# Patient Record
Sex: MALE | Race: WHITE | NOT HISPANIC OR LATINO | Employment: OTHER | URBAN - METROPOLITAN AREA
[De-identification: names, ages, dates, MRNs, and addresses within clinical notes are randomized per-mention and may not be internally consistent; named-entity substitution may affect disease eponyms.]

---

## 2017-01-01 ENCOUNTER — ANESTHESIA (OUTPATIENT)
Dept: SURGERY | Facility: HOSPITAL | Age: 72
DRG: 571 | End: 2017-01-01
Payer: MEDICARE

## 2017-01-01 ENCOUNTER — SURGERY (OUTPATIENT)
Age: 72
End: 2017-01-01

## 2017-01-01 PROCEDURE — 63600175 PHARM REV CODE 636 W HCPCS: Performed by: NURSE ANESTHETIST, CERTIFIED REGISTERED

## 2017-01-01 PROCEDURE — 25000003 PHARM REV CODE 250: Performed by: NURSE ANESTHETIST, CERTIFIED REGISTERED

## 2017-01-01 RX ORDER — SODIUM CHLORIDE, SODIUM LACTATE, POTASSIUM CHLORIDE, CALCIUM CHLORIDE 600; 310; 30; 20 MG/100ML; MG/100ML; MG/100ML; MG/100ML
INJECTION, SOLUTION INTRAVENOUS CONTINUOUS PRN
Status: DISCONTINUED | OUTPATIENT
Start: 2017-01-01 | End: 2017-01-01

## 2017-01-01 RX ORDER — FENTANYL CITRATE 50 UG/ML
INJECTION, SOLUTION INTRAMUSCULAR; INTRAVENOUS
Status: DISCONTINUED | OUTPATIENT
Start: 2017-01-01 | End: 2017-01-01

## 2017-01-01 RX ORDER — LIDOCAINE HCL/PF 100 MG/5ML
SYRINGE (ML) INTRAVENOUS
Status: DISCONTINUED | OUTPATIENT
Start: 2017-01-01 | End: 2017-01-01

## 2017-01-01 RX ORDER — PROPOFOL 10 MG/ML
VIAL (ML) INTRAVENOUS
Status: DISCONTINUED | OUTPATIENT
Start: 2017-01-01 | End: 2017-01-01

## 2017-01-01 RX ORDER — ONDANSETRON 2 MG/ML
INJECTION INTRAMUSCULAR; INTRAVENOUS
Status: DISCONTINUED | OUTPATIENT
Start: 2017-01-01 | End: 2017-01-01

## 2017-01-01 RX ADMIN — ONDANSETRON 4 MG: 2 INJECTION, SOLUTION INTRAMUSCULAR; INTRAVENOUS at 10:01

## 2017-01-01 RX ADMIN — FENTANYL CITRATE 100 MCG: 50 INJECTION, SOLUTION INTRAMUSCULAR; INTRAVENOUS at 09:01

## 2017-01-01 RX ADMIN — SODIUM CHLORIDE, SODIUM LACTATE, POTASSIUM CHLORIDE, AND CALCIUM CHLORIDE: 600; 310; 30; 20 INJECTION, SOLUTION INTRAVENOUS at 09:01

## 2017-01-01 RX ADMIN — LIDOCAINE HYDROCHLORIDE 80 MG: 20 INJECTION, SOLUTION INTRAVENOUS at 09:01

## 2017-01-01 RX ADMIN — PROPOFOL 120 MG: 10 INJECTION, EMULSION INTRAVENOUS at 09:01

## 2017-01-01 RX ADMIN — EPHEDRINE SULFATE 25 MG: 50 INJECTION, SOLUTION INTRAMUSCULAR; INTRAVENOUS; SUBCUTANEOUS at 09:01

## 2017-01-05 ENCOUNTER — TELEPHONE (OUTPATIENT)
Dept: CASE MANAGEMENT | Facility: HOSPITAL | Age: 72
End: 2017-01-05

## 2017-01-05 NOTE — TELEPHONE ENCOUNTER
Received call from Doris Lemos [facility] in regards to PICC line.  Doris states that the PICC line was not discontinued prior to discharge.  Doris Lemos stated that they are not certified to discontinue PICC lines.  Information regarding PICC line and patient information given to Mikki Reyez RN (CC) on med surg to determine course for getting PICC line discontinued.

## 2017-01-20 ENCOUNTER — TELEPHONE (OUTPATIENT)
Dept: INTERNAL MEDICINE | Facility: CLINIC | Age: 72
End: 2017-01-20

## 2017-01-20 NOTE — TELEPHONE ENCOUNTER
Called Gene Rothman and left a voicemail on 01/20/2017 at 2:20 PM    Patient needs to schedule an appointment to discuss paperwork with Dr. Hills.

## 2017-01-27 ENCOUNTER — TELEPHONE (OUTPATIENT)
Dept: INTERNAL MEDICINE | Facility: CLINIC | Age: 72
End: 2017-01-27

## 2017-01-27 NOTE — TELEPHONE ENCOUNTER
Patient's appointment has been scheduled. Gene Rothman has been notified 01/27/2017 at 2:22 PM & verbalized understanding.       Home health notified.

## 2017-01-27 NOTE — TELEPHONE ENCOUNTER
----- Message from Alondra Kelly sent at 1/27/2017 12:54 PM CST -----  Please call hannah hein at [facility] back at [phone] in regards to faxing a order for home care aid for adl. Please fax at [phone].

## 2017-01-27 NOTE — TELEPHONE ENCOUNTER
That should not be a problem, however he has not been seen for hospital TCC follow up and signing off on home health orders will be a problem as I have not seen him since September. He needs to schedule a visit. Thank you.

## 2017-02-06 ENCOUNTER — OFFICE VISIT (OUTPATIENT)
Dept: INTERNAL MEDICINE | Facility: CLINIC | Age: 72
End: 2017-02-06
Payer: MEDICARE

## 2017-02-06 ENCOUNTER — LAB VISIT (OUTPATIENT)
Dept: LAB | Facility: HOSPITAL | Age: 72
End: 2017-02-06
Attending: INTERNAL MEDICINE
Payer: MEDICARE

## 2017-02-06 VITALS
WEIGHT: 315 LBS | HEIGHT: 71 IN | BODY MASS INDEX: 44.1 KG/M2 | TEMPERATURE: 99 F | RESPIRATION RATE: 17 BRPM | DIASTOLIC BLOOD PRESSURE: 90 MMHG | HEART RATE: 100 BPM | OXYGEN SATURATION: 94 % | SYSTOLIC BLOOD PRESSURE: 160 MMHG

## 2017-02-06 DIAGNOSIS — S81.809D MULTIPLE OPEN WOUNDS OF LOWER LEG, UNSPECIFIED LATERALITY, SUBSEQUENT ENCOUNTER: Primary | ICD-10-CM

## 2017-02-06 DIAGNOSIS — E66.01 MORBID OBESITY WITH BMI OF 60.0-69.9, ADULT: ICD-10-CM

## 2017-02-06 DIAGNOSIS — E03.9 HYPOTHYROIDISM (ACQUIRED): ICD-10-CM

## 2017-02-06 DIAGNOSIS — I89.0 LYMPHEDEMA OF BOTH LOWER EXTREMITIES: ICD-10-CM

## 2017-02-06 DIAGNOSIS — I10 ESSENTIAL HYPERTENSION: ICD-10-CM

## 2017-02-06 PROCEDURE — 99999 PR PBB SHADOW E&M-EST. PATIENT-LVL III: CPT | Mod: PBBFAC,,, | Performed by: INTERNAL MEDICINE

## 2017-02-06 PROCEDURE — 99499 UNLISTED E&M SERVICE: CPT | Mod: S$GLB,,, | Performed by: INTERNAL MEDICINE

## 2017-02-06 PROCEDURE — 84439 ASSAY OF FREE THYROXINE: CPT

## 2017-02-06 PROCEDURE — 36415 COLL VENOUS BLD VENIPUNCTURE: CPT

## 2017-02-06 PROCEDURE — 84443 ASSAY THYROID STIM HORMONE: CPT

## 2017-02-06 PROCEDURE — 99213 OFFICE O/P EST LOW 20 MIN: CPT | Mod: S$GLB,,, | Performed by: INTERNAL MEDICINE

## 2017-02-06 RX ORDER — CIPROFLOXACIN 750 MG/1
TABLET, FILM COATED ORAL
COMMUNITY
Start: 2017-02-01 | End: 2017-02-20

## 2017-02-06 RX ORDER — HYDROCODONE BITARTRATE AND ACETAMINOPHEN 5; 325 MG/1; MG/1
TABLET ORAL
Status: ON HOLD | COMMUNITY
Start: 2017-02-01 | End: 2021-12-14 | Stop reason: SDUPTHER

## 2017-02-06 NOTE — MR AVS SNAPSHOT
[facility]  [address] [place] [address]  Phone: [phone]  Fax: [phone]                  Gene Rothman   2017 2:00 PM   Office Visit    Description:  Male : 1945   Provider:  Ami Zarate DO   Department:  [facility]           Reason for Visit     Transitional Care           Diagnoses this Visit        Comments    Hypothyroidism (acquired)    -  Primary     Multiple open wounds of lower leg, unspecified laterality, subsequent encounter         Venous insufficiency of both lower extremities                To Do List           Goals (5 Years of Data)     None      [facility]     [facility] On [facility] Care Line -  Assistance  Registered nurses in the [facility] On Call Center provide clinical advisement, health education, appointment booking, and other advisory services.  Call for this free service at [phone].             Medications           Message regarding Medications     Verify the changes and/or additions to your medication regime listed below are the same as discussed with your clinician today.  If any of these changes or additions are incorrect, please notify your healthcare provider.             Verify that the below list of medications is an accurate representation of the medications you are currently taking.  If none reported, the list may be blank. If incorrect, please contact your healthcare provider. Carry this list with you in case of emergency.           Current Medications     ciprofloxacin HCl (CIPRO) 750 MG tablet     hydrocodone-acetaminophen 5-325mg (NORCO) 5-325 mg per tablet     levothyroxine (SYNTHROID) 25 MCG tablet Take 1 tablet (25 mcg total) by mouth once daily.    metronidazole 1% (METROGEL) 1 % Gel Apply topically once daily.    miconazole (MICOTIN) 2 % cream Apply topically 2 (two) times daily.    polyethylene glycol (GLYCOLAX) 17 gram PwPk Take 17 g by mouth every evening.    furosemide  "(LASIX) 20 MG tablet Take 1 tablet (20 mg total) by mouth once daily.    naproxen (NAPROSYN) 250 MG tablet Take 500 mg by mouth once daily.    oxycodone-acetaminophen (PERCOCET)  mg per tablet Take 1 tablet by mouth every 4 (four) hours as needed for Pain.    sodium hypochlorite 0.5 % external solution Apply topically daily as needed (dressing changes).           Clinical Reference Information           Your Vitals Were     BP Pulse Temp Resp Height Weight    160/90 100 98.7 °F (37.1 °C) (Temporal) 17 5' 11" (1.803 m) 207.2 kg (456 lb 12.7 oz)    SpO2 BMI             94% 63.71 kg/m2         Blood Pressure          Most Recent Value    BP  (!)  160/90      Allergies as of 2/6/2017     Bactrim [Sulfamethoxazole-trimethoprim]      Immunizations Administered on Date of Encounter - 2/6/2017     None      Orders Placed During Today's Visit     Recurring Lab Work Interval Expires    TSH  Every 12 Months until 4/7/2018 4/7/2018      [facility] Sign-Up     Activating your [facility] account is as easy as 1-2-3!     1) Visit [email or web address], select Sign Up Now, enter this activation code and your date of birth, then select Next.  [email or web address]  Expires: 2/13/2017  2:14 PM      2) Create a username and password to use when you visit [facility] in the future and select a security question in case you lose your password and select Next.    3) Enter your e-mail address and click Sign Up!    Additional Information  If you have questions, please e-mail [email or web address] or call [phone] to talk to our [facility] staff. Remember, [facility] is NOT to be used for urgent needs. For medical emergencies, dial 911.         Language Assistance Services     ATTENTION: Language assistance services are available, free of charge. Please call [phone].      ATENCIÓN: Si habla español, tiene a christensen disposición servicios gratuitos de asistencia lingüística. Llame al [phone].     CHÚ Ý: N?u b?n nói Ti?ng Vi?t, có các d?ch " v? h? tr? carmen ng? mi?n phí alexanderh cho b?n. G?i s? [phone].         [facility] complies with applicable Federal civil rights laws and does not discriminate on the basis of race, color, national origin, age, disability, or sex.

## 2017-02-07 LAB
T4 FREE SERPL-MCNC: 1.03 NG/DL
TSH SERPL DL<=0.005 MIU/L-ACNC: 4.22 UIU/ML

## 2017-02-08 NOTE — PROGRESS NOTES
Transitional Care Note  Subjective:       Patient ID: Gene Rothman is a 71 y.o. male.  Chief Complaint: Transitional Care    Family and/or Caretaker present at visit?  Yes.  Diagnostic tests reviewed/disposition: I have reviewed all completed as well as pending diagnostic tests at the time of discharge.  Disease/illness education: done  Home health/community services discussion/referrals: Patient has home health established at [facility].   Establishment or re-establishment of referral orders for community resources: No other necessary community resources.   Discussion with other health care providers: No discussion with other health care providers necessary.   HPI Comments: HPI: Hospitalized from 12/30/16 to 1/4/17 due to bilateral lower extremity cellulitis and wounds which required surgical debridement and IV antibiotics. He is currently receiving wound care through home health. He and his wife endorse a significant improvement. He has chronic lymphedema.  He would like to establish care today.   Hypothyroidism--compliant with levothyroxine.   His b/p is elevated and has been elevated on prior visits with other providers. He has been prescribed furosemide but does not take it. He is not on any other b/p medication.     Past Medical History:    Arthritis                                                     Depression                                                    Hypertension                                                  Hypothyroidism                                                Lymphedema of lower extremity                                 Nephrolithiasis                                                 Comment:passes one q year, one surgically removed    Obesity                                                       Peripheral vascular disease                                   Sleep apnea                                                     Comment:can't stand the CPAP , sleeps elevated in                 hospital bed or chair    Tobacco dependence                                              Comment:resolved    Past Surgical History:    BACK SURGERY                                     ;  19*      Comment:x2 for disc; scar tissue removed    ADENOIDECTOMY                                                  INNER EAR SURGERY                               Bilateral             Comment:separate - pinnaplasty , new eardrms                constructed    TONSILLECTOMY                                              KIDNEY STONE SURGERY                            Left  appr*      Comment:open    Review of patient's family history indicates:    Asthma                         Daughter                    Comment:  26 w/ asthma    Cancer                         Brother                     Comment: skin-part of ext ear removed    Cancer                         Mother                      Comment: ? abd also    Diabetes                       Mother                    Hypertension                   Mother                    Cancer                         Father                      Comment: ? abdonimal origin stomach/liver or                pancreas    Heart disease                  Father                      Comment: pacer    Heart disease                  Brother                     Comment: CABG3    Alcohol abuse                  Maternal Grandfather      Stroke                         Neg Hx                    COPD                           Neg Hx                    Mental illness                 Neg Hx                    Mental retardation             Neg Hx                    Kidney disease                 Neg Hx                      Current Outpatient Prescriptions on File Prior to Visit:  levothyroxine (SYNTHROID) 25 MCG tablet, Take 1 tablet (25 mcg total) by mouth once daily., Disp: 90 tablet, Rfl: 3  metronidazole 1% (METROGEL) 1 % Gel, Apply topically once daily., Disp: 60 g, Rfl: 0  miconazole  (MICOTIN) 2 % cream, Apply topically 2 (two) times daily., Disp: 14 g, Rfl: 0  polyethylene glycol (GLYCOLAX) 17 gram PwPk, Take 17 g by mouth every evening., Disp: 30 each, Rfl: 0  furosemide (LASIX) 20 MG tablet, Take 1 tablet (20 mg total) by mouth once daily., Disp: 30 tablet, Rfl: 0  naproxen (NAPROSYN) 250 MG tablet, Take 500 mg by mouth once daily., Disp: , Rfl:   oxycodone-acetaminophen (PERCOCET)  mg per tablet, Take 1 tablet by mouth every 4 (four) hours as needed for Pain., Disp: 30 tablet, Rfl: 0  sodium hypochlorite 0.5 % external solution, Apply topically daily as needed (dressing changes)., Disp: , Rfl: 0    Allergies:   Review of patient's allergies indicates:   -- Bactrim (sulfamethoxazole-trimethoprim) -- Itching            Review of Systems   Constitutional: Negative for fever and unexpected weight change.   Respiratory: Negative for shortness of breath.    Cardiovascular: Positive for leg swelling. Negative for chest pain.   Gastrointestinal: Negative for abdominal pain, constipation and diarrhea.   Genitourinary: Negative for dysuria.   Musculoskeletal: Positive for gait problem.   Skin: Positive for wound.   Neurological: Negative for dizziness and headaches.       Objective:      Physical Exam   Constitutional: He is oriented to person, place, and time. He appears well-developed and well-nourished. No distress.   Eyes: No scleral icterus.   Cardiovascular: Normal rate, regular rhythm and normal heart sounds.    Pulmonary/Chest: Effort normal and breath sounds normal. No respiratory distress.   Abdominal: Soft. Bowel sounds are normal.   Obese    Musculoskeletal: He exhibits edema.   Neurological: He is alert and oriented to person, place, and time.   Skin: Skin is warm and dry.   Multiple wounds of bilateral lower extremities. Bloody drainage from wounds on right leg. No purulent drainage noted.    Psychiatric: He has a normal mood and affect.   Vitals reviewed.      Assessment:       1.  Multiple open wounds of lower leg, unspecified laterality, subsequent encounter    2. Lymphedema of both lower extremities    3. Hypothyroidism (acquired)    4. Essential hypertension    5. Morbid obesity with BMI of 60.0-69.9, adult        Plan:       Gene was seen today for transitional care.    Diagnoses and all orders for this visit:    Multiple open wounds of lower leg, unspecified laterality, subsequent encounter  -     Continue wound care    Lymphedema of both lower extremities    Hypothyroidism (acquired)  -     TSH; Standing    Essential hypertension  -     Monitor b/p at home through home health    Morbid obesity with BMI of 60.0-69.9, adult  -     Lifestyle modifications    Schedule labs.

## 2017-02-09 ENCOUNTER — TELEPHONE (OUTPATIENT)
Dept: INTERNAL MEDICINE | Facility: CLINIC | Age: 72
End: 2017-02-09

## 2017-02-09 DIAGNOSIS — E03.9 HYPOTHYROIDISM (ACQUIRED): Primary | ICD-10-CM

## 2017-02-13 ENCOUNTER — TELEPHONE (OUTPATIENT)
Dept: INTERNAL MEDICINE | Facility: CLINIC | Age: 72
End: 2017-02-13

## 2017-02-13 NOTE — TELEPHONE ENCOUNTER
----- Message from Ami Zarate DO sent at 2/9/2017  9:41 PM CST -----  Notify patient thyroid test is slightly abnormal. Recommend continuing current dose of levothyroxine and repeat TSH in 3 months.

## 2017-02-13 NOTE — TELEPHONE ENCOUNTER
Pt was informed lab results and Dr. Zarate recommended continuing current dose of levothyroxine and repeat TSH in 3 months pt verbalized understanding.

## 2017-02-20 ENCOUNTER — OFFICE VISIT (OUTPATIENT)
Dept: INTERNAL MEDICINE | Facility: CLINIC | Age: 72
End: 2017-02-20
Payer: MEDICARE

## 2017-02-20 VITALS
HEART RATE: 79 BPM | OXYGEN SATURATION: 94 % | TEMPERATURE: 99 F | WEIGHT: 315 LBS | DIASTOLIC BLOOD PRESSURE: 70 MMHG | HEIGHT: 71 IN | SYSTOLIC BLOOD PRESSURE: 128 MMHG | BODY MASS INDEX: 44.1 KG/M2

## 2017-02-20 DIAGNOSIS — Z76.89 RETURN TO WORK EVALUATION: Primary | ICD-10-CM

## 2017-02-20 PROCEDURE — 1157F ADVNC CARE PLAN IN RCRD: CPT | Mod: S$GLB,,, | Performed by: INTERNAL MEDICINE

## 2017-02-20 PROCEDURE — 1126F AMNT PAIN NOTED NONE PRSNT: CPT | Mod: S$GLB,,, | Performed by: INTERNAL MEDICINE

## 2017-02-20 PROCEDURE — 3078F DIAST BP <80 MM HG: CPT | Mod: S$GLB,,, | Performed by: INTERNAL MEDICINE

## 2017-02-20 PROCEDURE — 1159F MED LIST DOCD IN RCRD: CPT | Mod: S$GLB,,, | Performed by: INTERNAL MEDICINE

## 2017-02-20 PROCEDURE — 99999 PR PBB SHADOW E&M-EST. PATIENT-LVL III: CPT | Mod: PBBFAC,,, | Performed by: INTERNAL MEDICINE

## 2017-02-20 PROCEDURE — 3074F SYST BP LT 130 MM HG: CPT | Mod: S$GLB,,, | Performed by: INTERNAL MEDICINE

## 2017-02-20 PROCEDURE — 99212 OFFICE O/P EST SF 10 MIN: CPT | Mod: S$GLB,,, | Performed by: INTERNAL MEDICINE

## 2017-02-20 NOTE — MR AVS SNAPSHOT
[facility]  [address] [address]  Phone: [phone]  Fax: [phone]                  Gene Rothman   2017 1:20 PM   Office Visit    Description:  Male : 1945   Provider:  Ami Zarate DO   Department:  [facility]           Reason for Visit     Follow-up                To Do List           Future Appointments        Provider Department Dept Phone    2017 9:30 AM LABORATORY, [address] [facility] [phone]      Goals (5 Years of Data)     None      [facility] On Call     [facility] On Call Nurse Care Line -  Assistance  Registered nurses in the [facility] On Call Center provide clinical advisement, health education, appointment booking, and other advisory services.  Call for this free service at [phone].             Medications           Message regarding Medications     Verify the changes and/or additions to your medication regime listed below are the same as discussed with your clinician today.  If any of these changes or additions are incorrect, please notify your healthcare provider.        STOP taking these medications     ciprofloxacin HCl (CIPRO) 750 MG tablet     furosemide (LASIX) 20 MG tablet Take 1 tablet (20 mg total) by mouth once daily.    metronidazole 1% (METROGEL) 1 % Gel Apply topically once daily.    miconazole (MICOTIN) 2 % cream Apply topically 2 (two) times daily.    oxycodone-acetaminophen (PERCOCET)  mg per tablet Take 1 tablet by mouth every 4 (four) hours as needed for Pain.    polyethylene glycol (GLYCOLAX) 17 gram PwPk Take 17 g by mouth every evening.           Verify that the below list of medications is an accurate representation of the medications you are currently taking.  If none reported, the list may be blank. If incorrect, please contact your healthcare provider. Carry this list with you in case of emergency.           Current Medications      "hydrocodone-acetaminophen 5-325mg (NORCO) 5-325 mg per tablet     levothyroxine (SYNTHROID) 25 MCG tablet Take 1 tablet (25 mcg total) by mouth once daily.    naproxen (NAPROSYN) 250 MG tablet Take 500 mg by mouth once daily.    sodium hypochlorite 0.5 % external solution Apply topically daily as needed (dressing changes).           Clinical Reference Information           Your Vitals Were     BP Pulse Temp Height Weight SpO2    128/70 (BP Location: Right arm, Patient Position: Sitting) 79 98.7 °F (37.1 °C) (Tympanic) 5' 11" (1.803 m) 207 kg (456 lb 5.6 oz) 94%    BMI                63.65 kg/m2          Blood Pressure          Most Recent Value    BP  128/70      Allergies as of 2/20/2017     Bactrim [Sulfamethoxazole-trimethoprim]      Immunizations Administered on Date of Encounter - 2/20/2017     None      [facility] Sign-Up     Activating your [facility] account is as easy as 1-2-3!     1) Visit [email or web address], select Sign Up Now, enter this activation code and your date of birth, then select Next.  [email or web address]  Expires: 4/6/2017  2:09 PM      2) Create a username and password to use when you visit [facility] in the future and select a security question in case you lose your password and select Next.    3) Enter your e-mail address and click Sign Up!    Additional Information  If you have questions, please e-mail [email or web address] or call [phone] to talk to our [facility] staff. Remember, [facility] is NOT to be used for urgent needs. For medical emergencies, dial 911.         Language Assistance Services     ATTENTION: Language assistance services are available, free of charge. Please call [phone].      ATENCIÓN: Si stefaniala hardikwolf, tiene a christensen disposición servicios gratuitos de asistencia lingüística. Llame al [phone].     CHÚ Ý: N?u b?n nói Ti?ng Vi?t, có các d?ch v? h? tr? ngôn ng? mi?n phí dành cho b?n. G?i s? [phone].         [facility] complies with applicable " Federal civil rights laws and does not discriminate on the basis of race, color, national origin, age, disability, or sex.

## 2017-02-20 NOTE — LETTER
February 20, 2017               [facility]  Internal Medicine  [address] [place] [address]  Phone: [phone]  Fax: [phone]   February 20, 2017     Patient: Gene Rothman   YOB: 1945   Date of Visit: 2/20/2017       To Whom it May Concern:    Gene Rothman was seen in my clinic on 2/20/2017. He may return to work on 2/27/2017.    If you have any questions or concerns, please don't hesitate to call.    Sincerely,         Ami Zarate, DO

## 2017-03-23 NOTE — TELEPHONE ENCOUNTER
----- Message from Hansa Tovar sent at 3/23/2017 10:32 AM CDT -----  Pt needs to speak to the nurse regarding possibly getting a refill on a medication (silvasorb gel)) that was given to him in the hospital/ please call [phone]/ma

## 2017-03-28 NOTE — TELEPHONE ENCOUNTER
----- Message from Eneida Erazo sent at 3/28/2017 11:37 AM CDT -----  Contact: Patient   Refill request for Rx Silvasorb gel, Please call him at [phone] anytime after 4pm and anytime before 4pm to call [phone].      [phone] - [place] [address] - [address]  [address] [address]  Phone: [phone] Fax: [phone]    Thanks  td

## 2017-05-18 RX ORDER — LEVOTHYROXINE SODIUM 25 UG/1
25 TABLET ORAL DAILY
Qty: 90 TABLET | Refills: 3 | Status: SHIPPED | OUTPATIENT
Start: 2017-05-18 | End: 2017-05-22 | Stop reason: SDUPTHER

## 2017-05-22 RX ORDER — LEVOTHYROXINE SODIUM 25 UG/1
25 TABLET ORAL DAILY
Qty: 90 TABLET | Refills: 3 | Status: SHIPPED | OUTPATIENT
Start: 2017-05-22 | End: 2017-06-05 | Stop reason: SDUPTHER

## 2017-06-02 ENCOUNTER — LAB VISIT (OUTPATIENT)
Dept: LAB | Facility: HOSPITAL | Age: 72
End: 2017-06-02
Attending: INTERNAL MEDICINE
Payer: MEDICARE

## 2017-06-02 DIAGNOSIS — E03.9 HYPOTHYROIDISM (ACQUIRED): ICD-10-CM

## 2017-06-02 LAB
T4 FREE SERPL-MCNC: 0.94 NG/DL
TSH SERPL DL<=0.005 MIU/L-ACNC: 7.99 UIU/ML

## 2017-06-02 PROCEDURE — 84439 ASSAY OF FREE THYROXINE: CPT

## 2017-06-02 PROCEDURE — 84443 ASSAY THYROID STIM HORMONE: CPT

## 2017-06-02 PROCEDURE — 36415 COLL VENOUS BLD VENIPUNCTURE: CPT

## 2017-06-05 ENCOUNTER — TELEPHONE (OUTPATIENT)
Dept: INTERNAL MEDICINE | Facility: CLINIC | Age: 72
End: 2017-06-05

## 2017-06-05 DIAGNOSIS — E03.9 HYPOTHYROIDISM (ACQUIRED): Primary | ICD-10-CM

## 2017-06-05 RX ORDER — LEVOTHYROXINE SODIUM 25 UG/1
37.5 TABLET ORAL DAILY
Qty: 45 TABLET | Refills: 1 | Status: SHIPPED | OUTPATIENT
Start: 2017-06-05 | End: 2017-06-28 | Stop reason: SDUPTHER

## 2017-06-08 DIAGNOSIS — E03.9 HYPOTHYROIDISM (ACQUIRED): ICD-10-CM

## 2017-06-10 RX ORDER — LEVOTHYROXINE SODIUM 25 UG/1
37.5 TABLET ORAL DAILY
Qty: 135 TABLET | Refills: 1 | OUTPATIENT
Start: 2017-06-10

## 2017-06-26 ENCOUNTER — TELEPHONE (OUTPATIENT)
Dept: INTERNAL MEDICINE | Facility: CLINIC | Age: 72
End: 2017-06-26

## 2017-06-26 DIAGNOSIS — E03.9 HYPOTHYROIDISM (ACQUIRED): ICD-10-CM

## 2017-06-28 RX ORDER — LEVOTHYROXINE SODIUM 25 UG/1
37.5 TABLET ORAL DAILY
Qty: 135 TABLET | Refills: 0 | Status: SHIPPED | OUTPATIENT
Start: 2017-06-28 | End: 2017-09-19 | Stop reason: SDUPTHER

## 2017-09-12 ENCOUNTER — TELEPHONE (OUTPATIENT)
Dept: INTERNAL MEDICINE | Facility: CLINIC | Age: 72
End: 2017-09-12

## 2017-09-12 NOTE — TELEPHONE ENCOUNTER
Pt applying for assistance from [facility] to help restore his home. States he has arthritis to bilat knees and moblizes via walker and/or wheelchair. Letter needs to state that he is disabled.

## 2017-09-12 NOTE — TELEPHONE ENCOUNTER
----- Message from Tayo Lopez sent at 9/12/2017 11:07 AM CDT -----  Contact: pt  He's calling in regards to a written letter of his diagnosis for disability, please fax this to: [phone], please advise, [phone] (work)

## 2017-09-19 ENCOUNTER — TELEPHONE (OUTPATIENT)
Dept: INTERNAL MEDICINE | Facility: CLINIC | Age: 72
End: 2017-09-19

## 2017-09-19 DIAGNOSIS — E03.9 HYPOTHYROIDISM (ACQUIRED): ICD-10-CM

## 2017-09-19 RX ORDER — LEVOTHYROXINE SODIUM 25 UG/1
TABLET ORAL
Qty: 45 TABLET | Refills: 0 | Status: SHIPPED | OUTPATIENT
Start: 2017-09-19 | End: 2017-10-09 | Stop reason: SDUPTHER

## 2017-09-19 NOTE — TELEPHONE ENCOUNTER
----- Message from Patrice Rothman sent at 9/19/2017  2:06 PM CDT -----  Contact: [phone]  Would like to consult with nurse about pt medication Levothyroxine 25mg. Pt need authorization for script. .. Please fax back at [phone]. Thx. lj

## 2017-09-19 NOTE — TELEPHONE ENCOUNTER
Dr. Zarate had wanted him back for follow up to recheck TSH.  He did not complete this.  I only gave one month of refill  Please schedule him for lab and follow up with Dr. Zarate or Tej.    Dr. Erazo

## 2017-09-20 ENCOUNTER — TELEPHONE (OUTPATIENT)
Dept: INTERNAL MEDICINE | Facility: CLINIC | Age: 72
End: 2017-09-20

## 2017-09-20 NOTE — TELEPHONE ENCOUNTER
Pt has an appt on 09/29/17 for HRA and repeat TSH labs. Do you want to order anything else on him?

## 2017-09-20 NOTE — TELEPHONE ENCOUNTER
Notified pts wife of refill for 30 days. Scheduled lab appt for 09/29/17, pt already has an appt that day and will be best. Then scheduled a f/u appt with Dr Zarate on 10/13/17. Mailed appt slips to pt.

## 2017-09-29 ENCOUNTER — LAB VISIT (OUTPATIENT)
Dept: LAB | Facility: HOSPITAL | Age: 72
End: 2017-09-29
Attending: INTERNAL MEDICINE
Payer: MEDICARE

## 2017-09-29 ENCOUNTER — OFFICE VISIT (OUTPATIENT)
Dept: INTERNAL MEDICINE | Facility: CLINIC | Age: 72
End: 2017-09-29
Payer: MEDICARE

## 2017-09-29 VITALS
SYSTOLIC BLOOD PRESSURE: 118 MMHG | WEIGHT: 315 LBS | HEART RATE: 69 BPM | DIASTOLIC BLOOD PRESSURE: 64 MMHG | HEIGHT: 71 IN | OXYGEN SATURATION: 93 % | BODY MASS INDEX: 44.1 KG/M2 | TEMPERATURE: 98 F

## 2017-09-29 DIAGNOSIS — N20.0 NEPHROLITHIASIS: Chronic | ICD-10-CM

## 2017-09-29 DIAGNOSIS — Z13.31 POSITIVE DEPRESSION SCREENING: ICD-10-CM

## 2017-09-29 DIAGNOSIS — E03.9 HYPOTHYROIDISM (ACQUIRED): ICD-10-CM

## 2017-09-29 DIAGNOSIS — I89.0 CHRONIC ACQUIRED LYMPHEDEMA: Chronic | ICD-10-CM

## 2017-09-29 DIAGNOSIS — I83.029 VENOUS STASIS ULCERS OF BOTH LOWER EXTREMITIES: ICD-10-CM

## 2017-09-29 DIAGNOSIS — G47.33 OBSTRUCTIVE SLEEP APNEA SYNDROME: Chronic | ICD-10-CM

## 2017-09-29 DIAGNOSIS — G47.34 NOCTURNAL HYPOXIA: ICD-10-CM

## 2017-09-29 DIAGNOSIS — Z00.00 ENCOUNTER FOR PREVENTIVE HEALTH EXAMINATION: Primary | ICD-10-CM

## 2017-09-29 DIAGNOSIS — E03.8 OTHER SPECIFIED ACQUIRED HYPOTHYROIDISM: Chronic | ICD-10-CM

## 2017-09-29 DIAGNOSIS — L97.919 VENOUS STASIS ULCERS OF BOTH LOWER EXTREMITIES: ICD-10-CM

## 2017-09-29 DIAGNOSIS — L97.929 VENOUS STASIS ULCERS OF BOTH LOWER EXTREMITIES: ICD-10-CM

## 2017-09-29 DIAGNOSIS — H91.93 BILATERAL HEARING LOSS, UNSPECIFIED HEARING LOSS TYPE: Chronic | ICD-10-CM

## 2017-09-29 DIAGNOSIS — I83.019 VENOUS STASIS ULCERS OF BOTH LOWER EXTREMITIES: ICD-10-CM

## 2017-09-29 DIAGNOSIS — E66.01 MORBID OBESITY WITH BMI OF 60.0-69.9, ADULT: Chronic | ICD-10-CM

## 2017-09-29 DIAGNOSIS — K43.9 VENTRAL HERNIA WITHOUT OBSTRUCTION OR GANGRENE: Chronic | ICD-10-CM

## 2017-09-29 PROBLEM — Z99.3 WHEELCHAIR BOUND: Status: ACTIVE | Noted: 2017-09-29

## 2017-09-29 LAB — TSH SERPL DL<=0.005 MIU/L-ACNC: 3.87 UIU/ML

## 2017-09-29 PROCEDURE — 84443 ASSAY THYROID STIM HORMONE: CPT

## 2017-09-29 PROCEDURE — 36415 COLL VENOUS BLD VENIPUNCTURE: CPT

## 2017-09-29 PROCEDURE — 99999 PR PBB SHADOW E&M-EST. PATIENT-LVL IV: CPT | Mod: PBBFAC,,, | Performed by: INTERNAL MEDICINE

## 2017-09-29 PROCEDURE — 99499 UNLISTED E&M SERVICE: CPT | Mod: S$GLB,,, | Performed by: INTERNAL MEDICINE

## 2017-09-29 PROCEDURE — G0439 PPPS, SUBSEQ VISIT: HCPCS | Mod: S$GLB,,, | Performed by: INTERNAL MEDICINE

## 2017-09-29 NOTE — PROGRESS NOTES
"Gene Rothman presented for a  Medicare AWV and comprehensive Health Risk Assessment today. The following components were reviewed and updated:    · Medical history  · Family History  · Social history  · Allergies and Current Medications  · Health Risk Assessment  · Health Maintenance  · Care Team     ** See Completed Assessments for Annual Wellness Visit within the encounter summary.**       The following assessments were completed:  · Living Situation  · CAGE  · Depression Screening  · Timed Get Up and Go  · Whisper Test  · Cognitive Function Screening  · Nutrition Screening  · ADL Screening  · PAQ Screening    Physical Exam    PE:   /64 (BP Location: Left arm, Patient Position: Sitting, BP Method: Large (Manual))   Pulse 69   Temp 98 °F (36.7 °C) (Tympanic)   Ht 5' 11" (1.803 m)   Wt (!) 202.7 kg (446 lb 14 oz)   SpO2 (!) 93%   BMI 62.33 kg/m²     APPEARANCE: Patient is a 71 y.o.male /White . Awake, alert, in no distress, good historian.   HEENT: PERRLA, EOMI. No facial asymmetry.Tongue midline. Edentulate above. A few remaining teeth lower incisors and a molar on each side.   NECK: Supple. Carotids: 2+, no bruits. No thyromegaly. No cervical adenopathy.   HEART: Regular rate and rhythm with  No murmur , rubs or gallops.   CHEST: Lungs clear to auscultation.   BACK:  No spinous tenderness. No flank tenderness.   ABDOMEN: Morbidly obese.  Bowel sounds normal. Prominent rolls of fat lower abdomen, no specific hernia palpated but difficult exam due to girth. Soft. No rebound.   EXTREMITIES: No clubbing or cyanosis. Marked Bilateral lymphedema. With stasis changes and ulcerations. Wrapped foot to knee B. Skin as listed below.  Peripheral pulses intact.Moderate varicosities  NEURO:  Hearing Aid on right. Decreased bilaterally. Motor: Symmetric  grasp, flexion and extension of feet. Toes downgoing. Sensory intact to monofilament testing, symmetric. Finger to nose is intact with no tremor. No " "spasticity or muscle fasciculations. Gait:Wheelchair.   SKIN: All toenails with fungal involvement. Red stasis changes both lower legs with weeping ulcerations Left > Right lower legs.   Pictures of left lower leg below. Unable to remove all of dressing due to bleeding. 4 ulcerated lesions largest 9cm x 3 then 6 cm x 2 ( best seen in picture 1of 2 . 2 smaller ulcers seen on 2/2  about 2 cm in size.          Patient requested that wrappings - aces and gauze not be removed from right leg since only 1 "quarter sized almost healedulcer on posterior calf on right. He has the right wrapped for comfort.       Diagnoses and health risks identified today and associated recommendations/orders:    1. Encounter for preventive health examination  Completed.     2. Other specified acquired hypothyroidism  Unknown status. On levothyroxine with recheck drawn today and pending. PCP, Dr Zarate following this.   Component      9/29/17 6/2/17 2/6/17 2/5/16 4/6/15   TSH      0.400 - 4.000 uIU/mL pending 7.987 (H) 4.217 (H) 3.687 3.584     3. Morbid obesity with BMI of 60.0-69.9, adult  This problem is currently not controlled. Discussed food choices, portion size and regular exercise. Offered Dietian/nutrition consult- which he declined.  Please follow up with your PCP as planned to discuss adjustments to your treatment plan.     4. Venous stasis ulcers of both lower extremities  This problem is currently not controlled.  He used to go to wound care clinic but states" not worth it.  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 and his legs can't be elevated here. His son changes pads and dressings nightly for him and wraps his lower extremities every nght. Please follow up with your PCP as planned to discuss adjustments to your treatment plan.    - silver (SILVASORB) GlSR; Apply topically once daily.  Dispense: 480 mL; Refill: 2    5. " Chronic acquired lymphedema  Ongoing problem. He has his son ace wrap his lower legs daily. He has ulcerations mainly left leg. Follow up with PCP.    6. Obstructive sleep apnea syndrome  Stable and controlled per patient by sleeping upright in hospital bed since he does not tolerate CPAP mask. . Continue current treatment plan as previously prescribed with your physicians.     7. Nephrolithiasis  This problem is currently not controlled. States he thinks he passed a stone 5 days before this visit. Last imaging=7/22/11: CT 1.8 cm Nonobstructing Rt kidney stone. Surg before 2007 also. Please follow up with your PCP or urology.    8. Bilateral hearing loss, unspecified hearing loss type  Stable and controlled.Right ear hearing aid in and left is not a hearing aid candidate. Continue current treatment plan as previously prescribed with your PCP.     9. Nocturnal hypoxia  Unknown status. He no longer uses the oxygen, he does not have a pulse oximeter to be able to check. He has not followed up with Pulmonary.    10. Ventral hernia without obstruction or gangrene  Stable and controlled. Continue current treatment plan as previously prescribed with your PCP.     11. Positive depression screening  This is a new problem that has been identified during today's visit. He scored 3/6 on PHQ2 and 5 on the PHQ9 He feels he has enough stressors to be down or aggravated and not interested in pharmacologic therapy.  Please follow up with your PCP to discuss.      Provided Gene with a 5-10 year written screening schedule and personal prevention plan. Recommendations were developed using the USPSTF age appropriate recommendations. Education, counseling, and referrals were provided as needed. He declined Prevnar 13, Flu chot with pharmacist here or tetanus shot at this time. Also declined everardo of scheduling an Optometry appointment.  After Visit Summary printed and given to patient which includes a list of additional screenings\tests  needed.    Return in about 1 year (around 9/29/2018) for annual HRA.    Britany Castro MD

## 2017-09-29 NOTE — PATIENT INSTRUCTIONS
Counseling and Referral of Other Preventative  (Italic type indicates deductible and co-insurance are waived)    Patient Name: Gene Rothman  Today's Date: 9/29/2017  Recommended Optometry appointment    SERVICE LIMITATIONS RECOMMENDATION    Vaccines    · Pneumococcal (once after 65)    · Influenza (annually)    · Hepatitis B (if medium/high risk)    · Prevnar 13      Hepatitis B medium/high risk factors:       - End-stage renal disease       - Hemophiliacs who received Factor VII or         IX concentrates       - Clients of institutions for the mentally             retarded       - Persons who live in the same house as          a HepB carrier       - Homosexual men       - Illicit injectable drug abusers     Pneumococcal: Done, no repeat necessary     Influenza: Scheduled - see appointments     Hepatitis B: N/A     Prevnar 13: Recommended to patient, declined at this time    Prostate cancer screening (annually to age 75)     Prostate specific antigen (PSA) Shared decision making with Provider. Sometimes a co-pay may be required if the patient decides to have this test. The USPSTF no longer recommends prostate cancer screening routinely in medicine: not to follow    Colorectal cancer screening (to age 75)    · Fecal occult blood test (annual)  · Flexible sigmoidoscopy (5y)  · Screening colonoscopy (10y)  · Barium enema   Recommended to patient, declined    Diabetes self-management training (no USPSTF recommendations)  Requires referral by treating physician for patient with diabetes or renal disease. 10 hours of initial DSMT sessions of no less than 30 minutes each in a continuous 12-month period. 2 hours of follow-up DSMT in subsequent years.  N/A    Glaucoma screening (no USPSTF recommendation)  Diabetes mellitus, family history   , age 50 or over    American, age 65 or over  Last done 6-7 years ago , recommend to repeat every 1  Years, declines having one scheduled in spite of  recommendation.    Medical nutrition therapy for diabetes or renal disease (no recommended schedule)  Requires referral by treating physician for patient with diabetes or renal disease or kidney transplant within the past 3 years.  Can be provided in same year as diabetes self-management training (DSMT), and CMS recommends medical nutrition therapy take place after DSMT. Up to 3 hours for initial year and 2 hours in subsequent years.  N/A    Cardiovascular screening blood tests (every 5 years)  · Fasting lipid panel  Order as a panel if possible  Last done 4/6/15, recommend to repeat every 5  years    Diabetes screening tests (at least every 3 years, Medicare covers annually or at 6-month intervals for prediabetic patients)  · Fasting blood sugar (FBS) or glucose tolerance test (GTT)  Patient must be diagnosed with one of the following:       - Hypertension       - Dyslipidemia       - Obesity (BMI 30kg/m2)       - Previous elevated impaired FBS or GTT       ... or any two of the following:       - Overweight (BMI 25 but <30)       - Family history of diabetes       - Age 65 or older       - History of gestational diabetes or birth of baby weighing more than 9 pounds  Done this year, repeat every year    Abdominal aortic aneurysm screening (once)  · Sonogram   Limited to patients who meet one of the following criteria:       - Men who are 65-75 years old and have smoked more than 100 cigarette in their lifetime       - Anyone with a family history of abdominal aortic aneurysm       - Anyone recommended for screening by the USPSTF  Done, no repeat necessary    HIV screening (annually for increased risk patients)  · HIV-1 and HIV-2 by EIA, or EVIE, rapid antibody test or oral mucosa transudate  Patients must be at increased risk for HIV infection per USPSTF guidelines or pregnant. Tests covered annually for patient at increased risk or as requested by the patient. Pregnant patients may receive up to 3 tests during  pregnancy.  Risks discussed, screening is not recommended    Smoking cessation counseling (up to 8 sessions per year)  Patients must be asymptomatic of tobacco-related conditions to receive as a preventative service.  Non-smoker    Subsequent annual wellness visit  At least 12 months since last AWV  Return in one year     The following information is provided to all patients.  This information is to help you find resources for any of the problems found today that may be affecting your health:                Living healthy guide: www.[facility].[place].[facility]      Understanding Diabetes: www.diabetes.org      Eating healthy: www.cdc.gov/healthyweight      CDC home safety checklist: www.cdc.gov/steadi/patient.html      Agency on Aging: www.goea.[place].[facility]      Alcoholics anonymous (AA): www.aa.org      Physical Activity: www.fernanda.nih.gov/jx7zkca      Tobacco use: www.quitwithusla.org

## 2017-10-04 ENCOUNTER — TELEPHONE (OUTPATIENT)
Dept: INTERNAL MEDICINE | Facility: CLINIC | Age: 72
End: 2017-10-04

## 2017-10-04 NOTE — TELEPHONE ENCOUNTER
[phone] not at work they state he is in the hospital.  [phone] no answer or voicemail.  [phone] Solo left msg for call back.

## 2017-10-04 NOTE — TELEPHONE ENCOUNTER
Patient wife notified thyroid test is normal stay on current dose of Levothyroxine with verbalized understanding.

## 2017-10-04 NOTE — TELEPHONE ENCOUNTER
----- Message from Ami Zarate DO sent at 10/3/2017  7:37 PM CDT -----  Notify patient thyroid test is normal. Continue current dose of levothyroxine.

## 2017-10-09 DIAGNOSIS — E03.9 HYPOTHYROIDISM (ACQUIRED): ICD-10-CM

## 2017-10-10 RX ORDER — LEVOTHYROXINE SODIUM 25 UG/1
TABLET ORAL
Qty: 135 TABLET | Refills: 1 | Status: SHIPPED | OUTPATIENT
Start: 2017-10-10 | End: 2018-03-30 | Stop reason: SDUPTHER

## 2017-10-13 ENCOUNTER — OFFICE VISIT (OUTPATIENT)
Dept: INTERNAL MEDICINE | Facility: CLINIC | Age: 72
End: 2017-10-13
Payer: MEDICARE

## 2017-10-13 VITALS
TEMPERATURE: 98 F | HEART RATE: 72 BPM | BODY MASS INDEX: 44.1 KG/M2 | SYSTOLIC BLOOD PRESSURE: 118 MMHG | DIASTOLIC BLOOD PRESSURE: 60 MMHG | WEIGHT: 315 LBS | HEIGHT: 71 IN | OXYGEN SATURATION: 95 %

## 2017-10-13 DIAGNOSIS — E66.01 MORBID OBESITY WITH BMI OF 60.0-69.9, ADULT: ICD-10-CM

## 2017-10-13 DIAGNOSIS — M17.10 ARTHRITIS OF KNEE: ICD-10-CM

## 2017-10-13 DIAGNOSIS — N20.0 NEPHROLITHIASIS: Primary | ICD-10-CM

## 2017-10-13 DIAGNOSIS — Z74.09 IMPAIRED MOBILITY: ICD-10-CM

## 2017-10-13 DIAGNOSIS — N18.30 CHRONIC KIDNEY DISEASE (CKD), STAGE III (MODERATE): ICD-10-CM

## 2017-10-13 DIAGNOSIS — Z23 IMMUNIZATION DUE: ICD-10-CM

## 2017-10-13 PROCEDURE — 90670 PCV13 VACCINE IM: CPT | Mod: S$GLB,,, | Performed by: INTERNAL MEDICINE

## 2017-10-13 PROCEDURE — 99999 PR PBB SHADOW E&M-EST. PATIENT-LVL III: CPT | Mod: PBBFAC,,, | Performed by: INTERNAL MEDICINE

## 2017-10-13 PROCEDURE — 99499 UNLISTED E&M SERVICE: CPT | Mod: S$GLB,,, | Performed by: INTERNAL MEDICINE

## 2017-10-13 PROCEDURE — 99214 OFFICE O/P EST MOD 30 MIN: CPT | Mod: S$GLB,,, | Performed by: INTERNAL MEDICINE

## 2017-10-13 PROCEDURE — G0009 ADMIN PNEUMOCOCCAL VACCINE: HCPCS | Mod: S$GLB,,, | Performed by: INTERNAL MEDICINE

## 2017-10-13 NOTE — LETTER
October 13, 2017    Gene Rothman  [address] [address]             [facility]  [address] [place] [address]  [place] [address]  Phone: [phone]  Fax: [phone] To whom it may concern:    Gene Rothman is currently a patient under my care. He has been my patient since February 2017. He has arthritis in both knees and is unable to ambulate without using assistive devices such as a cane and wheelchair.     If you have any other questions or concerns, do not hesitate to contact my office.    Sincerely,        Ami Zarate D.O.  Internal Medicine Physician

## 2017-10-13 NOTE — PROGRESS NOTES
Subjective:       Patient ID: Gene Rothman is a 71 y.o. male.    Chief Complaint: Hospital Follow Up    HPI  Review of Systems    Objective:      Physical Exam    Assessment:       1. Nephrolithiasis    2. Chronic kidney disease (CKD), stage III (moderate)    3. Arthritis of knee    4. Impaired mobility        Plan:       ***
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
## 2018-02-27 ENCOUNTER — TELEPHONE (OUTPATIENT)
Dept: INTERNAL MEDICINE | Facility: CLINIC | Age: 73
End: 2018-02-27

## 2018-02-27 NOTE — TELEPHONE ENCOUNTER
----- Message from Lori Gray LPN sent at 2/26/2018 10:58 AM CST -----  Contact: Lynda/[employer]  Mr. Rothman has changed pcp to Dr. RAZIA Zarate.   ----- Message -----  From: Eneida Erazo  Sent: 2/26/2018  10:52 AM  To: Reinier HEBERT Staff    Lynda is calling for orders of repair for his hospital bed, Please call her at [phone] and or fax to [phone].    Thanks  td

## 2018-02-27 NOTE — TELEPHONE ENCOUNTER
Tried returning dawood call   states that she is with a customer  Left message asking for a call back

## 2018-03-15 ENCOUNTER — TELEPHONE (OUTPATIENT)
Dept: INTERNAL MEDICINE | Facility: CLINIC | Age: 73
End: 2018-03-15

## 2018-03-15 NOTE — TELEPHONE ENCOUNTER
----- Message from Micaela Barclay sent at 3/15/2018  9:18 AM CDT -----  Contact: carmen[employer]  Would like to consult with nurse regarding rx needed stating it is a medical necessity for  hospital bed. Request was made on 2/16/18 and left several messages within feb and march without a response.Please fax information to [phone]. Please call back at [phone].       Thanks,  Micaela Barclay

## 2018-03-30 DIAGNOSIS — E03.9 HYPOTHYROIDISM (ACQUIRED): ICD-10-CM

## 2018-04-02 RX ORDER — LEVOTHYROXINE SODIUM 25 UG/1
TABLET ORAL
Qty: 135 TABLET | Refills: 1 | Status: SHIPPED | OUTPATIENT
Start: 2018-04-02 | End: 2018-09-22 | Stop reason: SDUPTHER

## 2018-04-10 ENCOUNTER — TELEPHONE (OUTPATIENT)
Dept: INTERNAL MEDICINE | Facility: CLINIC | Age: 73
End: 2018-04-10

## 2018-04-10 NOTE — TELEPHONE ENCOUNTER
Spoke with Lynda. Pt needs order for repairs on hospital bed. Battery needs repairing. Lynda will fax paperwork for MD to review.

## 2018-04-10 NOTE — TELEPHONE ENCOUNTER
----- Message from Ángela Ramirez sent at 4/9/2018  9:28 AM CDT -----  Contact: Lynda Zavala  Ms Howell states patient needs repair on hospital bed and needs a script for hit, please fax to [phone] or call back at [phone]. Thank you

## 2018-04-27 DIAGNOSIS — M17.10 ARTHRITIS OF KNEE: ICD-10-CM

## 2018-04-27 DIAGNOSIS — Z74.09 IMPAIRED MOBILITY AND ACTIVITIES OF DAILY LIVING: Primary | ICD-10-CM

## 2018-04-27 DIAGNOSIS — Z78.9 IMPAIRED MOBILITY AND ACTIVITIES OF DAILY LIVING: Primary | ICD-10-CM

## 2018-04-27 DIAGNOSIS — E66.01 MORBID OBESITY WITH BMI OF 60.0-69.9, ADULT: ICD-10-CM

## 2018-05-09 ENCOUNTER — TELEPHONE (OUTPATIENT)
Dept: INTERNAL MEDICINE | Facility: CLINIC | Age: 73
End: 2018-05-09

## 2018-05-09 NOTE — TELEPHONE ENCOUNTER
I spoke to Lynda from [employer], she stated she need the [facility] fax number to fax some paperwork for patient(Gene Rothman) over, I gave it to her, she stated she is faxing it right away

## 2018-05-09 NOTE — TELEPHONE ENCOUNTER
----- Message from Misti Erazo MD sent at 5/8/2018  3:27 PM CDT -----  Contact: [employer] //// [employer]  Call them and see what they are requesting Dr. Zaraet to do? If they need order they can fax it to the office for repair?  ----- Message -----  From: Livia Calderon MA  Sent: 5/7/2018   3:19 PM  To: Ami Zarate DO        ----- Message -----  From: Donna Sheikh  Sent: 5/7/2018  10:19 AM  To: Elliot Mueller Staff    Caller is requesting a call back from in regards to pt hospital bed repair.            [phone]

## 2018-05-15 ENCOUNTER — PATIENT OUTREACH (OUTPATIENT)
Dept: ADMINISTRATIVE | Facility: HOSPITAL | Age: 73
End: 2018-05-15

## 2018-05-28 ENCOUNTER — PES CALL (OUTPATIENT)
Dept: ADMINISTRATIVE | Facility: CLINIC | Age: 73
End: 2018-05-28

## 2018-09-22 DIAGNOSIS — E03.9 HYPOTHYROIDISM (ACQUIRED): ICD-10-CM

## 2018-09-26 RX ORDER — LEVOTHYROXINE SODIUM 25 UG/1
TABLET ORAL
Qty: 135 TABLET | Refills: 1 | Status: SHIPPED | OUTPATIENT
Start: 2018-09-26 | End: 2019-03-28 | Stop reason: SDUPTHER

## 2018-12-28 ENCOUNTER — PATIENT OUTREACH (OUTPATIENT)
Dept: ADMINISTRATIVE | Facility: HOSPITAL | Age: 73
End: 2018-12-28

## 2019-01-30 ENCOUNTER — TELEPHONE (OUTPATIENT)
Dept: INTERNAL MEDICINE | Facility: CLINIC | Age: 74
End: 2019-01-30

## 2019-01-30 NOTE — TELEPHONE ENCOUNTER
----- Message from Naomi Calhoun sent at 1/30/2019  3:20 PM CST -----  Contact: self  Requesting call back regarding cancelling appt. Please call back at [phone].    Thanks,  Naomi Calhoun

## 2019-01-30 NOTE — TELEPHONE ENCOUNTER
Spoke with pt, was calling to rescheduled enhance wellness visit. Sent message to Rona Christina to get it rescheduled.

## 2019-03-16 DIAGNOSIS — E03.9 HYPOTHYROIDISM (ACQUIRED): ICD-10-CM

## 2019-03-18 RX ORDER — LEVOTHYROXINE SODIUM 25 UG/1
TABLET ORAL
Qty: 135 TABLET | Refills: 1 | OUTPATIENT
Start: 2019-03-18

## 2019-03-28 ENCOUNTER — TELEPHONE (OUTPATIENT)
Dept: INTERNAL MEDICINE | Facility: CLINIC | Age: 74
End: 2019-03-28

## 2019-03-28 DIAGNOSIS — E03.9 HYPOTHYROIDISM (ACQUIRED): Primary | ICD-10-CM

## 2019-03-28 RX ORDER — LEVOTHYROXINE SODIUM 25 UG/1
TABLET ORAL
Qty: 45 TABLET | Refills: 0 | Status: SHIPPED | OUTPATIENT
Start: 2019-03-28 | End: 2019-04-05 | Stop reason: SDUPTHER

## 2019-03-28 NOTE — TELEPHONE ENCOUNTER
----- Message from Chica Jean sent at 3/28/2019  9:56 AM CDT -----  Contact: pt  Type:  Sooner Apoointment Request    Caller is requesting a sooner appointment.  Caller declined first available appointment listed below.  Caller will not accept being placed on the waitlist and is requesting a message be sent to doctor.  Name of Caller:the pt  When is the first available appointment? 04/01/2019  Symptoms: med refill  Would the patient rather a call back or a response via [facility]? Call back  Best Call Back Number: [phone]  Additional Information: the pt wants to be worked in tomorrow

## 2019-03-28 NOTE — TELEPHONE ENCOUNTER
Spoke with pt, he only wants to see Dr. Zarate and on a Friday and he will be out of his levothyroxine by Monday. I offered him to see other providers, he declined

## 2019-03-28 NOTE — TELEPHONE ENCOUNTER
Patient needs to have his TSH checked ASAP.   Then he can be scheduled to see me on my next available Friday.   Refill approved x 1.

## 2019-03-29 ENCOUNTER — LAB VISIT (OUTPATIENT)
Dept: LAB | Facility: HOSPITAL | Age: 74
End: 2019-03-29
Attending: INTERNAL MEDICINE
Payer: MEDICARE

## 2019-03-29 DIAGNOSIS — E03.9 HYPOTHYROIDISM (ACQUIRED): ICD-10-CM

## 2019-03-29 LAB — TSH SERPL DL<=0.005 MIU/L-ACNC: 2.84 UIU/ML (ref 0.4–4)

## 2019-03-29 PROCEDURE — 84443 ASSAY THYROID STIM HORMONE: CPT | Mod: HCNC

## 2019-03-29 PROCEDURE — 36415 COLL VENOUS BLD VENIPUNCTURE: CPT | Mod: HCNC

## 2019-04-02 ENCOUNTER — TELEPHONE (OUTPATIENT)
Dept: INTERNAL MEDICINE | Facility: CLINIC | Age: 74
End: 2019-04-02

## 2019-04-02 NOTE — TELEPHONE ENCOUNTER
----- Message from Ami Zarate DO sent at 4/1/2019  2:24 PM CDT -----  Notify patient thyroid test is within normal range. Continue current dose of levothyroxine.

## 2019-04-05 ENCOUNTER — OFFICE VISIT (OUTPATIENT)
Dept: INTERNAL MEDICINE | Facility: CLINIC | Age: 74
End: 2019-04-05
Payer: MEDICARE

## 2019-04-05 ENCOUNTER — LAB VISIT (OUTPATIENT)
Dept: LAB | Facility: HOSPITAL | Age: 74
End: 2019-04-05
Payer: MEDICARE

## 2019-04-05 VITALS
HEIGHT: 71 IN | SYSTOLIC BLOOD PRESSURE: 134 MMHG | TEMPERATURE: 98 F | HEART RATE: 86 BPM | BODY MASS INDEX: 61.13 KG/M2 | OXYGEN SATURATION: 95 % | DIASTOLIC BLOOD PRESSURE: 70 MMHG

## 2019-04-05 VITALS
HEART RATE: 86 BPM | BODY MASS INDEX: 61.13 KG/M2 | DIASTOLIC BLOOD PRESSURE: 70 MMHG | TEMPERATURE: 98 F | HEIGHT: 71 IN | SYSTOLIC BLOOD PRESSURE: 134 MMHG | OXYGEN SATURATION: 95 %

## 2019-04-05 DIAGNOSIS — Z00.00 ENCOUNTER FOR PREVENTIVE HEALTH EXAMINATION: Primary | ICD-10-CM

## 2019-04-05 DIAGNOSIS — H91.93 BILATERAL HEARING LOSS, UNSPECIFIED HEARING LOSS TYPE: Chronic | ICD-10-CM

## 2019-04-05 DIAGNOSIS — E66.01 MORBID OBESITY WITH BMI OF 60.0-69.9, ADULT: Chronic | ICD-10-CM

## 2019-04-05 DIAGNOSIS — N18.30 CKD (CHRONIC KIDNEY DISEASE) STAGE 3, GFR 30-59 ML/MIN: ICD-10-CM

## 2019-04-05 DIAGNOSIS — I87.2 VENOUS STASIS DERMATITIS OF BOTH LOWER EXTREMITIES: ICD-10-CM

## 2019-04-05 DIAGNOSIS — G47.33 OBSTRUCTIVE SLEEP APNEA SYNDROME: Chronic | ICD-10-CM

## 2019-04-05 DIAGNOSIS — M19.90 OTHER TYPE OF OSTEOARTHRITIS, UNSPECIFIED SITE: Chronic | ICD-10-CM

## 2019-04-05 DIAGNOSIS — I89.0 CHRONIC ACQUIRED LYMPHEDEMA: Chronic | ICD-10-CM

## 2019-04-05 DIAGNOSIS — K43.9 VENTRAL HERNIA WITHOUT OBSTRUCTION OR GANGRENE: Chronic | ICD-10-CM

## 2019-04-05 DIAGNOSIS — E03.9 ACQUIRED HYPOTHYROIDISM: Chronic | ICD-10-CM

## 2019-04-05 DIAGNOSIS — N18.30 CKD (CHRONIC KIDNEY DISEASE) STAGE 3, GFR 30-59 ML/MIN: Chronic | ICD-10-CM

## 2019-04-05 DIAGNOSIS — I87.2 VENOUS STASIS DERMATITIS OF BOTH LOWER EXTREMITIES: Chronic | ICD-10-CM

## 2019-04-05 DIAGNOSIS — D64.9 NORMOCYTIC ANEMIA: ICD-10-CM

## 2019-04-05 DIAGNOSIS — E03.9 HYPOTHYROIDISM (ACQUIRED): Primary | ICD-10-CM

## 2019-04-05 DIAGNOSIS — E66.01 MORBID OBESITY WITH BMI OF 60.0-69.9, ADULT: ICD-10-CM

## 2019-04-05 LAB
ALBUMIN SERPL BCP-MCNC: 3.2 G/DL (ref 3.5–5.2)
ALP SERPL-CCNC: 64 U/L (ref 55–135)
ALT SERPL W/O P-5'-P-CCNC: 16 U/L (ref 10–44)
ANION GAP SERPL CALC-SCNC: 6 MMOL/L (ref 8–16)
AST SERPL-CCNC: 16 U/L (ref 10–40)
BASOPHILS # BLD AUTO: 0.04 K/UL (ref 0–0.2)
BASOPHILS NFR BLD: 0.7 % (ref 0–1.9)
BILIRUB SERPL-MCNC: 0.5 MG/DL (ref 0.1–1)
BUN SERPL-MCNC: 25 MG/DL (ref 8–23)
CALCIUM SERPL-MCNC: 8.6 MG/DL (ref 8.7–10.5)
CHLORIDE SERPL-SCNC: 105 MMOL/L (ref 95–110)
CO2 SERPL-SCNC: 29 MMOL/L (ref 23–29)
CREAT SERPL-MCNC: 1.8 MG/DL (ref 0.5–1.4)
DIFFERENTIAL METHOD: ABNORMAL
EOSINOPHIL # BLD AUTO: 0.3 K/UL (ref 0–0.5)
EOSINOPHIL NFR BLD: 4.3 % (ref 0–8)
ERYTHROCYTE [DISTWIDTH] IN BLOOD BY AUTOMATED COUNT: 14.3 % (ref 11.5–14.5)
EST. GFR  (AFRICAN AMERICAN): 41.6 ML/MIN/1.73 M^2
EST. GFR  (NON AFRICAN AMERICAN): 36 ML/MIN/1.73 M^2
GLUCOSE SERPL-MCNC: 101 MG/DL (ref 70–110)
HCT VFR BLD AUTO: 43.4 % (ref 40–54)
HGB BLD-MCNC: 13.4 G/DL (ref 14–18)
IMM GRANULOCYTES # BLD AUTO: 0.03 K/UL (ref 0–0.04)
IMM GRANULOCYTES NFR BLD AUTO: 0.5 % (ref 0–0.5)
LYMPHOCYTES # BLD AUTO: 1.4 K/UL (ref 1–4.8)
LYMPHOCYTES NFR BLD: 23.9 % (ref 18–48)
MCH RBC QN AUTO: 32 PG (ref 27–31)
MCHC RBC AUTO-ENTMCNC: 30.9 G/DL (ref 32–36)
MCV RBC AUTO: 104 FL (ref 82–98)
MONOCYTES # BLD AUTO: 0.7 K/UL (ref 0.3–1)
MONOCYTES NFR BLD: 11.4 % (ref 4–15)
NEUTROPHILS # BLD AUTO: 3.5 K/UL (ref 1.8–7.7)
NEUTROPHILS NFR BLD: 59.2 % (ref 38–73)
NRBC BLD-RTO: 0 /100 WBC
PLATELET # BLD AUTO: 193 K/UL (ref 150–350)
PMV BLD AUTO: 11.1 FL (ref 9.2–12.9)
POTASSIUM SERPL-SCNC: 4.7 MMOL/L (ref 3.5–5.1)
PROT SERPL-MCNC: 7.6 G/DL (ref 6–8.4)
RBC # BLD AUTO: 4.19 M/UL (ref 4.6–6.2)
SODIUM SERPL-SCNC: 140 MMOL/L (ref 136–145)
WBC # BLD AUTO: 5.87 K/UL (ref 3.9–12.7)

## 2019-04-05 PROCEDURE — 3075F PR MOST RECENT SYSTOLIC BLOOD PRESS GE 130-139MM HG: ICD-10-PCS | Mod: HCNC,CPTII,S$GLB, | Performed by: NURSE PRACTITIONER

## 2019-04-05 PROCEDURE — 3075F PR MOST RECENT SYSTOLIC BLOOD PRESS GE 130-139MM HG: ICD-10-PCS | Mod: HCNC,CPTII,S$GLB, | Performed by: INTERNAL MEDICINE

## 2019-04-05 PROCEDURE — 99214 PR OFFICE/OUTPT VISIT, EST, LEVL IV, 30-39 MIN: ICD-10-PCS | Mod: HCNC,S$GLB,, | Performed by: INTERNAL MEDICINE

## 2019-04-05 PROCEDURE — 99999 PR PBB SHADOW E&M-EST. PATIENT-LVL III: ICD-10-PCS | Mod: PBBFAC,HCNC,, | Performed by: INTERNAL MEDICINE

## 2019-04-05 PROCEDURE — 1101F PR PT FALLS ASSESS DOC 0-1 FALLS W/OUT INJ PAST YR: ICD-10-PCS | Mod: HCNC,CPTII,S$GLB, | Performed by: INTERNAL MEDICINE

## 2019-04-05 PROCEDURE — 3075F SYST BP GE 130 - 139MM HG: CPT | Mod: HCNC,CPTII,S$GLB, | Performed by: NURSE PRACTITIONER

## 2019-04-05 PROCEDURE — 3078F DIAST BP <80 MM HG: CPT | Mod: HCNC,CPTII,S$GLB, | Performed by: INTERNAL MEDICINE

## 2019-04-05 PROCEDURE — 3078F DIAST BP <80 MM HG: CPT | Mod: HCNC,CPTII,S$GLB, | Performed by: NURSE PRACTITIONER

## 2019-04-05 PROCEDURE — G0439 PPPS, SUBSEQ VISIT: HCPCS | Mod: HCNC,S$GLB,, | Performed by: NURSE PRACTITIONER

## 2019-04-05 PROCEDURE — 99999 PR PBB SHADOW E&M-EST. PATIENT-LVL III: CPT | Mod: PBBFAC,HCNC,, | Performed by: INTERNAL MEDICINE

## 2019-04-05 PROCEDURE — 80053 COMPREHEN METABOLIC PANEL: CPT | Mod: HCNC

## 2019-04-05 PROCEDURE — 99499 RISK ADDL DX/OHS AUDIT: ICD-10-PCS | Mod: S$GLB,,, | Performed by: NURSE PRACTITIONER

## 2019-04-05 PROCEDURE — 3078F PR MOST RECENT DIASTOLIC BLOOD PRESSURE < 80 MM HG: ICD-10-PCS | Mod: HCNC,CPTII,S$GLB, | Performed by: NURSE PRACTITIONER

## 2019-04-05 PROCEDURE — 3078F PR MOST RECENT DIASTOLIC BLOOD PRESSURE < 80 MM HG: ICD-10-PCS | Mod: HCNC,CPTII,S$GLB, | Performed by: INTERNAL MEDICINE

## 2019-04-05 PROCEDURE — 1101F PT FALLS ASSESS-DOCD LE1/YR: CPT | Mod: HCNC,CPTII,S$GLB, | Performed by: INTERNAL MEDICINE

## 2019-04-05 PROCEDURE — 99999 PR PBB SHADOW E&M-EST. PATIENT-LVL III: CPT | Mod: PBBFAC,HCNC,, | Performed by: NURSE PRACTITIONER

## 2019-04-05 PROCEDURE — 36415 COLL VENOUS BLD VENIPUNCTURE: CPT | Mod: HCNC

## 2019-04-05 PROCEDURE — 3075F SYST BP GE 130 - 139MM HG: CPT | Mod: HCNC,CPTII,S$GLB, | Performed by: INTERNAL MEDICINE

## 2019-04-05 PROCEDURE — 99214 OFFICE O/P EST MOD 30 MIN: CPT | Mod: HCNC,S$GLB,, | Performed by: INTERNAL MEDICINE

## 2019-04-05 PROCEDURE — 99499 UNLISTED E&M SERVICE: CPT | Mod: S$GLB,,, | Performed by: NURSE PRACTITIONER

## 2019-04-05 PROCEDURE — G0439 PR MEDICARE ANNUAL WELLNESS SUBSEQUENT VISIT: ICD-10-PCS | Mod: HCNC,S$GLB,, | Performed by: NURSE PRACTITIONER

## 2019-04-05 PROCEDURE — 99999 PR PBB SHADOW E&M-EST. PATIENT-LVL III: ICD-10-PCS | Mod: PBBFAC,HCNC,, | Performed by: NURSE PRACTITIONER

## 2019-04-05 PROCEDURE — 85025 COMPLETE CBC W/AUTO DIFF WBC: CPT | Mod: HCNC

## 2019-04-05 RX ORDER — LEVOTHYROXINE SODIUM 25 UG/1
TABLET ORAL
Qty: 135 TABLET | Refills: 3 | Status: SHIPPED | OUTPATIENT
Start: 2019-04-05 | End: 2020-03-31

## 2019-04-05 NOTE — ASSESSMENT & PLAN NOTE
11/29/11: Audiogram  Wears hearing aids but still has hard time hearing. Hearing aid from [employer]. Continue current treatment plan as previously prescribed with your PCP.

## 2019-04-05 NOTE — Clinical Note
Your patient was seen today for a HRA visit.  I have included a copy of my visit note, please review the note and feel free to contact me with any questions. Thank you for allowing me to participate in the care of your patients. BENNY Love

## 2019-04-05 NOTE — ASSESSMENT & PLAN NOTE
"Reports present for 25 years. Waxes and wanes. Reports he has seen a specialist in the past. No current infections. Reports son washes his legs and "treats them". Continue current treatment plan as previously prescribed with your PCP.    "

## 2019-04-05 NOTE — ASSESSMENT & PLAN NOTE
Status unknown. Last BMP in 2017. CMP drawn today. Discussed to avoid NSAIDs. Continue current treatment plan as previously prescribed with your PCP.

## 2019-04-05 NOTE — ASSESSMENT & PLAN NOTE
Chronic pain in bilateral knees. Takes tylenol and norco but states he is out of norco. Continue current treatment plan as previously prescribed with your PCP.

## 2019-04-05 NOTE — PROGRESS NOTES
Subjective:       Patient ID: Gene Rothman is a 73 y.o. male.    Chief Complaint: Follow-up    Gene Rothman  73 y.o. White male    Patient presents with:  Follow-up    HPI: Here today to follow up on chronic conditions.  Hypothyroidism--stable on levothyroxine. He denies symptoms.                      TSH                      2.842               03/29/2019            CKD III--asymptomatic. He denies taking NSAID's. He is not on an ACE inhibitor or an ARB.   Anemia--normocytic. He is asymptomatic. He denies blood loss.             HGB                      10.3 (L)            01/04/2017                 HCT                      32.3 (L)            01/04/2017                 MCV                      99 (H)              01/04/2017            Venous stasis--he denies open wounds. He denies pain.     Past Medical History:  Arthritis  Chronic kidney disease  Depression  Hypertension  Hypothyroidism  Lymphedema of lower extremity  Nephrolithiasis  Obesity  Peripheral vascular disease  Recurrent cellulitis of lower leg  Sleep apnea      Comment:  can't stand the CPAP , sleeps elevated in hospital bed                or chair  Tobacco dependence      Comment:  resolved    Current Outpatient Medications on File Prior to Visit:  acetaminophen (TYLENOL) 100 mg/mL suspension, Take by mouth every 4 (four) hours as needed for Temperature greater than., Disp: , Rfl:   levothyroxine (SYNTHROID) 25 MCG tablet, TAKE 1 AND 1/2 TABLETS ONE TIME DAILY, Disp: 45 tablet, Rfl: 0  hydrocodone-acetaminophen 5-325mg (NORCO) 5-325 mg per tablet, , Disp: , Rfl:   silver (SILVASORB) GlSR, Apply topically once daily., Disp: 480 mL, Rfl: 2  sodium hypochlorite 0.5 % external solution, Apply topically daily as needed (dressing changes)., Disp: , Rfl: 0    Allergies:  Review of patient's allergies indicates:   -- Bactrim (sulfamethoxazole-trimethoprim) -- Itching      Review of Systems   Constitutional: Negative for fatigue and unexpected weight  change.   Respiratory: Negative for shortness of breath.    Cardiovascular: Positive for leg swelling. Negative for chest pain.   Gastrointestinal: Negative for abdominal pain, blood in stool and constipation.   Genitourinary: Negative for difficulty urinating and frequency.   Musculoskeletal: Positive for gait problem.   Skin: Positive for rash. Negative for wound.   Neurological: Negative for dizziness and headaches.       Objective:      Physical Exam   Constitutional: He is oriented to person, place, and time. He appears well-developed and well-nourished. No distress.   Eyes: No scleral icterus.   Neck: No tracheal deviation present.   Cardiovascular: Normal rate, regular rhythm and normal heart sounds.   Pulmonary/Chest: Effort normal and breath sounds normal. No respiratory distress.   Abdominal: Soft. Bowel sounds are normal.   Musculoskeletal: He exhibits edema.   Neurological: He is alert and oriented to person, place, and time.   Skin: Skin is warm and dry.   Hyperpigmented skin with cracking and peeling on BLE. No open lesions or drainage.   Seborrheic keratoses over back and neck.    Psychiatric: He has a normal mood and affect.   Vitals reviewed.      Assessment:       1. Hypothyroidism (acquired)    2. CKD (chronic kidney disease) stage 3, GFR 30-59 ml/min    3. Normocytic anemia    4. Venous stasis dermatitis of both lower extremities    5. Morbid obesity with BMI of 60.0-69.9, adult        Plan:       Gene was seen today for follow-up.    Diagnoses and all orders for this visit:    Hypothyroidism (acquired)  -     Continue levothyroxine (SYNTHROID) 25 MCG tablet; TAKE 1 AND 1/2 TABLETS ONE TIME DAILY    CKD (chronic kidney disease) stage 3, GFR 30-59 ml/min  -     Advised to avoid NSAID's  -     Comprehensive metabolic panel; Future    Normocytic anemia  -     CBC auto differential; Future    Venous stasis dermatitis of both lower extremities    Morbid obesity with BMI of 60.0-69.9, adult    Labs  today.     Otherwise RTC annually and as needed.

## 2019-04-05 NOTE — ASSESSMENT & PLAN NOTE
Does not use CPAP machine. Not followed by pulm. Denies snoring and family denies apnea. Continue current treatment plan as previously prescribed with your PCP.

## 2019-04-05 NOTE — ASSESSMENT & PLAN NOTE
Lab Results   Component Value Date    TSH 2.842 03/29/2019     Reports compliance with medications. Stable and controlled. Continue current treatment plan as previously prescribed with your PCP.

## 2019-04-05 NOTE — PROGRESS NOTES
"Gene Rothman presented for a  Medicare AWV and comprehensive Health Risk Assessment today. The following components were reviewed and updated:    · Medical history  · Family History  · Social history  · Allergies and Current Medications  · Health Risk Assessment  · Health Maintenance  · Care Team     ** See Completed Assessments for Annual Wellness Visit within the encounter summary.**       The following assessments were completed:  · Living Situation  · CAGE  · Depression Screening  · Timed Get Up and Go  · Whisper Test  · Cognitive Function Screening  · Nutrition Screening  · ADL Screening  · PAQ Screening    Vitals:    04/05/19 1019   BP: 134/70   BP Location: Right arm   Pulse: 86   Temp: 97.6 °F (36.4 °C)   TempSrc: Tympanic   SpO2: 95%   Height: 5' 11" (1.803 m)     Body mass index is 61.13 kg/m².  Physical Exam   Constitutional: He appears well-developed and well-nourished. No distress.   HENT:   Head: Normocephalic and atraumatic.   Cardiovascular: Normal rate and regular rhythm.   BLE edema with discoloration and ulcer.   Pulmonary/Chest: Effort normal and breath sounds normal.   Abdominal: Soft. Bowel sounds are normal.   Neurological: He is alert.   Skin: He is not diaphoretic.   Psychiatric: He has a normal mood and affect.   Vitals reviewed.        Diagnoses and health risks identified today and associated recommendations/orders:    1. Encounter for preventive health examination  Reviewed HM and up to date.  Patient reports he bathes once a week and his son assists. He does try to keep skin folds clean and wears a tissue or towel in folds to help with moisture.   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  He reports he is "either in the chair or the bed". He can stand and walk 3-4 steps as long as he has "three point assist". His son pushes his wheelchair.    Morbid obesity with BMI of 60.0-69.9, adult  Status unknown. Patient refused to weigh this morning. He states he is " "either in wheelchair or laying in bed. States he "probably weighs 20 pounds more than last time he weighed"    Sleep apnea  Does not use CPAP machine. Not followed by pulm. Denies snoring and family denies apnea. Continue current treatment plan as previously prescribed with your PCP.      Acquired hypothyroidism  Lab Results   Component Value Date    TSH 2.842 03/29/2019     Reports compliance with medications. Stable and controlled. Continue current treatment plan as previously prescribed with your PCP.         Chronic acquired lymphedema  Reports present for 25 years. Waxes and wanes. Reports he has seen a specialist in the past. No current infections. Reports son washes his legs and "treats them". Continue current treatment plan as previously prescribed with your PCP.      CKD (chronic kidney disease) stage 3, GFR 30-59 ml/min  Status unknown. Last BMP in 2017. CMP drawn today. Discussed to avoid NSAIDs. Continue current treatment plan as previously prescribed with your PCP.      Osteoarthritis  Chronic pain in bilateral knees. Takes tylenol and norco but states he is out of norco. Continue current treatment plan as previously prescribed with your PCP.      Ventral hernia without obstruction or gangrene  7/22/11: Ct abd: FAT CONTAINING LEFT VENTRAL HERNIA   Patient reports no concerns. Continue current treatment plan as previously prescribed with your PCP.      Bilateral hearing loss  11/29/11: Audiogram  Wears hearing aids but still has hard time hearing. Hearing aid from [employer]. Continue current treatment plan as previously prescribed with your PCP.      Venous stasis dermatitis of both lower extremities  Chronic and ongoing. Continue current treatment plan as previously prescribed with your PCP.          Provided Gene with a 5-10 year written screening schedule and personal prevention plan. Recommendations were developed using the USPSTF age appropriate recommendations. Education, counseling, and referrals " were provided as needed. After Visit Summary printed and given to patient which includes a list of additional screenings\tests needed.    Follow up in about 1 year (around 4/5/2020) for annual HRA, keep routine follow up.    Cassidy Erazo NP

## 2019-04-05 NOTE — ASSESSMENT & PLAN NOTE
"Status unknown. Patient refused to weigh this morning. He states he is either in wheelchair or laying in bed. States he "probably weighs 20 pounds more than last time he weighed"  "

## 2019-04-05 NOTE — ASSESSMENT & PLAN NOTE
7/22/11: Ct abd: FAT CONTAINING LEFT VENTRAL HERNIA   Patient reports no concerns. Continue current treatment plan as previously prescribed with your PCP.

## 2019-04-05 NOTE — PATIENT INSTRUCTIONS
Counseling and Referral of Other Preventative  (Italic type indicates deductible and co-insurance are waived)    Patient Name: Gene Rothman  Today's Date: 4/5/2019    Health Maintenance       Date Due Completion Date    Lipid Panel 04/06/2020 4/6/2015    Colonoscopy 12/19/2024 12/19/2014 (Declined)    Override on 12/19/2014: Declined    TETANUS VACCINE 10/13/2027 10/13/2017 (ClinicallyNA)    Override on 10/13/2017: Not Clinically Appropriate        No orders of the defined types were placed in this encounter.    The following information is provided to all patients.  This information is to help you find resources for any of the problems found today that may be affecting your health:                Living healthy guide: www.[facility].[place].gov      Understanding Diabetes: www.diabetes.org      Eating healthy: www.cdc.gov/healthyweight      [facility] home safety checklist: www.cdc.gov/steadi/patient.html      Agency on Aging: www.goea.[place].[facility]      Alcoholics anonymous (AA): www.aa.org      Physical Activity: www.fernanda.nih.gov/st1qgnm      Tobacco use: www.quitwithusla.org

## 2019-04-09 ENCOUNTER — TELEPHONE (OUTPATIENT)
Dept: INTERNAL MEDICINE | Facility: CLINIC | Age: 74
End: 2019-04-09

## 2019-04-09 DIAGNOSIS — N28.9 RENAL INSUFFICIENCY: Primary | ICD-10-CM

## 2019-04-10 ENCOUNTER — TELEPHONE (OUTPATIENT)
Dept: INTERNAL MEDICINE | Facility: CLINIC | Age: 74
End: 2019-04-10

## 2019-04-10 NOTE — TELEPHONE ENCOUNTER
----- Message from Ami Zarate DO sent at 4/9/2019  3:04 PM CDT -----  Notify patient kidney function is slightly more reduced than usual. I recommend avoiding NSAID's. Repeat BMP in 4 weeks.   CBC shows anemia. It is mild and improved from the last time.

## 2019-10-17 NOTE — TELEPHONE ENCOUNTER
----- Message from Eneida Erazo sent at 4/9/2018  9:49 AM CDT -----  Contact: Patient   Patient would like a call back at [phone], Regards his hospital bed and a prescription refill.    1. What is the name of the medication you are requesting? Rx Levothyroxine  2. What is the dose? n/a  3. How do you take the medication? Orally, topically, etc? oral  4. How often do you take this medication? Once a day   5. Do you need a 30 day or 90 day supply? 90  6. How many refills are you requesting? 3  7. What is your preferred pharmacy and location of the pharmacy?   [facility] - [place], [place] - [address]  [address] [address]  Phone: [phone] Fax: [phone]  8. Who can we contact with further questions? Patient/[phone]    
Pt informed med was sent on 04/02/18. Voiced understanding.   
done

## 2019-12-27 ENCOUNTER — TELEPHONE (OUTPATIENT)
Dept: INTERNAL MEDICINE | Facility: CLINIC | Age: 74
End: 2019-12-27

## 2019-12-27 ENCOUNTER — OFFICE VISIT (OUTPATIENT)
Dept: INTERNAL MEDICINE | Facility: CLINIC | Age: 74
End: 2019-12-27
Payer: MEDICARE

## 2019-12-27 VITALS
TEMPERATURE: 97 F | SYSTOLIC BLOOD PRESSURE: 158 MMHG | DIASTOLIC BLOOD PRESSURE: 86 MMHG | OXYGEN SATURATION: 96 % | HEART RATE: 81 BPM

## 2019-12-27 DIAGNOSIS — M79.671 PAIN OF RIGHT HEEL: ICD-10-CM

## 2019-12-27 DIAGNOSIS — L97.411: Primary | ICD-10-CM

## 2019-12-27 DIAGNOSIS — I83.014: Primary | ICD-10-CM

## 2019-12-27 PROCEDURE — 1126F PR PAIN SEVERITY QUANTIFIED, NO PAIN PRESENT: ICD-10-PCS | Mod: HCNC,S$GLB,, | Performed by: FAMILY MEDICINE

## 2019-12-27 PROCEDURE — 90662 FLU VACCINE - HIGH DOSE (65+) PRESERVATIVE FREE IM: ICD-10-PCS | Mod: HCNC,S$GLB,, | Performed by: FAMILY MEDICINE

## 2019-12-27 PROCEDURE — 1159F PR MEDICATION LIST DOCUMENTED IN MEDICAL RECORD: ICD-10-PCS | Mod: HCNC,S$GLB,, | Performed by: FAMILY MEDICINE

## 2019-12-27 PROCEDURE — 1101F PR PT FALLS ASSESS DOC 0-1 FALLS W/OUT INJ PAST YR: ICD-10-PCS | Mod: HCNC,CPTII,S$GLB, | Performed by: FAMILY MEDICINE

## 2019-12-27 PROCEDURE — 99999 PR PBB SHADOW E&M-EST. PATIENT-LVL IV: CPT | Mod: PBBFAC,HCNC,, | Performed by: FAMILY MEDICINE

## 2019-12-27 PROCEDURE — 99999 PR PBB SHADOW E&M-EST. PATIENT-LVL IV: ICD-10-PCS | Mod: PBBFAC,HCNC,, | Performed by: FAMILY MEDICINE

## 2019-12-27 PROCEDURE — 3079F DIAST BP 80-89 MM HG: CPT | Mod: HCNC,CPTII,S$GLB, | Performed by: FAMILY MEDICINE

## 2019-12-27 PROCEDURE — 99499 RISK ADDL DX/OHS AUDIT: ICD-10-PCS | Mod: HCNC,S$GLB,, | Performed by: FAMILY MEDICINE

## 2019-12-27 PROCEDURE — 99214 OFFICE O/P EST MOD 30 MIN: CPT | Mod: 25,HCNC,S$GLB, | Performed by: FAMILY MEDICINE

## 2019-12-27 PROCEDURE — G0008 ADMIN INFLUENZA VIRUS VAC: HCPCS | Mod: 59,HCNC,S$GLB, | Performed by: FAMILY MEDICINE

## 2019-12-27 PROCEDURE — 90662 IIV NO PRSV INCREASED AG IM: CPT | Mod: HCNC,S$GLB,, | Performed by: FAMILY MEDICINE

## 2019-12-27 PROCEDURE — 3079F PR MOST RECENT DIASTOLIC BLOOD PRESSURE 80-89 MM HG: ICD-10-PCS | Mod: HCNC,CPTII,S$GLB, | Performed by: FAMILY MEDICINE

## 2019-12-27 PROCEDURE — 3077F SYST BP >= 140 MM HG: CPT | Mod: HCNC,CPTII,S$GLB, | Performed by: FAMILY MEDICINE

## 2019-12-27 PROCEDURE — G0008 FLU VACCINE - HIGH DOSE (65+) PRESERVATIVE FREE IM: ICD-10-PCS | Mod: 59,HCNC,S$GLB, | Performed by: FAMILY MEDICINE

## 2019-12-27 PROCEDURE — 1159F MED LIST DOCD IN RCRD: CPT | Mod: HCNC,S$GLB,, | Performed by: FAMILY MEDICINE

## 2019-12-27 PROCEDURE — 99214 PR OFFICE/OUTPT VISIT, EST, LEVL IV, 30-39 MIN: ICD-10-PCS | Mod: 25,HCNC,S$GLB, | Performed by: FAMILY MEDICINE

## 2019-12-27 PROCEDURE — 99499 UNLISTED E&M SERVICE: CPT | Mod: HCNC,S$GLB,, | Performed by: FAMILY MEDICINE

## 2019-12-27 PROCEDURE — 1101F PT FALLS ASSESS-DOCD LE1/YR: CPT | Mod: HCNC,CPTII,S$GLB, | Performed by: FAMILY MEDICINE

## 2019-12-27 PROCEDURE — 3077F PR MOST RECENT SYSTOLIC BLOOD PRESSURE >= 140 MM HG: ICD-10-PCS | Mod: HCNC,CPTII,S$GLB, | Performed by: FAMILY MEDICINE

## 2019-12-27 PROCEDURE — 1126F AMNT PAIN NOTED NONE PRSNT: CPT | Mod: HCNC,S$GLB,, | Performed by: FAMILY MEDICINE

## 2019-12-27 RX ORDER — SILVER SULFADIAZINE 10 G/1000G
CREAM TOPICAL DAILY
Qty: 50 G | Refills: 0 | Status: SHIPPED | OUTPATIENT
Start: 2019-12-27 | End: 2019-12-27 | Stop reason: SDUPTHER

## 2019-12-27 RX ORDER — OXYCODONE AND ACETAMINOPHEN 10; 325 MG/1; MG/1
1 TABLET ORAL EVERY 4 HOURS PRN
Qty: 21 TABLET | Refills: 0 | Status: SHIPPED | OUTPATIENT
Start: 2019-12-27 | End: 2019-12-27 | Stop reason: SDUPTHER

## 2019-12-27 RX ORDER — OXYCODONE AND ACETAMINOPHEN 10; 325 MG/1; MG/1
1 TABLET ORAL EVERY 8 HOURS PRN
Qty: 21 TABLET | Refills: 0 | Status: SHIPPED | OUTPATIENT
Start: 2019-12-27 | End: 2020-04-24 | Stop reason: SDUPTHER

## 2019-12-27 RX ORDER — SILVER SULFADIAZINE 10 G/1000G
CREAM TOPICAL DAILY
Qty: 85 G | Refills: 0 | Status: SHIPPED | OUTPATIENT
Start: 2019-12-27 | End: 2021-08-10

## 2019-12-27 NOTE — PROGRESS NOTES
Subjective:       Patient ID: Gene Rothman is a 74 y.o. male.    Chief Complaint: Mass (on leg)    HPI Mr. Rothman presents today with mass on leg. He has history of venous stasis ulcers and lymphedema.  Lymphedema ulcer on right ankle     Lymphedema for 20 years current ulcer 2-3 months  Painful at night   Tylenol extra strength is not helping with the pain.     Review of Systems   Eyes: Negative.    Cardiovascular: Negative.    Gastrointestinal: Negative.    Genitourinary: Negative.    Musculoskeletal: Positive for arthralgias, gait problem and myalgias.   Psychiatric/Behavioral: Negative.          Past Medical History:   Diagnosis Date    Arthritis     Chronic kidney disease     Depression     Hypertension     Hypothyroidism     Lymphedema of lower extremity     Nephrolithiasis     Obesity     Peripheral vascular disease     Recurrent cellulitis of lower leg     Sleep apnea     can't stand the CPAP , sleeps elevated in hospital bed or chair    Tobacco dependence     resolved     Past Surgical History:   Procedure Laterality Date    ADENOIDECTOMY      BACK SURGERY  ;  1976    x2 for disc; scar tissue removed    debridement of bilateral leg ulcers Bilateral 2017    Dr Hernandez    INNER EAR SURGERY Bilateral     separate - pinnaplasty , new eardrms constructed    KIDNEY STONE SURGERY Left  approx    open    TONSILLECTOMY       Family History   Problem Relation Age of Onset    Asthma Daughter          26 w/ asthma    Cancer Brother         skin-part of ext ear removed    Cancer Mother         ? abd also    Diabetes Mother     Hypertension Mother     Cancer Father         ? abdonimal origin stomach/liver or pancreas    Heart disease Father         pacer    Heart disease Brother         CABG3    Alcohol abuse Maternal Grandfather     Stroke Neg Hx     COPD Neg Hx     Mental illness Neg Hx     Mental retardation Neg Hx     Kidney disease Neg Hx         Objective:        Physical Exam   Constitutional: He appears well-developed and well-nourished.   Morbidly obesse   Vitals reviewed.            Results for orders placed or performed in visit on 04/05/19   CBC auto differential   Result Value Ref Range    WBC 5.87 3.90 - 12.70 K/uL    RBC 4.19 (L) 4.60 - 6.20 M/uL    Hemoglobin 13.4 (L) 14.0 - 18.0 g/dL    Hematocrit 43.4 40.0 - 54.0 %    Mean Corpuscular Volume 104 (H) 82 - 98 fL    Mean Corpuscular Hemoglobin 32.0 (H) 27.0 - 31.0 pg    Mean Corpuscular Hemoglobin Conc 30.9 (L) 32.0 - 36.0 g/dL    RDW 14.3 11.5 - 14.5 %    Platelets 193 150 - 350 K/uL    MPV 11.1 9.2 - 12.9 fL    Immature Granulocytes 0.5 0.0 - 0.5 %    Gran # (ANC) 3.5 1.8 - 7.7 K/uL    Immature Grans (Abs) 0.03 0.00 - 0.04 K/uL    Lymph # 1.4 1.0 - 4.8 K/uL    Mono # 0.7 0.3 - 1.0 K/uL    Eos # 0.3 0.0 - 0.5 K/uL    Baso # 0.04 0.00 - 0.20 K/uL    nRBC 0 0 /100 WBC    Gran% 59.2 38.0 - 73.0 %    Lymph% 23.9 18.0 - 48.0 %    Mono% 11.4 4.0 - 15.0 %    Eosinophil% 4.3 0.0 - 8.0 %    Basophil% 0.7 0.0 - 1.9 %    Differential Method Automated    Comprehensive metabolic panel   Result Value Ref Range    Sodium 140 136 - 145 mmol/L    Potassium 4.7 3.5 - 5.1 mmol/L    Chloride 105 95 - 110 mmol/L    CO2 29 23 - 29 mmol/L    Glucose 101 70 - 110 mg/dL    BUN, Bld 25 (H) 8 - 23 mg/dL    Creatinine 1.8 (H) 0.5 - 1.4 mg/dL    Calcium 8.6 (L) 8.7 - 10.5 mg/dL    Total Protein 7.6 6.0 - 8.4 g/dL    Albumin 3.2 (L) 3.5 - 5.2 g/dL    Total Bilirubin 0.5 0.1 - 1.0 mg/dL    Alkaline Phosphatase 64 55 - 135 U/L    AST 16 10 - 40 U/L    ALT 16 10 - 44 U/L    Anion Gap 6 (L) 8 - 16 mmol/L    eGFR if African American 41.6 (A) >60 mL/min/1.73 m^2    eGFR if non  36.0 (A) >60 mL/min/1.73 m^2       Assessment/Plan:     Venous stasis ulcer of right heel limited to breakdown of skin, unspecified whether varicose veins present  -     Ambulatory consult to Vascular Medicine-will fax referral to [facility] at  [facility]  -     Discontinue: silver sulfADIAZINE 1% (SILVADENE) 1 % cream; Apply topically once daily.  Dispense: 50 g; Refill: 0 pt reports this is expensive although it worked well for him he has an allergy to sulfa/bactrim   -     silver sulfADIAZINE 1% (SILVADENE) 1 % cream; Apply topically once daily.  Dispense: 85 g; Refill: 0    Pain of right heel  -     oxyCODONE-acetaminophen (PERCOCET)  mg per tablet; Take 1 tablet by mouth every 8 (eight) hours as needed for Pain.  Dispense: 21 tablet; Refill: 0    Other orders  -     Influenza - High Dose (65+) (PF) (IM)        Follow up as needed    Beata Garduno MD  [facility]   Family Medicine

## 2019-12-27 NOTE — TELEPHONE ENCOUNTER
----- Message from Cassidy Piña sent at 12/27/2019 12:55 PM CST -----  Contact: long(pharmacist) at [facility] called in reference to medication that was just prescribed for pt. It exceeds their policy and can be reached at [phone].      Thanks,  Cassidy Piña

## 2019-12-27 NOTE — TELEPHONE ENCOUNTER
Pharmacy states the recommendation for Percocet is every hrs to prevent respiratory depression  Can we change to every 8 hrs?

## 2019-12-30 ENCOUNTER — TELEPHONE (OUTPATIENT)
Dept: INTERNAL MEDICINE | Facility: CLINIC | Age: 74
End: 2019-12-30

## 2019-12-30 NOTE — TELEPHONE ENCOUNTER
----- Message from Micaela Barclay sent at 12/30/2019  8:37 AM CST -----  Contact: [facility]  The referral was received. Pt insurance is not accepted if he has [employer].If needed call back at [phone].      Thanks,  Micaela Barclay

## 2019-12-30 NOTE — TELEPHONE ENCOUNTER
Please inform patient that the people we wanted to send him to does not take his insurance. Will reach out to the other vascular doctor for recommendations.

## 2020-01-08 ENCOUNTER — TELEPHONE (OUTPATIENT)
Dept: INTERNAL MEDICINE | Facility: CLINIC | Age: 75
End: 2020-01-08

## 2020-01-08 NOTE — TELEPHONE ENCOUNTER
Notified pt we were referring him to Dr. Quispe for vasular and he should be receiving a call from them to schedule a apt   Pt verbalized understanding

## 2020-01-08 NOTE — TELEPHONE ENCOUNTER
Can we check with either providers below:  Dr. Xander Bernardo [phone] or  Dr. Peter Sterling [phone]    To see if they are affiliated with [facility].   They are with [facility]  The only way his insurance will cover is if it is with [facility]

## 2020-01-09 ENCOUNTER — PES CALL (OUTPATIENT)
Dept: ADMINISTRATIVE | Facility: CLINIC | Age: 75
End: 2020-01-09

## 2020-03-30 DIAGNOSIS — E03.9 HYPOTHYROIDISM (ACQUIRED): ICD-10-CM

## 2020-03-31 RX ORDER — LEVOTHYROXINE SODIUM 25 UG/1
TABLET ORAL
Qty: 135 TABLET | Refills: 0 | Status: SHIPPED | OUTPATIENT
Start: 2020-03-31 | End: 2020-05-25

## 2020-04-21 ENCOUNTER — TELEPHONE (OUTPATIENT)
Dept: INTERNAL MEDICINE | Facility: CLINIC | Age: 75
End: 2020-04-21

## 2020-04-21 NOTE — TELEPHONE ENCOUNTER
Spoke with pt wife offered an appointment with Dr. Zarate pt refused. Wife said she would try and get him to agree to the virtual visit or audio visit and call us back.

## 2020-04-21 NOTE — TELEPHONE ENCOUNTER
----- Message from Ute Terrell sent at 4/21/2020  9:27 AM CDT -----  Contact: -wife  Would like to consult with nurse regarding pt having chills, and shaking. Please give a call back at [phone].          Thanks,  Ute BENSON

## 2020-04-24 ENCOUNTER — OFFICE VISIT (OUTPATIENT)
Dept: INTERNAL MEDICINE | Facility: CLINIC | Age: 75
End: 2020-04-24
Payer: MEDICARE

## 2020-04-24 DIAGNOSIS — L03.90 CELLULITIS, UNSPECIFIED CELLULITIS SITE: Primary | ICD-10-CM

## 2020-04-24 DIAGNOSIS — M79.671 PAIN OF RIGHT HEEL: ICD-10-CM

## 2020-04-24 PROCEDURE — 99213 OFFICE O/P EST LOW 20 MIN: CPT | Mod: HCNC,95,, | Performed by: INTERNAL MEDICINE

## 2020-04-24 PROCEDURE — 99213 PR OFFICE/OUTPT VISIT, EST, LEVL III, 20-29 MIN: ICD-10-PCS | Mod: HCNC,95,, | Performed by: INTERNAL MEDICINE

## 2020-04-24 PROCEDURE — 1159F PR MEDICATION LIST DOCUMENTED IN MEDICAL RECORD: ICD-10-PCS | Mod: HCNC,95,, | Performed by: INTERNAL MEDICINE

## 2020-04-24 PROCEDURE — 99499 RISK ADDL DX/OHS AUDIT: ICD-10-PCS | Mod: 95,,, | Performed by: INTERNAL MEDICINE

## 2020-04-24 PROCEDURE — 1101F PR PT FALLS ASSESS DOC 0-1 FALLS W/OUT INJ PAST YR: ICD-10-PCS | Mod: HCNC,CPTII,95, | Performed by: INTERNAL MEDICINE

## 2020-04-24 PROCEDURE — 1159F MED LIST DOCD IN RCRD: CPT | Mod: HCNC,95,, | Performed by: INTERNAL MEDICINE

## 2020-04-24 PROCEDURE — 99499 UNLISTED E&M SERVICE: CPT | Mod: 95,,, | Performed by: INTERNAL MEDICINE

## 2020-04-24 PROCEDURE — 1101F PT FALLS ASSESS-DOCD LE1/YR: CPT | Mod: HCNC,CPTII,95, | Performed by: INTERNAL MEDICINE

## 2020-04-24 RX ORDER — OXYCODONE AND ACETAMINOPHEN 10; 325 MG/1; MG/1
1 TABLET ORAL EVERY 8 HOURS PRN
Qty: 20 TABLET | Refills: 0 | Status: ON HOLD | OUTPATIENT
Start: 2020-04-24 | End: 2021-12-14 | Stop reason: HOSPADM

## 2020-04-24 RX ORDER — CEPHALEXIN 500 MG/1
500 CAPSULE ORAL EVERY 12 HOURS
Qty: 20 CAPSULE | Refills: 0 | Status: SHIPPED | OUTPATIENT
Start: 2020-04-24 | End: 2020-05-04

## 2020-04-24 NOTE — PROGRESS NOTES
Subjective:       Patient ID: Gene Rothman is a 74 y.o. male.    Chief Complaint: No chief complaint on file.    The patient location is: home [place] [facility]    The chief complaint leading to consultation is: fevers, left thigh redness  Visit type: audiovisual  Total time spent with patient: 20  Each patient to whom he or she provides medical services by telemedicine is:  (1) informed of the relationship between the physician and patient and the respective role of any other health care provider with respect to management of the patient; and (2) notified that he or she may decline to receive medical services by telemedicine and may withdraw from such care at any time.    Notes: chills, cold feeling, low gr fever. Urine flow stopped for 24hrs 36 hrs ago.  Legs hurt as well and now red and inflamed from knees up, mostly on the L leg.  Urine flow returned 36 hrs ago and now nml. Now 0491-9458 cc per day.  Lips feels ulcerated and myalgias/arthralgias.    He wonders if he has cellulitis currently (has had in past) and current symptoms remind him of previous infection.  Still low grade fevers.    No sick contacts.    2 days ago had mild burning with urination, which has cleared. Wife reports that his urine is foul odored.    Has been self isolating due to covid19 for 6 weeks and no one in his hse has been sick. He denies cough, loss of taste, sinus symptoms or covid19 symptoms other than fevers.    Review of Systems   Constitutional: Positive for activity change and fever.   HENT: Negative for congestion.    Respiratory: Negative for cough and shortness of breath.    Cardiovascular: Positive for leg swelling. Negative for chest pain and palpitations.   Gastrointestinal: Negative for abdominal distention, abdominal pain, nausea and vomiting.   Genitourinary: Negative for decreased urine volume (now normal) and hematuria.   Musculoskeletal: Positive for arthralgias and myalgias.   Skin: Positive for rash and  wound (chronic lateral R ankle, venous stasis).   Neurological: Negative for tremors, syncope and weakness.   Psychiatric/Behavioral: Negative for agitation and confusion.       Objective:      Physical Exam   Constitutional: He is oriented to person, place, and time. He appears well-developed and well-nourished. No distress.   Well appearing, breathing comfortably, speaking full sentences, no coughing during telemed encounter   HENT:   Head: Normocephalic and atraumatic.   Pulmonary/Chest: Effort normal. No respiratory distress.   Abdominal: Soft. Bowel sounds are normal.   No tenderness to deep palpation     Musculoskeletal:   Obvious redness L thigh which is well demarcated on video and tender when patient touches the thigh. R thigh is not similarly red    bilat lower legs wrapped for lymphedema    Chronic R lateral ankle venous stasis ulcer does not show obvious redness around the ulcer   Neurological: He is alert and oriented to person, place, and time.   Skin: Rash noted. He is not diaphoretic.   Psychiatric: He has a normal mood and affect. His behavior is normal. Judgment and thought content normal.       Assessment:       1. Cellulitis, unspecified cellulitis site    2. Pain of right heel        Plan:       Diagnoses and all orders for this visit:    Cellulitis, unspecified cellulitis site  -     cephALEXin (KEFLEX) 500 MG capsule; Take 1 capsule (500 mg total) by mouth every 12 (twelve) hours. for 10 days  Presumed L thigh cellulitis.  Could be UTI as well, doubt covid19  Explained that if not better in 3-4 days, pt should rtc/call PCP        Pain of right heel  -     oxyCODONE-acetaminophen (PERCOCET)  mg per tablet; Take 1 tablet by mouth every 8 (eight) hours as needed for Pain.  Chronic ulcer at least 4 mos, wants med RFed      Health Maintenance       Date Due Completion Date    Shingles Vaccine (1 of 2) 10/22/1993 ---    Lipid Panel 04/06/2020 4/6/2015    TETANUS VACCINE 10/13/2027 10/13/2017  (ClinicallyNA)    Override on 10/13/2017: Not Clinically Appropriate          cc Ami Zarate, DO        No future appointments.

## 2020-05-23 DIAGNOSIS — E03.9 HYPOTHYROIDISM (ACQUIRED): ICD-10-CM

## 2020-05-25 RX ORDER — LEVOTHYROXINE SODIUM 25 UG/1
TABLET ORAL
Qty: 45 TABLET | Refills: 0 | Status: SHIPPED | OUTPATIENT
Start: 2020-05-25 | End: 2020-05-27 | Stop reason: SDUPTHER

## 2020-05-25 NOTE — TELEPHONE ENCOUNTER
Tried to call patient and the mailbox is full and can't receive messages. Tried to inform patient of medication refill being sent to the pharmacy and he needs to schedule an in office visit.

## 2020-05-27 DIAGNOSIS — E03.9 HYPOTHYROIDISM (ACQUIRED): ICD-10-CM

## 2020-05-27 RX ORDER — LEVOTHYROXINE SODIUM 25 UG/1
TABLET ORAL
Qty: 45 TABLET | Refills: 0 | Status: SHIPPED | OUTPATIENT
Start: 2020-05-27 | End: 2020-06-15 | Stop reason: SDUPTHER

## 2020-05-27 NOTE — TELEPHONE ENCOUNTER
I will send in one prescription but patient needs to have TSH and an annual exam prior to additional refills.

## 2020-05-27 NOTE — TELEPHONE ENCOUNTER
DALE Zarate 04/05/2019  DALE Garduno 12/27/2019      Pt medication was refused by Provider Jayla, she stated pt needs an appt and labs. I informed pt and he stated he had it done last year and has a huge wheelchair that won't fit through our doors. Pt said he has been on the medication for decades and would like it called in.       Please advise.

## 2020-06-15 DIAGNOSIS — E03.9 HYPOTHYROIDISM (ACQUIRED): ICD-10-CM

## 2020-06-15 RX ORDER — LEVOTHYROXINE SODIUM 25 UG/1
TABLET ORAL
Qty: 135 TABLET | Refills: 0 | Status: SHIPPED | OUTPATIENT
Start: 2020-06-15 | End: 2020-06-16 | Stop reason: SDUPTHER

## 2020-06-15 NOTE — TELEPHONE ENCOUNTER
----- Message from Hiral Loredo sent at 6/15/2020  2:27 PM CDT -----  Patient stated that there was an error with the refill on his Rx(levothyroxine (SYNTHROID) 25 MCG tablet). He stated that it should have been refilled through [facility] for 90 days. Please call back at [phone]

## 2020-06-16 DIAGNOSIS — E03.9 HYPOTHYROIDISM (ACQUIRED): ICD-10-CM

## 2020-06-16 NOTE — TELEPHONE ENCOUNTER
----- Message from Yahaira Wills sent at 6/16/2020  3:38 PM CDT -----  Regarding: CALLING AGAIN  CALLING REGARDING 90 SUPPLY OF MEDICATION. PLEASE CALL PATIENT ASAP TODAY @ [phone]. URGENT!!!!

## 2020-06-16 NOTE — TELEPHONE ENCOUNTER
Pt calling for a refill to [facility]  States he is not coming into the office anytime soon due to covid.

## 2020-06-17 RX ORDER — LEVOTHYROXINE SODIUM 25 UG/1
TABLET ORAL
Qty: 135 TABLET | Refills: 0 | Status: SHIPPED | OUTPATIENT
Start: 2020-06-17 | End: 2021-02-10

## 2020-10-16 ENCOUNTER — IMMUNIZATION (OUTPATIENT)
Dept: INTERNAL MEDICINE | Facility: CLINIC | Age: 75
End: 2020-10-16
Payer: MEDICARE

## 2020-10-16 PROCEDURE — 99999 PR PBB SHADOW E&M-EST. PATIENT-LVL II: ICD-10-PCS | Mod: PBBFAC,HCNC,,

## 2020-10-16 PROCEDURE — 90694 FLU VACCINE - QUADRIVALENT - ADJUVANTED: ICD-10-PCS | Mod: HCNC,S$GLB,, | Performed by: INTERNAL MEDICINE

## 2020-10-16 PROCEDURE — G0008 ADMIN INFLUENZA VIRUS VAC: HCPCS | Mod: HCNC,S$GLB,, | Performed by: INTERNAL MEDICINE

## 2020-10-16 PROCEDURE — G0008 FLU VACCINE - QUADRIVALENT - ADJUVANTED: ICD-10-PCS | Mod: HCNC,S$GLB,, | Performed by: INTERNAL MEDICINE

## 2020-10-16 PROCEDURE — 99999 PR PBB SHADOW E&M-EST. PATIENT-LVL II: CPT | Mod: PBBFAC,HCNC,,

## 2020-10-16 PROCEDURE — 90694 VACC AIIV4 NO PRSRV 0.5ML IM: CPT | Mod: HCNC,S$GLB,, | Performed by: INTERNAL MEDICINE

## 2020-12-16 ENCOUNTER — PES CALL (OUTPATIENT)
Dept: ADMINISTRATIVE | Facility: CLINIC | Age: 75
End: 2020-12-16

## 2021-02-20 ENCOUNTER — IMMUNIZATION (OUTPATIENT)
Dept: INTERNAL MEDICINE | Facility: CLINIC | Age: 76
End: 2021-02-20
Payer: MEDICARE

## 2021-02-20 DIAGNOSIS — Z23 NEED FOR VACCINATION: Primary | ICD-10-CM

## 2021-02-20 PROCEDURE — 91300 COVID-19, MRNA, LNP-S, PF, 30 MCG/0.3 ML DOSE VACCINE: CPT | Mod: PBBFAC | Performed by: FAMILY MEDICINE

## 2021-03-13 ENCOUNTER — IMMUNIZATION (OUTPATIENT)
Dept: INTERNAL MEDICINE | Facility: CLINIC | Age: 76
End: 2021-03-13
Payer: MEDICARE

## 2021-03-13 DIAGNOSIS — Z23 NEED FOR VACCINATION: Primary | ICD-10-CM

## 2021-03-13 PROCEDURE — 0002A COVID-19, MRNA, LNP-S, PF, 30 MCG/0.3 ML DOSE VACCINE: CPT | Mod: PBBFAC | Performed by: FAMILY MEDICINE

## 2021-03-13 PROCEDURE — 91300 COVID-19, MRNA, LNP-S, PF, 30 MCG/0.3 ML DOSE VACCINE: CPT | Mod: PBBFAC | Performed by: FAMILY MEDICINE

## 2021-04-15 ENCOUNTER — PES CALL (OUTPATIENT)
Dept: ADMINISTRATIVE | Facility: CLINIC | Age: 76
End: 2021-04-15

## 2021-05-03 DIAGNOSIS — E03.9 HYPOTHYROIDISM (ACQUIRED): ICD-10-CM

## 2021-05-03 DIAGNOSIS — E66.01 MORBID OBESITY: Primary | ICD-10-CM

## 2021-05-03 DIAGNOSIS — Z74.09 IMPAIRED MOBILITY: ICD-10-CM

## 2021-05-06 RX ORDER — LEVOTHYROXINE SODIUM 25 UG/1
TABLET ORAL
Qty: 135 TABLET | Refills: 0 | Status: SHIPPED | OUTPATIENT
Start: 2021-05-06 | End: 2021-06-19

## 2021-05-06 RX ORDER — LEVOTHYROXINE SODIUM 25 UG/1
TABLET ORAL
Qty: 135 TABLET | Refills: 0 | OUTPATIENT
Start: 2021-05-06

## 2021-05-09 ENCOUNTER — PES CALL (OUTPATIENT)
Dept: ADMINISTRATIVE | Facility: CLINIC | Age: 76
End: 2021-05-09

## 2021-05-14 ENCOUNTER — CARE AT HOME (OUTPATIENT)
Dept: HOME HEALTH SERVICES | Facility: CLINIC | Age: 76
End: 2021-05-14
Payer: MEDICARE

## 2021-05-14 VITALS
RESPIRATION RATE: 16 BRPM | SYSTOLIC BLOOD PRESSURE: 152 MMHG | TEMPERATURE: 98 F | OXYGEN SATURATION: 98 % | DIASTOLIC BLOOD PRESSURE: 88 MMHG

## 2021-05-14 DIAGNOSIS — Z74.09 IMPAIRED MOBILITY: ICD-10-CM

## 2021-05-14 DIAGNOSIS — I87.2 VENOUS STASIS DERMATITIS OF BOTH LOWER EXTREMITIES: ICD-10-CM

## 2021-05-14 DIAGNOSIS — E03.9 HYPOTHYROIDISM (ACQUIRED): ICD-10-CM

## 2021-05-14 DIAGNOSIS — I89.0 ACQUIRED LYMPHEDEMA OF LEG: ICD-10-CM

## 2021-05-14 DIAGNOSIS — I89.0 CHRONIC ACQUIRED LYMPHEDEMA: Primary | ICD-10-CM

## 2021-05-14 DIAGNOSIS — Z09 FOLLOW UP: ICD-10-CM

## 2021-05-14 DIAGNOSIS — E66.01 MORBID OBESITY: ICD-10-CM

## 2021-05-14 PROCEDURE — 99350 PR HOME VISIT,ESTAB PATIENT,LEVEL IV: ICD-10-PCS | Mod: 25,,, | Performed by: NURSE PRACTITIONER

## 2021-05-14 PROCEDURE — 99497 ADVNCD CARE PLAN 30 MIN: CPT | Mod: S$GLB,,, | Performed by: NURSE PRACTITIONER

## 2021-05-14 PROCEDURE — 99350 HOME/RES VST EST HIGH MDM 60: CPT | Mod: 25,,, | Performed by: NURSE PRACTITIONER

## 2021-05-14 PROCEDURE — 1159F PR MEDICATION LIST DOCUMENTED IN MEDICAL RECORD: ICD-10-PCS | Mod: S$GLB,,, | Performed by: NURSE PRACTITIONER

## 2021-05-14 PROCEDURE — 99497 PR ADVNCD CARE PLAN 30 MIN: ICD-10-PCS | Mod: S$GLB,,, | Performed by: NURSE PRACTITIONER

## 2021-05-14 PROCEDURE — 1159F MED LIST DOCD IN RCRD: CPT | Mod: S$GLB,,, | Performed by: NURSE PRACTITIONER

## 2021-05-14 PROCEDURE — 99499 UNLISTED E&M SERVICE: CPT | Mod: S$GLB,,, | Performed by: NURSE PRACTITIONER

## 2021-05-14 PROCEDURE — 99499 RISK ADDL DX/OHS AUDIT: ICD-10-PCS | Mod: S$GLB,,, | Performed by: NURSE PRACTITIONER

## 2021-05-19 ENCOUNTER — LAB VISIT (OUTPATIENT)
Dept: LAB | Facility: HOSPITAL | Age: 76
End: 2021-05-19
Attending: NURSE PRACTITIONER
Payer: MEDICARE

## 2021-05-19 DIAGNOSIS — Z09: ICD-10-CM

## 2021-05-19 DIAGNOSIS — E03.9 PRIMARY HYPOTHYROIDISM: Primary | ICD-10-CM

## 2021-05-19 LAB
ALBUMIN SERPL BCP-MCNC: 3 G/DL (ref 3.5–5.2)
ALP SERPL-CCNC: 64 U/L (ref 55–135)
ALT SERPL W/O P-5'-P-CCNC: 18 U/L (ref 10–44)
ANION GAP SERPL CALC-SCNC: 8 MMOL/L (ref 8–16)
AST SERPL-CCNC: 20 U/L (ref 10–40)
BASOPHILS # BLD AUTO: 0.06 K/UL (ref 0–0.2)
BASOPHILS NFR BLD: 1 % (ref 0–1.9)
BILIRUB SERPL-MCNC: 0.5 MG/DL (ref 0.1–1)
BUN SERPL-MCNC: 19 MG/DL (ref 8–23)
CALCIUM SERPL-MCNC: 7.8 MG/DL (ref 8.7–10.5)
CHLORIDE SERPL-SCNC: 107 MMOL/L (ref 95–110)
CHOLEST SERPL-MCNC: 151 MG/DL (ref 120–199)
CHOLEST/HDLC SERPL: 4.1 {RATIO} (ref 2–5)
CO2 SERPL-SCNC: 24 MMOL/L (ref 23–29)
COMPLEXED PSA SERPL-MCNC: 0.65 NG/ML (ref 0–4)
CREAT SERPL-MCNC: 1.5 MG/DL (ref 0.5–1.4)
DIFFERENTIAL METHOD: ABNORMAL
EOSINOPHIL # BLD AUTO: 0.3 K/UL (ref 0–0.5)
EOSINOPHIL NFR BLD: 5.1 % (ref 0–8)
ERYTHROCYTE [DISTWIDTH] IN BLOOD BY AUTOMATED COUNT: 15.5 % (ref 11.5–14.5)
EST. GFR  (AFRICAN AMERICAN): 51 ML/MIN/1.73 M^2
EST. GFR  (NON AFRICAN AMERICAN): 44 ML/MIN/1.73 M^2
ESTIMATED AVG GLUCOSE: 111 MG/DL (ref 68–131)
GLUCOSE SERPL-MCNC: 81 MG/DL (ref 70–110)
HBA1C MFR BLD: 5.5 % (ref 4–5.6)
HCT VFR BLD AUTO: 43.5 % (ref 40–54)
HDLC SERPL-MCNC: 37 MG/DL (ref 40–75)
HDLC SERPL: 24.5 % (ref 20–50)
HGB BLD-MCNC: 13.3 G/DL (ref 14–18)
IMM GRANULOCYTES # BLD AUTO: 0.03 K/UL (ref 0–0.04)
IMM GRANULOCYTES NFR BLD AUTO: 0.5 % (ref 0–0.5)
LDLC SERPL CALC-MCNC: 93 MG/DL (ref 63–159)
LYMPHOCYTES # BLD AUTO: 2.1 K/UL (ref 1–4.8)
LYMPHOCYTES NFR BLD: 34 % (ref 18–48)
MAGNESIUM SERPL-MCNC: 2.2 MG/DL (ref 1.6–2.6)
MCH RBC QN AUTO: 32 PG (ref 27–31)
MCHC RBC AUTO-ENTMCNC: 30.6 G/DL (ref 32–36)
MCV RBC AUTO: 105 FL (ref 82–98)
MONOCYTES # BLD AUTO: 0.7 K/UL (ref 0.3–1)
MONOCYTES NFR BLD: 10.3 % (ref 4–15)
NEUTROPHILS # BLD AUTO: 3.1 K/UL (ref 1.8–7.7)
NEUTROPHILS NFR BLD: 49.1 % (ref 38–73)
NONHDLC SERPL-MCNC: 114 MG/DL
NRBC BLD-RTO: 0 /100 WBC
PHOSPHATE SERPL-MCNC: 3.5 MG/DL (ref 2.7–4.5)
PLATELET # BLD AUTO: 225 K/UL (ref 150–450)
PMV BLD AUTO: 10 FL (ref 9.2–12.9)
POTASSIUM SERPL-SCNC: 4.2 MMOL/L (ref 3.5–5.1)
PROT SERPL-MCNC: 7 G/DL (ref 6–8.4)
RBC # BLD AUTO: 4.16 M/UL (ref 4.6–6.2)
SODIUM SERPL-SCNC: 139 MMOL/L (ref 136–145)
T3 SERPL-MCNC: 52 NG/DL (ref 60–180)
T4 FREE SERPL-MCNC: 0.84 NG/DL (ref 0.71–1.51)
T4 SERPL-MCNC: 6.4 UG/DL (ref 4.5–11.5)
TRIGL SERPL-MCNC: 105 MG/DL (ref 30–150)
TSH SERPL DL<=0.005 MIU/L-ACNC: 6.51 UIU/ML (ref 0.4–4)
WBC # BLD AUTO: 6.3 K/UL (ref 3.9–12.7)

## 2021-05-19 PROCEDURE — 80061 LIPID PANEL: CPT | Performed by: NURSE PRACTITIONER

## 2021-05-19 PROCEDURE — 84480 ASSAY TRIIODOTHYRONINE (T3): CPT | Performed by: NURSE PRACTITIONER

## 2021-05-19 PROCEDURE — 84439 ASSAY OF FREE THYROXINE: CPT | Performed by: NURSE PRACTITIONER

## 2021-05-19 PROCEDURE — 83036 HEMOGLOBIN GLYCOSYLATED A1C: CPT | Mod: DBM | Performed by: NURSE PRACTITIONER

## 2021-05-19 PROCEDURE — 36415 COLL VENOUS BLD VENIPUNCTURE: CPT | Performed by: NURSE PRACTITIONER

## 2021-05-19 PROCEDURE — 84153 ASSAY OF PSA TOTAL: CPT | Mod: DBM | Performed by: NURSE PRACTITIONER

## 2021-05-19 PROCEDURE — 84436 ASSAY OF TOTAL THYROXINE: CPT | Performed by: NURSE PRACTITIONER

## 2021-05-19 PROCEDURE — 85025 COMPLETE CBC W/AUTO DIFF WBC: CPT | Performed by: NURSE PRACTITIONER

## 2021-05-19 PROCEDURE — 84100 ASSAY OF PHOSPHORUS: CPT | Performed by: NURSE PRACTITIONER

## 2021-05-19 PROCEDURE — 80053 COMPREHEN METABOLIC PANEL: CPT | Performed by: NURSE PRACTITIONER

## 2021-05-19 PROCEDURE — 84443 ASSAY THYROID STIM HORMONE: CPT | Performed by: NURSE PRACTITIONER

## 2021-05-19 PROCEDURE — 83735 ASSAY OF MAGNESIUM: CPT | Performed by: NURSE PRACTITIONER

## 2021-06-03 ENCOUNTER — EXTERNAL HOME HEALTH (OUTPATIENT)
Dept: HOME HEALTH SERVICES | Facility: HOSPITAL | Age: 76
End: 2021-06-03
Payer: MEDICARE

## 2021-06-16 ENCOUNTER — CARE AT HOME (OUTPATIENT)
Dept: HOME HEALTH SERVICES | Facility: CLINIC | Age: 76
End: 2021-06-16
Payer: MEDICARE

## 2021-06-16 DIAGNOSIS — E03.9 HYPOTHYROIDISM (ACQUIRED): ICD-10-CM

## 2021-06-16 DIAGNOSIS — E03.9 ACQUIRED HYPOTHYROIDISM: ICD-10-CM

## 2021-06-16 DIAGNOSIS — Z09 FOLLOW UP: Primary | ICD-10-CM

## 2021-06-16 DIAGNOSIS — E78.5 HYPERLIPIDEMIA, UNSPECIFIED HYPERLIPIDEMIA TYPE: ICD-10-CM

## 2021-06-16 PROCEDURE — 99350 PR HOME VISIT,ESTAB PATIENT,LEVEL IV: ICD-10-PCS | Mod: ,,, | Performed by: NURSE PRACTITIONER

## 2021-06-16 PROCEDURE — 99350 HOME/RES VST EST HIGH MDM 60: CPT | Mod: ,,, | Performed by: NURSE PRACTITIONER

## 2021-06-16 PROCEDURE — 1159F PR MEDICATION LIST DOCUMENTED IN MEDICAL RECORD: ICD-10-PCS | Mod: ,,, | Performed by: NURSE PRACTITIONER

## 2021-06-16 PROCEDURE — 1159F MED LIST DOCD IN RCRD: CPT | Mod: ,,, | Performed by: NURSE PRACTITIONER

## 2021-06-19 ENCOUNTER — TELEPHONE (OUTPATIENT)
Dept: HOME HEALTH SERVICES | Facility: CLINIC | Age: 76
End: 2021-06-19

## 2021-06-19 VITALS
SYSTOLIC BLOOD PRESSURE: 132 MMHG | RESPIRATION RATE: 18 BRPM | HEART RATE: 78 BPM | TEMPERATURE: 98 F | OXYGEN SATURATION: 98 % | DIASTOLIC BLOOD PRESSURE: 75 MMHG

## 2021-06-19 RX ORDER — LEVOTHYROXINE SODIUM 50 UG/1
50 TABLET ORAL
Qty: 90 TABLET | Refills: 0 | Status: SHIPPED | OUTPATIENT
Start: 2021-06-19 | End: 2021-09-21 | Stop reason: SDUPTHER

## 2021-06-19 RX ORDER — ATORVASTATIN CALCIUM 40 MG/1
40 TABLET, FILM COATED ORAL DAILY
Qty: 90 TABLET | Refills: 3 | Status: SHIPPED | OUTPATIENT
Start: 2021-06-19 | End: 2022-04-28 | Stop reason: SDUPTHER

## 2021-07-01 ENCOUNTER — PATIENT MESSAGE (OUTPATIENT)
Dept: ADMINISTRATIVE | Facility: OTHER | Age: 76
End: 2021-07-01

## 2021-07-09 ENCOUNTER — PES CALL (OUTPATIENT)
Dept: ADMINISTRATIVE | Facility: CLINIC | Age: 76
End: 2021-07-09

## 2021-07-22 ENCOUNTER — CARE AT HOME (OUTPATIENT)
Dept: HOME HEALTH SERVICES | Facility: CLINIC | Age: 76
End: 2021-07-22
Payer: MEDICARE

## 2021-07-22 DIAGNOSIS — Z09 FOLLOW UP: Primary | ICD-10-CM

## 2021-07-22 PROCEDURE — 99213 PR OFFICE/OUTPT VISIT, EST, LEVL III, 20-29 MIN: ICD-10-PCS | Mod: S$GLB,,, | Performed by: NURSE PRACTITIONER

## 2021-07-22 PROCEDURE — 99213 OFFICE O/P EST LOW 20 MIN: CPT | Mod: S$GLB,,, | Performed by: NURSE PRACTITIONER

## 2021-08-10 ENCOUNTER — OFFICE VISIT (OUTPATIENT)
Dept: HOME HEALTH SERVICES | Facility: CLINIC | Age: 76
End: 2021-08-10
Payer: MEDICARE

## 2021-08-10 VITALS
HEIGHT: 71 IN | HEART RATE: 77 BPM | TEMPERATURE: 97 F | BODY MASS INDEX: 44.1 KG/M2 | WEIGHT: 315 LBS | OXYGEN SATURATION: 94 % | DIASTOLIC BLOOD PRESSURE: 77 MMHG | SYSTOLIC BLOOD PRESSURE: 151 MMHG

## 2021-08-10 DIAGNOSIS — H91.93 BILATERAL HEARING LOSS, UNSPECIFIED HEARING LOSS TYPE: Chronic | ICD-10-CM

## 2021-08-10 DIAGNOSIS — G47.33 OBSTRUCTIVE SLEEP APNEA SYNDROME: Chronic | ICD-10-CM

## 2021-08-10 DIAGNOSIS — I73.9 PERIPHERAL VASCULAR DISEASE, UNSPECIFIED: ICD-10-CM

## 2021-08-10 DIAGNOSIS — Z00.00 ENCOUNTER FOR PREVENTIVE HEALTH EXAMINATION: ICD-10-CM

## 2021-08-10 DIAGNOSIS — M19.90 OTHER TYPE OF OSTEOARTHRITIS, UNSPECIFIED SITE: Chronic | ICD-10-CM

## 2021-08-10 DIAGNOSIS — B35.1 ONYCHOMYCOSIS: ICD-10-CM

## 2021-08-10 DIAGNOSIS — E03.9 ACQUIRED HYPOTHYROIDISM: Chronic | ICD-10-CM

## 2021-08-10 DIAGNOSIS — N18.30 STAGE 3 CHRONIC KIDNEY DISEASE, UNSPECIFIED WHETHER STAGE 3A OR 3B CKD: Chronic | ICD-10-CM

## 2021-08-10 DIAGNOSIS — E66.01 MORBID OBESITY WITH BMI OF 60.0-69.9, ADULT: Chronic | ICD-10-CM

## 2021-08-10 DIAGNOSIS — I87.2 VENOUS STASIS DERMATITIS OF BOTH LOWER EXTREMITIES: Chronic | ICD-10-CM

## 2021-08-10 DIAGNOSIS — B35.1 FUNGAL INFECTION OF TOENAIL: ICD-10-CM

## 2021-08-10 DIAGNOSIS — Z99.3 DEPENDENCE ON WHEELCHAIR: ICD-10-CM

## 2021-08-10 DIAGNOSIS — R26.9 ABNORMALITY OF GAIT AND MOBILITY: ICD-10-CM

## 2021-08-10 DIAGNOSIS — Z00.00 ENCOUNTER FOR PREVENTATIVE ADULT HEALTH CARE EXAMINATION: Primary | ICD-10-CM

## 2021-08-10 DIAGNOSIS — I89.0 CHRONIC ACQUIRED LYMPHEDEMA: Chronic | ICD-10-CM

## 2021-08-10 DIAGNOSIS — K43.9 VENTRAL HERNIA WITHOUT OBSTRUCTION OR GANGRENE: Chronic | ICD-10-CM

## 2021-08-10 PROCEDURE — 1125F AMNT PAIN NOTED PAIN PRSNT: CPT | Mod: CPTII,S$GLB,, | Performed by: NURSE PRACTITIONER

## 2021-08-10 PROCEDURE — 1159F MED LIST DOCD IN RCRD: CPT | Mod: CPTII,S$GLB,, | Performed by: NURSE PRACTITIONER

## 2021-08-10 PROCEDURE — 3078F DIAST BP <80 MM HG: CPT | Mod: CPTII,S$GLB,, | Performed by: NURSE PRACTITIONER

## 2021-08-10 PROCEDURE — 1125F PR PAIN SEVERITY QUANTIFIED, PAIN PRESENT: ICD-10-PCS | Mod: CPTII,S$GLB,, | Performed by: NURSE PRACTITIONER

## 2021-08-10 PROCEDURE — 3288F PR FALLS RISK ASSESSMENT DOCUMENTED: ICD-10-PCS | Mod: CPTII,S$GLB,, | Performed by: NURSE PRACTITIONER

## 2021-08-10 PROCEDURE — 3077F PR MOST RECENT SYSTOLIC BLOOD PRESSURE >= 140 MM HG: ICD-10-PCS | Mod: CPTII,S$GLB,, | Performed by: NURSE PRACTITIONER

## 2021-08-10 PROCEDURE — 1101F PT FALLS ASSESS-DOCD LE1/YR: CPT | Mod: CPTII,S$GLB,, | Performed by: NURSE PRACTITIONER

## 2021-08-10 PROCEDURE — 1101F PR PT FALLS ASSESS DOC 0-1 FALLS W/OUT INJ PAST YR: ICD-10-PCS | Mod: CPTII,S$GLB,, | Performed by: NURSE PRACTITIONER

## 2021-08-10 PROCEDURE — 3288F FALL RISK ASSESSMENT DOCD: CPT | Mod: CPTII,S$GLB,, | Performed by: NURSE PRACTITIONER

## 2021-08-10 PROCEDURE — 1160F RVW MEDS BY RX/DR IN RCRD: CPT | Mod: CPTII,S$GLB,, | Performed by: NURSE PRACTITIONER

## 2021-08-10 PROCEDURE — 1160F PR REVIEW ALL MEDS BY PRESCRIBER/CLIN PHARMACIST DOCUMENTED: ICD-10-PCS | Mod: CPTII,S$GLB,, | Performed by: NURSE PRACTITIONER

## 2021-08-10 PROCEDURE — G0439 PR MEDICARE ANNUAL WELLNESS SUBSEQUENT VISIT: ICD-10-PCS | Mod: S$GLB,,, | Performed by: NURSE PRACTITIONER

## 2021-08-10 PROCEDURE — G0439 PPPS, SUBSEQ VISIT: HCPCS | Mod: S$GLB,,, | Performed by: NURSE PRACTITIONER

## 2021-08-10 PROCEDURE — 3078F PR MOST RECENT DIASTOLIC BLOOD PRESSURE < 80 MM HG: ICD-10-PCS | Mod: CPTII,S$GLB,, | Performed by: NURSE PRACTITIONER

## 2021-08-10 PROCEDURE — 3077F SYST BP >= 140 MM HG: CPT | Mod: CPTII,S$GLB,, | Performed by: NURSE PRACTITIONER

## 2021-08-10 PROCEDURE — 1159F PR MEDICATION LIST DOCUMENTED IN MEDICAL RECORD: ICD-10-PCS | Mod: CPTII,S$GLB,, | Performed by: NURSE PRACTITIONER

## 2021-08-10 RX ORDER — ACETAMINOPHEN 500 MG
500 TABLET ORAL EVERY 6 HOURS PRN
Status: ON HOLD | COMMUNITY
End: 2023-01-01

## 2021-08-10 RX ORDER — CICLOPIROX 80 MG/ML
SOLUTION TOPICAL NIGHTLY
Qty: 3 BOTTLE | Refills: 4 | Status: ON HOLD | OUTPATIENT
Start: 2021-08-10 | End: 2021-12-14 | Stop reason: HOSPADM

## 2021-08-13 ENCOUNTER — EXTERNAL HOME HEALTH (OUTPATIENT)
Dept: HOME HEALTH SERVICES | Facility: HOSPITAL | Age: 76
End: 2021-08-13
Payer: MEDICARE

## 2021-08-23 ENCOUNTER — DOCUMENT SCAN (OUTPATIENT)
Dept: HOME HEALTH SERVICES | Facility: HOSPITAL | Age: 76
End: 2021-08-23

## 2021-09-01 RX ORDER — SILVER SULFADIAZINE 10 G/1000G
CREAM TOPICAL
Status: CANCELLED | OUTPATIENT
Start: 2021-09-01 | End: 2021-09-01

## 2021-09-01 RX ORDER — SILVER SULFADIAZINE 10 G/1000G
CREAM TOPICAL DAILY
Qty: 85 G | Refills: 11 | Status: ON HOLD | OUTPATIENT
Start: 2021-09-01 | End: 2021-12-14 | Stop reason: HOSPADM

## 2021-09-02 ENCOUNTER — CARE AT HOME (OUTPATIENT)
Dept: HOME HEALTH SERVICES | Facility: CLINIC | Age: 76
End: 2021-09-02
Payer: MEDICARE

## 2021-09-02 DIAGNOSIS — Z09 FOLLOW UP: Primary | ICD-10-CM

## 2021-09-02 PROCEDURE — 99499 NO LOS: ICD-10-PCS | Mod: S$GLB,,, | Performed by: NURSE PRACTITIONER

## 2021-09-02 PROCEDURE — 99499 UNLISTED E&M SERVICE: CPT | Mod: S$GLB,,, | Performed by: NURSE PRACTITIONER

## 2021-09-14 ENCOUNTER — PATIENT MESSAGE (OUTPATIENT)
Dept: PHARMACY | Facility: CLINIC | Age: 76
End: 2021-09-14

## 2021-09-21 ENCOUNTER — TELEPHONE (OUTPATIENT)
Dept: INTERNAL MEDICINE | Facility: CLINIC | Age: 76
End: 2021-09-21

## 2021-09-25 RX ORDER — LEVOTHYROXINE SODIUM 50 UG/1
50 TABLET ORAL
Qty: 90 TABLET | Refills: 0 | Status: SHIPPED | OUTPATIENT
Start: 2021-09-25 | End: 2021-11-26

## 2021-10-07 ENCOUNTER — CARE AT HOME (OUTPATIENT)
Dept: HOME HEALTH SERVICES | Facility: CLINIC | Age: 76
End: 2021-10-07
Payer: MEDICARE

## 2021-10-07 DIAGNOSIS — I89.0 ACQUIRED LYMPHEDEMA OF LEG: ICD-10-CM

## 2021-10-07 DIAGNOSIS — Z09 FOLLOW UP: Primary | ICD-10-CM

## 2021-10-07 DIAGNOSIS — E03.9 ACQUIRED HYPOTHYROIDISM: ICD-10-CM

## 2021-10-07 PROCEDURE — 99350 PR HOME VISIT,ESTAB PATIENT,LEVEL IV: ICD-10-PCS | Mod: ,,, | Performed by: NURSE PRACTITIONER

## 2021-10-07 PROCEDURE — 99499 RISK ADDL DX/OHS AUDIT: ICD-10-PCS | Mod: S$GLB,,, | Performed by: NURSE PRACTITIONER

## 2021-10-07 PROCEDURE — 99350 HOME/RES VST EST HIGH MDM 60: CPT | Mod: ,,, | Performed by: NURSE PRACTITIONER

## 2021-10-07 PROCEDURE — 99499 UNLISTED E&M SERVICE: CPT | Mod: S$GLB,,, | Performed by: NURSE PRACTITIONER

## 2021-10-08 VITALS
RESPIRATION RATE: 16 BRPM | SYSTOLIC BLOOD PRESSURE: 140 MMHG | TEMPERATURE: 98 F | DIASTOLIC BLOOD PRESSURE: 81 MMHG | HEART RATE: 63 BPM

## 2021-10-08 RX ORDER — SILVER SULFADIAZINE 10 G/1000G
CREAM TOPICAL DAILY
Qty: 85 G | Refills: 3 | Status: ON HOLD | OUTPATIENT
Start: 2021-10-08 | End: 2021-12-14 | Stop reason: HOSPADM

## 2021-11-12 ENCOUNTER — TELEPHONE (OUTPATIENT)
Dept: INTERNAL MEDICINE | Facility: CLINIC | Age: 76
End: 2021-11-12
Payer: MEDICARE

## 2021-11-26 RX ORDER — LEVOTHYROXINE SODIUM 50 UG/1
50 TABLET ORAL
Qty: 90 TABLET | Refills: 0 | Status: SHIPPED | OUTPATIENT
Start: 2021-11-26 | End: 2022-02-10 | Stop reason: SDUPTHER

## 2021-12-01 ENCOUNTER — IMMUNIZATION (OUTPATIENT)
Dept: PRIMARY CARE CLINIC | Facility: CLINIC | Age: 76
End: 2021-12-01
Payer: MEDICARE

## 2021-12-01 DIAGNOSIS — Z23 NEED FOR VACCINATION: Primary | ICD-10-CM

## 2021-12-01 PROCEDURE — 0004A COVID-19, MRNA, LNP-S, PF, 30 MCG/0.3 ML DOSE VACCINE: CPT | Mod: CV19,PBBFAC | Performed by: FAMILY MEDICINE

## 2021-12-03 ENCOUNTER — HOSPITAL ENCOUNTER (INPATIENT)
Facility: HOSPITAL | Age: 76
LOS: 10 days | Discharge: NURSING, REHAB OR LONG-TERM CARE | DRG: 872 | End: 2021-12-14
Attending: EMERGENCY MEDICINE | Admitting: INTERNAL MEDICINE
Payer: MEDICARE

## 2021-12-03 DIAGNOSIS — A41.9 SEVERE SEPSIS: ICD-10-CM

## 2021-12-03 DIAGNOSIS — N17.9 AKI (ACUTE KIDNEY INJURY): ICD-10-CM

## 2021-12-03 DIAGNOSIS — R07.9 CHEST PAIN: ICD-10-CM

## 2021-12-03 DIAGNOSIS — R53.1 WEAKNESS: ICD-10-CM

## 2021-12-03 DIAGNOSIS — E66.01 MORBID OBESITY WITH BMI OF 60.0-69.9, ADULT: Chronic | ICD-10-CM

## 2021-12-03 DIAGNOSIS — R65.20 SEVERE SEPSIS: ICD-10-CM

## 2021-12-03 DIAGNOSIS — A41.9 SEPSIS, DUE TO UNSPECIFIED ORGANISM, UNSPECIFIED WHETHER ACUTE ORGAN DYSFUNCTION PRESENT: ICD-10-CM

## 2021-12-03 DIAGNOSIS — D72.829 LEUKOCYTOSIS, UNSPECIFIED TYPE: ICD-10-CM

## 2021-12-03 DIAGNOSIS — R60.9 EDEMA: ICD-10-CM

## 2021-12-03 DIAGNOSIS — L03.116 CELLULITIS OF LEFT LOWER EXTREMITY: Primary | ICD-10-CM

## 2021-12-03 PROBLEM — L03.90 CELLULITIS: Status: ACTIVE | Noted: 2021-12-03

## 2021-12-03 PROBLEM — N18.9 ACUTE ON CHRONIC RENAL FAILURE: Status: ACTIVE | Noted: 2021-12-03

## 2021-12-03 LAB
ALBUMIN SERPL BCP-MCNC: 2.7 G/DL (ref 3.5–5.2)
ALP SERPL-CCNC: 67 U/L (ref 55–135)
ALT SERPL W/O P-5'-P-CCNC: 39 U/L (ref 10–44)
ANION GAP SERPL CALC-SCNC: 13 MMOL/L (ref 8–16)
AST SERPL-CCNC: 33 U/L (ref 10–40)
BACTERIA #/AREA URNS HPF: ABNORMAL /HPF
BASOPHILS # BLD AUTO: 0.08 K/UL (ref 0–0.2)
BASOPHILS NFR BLD: 0.3 % (ref 0–1.9)
BILIRUB SERPL-MCNC: 2 MG/DL (ref 0.1–1)
BILIRUB UR QL STRIP: NEGATIVE
BNP SERPL-MCNC: 234 PG/ML (ref 0–99)
BUN SERPL-MCNC: 27 MG/DL (ref 8–23)
CALCIUM SERPL-MCNC: 8.1 MG/DL (ref 8.7–10.5)
CHLORIDE SERPL-SCNC: 103 MMOL/L (ref 95–110)
CLARITY UR: CLEAR
CO2 SERPL-SCNC: 20 MMOL/L (ref 23–29)
COLOR UR: YELLOW
CREAT SERPL-MCNC: 3 MG/DL (ref 0.5–1.4)
DIFFERENTIAL METHOD: ABNORMAL
EOSINOPHIL # BLD AUTO: 0 K/UL (ref 0–0.5)
EOSINOPHIL NFR BLD: 0 % (ref 0–8)
ERYTHROCYTE [DISTWIDTH] IN BLOOD BY AUTOMATED COUNT: 14.6 % (ref 11.5–14.5)
EST. GFR  (AFRICAN AMERICAN): 22 ML/MIN/1.73 M^2
EST. GFR  (NON AFRICAN AMERICAN): 19 ML/MIN/1.73 M^2
GLUCOSE SERPL-MCNC: 95 MG/DL (ref 70–110)
GLUCOSE UR QL STRIP: NEGATIVE
HCT VFR BLD AUTO: 39.6 % (ref 40–54)
HGB BLD-MCNC: 12.4 G/DL (ref 14–18)
HGB UR QL STRIP: ABNORMAL
HYALINE CASTS #/AREA URNS LPF: 0 /LPF
IMM GRANULOCYTES # BLD AUTO: 1.09 K/UL (ref 0–0.04)
IMM GRANULOCYTES NFR BLD AUTO: 4.8 % (ref 0–0.5)
KETONES UR QL STRIP: ABNORMAL
LACTATE SERPL-SCNC: 4 MMOL/L (ref 0.5–2.2)
LACTATE SERPL-SCNC: 4.2 MMOL/L (ref 0.5–2.2)
LACTATE SERPL-SCNC: 5.1 MMOL/L (ref 0.5–2.2)
LEUKOCYTE ESTERASE UR QL STRIP: ABNORMAL
LYMPHOCYTES # BLD AUTO: 0.8 K/UL (ref 1–4.8)
LYMPHOCYTES NFR BLD: 3.5 % (ref 18–48)
MCH RBC QN AUTO: 32.2 PG (ref 27–31)
MCHC RBC AUTO-ENTMCNC: 31.3 G/DL (ref 32–36)
MCV RBC AUTO: 103 FL (ref 82–98)
MICROSCOPIC COMMENT: ABNORMAL
MONOCYTES # BLD AUTO: 1.4 K/UL (ref 0.3–1)
MONOCYTES NFR BLD: 6 % (ref 4–15)
NEUTROPHILS # BLD AUTO: 19.6 K/UL (ref 1.8–7.7)
NEUTROPHILS NFR BLD: 85.4 % (ref 38–73)
NITRITE UR QL STRIP: NEGATIVE
NRBC BLD-RTO: 0 /100 WBC
PH UR STRIP: 6 [PH] (ref 5–8)
PLATELET # BLD AUTO: 171 K/UL (ref 150–450)
PMV BLD AUTO: 9.9 FL (ref 9.2–12.9)
POTASSIUM SERPL-SCNC: 4.5 MMOL/L (ref 3.5–5.1)
PROCALCITONIN SERPL IA-MCNC: 52 NG/ML
PROT SERPL-MCNC: 6.8 G/DL (ref 6–8.4)
PROT UR QL STRIP: ABNORMAL
RBC # BLD AUTO: 3.85 M/UL (ref 4.6–6.2)
RBC #/AREA URNS HPF: 0 /HPF (ref 0–4)
SODIUM SERPL-SCNC: 136 MMOL/L (ref 136–145)
SP GR UR STRIP: 1.02 (ref 1–1.03)
URN SPEC COLLECT METH UR: ABNORMAL
UROBILINOGEN UR STRIP-ACNC: NEGATIVE EU/DL
WBC # BLD AUTO: 22.91 K/UL (ref 3.9–12.7)
WBC #/AREA URNS HPF: 50 /HPF (ref 0–5)

## 2021-12-03 PROCEDURE — 63600175 PHARM REV CODE 636 W HCPCS: Performed by: EMERGENCY MEDICINE

## 2021-12-03 PROCEDURE — 99900035 HC TECH TIME PER 15 MIN (STAT): Mod: HCNC

## 2021-12-03 PROCEDURE — 93005 ELECTROCARDIOGRAM TRACING: CPT | Mod: HCNC

## 2021-12-03 PROCEDURE — 93010 EKG 12-LEAD: ICD-10-PCS | Mod: HCNC,,, | Performed by: INTERNAL MEDICINE

## 2021-12-03 PROCEDURE — 81000 URINALYSIS NONAUTO W/SCOPE: CPT | Mod: HCNC | Performed by: EMERGENCY MEDICINE

## 2021-12-03 PROCEDURE — 84145 PROCALCITONIN (PCT): CPT | Mod: HCNC | Performed by: EMERGENCY MEDICINE

## 2021-12-03 PROCEDURE — 87086 URINE CULTURE/COLONY COUNT: CPT | Mod: HCNC | Performed by: EMERGENCY MEDICINE

## 2021-12-03 PROCEDURE — 83605 ASSAY OF LACTIC ACID: CPT | Mod: 91,HCNC | Performed by: NURSE PRACTITIONER

## 2021-12-03 PROCEDURE — 83605 ASSAY OF LACTIC ACID: CPT | Mod: 91,HCNC | Performed by: EMERGENCY MEDICINE

## 2021-12-03 PROCEDURE — 99291 CRITICAL CARE FIRST HOUR: CPT | Mod: 25,HCNC

## 2021-12-03 PROCEDURE — 25000003 PHARM REV CODE 250: Mod: HCNC | Performed by: EMERGENCY MEDICINE

## 2021-12-03 PROCEDURE — 96361 HYDRATE IV INFUSION ADD-ON: CPT | Mod: HCNC

## 2021-12-03 PROCEDURE — G0378 HOSPITAL OBSERVATION PER HR: HCPCS | Mod: HCNC

## 2021-12-03 PROCEDURE — 87040 BLOOD CULTURE FOR BACTERIA: CPT | Mod: HCNC | Performed by: EMERGENCY MEDICINE

## 2021-12-03 PROCEDURE — 85025 COMPLETE CBC W/AUTO DIFF WBC: CPT | Mod: HCNC | Performed by: EMERGENCY MEDICINE

## 2021-12-03 PROCEDURE — 96374 THER/PROPH/DIAG INJ IV PUSH: CPT | Mod: HCNC

## 2021-12-03 PROCEDURE — 80053 COMPREHEN METABOLIC PANEL: CPT | Mod: HCNC | Performed by: EMERGENCY MEDICINE

## 2021-12-03 PROCEDURE — 83880 ASSAY OF NATRIURETIC PEPTIDE: CPT | Mod: HCNC | Performed by: EMERGENCY MEDICINE

## 2021-12-03 PROCEDURE — 36415 COLL VENOUS BLD VENIPUNCTURE: CPT | Mod: HCNC | Performed by: NURSE PRACTITIONER

## 2021-12-03 PROCEDURE — 93010 ELECTROCARDIOGRAM REPORT: CPT | Mod: HCNC,,, | Performed by: INTERNAL MEDICINE

## 2021-12-03 RX ORDER — METRONIDAZOLE 500 MG/1
500 TABLET ORAL EVERY 8 HOURS
Status: DISCONTINUED | OUTPATIENT
Start: 2021-12-03 | End: 2021-12-07

## 2021-12-03 RX ORDER — SODIUM CHLORIDE 9 MG/ML
INJECTION, SOLUTION INTRAVENOUS
Status: DISCONTINUED | OUTPATIENT
Start: 2021-12-03 | End: 2021-12-08

## 2021-12-03 RX ORDER — TRAMADOL HYDROCHLORIDE 50 MG/1
50 TABLET ORAL EVERY 6 HOURS PRN
Status: DISCONTINUED | OUTPATIENT
Start: 2021-12-03 | End: 2021-12-08

## 2021-12-03 RX ORDER — LEVOTHYROXINE SODIUM 50 UG/1
50 TABLET ORAL
Status: DISCONTINUED | OUTPATIENT
Start: 2021-12-04 | End: 2021-12-14 | Stop reason: HOSPADM

## 2021-12-03 RX ORDER — HEPARIN SODIUM 5000 [USP'U]/ML
5000 INJECTION, SOLUTION INTRAVENOUS; SUBCUTANEOUS EVERY 8 HOURS
Status: DISCONTINUED | OUTPATIENT
Start: 2021-12-03 | End: 2021-12-14 | Stop reason: HOSPADM

## 2021-12-03 RX ORDER — MORPHINE SULFATE 4 MG/ML
4 INJECTION, SOLUTION INTRAMUSCULAR; INTRAVENOUS
Status: COMPLETED | OUTPATIENT
Start: 2021-12-03 | End: 2021-12-03

## 2021-12-03 RX ORDER — SODIUM CHLORIDE 9 MG/ML
INJECTION, SOLUTION INTRAVENOUS
Status: DISCONTINUED | OUTPATIENT
Start: 2021-12-03 | End: 2021-12-14 | Stop reason: HOSPADM

## 2021-12-03 RX ORDER — CEFEPIME HYDROCHLORIDE 1 G/50ML
1 INJECTION, SOLUTION INTRAVENOUS
Status: DISCONTINUED | OUTPATIENT
Start: 2021-12-03 | End: 2021-12-06

## 2021-12-03 RX ORDER — FAMOTIDINE 20 MG/1
20 TABLET, FILM COATED ORAL DAILY
Status: DISCONTINUED | OUTPATIENT
Start: 2021-12-04 | End: 2021-12-06

## 2021-12-03 RX ORDER — ATORVASTATIN CALCIUM 40 MG/1
40 TABLET, FILM COATED ORAL NIGHTLY
Status: DISCONTINUED | OUTPATIENT
Start: 2021-12-04 | End: 2021-12-14 | Stop reason: HOSPADM

## 2021-12-03 RX ADMIN — SODIUM CHLORIDE: 0.9 INJECTION, SOLUTION INTRAVENOUS at 01:12

## 2021-12-03 RX ADMIN — SODIUM CHLORIDE, SODIUM LACTATE, POTASSIUM CHLORIDE, AND CALCIUM CHLORIDE 2259 ML: .6; .31; .03; .02 INJECTION, SOLUTION INTRAVENOUS at 12:12

## 2021-12-03 RX ADMIN — VANCOMYCIN HYDROCHLORIDE 2000 MG: 10 INJECTION, POWDER, LYOPHILIZED, FOR SOLUTION INTRAVENOUS at 02:12

## 2021-12-03 RX ADMIN — SODIUM CHLORIDE: 0.9 INJECTION, SOLUTION INTRAVENOUS at 02:12

## 2021-12-03 RX ADMIN — CEFEPIME HYDROCHLORIDE 1 G: 1 INJECTION, SOLUTION INTRAVENOUS at 01:12

## 2021-12-03 RX ADMIN — MORPHINE SULFATE 4 MG: 4 INJECTION INTRAVENOUS at 01:12

## 2021-12-04 PROBLEM — B37.2 CANDIDAL DERMATITIS: Status: ACTIVE | Noted: 2021-12-04

## 2021-12-04 LAB
ALBUMIN SERPL BCP-MCNC: 2.2 G/DL (ref 3.5–5.2)
ALP SERPL-CCNC: 72 U/L (ref 55–135)
ALT SERPL W/O P-5'-P-CCNC: 30 U/L (ref 10–44)
ANION GAP SERPL CALC-SCNC: 10 MMOL/L (ref 8–16)
AST SERPL-CCNC: 28 U/L (ref 10–40)
BASOPHILS NFR BLD: 0 % (ref 0–1.9)
BILIRUB SERPL-MCNC: 1.5 MG/DL (ref 0.1–1)
BUN SERPL-MCNC: 35 MG/DL (ref 8–23)
CALCIUM SERPL-MCNC: 8.1 MG/DL (ref 8.7–10.5)
CHLORIDE SERPL-SCNC: 103 MMOL/L (ref 95–110)
CO2 SERPL-SCNC: 22 MMOL/L (ref 23–29)
CREAT SERPL-MCNC: 2.5 MG/DL (ref 0.5–1.4)
DIFFERENTIAL METHOD: ABNORMAL
EOSINOPHIL NFR BLD: 0 % (ref 0–8)
ERYTHROCYTE [DISTWIDTH] IN BLOOD BY AUTOMATED COUNT: 14.5 % (ref 11.5–14.5)
EST. GFR  (AFRICAN AMERICAN): 27 ML/MIN/1.73 M^2
EST. GFR  (NON AFRICAN AMERICAN): 24 ML/MIN/1.73 M^2
GLUCOSE SERPL-MCNC: 89 MG/DL (ref 70–110)
HCT VFR BLD AUTO: 35.8 % (ref 40–54)
HGB BLD-MCNC: 11.4 G/DL (ref 14–18)
HYPOCHROMIA BLD QL SMEAR: ABNORMAL
IMM GRANULOCYTES # BLD AUTO: ABNORMAL K/UL (ref 0–0.04)
IMM GRANULOCYTES NFR BLD AUTO: ABNORMAL % (ref 0–0.5)
LACTATE SERPL-SCNC: 2.6 MMOL/L (ref 0.5–2.2)
LYMPHOCYTES NFR BLD: 4 % (ref 18–48)
MCH RBC QN AUTO: 31.8 PG (ref 27–31)
MCHC RBC AUTO-ENTMCNC: 31.8 G/DL (ref 32–36)
MCV RBC AUTO: 100 FL (ref 82–98)
METAMYELOCYTES NFR BLD MANUAL: 1 %
MONOCYTES NFR BLD: 7 % (ref 4–15)
MYELOCYTES NFR BLD MANUAL: 2 %
NEUTROPHILS NFR BLD: 86 % (ref 38–73)
NRBC BLD-RTO: 0 /100 WBC
PLATELET # BLD AUTO: 156 K/UL (ref 150–450)
PMV BLD AUTO: 10 FL (ref 9.2–12.9)
POTASSIUM SERPL-SCNC: 4.4 MMOL/L (ref 3.5–5.1)
PROT SERPL-MCNC: 6.3 G/DL (ref 6–8.4)
RBC # BLD AUTO: 3.58 M/UL (ref 4.6–6.2)
SODIUM SERPL-SCNC: 135 MMOL/L (ref 136–145)
TSH SERPL DL<=0.005 MIU/L-ACNC: 3.41 UIU/ML (ref 0.4–4)
VANCOMYCIN SERPL-MCNC: 13.4 UG/ML
WBC # BLD AUTO: 21.7 K/UL (ref 3.9–12.7)

## 2021-12-04 PROCEDURE — 36415 COLL VENOUS BLD VENIPUNCTURE: CPT | Mod: HCNC | Performed by: INTERNAL MEDICINE

## 2021-12-04 PROCEDURE — 85007 BL SMEAR W/DIFF WBC COUNT: CPT | Mod: HCNC | Performed by: NURSE PRACTITIONER

## 2021-12-04 PROCEDURE — 63700000 PHARM REV CODE 250 ALT 637 W/O HCPCS: Mod: HCNC | Performed by: NURSE PRACTITIONER

## 2021-12-04 PROCEDURE — 63600175 PHARM REV CODE 636 W HCPCS: Mod: HCNC | Performed by: INTERNAL MEDICINE

## 2021-12-04 PROCEDURE — 36415 COLL VENOUS BLD VENIPUNCTURE: CPT | Mod: HCNC | Performed by: EMERGENCY MEDICINE

## 2021-12-04 PROCEDURE — 80202 ASSAY OF VANCOMYCIN: CPT | Mod: HCNC | Performed by: EMERGENCY MEDICINE

## 2021-12-04 PROCEDURE — 84443 ASSAY THYROID STIM HORMONE: CPT | Mod: HCNC | Performed by: NURSE PRACTITIONER

## 2021-12-04 PROCEDURE — 83605 ASSAY OF LACTIC ACID: CPT | Mod: HCNC | Performed by: INTERNAL MEDICINE

## 2021-12-04 PROCEDURE — 25000003 PHARM REV CODE 250: Mod: HCNC | Performed by: NURSE PRACTITIONER

## 2021-12-04 PROCEDURE — 63600175 PHARM REV CODE 636 W HCPCS: Mod: HCNC | Performed by: EMERGENCY MEDICINE

## 2021-12-04 PROCEDURE — 25000003 PHARM REV CODE 250: Mod: HCNC | Performed by: INTERNAL MEDICINE

## 2021-12-04 PROCEDURE — 63600175 PHARM REV CODE 636 W HCPCS: Mod: HCNC | Performed by: NURSE PRACTITIONER

## 2021-12-04 PROCEDURE — 99900035 HC TECH TIME PER 15 MIN (STAT): Mod: HCNC

## 2021-12-04 PROCEDURE — 25000003 PHARM REV CODE 250: Mod: HCNC | Performed by: EMERGENCY MEDICINE

## 2021-12-04 PROCEDURE — 11000001 HC ACUTE MED/SURG PRIVATE ROOM: Mod: HCNC

## 2021-12-04 PROCEDURE — 85027 COMPLETE CBC AUTOMATED: CPT | Mod: HCNC | Performed by: NURSE PRACTITIONER

## 2021-12-04 PROCEDURE — 80053 COMPREHEN METABOLIC PANEL: CPT | Mod: HCNC | Performed by: NURSE PRACTITIONER

## 2021-12-04 RX ORDER — SODIUM CHLORIDE 9 MG/ML
INJECTION, SOLUTION INTRAVENOUS CONTINUOUS
Status: DISCONTINUED | OUTPATIENT
Start: 2021-12-04 | End: 2021-12-14 | Stop reason: HOSPADM

## 2021-12-04 RX ORDER — MICONAZOLE NITRATE 2 %
POWDER (GRAM) TOPICAL 2 TIMES DAILY
Status: DISCONTINUED | OUTPATIENT
Start: 2021-12-04 | End: 2021-12-14 | Stop reason: HOSPADM

## 2021-12-04 RX ORDER — FLUCONAZOLE 100 MG/1
100 TABLET ORAL DAILY
Status: DISCONTINUED | OUTPATIENT
Start: 2021-12-04 | End: 2021-12-05

## 2021-12-04 RX ADMIN — FAMOTIDINE 20 MG: 20 TABLET ORAL at 09:12

## 2021-12-04 RX ADMIN — HEPARIN SODIUM 5000 UNITS: 5000 INJECTION INTRAVENOUS; SUBCUTANEOUS at 09:12

## 2021-12-04 RX ADMIN — HEPARIN SODIUM 5000 UNITS: 5000 INJECTION INTRAVENOUS; SUBCUTANEOUS at 01:12

## 2021-12-04 RX ADMIN — FLUCONAZOLE 100 MG: 100 TABLET ORAL at 09:12

## 2021-12-04 RX ADMIN — METRONIDAZOLE 500 MG: 500 TABLET ORAL at 12:12

## 2021-12-04 RX ADMIN — ATORVASTATIN CALCIUM 40 MG: 40 TABLET, FILM COATED ORAL at 09:12

## 2021-12-04 RX ADMIN — CEFEPIME HYDROCHLORIDE 1 G: 1 INJECTION, SOLUTION INTRAVENOUS at 02:12

## 2021-12-04 RX ADMIN — MICONAZOLE NITRATE: 20 POWDER TOPICAL at 09:12

## 2021-12-04 RX ADMIN — METRONIDAZOLE 500 MG: 500 TABLET ORAL at 06:12

## 2021-12-04 RX ADMIN — METRONIDAZOLE 500 MG: 500 TABLET ORAL at 01:12

## 2021-12-04 RX ADMIN — TRAMADOL HYDROCHLORIDE 50 MG: 50 TABLET ORAL at 12:12

## 2021-12-04 RX ADMIN — LEVOTHYROXINE SODIUM 50 MCG: 50 TABLET ORAL at 06:12

## 2021-12-04 RX ADMIN — METRONIDAZOLE 500 MG: 500 TABLET ORAL at 09:12

## 2021-12-04 RX ADMIN — TRAMADOL HYDROCHLORIDE 50 MG: 50 TABLET ORAL at 06:12

## 2021-12-04 RX ADMIN — SODIUM CHLORIDE: 0.9 INJECTION, SOLUTION INTRAVENOUS at 02:12

## 2021-12-04 RX ADMIN — HEPARIN SODIUM 5000 UNITS: 5000 INJECTION INTRAVENOUS; SUBCUTANEOUS at 06:12

## 2021-12-04 RX ADMIN — TRAMADOL HYDROCHLORIDE 50 MG: 50 TABLET ORAL at 03:12

## 2021-12-04 RX ADMIN — HEPARIN SODIUM 5000 UNITS: 5000 INJECTION INTRAVENOUS; SUBCUTANEOUS at 12:12

## 2021-12-04 RX ADMIN — VANCOMYCIN HYDROCHLORIDE 2000 MG: 10 INJECTION, POWDER, LYOPHILIZED, FOR SOLUTION INTRAVENOUS at 12:12

## 2021-12-05 PROBLEM — L97.329 VENOUS STASIS ULCER OF LEFT ANKLE: Status: ACTIVE | Noted: 2021-12-05

## 2021-12-05 PROBLEM — I83.023 VENOUS STASIS ULCER OF LEFT ANKLE: Status: ACTIVE | Noted: 2021-12-05

## 2021-12-05 LAB
ALBUMIN SERPL BCP-MCNC: 2 G/DL (ref 3.5–5.2)
ALP SERPL-CCNC: 72 U/L (ref 55–135)
ALT SERPL W/O P-5'-P-CCNC: 26 U/L (ref 10–44)
ANION GAP SERPL CALC-SCNC: 8 MMOL/L (ref 8–16)
AST SERPL-CCNC: 26 U/L (ref 10–40)
BACTERIA UR CULT: NO GROWTH
BASOPHILS NFR BLD: 1 % (ref 0–1.9)
BILIRUB SERPL-MCNC: 1.1 MG/DL (ref 0.1–1)
BUN SERPL-MCNC: 35 MG/DL (ref 8–23)
CALCIUM SERPL-MCNC: 8.1 MG/DL (ref 8.7–10.5)
CHLORIDE SERPL-SCNC: 106 MMOL/L (ref 95–110)
CO2 SERPL-SCNC: 22 MMOL/L (ref 23–29)
CREAT SERPL-MCNC: 2 MG/DL (ref 0.5–1.4)
DIFFERENTIAL METHOD: ABNORMAL
EOSINOPHIL NFR BLD: 0 % (ref 0–8)
ERYTHROCYTE [DISTWIDTH] IN BLOOD BY AUTOMATED COUNT: 14.5 % (ref 11.5–14.5)
EST. GFR  (AFRICAN AMERICAN): 36 ML/MIN/1.73 M^2
EST. GFR  (NON AFRICAN AMERICAN): 31 ML/MIN/1.73 M^2
GLUCOSE SERPL-MCNC: 88 MG/DL (ref 70–110)
HCT VFR BLD AUTO: 36.3 % (ref 40–54)
HGB BLD-MCNC: 11.5 G/DL (ref 14–18)
IMM GRANULOCYTES # BLD AUTO: ABNORMAL K/UL (ref 0–0.04)
IMM GRANULOCYTES NFR BLD AUTO: ABNORMAL % (ref 0–0.5)
LYMPHOCYTES NFR BLD: 8 % (ref 18–48)
MCH RBC QN AUTO: 32.1 PG (ref 27–31)
MCHC RBC AUTO-ENTMCNC: 31.7 G/DL (ref 32–36)
MCV RBC AUTO: 101 FL (ref 82–98)
MONOCYTES NFR BLD: 4 % (ref 4–15)
NEUTROPHILS NFR BLD: 87 % (ref 38–73)
NRBC BLD-RTO: 0 /100 WBC
PLATELET # BLD AUTO: 158 K/UL (ref 150–450)
PLATELET BLD QL SMEAR: ABNORMAL
PMV BLD AUTO: 10.2 FL (ref 9.2–12.9)
POTASSIUM SERPL-SCNC: 3.9 MMOL/L (ref 3.5–5.1)
PROCALCITONIN SERPL IA-MCNC: 16.23 NG/ML
PROCALCITONIN SERPL IA-MCNC: 16.23 NG/ML
PROT SERPL-MCNC: 6.3 G/DL (ref 6–8.4)
RBC # BLD AUTO: 3.58 M/UL (ref 4.6–6.2)
SODIUM SERPL-SCNC: 136 MMOL/L (ref 136–145)
VANCOMYCIN SERPL-MCNC: 15.7 UG/ML
WBC # BLD AUTO: 18.12 K/UL (ref 3.9–12.7)

## 2021-12-05 PROCEDURE — 25000003 PHARM REV CODE 250: Mod: HCNC | Performed by: NURSE PRACTITIONER

## 2021-12-05 PROCEDURE — 99900035 HC TECH TIME PER 15 MIN (STAT): Mod: HCNC

## 2021-12-05 PROCEDURE — 25000003 PHARM REV CODE 250: Mod: HCNC | Performed by: EMERGENCY MEDICINE

## 2021-12-05 PROCEDURE — 63600175 PHARM REV CODE 636 W HCPCS: Mod: HCNC | Performed by: NURSE PRACTITIONER

## 2021-12-05 PROCEDURE — 85027 COMPLETE CBC AUTOMATED: CPT | Mod: HCNC | Performed by: NURSE PRACTITIONER

## 2021-12-05 PROCEDURE — 97110 THERAPEUTIC EXERCISES: CPT | Mod: HCNC

## 2021-12-05 PROCEDURE — 85007 BL SMEAR W/DIFF WBC COUNT: CPT | Mod: HCNC | Performed by: NURSE PRACTITIONER

## 2021-12-05 PROCEDURE — 11000001 HC ACUTE MED/SURG PRIVATE ROOM: Mod: HCNC

## 2021-12-05 PROCEDURE — 97163 PT EVAL HIGH COMPLEX 45 MIN: CPT | Mod: HCNC

## 2021-12-05 PROCEDURE — 84145 PROCALCITONIN (PCT): CPT | Mod: HCNC | Performed by: NURSE PRACTITIONER

## 2021-12-05 PROCEDURE — 25000003 PHARM REV CODE 250: Mod: HCNC | Performed by: INTERNAL MEDICINE

## 2021-12-05 PROCEDURE — 36415 COLL VENOUS BLD VENIPUNCTURE: CPT | Mod: HCNC | Performed by: INTERNAL MEDICINE

## 2021-12-05 PROCEDURE — 63700000 PHARM REV CODE 250 ALT 637 W/O HCPCS: Mod: HCNC | Performed by: INTERNAL MEDICINE

## 2021-12-05 PROCEDURE — 63600175 PHARM REV CODE 636 W HCPCS: Mod: HCNC | Performed by: EMERGENCY MEDICINE

## 2021-12-05 PROCEDURE — 80202 ASSAY OF VANCOMYCIN: CPT | Mod: HCNC | Performed by: INTERNAL MEDICINE

## 2021-12-05 PROCEDURE — 36415 COLL VENOUS BLD VENIPUNCTURE: CPT | Mod: HCNC | Performed by: NURSE PRACTITIONER

## 2021-12-05 PROCEDURE — 80053 COMPREHEN METABOLIC PANEL: CPT | Mod: HCNC | Performed by: NURSE PRACTITIONER

## 2021-12-05 PROCEDURE — 63600175 PHARM REV CODE 636 W HCPCS: Mod: HCNC | Performed by: INTERNAL MEDICINE

## 2021-12-05 RX ORDER — FLUCONAZOLE 100 MG/1
200 TABLET ORAL DAILY
Status: COMPLETED | OUTPATIENT
Start: 2021-12-05 | End: 2021-12-08

## 2021-12-05 RX ORDER — METRONIDAZOLE 10 MG/G
GEL TOPICAL 2 TIMES DAILY
Status: DISCONTINUED | OUTPATIENT
Start: 2021-12-05 | End: 2021-12-14 | Stop reason: HOSPADM

## 2021-12-05 RX ADMIN — METRONIDAZOLE 500 MG: 500 TABLET ORAL at 05:12

## 2021-12-05 RX ADMIN — FAMOTIDINE 20 MG: 20 TABLET ORAL at 08:12

## 2021-12-05 RX ADMIN — TRAMADOL HYDROCHLORIDE 50 MG: 50 TABLET ORAL at 08:12

## 2021-12-05 RX ADMIN — LEVOTHYROXINE SODIUM 50 MCG: 50 TABLET ORAL at 05:12

## 2021-12-05 RX ADMIN — METRONIDAZOLE 500 MG: 500 TABLET ORAL at 01:12

## 2021-12-05 RX ADMIN — SODIUM CHLORIDE: 0.9 INJECTION, SOLUTION INTRAVENOUS at 01:12

## 2021-12-05 RX ADMIN — MICONAZOLE NITRATE: 20 POWDER TOPICAL at 08:12

## 2021-12-05 RX ADMIN — FLUCONAZOLE 200 MG: 100 TABLET ORAL at 08:12

## 2021-12-05 RX ADMIN — CEFEPIME HYDROCHLORIDE 1 G: 1 INJECTION, SOLUTION INTRAVENOUS at 01:12

## 2021-12-05 RX ADMIN — MICONAZOLE NITRATE: 2 OINTMENT TOPICAL at 09:12

## 2021-12-05 RX ADMIN — HEPARIN SODIUM 5000 UNITS: 5000 INJECTION INTRAVENOUS; SUBCUTANEOUS at 01:12

## 2021-12-05 RX ADMIN — METRONIDAZOLE: 10 GEL TOPICAL at 05:12

## 2021-12-05 RX ADMIN — ATORVASTATIN CALCIUM 40 MG: 40 TABLET, FILM COATED ORAL at 09:12

## 2021-12-05 RX ADMIN — HEPARIN SODIUM 5000 UNITS: 5000 INJECTION INTRAVENOUS; SUBCUTANEOUS at 09:12

## 2021-12-05 RX ADMIN — VANCOMYCIN HYDROCHLORIDE 2000 MG: 10 INJECTION, POWDER, LYOPHILIZED, FOR SOLUTION INTRAVENOUS at 04:12

## 2021-12-05 RX ADMIN — HEPARIN SODIUM 5000 UNITS: 5000 INJECTION INTRAVENOUS; SUBCUTANEOUS at 05:12

## 2021-12-05 RX ADMIN — METRONIDAZOLE 500 MG: 500 TABLET ORAL at 09:12

## 2021-12-05 RX ADMIN — MICONAZOLE NITRATE: 20 POWDER TOPICAL at 09:12

## 2021-12-06 PROBLEM — D72.829 LEUKOCYTOSIS: Status: ACTIVE | Noted: 2021-12-06

## 2021-12-06 LAB
ALBUMIN SERPL BCP-MCNC: 1.9 G/DL (ref 3.5–5.2)
ALP SERPL-CCNC: 97 U/L (ref 55–135)
ALT SERPL W/O P-5'-P-CCNC: 21 U/L (ref 10–44)
ANION GAP SERPL CALC-SCNC: 7 MMOL/L (ref 8–16)
AST SERPL-CCNC: 24 U/L (ref 10–40)
BILIRUB SERPL-MCNC: 1 MG/DL (ref 0.1–1)
BUN SERPL-MCNC: 29 MG/DL (ref 8–23)
CALCIUM SERPL-MCNC: 8 MG/DL (ref 8.7–10.5)
CHLORIDE SERPL-SCNC: 106 MMOL/L (ref 95–110)
CO2 SERPL-SCNC: 23 MMOL/L (ref 23–29)
CREAT SERPL-MCNC: 1.6 MG/DL (ref 0.5–1.4)
EST. GFR  (AFRICAN AMERICAN): 47 ML/MIN/1.73 M^2
EST. GFR  (NON AFRICAN AMERICAN): 41 ML/MIN/1.73 M^2
GLUCOSE SERPL-MCNC: 83 MG/DL (ref 70–110)
POTASSIUM SERPL-SCNC: 3.6 MMOL/L (ref 3.5–5.1)
PROT SERPL-MCNC: 6.3 G/DL (ref 6–8.4)
SODIUM SERPL-SCNC: 136 MMOL/L (ref 136–145)
VANCOMYCIN SERPL-MCNC: 19.3 UG/ML

## 2021-12-06 PROCEDURE — 36415 COLL VENOUS BLD VENIPUNCTURE: CPT | Mod: HCNC | Performed by: NURSE PRACTITIONER

## 2021-12-06 PROCEDURE — 11000001 HC ACUTE MED/SURG PRIVATE ROOM: Mod: HCNC

## 2021-12-06 PROCEDURE — 63700000 PHARM REV CODE 250 ALT 637 W/O HCPCS: Mod: HCNC | Performed by: INTERNAL MEDICINE

## 2021-12-06 PROCEDURE — 63600175 PHARM REV CODE 636 W HCPCS: Mod: HCNC | Performed by: INTERNAL MEDICINE

## 2021-12-06 PROCEDURE — 36415 COLL VENOUS BLD VENIPUNCTURE: CPT | Mod: HCNC | Performed by: INTERNAL MEDICINE

## 2021-12-06 PROCEDURE — 80053 COMPREHEN METABOLIC PANEL: CPT | Mod: HCNC | Performed by: INTERNAL MEDICINE

## 2021-12-06 PROCEDURE — 25000003 PHARM REV CODE 250: Mod: HCNC | Performed by: INTERNAL MEDICINE

## 2021-12-06 PROCEDURE — 63600175 PHARM REV CODE 636 W HCPCS: Mod: HCNC | Performed by: NURSE PRACTITIONER

## 2021-12-06 PROCEDURE — 63600175 PHARM REV CODE 636 W HCPCS: Mod: HCNC | Performed by: EMERGENCY MEDICINE

## 2021-12-06 PROCEDURE — 25000003 PHARM REV CODE 250: Mod: HCNC | Performed by: EMERGENCY MEDICINE

## 2021-12-06 PROCEDURE — 99900035 HC TECH TIME PER 15 MIN (STAT): Mod: HCNC

## 2021-12-06 PROCEDURE — 99900038 HC OT GENERIC THERAPY SCREENING (STAT): Mod: HCNC

## 2021-12-06 PROCEDURE — 25000003 PHARM REV CODE 250: Mod: HCNC | Performed by: NURSE PRACTITIONER

## 2021-12-06 PROCEDURE — 80202 ASSAY OF VANCOMYCIN: CPT | Mod: HCNC | Performed by: INTERNAL MEDICINE

## 2021-12-06 RX ORDER — CEFEPIME HYDROCHLORIDE 1 G/50ML
1 INJECTION, SOLUTION INTRAVENOUS
Status: DISCONTINUED | OUTPATIENT
Start: 2021-12-06 | End: 2021-12-07

## 2021-12-06 RX ORDER — FAMOTIDINE 20 MG/1
20 TABLET, FILM COATED ORAL 2 TIMES DAILY
Status: DISCONTINUED | OUTPATIENT
Start: 2021-12-06 | End: 2021-12-14 | Stop reason: HOSPADM

## 2021-12-06 RX ADMIN — LEVOTHYROXINE SODIUM 50 MCG: 50 TABLET ORAL at 06:12

## 2021-12-06 RX ADMIN — CEFEPIME HYDROCHLORIDE 1 G: 1 INJECTION, SOLUTION INTRAVENOUS at 02:12

## 2021-12-06 RX ADMIN — METRONIDAZOLE: 10 GEL TOPICAL at 09:12

## 2021-12-06 RX ADMIN — FAMOTIDINE 20 MG: 20 TABLET ORAL at 09:12

## 2021-12-06 RX ADMIN — CEFEPIME HYDROCHLORIDE 1 G: 1 INJECTION, SOLUTION INTRAVENOUS at 09:12

## 2021-12-06 RX ADMIN — TRAMADOL HYDROCHLORIDE 50 MG: 50 TABLET ORAL at 03:12

## 2021-12-06 RX ADMIN — MICONAZOLE NITRATE: 20 POWDER TOPICAL at 09:12

## 2021-12-06 RX ADMIN — HEPARIN SODIUM 5000 UNITS: 5000 INJECTION INTRAVENOUS; SUBCUTANEOUS at 06:12

## 2021-12-06 RX ADMIN — CEFEPIME HYDROCHLORIDE 1 G: 1 INJECTION, SOLUTION INTRAVENOUS at 01:12

## 2021-12-06 RX ADMIN — VANCOMYCIN HYDROCHLORIDE 1750 MG: 750 INJECTION, POWDER, LYOPHILIZED, FOR SOLUTION INTRAVENOUS at 09:12

## 2021-12-06 RX ADMIN — METRONIDAZOLE 500 MG: 500 TABLET ORAL at 01:12

## 2021-12-06 RX ADMIN — METRONIDAZOLE 500 MG: 500 TABLET ORAL at 09:12

## 2021-12-06 RX ADMIN — MICONAZOLE NITRATE: 2 OINTMENT TOPICAL at 09:12

## 2021-12-06 RX ADMIN — HEPARIN SODIUM 5000 UNITS: 5000 INJECTION INTRAVENOUS; SUBCUTANEOUS at 09:12

## 2021-12-06 RX ADMIN — HEPARIN SODIUM 5000 UNITS: 5000 INJECTION INTRAVENOUS; SUBCUTANEOUS at 01:12

## 2021-12-06 RX ADMIN — FLUCONAZOLE 200 MG: 100 TABLET ORAL at 09:12

## 2021-12-06 RX ADMIN — MICONAZOLE NITRATE: 2 OINTMENT TOPICAL at 01:12

## 2021-12-06 RX ADMIN — ATORVASTATIN CALCIUM 40 MG: 40 TABLET, FILM COATED ORAL at 09:12

## 2021-12-06 RX ADMIN — METRONIDAZOLE 500 MG: 500 TABLET ORAL at 06:12

## 2021-12-07 PROBLEM — M79.641 HAND PAIN, RIGHT: Status: ACTIVE | Noted: 2021-12-07

## 2021-12-07 LAB
AORTIC ROOT ANNULUS: 3.58 CM
ASCENDING AORTA: 3.63 CM
AV INDEX (PROSTH): 1.01
AV MEAN GRADIENT: 6 MMHG
AV PEAK GRADIENT: 10 MMHG
AV VALVE AREA: 3.21 CM2
AV VELOCITY RATIO: 0.88
BASOPHILS # BLD AUTO: 0.06 K/UL (ref 0–0.2)
BASOPHILS NFR BLD: 0.7 % (ref 0–1.9)
BSA FOR ECHO PROCEDURE: 3.24 M2
CREAT SERPL-MCNC: 1.4 MG/DL (ref 0.5–1.4)
CV ECHO LV RWT: 0.5 CM
DIFFERENTIAL METHOD: ABNORMAL
DOP CALC AO PEAK VEL: 1.56 M/S
DOP CALC AO VTI: 28.3 CM
DOP CALC LVOT AREA: 3.2 CM2
DOP CALC LVOT DIAMETER: 2.01 CM
DOP CALC LVOT PEAK VEL: 1.38 M/S
DOP CALC LVOT STROKE VOLUME: 90.7 CM3
DOP CALC RVOT PEAK VEL: 1.09 M/S
DOP CALC RVOT VTI: 26.5 CM
DOP CALCLVOT PEAK VEL VTI: 28.6 CM
E WAVE DECELERATION TIME: 189.69 MSEC
E/A RATIO: 1.14
E/E' RATIO: 10.33 M/S
ECHO EF ESTIMATED: 65 %
ECHO LV POSTERIOR WALL: 1.3 CM (ref 0.6–1.1)
EJECTION FRACTION: 55 %
EOSINOPHIL # BLD AUTO: 0.2 K/UL (ref 0–0.5)
EOSINOPHIL NFR BLD: 2.1 % (ref 0–8)
ERYTHROCYTE [DISTWIDTH] IN BLOOD BY AUTOMATED COUNT: 14.6 % (ref 11.5–14.5)
EST. GFR  (AFRICAN AMERICAN): 55 ML/MIN/1.73 M^2
EST. GFR  (NON AFRICAN AMERICAN): 48 ML/MIN/1.73 M^2
FRACTIONAL SHORTENING: 36 % (ref 28–44)
HCT VFR BLD AUTO: 38.2 % (ref 40–54)
HGB BLD-MCNC: 11.8 G/DL (ref 14–18)
IMM GRANULOCYTES # BLD AUTO: 0.32 K/UL (ref 0–0.04)
IMM GRANULOCYTES NFR BLD AUTO: 3.9 % (ref 0–0.5)
INTERVENTRICULAR SEPTUM: 1.3 CM (ref 0.6–1.1)
IVC DIAMETER: 1.94 CM
IVRT: 57.09 MSEC
LA MAJOR: 5.15 CM
LA MINOR: 4.07 CM
LEFT ATRIUM SIZE: 3.66 CM
LEFT INTERNAL DIMENSION IN SYSTOLE: 3.32 CM (ref 2.1–4)
LEFT VENTRICLE DIASTOLIC VOLUME INDEX: 42.44 ML/M2
LEFT VENTRICLE DIASTOLIC VOLUME: 127.73 ML
LEFT VENTRICLE MASS INDEX: 92 G/M2
LEFT VENTRICLE SYSTOLIC VOLUME INDEX: 14.8 ML/M2
LEFT VENTRICLE SYSTOLIC VOLUME: 44.65 ML
LEFT VENTRICULAR INTERNAL DIMENSION IN DIASTOLE: 5.17 CM (ref 3.5–6)
LEFT VENTRICULAR MASS: 275.92 G
LV LATERAL E/E' RATIO: 8.86 M/S
LV SEPTAL E/E' RATIO: 12.4 M/S
LVOT MG: 5.31 MMHG
LVOT MV: 1.13 CM/S
LYMPHOCYTES # BLD AUTO: 1.2 K/UL (ref 1–4.8)
LYMPHOCYTES NFR BLD: 15 % (ref 18–48)
MCH RBC QN AUTO: 31.5 PG (ref 27–31)
MCHC RBC AUTO-ENTMCNC: 30.9 G/DL (ref 32–36)
MCV RBC AUTO: 102 FL (ref 82–98)
MONOCYTES # BLD AUTO: 1.6 K/UL (ref 0.3–1)
MONOCYTES NFR BLD: 19.8 % (ref 4–15)
MV PEAK A VEL: 1.09 M/S
MV PEAK E VEL: 1.24 M/S
MV STENOSIS PRESSURE HALF TIME: 55.01 MS
MV VALVE AREA P 1/2 METHOD: 4 CM2
NEUTROPHILS # BLD AUTO: 4.7 K/UL (ref 1.8–7.7)
NEUTROPHILS NFR BLD: 58.5 % (ref 38–73)
NRBC BLD-RTO: 0 /100 WBC
PISA MRMAX VEL: 4.4 M/S
PISA TR MAX VEL: 3.15 M/S
PLATELET # BLD AUTO: 170 K/UL (ref 150–450)
PMV BLD AUTO: 10.1 FL (ref 9.2–12.9)
PROCALCITONIN SERPL IA-MCNC: 5.29 NG/ML
PV MEAN GRADIENT: 3.07 MMHG
RA MAJOR: 4.33 CM
RA PRESSURE: 8 MMHG
RA WIDTH: 3.52 CM
RBC # BLD AUTO: 3.75 M/UL (ref 4.6–6.2)
SINUS: 3.91 CM
STJ: 3.66 CM
TDI LATERAL: 0.14 M/S
TDI SEPTAL: 0.1 M/S
TDI: 0.12 M/S
TR MAX PG: 40 MMHG
TR MEAN GRADIENT: 29 MMHG
TRICUSPID ANNULAR PLANE SYSTOLIC EXCURSION: 1.9 CM
TV REST PULMONARY ARTERY PRESSURE: 48 MMHG
URATE SERPL-MCNC: 4.3 MG/DL (ref 3.4–7)
WBC # BLD AUTO: 8.12 K/UL (ref 3.9–12.7)

## 2021-12-07 PROCEDURE — 99900035 HC TECH TIME PER 15 MIN (STAT): Mod: HCNC

## 2021-12-07 PROCEDURE — 84145 PROCALCITONIN (PCT): CPT | Mod: HCNC | Performed by: NURSE PRACTITIONER

## 2021-12-07 PROCEDURE — 11000001 HC ACUTE MED/SURG PRIVATE ROOM: Mod: HCNC

## 2021-12-07 PROCEDURE — 63600175 PHARM REV CODE 636 W HCPCS: Mod: HCNC | Performed by: INTERNAL MEDICINE

## 2021-12-07 PROCEDURE — 25000003 PHARM REV CODE 250: Mod: HCNC | Performed by: INTERNAL MEDICINE

## 2021-12-07 PROCEDURE — 25000003 PHARM REV CODE 250: Mod: HCNC | Performed by: NURSE PRACTITIONER

## 2021-12-07 PROCEDURE — 36415 COLL VENOUS BLD VENIPUNCTURE: CPT | Mod: HCNC | Performed by: NURSE PRACTITIONER

## 2021-12-07 PROCEDURE — 82565 ASSAY OF CREATININE: CPT | Mod: HCNC | Performed by: INTERNAL MEDICINE

## 2021-12-07 PROCEDURE — 63700000 PHARM REV CODE 250 ALT 637 W/O HCPCS: Mod: HCNC | Performed by: INTERNAL MEDICINE

## 2021-12-07 PROCEDURE — 85025 COMPLETE CBC W/AUTO DIFF WBC: CPT | Mod: HCNC | Performed by: NURSE PRACTITIONER

## 2021-12-07 PROCEDURE — 97166 OT EVAL MOD COMPLEX 45 MIN: CPT | Mod: HCNC | Performed by: PHYSICAL THERAPIST

## 2021-12-07 PROCEDURE — 84550 ASSAY OF BLOOD/URIC ACID: CPT | Mod: HCNC | Performed by: NURSE PRACTITIONER

## 2021-12-07 PROCEDURE — 63600175 PHARM REV CODE 636 W HCPCS: Mod: HCNC | Performed by: NURSE PRACTITIONER

## 2021-12-07 PROCEDURE — 96372 THER/PROPH/DIAG INJ SC/IM: CPT | Mod: HCNC

## 2021-12-07 PROCEDURE — 25000003 PHARM REV CODE 250: Mod: HCNC | Performed by: EMERGENCY MEDICINE

## 2021-12-07 PROCEDURE — 97110 THERAPEUTIC EXERCISES: CPT | Mod: HCNC | Performed by: PHYSICAL THERAPIST

## 2021-12-07 RX ORDER — DOXYCYCLINE HYCLATE 100 MG
100 TABLET ORAL EVERY 12 HOURS
Status: DISCONTINUED | OUTPATIENT
Start: 2021-12-07 | End: 2021-12-14 | Stop reason: HOSPADM

## 2021-12-07 RX ORDER — AMOXICILLIN AND CLAVULANATE POTASSIUM 875; 125 MG/1; MG/1
1 TABLET, FILM COATED ORAL EVERY 12 HOURS
Status: DISCONTINUED | OUTPATIENT
Start: 2021-12-07 | End: 2021-12-14 | Stop reason: HOSPADM

## 2021-12-07 RX ADMIN — CEFEPIME HYDROCHLORIDE 1 G: 1 INJECTION, SOLUTION INTRAVENOUS at 05:12

## 2021-12-07 RX ADMIN — LEVOTHYROXINE SODIUM 50 MCG: 50 TABLET ORAL at 05:12

## 2021-12-07 RX ADMIN — ATORVASTATIN CALCIUM 40 MG: 40 TABLET, FILM COATED ORAL at 09:12

## 2021-12-07 RX ADMIN — MICONAZOLE NITRATE: 20 POWDER TOPICAL at 09:12

## 2021-12-07 RX ADMIN — METRONIDAZOLE: 10 GEL TOPICAL at 10:12

## 2021-12-07 RX ADMIN — TRAMADOL HYDROCHLORIDE 50 MG: 50 TABLET ORAL at 10:12

## 2021-12-07 RX ADMIN — DOXYCYCLINE HYCLATE 100 MG: 100 TABLET, COATED ORAL at 09:12

## 2021-12-07 RX ADMIN — MICONAZOLE NITRATE: 2 OINTMENT TOPICAL at 09:12

## 2021-12-07 RX ADMIN — FLUCONAZOLE 200 MG: 100 TABLET ORAL at 09:12

## 2021-12-07 RX ADMIN — FAMOTIDINE 20 MG: 20 TABLET ORAL at 09:12

## 2021-12-07 RX ADMIN — METRONIDAZOLE 500 MG: 500 TABLET ORAL at 05:12

## 2021-12-07 RX ADMIN — METRONIDAZOLE: 10 GEL TOPICAL at 09:12

## 2021-12-07 RX ADMIN — AMOXICILLIN AND CLAVULANATE POTASSIUM 1 TABLET: 875; 125 TABLET, FILM COATED ORAL at 09:12

## 2021-12-07 RX ADMIN — HEPARIN SODIUM 5000 UNITS: 5000 INJECTION INTRAVENOUS; SUBCUTANEOUS at 09:12

## 2021-12-07 RX ADMIN — MICONAZOLE NITRATE: 20 POWDER TOPICAL at 10:12

## 2021-12-07 RX ADMIN — HEPARIN SODIUM 5000 UNITS: 5000 INJECTION INTRAVENOUS; SUBCUTANEOUS at 05:12

## 2021-12-07 RX ADMIN — HEPARIN SODIUM 5000 UNITS: 5000 INJECTION INTRAVENOUS; SUBCUTANEOUS at 01:12

## 2021-12-07 RX ADMIN — METRONIDAZOLE: 10 GEL TOPICAL at 05:12

## 2021-12-07 RX ADMIN — MICONAZOLE NITRATE: 2 OINTMENT TOPICAL at 01:12

## 2021-12-07 RX ADMIN — TRAMADOL HYDROCHLORIDE 50 MG: 50 TABLET ORAL at 01:12

## 2021-12-08 PROBLEM — A41.9 SEVERE SEPSIS: Status: RESOLVED | Noted: 2021-12-03 | Resolved: 2021-12-08

## 2021-12-08 PROBLEM — R65.20 SEVERE SEPSIS: Status: RESOLVED | Noted: 2021-12-03 | Resolved: 2021-12-08

## 2021-12-08 LAB
ALBUMIN SERPL BCP-MCNC: 1.9 G/DL (ref 3.5–5.2)
ANION GAP SERPL CALC-SCNC: 8 MMOL/L (ref 8–16)
BACTERIA BLD CULT: NORMAL
BACTERIA BLD CULT: NORMAL
BASOPHILS NFR BLD: 0 % (ref 0–1.9)
BUN SERPL-MCNC: 22 MG/DL (ref 8–23)
CALCIUM SERPL-MCNC: 8.5 MG/DL (ref 8.7–10.5)
CHLORIDE SERPL-SCNC: 103 MMOL/L (ref 95–110)
CO2 SERPL-SCNC: 26 MMOL/L (ref 23–29)
CREAT SERPL-MCNC: 1.3 MG/DL (ref 0.5–1.4)
CREAT SERPL-MCNC: 1.3 MG/DL (ref 0.5–1.4)
DIFFERENTIAL METHOD: ABNORMAL
EOSINOPHIL NFR BLD: 2 % (ref 0–8)
ERYTHROCYTE [DISTWIDTH] IN BLOOD BY AUTOMATED COUNT: 14.6 % (ref 11.5–14.5)
EST. GFR  (AFRICAN AMERICAN): >60 ML/MIN/1.73 M^2
EST. GFR  (AFRICAN AMERICAN): >60 ML/MIN/1.73 M^2
EST. GFR  (NON AFRICAN AMERICAN): 52 ML/MIN/1.73 M^2
EST. GFR  (NON AFRICAN AMERICAN): 52 ML/MIN/1.73 M^2
GLUCOSE SERPL-MCNC: 88 MG/DL (ref 70–110)
HCT VFR BLD AUTO: 40.8 % (ref 40–54)
HGB BLD-MCNC: 12.5 G/DL (ref 14–18)
HYPOCHROMIA BLD QL SMEAR: ABNORMAL
IMM GRANULOCYTES # BLD AUTO: ABNORMAL K/UL (ref 0–0.04)
IMM GRANULOCYTES NFR BLD AUTO: ABNORMAL % (ref 0–0.5)
LYMPHOCYTES NFR BLD: 21 % (ref 18–48)
MCH RBC QN AUTO: 31.6 PG (ref 27–31)
MCHC RBC AUTO-ENTMCNC: 30.6 G/DL (ref 32–36)
MCV RBC AUTO: 103 FL (ref 82–98)
METAMYELOCYTES NFR BLD MANUAL: 2 %
MONOCYTES NFR BLD: 4 % (ref 4–15)
MYELOCYTES NFR BLD MANUAL: 4 %
NEUTROPHILS NFR BLD: 48 % (ref 38–73)
NEUTS BAND NFR BLD MANUAL: 19 %
NRBC BLD-RTO: 0 /100 WBC
PHOSPHATE SERPL-MCNC: 2.3 MG/DL (ref 2.7–4.5)
PLATELET # BLD AUTO: 190 K/UL (ref 150–450)
PLATELET BLD QL SMEAR: ABNORMAL
PMV BLD AUTO: 10.5 FL (ref 9.2–12.9)
POTASSIUM SERPL-SCNC: 3.7 MMOL/L (ref 3.5–5.1)
RBC # BLD AUTO: 3.96 M/UL (ref 4.6–6.2)
SODIUM SERPL-SCNC: 137 MMOL/L (ref 136–145)
WBC # BLD AUTO: 7.29 K/UL (ref 3.9–12.7)

## 2021-12-08 PROCEDURE — 63600175 PHARM REV CODE 636 W HCPCS: Mod: HCNC | Performed by: NURSE PRACTITIONER

## 2021-12-08 PROCEDURE — 25000003 PHARM REV CODE 250: Mod: HCNC | Performed by: NURSE PRACTITIONER

## 2021-12-08 PROCEDURE — 80069 RENAL FUNCTION PANEL: CPT | Mod: HCNC | Performed by: NURSE PRACTITIONER

## 2021-12-08 PROCEDURE — 97530 THERAPEUTIC ACTIVITIES: CPT | Mod: HCNC

## 2021-12-08 PROCEDURE — 85007 BL SMEAR W/DIFF WBC COUNT: CPT | Mod: HCNC | Performed by: NURSE PRACTITIONER

## 2021-12-08 PROCEDURE — 63700000 PHARM REV CODE 250 ALT 637 W/O HCPCS: Mod: HCNC | Performed by: INTERNAL MEDICINE

## 2021-12-08 PROCEDURE — 85027 COMPLETE CBC AUTOMATED: CPT | Mod: HCNC | Performed by: NURSE PRACTITIONER

## 2021-12-08 PROCEDURE — 97530 THERAPEUTIC ACTIVITIES: CPT | Mod: HCNC | Performed by: PHYSICAL THERAPIST

## 2021-12-08 PROCEDURE — 25000003 PHARM REV CODE 250: Mod: HCNC | Performed by: INTERNAL MEDICINE

## 2021-12-08 PROCEDURE — 96372 THER/PROPH/DIAG INJ SC/IM: CPT | Mod: HCNC

## 2021-12-08 PROCEDURE — 36415 COLL VENOUS BLD VENIPUNCTURE: CPT | Mod: HCNC | Performed by: NURSE PRACTITIONER

## 2021-12-08 PROCEDURE — 11000001 HC ACUTE MED/SURG PRIVATE ROOM: Mod: HCNC

## 2021-12-08 RX ORDER — SODIUM,POTASSIUM PHOSPHATES 280-250MG
2 POWDER IN PACKET (EA) ORAL
Status: DISCONTINUED | OUTPATIENT
Start: 2021-12-08 | End: 2021-12-14 | Stop reason: HOSPADM

## 2021-12-08 RX ORDER — DOXYCYCLINE HYCLATE 100 MG
100 TABLET ORAL EVERY 12 HOURS
Qty: 12 TABLET | Refills: 0 | OUTPATIENT
Start: 2021-12-08 | End: 2021-12-14

## 2021-12-08 RX ORDER — CYANOCOBALAMIN 1000 UG/ML
1000 INJECTION, SOLUTION INTRAMUSCULAR; SUBCUTANEOUS ONCE
Status: COMPLETED | OUTPATIENT
Start: 2021-12-08 | End: 2021-12-08

## 2021-12-08 RX ORDER — BISACODYL 10 MG
10 SUPPOSITORY, RECTAL RECTAL DAILY PRN
Status: DISCONTINUED | OUTPATIENT
Start: 2021-12-08 | End: 2021-12-14 | Stop reason: HOSPADM

## 2021-12-08 RX ORDER — SODIUM,POTASSIUM PHOSPHATES 280-250MG
1 POWDER IN PACKET (EA) ORAL ONCE
Status: COMPLETED | OUTPATIENT
Start: 2021-12-08 | End: 2021-12-08

## 2021-12-08 RX ORDER — HYDROCODONE BITARTRATE AND ACETAMINOPHEN 10; 325 MG/1; MG/1
1 TABLET ORAL EVERY 6 HOURS PRN
Status: DISCONTINUED | OUTPATIENT
Start: 2021-12-08 | End: 2021-12-14 | Stop reason: HOSPADM

## 2021-12-08 RX ORDER — AMOXICILLIN AND CLAVULANATE POTASSIUM 875; 125 MG/1; MG/1
1 TABLET, FILM COATED ORAL EVERY 12 HOURS
Qty: 12 TABLET | Refills: 0 | OUTPATIENT
Start: 2021-12-08 | End: 2021-12-14

## 2021-12-08 RX ORDER — MICONAZOLE NITRATE 2 %
POWDER (GRAM) TOPICAL 2 TIMES DAILY
Refills: 0
Start: 2021-12-08

## 2021-12-08 RX ORDER — NALOXONE HCL 0.4 MG/ML
0.02 VIAL (ML) INJECTION
Status: DISCONTINUED | OUTPATIENT
Start: 2021-12-08 | End: 2021-12-14 | Stop reason: HOSPADM

## 2021-12-08 RX ORDER — TALC
6 POWDER (GRAM) TOPICAL NIGHTLY PRN
Status: DISCONTINUED | OUTPATIENT
Start: 2021-12-08 | End: 2021-12-14 | Stop reason: HOSPADM

## 2021-12-08 RX ORDER — KETOROLAC TROMETHAMINE 30 MG/ML
15 INJECTION, SOLUTION INTRAMUSCULAR; INTRAVENOUS EVERY 6 HOURS PRN
Status: ACTIVE | OUTPATIENT
Start: 2021-12-08 | End: 2021-12-11

## 2021-12-08 RX ORDER — TRAMADOL HYDROCHLORIDE 50 MG/1
50 TABLET ORAL EVERY 6 HOURS PRN
Qty: 28 TABLET | Refills: 0 | OUTPATIENT
Start: 2021-12-08 | End: 2021-12-15

## 2021-12-08 RX ORDER — MAG HYDROX/ALUMINUM HYD/SIMETH 200-200-20
30 SUSPENSION, ORAL (FINAL DOSE FORM) ORAL 4 TIMES DAILY PRN
Status: DISCONTINUED | OUTPATIENT
Start: 2021-12-08 | End: 2021-12-14 | Stop reason: HOSPADM

## 2021-12-08 RX ORDER — CELECOXIB 100 MG/1
200 CAPSULE ORAL DAILY
Status: DISCONTINUED | OUTPATIENT
Start: 2021-12-08 | End: 2021-12-14 | Stop reason: HOSPADM

## 2021-12-08 RX ORDER — HYDROCODONE BITARTRATE AND ACETAMINOPHEN 10; 325 MG/1; MG/1
1 TABLET ORAL ONCE
Status: COMPLETED | OUTPATIENT
Start: 2021-12-08 | End: 2021-12-08

## 2021-12-08 RX ORDER — LANOLIN ALCOHOL/MO/W.PET/CERES
800 CREAM (GRAM) TOPICAL
Status: DISCONTINUED | OUTPATIENT
Start: 2021-12-08 | End: 2021-12-14 | Stop reason: HOSPADM

## 2021-12-08 RX ORDER — SIMETHICONE 80 MG
1 TABLET,CHEWABLE ORAL 4 TIMES DAILY PRN
Status: DISCONTINUED | OUTPATIENT
Start: 2021-12-08 | End: 2021-12-14 | Stop reason: HOSPADM

## 2021-12-08 RX ORDER — PROMETHAZINE HYDROCHLORIDE 25 MG/1
25 TABLET ORAL EVERY 6 HOURS PRN
Status: DISCONTINUED | OUTPATIENT
Start: 2021-12-08 | End: 2021-12-14 | Stop reason: HOSPADM

## 2021-12-08 RX ORDER — ACETAMINOPHEN 325 MG/1
650 TABLET ORAL EVERY 4 HOURS PRN
Status: DISCONTINUED | OUTPATIENT
Start: 2021-12-08 | End: 2021-12-14 | Stop reason: HOSPADM

## 2021-12-08 RX ORDER — ONDANSETRON 8 MG/1
8 TABLET, ORALLY DISINTEGRATING ORAL EVERY 8 HOURS PRN
Status: DISCONTINUED | OUTPATIENT
Start: 2021-12-08 | End: 2021-12-14 | Stop reason: HOSPADM

## 2021-12-08 RX ORDER — METRONIDAZOLE 10 MG/G
GEL TOPICAL 2 TIMES DAILY
Start: 2021-12-08 | End: 2022-01-01

## 2021-12-08 RX ORDER — OXYCODONE HYDROCHLORIDE 5 MG/1
10 TABLET ORAL EVERY 6 HOURS PRN
Status: DISCONTINUED | OUTPATIENT
Start: 2021-12-08 | End: 2021-12-08

## 2021-12-08 RX ADMIN — MICONAZOLE NITRATE: 20 POWDER TOPICAL at 09:12

## 2021-12-08 RX ADMIN — ATORVASTATIN CALCIUM 40 MG: 40 TABLET, FILM COATED ORAL at 09:12

## 2021-12-08 RX ADMIN — FAMOTIDINE 20 MG: 20 TABLET ORAL at 10:12

## 2021-12-08 RX ADMIN — DOXYCYCLINE HYCLATE 100 MG: 100 TABLET, COATED ORAL at 09:12

## 2021-12-08 RX ADMIN — LEVOTHYROXINE SODIUM 50 MCG: 50 TABLET ORAL at 06:12

## 2021-12-08 RX ADMIN — MICONAZOLE NITRATE: 2 OINTMENT TOPICAL at 01:12

## 2021-12-08 RX ADMIN — CYANOCOBALAMIN 1000 MCG: 1000 INJECTION, SOLUTION INTRAMUSCULAR; SUBCUTANEOUS at 06:12

## 2021-12-08 RX ADMIN — CELECOXIB 200 MG: 100 CAPSULE ORAL at 02:12

## 2021-12-08 RX ADMIN — HEPARIN SODIUM 5000 UNITS: 5000 INJECTION INTRAVENOUS; SUBCUTANEOUS at 06:12

## 2021-12-08 RX ADMIN — METRONIDAZOLE: 10 GEL TOPICAL at 10:12

## 2021-12-08 RX ADMIN — HEPARIN SODIUM 5000 UNITS: 5000 INJECTION INTRAVENOUS; SUBCUTANEOUS at 02:12

## 2021-12-08 RX ADMIN — AMOXICILLIN AND CLAVULANATE POTASSIUM 1 TABLET: 875; 125 TABLET, FILM COATED ORAL at 09:12

## 2021-12-08 RX ADMIN — SODIUM CHLORIDE: 0.9 INJECTION, SOLUTION INTRAVENOUS at 01:12

## 2021-12-08 RX ADMIN — HYDROCODONE BITARTRATE AND ACETAMINOPHEN 1 TABLET: 10; 325 TABLET ORAL at 02:12

## 2021-12-08 RX ADMIN — TRAMADOL HYDROCHLORIDE 50 MG: 50 TABLET ORAL at 06:12

## 2021-12-08 RX ADMIN — HEPARIN SODIUM 5000 UNITS: 5000 INJECTION INTRAVENOUS; SUBCUTANEOUS at 09:12

## 2021-12-08 RX ADMIN — MICONAZOLE NITRATE: 20 POWDER TOPICAL at 10:12

## 2021-12-08 RX ADMIN — MICONAZOLE NITRATE: 2 OINTMENT TOPICAL at 09:12

## 2021-12-08 RX ADMIN — METRONIDAZOLE: 10 GEL TOPICAL at 05:12

## 2021-12-08 RX ADMIN — TRAZODONE HYDROCHLORIDE 25 MG: 50 TABLET ORAL at 09:12

## 2021-12-08 RX ADMIN — FLUCONAZOLE 200 MG: 100 TABLET ORAL at 09:12

## 2021-12-08 RX ADMIN — Medication 1 PACKET: at 06:12

## 2021-12-08 RX ADMIN — FAMOTIDINE 20 MG: 20 TABLET ORAL at 09:12

## 2021-12-09 LAB
ALBUMIN SERPL BCP-MCNC: 2 G/DL (ref 3.5–5.2)
ANION GAP SERPL CALC-SCNC: 11 MMOL/L (ref 8–16)
BASOPHILS NFR BLD: 1 % (ref 0–1.9)
BUN SERPL-MCNC: 24 MG/DL (ref 8–23)
CALCIUM SERPL-MCNC: 8.6 MG/DL (ref 8.7–10.5)
CHLORIDE SERPL-SCNC: 104 MMOL/L (ref 95–110)
CO2 SERPL-SCNC: 23 MMOL/L (ref 23–29)
CREAT SERPL-MCNC: 1.3 MG/DL (ref 0.5–1.4)
CREAT SERPL-MCNC: 1.3 MG/DL (ref 0.5–1.4)
DIFFERENTIAL METHOD: ABNORMAL
EOSINOPHIL NFR BLD: 1 % (ref 0–8)
ERYTHROCYTE [DISTWIDTH] IN BLOOD BY AUTOMATED COUNT: 14.5 % (ref 11.5–14.5)
EST. GFR  (AFRICAN AMERICAN): >60 ML/MIN/1.73 M^2
EST. GFR  (AFRICAN AMERICAN): >60 ML/MIN/1.73 M^2
EST. GFR  (NON AFRICAN AMERICAN): 52 ML/MIN/1.73 M^2
EST. GFR  (NON AFRICAN AMERICAN): 52 ML/MIN/1.73 M^2
GLUCOSE SERPL-MCNC: 89 MG/DL (ref 70–110)
HCT VFR BLD AUTO: 41.9 % (ref 40–54)
HGB BLD-MCNC: 12.8 G/DL (ref 14–18)
IMM GRANULOCYTES # BLD AUTO: ABNORMAL K/UL (ref 0–0.04)
IMM GRANULOCYTES NFR BLD AUTO: ABNORMAL % (ref 0–0.5)
LYMPHOCYTES NFR BLD: 19 % (ref 18–48)
MCH RBC QN AUTO: 31.3 PG (ref 27–31)
MCHC RBC AUTO-ENTMCNC: 30.5 G/DL (ref 32–36)
MCV RBC AUTO: 102 FL (ref 82–98)
METAMYELOCYTES NFR BLD MANUAL: 10 %
MONOCYTES NFR BLD: 5 % (ref 4–15)
MYELOCYTES NFR BLD MANUAL: 2 %
NEUTROPHILS NFR BLD: 45 % (ref 38–73)
NEUTS BAND NFR BLD MANUAL: 17 %
NRBC BLD-RTO: 0 /100 WBC
PHOSPHATE SERPL-MCNC: 2.9 MG/DL (ref 2.7–4.5)
PLATELET # BLD AUTO: 198 K/UL (ref 150–450)
PLATELET BLD QL SMEAR: ABNORMAL
PMV BLD AUTO: 9.7 FL (ref 9.2–12.9)
POTASSIUM SERPL-SCNC: 4 MMOL/L (ref 3.5–5.1)
RBC # BLD AUTO: 4.09 M/UL (ref 4.6–6.2)
SODIUM SERPL-SCNC: 138 MMOL/L (ref 136–145)
WBC # BLD AUTO: 7.85 K/UL (ref 3.9–12.7)

## 2021-12-09 PROCEDURE — 63600175 PHARM REV CODE 636 W HCPCS: Mod: HCNC | Performed by: NURSE PRACTITIONER

## 2021-12-09 PROCEDURE — 94761 N-INVAS EAR/PLS OXIMETRY MLT: CPT | Mod: HCNC

## 2021-12-09 PROCEDURE — 97110 THERAPEUTIC EXERCISES: CPT | Mod: HCNC

## 2021-12-09 PROCEDURE — 25000003 PHARM REV CODE 250: Mod: HCNC | Performed by: NURSE PRACTITIONER

## 2021-12-09 PROCEDURE — 85027 COMPLETE CBC AUTOMATED: CPT | Mod: HCNC | Performed by: NURSE PRACTITIONER

## 2021-12-09 PROCEDURE — 25000003 PHARM REV CODE 250: Mod: HCNC | Performed by: INTERNAL MEDICINE

## 2021-12-09 PROCEDURE — 36415 COLL VENOUS BLD VENIPUNCTURE: CPT | Mod: HCNC | Performed by: NURSE PRACTITIONER

## 2021-12-09 PROCEDURE — 11000001 HC ACUTE MED/SURG PRIVATE ROOM: Mod: HCNC

## 2021-12-09 PROCEDURE — 99900035 HC TECH TIME PER 15 MIN (STAT): Mod: HCNC

## 2021-12-09 PROCEDURE — 85007 BL SMEAR W/DIFF WBC COUNT: CPT | Mod: HCNC | Performed by: NURSE PRACTITIONER

## 2021-12-09 PROCEDURE — 80069 RENAL FUNCTION PANEL: CPT | Mod: HCNC | Performed by: NURSE PRACTITIONER

## 2021-12-09 PROCEDURE — 21400001 HC TELEMETRY ROOM: Mod: HCNC

## 2021-12-09 PROCEDURE — 27000221 HC OXYGEN, UP TO 24 HOURS: Mod: HCNC

## 2021-12-09 RX ADMIN — HEPARIN SODIUM 5000 UNITS: 5000 INJECTION INTRAVENOUS; SUBCUTANEOUS at 02:12

## 2021-12-09 RX ADMIN — DOXYCYCLINE HYCLATE 100 MG: 100 TABLET, COATED ORAL at 09:12

## 2021-12-09 RX ADMIN — MICONAZOLE NITRATE: 20 POWDER TOPICAL at 09:12

## 2021-12-09 RX ADMIN — METRONIDAZOLE: 10 GEL TOPICAL at 08:12

## 2021-12-09 RX ADMIN — FAMOTIDINE 20 MG: 20 TABLET ORAL at 08:12

## 2021-12-09 RX ADMIN — METRONIDAZOLE: 10 GEL TOPICAL at 06:12

## 2021-12-09 RX ADMIN — LEVOTHYROXINE SODIUM 50 MCG: 50 TABLET ORAL at 05:12

## 2021-12-09 RX ADMIN — MICONAZOLE NITRATE: 2 OINTMENT TOPICAL at 09:12

## 2021-12-09 RX ADMIN — DOXYCYCLINE HYCLATE 100 MG: 100 TABLET, COATED ORAL at 08:12

## 2021-12-09 RX ADMIN — CELECOXIB 200 MG: 100 CAPSULE ORAL at 08:12

## 2021-12-09 RX ADMIN — HEPARIN SODIUM 5000 UNITS: 5000 INJECTION INTRAVENOUS; SUBCUTANEOUS at 05:12

## 2021-12-09 RX ADMIN — MICONAZOLE NITRATE: 20 POWDER TOPICAL at 08:12

## 2021-12-09 RX ADMIN — TRAZODONE HYDROCHLORIDE 25 MG: 50 TABLET ORAL at 09:12

## 2021-12-09 RX ADMIN — AMOXICILLIN AND CLAVULANATE POTASSIUM 1 TABLET: 875; 125 TABLET, FILM COATED ORAL at 08:12

## 2021-12-09 RX ADMIN — MICONAZOLE NITRATE: 2 OINTMENT TOPICAL at 02:12

## 2021-12-09 RX ADMIN — FAMOTIDINE 20 MG: 20 TABLET ORAL at 09:12

## 2021-12-09 RX ADMIN — ATORVASTATIN CALCIUM 40 MG: 40 TABLET, FILM COATED ORAL at 09:12

## 2021-12-09 RX ADMIN — AMOXICILLIN AND CLAVULANATE POTASSIUM 1 TABLET: 875; 125 TABLET, FILM COATED ORAL at 09:12

## 2021-12-09 RX ADMIN — HEPARIN SODIUM 5000 UNITS: 5000 INJECTION INTRAVENOUS; SUBCUTANEOUS at 10:12

## 2021-12-10 PROBLEM — D72.829 LEUKOCYTOSIS: Status: RESOLVED | Noted: 2021-12-06 | Resolved: 2021-12-10

## 2021-12-10 LAB
CREAT SERPL-MCNC: 1.2 MG/DL (ref 0.5–1.4)
EST. GFR  (AFRICAN AMERICAN): >60 ML/MIN/1.73 M^2
EST. GFR  (NON AFRICAN AMERICAN): 58 ML/MIN/1.73 M^2

## 2021-12-10 PROCEDURE — 36415 COLL VENOUS BLD VENIPUNCTURE: CPT | Mod: HCNC | Performed by: INTERNAL MEDICINE

## 2021-12-10 PROCEDURE — 25000003 PHARM REV CODE 250: Mod: HCNC | Performed by: NURSE PRACTITIONER

## 2021-12-10 PROCEDURE — 82565 ASSAY OF CREATININE: CPT | Mod: HCNC | Performed by: INTERNAL MEDICINE

## 2021-12-10 PROCEDURE — 94761 N-INVAS EAR/PLS OXIMETRY MLT: CPT | Mod: HCNC

## 2021-12-10 PROCEDURE — 96372 THER/PROPH/DIAG INJ SC/IM: CPT | Mod: HCNC

## 2021-12-10 PROCEDURE — 21400001 HC TELEMETRY ROOM: Mod: HCNC

## 2021-12-10 PROCEDURE — 27000221 HC OXYGEN, UP TO 24 HOURS: Mod: HCNC

## 2021-12-10 PROCEDURE — 63600175 PHARM REV CODE 636 W HCPCS: Mod: HCNC | Performed by: NURSE PRACTITIONER

## 2021-12-10 PROCEDURE — 99900035 HC TECH TIME PER 15 MIN (STAT): Mod: HCNC

## 2021-12-10 PROCEDURE — 11000001 HC ACUTE MED/SURG PRIVATE ROOM: Mod: HCNC

## 2021-12-10 PROCEDURE — 97530 THERAPEUTIC ACTIVITIES: CPT | Mod: HCNC

## 2021-12-10 PROCEDURE — 25000003 PHARM REV CODE 250: Mod: HCNC | Performed by: INTERNAL MEDICINE

## 2021-12-10 RX ORDER — AMOXICILLIN 250 MG
1 CAPSULE ORAL DAILY
Status: DISCONTINUED | OUTPATIENT
Start: 2021-12-10 | End: 2021-12-12

## 2021-12-10 RX ADMIN — AMOXICILLIN AND CLAVULANATE POTASSIUM 1 TABLET: 875; 125 TABLET, FILM COATED ORAL at 09:12

## 2021-12-10 RX ADMIN — METRONIDAZOLE: 10 GEL TOPICAL at 09:12

## 2021-12-10 RX ADMIN — FAMOTIDINE 20 MG: 20 TABLET ORAL at 08:12

## 2021-12-10 RX ADMIN — SODIUM CHLORIDE: 0.9 INJECTION, SOLUTION INTRAVENOUS at 05:12

## 2021-12-10 RX ADMIN — CELECOXIB 200 MG: 100 CAPSULE ORAL at 09:12

## 2021-12-10 RX ADMIN — Medication 6 MG: at 08:12

## 2021-12-10 RX ADMIN — HYDROCODONE BITARTRATE AND ACETAMINOPHEN 1 TABLET: 10; 325 TABLET ORAL at 12:12

## 2021-12-10 RX ADMIN — HEPARIN SODIUM 5000 UNITS: 5000 INJECTION INTRAVENOUS; SUBCUTANEOUS at 02:12

## 2021-12-10 RX ADMIN — METRONIDAZOLE: 10 GEL TOPICAL at 05:12

## 2021-12-10 RX ADMIN — MICONAZOLE NITRATE: 2 OINTMENT TOPICAL at 01:12

## 2021-12-10 RX ADMIN — SENNOSIDES AND DOCUSATE SODIUM 1 TABLET: 50; 8.6 TABLET ORAL at 08:12

## 2021-12-10 RX ADMIN — MICONAZOLE NITRATE: 20 POWDER TOPICAL at 08:12

## 2021-12-10 RX ADMIN — ATORVASTATIN CALCIUM 40 MG: 40 TABLET, FILM COATED ORAL at 08:12

## 2021-12-10 RX ADMIN — HYDROCODONE BITARTRATE AND ACETAMINOPHEN 1 TABLET: 10; 325 TABLET ORAL at 08:12

## 2021-12-10 RX ADMIN — DOXYCYCLINE HYCLATE 100 MG: 100 TABLET, COATED ORAL at 08:12

## 2021-12-10 RX ADMIN — MICONAZOLE NITRATE: 20 POWDER TOPICAL at 09:12

## 2021-12-10 RX ADMIN — HEPARIN SODIUM 5000 UNITS: 5000 INJECTION INTRAVENOUS; SUBCUTANEOUS at 09:12

## 2021-12-10 RX ADMIN — FAMOTIDINE 20 MG: 20 TABLET ORAL at 09:12

## 2021-12-10 RX ADMIN — DOXYCYCLINE HYCLATE 100 MG: 100 TABLET, COATED ORAL at 09:12

## 2021-12-10 RX ADMIN — TRAZODONE HYDROCHLORIDE 25 MG: 50 TABLET ORAL at 08:12

## 2021-12-10 RX ADMIN — HYDROCODONE BITARTRATE AND ACETAMINOPHEN 1 TABLET: 10; 325 TABLET ORAL at 09:12

## 2021-12-10 RX ADMIN — AMOXICILLIN AND CLAVULANATE POTASSIUM 1 TABLET: 875; 125 TABLET, FILM COATED ORAL at 08:12

## 2021-12-10 RX ADMIN — HEPARIN SODIUM 5000 UNITS: 5000 INJECTION INTRAVENOUS; SUBCUTANEOUS at 05:12

## 2021-12-10 RX ADMIN — MICONAZOLE NITRATE: 2 OINTMENT TOPICAL at 08:12

## 2021-12-10 RX ADMIN — LEVOTHYROXINE SODIUM 50 MCG: 50 TABLET ORAL at 05:12

## 2021-12-11 PROBLEM — L03.90 CELLULITIS: Status: RESOLVED | Noted: 2021-12-03 | Resolved: 2021-12-11

## 2021-12-11 PROBLEM — N18.9 ACUTE ON CHRONIC RENAL FAILURE: Status: RESOLVED | Noted: 2021-12-03 | Resolved: 2021-12-11

## 2021-12-11 PROBLEM — N17.9 ACUTE ON CHRONIC RENAL FAILURE: Status: RESOLVED | Noted: 2021-12-03 | Resolved: 2021-12-11

## 2021-12-11 PROCEDURE — 25000003 PHARM REV CODE 250: Mod: HCNC | Performed by: NURSE PRACTITIONER

## 2021-12-11 PROCEDURE — 63600175 PHARM REV CODE 636 W HCPCS: Mod: HCNC | Performed by: NURSE PRACTITIONER

## 2021-12-11 PROCEDURE — 99900035 HC TECH TIME PER 15 MIN (STAT): Mod: HCNC

## 2021-12-11 PROCEDURE — 97110 THERAPEUTIC EXERCISES: CPT | Mod: HCNC,CQ

## 2021-12-11 PROCEDURE — 21400001 HC TELEMETRY ROOM: Mod: HCNC

## 2021-12-11 PROCEDURE — 97530 THERAPEUTIC ACTIVITIES: CPT | Mod: HCNC,CQ

## 2021-12-11 PROCEDURE — 11000001 HC ACUTE MED/SURG PRIVATE ROOM: Mod: HCNC

## 2021-12-11 PROCEDURE — 25000003 PHARM REV CODE 250: Mod: HCNC | Performed by: INTERNAL MEDICINE

## 2021-12-11 RX ADMIN — HEPARIN SODIUM 5000 UNITS: 5000 INJECTION INTRAVENOUS; SUBCUTANEOUS at 03:12

## 2021-12-11 RX ADMIN — MICONAZOLE NITRATE: 20 POWDER TOPICAL at 08:12

## 2021-12-11 RX ADMIN — HYDROCODONE BITARTRATE AND ACETAMINOPHEN 1 TABLET: 10; 325 TABLET ORAL at 10:12

## 2021-12-11 RX ADMIN — DOXYCYCLINE HYCLATE 100 MG: 100 TABLET, COATED ORAL at 08:12

## 2021-12-11 RX ADMIN — AMOXICILLIN AND CLAVULANATE POTASSIUM 1 TABLET: 875; 125 TABLET, FILM COATED ORAL at 09:12

## 2021-12-11 RX ADMIN — METRONIDAZOLE: 10 GEL TOPICAL at 08:12

## 2021-12-11 RX ADMIN — FAMOTIDINE 20 MG: 20 TABLET ORAL at 08:12

## 2021-12-11 RX ADMIN — ATORVASTATIN CALCIUM 40 MG: 40 TABLET, FILM COATED ORAL at 09:12

## 2021-12-11 RX ADMIN — HEPARIN SODIUM 5000 UNITS: 5000 INJECTION INTRAVENOUS; SUBCUTANEOUS at 05:12

## 2021-12-11 RX ADMIN — TRAZODONE HYDROCHLORIDE 25 MG: 50 TABLET ORAL at 09:12

## 2021-12-11 RX ADMIN — ONDANSETRON 8 MG: 8 TABLET, ORALLY DISINTEGRATING ORAL at 09:12

## 2021-12-11 RX ADMIN — MICONAZOLE NITRATE: 2 OINTMENT TOPICAL at 12:12

## 2021-12-11 RX ADMIN — SODIUM CHLORIDE: 0.9 INJECTION, SOLUTION INTRAVENOUS at 02:12

## 2021-12-11 RX ADMIN — SENNOSIDES AND DOCUSATE SODIUM 1 TABLET: 50; 8.6 TABLET ORAL at 08:12

## 2021-12-11 RX ADMIN — Medication 6 MG: at 09:12

## 2021-12-11 RX ADMIN — HYDROCODONE BITARTRATE AND ACETAMINOPHEN 1 TABLET: 10; 325 TABLET ORAL at 09:12

## 2021-12-11 RX ADMIN — SODIUM CHLORIDE: 0.9 INJECTION, SOLUTION INTRAVENOUS at 12:12

## 2021-12-11 RX ADMIN — DOXYCYCLINE HYCLATE 100 MG: 100 TABLET, COATED ORAL at 09:12

## 2021-12-11 RX ADMIN — HEPARIN SODIUM 5000 UNITS: 5000 INJECTION INTRAVENOUS; SUBCUTANEOUS at 09:12

## 2021-12-11 RX ADMIN — LEVOTHYROXINE SODIUM 50 MCG: 50 TABLET ORAL at 05:12

## 2021-12-11 RX ADMIN — CELECOXIB 200 MG: 100 CAPSULE ORAL at 08:12

## 2021-12-11 RX ADMIN — AMOXICILLIN AND CLAVULANATE POTASSIUM 1 TABLET: 875; 125 TABLET, FILM COATED ORAL at 08:12

## 2021-12-11 RX ADMIN — MICONAZOLE NITRATE: 20 POWDER TOPICAL at 09:12

## 2021-12-11 RX ADMIN — MICONAZOLE NITRATE: 2 OINTMENT TOPICAL at 09:12

## 2021-12-11 RX ADMIN — FAMOTIDINE 20 MG: 20 TABLET ORAL at 09:12

## 2021-12-12 PROCEDURE — 21400001 HC TELEMETRY ROOM: Mod: HCNC

## 2021-12-12 PROCEDURE — 99900035 HC TECH TIME PER 15 MIN (STAT): Mod: HCNC

## 2021-12-12 PROCEDURE — 25000003 PHARM REV CODE 250: Mod: HCNC | Performed by: NURSE PRACTITIONER

## 2021-12-12 PROCEDURE — 11000001 HC ACUTE MED/SURG PRIVATE ROOM: Mod: HCNC

## 2021-12-12 PROCEDURE — 25000003 PHARM REV CODE 250: Mod: HCNC | Performed by: INTERNAL MEDICINE

## 2021-12-12 PROCEDURE — 25000003 PHARM REV CODE 250: Mod: HCNC | Performed by: HOSPITALIST

## 2021-12-12 PROCEDURE — 63600175 PHARM REV CODE 636 W HCPCS: Mod: HCNC | Performed by: NURSE PRACTITIONER

## 2021-12-12 RX ORDER — AMOXICILLIN 250 MG
1 CAPSULE ORAL 2 TIMES DAILY
Status: DISCONTINUED | OUTPATIENT
Start: 2021-12-12 | End: 2021-12-14 | Stop reason: HOSPADM

## 2021-12-12 RX ORDER — POLYETHYLENE GLYCOL 3350 17 G/17G
17 POWDER, FOR SOLUTION ORAL 2 TIMES DAILY
Status: DISCONTINUED | OUTPATIENT
Start: 2021-12-12 | End: 2021-12-14 | Stop reason: HOSPADM

## 2021-12-12 RX ORDER — LACTULOSE 10 G/15ML
20 SOLUTION ORAL 3 TIMES DAILY
Status: DISPENSED | OUTPATIENT
Start: 2021-12-12 | End: 2021-12-14

## 2021-12-12 RX ORDER — ADHESIVE BANDAGE
30 BANDAGE TOPICAL DAILY
Status: DISCONTINUED | OUTPATIENT
Start: 2021-12-12 | End: 2021-12-14 | Stop reason: HOSPADM

## 2021-12-12 RX ADMIN — SENNOSIDES AND DOCUSATE SODIUM 1 TABLET: 50; 8.6 TABLET ORAL at 08:12

## 2021-12-12 RX ADMIN — DOXYCYCLINE HYCLATE 100 MG: 100 TABLET, COATED ORAL at 08:12

## 2021-12-12 RX ADMIN — SODIUM CHLORIDE: 0.9 INJECTION, SOLUTION INTRAVENOUS at 08:12

## 2021-12-12 RX ADMIN — ONDANSETRON 8 MG: 8 TABLET, ORALLY DISINTEGRATING ORAL at 08:12

## 2021-12-12 RX ADMIN — ATORVASTATIN CALCIUM 40 MG: 40 TABLET, FILM COATED ORAL at 08:12

## 2021-12-12 RX ADMIN — AMOXICILLIN AND CLAVULANATE POTASSIUM 1 TABLET: 875; 125 TABLET, FILM COATED ORAL at 08:12

## 2021-12-12 RX ADMIN — HEPARIN SODIUM 5000 UNITS: 5000 INJECTION INTRAVENOUS; SUBCUTANEOUS at 02:12

## 2021-12-12 RX ADMIN — HEPARIN SODIUM 5000 UNITS: 5000 INJECTION INTRAVENOUS; SUBCUTANEOUS at 05:12

## 2021-12-12 RX ADMIN — HEPARIN SODIUM 5000 UNITS: 5000 INJECTION INTRAVENOUS; SUBCUTANEOUS at 09:12

## 2021-12-12 RX ADMIN — METRONIDAZOLE: 10 GEL TOPICAL at 08:12

## 2021-12-12 RX ADMIN — LACTULOSE 20 G: 10 SOLUTION ORAL at 08:12

## 2021-12-12 RX ADMIN — MICONAZOLE NITRATE: 20 POWDER TOPICAL at 08:12

## 2021-12-12 RX ADMIN — MAGNESIUM HYDROXIDE 2400 MG: 400 SUSPENSION ORAL at 02:12

## 2021-12-12 RX ADMIN — TRAZODONE HYDROCHLORIDE 25 MG: 50 TABLET ORAL at 08:12

## 2021-12-12 RX ADMIN — LEVOTHYROXINE SODIUM 50 MCG: 50 TABLET ORAL at 05:12

## 2021-12-12 RX ADMIN — Medication 6 MG: at 08:12

## 2021-12-12 RX ADMIN — MICONAZOLE NITRATE: 2 OINTMENT TOPICAL at 01:12

## 2021-12-12 RX ADMIN — HYDROCODONE BITARTRATE AND ACETAMINOPHEN 1 TABLET: 10; 325 TABLET ORAL at 08:12

## 2021-12-12 RX ADMIN — METRONIDAZOLE: 10 GEL TOPICAL at 05:12

## 2021-12-12 RX ADMIN — LACTULOSE 20 G: 10 SOLUTION ORAL at 02:12

## 2021-12-12 RX ADMIN — FAMOTIDINE 20 MG: 20 TABLET ORAL at 08:12

## 2021-12-12 RX ADMIN — POLYETHYLENE GLYCOL 3350 17 G: 17 POWDER, FOR SOLUTION ORAL at 08:12

## 2021-12-12 RX ADMIN — CELECOXIB 200 MG: 100 CAPSULE ORAL at 08:12

## 2021-12-13 PROBLEM — F32.A DEPRESSION: Status: ACTIVE | Noted: 2021-12-13

## 2021-12-13 LAB
ANION GAP SERPL CALC-SCNC: 7 MMOL/L (ref 8–16)
BASOPHILS # BLD AUTO: 0.05 K/UL (ref 0–0.2)
BASOPHILS NFR BLD: 0.7 % (ref 0–1.9)
BUN SERPL-MCNC: 23 MG/DL (ref 8–23)
CALCIUM SERPL-MCNC: 8.1 MG/DL (ref 8.7–10.5)
CHLORIDE SERPL-SCNC: 107 MMOL/L (ref 95–110)
CO2 SERPL-SCNC: 25 MMOL/L (ref 23–29)
CREAT SERPL-MCNC: 1.1 MG/DL (ref 0.5–1.4)
DIFFERENTIAL METHOD: ABNORMAL
EOSINOPHIL # BLD AUTO: 0.3 K/UL (ref 0–0.5)
EOSINOPHIL NFR BLD: 3.5 % (ref 0–8)
ERYTHROCYTE [DISTWIDTH] IN BLOOD BY AUTOMATED COUNT: 14.4 % (ref 11.5–14.5)
EST. GFR  (AFRICAN AMERICAN): >60 ML/MIN/1.73 M^2
EST. GFR  (NON AFRICAN AMERICAN): >60 ML/MIN/1.73 M^2
GLUCOSE SERPL-MCNC: 89 MG/DL (ref 70–110)
HCT VFR BLD AUTO: 39.2 % (ref 40–54)
HGB BLD-MCNC: 11.9 G/DL (ref 14–18)
IMM GRANULOCYTES # BLD AUTO: 0.23 K/UL (ref 0–0.04)
IMM GRANULOCYTES NFR BLD AUTO: 3 % (ref 0–0.5)
LYMPHOCYTES # BLD AUTO: 2.1 K/UL (ref 1–4.8)
LYMPHOCYTES NFR BLD: 27.3 % (ref 18–48)
MAGNESIUM SERPL-MCNC: 1.8 MG/DL (ref 1.6–2.6)
MCH RBC QN AUTO: 31.2 PG (ref 27–31)
MCHC RBC AUTO-ENTMCNC: 30.4 G/DL (ref 32–36)
MCV RBC AUTO: 103 FL (ref 82–98)
MONOCYTES # BLD AUTO: 0.5 K/UL (ref 0.3–1)
MONOCYTES NFR BLD: 6.9 % (ref 4–15)
NEUTROPHILS # BLD AUTO: 4.5 K/UL (ref 1.8–7.7)
NEUTROPHILS NFR BLD: 58.6 % (ref 38–73)
NRBC BLD-RTO: 0 /100 WBC
PHOSPHATE SERPL-MCNC: 2.8 MG/DL (ref 2.7–4.5)
PLATELET # BLD AUTO: 256 K/UL (ref 150–450)
PMV BLD AUTO: 9.3 FL (ref 9.2–12.9)
POCT GLUCOSE: 122 MG/DL (ref 70–110)
POTASSIUM SERPL-SCNC: 4 MMOL/L (ref 3.5–5.1)
RBC # BLD AUTO: 3.82 M/UL (ref 4.6–6.2)
SODIUM SERPL-SCNC: 139 MMOL/L (ref 136–145)
WBC # BLD AUTO: 7.63 K/UL (ref 3.9–12.7)

## 2021-12-13 PROCEDURE — 36415 COLL VENOUS BLD VENIPUNCTURE: CPT | Mod: HCNC | Performed by: HOSPITALIST

## 2021-12-13 PROCEDURE — 83735 ASSAY OF MAGNESIUM: CPT | Mod: HCNC | Performed by: HOSPITALIST

## 2021-12-13 PROCEDURE — 80048 BASIC METABOLIC PNL TOTAL CA: CPT | Mod: HCNC | Performed by: HOSPITALIST

## 2021-12-13 PROCEDURE — 63600175 PHARM REV CODE 636 W HCPCS: Mod: HCNC | Performed by: NURSE PRACTITIONER

## 2021-12-13 PROCEDURE — 25000003 PHARM REV CODE 250: Mod: HCNC | Performed by: HOSPITALIST

## 2021-12-13 PROCEDURE — 96372 THER/PROPH/DIAG INJ SC/IM: CPT | Mod: HCNC

## 2021-12-13 PROCEDURE — 85025 COMPLETE CBC W/AUTO DIFF WBC: CPT | Mod: HCNC | Performed by: HOSPITALIST

## 2021-12-13 PROCEDURE — 21400001 HC TELEMETRY ROOM: Mod: HCNC

## 2021-12-13 PROCEDURE — 25000003 PHARM REV CODE 250: Mod: HCNC | Performed by: INTERNAL MEDICINE

## 2021-12-13 PROCEDURE — 84100 ASSAY OF PHOSPHORUS: CPT | Mod: HCNC | Performed by: HOSPITALIST

## 2021-12-13 PROCEDURE — 25000003 PHARM REV CODE 250: Mod: HCNC | Performed by: NURSE PRACTITIONER

## 2021-12-13 PROCEDURE — 99900035 HC TECH TIME PER 15 MIN (STAT): Mod: HCNC

## 2021-12-13 RX ORDER — PSEUDOEPHEDRINE/ACETAMINOPHEN 30MG-500MG
100 TABLET ORAL
Status: COMPLETED | OUTPATIENT
Start: 2021-12-13 | End: 2021-12-13

## 2021-12-13 RX ORDER — SYRING-NEEDL,DISP,INSUL,0.3 ML 29 G X1/2"
296 SYRINGE, EMPTY DISPOSABLE MISCELLANEOUS ONCE
Status: COMPLETED | OUTPATIENT
Start: 2021-12-13 | End: 2021-12-13

## 2021-12-13 RX ADMIN — LACTULOSE 20 G: 10 SOLUTION ORAL at 09:12

## 2021-12-13 RX ADMIN — HEPARIN SODIUM 5000 UNITS: 5000 INJECTION INTRAVENOUS; SUBCUTANEOUS at 02:12

## 2021-12-13 RX ADMIN — Medication 296 ML: at 02:12

## 2021-12-13 RX ADMIN — MICONAZOLE NITRATE: 2 OINTMENT TOPICAL at 08:12

## 2021-12-13 RX ADMIN — DOXYCYCLINE HYCLATE 100 MG: 100 TABLET, COATED ORAL at 09:12

## 2021-12-13 RX ADMIN — DOXYCYCLINE HYCLATE 100 MG: 100 TABLET, COATED ORAL at 08:12

## 2021-12-13 RX ADMIN — CELECOXIB 200 MG: 100 CAPSULE ORAL at 09:12

## 2021-12-13 RX ADMIN — METRONIDAZOLE: 10 GEL TOPICAL at 10:12

## 2021-12-13 RX ADMIN — HEPARIN SODIUM 5000 UNITS: 5000 INJECTION INTRAVENOUS; SUBCUTANEOUS at 05:12

## 2021-12-13 RX ADMIN — LEVOTHYROXINE SODIUM 50 MCG: 50 TABLET ORAL at 05:12

## 2021-12-13 RX ADMIN — Medication 100 ML: at 02:12

## 2021-12-13 RX ADMIN — TRAZODONE HYDROCHLORIDE 25 MG: 50 TABLET ORAL at 08:12

## 2021-12-13 RX ADMIN — ATORVASTATIN CALCIUM 40 MG: 40 TABLET, FILM COATED ORAL at 08:12

## 2021-12-13 RX ADMIN — MICONAZOLE NITRATE: 20 POWDER TOPICAL at 10:12

## 2021-12-13 RX ADMIN — SODIUM CHLORIDE 500 ML: 0.9 INJECTION, SOLUTION INTRAVENOUS at 02:12

## 2021-12-13 RX ADMIN — FAMOTIDINE 20 MG: 20 TABLET ORAL at 09:12

## 2021-12-13 RX ADMIN — MICONAZOLE NITRATE: 20 POWDER TOPICAL at 08:12

## 2021-12-13 RX ADMIN — POLYETHYLENE GLYCOL 3350 17 G: 17 POWDER, FOR SOLUTION ORAL at 09:12

## 2021-12-13 RX ADMIN — MICONAZOLE NITRATE: 2 OINTMENT TOPICAL at 02:12

## 2021-12-13 RX ADMIN — FAMOTIDINE 20 MG: 20 TABLET ORAL at 08:12

## 2021-12-13 RX ADMIN — METRONIDAZOLE: 10 GEL TOPICAL at 05:12

## 2021-12-13 RX ADMIN — Medication 6 MG: at 08:12

## 2021-12-13 RX ADMIN — SENNOSIDES AND DOCUSATE SODIUM 1 TABLET: 50; 8.6 TABLET ORAL at 08:12

## 2021-12-13 RX ADMIN — HEPARIN SODIUM 5000 UNITS: 5000 INJECTION INTRAVENOUS; SUBCUTANEOUS at 09:12

## 2021-12-13 RX ADMIN — AMOXICILLIN AND CLAVULANATE POTASSIUM 1 TABLET: 875; 125 TABLET, FILM COATED ORAL at 09:12

## 2021-12-13 RX ADMIN — SENNOSIDES AND DOCUSATE SODIUM 1 TABLET: 50; 8.6 TABLET ORAL at 09:12

## 2021-12-13 RX ADMIN — MAGNESIUM HYDROXIDE 2400 MG: 400 SUSPENSION ORAL at 09:12

## 2021-12-13 RX ADMIN — AMOXICILLIN AND CLAVULANATE POTASSIUM 1 TABLET: 875; 125 TABLET, FILM COATED ORAL at 08:12

## 2021-12-13 RX ADMIN — SODIUM CHLORIDE: 0.9 INJECTION, SOLUTION INTRAVENOUS at 02:12

## 2021-12-14 ENCOUNTER — TELEPHONE (OUTPATIENT)
Dept: SPORTS MEDICINE | Facility: CLINIC | Age: 76
End: 2021-12-14
Payer: MEDICARE

## 2021-12-14 VITALS
RESPIRATION RATE: 16 BRPM | WEIGHT: 315 LBS | TEMPERATURE: 98 F | HEIGHT: 71 IN | HEART RATE: 78 BPM | DIASTOLIC BLOOD PRESSURE: 69 MMHG | BODY MASS INDEX: 44.1 KG/M2 | OXYGEN SATURATION: 99 % | SYSTOLIC BLOOD PRESSURE: 142 MMHG

## 2021-12-14 LAB — SARS-COV-2 RDRP RESP QL NAA+PROBE: NEGATIVE

## 2021-12-14 PROCEDURE — 25000003 PHARM REV CODE 250: Mod: HCNC | Performed by: INTERNAL MEDICINE

## 2021-12-14 PROCEDURE — 63600175 PHARM REV CODE 636 W HCPCS: Mod: HCNC | Performed by: NURSE PRACTITIONER

## 2021-12-14 PROCEDURE — 97110 THERAPEUTIC EXERCISES: CPT | Mod: HCNC | Performed by: PHYSICAL THERAPIST

## 2021-12-14 PROCEDURE — 25000003 PHARM REV CODE 250: Mod: HCNC | Performed by: NURSE PRACTITIONER

## 2021-12-14 PROCEDURE — 25000003 PHARM REV CODE 250: Mod: HCNC | Performed by: HOSPITALIST

## 2021-12-14 PROCEDURE — 97530 THERAPEUTIC ACTIVITIES: CPT | Mod: HCNC

## 2021-12-14 PROCEDURE — U0002 COVID-19 LAB TEST NON-CDC: HCPCS | Mod: HCNC | Performed by: HOSPITALIST

## 2021-12-14 RX ORDER — AMOXICILLIN AND CLAVULANATE POTASSIUM 875; 125 MG/1; MG/1
1 TABLET, FILM COATED ORAL EVERY 12 HOURS
Qty: 6 TABLET | Refills: 0 | Status: SHIPPED | OUTPATIENT
Start: 2021-12-14 | End: 2021-12-17

## 2021-12-14 RX ORDER — DOXYCYCLINE HYCLATE 100 MG
100 TABLET ORAL EVERY 12 HOURS
Qty: 6 TABLET | Refills: 0 | Status: SHIPPED | OUTPATIENT
Start: 2021-12-14 | End: 2021-12-17

## 2021-12-14 RX ORDER — MICONAZOLE NITRATE 2 %
POWDER (GRAM) TOPICAL 2 TIMES DAILY
Refills: 0
Start: 2021-12-14 | End: 2022-05-26 | Stop reason: SDUPTHER

## 2021-12-14 RX ORDER — LIDOCAINE HYDROCHLORIDE 20 MG/ML
JELLY TOPICAL
Status: DISCONTINUED | OUTPATIENT
Start: 2021-12-14 | End: 2021-12-14 | Stop reason: HOSPADM

## 2021-12-14 RX ORDER — METRONIDAZOLE 10 MG/G
GEL TOPICAL 2 TIMES DAILY
Start: 2021-12-14 | End: 2023-01-01

## 2021-12-14 RX ORDER — POLYETHYLENE GLYCOL 3350 17 G/17G
17 POWDER, FOR SOLUTION ORAL 2 TIMES DAILY
Refills: 0
Start: 2021-12-14 | End: 2022-05-26 | Stop reason: SDUPTHER

## 2021-12-14 RX ORDER — FAMOTIDINE 20 MG/1
20 TABLET, FILM COATED ORAL 2 TIMES DAILY
Qty: 60 TABLET | Refills: 11 | Status: SHIPPED | OUTPATIENT
Start: 2021-12-14 | End: 2022-09-16

## 2021-12-14 RX ORDER — CELECOXIB 200 MG/1
200 CAPSULE ORAL DAILY
Qty: 10 CAPSULE | Refills: 0
Start: 2021-12-15 | End: 2021-12-25

## 2021-12-14 RX ORDER — AMOXICILLIN 250 MG
1 CAPSULE ORAL DAILY
Start: 2021-12-14 | End: 2022-05-26 | Stop reason: SDUPTHER

## 2021-12-14 RX ORDER — HYDROCODONE BITARTRATE AND ACETAMINOPHEN 5; 325 MG/1; MG/1
1 TABLET ORAL EVERY 6 HOURS PRN
Qty: 12 TABLET | Refills: 0
Start: 2021-12-14 | End: 2022-05-26

## 2021-12-14 RX ORDER — TRAZODONE HYDROCHLORIDE 50 MG/1
25 TABLET ORAL NIGHTLY
Qty: 15 TABLET | Refills: 0
Start: 2021-12-14 | End: 2022-01-13

## 2021-12-14 RX ADMIN — LEVOTHYROXINE SODIUM 50 MCG: 50 TABLET ORAL at 06:12

## 2021-12-14 RX ADMIN — MAGNESIUM HYDROXIDE 2400 MG: 400 SUSPENSION ORAL at 08:12

## 2021-12-14 RX ADMIN — HEPARIN SODIUM 5000 UNITS: 5000 INJECTION INTRAVENOUS; SUBCUTANEOUS at 06:12

## 2021-12-14 RX ADMIN — SENNOSIDES AND DOCUSATE SODIUM 1 TABLET: 50; 8.6 TABLET ORAL at 09:12

## 2021-12-14 RX ADMIN — LACTULOSE 20 G: 10 SOLUTION ORAL at 08:12

## 2021-12-14 RX ADMIN — AMOXICILLIN AND CLAVULANATE POTASSIUM 1 TABLET: 875; 125 TABLET, FILM COATED ORAL at 08:12

## 2021-12-14 RX ADMIN — CELECOXIB 200 MG: 100 CAPSULE ORAL at 08:12

## 2021-12-14 RX ADMIN — DOXYCYCLINE HYCLATE 100 MG: 100 TABLET, COATED ORAL at 08:12

## 2021-12-14 RX ADMIN — MICONAZOLE NITRATE: 20 POWDER TOPICAL at 08:12

## 2021-12-14 RX ADMIN — HEPARIN SODIUM 5000 UNITS: 5000 INJECTION INTRAVENOUS; SUBCUTANEOUS at 02:12

## 2021-12-14 RX ADMIN — HYDROCODONE BITARTRATE AND ACETAMINOPHEN 1 TABLET: 10; 325 TABLET ORAL at 12:12

## 2021-12-14 RX ADMIN — MICONAZOLE NITRATE: 2 OINTMENT TOPICAL at 02:12

## 2021-12-14 RX ADMIN — FAMOTIDINE 20 MG: 20 TABLET ORAL at 08:12

## 2021-12-14 RX ADMIN — METRONIDAZOLE: 10 GEL TOPICAL at 08:12

## 2021-12-14 RX ADMIN — POLYETHYLENE GLYCOL 3350 17 G: 17 POWDER, FOR SOLUTION ORAL at 08:12

## 2021-12-19 ENCOUNTER — HOSPITAL ENCOUNTER (EMERGENCY)
Facility: HOSPITAL | Age: 76
Discharge: HOME | End: 2021-12-19
Attending: EMERGENCY MEDICINE
Payer: MEDICARE

## 2021-12-19 VITALS
RESPIRATION RATE: 20 BRPM | DIASTOLIC BLOOD PRESSURE: 67 MMHG | BODY MASS INDEX: 59.77 KG/M2 | OXYGEN SATURATION: 95 % | HEART RATE: 72 BPM | TEMPERATURE: 98 F | SYSTOLIC BLOOD PRESSURE: 150 MMHG | WEIGHT: 315 LBS

## 2021-12-19 DIAGNOSIS — U07.1 LAB TEST POSITIVE FOR DETECTION OF COVID-19 VIRUS: Primary | ICD-10-CM

## 2021-12-19 DIAGNOSIS — E66.01 MORBID OBESITY: ICD-10-CM

## 2021-12-19 DIAGNOSIS — I87.2 CHRONIC VENOUS STASIS DERMATITIS OF BOTH LOWER EXTREMITIES: ICD-10-CM

## 2021-12-19 DIAGNOSIS — R53.81 DEBILITATED PATIENT: ICD-10-CM

## 2021-12-19 LAB
CTP QC/QA: YES
SARS-COV-2 RDRP RESP QL NAA+PROBE: POSITIVE

## 2021-12-19 PROCEDURE — 99282 EMERGENCY DEPT VISIT SF MDM: CPT | Mod: 25,HCNC

## 2021-12-19 PROCEDURE — U0002 COVID-19 LAB TEST NON-CDC: HCPCS | Mod: HCNC | Performed by: EMERGENCY MEDICINE

## 2021-12-20 ENCOUNTER — PATIENT MESSAGE (OUTPATIENT)
Dept: INFECTIOUS DISEASES | Facility: HOSPITAL | Age: 76
End: 2021-12-20
Payer: MEDICARE

## 2021-12-20 ENCOUNTER — TELEPHONE (OUTPATIENT)
Dept: INTERNAL MEDICINE | Facility: CLINIC | Age: 76
End: 2021-12-20
Payer: MEDICARE

## 2021-12-20 DIAGNOSIS — I87.8 VENOUS STASIS OF BOTH LOWER EXTREMITIES: ICD-10-CM

## 2021-12-20 DIAGNOSIS — R53.81 DEBILITY: ICD-10-CM

## 2021-12-20 DIAGNOSIS — U07.1 COVID-19: Primary | ICD-10-CM

## 2021-12-21 ENCOUNTER — TELEPHONE (OUTPATIENT)
Dept: INTERNAL MEDICINE | Facility: CLINIC | Age: 76
End: 2021-12-21
Payer: MEDICARE

## 2021-12-21 PROCEDURE — G0180 PR HOME HEALTH MD CERTIFICATION: ICD-10-PCS | Mod: ,,, | Performed by: INTERNAL MEDICINE

## 2021-12-21 PROCEDURE — G0180 MD CERTIFICATION HHA PATIENT: HCPCS | Mod: ,,, | Performed by: INTERNAL MEDICINE

## 2021-12-28 ENCOUNTER — TELEPHONE (OUTPATIENT)
Dept: INTERNAL MEDICINE | Facility: CLINIC | Age: 76
End: 2021-12-28
Payer: MEDICARE

## 2021-12-28 ENCOUNTER — TELEPHONE (OUTPATIENT)
Dept: PRIMARY CARE CLINIC | Facility: CLINIC | Age: 76
End: 2021-12-28
Payer: MEDICARE

## 2022-01-01 ENCOUNTER — CARE AT HOME (OUTPATIENT)
Dept: HOME HEALTH SERVICES | Facility: CLINIC | Age: 77
End: 2022-01-01
Payer: MEDICARE

## 2022-01-01 ENCOUNTER — EXTERNAL HOME HEALTH (OUTPATIENT)
Dept: HOME HEALTH SERVICES | Facility: HOSPITAL | Age: 77
End: 2022-01-01
Payer: MEDICARE

## 2022-01-01 ENCOUNTER — OFFICE VISIT (OUTPATIENT)
Dept: HOME HEALTH SERVICES | Facility: CLINIC | Age: 77
End: 2022-01-01
Payer: MEDICARE

## 2022-01-01 ENCOUNTER — HOSPITAL ENCOUNTER (EMERGENCY)
Facility: HOSPITAL | Age: 77
Discharge: ANOTHER HOSPITAL | End: 2022-12-11
Attending: EMERGENCY MEDICINE
Payer: MEDICARE

## 2022-01-01 ENCOUNTER — TELEPHONE (OUTPATIENT)
Dept: INTERNAL MEDICINE | Facility: CLINIC | Age: 77
End: 2022-01-01
Payer: MEDICARE

## 2022-01-01 VITALS
DIASTOLIC BLOOD PRESSURE: 64 MMHG | RESPIRATION RATE: 33 BRPM | WEIGHT: 315 LBS | HEIGHT: 71 IN | BODY MASS INDEX: 44.1 KG/M2 | OXYGEN SATURATION: 94 % | SYSTOLIC BLOOD PRESSURE: 146 MMHG | HEART RATE: 82 BPM | TEMPERATURE: 98 F

## 2022-01-01 VITALS
RESPIRATION RATE: 18 BRPM | TEMPERATURE: 98 F | OXYGEN SATURATION: 95 % | SYSTOLIC BLOOD PRESSURE: 122 MMHG | DIASTOLIC BLOOD PRESSURE: 58 MMHG | HEART RATE: 39 BPM

## 2022-01-01 DIAGNOSIS — E03.9 HYPOTHYROIDISM (ACQUIRED): ICD-10-CM

## 2022-01-01 DIAGNOSIS — R00.1 BRADYCARDIA: Primary | ICD-10-CM

## 2022-01-01 DIAGNOSIS — N39.0 URINARY TRACT INFECTION WITH HEMATURIA, SITE UNSPECIFIED: Primary | ICD-10-CM

## 2022-01-01 DIAGNOSIS — I11.0 HYPERTENSIVE HEART DISEASE WITH HEART FAILURE: ICD-10-CM

## 2022-01-01 DIAGNOSIS — N13.30 HYDRONEPHROSIS, UNSPECIFIED HYDRONEPHROSIS TYPE: ICD-10-CM

## 2022-01-01 DIAGNOSIS — E66.01 MORBID OBESITY WITH BMI OF 50.0-59.9, ADULT: ICD-10-CM

## 2022-01-01 DIAGNOSIS — R31.9 URINARY TRACT INFECTION WITH HEMATURIA, SITE UNSPECIFIED: Primary | ICD-10-CM

## 2022-01-01 DIAGNOSIS — I87.2 VENOUS STASIS DERMATITIS OF BOTH LOWER EXTREMITIES: ICD-10-CM

## 2022-01-01 DIAGNOSIS — J96.01 ACUTE RESPIRATORY FAILURE WITH HYPOXIA: Primary | ICD-10-CM

## 2022-01-01 DIAGNOSIS — A41.9 SEPSIS, DUE TO UNSPECIFIED ORGANISM, UNSPECIFIED WHETHER ACUTE ORGAN DYSFUNCTION PRESENT: ICD-10-CM

## 2022-01-01 DIAGNOSIS — N20.0 LEFT RENAL STONE: ICD-10-CM

## 2022-01-01 LAB
ALBUMIN SERPL BCP-MCNC: 2.5 G/DL (ref 3.5–5.2)
ALP SERPL-CCNC: 55 U/L (ref 55–135)
ALT SERPL W/O P-5'-P-CCNC: 18 U/L (ref 10–44)
ANION GAP SERPL CALC-SCNC: 9 MMOL/L (ref 8–16)
AST SERPL-CCNC: 25 U/L (ref 10–40)
BACTERIA #/AREA URNS HPF: ABNORMAL /HPF
BACTERIA BLD CULT: ABNORMAL
BACTERIA BLD CULT: NORMAL
BACTERIA UR CULT: ABNORMAL
BASOPHILS # BLD AUTO: 0.06 K/UL (ref 0–0.2)
BASOPHILS NFR BLD: 0.3 % (ref 0–1.9)
BILIRUB SERPL-MCNC: 1.9 MG/DL (ref 0.1–1)
BILIRUB UR QL STRIP: NEGATIVE
BUN SERPL-MCNC: 23 MG/DL (ref 8–23)
CALCIUM SERPL-MCNC: 8.6 MG/DL (ref 8.7–10.5)
CHLORIDE SERPL-SCNC: 99 MMOL/L (ref 95–110)
CLARITY UR: ABNORMAL
CO2 SERPL-SCNC: 32 MMOL/L (ref 23–29)
COLOR UR: ABNORMAL
CREAT SERPL-MCNC: 1.9 MG/DL (ref 0.5–1.4)
DIFFERENTIAL METHOD: ABNORMAL
EOSINOPHIL # BLD AUTO: 0 K/UL (ref 0–0.5)
EOSINOPHIL NFR BLD: 0 % (ref 0–8)
ERYTHROCYTE [DISTWIDTH] IN BLOOD BY AUTOMATED COUNT: 15.4 % (ref 11.5–14.5)
EST. GFR  (NO RACE VARIABLE): 35 ML/MIN/1.73 M^2
GLUCOSE SERPL-MCNC: 111 MG/DL (ref 70–110)
GLUCOSE UR QL STRIP: NEGATIVE
HCT VFR BLD AUTO: 38.7 % (ref 40–54)
HCV AB SERPL QL IA: NEGATIVE
HEP C VIRUS HOLD SPECIMEN: NORMAL
HGB BLD-MCNC: 12 G/DL (ref 14–18)
HGB UR QL STRIP: ABNORMAL
HIV 1+2 AB+HIV1 P24 AG SERPL QL IA: NEGATIVE
HYALINE CASTS #/AREA URNS LPF: 0 /LPF
IMM GRANULOCYTES # BLD AUTO: 0.36 K/UL (ref 0–0.04)
IMM GRANULOCYTES NFR BLD AUTO: 1.8 % (ref 0–0.5)
KETONES UR QL STRIP: ABNORMAL
LACTATE SERPL-SCNC: 2.4 MMOL/L (ref 0.5–2.2)
LEUKOCYTE ESTERASE UR QL STRIP: ABNORMAL
LYMPHOCYTES # BLD AUTO: 0.5 K/UL (ref 1–4.8)
LYMPHOCYTES NFR BLD: 2.3 % (ref 18–48)
MCH RBC QN AUTO: 31.9 PG (ref 27–31)
MCHC RBC AUTO-ENTMCNC: 31 G/DL (ref 32–36)
MCV RBC AUTO: 103 FL (ref 82–98)
MICROSCOPIC COMMENT: ABNORMAL
MONOCYTES # BLD AUTO: 1.6 K/UL (ref 0.3–1)
MONOCYTES NFR BLD: 8.1 % (ref 4–15)
NEUTROPHILS # BLD AUTO: 17.8 K/UL (ref 1.8–7.7)
NEUTROPHILS NFR BLD: 87.5 % (ref 38–73)
NITRITE UR QL STRIP: NEGATIVE
NRBC BLD-RTO: 0 /100 WBC
PH UR STRIP: 7 [PH] (ref 5–8)
PLATELET # BLD AUTO: 124 K/UL (ref 150–450)
PMV BLD AUTO: 9.9 FL (ref 9.2–12.9)
POIKILOCYTOSIS BLD QL SMEAR: SLIGHT
POTASSIUM SERPL-SCNC: 3.9 MMOL/L (ref 3.5–5.1)
PROCALCITONIN SERPL IA-MCNC: 43.04 NG/ML
PROT SERPL-MCNC: 6.8 G/DL (ref 6–8.4)
PROT UR QL STRIP: ABNORMAL
RBC # BLD AUTO: 3.76 M/UL (ref 4.6–6.2)
RBC #/AREA URNS HPF: >100 /HPF (ref 0–4)
SODIUM SERPL-SCNC: 140 MMOL/L (ref 136–145)
SP GR UR STRIP: 1.01 (ref 1–1.03)
SQUAMOUS #/AREA URNS HPF: 3 /HPF
STOMATOCYTES BLD QL SMEAR: PRESENT
UNIDENT CRYS URNS QL MICRO: ABNORMAL
URN SPEC COLLECT METH UR: ABNORMAL
UROBILINOGEN UR STRIP-ACNC: NEGATIVE EU/DL
WBC # BLD AUTO: 20.29 K/UL (ref 3.9–12.7)
WBC #/AREA URNS HPF: >100 /HPF (ref 0–5)
WBC CLUMPS URNS QL MICRO: ABNORMAL
YEAST URNS QL MICRO: ABNORMAL

## 2022-01-01 PROCEDURE — 63600175 PHARM REV CODE 636 W HCPCS: Performed by: EMERGENCY MEDICINE

## 2022-01-01 PROCEDURE — 99499 UNLISTED E&M SERVICE: CPT | Mod: HCNC,S$GLB,, | Performed by: NURSE PRACTITIONER

## 2022-01-01 PROCEDURE — 99350 PR HOME VISIT,ESTAB PATIENT,LEVEL IV: ICD-10-PCS | Mod: ICN,,, | Performed by: NURSE PRACTITIONER

## 2022-01-01 PROCEDURE — 87086 URINE CULTURE/COLONY COUNT: CPT | Performed by: EMERGENCY MEDICINE

## 2022-01-01 PROCEDURE — 25000003 PHARM REV CODE 250: Performed by: EMERGENCY MEDICINE

## 2022-01-01 PROCEDURE — 99499 RISK ADDL DX/OHS AUDIT: ICD-10-PCS | Mod: S$GLB,,, | Performed by: NURSE PRACTITIONER

## 2022-01-01 PROCEDURE — 99291 CRITICAL CARE FIRST HOUR: CPT | Mod: 25

## 2022-01-01 PROCEDURE — 81000 URINALYSIS NONAUTO W/SCOPE: CPT | Performed by: EMERGENCY MEDICINE

## 2022-01-01 PROCEDURE — 96366 THER/PROPH/DIAG IV INF ADDON: CPT

## 2022-01-01 PROCEDURE — 99350 HOME/RES VST EST HIGH MDM 60: CPT | Mod: ICN,,, | Performed by: NURSE PRACTITIONER

## 2022-01-01 PROCEDURE — 96365 THER/PROPH/DIAG IV INF INIT: CPT

## 2022-01-01 PROCEDURE — 1111F DSCHRG MED/CURRENT MED MERGE: CPT | Mod: CPTII,S$GLB,ICN, | Performed by: NURSE PRACTITIONER

## 2022-01-01 PROCEDURE — 84145 PROCALCITONIN (PCT): CPT | Performed by: EMERGENCY MEDICINE

## 2022-01-01 PROCEDURE — 1111F PR DISCHARGE MEDS RECONCILED W/ CURRENT OUTPATIENT MED LIST: ICD-10-PCS | Mod: CPTII,S$GLB,ICN, | Performed by: NURSE PRACTITIONER

## 2022-01-01 PROCEDURE — 80053 COMPREHEN METABOLIC PANEL: CPT | Performed by: EMERGENCY MEDICINE

## 2022-01-01 PROCEDURE — 86803 HEPATITIS C AB TEST: CPT | Performed by: EMERGENCY MEDICINE

## 2022-01-01 PROCEDURE — 83605 ASSAY OF LACTIC ACID: CPT | Performed by: EMERGENCY MEDICINE

## 2022-01-01 PROCEDURE — 87088 URINE BACTERIA CULTURE: CPT | Performed by: EMERGENCY MEDICINE

## 2022-01-01 PROCEDURE — 87040 BLOOD CULTURE FOR BACTERIA: CPT | Performed by: EMERGENCY MEDICINE

## 2022-01-01 PROCEDURE — 99499 UNLISTED E&M SERVICE: CPT | Mod: S$GLB,,, | Performed by: NURSE PRACTITIONER

## 2022-01-01 PROCEDURE — 99499 RISK ADDL DX/OHS AUDIT: ICD-10-PCS | Mod: HCNC,S$GLB,, | Performed by: NURSE PRACTITIONER

## 2022-01-01 PROCEDURE — 87389 HIV-1 AG W/HIV-1&-2 AB AG IA: CPT | Performed by: EMERGENCY MEDICINE

## 2022-01-01 PROCEDURE — 99349 HOME/RES VST EST MOD MDM 40: CPT | Mod: S$GLB,,, | Performed by: NURSE PRACTITIONER

## 2022-01-01 PROCEDURE — 99349 PR HOME VISIT,ESTAB PATIENT,LEVEL III: ICD-10-PCS | Mod: S$GLB,,, | Performed by: NURSE PRACTITIONER

## 2022-01-01 PROCEDURE — 87077 CULTURE AEROBIC IDENTIFY: CPT | Mod: 59 | Performed by: EMERGENCY MEDICINE

## 2022-01-01 PROCEDURE — 85025 COMPLETE CBC W/AUTO DIFF WBC: CPT | Performed by: EMERGENCY MEDICINE

## 2022-01-01 PROCEDURE — 87186 SC STD MICRODIL/AGAR DIL: CPT | Mod: 59 | Performed by: EMERGENCY MEDICINE

## 2022-01-01 RX ORDER — NAPROXEN SODIUM 220 MG/1
81 TABLET, FILM COATED ORAL DAILY
COMMUNITY
End: 2023-01-01 | Stop reason: SDUPTHER

## 2022-01-01 RX ORDER — SODIUM CHLORIDE 9 MG/ML
1000 INJECTION, SOLUTION INTRAVENOUS
Status: COMPLETED | OUTPATIENT
Start: 2022-01-01 | End: 2022-01-01

## 2022-01-01 RX ORDER — OXYCODONE AND ACETAMINOPHEN 10; 325 MG/1; MG/1
1 TABLET ORAL EVERY 8 HOURS PRN
COMMUNITY
End: 2023-01-01

## 2022-01-01 RX ADMIN — SODIUM CHLORIDE 500 ML: 0.9 INJECTION, SOLUTION INTRAVENOUS at 08:12

## 2022-01-01 RX ADMIN — SODIUM CHLORIDE 1000 ML: 0.9 INJECTION, SOLUTION INTRAVENOUS at 10:12

## 2022-01-01 RX ADMIN — CEFTRIAXONE 1 G: 1 INJECTION, SOLUTION INTRAVENOUS at 08:12

## 2022-01-02 ENCOUNTER — HOSPITAL ENCOUNTER (INPATIENT)
Facility: HOSPITAL | Age: 77
LOS: 2 days | Discharge: HOME HEALTH | DRG: 291 | End: 2022-01-05
Attending: FAMILY MEDICINE | Admitting: INTERNAL MEDICINE
Payer: MEDICARE

## 2022-01-02 DIAGNOSIS — U07.1 COVID-19: ICD-10-CM

## 2022-01-02 DIAGNOSIS — J96.01 ACUTE RESPIRATORY FAILURE WITH HYPOXIA: Primary | ICD-10-CM

## 2022-01-02 DIAGNOSIS — J81.1 PULMONARY EDEMA: ICD-10-CM

## 2022-01-02 DIAGNOSIS — R79.89 ELEVATED D-DIMER: ICD-10-CM

## 2022-01-02 PROBLEM — E66.01 MORBID OBESITY WITH BMI OF 50.0-59.9, ADULT: Status: ACTIVE | Noted: 2022-01-02

## 2022-01-02 PROBLEM — L03.116 BILATERAL LOWER LEG CELLULITIS: Status: ACTIVE | Noted: 2022-01-02

## 2022-01-02 PROBLEM — E78.5 HYPERLIPIDEMIA: Status: ACTIVE | Noted: 2022-01-02

## 2022-01-02 PROBLEM — L03.115 BILATERAL LOWER LEG CELLULITIS: Status: ACTIVE | Noted: 2022-01-02

## 2022-01-02 PROBLEM — N18.30 CKD (CHRONIC KIDNEY DISEASE) STAGE 3, GFR 30-59 ML/MIN: Status: ACTIVE | Noted: 2022-01-02

## 2022-01-02 PROBLEM — E03.9 HYPOTHYROIDISM: Status: ACTIVE | Noted: 2022-01-02

## 2022-01-02 PROBLEM — K21.9 GERD (GASTROESOPHAGEAL REFLUX DISEASE): Status: ACTIVE | Noted: 2022-01-02

## 2022-01-02 PROBLEM — R53.2 FUNCTIONAL QUADRIPLEGIA: Status: ACTIVE | Noted: 2022-01-02

## 2022-01-02 LAB
ALBUMIN SERPL BCP-MCNC: 3 G/DL (ref 3.5–5.2)
ALP SERPL-CCNC: 88 U/L (ref 55–135)
ALT SERPL W/O P-5'-P-CCNC: 21 U/L (ref 10–44)
ANION GAP SERPL CALC-SCNC: 8 MMOL/L (ref 8–16)
AST SERPL-CCNC: 31 U/L (ref 10–40)
BASOPHILS # BLD AUTO: 0.05 K/UL (ref 0–0.2)
BASOPHILS NFR BLD: 0.7 % (ref 0–1.9)
BILIRUB SERPL-MCNC: 0.5 MG/DL (ref 0.1–1)
BNP SERPL-MCNC: 27 PG/ML (ref 0–99)
BUN SERPL-MCNC: 22 MG/DL (ref 8–23)
CALCIUM SERPL-MCNC: 8.6 MG/DL (ref 8.7–10.5)
CHLORIDE SERPL-SCNC: 100 MMOL/L (ref 95–110)
CK SERPL-CCNC: 44 U/L (ref 20–200)
CO2 SERPL-SCNC: 29 MMOL/L (ref 23–29)
CREAT SERPL-MCNC: 1.6 MG/DL (ref 0.5–1.4)
CRP SERPL-MCNC: 8.5 MG/L (ref 0–8.2)
CTP QC/QA: YES
CTP QC/QA: YES
D DIMER PPP IA.FEU-MCNC: 11.28 MG/L FEU
DIFFERENTIAL METHOD: ABNORMAL
EOSINOPHIL # BLD AUTO: 0.3 K/UL (ref 0–0.5)
EOSINOPHIL NFR BLD: 5 % (ref 0–8)
ERYTHROCYTE [DISTWIDTH] IN BLOOD BY AUTOMATED COUNT: 14.4 % (ref 11.5–14.5)
EST. GFR  (AFRICAN AMERICAN): 47 ML/MIN/1.73 M^2
EST. GFR  (NON AFRICAN AMERICAN): 41 ML/MIN/1.73 M^2
FERRITIN SERPL-MCNC: 212 NG/ML (ref 20–300)
GLUCOSE SERPL-MCNC: 114 MG/DL (ref 70–110)
HCT VFR BLD AUTO: 49.6 % (ref 40–54)
HGB BLD-MCNC: 15.6 G/DL (ref 14–18)
IMM GRANULOCYTES # BLD AUTO: 0.03 K/UL (ref 0–0.04)
IMM GRANULOCYTES NFR BLD AUTO: 0.4 % (ref 0–0.5)
LACTATE SERPL-SCNC: 1.7 MMOL/L (ref 0.5–2.2)
LDH SERPL L TO P-CCNC: 370 U/L (ref 110–260)
LYMPHOCYTES # BLD AUTO: 1.9 K/UL (ref 1–4.8)
LYMPHOCYTES NFR BLD: 27.6 % (ref 18–48)
MCH RBC QN AUTO: 31.5 PG (ref 27–31)
MCHC RBC AUTO-ENTMCNC: 31.5 G/DL (ref 32–36)
MCV RBC AUTO: 100 FL (ref 82–98)
MONOCYTES # BLD AUTO: 0.8 K/UL (ref 0.3–1)
MONOCYTES NFR BLD: 11.7 % (ref 4–15)
NEUTROPHILS # BLD AUTO: 3.7 K/UL (ref 1.8–7.7)
NEUTROPHILS NFR BLD: 54.6 % (ref 38–73)
NRBC BLD-RTO: 0 /100 WBC
PLATELET # BLD AUTO: 147 K/UL (ref 150–450)
PMV BLD AUTO: 10.1 FL (ref 9.2–12.9)
POC MOLECULAR INFLUENZA A AGN: NEGATIVE
POC MOLECULAR INFLUENZA B AGN: NEGATIVE
POTASSIUM SERPL-SCNC: 3.5 MMOL/L (ref 3.5–5.1)
PROT SERPL-MCNC: 7.9 G/DL (ref 6–8.4)
RBC # BLD AUTO: 4.95 M/UL (ref 4.6–6.2)
SARS-COV-2 RDRP RESP QL NAA+PROBE: NEGATIVE
SODIUM SERPL-SCNC: 137 MMOL/L (ref 136–145)
TROPONIN I SERPL DL<=0.01 NG/ML-MCNC: 0.02 NG/ML (ref 0–0.03)
WBC # BLD AUTO: 6.74 K/UL (ref 3.9–12.7)

## 2022-01-02 PROCEDURE — 85379 FIBRIN DEGRADATION QUANT: CPT | Mod: HCNC | Performed by: FAMILY MEDICINE

## 2022-01-02 PROCEDURE — 82728 ASSAY OF FERRITIN: CPT | Mod: HCNC | Performed by: FAMILY MEDICINE

## 2022-01-02 PROCEDURE — 63600175 PHARM REV CODE 636 W HCPCS: Mod: HCNC | Performed by: FAMILY MEDICINE

## 2022-01-02 PROCEDURE — G0378 HOSPITAL OBSERVATION PER HR: HCPCS | Mod: HCNC

## 2022-01-02 PROCEDURE — 99291 CRITICAL CARE FIRST HOUR: CPT | Mod: 25,HCNC

## 2022-01-02 PROCEDURE — 93010 EKG 12-LEAD: ICD-10-PCS | Mod: HCNC,,, | Performed by: INTERNAL MEDICINE

## 2022-01-02 PROCEDURE — 93010 ELECTROCARDIOGRAM REPORT: CPT | Mod: HCNC,,, | Performed by: INTERNAL MEDICINE

## 2022-01-02 PROCEDURE — 85025 COMPLETE CBC W/AUTO DIFF WBC: CPT | Mod: HCNC | Performed by: FAMILY MEDICINE

## 2022-01-02 PROCEDURE — 86140 C-REACTIVE PROTEIN: CPT | Mod: HCNC | Performed by: FAMILY MEDICINE

## 2022-01-02 PROCEDURE — 83615 LACTATE (LD) (LDH) ENZYME: CPT | Mod: HCNC | Performed by: FAMILY MEDICINE

## 2022-01-02 PROCEDURE — 83880 ASSAY OF NATRIURETIC PEPTIDE: CPT | Mod: HCNC | Performed by: FAMILY MEDICINE

## 2022-01-02 PROCEDURE — 84484 ASSAY OF TROPONIN QUANT: CPT | Mod: HCNC | Performed by: FAMILY MEDICINE

## 2022-01-02 PROCEDURE — 96375 TX/PRO/DX INJ NEW DRUG ADDON: CPT | Mod: HCNC

## 2022-01-02 PROCEDURE — 93005 ELECTROCARDIOGRAM TRACING: CPT | Mod: HCNC

## 2022-01-02 PROCEDURE — U0002 COVID-19 LAB TEST NON-CDC: HCPCS | Performed by: FAMILY MEDICINE

## 2022-01-02 PROCEDURE — 83605 ASSAY OF LACTIC ACID: CPT | Mod: HCNC | Performed by: FAMILY MEDICINE

## 2022-01-02 PROCEDURE — 82550 ASSAY OF CK (CPK): CPT | Mod: HCNC | Performed by: FAMILY MEDICINE

## 2022-01-02 PROCEDURE — 80053 COMPREHEN METABOLIC PANEL: CPT | Mod: HCNC | Performed by: FAMILY MEDICINE

## 2022-01-02 RX ORDER — HYDROCODONE BITARTRATE AND ACETAMINOPHEN 5; 325 MG/1; MG/1
1 TABLET ORAL EVERY 6 HOURS PRN
COMMUNITY
End: 2022-05-26

## 2022-01-02 RX ORDER — LEVOTHYROXINE SODIUM 50 UG/1
50 TABLET ORAL
Status: DISCONTINUED | OUTPATIENT
Start: 2022-01-03 | End: 2022-01-05 | Stop reason: HOSPADM

## 2022-01-02 RX ORDER — HYDROCODONE BITARTRATE AND ACETAMINOPHEN 5; 325 MG/1; MG/1
1 TABLET ORAL EVERY 6 HOURS PRN
Status: DISCONTINUED | OUTPATIENT
Start: 2022-01-02 | End: 2022-01-05 | Stop reason: HOSPADM

## 2022-01-02 RX ORDER — FUROSEMIDE 10 MG/ML
40 INJECTION INTRAMUSCULAR; INTRAVENOUS
Status: DISCONTINUED | OUTPATIENT
Start: 2022-01-03 | End: 2022-01-03

## 2022-01-02 RX ORDER — CEFEPIME HYDROCHLORIDE 1 G/50ML
1 INJECTION, SOLUTION INTRAVENOUS
Status: DISCONTINUED | OUTPATIENT
Start: 2022-01-03 | End: 2022-01-03 | Stop reason: ALTCHOICE

## 2022-01-02 RX ORDER — AMOXICILLIN 250 MG
1 CAPSULE ORAL DAILY
Status: DISCONTINUED | OUTPATIENT
Start: 2022-01-03 | End: 2022-01-05 | Stop reason: HOSPADM

## 2022-01-02 RX ORDER — FAMOTIDINE 20 MG/1
20 TABLET, FILM COATED ORAL 2 TIMES DAILY
Status: DISCONTINUED | OUTPATIENT
Start: 2022-01-02 | End: 2022-01-05 | Stop reason: HOSPADM

## 2022-01-02 RX ORDER — FUROSEMIDE 10 MG/ML
40 INJECTION INTRAMUSCULAR; INTRAVENOUS
Status: COMPLETED | OUTPATIENT
Start: 2022-01-02 | End: 2022-01-02

## 2022-01-02 RX ORDER — MICONAZOLE NITRATE 2 %
POWDER (GRAM) TOPICAL 2 TIMES DAILY
COMMUNITY
End: 2023-01-01

## 2022-01-02 RX ORDER — HEPARIN SODIUM,PORCINE/D5W 25000/250
0-40 INTRAVENOUS SOLUTION INTRAVENOUS CONTINUOUS
Status: DISCONTINUED | OUTPATIENT
Start: 2022-01-02 | End: 2022-01-04

## 2022-01-02 RX ORDER — MICONAZOLE NITRATE 2 %
POWDER (GRAM) TOPICAL 2 TIMES DAILY
Status: DISCONTINUED | OUTPATIENT
Start: 2022-01-02 | End: 2022-01-05 | Stop reason: HOSPADM

## 2022-01-02 RX ORDER — ATORVASTATIN CALCIUM 40 MG/1
40 TABLET, FILM COATED ORAL DAILY
COMMUNITY
End: 2022-04-28 | Stop reason: SDUPTHER

## 2022-01-02 RX ORDER — POLYETHYLENE GLYCOL 3350 17 G/17G
17 POWDER, FOR SOLUTION ORAL 2 TIMES DAILY
COMMUNITY
End: 2023-01-01 | Stop reason: SDUPTHER

## 2022-01-02 RX ORDER — ACETAMINOPHEN 500 MG
500 TABLET ORAL EVERY 6 HOURS PRN
COMMUNITY
End: 2022-05-26 | Stop reason: SDUPTHER

## 2022-01-02 RX ORDER — AMOXICILLIN 250 MG
1 CAPSULE ORAL DAILY
Status: ON HOLD | COMMUNITY
End: 2023-01-01 | Stop reason: SDUPTHER

## 2022-01-02 RX ORDER — ONDANSETRON 2 MG/ML
4 INJECTION INTRAMUSCULAR; INTRAVENOUS EVERY 6 HOURS PRN
Status: DISCONTINUED | OUTPATIENT
Start: 2022-01-02 | End: 2022-01-05 | Stop reason: HOSPADM

## 2022-01-02 RX ORDER — HYDRALAZINE HYDROCHLORIDE 20 MG/ML
10 INJECTION INTRAMUSCULAR; INTRAVENOUS EVERY 6 HOURS PRN
Status: DISCONTINUED | OUTPATIENT
Start: 2022-01-02 | End: 2022-01-05 | Stop reason: HOSPADM

## 2022-01-02 RX ORDER — LEVOTHYROXINE SODIUM 50 UG/1
50 TABLET ORAL
COMMUNITY
End: 2022-02-10

## 2022-01-02 RX ORDER — FAMOTIDINE 20 MG/1
20 TABLET, FILM COATED ORAL 2 TIMES DAILY
COMMUNITY
End: 2022-05-26 | Stop reason: SDUPTHER

## 2022-01-02 RX ORDER — POLYETHYLENE GLYCOL 3350 17 G/17G
17 POWDER, FOR SOLUTION ORAL 2 TIMES DAILY PRN
Status: DISCONTINUED | OUTPATIENT
Start: 2022-01-02 | End: 2022-01-05 | Stop reason: HOSPADM

## 2022-01-02 RX ORDER — SODIUM CHLORIDE 9 MG/ML
INJECTION, SOLUTION INTRAVENOUS
Status: DISCONTINUED | OUTPATIENT
Start: 2022-01-02 | End: 2022-01-05 | Stop reason: HOSPADM

## 2022-01-02 RX ORDER — TRAZODONE HYDROCHLORIDE 50 MG/1
25 TABLET ORAL NIGHTLY
COMMUNITY
End: 2022-09-16

## 2022-01-02 RX ORDER — ATORVASTATIN CALCIUM 40 MG/1
40 TABLET, FILM COATED ORAL DAILY
Status: DISCONTINUED | OUTPATIENT
Start: 2022-01-03 | End: 2022-01-05 | Stop reason: HOSPADM

## 2022-01-02 RX ORDER — ACETAMINOPHEN 325 MG/1
650 TABLET ORAL EVERY 6 HOURS PRN
Status: DISCONTINUED | OUTPATIENT
Start: 2022-01-02 | End: 2022-01-05 | Stop reason: HOSPADM

## 2022-01-02 RX ADMIN — CEFEPIME HYDROCHLORIDE 1 G: 1 INJECTION, SOLUTION INTRAVENOUS at 11:01

## 2022-01-02 RX ADMIN — FUROSEMIDE 40 MG: 10 INJECTION, SOLUTION INTRAMUSCULAR; INTRAVENOUS at 07:01

## 2022-01-02 NOTE — ED PROVIDER NOTES
SCRIBE #1 NOTE: I, Sasha Licona, am scribing for, and in the presence of, Alaina Pool MD. I have scribed the entire note.       History     Chief Complaint   Patient presents with    Shortness of Breath     EMS reports o2 sat of 80's on arrival. Pt is covid positive and now sats are in the 90's cannula.     Review of patient's allergies indicates:  Not on File      History of Present Illness     HPI    1/2/2022, 3:30 PM  History obtained from the patient      History of Present Illness: Gene Rothman is a 76 y.o. male patient who presents to the Emergency Department for evaluation of feeling SOB which onset gradually over the past few weeks. Pt states he tested positive for COVID on December 19th and has not been retested since. Pt states he is SOB but is not on oxygen at home. Pt states he does not have COPD. Symptoms are constant and moderate in severity. No mitigating or exacerbating factors reported. Patient denies any CP, fever, cough, HA, weakness, numbness, dizziness, and all other sxs at this time. No prior Tx included. No further complaints or concerns at this time.       Arrival mode: EMS     PCP: Amy Lopez MD        Past Medical History:  No past medical history on file.    Past Surgical History:  No past surgical history on file.      Family History:  No family history on file.    Social History:  Social History     Tobacco Use    Smoking status: Not on file    Smokeless tobacco: Not on file   Substance and Sexual Activity    Alcohol use: Not on file    Drug use: Not on file    Sexual activity: Not on file        Review of Systems     Review of Systems   Constitutional: Negative for fever.   HENT: Negative for sore throat.    Respiratory: Positive for shortness of breath. Negative for cough.    Cardiovascular: Negative for chest pain.   Gastrointestinal: Negative for nausea.   Genitourinary: Negative for dysuria.   Musculoskeletal: Negative for back pain.   Skin: Negative for rash.    Neurological: Negative for dizziness, weakness, numbness and headaches.   Hematological: Does not bruise/bleed easily.   All other systems reviewed and are negative.     Physical Exam     Initial Vitals [01/02/22 1423]   BP Pulse Resp Temp SpO2   (!) 160/100 86 18 98 °F (36.7 °C) 96 %      MAP       --          Physical Exam  Nursing Notes and Vital Signs Reviewed.  Constitutional: Patient is in no apparent distress. Morbidly obese.   Head: Atraumatic. Normocephalic.  Eyes: PERRL. EOM intact. Conjunctivae are not pale. No scleral icterus.  ENT: Mucous membranes are moist. Oropharynx is clear and symmetric.    Neck: Supple. Full ROM. No lymphadenopathy.  Cardiovascular: Regular rate. Regular rhythm. No murmurs, rubs, or gallops. Distal pulses are 2+ and symmetric.  Pulmonary/Chest: Diminished breath sounds bilaterally.  Abdominal: Soft and non-distended.  There is no tenderness.  No rebound, guarding, or rigidity. Good bowel sounds.  Genitourinary: No CVA tenderness  Musculoskeletal: Moves all extremities. No obvious deformities. No edema. No calf tenderness.  Skin: Warm and dry.  Neurological:  Alert, awake, and appropriate.  Normal speech.  No acute focal neurological deficits are appreciated.  Psychiatric: Normal affect. Good eye contact. Appropriate in content.     ED Course   Critical Care    Date/Time: 1/2/2022 7:40 PM  Performed by: Alaina Pool MD  Authorized by: Alaina Pool MD   Direct patient critical care time: 15 minutes  Additional history critical care time: 14 minutes  Ordering / reviewing critical care time: 11 minutes  Documentation critical care time: 5 minutes  Total critical care time (exclusive of procedural time) : 45 minutes  Critical care time was exclusive of separately billable procedures and treating other patients and teaching time.  Critical care was necessary to treat or prevent imminent or life-threatening deterioration of the following conditions: Acute hypoxic respiratory  failure.  Critical care was time spent personally by me on the following activities: blood draw for specimens, development of treatment plan with patient or surrogate, discussions with consultants, interpretation of cardiac output measurements, evaluation of patient's response to treatment, examination of patient, obtaining history from patient or surrogate, ordering and performing treatments and interventions, ordering and review of laboratory studies, ordering and review of radiographic studies, pulse oximetry, re-evaluation of patient's condition and review of old charts.        ED Vital Signs:  Vitals:    01/02/22 1423 01/02/22 1800 01/02/22 1830 01/02/22 1847   BP: (!) 160/100 130/67 121/60    Pulse: 86 74 65 76   Resp: 18 (!) 41 (!) 40    Temp: 98 °F (36.7 °C)      TempSrc: Oral      SpO2: 96% 97% 97% 96%   Weight:        01/02/22 1849 01/02/22 1851 01/02/22 1902 01/02/22 1916   BP:   (!) 165/74    Pulse: 76 77 80 93   Resp:       Temp:       TempSrc:       SpO2: (!) 94% (!) 91% (!) 88% (!) 86%   Weight:        01/02/22 1917 01/02/22 1918 01/02/22 1919   BP:      Pulse: 90  82   Resp:      Temp:      TempSrc:      SpO2: (!) 83%  (!) 90%   Weight:  (!) 190.2 kg (419 lb 5 oz)        Abnormal Lab Results:  Labs Reviewed   CBC W/ AUTO DIFFERENTIAL - Abnormal; Notable for the following components:       Result Value     (*)     MCH 31.5 (*)     MCHC 31.5 (*)     Platelets 147 (*)     All other components within normal limits   COMPREHENSIVE METABOLIC PANEL - Abnormal; Notable for the following components:    Glucose 114 (*)     Creatinine 1.6 (*)     Calcium 8.6 (*)     Albumin 3.0 (*)     eGFR if  47 (*)     eGFR if non  41 (*)     All other components within normal limits   C-REACTIVE PROTEIN - Abnormal; Notable for the following components:    CRP 8.5 (*)     All other components within normal limits   LACTATE DEHYDROGENASE - Abnormal; Notable for the following components:      (*)     All other components within normal limits   D DIMER, QUANTITATIVE - Abnormal; Notable for the following components:    D-Dimer 11.28 (*)     All other components within normal limits   FERRITIN   CK   LACTIC ACID, PLASMA   TROPONIN I   B-TYPE NATRIURETIC PEPTIDE   SARS-COV-2 RDRP GENE    Narrative:     This test utilizes isothermal nucleic acid amplification   technology to detect the SARS-CoV-2 RdRp nucleic acid segment.   The analytical sensitivity (limit of detection) is 125 genome   equivalents/mL.   A POSITIVE result implies infection with the SARS-CoV-2 virus;   the patient is presumed to be contagious.     A NEGATIVE result means that SARS-CoV-2 nucleic acids are not   present above the limit of detection. A NEGATIVE result should be   treated as presumptive. It does not rule out the possibility of   COVID-19 and should not be the sole basis for treatment decisions.   If COVID-19 is strongly suspected based on clinical and exposure   history, re-testing using an alternate molecular assay should be   considered.   This test is only for use under the Food and Drug   Administration s Emergency Use Authorization (EUA).   Commercial kits are provided by [employer].   Performance characteristics of the EUA have been independently   verified by [facility] Department of   Pathology and Laboratory Medicine.   _________________________________________________________________   The authorized Fact Sheet for Healthcare Providers and the authorized Fact   Sheet for Patients of the ID NOW COVID-19 are available on the FDA   website:     https://www.fda.gov/media/531747/download  https://www.fda.gov/media/758299/download       POCT INFLUENZA A/B MOLECULAR        All Lab Results:  Results for orders placed or performed during the hospital encounter of 01/02/22   CBC auto differential   Result Value Ref Range    WBC 6.74 3.90 - 12.70 K/uL    RBC 4.95 4.60 - 6.20 M/uL    Hemoglobin 15.6  14.0 - 18.0 g/dL    Hematocrit 49.6 40.0 - 54.0 %     (H) 82 - 98 fL    MCH 31.5 (H) 27.0 - 31.0 pg    MCHC 31.5 (L) 32.0 - 36.0 g/dL    RDW 14.4 11.5 - 14.5 %    Platelets 147 (L) 150 - 450 K/uL    MPV 10.1 9.2 - 12.9 fL    Immature Granulocytes 0.4 0.0 - 0.5 %    Gran # (ANC) 3.7 1.8 - 7.7 K/uL    Immature Grans (Abs) 0.03 0.00 - 0.04 K/uL    Lymph # 1.9 1.0 - 4.8 K/uL    Mono # 0.8 0.3 - 1.0 K/uL    Eos # 0.3 0.0 - 0.5 K/uL    Baso # 0.05 0.00 - 0.20 K/uL    nRBC 0 0 /100 WBC    Gran % 54.6 38.0 - 73.0 %    Lymph % 27.6 18.0 - 48.0 %    Mono % 11.7 4.0 - 15.0 %    Eosinophil % 5.0 0.0 - 8.0 %    Basophil % 0.7 0.0 - 1.9 %    Differential Method Automated    Comprehensive metabolic panel   Result Value Ref Range    Sodium 137 136 - 145 mmol/L    Potassium 3.5 3.5 - 5.1 mmol/L    Chloride 100 95 - 110 mmol/L    CO2 29 23 - 29 mmol/L    Glucose 114 (H) 70 - 110 mg/dL    BUN 22 8 - 23 mg/dL    Creatinine 1.6 (H) 0.5 - 1.4 mg/dL    Calcium 8.6 (L) 8.7 - 10.5 mg/dL    Total Protein 7.9 6.0 - 8.4 g/dL    Albumin 3.0 (L) 3.5 - 5.2 g/dL    Total Bilirubin 0.5 0.1 - 1.0 mg/dL    Alkaline Phosphatase 88 55 - 135 U/L    AST 31 10 - 40 U/L    ALT 21 10 - 44 U/L    Anion Gap 8 8 - 16 mmol/L    eGFR if African American 47 (A) >60 mL/min/1.73 m^2    eGFR if non African American 41 (A) >60 mL/min/1.73 m^2   C-Reactive Protein   Result Value Ref Range    CRP 8.5 (H) 0.0 - 8.2 mg/L   Ferritin   Result Value Ref Range    Ferritin 212 20.0 - 300.0 ng/mL   Lactate Dehydrogenase   Result Value Ref Range     (H) 110 - 260 U/L   CK   Result Value Ref Range    CPK 44 20 - 200 U/L   Lactic Acid, Plasma   Result Value Ref Range    Lactate (Lactic Acid) 1.7 0.5 - 2.2 mmol/L   Troponin I   Result Value Ref Range    Troponin I 0.022 0.000 - 0.026 ng/mL   Brain Natriuretic Peptide   Result Value Ref Range    BNP 27 0 - 99 pg/mL   D dimer, quantitative   Result Value Ref Range    D-Dimer 11.28 (H) <0.50 mg/L FEU   POCT COVID-19  Rapid Screening   Result Value Ref Range    POC Rapid COVID Negative Negative     Acceptable Yes    POCT Influenza A/B Molecular   Result Value Ref Range    POC Molecular Influenza A Ag Negative Negative, Not Reported    POC Molecular Influenza B Ag Negative Negative, Not Reported     Acceptable Yes          Imaging Results:  Imaging Results          X-Ray Chest AP Portable (Final result)  Result time 01/02/22 15:50:54    Final result by Ramirez Dorsey Jr., MD (01/02/22 15:50:54)                 Impression:      Pulmonary vascular congestion and cardiomegaly.      Electronically signed by: Ramirez Dorsey Jr., MD  Date:    01/02/2022  Time:    15:50             Narrative:    EXAMINATION:  XR CHEST AP PORTABLE    CLINICAL HISTORY:  COVID-19;    COMPARISON:  None.    FINDINGS:  Low lung volumes.  Pulmonary vascular congestion.  No pleural fluid or pneumothorax.  The heart is likely enlarged.  No significant bony findings.                                 The EKG was ordered, reviewed, and independently interpreted by the ED provider.  Interpretation time: 15:31  Rate: 78 BPM  Rhythm: sinus rhythm with 1st degree AV block  Interpretation: ST and T wave abnormality, consider anterior ischemia. No STEMI.             The Emergency Provider reviewed the vital signs and test results, which are outlined above.     ED Discussion     7:35 PM: Discussed case with Sol Stuart NP ([facility]). Dr. Juan agrees with current care and management of pt and accepts admission.   Admitting Service: Hospital Medicine  Admitting Physician: Dr. Juan   Admit to: Tele.obs    7:36 PM: Re-evaluated pt. I have discussed test results, shared treatment plan, and the need for admission with patient and family at bedside. Pt and family express understanding at this time and agree with all information. All questions answered. Pt and family have no further questions or concerns at this time. Pt is ready for  "admit.      ED Course as of 01/02/22 1920   Sun Jan 02, 2022   1901 Patient desatting to 84% room air.  Place nasal cannula again. [CELSO]      ED Course User Index  [CELSO] Alaina Pool MD     Medical Decision Making:   Clinical Tests:   Lab Tests: Ordered and Reviewed  Radiological Study: Ordered and Reviewed  Medical Tests: Ordered and Reviewed           ED Medication(s):  Medications   furosemide injection 40 mg (has no administration in time range)       New Prescriptions    No medications on file               Scribe Attestation:   Scribe #1: I performed the above scribed service and the documentation accurately describes the services I performed. I attest to the accuracy of the note.     Attending:   Physician Attestation Statement for Scribe #1: I, Alaina Pool MD, personally performed the services described in this documentation, as scribed by Sasha Licona, in my presence, and it is both accurate and complete.           Clinical Impression     Final diagnoses:    [J96.01] Acute respiratory failure with hypoxia (Primary)  [R79.89] Elevated d-dimer        Disposition:   Disposition: Placed in Observation  Condition: Fair    Portions of this note may have been created with voice recognition software. Occasional "wrong-word" or "sound-a-like" substitutions may have occurred due to the inherent limitations of voice recognition software. Please, read the note carefully and recognize, using context, where substitutions have occurred.          Alaina Pool MD  01/04/22 1531       Alaina Pool MD  01/04/22 1531       Alaina Pool MD  01/04/22 1531       Alaina Pool MD  01/04/22 1532    "

## 2022-01-03 LAB
ALBUMIN SERPL BCP-MCNC: 2.9 G/DL (ref 3.5–5.2)
ALP SERPL-CCNC: 94 U/L (ref 55–135)
ALT SERPL W/O P-5'-P-CCNC: 17 U/L (ref 10–44)
ANION GAP SERPL CALC-SCNC: 12 MMOL/L (ref 8–16)
AORTIC ROOT ANNULUS: 3.63 CM
APTT BLDCRRT: 26.8 SEC (ref 21–32)
APTT BLDCRRT: 43 SEC (ref 21–32)
APTT BLDCRRT: 95.5 SEC (ref 21–32)
ASCENDING AORTA: 2.98 CM
AST SERPL-CCNC: 18 U/L (ref 10–40)
AV INDEX (PROSTH): 0.7
AV MEAN GRADIENT: 3 MMHG
AV PEAK GRADIENT: 4 MMHG
AV VALVE AREA: 2.65 CM2
AV VELOCITY RATIO: 0.74
BASOPHILS # BLD AUTO: 0.04 K/UL (ref 0–0.2)
BASOPHILS # BLD AUTO: 0.04 K/UL (ref 0–0.2)
BASOPHILS NFR BLD: 0.5 % (ref 0–1.9)
BASOPHILS NFR BLD: 0.5 % (ref 0–1.9)
BILIRUB SERPL-MCNC: 0.9 MG/DL (ref 0.1–1)
BSA FOR ECHO PROCEDURE: 3.05 M2
BUN SERPL-MCNC: 22 MG/DL (ref 8–23)
CALCIUM SERPL-MCNC: 8.6 MG/DL (ref 8.7–10.5)
CHLORIDE SERPL-SCNC: 98 MMOL/L (ref 95–110)
CHOLEST SERPL-MCNC: 72 MG/DL (ref 120–199)
CHOLEST/HDLC SERPL: 2.6 {RATIO} (ref 2–5)
CO2 SERPL-SCNC: 29 MMOL/L (ref 23–29)
CREAT SERPL-MCNC: 1.5 MG/DL (ref 0.5–1.4)
CV ECHO LV RWT: 0.98 CM
DIFFERENTIAL METHOD: ABNORMAL
DIFFERENTIAL METHOD: ABNORMAL
DOP CALC AO PEAK VEL: 1.04 M/S
DOP CALC AO VTI: 18.2 CM
DOP CALC LVOT AREA: 3.8 CM2
DOP CALC LVOT DIAMETER: 2.2 CM
DOP CALC LVOT PEAK VEL: 0.77 M/S
DOP CALC LVOT STROKE VOLUME: 48.25 CM3
DOP CALC RVOT PEAK VEL: 0.66 M/S
DOP CALC RVOT VTI: 13.4 CM
DOP CALCLVOT PEAK VEL VTI: 12.7 CM
E WAVE DECELERATION TIME: 239.12 MSEC
E/A RATIO: 0.6
E/E' RATIO: 7.43 M/S
ECHO EF ESTIMATED: 49 %
ECHO LV POSTERIOR WALL: 1.88 CM (ref 0.6–1.1)
EJECTION FRACTION: 55 %
EOSINOPHIL # BLD AUTO: 0.3 K/UL (ref 0–0.5)
EOSINOPHIL # BLD AUTO: 0.3 K/UL (ref 0–0.5)
EOSINOPHIL NFR BLD: 3.9 % (ref 0–8)
EOSINOPHIL NFR BLD: 3.9 % (ref 0–8)
ERYTHROCYTE [DISTWIDTH] IN BLOOD BY AUTOMATED COUNT: 14.3 % (ref 11.5–14.5)
ERYTHROCYTE [DISTWIDTH] IN BLOOD BY AUTOMATED COUNT: 14.3 % (ref 11.5–14.5)
EST. GFR  (AFRICAN AMERICAN): 51 ML/MIN/1.73 M^2
EST. GFR  (NON AFRICAN AMERICAN): 44 ML/MIN/1.73 M^2
ESTIMATED AVG GLUCOSE: 114 MG/DL (ref 68–131)
FRACTIONAL SHORTENING: 24 % (ref 28–44)
GLUCOSE SERPL-MCNC: 90 MG/DL (ref 70–110)
HBA1C MFR BLD: 5.6 % (ref 4–5.6)
HCT VFR BLD AUTO: 46.7 % (ref 40–54)
HCT VFR BLD AUTO: 46.7 % (ref 40–54)
HDLC SERPL-MCNC: 28 MG/DL (ref 40–75)
HDLC SERPL: 38.9 % (ref 20–50)
HGB BLD-MCNC: 15 G/DL (ref 14–18)
HGB BLD-MCNC: 15 G/DL (ref 14–18)
IMM GRANULOCYTES # BLD AUTO: 0.03 K/UL (ref 0–0.04)
IMM GRANULOCYTES # BLD AUTO: 0.03 K/UL (ref 0–0.04)
IMM GRANULOCYTES NFR BLD AUTO: 0.4 % (ref 0–0.5)
IMM GRANULOCYTES NFR BLD AUTO: 0.4 % (ref 0–0.5)
INR PPP: 1 (ref 0.8–1.2)
INTERVENTRICULAR SEPTUM: 1.56 CM (ref 0.6–1.1)
IVC DIAMETER: 1.14 CM
IVRT: 99.9 MSEC
LA MAJOR: 5.31 CM
LA MINOR: 3.25 CM
LA WIDTH: 3.27 CM
LDLC SERPL CALC-MCNC: 27 MG/DL (ref 63–159)
LEFT ATRIUM SIZE: 2.29 CM
LEFT ATRIUM VOLUME INDEX: 8.9 ML/M2
LEFT ATRIUM VOLUME: 25.66 CM3
LEFT INTERNAL DIMENSION IN SYSTOLE: 2.9 CM (ref 2.1–4)
LEFT VENTRICLE DIASTOLIC VOLUME INDEX: 21.93 ML/M2
LEFT VENTRICLE DIASTOLIC VOLUME: 63.39 ML
LEFT VENTRICLE MASS INDEX: 95 G/M2
LEFT VENTRICLE SYSTOLIC VOLUME INDEX: 11.1 ML/M2
LEFT VENTRICLE SYSTOLIC VOLUME: 32.09 ML
LEFT VENTRICULAR INTERNAL DIMENSION IN DIASTOLE: 3.84 CM (ref 3.5–6)
LEFT VENTRICULAR MASS: 274.5 G
LV LATERAL E/E' RATIO: 5.78 M/S
LV SEPTAL E/E' RATIO: 10.4 M/S
LVOT MG: 1.46 MMHG
LVOT MV: 0.59 CM/S
LYMPHOCYTES # BLD AUTO: 1.8 K/UL (ref 1–4.8)
LYMPHOCYTES # BLD AUTO: 1.8 K/UL (ref 1–4.8)
LYMPHOCYTES NFR BLD: 23.8 % (ref 18–48)
LYMPHOCYTES NFR BLD: 23.8 % (ref 18–48)
MAGNESIUM SERPL-MCNC: 1.9 MG/DL (ref 1.6–2.6)
MCH RBC QN AUTO: 31.7 PG (ref 27–31)
MCH RBC QN AUTO: 31.7 PG (ref 27–31)
MCHC RBC AUTO-ENTMCNC: 32.1 G/DL (ref 32–36)
MCHC RBC AUTO-ENTMCNC: 32.1 G/DL (ref 32–36)
MCV RBC AUTO: 99 FL (ref 82–98)
MCV RBC AUTO: 99 FL (ref 82–98)
MONOCYTES # BLD AUTO: 1 K/UL (ref 0.3–1)
MONOCYTES # BLD AUTO: 1 K/UL (ref 0.3–1)
MONOCYTES NFR BLD: 12.4 % (ref 4–15)
MONOCYTES NFR BLD: 12.4 % (ref 4–15)
MV PEAK A VEL: 0.87 M/S
MV PEAK E VEL: 0.52 M/S
MV STENOSIS PRESSURE HALF TIME: 69.35 MS
MV VALVE AREA P 1/2 METHOD: 3.17 CM2
NEUTROPHILS # BLD AUTO: 4.5 K/UL (ref 1.8–7.7)
NEUTROPHILS # BLD AUTO: 4.5 K/UL (ref 1.8–7.7)
NEUTROPHILS NFR BLD: 59 % (ref 38–73)
NEUTROPHILS NFR BLD: 59 % (ref 38–73)
NONHDLC SERPL-MCNC: 44 MG/DL
NRBC BLD-RTO: 0 /100 WBC
NRBC BLD-RTO: 0 /100 WBC
PISA TR MAX VEL: 3.18 M/S
PLATELET # BLD AUTO: 153 K/UL (ref 150–450)
PLATELET # BLD AUTO: 153 K/UL (ref 150–450)
PMV BLD AUTO: 10.4 FL (ref 9.2–12.9)
PMV BLD AUTO: 10.4 FL (ref 9.2–12.9)
POTASSIUM SERPL-SCNC: 3 MMOL/L (ref 3.5–5.1)
PROT SERPL-MCNC: 7.6 G/DL (ref 6–8.4)
PROTHROMBIN TIME: 11.2 SEC (ref 9–12.5)
PV MEAN GRADIENT: 0.78 MMHG
RA MAJOR: 4.13 CM
RA PRESSURE: 3 MMHG
RA WIDTH: 2.81 CM
RBC # BLD AUTO: 4.73 M/UL (ref 4.6–6.2)
RBC # BLD AUTO: 4.73 M/UL (ref 4.6–6.2)
RIGHT VENTRICULAR END-DIASTOLIC DIMENSION: 5.32 CM
SINUS: 3.14 CM
SODIUM SERPL-SCNC: 139 MMOL/L (ref 136–145)
STJ: 2.64 CM
TDI LATERAL: 0.09 M/S
TDI SEPTAL: 0.05 M/S
TDI: 0.07 M/S
TR MAX PG: 40 MMHG
TRICUSPID ANNULAR PLANE SYSTOLIC EXCURSION: 1.44 CM
TRIGL SERPL-MCNC: 85 MG/DL (ref 30–150)
TSH SERPL DL<=0.005 MIU/L-ACNC: 3 UIU/ML (ref 0.4–4)
TV REST PULMONARY ARTERY PRESSURE: 43 MMHG
WBC # BLD AUTO: 7.69 K/UL (ref 3.9–12.7)
WBC # BLD AUTO: 7.69 K/UL (ref 3.9–12.7)

## 2022-01-03 PROCEDURE — 27000221 HC OXYGEN, UP TO 24 HOURS: Mod: HCNC

## 2022-01-03 PROCEDURE — 96366 THER/PROPH/DIAG IV INF ADDON: CPT

## 2022-01-03 PROCEDURE — 36415 COLL VENOUS BLD VENIPUNCTURE: CPT | Mod: HCNC | Performed by: NURSE PRACTITIONER

## 2022-01-03 PROCEDURE — 94761 N-INVAS EAR/PLS OXIMETRY MLT: CPT | Mod: HCNC

## 2022-01-03 PROCEDURE — 83735 ASSAY OF MAGNESIUM: CPT | Mod: HCNC | Performed by: NURSE PRACTITIONER

## 2022-01-03 PROCEDURE — 25500020 PHARM REV CODE 255: Mod: HCNC | Performed by: INTERNAL MEDICINE

## 2022-01-03 PROCEDURE — 25000003 PHARM REV CODE 250: Mod: HCNC | Performed by: NURSE PRACTITIONER

## 2022-01-03 PROCEDURE — 99223 1ST HOSP IP/OBS HIGH 75: CPT | Mod: HCNC,25,, | Performed by: INTERNAL MEDICINE

## 2022-01-03 PROCEDURE — 96376 TX/PRO/DX INJ SAME DRUG ADON: CPT

## 2022-01-03 PROCEDURE — 21400001 HC TELEMETRY ROOM: Mod: HCNC

## 2022-01-03 PROCEDURE — 84443 ASSAY THYROID STIM HORMONE: CPT | Mod: HCNC | Performed by: NURSE PRACTITIONER

## 2022-01-03 PROCEDURE — 99900035 HC TECH TIME PER 15 MIN (STAT): Mod: HCNC

## 2022-01-03 PROCEDURE — 85610 PROTHROMBIN TIME: CPT | Mod: HCNC | Performed by: NURSE PRACTITIONER

## 2022-01-03 PROCEDURE — 85730 THROMBOPLASTIN TIME PARTIAL: CPT | Mod: 91,HCNC | Performed by: INTERNAL MEDICINE

## 2022-01-03 PROCEDURE — 63600175 PHARM REV CODE 636 W HCPCS: Mod: HCNC | Performed by: NURSE PRACTITIONER

## 2022-01-03 PROCEDURE — 96365 THER/PROPH/DIAG IV INF INIT: CPT | Mod: 59

## 2022-01-03 PROCEDURE — 63600175 PHARM REV CODE 636 W HCPCS: Mod: HCNC | Performed by: INTERNAL MEDICINE

## 2022-01-03 PROCEDURE — 80053 COMPREHEN METABOLIC PANEL: CPT | Mod: HCNC | Performed by: NURSE PRACTITIONER

## 2022-01-03 PROCEDURE — 36415 COLL VENOUS BLD VENIPUNCTURE: CPT | Mod: HCNC | Performed by: INTERNAL MEDICINE

## 2022-01-03 PROCEDURE — 97167 OT EVAL HIGH COMPLEX 60 MIN: CPT | Mod: HCNC

## 2022-01-03 PROCEDURE — 85730 THROMBOPLASTIN TIME PARTIAL: CPT | Mod: HCNC | Performed by: NURSE PRACTITIONER

## 2022-01-03 PROCEDURE — 97162 PT EVAL MOD COMPLEX 30 MIN: CPT | Mod: HCNC

## 2022-01-03 PROCEDURE — 99223 PR INITIAL HOSPITAL CARE,LEVL III: ICD-10-PCS | Mod: HCNC,25,, | Performed by: INTERNAL MEDICINE

## 2022-01-03 PROCEDURE — 25000003 PHARM REV CODE 250: Mod: HCNC | Performed by: INTERNAL MEDICINE

## 2022-01-03 PROCEDURE — 85025 COMPLETE CBC W/AUTO DIFF WBC: CPT | Mod: HCNC | Performed by: NURSE PRACTITIONER

## 2022-01-03 PROCEDURE — 97530 THERAPEUTIC ACTIVITIES: CPT | Mod: HCNC

## 2022-01-03 PROCEDURE — 83036 HEMOGLOBIN GLYCOSYLATED A1C: CPT | Mod: HCNC | Performed by: NURSE PRACTITIONER

## 2022-01-03 PROCEDURE — 11000001 HC ACUTE MED/SURG PRIVATE ROOM: Mod: HCNC

## 2022-01-03 PROCEDURE — 80061 LIPID PANEL: CPT | Mod: HCNC | Performed by: NURSE PRACTITIONER

## 2022-01-03 PROCEDURE — 96367 TX/PROPH/DG ADDL SEQ IV INF: CPT

## 2022-01-03 PROCEDURE — 87040 BLOOD CULTURE FOR BACTERIA: CPT | Mod: HCNC | Performed by: NURSE PRACTITIONER

## 2022-01-03 RX ORDER — FUROSEMIDE 10 MG/ML
40 INJECTION INTRAMUSCULAR; INTRAVENOUS DAILY
Status: DISCONTINUED | OUTPATIENT
Start: 2022-01-04 | End: 2022-01-04

## 2022-01-03 RX ORDER — POTASSIUM CHLORIDE 20 MEQ/1
40 TABLET, EXTENDED RELEASE ORAL 2 TIMES DAILY
Status: DISCONTINUED | OUTPATIENT
Start: 2022-01-03 | End: 2022-01-04

## 2022-01-03 RX ORDER — CEPHALEXIN 500 MG/1
500 CAPSULE ORAL EVERY 6 HOURS
Status: DISCONTINUED | OUTPATIENT
Start: 2022-01-03 | End: 2022-01-05 | Stop reason: HOSPADM

## 2022-01-03 RX ADMIN — SODIUM CHLORIDE: 0.9 INJECTION, SOLUTION INTRAVENOUS at 11:01

## 2022-01-03 RX ADMIN — POTASSIUM CHLORIDE 40 MEQ: 1500 TABLET, EXTENDED RELEASE ORAL at 09:01

## 2022-01-03 RX ADMIN — MICONAZOLE NITRATE: 20 POWDER TOPICAL at 10:01

## 2022-01-03 RX ADMIN — MICONAZOLE NITRATE: 20 POWDER TOPICAL at 12:01

## 2022-01-03 RX ADMIN — FAMOTIDINE 20 MG: 20 TABLET ORAL at 10:01

## 2022-01-03 RX ADMIN — HUMAN ALBUMIN MICROSPHERES AND PERFLUTREN 0.11 MG: 10; .22 INJECTION, SOLUTION INTRAVENOUS at 11:01

## 2022-01-03 RX ADMIN — FAMOTIDINE 20 MG: 20 TABLET ORAL at 12:01

## 2022-01-03 RX ADMIN — TRAZODONE HYDROCHLORIDE 25 MG: 50 TABLET ORAL at 12:01

## 2022-01-03 RX ADMIN — CEPHALEXIN 500 MG: 500 CAPSULE ORAL at 06:01

## 2022-01-03 RX ADMIN — CEFEPIME HYDROCHLORIDE 1 G: 1 INJECTION, SOLUTION INTRAVENOUS at 11:01

## 2022-01-03 RX ADMIN — HYDROCODONE BITARTRATE AND ACETAMINOPHEN 1 TABLET: 5; 325 TABLET ORAL at 12:01

## 2022-01-03 RX ADMIN — LEVOTHYROXINE SODIUM 50 MCG: 50 TABLET ORAL at 05:01

## 2022-01-03 RX ADMIN — ATORVASTATIN CALCIUM 40 MG: 40 TABLET, FILM COATED ORAL at 10:01

## 2022-01-03 RX ADMIN — FUROSEMIDE 40 MG: 10 INJECTION, SOLUTION INTRAMUSCULAR; INTRAVENOUS at 10:01

## 2022-01-03 RX ADMIN — FAMOTIDINE 20 MG: 20 TABLET ORAL at 09:01

## 2022-01-03 RX ADMIN — MICONAZOLE NITRATE: 20 POWDER TOPICAL at 09:01

## 2022-01-03 RX ADMIN — TRAZODONE HYDROCHLORIDE 25 MG: 50 TABLET ORAL at 09:01

## 2022-01-03 RX ADMIN — VANCOMYCIN HYDROCHLORIDE 2000 MG: 500 INJECTION, POWDER, LYOPHILIZED, FOR SOLUTION INTRAVENOUS at 01:01

## 2022-01-03 RX ADMIN — HEPARIN SODIUM 14 UNITS/KG/HR: 1000 INJECTION, SOLUTION INTRAVENOUS; SUBCUTANEOUS at 12:01

## 2022-01-03 RX ADMIN — HEPARIN SODIUM 18 UNITS/KG/HR: 1000 INJECTION, SOLUTION INTRAVENOUS; SUBCUTANEOUS at 01:01

## 2022-01-03 RX ADMIN — POTASSIUM CHLORIDE 40 MEQ: 1500 TABLET, EXTENDED RELEASE ORAL at 12:01

## 2022-01-03 RX ADMIN — SENNOSIDES AND DOCUSATE SODIUM 1 TABLET: 50; 8.6 TABLET ORAL at 10:01

## 2022-01-03 NOTE — ASSESSMENT & PLAN NOTE
X1 month per pt - seen by outpatient wound care  Wound care consulted  BC x2 pending  Continue IV vancomycin/cefepime for now  BLE doppler u/s pending

## 2022-01-03 NOTE — ASSESSMENT & PLAN NOTE
X1 month per pt - seen by outpatient wound care  Wound care consulted  BC x2 pending  Continue IV vancomycin/cefepime for now  BLE doppler u/s pending      1/3  De-Escalate Vanco and Cefepime to a Cephalosprin  Will personally see the limb when unwrapped  Blood culture from his most recent admission was not positive  Likely chronic stasis

## 2022-01-03 NOTE — PT/OT/SLP EVAL
Occupational Therapy   Evaluation    Name: Gene Rothman  MRN: [id number]  Admitting Diagnosis:  Pulmonary edema  Recent Surgery: * No surgery found *      Recommendations:     Discharge Recommendations:    Discharge Equipment Recommendations:  none  Barriers to discharge:       Assessment:     Gene Rothman is a 76 y.o. male with a medical diagnosis of Pulmonary edema.  He presents with DEBILITY AND GENERALIZED WEAKNESS. Performance deficits affecting function: weakness,impaired balance,decreased safety awareness,impaired endurance,impaired self care skills,impaired functional mobilty,decreased upper extremity function,decreased lower extremity function,gait instability.      Rehab Prognosis: Fair; patient would benefit from acute skilled OT services to address these deficits and reach maximum level of function.       Plan:     Patient to be seen 2 x/week to address the above listed problems via self-care/home management,therapeutic activities,therapeutic exercises  · Plan of Care Expires:    · Plan of Care Reviewed with: patient    Subjective     Chief Complaint: DEBILITY AND GENERALIZED WEAKNESS  Patient/Family Comments/goals:     Occupational Profile:  Living Environment: LIVES SPOUSE   Previous level of function: MOD (i) WITH ROLLATOR OR WHEELCHAIR  Roles and Routines: occupational therapy  Equipment Used at Home:  hospital bed,wheelchair,walker, standard,rollator  Assistance upon Discharge:     Pain/Comfort:  · Pain Rating 1: 10/10  · Location - Orientation 1: upper  · Location 1: arm    Patients cultural, spiritual, Hinduism conflicts given the current situation:      Objective:     Communicated with: NURSE SAIRA AND [facility] CHART REVIEW prior to session.  Patient found HOB elevated with peripheral IV upon OT entry to room.    General Precautions: Standard, fall   Orthopedic Precautions:N/A   Braces: N/A  Occupational Performance:    Bed Mobility:    · Patient completed Rolling/Turning to Left with  total  assistance  · Patient completed Rolling/Turning to Right with total assistance  · Patient completed Scooting/Bridging with total assistance    Activities of Daily Living:  · Upper Body Dressing: maximal assistance .    Cognitive/Visual Perceptual:  Cognitive/Psychosocial Skills:  -       Oriented to: Person, Place, Time and Situation   -       Follows Commands/attention:Follows one-step commands  -       Communication: clear/fluent  -       Memory: No Deficits noted  -       Safety awareness/insight to disability: impaired     Physical Exam:  Upper Extremity Range of Motion:     -       Right Upper Extremity: NO SHOULDER FLEXION DUE TO 10/10 PAIN AND PROM ELBOW FLEX/EXT, WRIST FLEX/EXT  -       Left Upper Extremity: WFL  Upper Extremity Strength:    -       Right Upper Extremity: NOT TESTED  -       Left Upper Extremity: MMT: 3/5 GROSSLY   Strength:    -       Right Upper Extremity: NOT TESTED  -       Left Upper Extremity: MMT: 3/5 GROSSLY    AMPAC 6 Click ADL:  AMPAC Total Score: 8    Treatment & Education:  O.T. EVAL COMPLETED. PT EDUCATED ON O.T. POC. PT DISPLAYED DEFICITS WITH ADL'S AND BED MOBILITY AS WELL AS DECREASE B UE STRENGTH/ENDURANCE. SEE EVAL FOR DETAILS Tx: PT EDUCATED ON IMPORTANCE OF THERAPY AND PARTICIPATING WITH TX SESSION. PT RECEIVED 1 SET X  X 5 REPS P/AAROM SHOULDER FLEXION/  Education:  ADDUCTION/ABDUCTION. PT LIMITED R UE SHOULDER ROM DUE TO INCREASE PAIN. PT EDUCATED HEP OF R UE ELBOW FLEX/EXT AND WRIST FLEX/EXT AND HAND/DIGITS FLEX/ EXT  Patient left up in chair with all lines intact, call button in reach and NURSE SAIRA notified    GOALS:   Multidisciplinary Problems     Occupational Therapy Goals        Problem: Occupational Therapy Goal    Goal Priority Disciplines Outcome Interventions   Occupational Therapy Goal     OT, PT/OT     Description: OT GOALS TO BE MET BY 1-17-22   MOD A WITH ROLLING L<>R  MOD A WITH SITTING EOB   PT WILL TOLERATE 1 SET X 10 REPS R UE GENTLE ROM  EXERCISE AND L UE ROM EXERCISE  MOD A WITH UE DRESSING                   History:     Past Medical History:   Diagnosis Date    CHF (congestive heart failure)     Coronary artery disease        No past surgical history on file.    Time Tracking:     OT Date of Treatment: 01/03/22  OT Start Time: 1035  OT Stop Time: 1100  OT Total Time (min): 25 min    Billable Minutes:Evaluation 15 MINUTES  Therapeutic Activity 10 MINUTES    1/3/2022

## 2022-01-03 NOTE — ASSESSMENT & PLAN NOTE
Suspicion for new onset CHF - no previous ECHO available on chart  Cxray with pulmonary vascular congestion; BLE increased edema, BNP 27  Cardiology consulted  Continue IV lasix pending cardiology input  ECHO pending  Strict I&Os  Daily weights  Cardiac diet

## 2022-01-03 NOTE — CONSULTS
Patient recently discharged from [facility].  Patient does not qualify for inpatient rehab at this time.

## 2022-01-03 NOTE — ASSESSMENT & PLAN NOTE
Creatinine at 1.6 in ED - baseline appears to be around 1.1-1.4  Monitor renal function labs closely in light of diuresis  Avoid nephrotoxins as able  Monitor I&Os  Consider nephrology consult for worsening renal function

## 2022-01-03 NOTE — SUBJECTIVE & OBJECTIVE
Past Medical History:   Diagnosis Date    CHF (congestive heart failure)     Coronary artery disease        No past surgical history on file.    Review of patient's allergies indicates:  No Known Allergies    No current facility-administered medications on file prior to encounter.     Current Outpatient Medications on File Prior to Encounter   Medication Sig    acetaminophen (TYLENOL) 500 MG tablet Take 500 mg by mouth every 6 (six) hours as needed for Pain.    atorvastatin (LIPITOR) 40 MG tablet Take 40 mg by mouth once daily.    famotidine (PEPCID) 20 MG tablet Take 20 mg by mouth 2 (two) times daily.    HYDROcodone-acetaminophen (NORCO) 5-325 mg per tablet Take 1 tablet by mouth every 6 (six) hours as needed for Pain.    levothyroxine (SYNTHROID) 50 MCG tablet Take 50 mcg by mouth before breakfast.    miconazole NITRATE 2 % (MICOTIN) 2 % top powder Apply topically 2 (two) times daily.    polyethylene glycol (GLYCOLAX) 17 gram PwPk Take 17 g by mouth 2 (two) times daily.    senna-docusate 8.6-50 mg (SENNA WITH DOCUSATE SODIUM) 8.6-50 mg per tablet Take 1 tablet by mouth once daily.    traZODone (DESYREL) 50 MG tablet Take 25 mg by mouth every evening.     Family History    Reviewed and not pertinent       Tobacco Use    Smoking status: Never Smoker    Smokeless tobacco: Not on file   Substance and Sexual Activity    Alcohol use: Not Currently    Drug use: Never    Sexual activity: Not on file     Review of Systems   Constitutional: Positive for activity change (decreased d/t weakness & R wrist pain). Negative for chills, diaphoresis, fatigue and fever.   HENT: Positive for hearing loss (bilateral). Negative for congestion, trouble swallowing and voice change.    Eyes: Negative for photophobia and discharge.   Respiratory: Positive for shortness of breath. Negative for cough, chest tightness and wheezing.    Cardiovascular: Positive for leg swelling (Chronic lymphedema but worsened). Negative for  chest pain and palpitations.   Gastrointestinal: Negative for abdominal pain, blood in stool, constipation, diarrhea, nausea and vomiting.   Endocrine: Negative for cold intolerance and heat intolerance.   Genitourinary: Negative for difficulty urinating, dysuria, flank pain, frequency and urgency.   Musculoskeletal: Positive for arthralgias (R wrist carpal tunnel syndrome - brace in place). Negative for back pain, joint swelling and myalgias.   Skin: Positive for rash (BLE cellulitis / yeast to abdomen/groin/perineal folds) and wound (L calf, R ankle).   Neurological: Positive for weakness (generalized). Negative for dizziness, seizures, syncope, facial asymmetry, light-headedness, numbness and headaches.   Psychiatric/Behavioral: Negative for confusion and hallucinations.     Objective:     Vital Signs (Most Recent):  Temp: 98 °F (36.7 °C) (01/02/22 1800)  Pulse: 75 (01/02/22 1925)  Resp: (!) 24 (01/02/22 1800)  BP: (!) 165/74 (01/02/22 1902)  SpO2: 95 % (01/02/22 1925) Vital Signs (24h Range):  Temp:  [98 °F (36.7 °C)] 98 °F (36.7 °C)  Pulse:  [65-93] 75  Resp:  [18-24] 24  SpO2:  [83 %-97 %] 95 %  BP: (121-165)/() 165/74     Weight: (!) 185.8 kg (409 lb 9.8 oz)  Body mass index is 57.13 kg/m².    Physical Exam  Vitals reviewed.   Constitutional:       General: He is not in acute distress.     Appearance: Normal appearance. He is obese. He is ill-appearing. He is not toxic-appearing.      Comments: Morbid    HENT:      Head: Normocephalic and atraumatic.      Ears:      Comments: Bilateral [place]     Nose: Nose normal. No congestion.      Mouth/Throat:      Mouth: Mucous membranes are moist.   Eyes:      General: No scleral icterus.     Pupils: Pupils are equal, round, and reactive to light.   Cardiovascular:      Rate and Rhythm: Normal rate and regular rhythm.      Pulses: Normal pulses.      Heart sounds: Normal heart sounds.   Pulmonary:      Effort: Pulmonary effort is normal. No respiratory distress.       Breath sounds: Decreased breath sounds (throughout - large body habitus) present. No wheezing or rhonchi.   Abdominal:      General: Abdomen is flat. Bowel sounds are normal.      Palpations: Abdomen is soft.   Musculoskeletal:         General: Tenderness (R wrist with brace - carpal tunnel syndrome per pt) present. Normal range of motion.      Cervical back: Normal range of motion and neck supple. No tenderness.      Right lower leg: 3+ Pitting Edema present.      Left lower leg: 3+ Pitting Edema present.   Skin:     General: Skin is warm and dry.      Capillary Refill: Capillary refill takes less than 2 seconds.      Coloration: Skin is pale.      Findings: Erythema (BLE cellulitis) and rash (Appears to be yeast - abdomen/groin folds, perineum) present.             Comments: BLE cellulitis - 1 month per pt   Neurological:      General: No focal deficit present.      Mental Status: He is alert and oriented to person, place, and time.      Motor: Weakness (generalized) present.   Psychiatric:         Mood and Affect: Mood normal.         Behavior: Behavior normal.         Thought Content: Thought content normal.         Judgment: Judgment normal.           CRANIAL NERVES     CN III, IV, VI   Pupils are equal, round, and reactive to light.       Significant Labs:   Results for orders placed or performed during the hospital encounter of 01/02/22   CBC auto differential   Result Value Ref Range    WBC 6.74 3.90 - 12.70 K/uL    RBC 4.95 4.60 - 6.20 M/uL    Hemoglobin 15.6 14.0 - 18.0 g/dL    Hematocrit 49.6 40.0 - 54.0 %     (H) 82 - 98 fL    MCH 31.5 (H) 27.0 - 31.0 pg    MCHC 31.5 (L) 32.0 - 36.0 g/dL    RDW 14.4 11.5 - 14.5 %    Platelets 147 (L) 150 - 450 K/uL    MPV 10.1 9.2 - 12.9 fL    Immature Granulocytes 0.4 0.0 - 0.5 %    Gran # (ANC) 3.7 1.8 - 7.7 K/uL    Immature Grans (Abs) 0.03 0.00 - 0.04 K/uL    Lymph # 1.9 1.0 - 4.8 K/uL    Mono # 0.8 0.3 - 1.0 K/uL    Eos # 0.3 0.0 - 0.5 K/uL    Baso # 0.05  0.00 - 0.20 K/uL    nRBC 0 0 /100 WBC    Gran % 54.6 38.0 - 73.0 %    Lymph % 27.6 18.0 - 48.0 %    Mono % 11.7 4.0 - 15.0 %    Eosinophil % 5.0 0.0 - 8.0 %    Basophil % 0.7 0.0 - 1.9 %    Differential Method Automated    Comprehensive metabolic panel   Result Value Ref Range    Sodium 137 136 - 145 mmol/L    Potassium 3.5 3.5 - 5.1 mmol/L    Chloride 100 95 - 110 mmol/L    CO2 29 23 - 29 mmol/L    Glucose 114 (H) 70 - 110 mg/dL    BUN 22 8 - 23 mg/dL    Creatinine 1.6 (H) 0.5 - 1.4 mg/dL    Calcium 8.6 (L) 8.7 - 10.5 mg/dL    Total Protein 7.9 6.0 - 8.4 g/dL    Albumin 3.0 (L) 3.5 - 5.2 g/dL    Total Bilirubin 0.5 0.1 - 1.0 mg/dL    Alkaline Phosphatase 88 55 - 135 U/L    AST 31 10 - 40 U/L    ALT 21 10 - 44 U/L    Anion Gap 8 8 - 16 mmol/L    eGFR if African American 47 (A) >60 mL/min/1.73 m^2    eGFR if non African American 41 (A) >60 mL/min/1.73 m^2   C-Reactive Protein   Result Value Ref Range    CRP 8.5 (H) 0.0 - 8.2 mg/L   Ferritin   Result Value Ref Range    Ferritin 212 20.0 - 300.0 ng/mL   Lactate Dehydrogenase   Result Value Ref Range     (H) 110 - 260 U/L   CK   Result Value Ref Range    CPK 44 20 - 200 U/L   Lactic Acid, Plasma   Result Value Ref Range    Lactate (Lactic Acid) 1.7 0.5 - 2.2 mmol/L   Troponin I   Result Value Ref Range    Troponin I 0.022 0.000 - 0.026 ng/mL   Brain Natriuretic Peptide   Result Value Ref Range    BNP 27 0 - 99 pg/mL   D dimer, quantitative   Result Value Ref Range    D-Dimer 11.28 (H) <0.50 mg/L FEU   POCT COVID-19 Rapid Screening   Result Value Ref Range    POC Rapid COVID Negative Negative     Acceptable Yes    POCT Influenza A/B Molecular   Result Value Ref Range    POC Molecular Influenza A Ag Negative Negative, Not Reported    POC Molecular Influenza B Ag Negative Negative, Not Reported     Acceptable Yes          Significant Imaging:   Imaging Results          NM Lung Scan Ventilation Perfusion (Final result)  Result time  01/02/22 23:05:41    Final result by Cy Ureña MD (01/02/22 23:05:41)                 Impression:      This represents a intermediate probability  of pulmonary embolism.      Electronically signed by: Cy Ureña  Date:    01/02/2022  Time:    23:05             Narrative:    EXAMINATION:  NM LUNG VENTILATION AND PERFUSION IMAGING    CLINICAL HISTORY:  Pulmonary embolism (PE) suspected, positive D-dimer;    TECHNIQUE:  29.97 mCi of Tc-99m-DTPA were placed in the nebulizer. Following the inhalation Tc-99m-DTPA in aerosol and the subsequent IV administration of 5.05 mCi of Tc-99m-MAA, multiple images of the thorax were obtained in various projections.    COMPARISON:  None.    FINDINGS:  Perfusion: Subsegmental perfusion defects seen in the left lung likely matched.    Ventilation: Multifocal ventilation defects with clumping of the radiotracer in the airways which degrades the exam..    CXR no focal airspace opacities..                               X-Ray Chest AP Portable (Final result)  Result time 01/02/22 15:50:54    Final result by Ramirez Dorsey Jr., MD (01/02/22 15:50:54)                 Impression:      Pulmonary vascular congestion and cardiomegaly.      Electronically signed by: Ramirez Dorsey Jr., MD  Date:    01/02/2022  Time:    15:50             Narrative:    EXAMINATION:  XR CHEST AP PORTABLE    CLINICAL HISTORY:  COVID-19;    COMPARISON:  None.    FINDINGS:  Low lung volumes.  Pulmonary vascular congestion.  No pleural fluid or pneumothorax.  The heart is likely enlarged.  No significant bony findings.                                Results for orders placed or performed during the hospital encounter of 01/02/22   EKG 12-lead    Collection Time: 01/02/22  3:31 PM    Narrative    Test Reason : U07.1,    Vent. Rate : 078 BPM     Atrial Rate : 078 BPM     P-R Int : 364 ms          QRS Dur : 108 ms      QT Int : 392 ms       P-R-T Axes : 085 013 046 degrees     QTc Int : 446 ms    Sinus rhythm with  1st degree A-V block  ST and T wave abnormality, consider anterior ischemia  Abnormal ECG  No previous ECGs available    Referred By: [email or web address]   SELF           Confirmed By:

## 2022-01-03 NOTE — CONSULTS
Consulted on this 76 M for BLE cellulitis. Upon assessment, patient has BLE unna boots in place. Unna boots dry and intact - patient states that home health changes them on Tuesday/Thursday and they were changed on Thursday. Will leave unna boots in place until tomorrow. Patient states that he has very minimal feeling below his knees and that is his baseline. Capillary refill to bilateral toes is 5-6 seconds. Abdominal folds moist - cleaned with bath wipes and placed interdry in abdominal folds. Stage 2 pressure injury to gluteal cleft measuring 2cm x 0.5cm. Wound bed red and moist, slough noted. Cleaned with saline and applied foam dressing. DTI noted to patient's right buttock. Changed linens and ultrasorb pad placed under patient. Patient turned to right side when consult complete.

## 2022-01-03 NOTE — PROGRESS NOTES
[facility] Medicine  Progress Note    Patient Name: Gene Rothman  MRN: [id number]  Patient Class: OP- Observation   Admission Date: 1/2/2022  Length of Stay: 0 days  Attending Physician: Samuel Pantoja MD  Primary Care Provider: Amy Lopez MD        Subjective:     Principal Problem:Pulmonary edema        HPI:  75 y/o WM with hx of OCVID-19, cardiomegaly, [place], hypothyroidism, HLD, arthritis, R wrist carpal tunnel syndrome, GERD, chronic lymphedema, cellulitis, CKD3 to ED with c/o gradually increasing SOB and BLE edema over the previous several weeks. He reports he was admitted here from 12/03/2021-12/14/2021 for cellulitis, sepsis and BETY, then discharged to [place] from 12/14/2021-12/19/2021 until he tested positive for COVID and was sent back to the [facility] ED - he was then sent home from the ED after being deemed stable, but reports that his respiratory status has continued to decline. Symptoms are progressive and of moderate severity, without known mitigating or exacerbating factors. Denies chest pain, palpitations, wheezing, abdominal pain, N/V/D, constipation, dysuria, flank pain, HA, dizziness, fever, cough, falls, blurred vision. Found in ED to have BP of 165/74, O2 saturation of 83% and RR of 24 - pt was afebrile; WBCs 6.74, H&H 15.6&49.6, platelets 147, ferritin 212, Ddimer 11.28, Na 137, K+ 3.5, BUN 22, creatinine 1.6, GFR 41, , CRP 8.5, BNP 27, CPK 44, , troponin negative, lactic acid 1.7; EKG showed NSR with 1st degree AV block (78 BPM); COVID-19 negative, flu negative, Cxray showed pulmonary vascular congestion and cardiomegaly; VQ scan showed intermediate probability for PE. In ED the pt was given 40mg IV lasix and placed on O2 at 2L per NC, with improvement in BP to 130/67, O2 saturation to 95% and RR to 20. Hospital medicine was called and the pt was placed in observation with telemetry monitoring. Pt is a full code - surrogate decision maker is his son, Solo  Gene.       Overview/Hospital Course:  1/3 Patient with a different MRN and  1945 and MRN [id number]. Under these indices he was admitted at [facility] from 12/3/21 to 21 then discharged to [place].   Apparently at [place] he tested positive to Covid and was discharged from there to this ER on 21 to get Regeneron (it looks like it was set up for outpatient), then sent home.  He returned 22 for Dyspnea. Dimer elevated 11. VQ scan intermediate probability. Covid is negative this time.    Met on 2L supplemental O2       Interval History: no interval events    Review of Systems   Constitutional: Negative for fatigue and fever.   HENT: Negative for drooling, nosebleeds, tinnitus and trouble swallowing.    Respiratory: Positive for cough and shortness of breath.    Cardiovascular: Positive for leg swelling. Negative for chest pain.        Chronic leg swelling   Gastrointestinal: Negative for constipation and nausea.   Genitourinary: Negative for flank pain, frequency and urgency.   Musculoskeletal: Negative for arthralgias.   Skin:        Chronic stasis dermatitis both Lower extremities   Neurological: Negative for tremors, seizures, syncope and numbness.        Baseline wheelchair bound with very little full assistance ambulation possible   Psychiatric/Behavioral: Negative for agitation and behavioral problems.     Objective:     Vital Signs (Most Recent):  Temp: 97.5 °F (36.4 °C) (22)  Pulse: 72 (22)  Resp: 18 (22)  BP: (!) 123/58 (22)  SpO2: (!) 92 % (rn obtained) (22) Vital Signs (24h Range):  Temp:  [97.4 °F (36.3 °C)-98 °F (36.7 °C)] 97.5 °F (36.4 °C)  Pulse:  [65-93] 72  Resp:  [17-24] 18  SpO2:  [83 %-97 %] 92 %  BP: (121-165)/() 123/58     Weight: (!) 189.9 kg (418 lb 10.5 oz)  Body mass index is 58.39 kg/m².    Intake/Output Summary (Last 24 hours) at 1/3/2022 1200  Last data filed at 1/3/2022 1105  Gross per 24 hour   Intake 864.33 ml    Output 500 ml   Net 364.33 ml      Physical Exam  Vitals reviewed.   Constitutional:       General: He is not in acute distress.     Appearance: He is obese. He is ill-appearing. He is not toxic-appearing or diaphoretic.   HENT:      Head: Normocephalic and atraumatic.      Nose: Nose normal.      Mouth/Throat:      Mouth: Mucous membranes are moist.   Eyes:      General: No scleral icterus.     Pupils: Pupils are equal, round, and reactive to light.   Cardiovascular:      Rate and Rhythm: Normal rate and regular rhythm.      Pulses: Normal pulses.      Heart sounds: No murmur heard.      Pulmonary:      Effort: Pulmonary effort is normal. No respiratory distress.      Breath sounds: No stridor. No wheezing or rhonchi.   Abdominal:      General: Abdomen is flat. There is no distension.      Palpations: Abdomen is soft. There is no mass.      Tenderness: There is no abdominal tenderness. There is no guarding.   Musculoskeletal:      Cervical back: Normal range of motion.      Comments: Both lower extremities wrapped, visible parts chronic stasis dermatitis seen   Skin:     General: Skin is warm.      Comments: Both lower extremities wrapped   Neurological:      Mental Status: He is alert and oriented to person, place, and time. Mental status is at baseline.   Psychiatric:         Mood and Affect: Mood normal.         Significant Labs:   All pertinent labs within the past 24 hours have been reviewed.  CBC:   Recent Labs   Lab 01/02/22  1736 01/03/22  0831   WBC 6.74 7.69  7.69   HGB 15.6 15.0  15.0   HCT 49.6 46.7  46.7   * 153  153     CMP:   Recent Labs   Lab 01/02/22  1628 01/03/22  0831    139   K 3.5 3.0*    98   CO2 29 29   * 90   BUN 22 22   CREATININE 1.6* 1.5*   CALCIUM 8.6* 8.6*   PROT 7.9 7.6   ALBUMIN 3.0* 2.9*   BILITOT 0.5 0.9   ALKPHOS 88 94   AST 31 18   ALT 21 17   ANIONGAP 8 12   EGFRNONAA 41* 44*     Cardiac Markers:   Recent Labs   Lab 01/02/22  1755   BNP 27      Coagulation:   Recent Labs   Lab 22  0004 22  0004 22  0830   INR 1.0  --   --    APTT 26.8   < > 95.5*    < > = values in this interval not displayed.       Significant Imaging: as below   CXR:    FINDINGS:   Low lung volumes.  Pulmonary vascular congestion.  No pleural fluid or pneumothorax.  The heart is likely enlarged.  No significant bony findings.    Impression:       Pulmonary vascular congestion and cardiomegaly.          VQ scan:    This represents a intermediate probability  of pulmonary embolism.     Doppler veins 22:  1.  Findings most likely representing chronic recannulated nonocclusive clot in the right common femoral vein.  The right common femoral vein is partially compressible and there is flow surrounding the heterogeneous material in the common femoral vein.     2.  Negative for ultrasound evidence for acute DVT otherwise.    Arterial Doppler from            Assessment/Plan:      * Pulmonary edema  Suspicion for new onset CHF - no previous ECHO available on chart  Cxray with pulmonary vascular congestion; BLE increased edema, BNP 27  Cardiology consulted  Continue IV lasix pending cardiology input  ECHO pending  Strict I&Os  Daily weights  Cardiac diet       1/3  ECHO from  21 using the MRN and  stated above:  · The left ventricle is normal in size with mild concentric hypertrophy and normal systolic function.  · Normal left ventricular diastolic function.  · The estimated PA systolic pressure is 48 mmHg.  · Normal right ventricular size with normal right ventricular systolic function.  · There is pulmonary hypertension.  · Intermediate central venous pressure (8 mmHg).  · The estimated ejection fraction is 55%.  · Mild mitral regurgitation.  · Mild tricuspid regurgitation.    Reduce Lasix to 40 mg IV QD  Continue supplemental O2      Morbid obesity with BMI of 50.0-59.9, adult  Educated patient on health benefits of weight loss  Advised on appropriate  diet/exercise options for healthy weight loss        CKD (chronic kidney disease) stage 3, GFR 30-59 ml/min  Creatinine at 1.6 in ED - baseline appears to be around 1.1-1.4  Monitor renal function labs closely in light of diuresis  Avoid nephrotoxins as able  Monitor I&Os  Consider nephrology consult for worsening renal function       GERD (gastroesophageal reflux disease)  Continue home pepcid      Hyperlipidemia  Continue home statin  Lipid panel pending      Hypothyroidism  Resumed home synthroid  TSH pending      Functional quadriplegia  Likely secondary to BLE cellulitis, R wrist carpal tunnel syndrome and obesity  PT/OT to eval/treat  Fall precautions  Encourage weight loss  Social work consult for d/c planning and possible rehab    1/3  PT/OT as tolerated    Bilateral lower leg cellulitis  X1 month per pt - seen by outpatient wound care  Wound care consulted  BC x2 pending  Continue IV vancomycin/cefepime for now  BLE doppler u/s pending      1/3  De-Escalate Vanco and Cefepime to a Cephalosprin  Will personally see the limb when unwrapped  Blood culture from his most recent admission was not positive  Likely chronic stasis       Elevated d-dimer  Concern for pulmonary embolism (PE)  Unable to obtain CTA d/t renal function  VQ scan - intermediate probability for PE  Ddimer at 11.28 in ED  Continue titrating heparin gtt for now  ECHO pending to evaluate for R heart strain  Supplemental O2 prn   BLE venous doppler u/s pending      1/3  PE likely patient is morbidly obese, very little activity and was covid positive on 12/19/21  Cont Heparin drip, will transtion to an oral anticoagulation after 24-48 hours of heparin gtt, Will speak with Pharmacy about NAOC in morbidly obese  See what repeat ECHO says        VTE Risk Mitigation (From admission, onward)         Ordered     heparin 25,000 units in dextrose 5% (100 units/ml) IV bolus from bag - ADDITIONAL PRN BOLUS - 60 units/kg  As needed (PRN)        Question:   Heparin Infusion Adjustment (DO NOT MODIFY ANSWER)  Answer:  [email or web address]\Images\Pharmacy\HeparinInfusions\heparin HIGH INTENSITY nomogram for [facility] [id number]    01/02/22 2343     heparin 25,000 units in dextrose 5% (100 units/ml) IV bolus from bag - ADDITIONAL PRN BOLUS - 30 units/kg  As needed (PRN)        Question:  Heparin Infusion Adjustment (DO NOT MODIFY ANSWER)  Answer:  [email or web address]\epic\Images\Pharmacy\HeparinInfusions\heparin HIGH INTENSITY nomogram for [facility] [id number]    01/02/22 2343     heparin 25,000 units in dextrose 5% 250 mL (100 units/mL) infusion HIGH INTENSITY nomogram - [facility]  Continuous        Question Answer Comment   Heparin Infusion Adjustment (DO NOT MODIFY ANSWER) [email or web address]\epic\Images\Pharmacy\HeparinInfusions\heparin HIGH INTENSITY nomogram for [facility] [id number]    Begin at (in units/kg/hr) 18        01/02/22 2343     Place sequential compression device  Until discontinued         01/02/22 2130                Discharge Planning   JAMES:      Code Status: Full Code   Is the patient medically ready for discharge?:     Reason for patient still in hospital (select all that apply): Treatment                     Samuel Pantoja MD  Department of Hospital Medicine   [facility] - Med Surg

## 2022-01-03 NOTE — SUBJECTIVE & OBJECTIVE
Interval History: no interval events    Review of Systems   Constitutional: Negative for fatigue and fever.   HENT: Negative for drooling, nosebleeds, tinnitus and trouble swallowing.    Respiratory: Positive for cough and shortness of breath.    Cardiovascular: Positive for leg swelling. Negative for chest pain.        Chronic leg swelling   Gastrointestinal: Negative for constipation and nausea.   Genitourinary: Negative for flank pain, frequency and urgency.   Musculoskeletal: Negative for arthralgias.   Skin:        Chronic stasis dermatitis both Lower extremities   Neurological: Negative for tremors, seizures, syncope and numbness.        Baseline wheelchair bound with very little full assistance ambulation possible   Psychiatric/Behavioral: Negative for agitation and behavioral problems.     Objective:     Vital Signs (Most Recent):  Temp: 97.5 °F (36.4 °C) (01/03/22 1325)  Pulse: 83 (01/03/22 1325)  Resp: 18 (01/03/22 1325)  BP: 133/61 (01/03/22 1325)  SpO2: (!) 82 % (01/03/22 1325) Vital Signs (24h Range):  Temp:  [97.4 °F (36.3 °C)-98 °F (36.7 °C)] 97.5 °F (36.4 °C)  Pulse:  [65-93] 83  Resp:  [17-24] 18  SpO2:  [82 %-97 %] 82 %  BP: (121-165)/(58-74) 133/61     Weight: (!) 189.9 kg (418 lb 10.5 oz)  Body mass index is 58.39 kg/m².    Intake/Output Summary (Last 24 hours) at 1/3/2022 1506  Last data filed at 1/3/2022 1400  Gross per 24 hour   Intake 1104.33 ml   Output 600 ml   Net 504.33 ml      Physical Exam  Vitals reviewed.   Constitutional:       General: He is not in acute distress.     Appearance: He is obese. He is ill-appearing. He is not toxic-appearing or diaphoretic.   HENT:      Head: Normocephalic and atraumatic.      Nose: Nose normal.      Mouth/Throat:      Mouth: Mucous membranes are moist.   Eyes:      General: No scleral icterus.     Pupils: Pupils are equal, round, and reactive to light.   Cardiovascular:      Rate and Rhythm: Normal rate and regular rhythm.      Pulses: Normal pulses.       Heart sounds: No murmur heard.      Pulmonary:      Effort: Pulmonary effort is normal. No respiratory distress.      Breath sounds: No stridor. No wheezing or rhonchi.   Abdominal:      General: Abdomen is flat. There is no distension.      Palpations: Abdomen is soft. There is no mass.      Tenderness: There is no abdominal tenderness. There is no guarding.   Musculoskeletal:      Cervical back: Normal range of motion.      Comments: Both lower extremities wrapped, visible parts chronic stasis dermatitis seen   Skin:     General: Skin is warm.      Comments: Both lower extremities wrapped   Neurological:      Mental Status: He is alert and oriented to person, place, and time. Mental status is at baseline.   Psychiatric:         Mood and Affect: Mood normal.         Significant Labs:   All pertinent labs within the past 24 hours have been reviewed.  CBC:   Recent Labs   Lab 01/02/22  1736 01/03/22  0831   WBC 6.74 7.69  7.69   HGB 15.6 15.0  15.0   HCT 49.6 46.7  46.7   * 153  153     CMP:   Recent Labs   Lab 01/02/22  1628 01/03/22  0831    139   K 3.5 3.0*    98   CO2 29 29   * 90   BUN 22 22   CREATININE 1.6* 1.5*   CALCIUM 8.6* 8.6*   PROT 7.9 7.6   ALBUMIN 3.0* 2.9*   BILITOT 0.5 0.9   ALKPHOS 88 94   AST 31 18   ALT 21 17   ANIONGAP 8 12   EGFRNONAA 41* 44*     Cardiac Markers:   Recent Labs   Lab 01/02/22  1755   BNP 27     Coagulation:   Recent Labs   Lab 01/03/22  0004 01/03/22  0004 01/03/22  0830   INR 1.0  --   --    APTT 26.8   < > 95.5*    < > = values in this interval not displayed.       Significant Imaging: as below   CXR:    FINDINGS:   Low lung volumes.  Pulmonary vascular congestion.  No pleural fluid or pneumothorax.  The heart is likely enlarged.  No significant bony findings.    Impression:       Pulmonary vascular congestion and cardiomegaly.          VQ scan:    This represents a intermediate probability  of pulmonary embolism.     Doppler veins  1/2/22:  1.  Findings most likely representing chronic recannulated nonocclusive clot in the right common femoral vein.  The right common femoral vein is partially compressible and there is flow surrounding the heterogeneous material in the common femoral vein.     2.  Negative for ultrasound evidence for acute DVT otherwise.    Arterial Doppler from        Review of Systems   Constitutional: Negative for fatigue and fever.   HENT: Negative for drooling, nosebleeds, tinnitus and trouble swallowing.    Cardiovascular: Positive for leg swelling. Negative for chest pain.        Chronic leg swelling   Respiratory: Positive for cough and shortness of breath.    Skin:        Chronic stasis dermatitis both Lower extremities   Musculoskeletal: Negative for arthralgias.   Gastrointestinal: Negative for constipation and nausea.   Genitourinary: Negative for flank pain, frequency and urgency.   Neurological: Negative for numbness, seizures, syncope and tremors.        Baseline wheelchair bound with very little full assistance ambulation possible   Psychiatric/Behavioral: Negative for agitation and behavioral problems.       Physical Exam  Vitals reviewed.   Constitutional:       General: He is not in acute distress.     Appearance: He is obese. He is ill-appearing. He is not toxic-appearing or diaphoretic.   HENT:      Head: Normocephalic and atraumatic.      Nose: Nose normal.      Mouth/Throat:      Mouth: Mucous membranes are moist.   Eyes:      General: No scleral icterus.     Pupils: Pupils are equal, round, and reactive to light.   Cardiovascular:      Rate and Rhythm: Normal rate and regular rhythm.      Pulses: Normal pulses.      Heart sounds: No murmur heard.      Pulmonary:      Effort: Pulmonary effort is normal. No respiratory distress.      Breath sounds: No stridor. No wheezing or rhonchi.   Abdominal:      General: Abdomen is flat. There is no distension.      Palpations: Abdomen is soft. There is no mass.       Tenderness: There is no abdominal tenderness. There is no guarding.   Musculoskeletal:      Cervical back: Normal range of motion.      Comments: Both lower extremities wrapped, visible parts chronic stasis dermatitis seen   Skin:     General: Skin is warm.      Comments: Both lower extremities wrapped   Neurological:      Mental Status: He is alert and oriented to person, place, and time. Mental status is at baseline.   Psychiatric:         Mood and Affect: Mood normal.

## 2022-01-03 NOTE — ASSESSMENT & PLAN NOTE
Likely secondary to BLE cellulitis, R wrist carpal tunnel syndrome and obesity  PT/OT to eval/treat  Fall precautions  Encourage weight loss  Social work consult for d/c planning and possible rehab

## 2022-01-03 NOTE — ASSESSMENT & PLAN NOTE
Likely secondary to BLE cellulitis, R wrist carpal tunnel syndrome and obesity  PT/OT to eval/treat  Fall precautions  Encourage weight loss  Social work consult for d/c planning and possible rehab    1/3  PT/OT as tolerated

## 2022-01-03 NOTE — SUBJECTIVE & OBJECTIVE
Interval History: no interval events    Review of Systems   Constitutional: Negative for fatigue and fever.   HENT: Negative for drooling, nosebleeds, tinnitus and trouble swallowing.    Respiratory: Positive for cough and shortness of breath.    Cardiovascular: Positive for leg swelling. Negative for chest pain.        Chronic leg swelling   Gastrointestinal: Negative for constipation and nausea.   Genitourinary: Negative for flank pain, frequency and urgency.   Musculoskeletal: Negative for arthralgias.   Skin:        Chronic stasis dermatitis both Lower extremities   Neurological: Negative for tremors, seizures, syncope and numbness.        Baseline wheelchair bound with very little full assistance ambulation possible   Psychiatric/Behavioral: Negative for agitation and behavioral problems.     Objective:     Vital Signs (Most Recent):  Temp: 97.5 °F (36.4 °C) (01/03/22 0719)  Pulse: 72 (01/03/22 0719)  Resp: 18 (01/03/22 0719)  BP: (!) 123/58 (01/03/22 0719)  SpO2: (!) 92 % (rn obtained) (01/03/22 0719) Vital Signs (24h Range):  Temp:  [97.4 °F (36.3 °C)-98 °F (36.7 °C)] 97.5 °F (36.4 °C)  Pulse:  [65-93] 72  Resp:  [17-24] 18  SpO2:  [83 %-97 %] 92 %  BP: (121-165)/() 123/58     Weight: (!) 189.9 kg (418 lb 10.5 oz)  Body mass index is 58.39 kg/m².    Intake/Output Summary (Last 24 hours) at 1/3/2022 1200  Last data filed at 1/3/2022 1105  Gross per 24 hour   Intake 864.33 ml   Output 500 ml   Net 364.33 ml      Physical Exam  Vitals reviewed.   Constitutional:       General: He is not in acute distress.     Appearance: He is obese. He is ill-appearing. He is not toxic-appearing or diaphoretic.   HENT:      Head: Normocephalic and atraumatic.      Nose: Nose normal.      Mouth/Throat:      Mouth: Mucous membranes are moist.   Eyes:      General: No scleral icterus.     Pupils: Pupils are equal, round, and reactive to light.   Cardiovascular:      Rate and Rhythm: Normal rate and regular rhythm.       Pulses: Normal pulses.      Heart sounds: No murmur heard.      Pulmonary:      Effort: Pulmonary effort is normal. No respiratory distress.      Breath sounds: No stridor. No wheezing or rhonchi.   Abdominal:      General: Abdomen is flat. There is no distension.      Palpations: Abdomen is soft. There is no mass.      Tenderness: There is no abdominal tenderness. There is no guarding.   Musculoskeletal:      Cervical back: Normal range of motion.      Comments: Both lower extremities wrapped, visible parts chronic stasis dermatitis seen   Skin:     General: Skin is warm.      Comments: Both lower extremities wrapped   Neurological:      Mental Status: He is alert and oriented to person, place, and time. Mental status is at baseline.   Psychiatric:         Mood and Affect: Mood normal.         Significant Labs:   All pertinent labs within the past 24 hours have been reviewed.  CBC:   Recent Labs   Lab 01/02/22  1736 01/03/22  0831   WBC 6.74 7.69  7.69   HGB 15.6 15.0  15.0   HCT 49.6 46.7  46.7   * 153  153     CMP:   Recent Labs   Lab 01/02/22  1628 01/03/22  0831    139   K 3.5 3.0*    98   CO2 29 29   * 90   BUN 22 22   CREATININE 1.6* 1.5*   CALCIUM 8.6* 8.6*   PROT 7.9 7.6   ALBUMIN 3.0* 2.9*   BILITOT 0.5 0.9   ALKPHOS 88 94   AST 31 18   ALT 21 17   ANIONGAP 8 12   EGFRNONAA 41* 44*     Cardiac Markers:   Recent Labs   Lab 01/02/22  1755   BNP 27     Coagulation:   Recent Labs   Lab 01/03/22  0004 01/03/22  0004 01/03/22  0830   INR 1.0  --   --    APTT 26.8   < > 95.5*    < > = values in this interval not displayed.       Significant Imaging: as below   CXR:    FINDINGS:   Low lung volumes.  Pulmonary vascular congestion.  No pleural fluid or pneumothorax.  The heart is likely enlarged.  No significant bony findings.    Impression:       Pulmonary vascular congestion and cardiomegaly.          VQ scan:    This represents a intermediate probability  of pulmonary embolism.      Doppler veins 1/2/22:  1.  Findings most likely representing chronic recannulated nonocclusive clot in the right common femoral vein.  The right common femoral vein is partially compressible and there is flow surrounding the heterogeneous material in the common femoral vein.     2.  Negative for ultrasound evidence for acute DVT otherwise.    Arterial Doppler from

## 2022-01-03 NOTE — ED NOTES
[facility] here to take pt. for scan.  Pt. Repositioned and pads placed under him r/t just receiving lasix, and he want be able to use urinal.  US tech made aware that pt. Has a bed upstairs, and I gave report, so after test take pt. to room.  Son at bedside, and is aware.  Pt. With no complaints voiced or any visible distress noted.  I also called Jarett LANG back and made her aware of pt. Going to [facility] prior to coming up.

## 2022-01-03 NOTE — ASSESSMENT & PLAN NOTE
Concern for pulmonary embolism (PE)  Unable to obtain CTA d/t renal function  VQ scan - intermediate probability for PE  Ddimer at 11.28 in ED  Continue titrating heparin gtt for now  ECHO pending to evaluate for R heart strain  Supplemental O2 prn   BLE venous doppler u/s pending

## 2022-01-03 NOTE — CONSULTS
Pharmacokinetic Assessment Sign Off: IV Vancomycin     Therapy with Vancomycin complete and/or consult discontinued by provider.  Pharmacy will sign off, please re-consult as needed.     Thank you for allowing us to participate in this patient's care.      Zoe Merrill PharmD 01/03/2022 12:31 PM

## 2022-01-03 NOTE — H&P
[facility]  History & Physical    Patient Name: Gene Rothman  MRN: [id number]  Patient Class: OP- Observation  Admission Date: 1/2/2022  Attending Physician: Tej Juan MD   Primary Care Provider: Amy Lopez MD         Patient information was obtained from patient, past medical records and ER records.     Subjective:     Principal Problem:Pulmonary edema    Chief Complaint:   Chief Complaint   Patient presents with    Shortness of Breath     EMS reports o2 sat of 80's on arrival. Pt is covid positive and now sats are in the 90's cannula.        HPI: 75 y/o WM with hx of OCVID-19, cardiomegaly, [place], hypothyroidism, HLD, arthritis, R wrist carpal tunnel syndrome, GERD, chronic lymphedema, cellulitis, CKD3 to ED with c/o gradually increasing SOB and BLE edema over the previous several weeks. He reports he was admitted here from 12/03/2021-12/14/2021 for cellulitis, sepsis and BETY, then discharged to [place] from 12/14/2021-12/19/2021 until he tested positive for COVID and was sent back to the [facility] ED - he was then sent home from the ED after being deemed stable, but reports that his respiratory status has continued to decline. Symptoms are progressive and of moderate severity, without known mitigating or exacerbating factors. Denies chest pain, palpitations, wheezing, abdominal pain, N/V/D, constipation, dysuria, flank pain, HA, dizziness, fever, cough, falls, blurred vision. Found in ED to have BP of 165/74, O2 saturation of 83% and RR of 24 - pt was afebrile; WBCs 6.74, H&H 15.6&49.6, platelets 147, ferritin 212, Ddimer 11.28, Na 137, K+ 3.5, BUN 22, creatinine 1.6, GFR 41, , CRP 8.5, BNP 27, CPK 44, , troponin negative, lactic acid 1.7; EKG showed NSR with 1st degree AV block (78 BPM); COVID-19 negative, flu negative, Cxray showed pulmonary vascular congestion and cardiomegaly; VQ scan showed intermediate probability for PE. In ED the pt was given 40mg IV lasix and placed  on O2 at 2L per NC, with improvement in BP to 130/67, O2 saturation to 95% and RR to 20. Hospital medicine was called and the pt was placed in observation with telemetry monitoring. Pt is a full code - surrogate decision maker is his son, Solo Mills.       Past Medical History:   Diagnosis Date    CHF (congestive heart failure)     Coronary artery disease        No past surgical history on file.    Review of patient's allergies indicates:  No Known Allergies    No current facility-administered medications on file prior to encounter.     Current Outpatient Medications on File Prior to Encounter   Medication Sig    acetaminophen (TYLENOL) 500 MG tablet Take 500 mg by mouth every 6 (six) hours as needed for Pain.    atorvastatin (LIPITOR) 40 MG tablet Take 40 mg by mouth once daily.    famotidine (PEPCID) 20 MG tablet Take 20 mg by mouth 2 (two) times daily.    HYDROcodone-acetaminophen (NORCO) 5-325 mg per tablet Take 1 tablet by mouth every 6 (six) hours as needed for Pain.    levothyroxine (SYNTHROID) 50 MCG tablet Take 50 mcg by mouth before breakfast.    miconazole NITRATE 2 % (MICOTIN) 2 % top powder Apply topically 2 (two) times daily.    polyethylene glycol (GLYCOLAX) 17 gram PwPk Take 17 g by mouth 2 (two) times daily.    senna-docusate 8.6-50 mg (SENNA WITH DOCUSATE SODIUM) 8.6-50 mg per tablet Take 1 tablet by mouth once daily.    traZODone (DESYREL) 50 MG tablet Take 25 mg by mouth every evening.     Family History    Reviewed and not pertinent       Tobacco Use    Smoking status: Never Smoker    Smokeless tobacco: Not on file   Substance and Sexual Activity    Alcohol use: Not Currently    Drug use: Never    Sexual activity: Not on file     Review of Systems   Constitutional: Positive for activity change (decreased d/t weakness & R wrist pain). Negative for chills, diaphoresis, fatigue and fever.   HENT: Positive for hearing loss (bilateral). Negative for congestion, trouble swallowing  and voice change.    Eyes: Negative for photophobia and discharge.   Respiratory: Positive for shortness of breath. Negative for cough, chest tightness and wheezing.    Cardiovascular: Positive for leg swelling (Chronic lymphedema but worsened). Negative for chest pain and palpitations.   Gastrointestinal: Negative for abdominal pain, blood in stool, constipation, diarrhea, nausea and vomiting.   Endocrine: Negative for cold intolerance and heat intolerance.   Genitourinary: Negative for difficulty urinating, dysuria, flank pain, frequency and urgency.   Musculoskeletal: Positive for arthralgias (R wrist carpal tunnel syndrome - brace in place). Negative for back pain, joint swelling and myalgias.   Skin: Positive for rash (BLE cellulitis / yeast to abdomen/groin/perineal folds) and wound (L calf, R ankle).   Neurological: Positive for weakness (generalized). Negative for dizziness, seizures, syncope, facial asymmetry, light-headedness, numbness and headaches.   Psychiatric/Behavioral: Negative for confusion and hallucinations.     Objective:     Vital Signs (Most Recent):  Temp: 98 °F (36.7 °C) (01/02/22 1800)  Pulse: 75 (01/02/22 1925)  Resp: (!) 24 (01/02/22 1800)  BP: (!) 165/74 (01/02/22 1902)  SpO2: 95 % (01/02/22 1925) Vital Signs (24h Range):  Temp:  [98 °F (36.7 °C)] 98 °F (36.7 °C)  Pulse:  [65-93] 75  Resp:  [18-24] 24  SpO2:  [83 %-97 %] 95 %  BP: (121-165)/() 165/74     Weight: (!) 185.8 kg (409 lb 9.8 oz)  Body mass index is 57.13 kg/m².    Physical Exam  Vitals reviewed.   Constitutional:       General: He is not in acute distress.     Appearance: Normal appearance. He is obese. He is ill-appearing. He is not toxic-appearing.      Comments: Morbid    HENT:      Head: Normocephalic and atraumatic.      Ears:      Comments: Bilateral Pl[place]     Nose: Nose normal. No congestion.      Mouth/Throat:      Mouth: Mucous membranes are moist.   Eyes:      General: No scleral icterus.     Pupils: Pupils are  equal, round, and reactive to light.   Cardiovascular:      Rate and Rhythm: Normal rate and regular rhythm.      Pulses: Normal pulses.      Heart sounds: Normal heart sounds.   Pulmonary:      Effort: Pulmonary effort is normal. No respiratory distress.      Breath sounds: Decreased breath sounds (throughout - large body habitus) present. No wheezing or rhonchi.   Abdominal:      General: Abdomen is flat. Bowel sounds are normal.      Palpations: Abdomen is soft.   Musculoskeletal:         General: Tenderness (R wrist with brace - carpal tunnel syndrome per pt) present. Normal range of motion.      Cervical back: Normal range of motion and neck supple. No tenderness.      Right lower leg: 3+ Pitting Edema present.      Left lower leg: 3+ Pitting Edema present.   Skin:     General: Skin is warm and dry.      Capillary Refill: Capillary refill takes less than 2 seconds.      Coloration: Skin is pale.      Findings: Erythema (BLE cellulitis) and rash (Appears to be yeast - abdomen/groin folds, perineum) present.             Comments: BLE cellulitis - 1 month per pt   Neurological:      General: No focal deficit present.      Mental Status: He is alert and oriented to person, place, and time.      Motor: Weakness (generalized) present.   Psychiatric:         Mood and Affect: Mood normal.         Behavior: Behavior normal.         Thought Content: Thought content normal.         Judgment: Judgment normal.           CRANIAL NERVES     CN III, IV, VI   Pupils are equal, round, and reactive to light.       Significant Labs:   Results for orders placed or performed during the hospital encounter of 01/02/22   CBC auto differential   Result Value Ref Range    WBC 6.74 3.90 - 12.70 K/uL    RBC 4.95 4.60 - 6.20 M/uL    Hemoglobin 15.6 14.0 - 18.0 g/dL    Hematocrit 49.6 40.0 - 54.0 %     (H) 82 - 98 fL    MCH 31.5 (H) 27.0 - 31.0 pg    MCHC 31.5 (L) 32.0 - 36.0 g/dL    RDW 14.4 11.5 - 14.5 %    Platelets 147 (L) 150 -  450 K/uL    MPV 10.1 9.2 - 12.9 fL    Immature Granulocytes 0.4 0.0 - 0.5 %    Gran # (ANC) 3.7 1.8 - 7.7 K/uL    Immature Grans (Abs) 0.03 0.00 - 0.04 K/uL    Lymph # 1.9 1.0 - 4.8 K/uL    Mono # 0.8 0.3 - 1.0 K/uL    Eos # 0.3 0.0 - 0.5 K/uL    Baso # 0.05 0.00 - 0.20 K/uL    nRBC 0 0 /100 WBC    Gran % 54.6 38.0 - 73.0 %    Lymph % 27.6 18.0 - 48.0 %    Mono % 11.7 4.0 - 15.0 %    Eosinophil % 5.0 0.0 - 8.0 %    Basophil % 0.7 0.0 - 1.9 %    Differential Method Automated    Comprehensive metabolic panel   Result Value Ref Range    Sodium 137 136 - 145 mmol/L    Potassium 3.5 3.5 - 5.1 mmol/L    Chloride 100 95 - 110 mmol/L    CO2 29 23 - 29 mmol/L    Glucose 114 (H) 70 - 110 mg/dL    BUN 22 8 - 23 mg/dL    Creatinine 1.6 (H) 0.5 - 1.4 mg/dL    Calcium 8.6 (L) 8.7 - 10.5 mg/dL    Total Protein 7.9 6.0 - 8.4 g/dL    Albumin 3.0 (L) 3.5 - 5.2 g/dL    Total Bilirubin 0.5 0.1 - 1.0 mg/dL    Alkaline Phosphatase 88 55 - 135 U/L    AST 31 10 - 40 U/L    ALT 21 10 - 44 U/L    Anion Gap 8 8 - 16 mmol/L    eGFR if African American 47 (A) >60 mL/min/1.73 m^2    eGFR if non African American 41 (A) >60 mL/min/1.73 m^2   C-Reactive Protein   Result Value Ref Range    CRP 8.5 (H) 0.0 - 8.2 mg/L   Ferritin   Result Value Ref Range    Ferritin 212 20.0 - 300.0 ng/mL   Lactate Dehydrogenase   Result Value Ref Range     (H) 110 - 260 U/L   CK   Result Value Ref Range    CPK 44 20 - 200 U/L   Lactic Acid, Plasma   Result Value Ref Range    Lactate (Lactic Acid) 1.7 0.5 - 2.2 mmol/L   Troponin I   Result Value Ref Range    Troponin I 0.022 0.000 - 0.026 ng/mL   Brain Natriuretic Peptide   Result Value Ref Range    BNP 27 0 - 99 pg/mL   D dimer, quantitative   Result Value Ref Range    D-Dimer 11.28 (H) <0.50 mg/L FEU   POCT COVID-19 Rapid Screening   Result Value Ref Range    POC Rapid COVID Negative Negative     Acceptable Yes    POCT Influenza A/B Molecular   Result Value Ref Range    POC Molecular Influenza A  Ag Negative Negative, Not Reported    POC Molecular Influenza B Ag Negative Negative, Not Reported     Acceptable Yes          Significant Imaging:   Imaging Results          NM Lung Scan Ventilation Perfusion (Final result)  Result time 01/02/22 23:05:41    Final result by Cy Ureña MD (01/02/22 23:05:41)                 Impression:      This represents a intermediate probability  of pulmonary embolism.      Electronically signed by: Cy Ureña  Date:    01/02/2022  Time:    23:05             Narrative:    EXAMINATION:  NM LUNG VENTILATION AND PERFUSION IMAGING    CLINICAL HISTORY:  Pulmonary embolism (PE) suspected, positive D-dimer;    TECHNIQUE:  29.97 mCi of Tc-99m-DTPA were placed in the nebulizer. Following the inhalation Tc-99m-DTPA in aerosol and the subsequent IV administration of 5.05 mCi of Tc-99m-MAA, multiple images of the thorax were obtained in various projections.    COMPARISON:  None.    FINDINGS:  Perfusion: Subsegmental perfusion defects seen in the left lung likely matched.    Ventilation: Multifocal ventilation defects with clumping of the radiotracer in the airways which degrades the exam..    CXR no focal airspace opacities..                               X-Ray Chest AP Portable (Final result)  Result time 01/02/22 15:50:54    Final result by Ramirez Dorsey Jr., MD (01/02/22 15:50:54)                 Impression:      Pulmonary vascular congestion and cardiomegaly.      Electronically signed by: Ramirez Dorsey Jr., MD  Date:    01/02/2022  Time:    15:50             Narrative:    EXAMINATION:  XR CHEST AP PORTABLE    CLINICAL HISTORY:  COVID-19;    COMPARISON:  None.    FINDINGS:  Low lung volumes.  Pulmonary vascular congestion.  No pleural fluid or pneumothorax.  The heart is likely enlarged.  No significant bony findings.                                Results for orders placed or performed during the hospital encounter of 01/02/22   EKG 12-lead    Collection Time:  01/02/22  3:31 PM    Narrative    Test Reason : U07.1,    Vent. Rate : 078 BPM     Atrial Rate : 078 BPM     P-R Int : 364 ms          QRS Dur : 108 ms      QT Int : 392 ms       P-R-T Axes : 085 013 046 degrees     QTc Int : 446 ms    Sinus rhythm with 1st degree A-V block  ST and T wave abnormality, consider anterior ischemia  Abnormal ECG  No previous ECGs available    Referred By: [email or web address]   SELF           Confirmed By:          Assessment/Plan:     * Pulmonary edema  Suspicion for new onset CHF - no previous ECHO available on chart  Cxray with pulmonary vascular congestion; BLE increased edema, BNP 27  Cardiology consulted  Continue IV lasix pending cardiology input  ECHO pending  Strict I&Os  Daily weights  Cardiac diet         Elevated d-dimer  Concern for pulmonary embolism (PE)  Unable to obtain CTA d/t renal function  VQ scan - intermediate probability for PE  Ddimer at 11.28 in ED  Continue titrating heparin gtt for now  ECHO pending to evaluate for R heart strain  Supplemental O2 prn   BLE venous doppler u/s pending      Bilateral lower leg cellulitis  X1 month per pt - seen by outpatient wound care  Wound care consulted  BC x2 pending  Continue IV vancomycin/cefepime for now  BLE doppler u/s pending        Functional quadriplegia  Likely secondary to BLE cellulitis, R wrist carpal tunnel syndrome and obesity  PT/OT to eval/treat  Fall precautions  Encourage weight loss  Social work consult for d/c planning and possible rehab    Hypothyroidism  Resumed home synthroid  TSH pending      GERD (gastroesophageal reflux disease)  Continue home pepcid      Hyperlipidemia  Continue home statin  Lipid panel pending      CKD (chronic kidney disease) stage 3, GFR 30-59 ml/min  Creatinine at 1.6 in ED - baseline appears to be around 1.1-1.4  Monitor renal function labs closely in light of diuresis  Avoid nephrotoxins as able  Monitor I&Os  Consider nephrology consult for worsening renal function       Morbid  obesity with BMI of 50.0-59.9, adult  Educated patient on health benefits of weight loss  Advised on appropriate diet/exercise options for healthy weight loss          VTE Risk Mitigation (From admission, onward)         Ordered     heparin 25,000 units in dextrose 5% (100 units/ml) IV bolus from bag - ADDITIONAL PRN BOLUS - 60 units/kg  As needed (PRN)        Question:  Heparin Infusion Adjustment (DO NOT MODIFY ANSWER)  Answer:  [email or web address]\epic\Images\Pharmacy\HeparinInfusions\heparin HIGH INTENSITY nomogram for [facility] [id number]    01/02/22 2343     heparin 25,000 units in dextrose 5% (100 units/ml) IV bolus from bag - ADDITIONAL PRN BOLUS - 30 units/kg  As needed (PRN)        Question:  Heparin Infusion Adjustment (DO NOT MODIFY ANSWER)  Answer:  [email or web address]\epic\Images\Pharmacy\HeparinInfusions\heparin HIGH INTENSITY nomogram for [facility] [id number].pdf    01/02/22 2343     heparin 25,000 units in dextrose 5% (100 units/ml) IV bolus from bag INITIAL BOLUS  Once        Question:  Heparin Infusion Adjustment (DO NOT MODIFY ANSWER)  Answer:  [email or web address]\epic\Images\Pharmacy\HeparinInfusions\heparin HIGH INTENSITY nomogram for [facility] [id number].pdf    01/02/22 2343     heparin 25,000 units in dextrose 5% 250 mL (100 units/mL) infusion HIGH INTENSITY nomogram - [facility]  Continuous        Question Answer Comment   Heparin Infusion Adjustment (DO NOT MODIFY ANSWER) [email or web address]\epic\Images\Pharmacy\HeparinInfusions\heparin HIGH INTENSITY nomogram for [facility] [id number].pdf    Begin at (in units/kg/hr) 18        01/02/22 2343     Place sequential compression device  Until discontinued         01/02/22 2130                   Sol Stuart, NP  Department of Hospital Medicine   '[facility]

## 2022-01-03 NOTE — PLAN OF CARE
[facility] - Med Surg  Initial Discharge Assessment       Primary Care Provider: Amy Lopez MD    Admission Diagnosis: Pulmonary edema [J81.1]  Acute respiratory failure with hypoxia [J96.01]  Elevated d-dimer [R79.89]  COVID-19 [U07.1]    Admission Date: 1/2/2022  Expected Discharge Date:     Discharge Barriers Identified: None    Payor: HU[employer] MANAGED MEDICARE / Plan: HU[employer] TOTAL CARE ADVANTAGE / Product Type: Medicare Advantage /     Extended Emergency Contact Information  Primary Emergency Contact: Solo Mills  Mobile Phone: [phone]  Relation: Son    Discharge Plan A: Home with family,Home Health  Discharge Plan B: Home with family,Home Health      [id number] - [place] - [address]  [address] [address]  Phone: [phone] Fax: [phone]      Initial Assessment (most recent)     Adult Discharge Assessment - 01/03/22 1412        Discharge Assessment    Assessment Type Discharge Planning Assessment     Confirmed/corrected address, phone number and insurance Yes     Confirmed Demographics Contacted registration to update     Source of Information patient     Communicated JAMES with patient/caregiver Yes     Reason For Admission Short of breath     Lives With spouse     Facility Arrived From: Home     Do you expect to return to your current living situation? Yes     Do you have help at home or someone to help you manage your care at home? Yes     Who are your caregiver(s) and their phone number(s)? Peace     Prior to hospitilization cognitive status: Alert/Oriented     Current cognitive status: Alert/Oriented     Walking or Climbing Stairs Difficulty transferring difficulty, dependent     Dressing/Bathing Difficulty bathing difficulty, dependent     Dressing/Bathing Management Wife has to assist     Home Accessibility wheelchair accessible     Equipment Currently Used at Home hospital bed;wheelchair;walker, standard;rollator;CPAP   Does not currently use the CPAP     Readmission within 30 days? Yes     Do you currently have service(s) that help you manage your care at home? No     Do you take prescription medications? Yes     Do you have prescription coverage? Yes     Coverage [employer]     Do you have any problems affording any of your prescribed medications? No     Is the patient taking medications as prescribed? yes     Who is going to help you get home at discharge? [facility]     How do you get to doctors appointments? other (see comments)   ambulance    Are you on dialysis? No     Do you take coumadin? No     Discharge Plan A Home with family;Home Health     Discharge Plan B Home with family;Home Health     DME Needed Upon Discharge  none     Discharge Plan discussed with: Patient     Discharge Barriers Identified None        Relationship/Environment    Name(s) of Who Lives With Patient Peace Rothman                 Readmission Assessment (most recent)     Readmission Assessment - 01/03/22 1415        Readmission    Why were you hospitalized in the last 30 days? lower extremity cellulitis     Why were you readmitted? New medical problem     When you left the hospital how did you feel? okay to dc to skilled     When you left the hospital where did you go? SNF     Did patient/caregiver refused recommended DC plan? No     Tell me about what happened between when you left the hospital and the day you returned. became short of breath on Saturday and it became worse yesterday     When did you start not feeling well? Saturday     Did you try to manage your symptoms your self? No     Did you call anyone? No     Why? Son told patient to go to ER     Did you try to see or did see a doctor or nurse before you came? No     Why? Sunday, patient is bed bound at this time     Did you have  a follow-up appointment on discharge? --   Discharged to [facility] and managed by provider at [facility]    Was this a planned readmission? No                CM met with patient at the bedside to assess for  discharge needs.  Patient stated that when he discharged last admission, he went to [facility].  He tested positive for COVID at [facility] and discharged home from [facility] with [facility].  Plan is to return home with [facility] following this discharge.  CM will continue to follow for needs.  CM provided a transitional care folder, information on advanced directives, information on pharmacy bedside delivery, and discharge planning begins on admission with contact information for any needs/questions.

## 2022-01-03 NOTE — ASSESSMENT & PLAN NOTE
Likely sec to PE with h/o COVID, intermediate prob VQ and severe RV dysfunction/dilation  Cont heparin drip, transition to treatment dose Eliquis 10mg BID if no further procedures/interventions planned  Would repeat D dimer in 4 weeks to ensure treatment but continue treatment min 3 months if not lifelong

## 2022-01-03 NOTE — HPI
Gene Rothman is a 76 y.o. male pt with h/o COVID-19, cardiomegaly, [facility], hypothyroidism, HLD, arthritis, R wrist carpal tunnel syndrome, GERD, chronic lymphedema, cellulitis, CKD3 to ED with c/o gradually increasing SOB and BLE edema over the previous several weeks. He reports he was admitted here from 12/03/2021-12/14/2021 for cellulitis, sepsis and BETY, then discharged to [place] from 12/14/2021-12/19/2021 until he tested positive for COVID and was sent back to the [facility] ED - he was then sent home from the ED after being deemed stable, but reports that his respiratory status has continued to decline. Symptoms are progressive and of moderate severity, without known mitigating or exacerbating factors. Denies chest pain, palpitations, wheezing, abdominal pain, N/V/D, constipation, dysuria, flank pain, HA, dizziness, fever, cough, falls, blurred vision. Found in ED to have BP of 165/74, O2 saturation of 83% and RR of 24 - pt was afebrile; WBCs 6.74, H&H 15.6&49.6, platelets 147, ferritin 212, Ddimer 11.28, Na 137, K+ 3.5, BUN 22, creatinine 1.6, GFR 41, , CRP 8.5, BNP 27, CPK 44, , troponin negative, lactic acid 1.7; EKG showed NSR with 1st degree AV block (78 BPM); COVID-19 negative, flu negative, Cxray showed pulmonary vascular congestion and cardiomegaly; VQ scan showed intermediate probability for PE. In ED the pt was given 40mg IV lasix and placed on O2 at 2L per NC, with improvement in BP to 130/67, O2 saturation to 95% and RR to 20.     Cardiology consulted for CHF. ECHO with grade 1 DD, normal EF but severely dilated RV with severely decreased function with pulm HTN noted. Pt is on heparin drip for presumed PE as VQ intermediate prob.     Results for orders placed during the hospital encounter of 01/02/22    Echo    Interpretation Summary  · The left ventricle is normal in size with concentric remodeling and normal systolic function.  · The estimated ejection fraction is 55%.  · Grade I left  ventricular diastolic dysfunction.  · Severe right ventricular enlargement with moderately to severely reduced right ventricular systolic function.  · Right atrial enlargement.  · Mild to moderate tricuspid regurgitation.  · Normal central venous pressure (3 mmHg).  · The estimated PA systolic pressure is 43 mmHg.  · There is pulmonary hypertension.

## 2022-01-03 NOTE — ASSESSMENT & PLAN NOTE
Suspicion for new onset CHF - no previous ECHO available on chart  Cxray with pulmonary vascular congestion; BLE increased edema, BNP 27  Cardiology consulted  Continue IV lasix pending cardiology input  ECHO pending  Strict I&Os  Daily weights  Cardiac diet       1/3  ECHO from  21 using the MRN and  stated above:  · The left ventricle is normal in size with mild concentric hypertrophy and normal systolic function.  · Normal left ventricular diastolic function.  · The estimated PA systolic pressure is 48 mmHg.  · Normal right ventricular size with normal right ventricular systolic function.  · There is pulmonary hypertension.  · Intermediate central venous pressure (8 mmHg).  · The estimated ejection fraction is 55%.  · Mild mitral regurgitation.  · Mild tricuspid regurgitation.    Reduce Lasix to 40 mg IV QD  Continue supplemental O2

## 2022-01-03 NOTE — PLAN OF CARE
Problem: Adult Inpatient Plan of Care  Goal: Plan of Care Review  Outcome: Ongoing, Progressing  Goal: Patient-Specific Goal (Individualized)  Outcome: Ongoing, Progressing  Goal: Absence of Hospital-Acquired Illness or Injury  Outcome: Ongoing, Progressing  Goal: Optimal Comfort and Wellbeing  Outcome: Ongoing, Progressing  Goal: Readiness for Transition of Care  Outcome: Ongoing, Progressing     Problem: Bariatric Environmental Safety  Goal: Safety Maintained with Care  Outcome: Ongoing, Progressing     Problem: Skin Injury Risk Increased  Goal: Skin Health and Integrity  Outcome: Ongoing, Progressing

## 2022-01-03 NOTE — PLAN OF CARE
SEE EVAL . PT DISPLAYED DEFICITS WITH FUNCTIONAL MOBILITY/TRANSFERS,  DECREASE ADL'S SKILLS, AS WELL AS DECREASE B UE STRENGTH, ROM, ENDURANCE. RECOMMEND : SNF

## 2022-01-03 NOTE — ED NOTES
Pt states that he was in the bathroom when he felt very SOB and his son called EMS for him. Denies home O2 use and states that he feels much better with the O2 being provided at this time. States that he has been bed-bound since early December when he developed cellulitis in BLE.

## 2022-01-03 NOTE — HOSPITAL COURSE
1/3 Patient with a different MRN and  1945 and MRN [id number]. Under these indices he was admitted at [facility] from 12/3/21 to 21 then discharged to [facility].   Apparently at [facility] he tested positive to Covid and was discharged from there to this ER on 21 to get Regeneron (it looks like it was set up for outpatient), then sent home.  He returned 22 for Dyspnea. Dimer elevated 11. VQ scan intermediate probability. Covid is negative this time.    Met on 2L supplemental O2      patient seen, euvolemic, requiring 2-3L oxygen, lungs CTA with reduced entry at bases. Started on Eliquis today, this MD explained the lack of data on patients > 40 BMI with NOAC, patient okay with going on Eliquis rather than coumadin with frequent blood draws and restrictions.  Lasix 40 mg PO BID x next 3 days then 40 mg QD, home health to draw BMP, home O2 to be delivered, wound care here recommends leaving wound to open air.   Patient discharge order is in and may leave today 22 or tomorrow 22 depending on getting his home O2 and home meds delivered

## 2022-01-03 NOTE — PLAN OF CARE
Problem: Adult Inpatient Plan of Care  Goal: Plan of Care Review  Outcome: Ongoing, Progressing  Goal: Patient-Specific Goal (Individualized)  Outcome: Ongoing, Progressing  Goal: Absence of Hospital-Acquired Illness or Injury  Outcome: Ongoing, Progressing  Goal: Optimal Comfort and Wellbeing  Outcome: Ongoing, Progressing  Goal: Readiness for Transition of Care  Outcome: Ongoing, Progressing     Problem: Bariatric Environmental Safety  Goal: Safety Maintained with Care  Outcome: Ongoing, Progressing     Problem: Skin Injury Risk Increased  Goal: Skin Health and Integrity  Outcome: Ongoing, Progressing     Problem: Impaired Wound Healing  Goal: Optimal Wound Healing  Outcome: Ongoing, Progressing

## 2022-01-03 NOTE — ASSESSMENT & PLAN NOTE
Educated patient on health benefits of weight loss  Advised on appropriate diet/exercise options for healthy weight loss

## 2022-01-03 NOTE — CONSULTS
Pharmacokinetic Initial Assessment: IV Vancomycin    Assessment/Plan:    Initiate intravenous vancomycin with loading dose of 2000 mg once with subsequent doses when random concentrations are less than 20 mcg/mL. Desired empiric serum trough concentration is 10 to 20 mcg/mL. Draw vancomycin random level on 1/3 at 1300.    Pharmacy will continue to follow and monitor vancomycin.      Please contact pharmacy at extension [phone] with any questions regarding this assessment.     Thank you for the consult,   Carl Blandon, PharmD 1/3/2022 1:22 AM         Patient brief summary:  Gene Rothman is a 76 y.o. male initiated on antimicrobial therapy with IV Vancomycin for treatment of suspected skin & soft tissue infection.    Drug Allergies:   Review of patient's allergies indicates:  No Known Allergies    Actual Body Weight:   185.8 kg    Renal Function:   Estimated Creatinine Clearance: 64.3 mL/min (A) (based on SCr of 1.6 mg/dL (H)).,     Dialysis Method (if applicable):  Not on RRT    CBC (last 72 hours):  Recent Labs   Lab Result Units 01/02/22  1736   WBC K/uL 6.74   Hemoglobin g/dL 15.6   Hematocrit % 49.6   Platelets K/uL 147*   Gran % % 54.6   Lymph % % 27.6   Mono % % 11.7   Eosinophil % % 5.0   Basophil % % 0.7   Differential Method  Automated       Metabolic Panel (last 72 hours):  Recent Labs   Lab Result Units 01/02/22  1628   Sodium mmol/L 137   Potassium mmol/L 3.5   Chloride mmol/L 100   CO2 mmol/L 29   Glucose mg/dL 114*   BUN mg/dL 22   Creatinine mg/dL 1.6*   Albumin g/dL 3.0*   Total Bilirubin mg/dL 0.5   Alkaline Phosphatase U/L 88   AST U/L 31   ALT U/L 21       Drug levels (last 3 results):  No results for input(s): VANCOMYCINRA, VANCOMYCINPE, VANCOMYCINTR in the last 72 hours.    Microbiologic Results:  Microbiology Results (last 7 days)       Procedure Component Value Units Date/Time    Blood culture [[id number]] Collected: 01/03/22 0005    Order Status: Sent Specimen: Blood Updated: 01/03/22 0005     Blood culture [[id number]] Collected: 01/03/22 0004    Order Status: Sent Specimen: Blood from Antecubital, Left Arm Updated: 01/03/22 0005

## 2022-01-03 NOTE — ED NOTES
Pt. With c/o sob, on and off.  Son relates that pt. Has been having these episodes frequently, and states his BLE cellulitis/edema has been going on for the last month, and that pt. Is seeing a wound care nurse for treatment.  Pt. BLE are currently wrapped.  Pt. A&OX4, and able to make needs known.  Pt. Is on 02 @ 2L.  Pt. In NAD.

## 2022-01-03 NOTE — HPI
77 y/o WM with hx of OCVID-19, cardiomegaly, [place], hypothyroidism, HLD, arthritis, R wrist carpal tunnel syndrome, GERD, chronic lymphedema, cellulitis, CKD3 to ED with c/o gradually increasing SOB and BLE edema over the previous several weeks. He reports he was admitted here from 12/03/2021-12/14/2021 for cellulitis, sepsis and BETY, then discharged to [facility] from 12/14/2021-12/19/2021 until he tested positive for COVID and was sent back to the [facility] ED - he was then sent home from the ED after being deemed stable, but reports that his respiratory status has continued to decline. Symptoms are progressive and of moderate severity, without known mitigating or exacerbating factors. Denies chest pain, palpitations, wheezing, abdominal pain, N/V/D, constipation, dysuria, flank pain, HA, dizziness, fever, cough, falls, blurred vision. Found in ED to have BP of 165/74, O2 saturation of 83% and RR of 24 - pt was afebrile; WBCs 6.74, H&H 15.6&49.6, platelets 147, ferritin 212, Ddimer 11.28, Na 137, K+ 3.5, BUN 22, creatinine 1.6, GFR 41, , CRP 8.5, BNP 27, CPK 44, , troponin negative, lactic acid 1.7; EKG showed NSR with 1st degree AV block (78 BPM); COVID-19 negative, flu negative, Cxray showed pulmonary vascular congestion and cardiomegaly; VQ scan showed intermediate probability for PE. In ED the pt was given 40mg IV lasix and placed on O2 at 2L per NC, with improvement in BP to 130/67, O2 saturation to 95% and RR to 20. Hospital medicine was called and the pt was placed in observation with telemetry monitoring. Pt is a full code - surrogate decision maker is his son, Solo [place].

## 2022-01-03 NOTE — PT/OT/SLP EVAL
Physical Therapy Evaluation    Patient Name:  Gene Rothman   MRN:  [id number]    Recommendations:     Discharge Recommendations:  home health PT,nursing facility, skilled   Discharge Equipment Recommendations:     Barriers to discharge: None    Assessment:     Gene Rothman is a 76 y.o. male admitted with a medical diagnosis of Pulmonary edema.  He presents with the following impairments/functional limitations:  weakness,decreased ROM,impaired endurance,decreased lower extremity function,impaired joint extensibility,decreased safety awareness,impaired self care skills,impaired functional mobilty.  Pt could benefit from physical therapy treatment in order to address the stated deficits.    Rehab Prognosis: Fair; patient would benefit from acute skilled PT services to address these deficits and reach maximum level of function.    Recent Surgery: * No surgery found *      Plan:     During this hospitalization, patient to be seen   to address the identified rehab impairments via gait training,therapeutic activities,therapeutic exercises,neuromuscular re-education,wheelchair management/training and progress toward the following goals:    · Plan of Care Expires:  01/17/22    Subjective     Chief Complaint: Pt refused to attempt supine to sit with c/o fear of falling off of edge of bed as he's done in the past.  Pt continued to refuse with Max encouragement and reassurance.  Patient/Family Comments/goals: Pt wants to return home with his wife and son.  Pain/Comfort:  · Pain Rating 1: 0/10    Patients cultural, spiritual, Restorationist conflicts given the current situation: no    Living Environment:  Pt reports that he lives home with his wife and son.  Prior to admission, patients level of function was Mod I with bed mobility and stand pivot transfers.  Pt reports being non-ambulatory over the last month.  Equipment used at home: cane, straight,wheelchair.  DME owned (not currently used): none.  Upon discharge, patient will have  assistance from wife and son.    Objective:     Communicated with Nurse Hand and Epic chart reviewed prior to session.  Patient found supine with peripheral IV,PureWick  upon PT entry to room.    General Precautions: Standard, fall   Orthopedic Precautions:N/A   Braces: N/A  Respiratory Status: Nasal cannula O2.    Exams:  · Cognitive Exam:  Patient is oriented to Person, Place, Time and Situation  · RLE ROM: Deficits: Moderately limited in supine position in hips and knees, Minimally limited in ankles.  · RLE Strength: Deficits: Grossly 3-/5  · LLE ROM: Deficits: Moderately limited in supine position in hips and knees, Minimally limited in ankles.  · LLE Strength: Deficits: Grossly 3-/5    Functional Mobility:  · Bed Mobility:     · Rolling Left:  maximal assistance  · Rolling Right: maximal assistance  · Supine to Sit: Pt refused to attempt.  · Transfers: Pt refused to attempt.    Therapeutic Activities and Exercises:   Pt was educated on performing bilateral LE AROM as tolerated in bed to decrease joint stiffness and improve functional mobility.  Pt verbalized understanding.    AM-PAC 6 CLICK MOBILITY  Total Score:7     Patient left supine with all lines intact, call button in reach and Nurse and CNA notified.    GOALS:   Multidisciplinary Problems     Physical Therapy Goals        Problem: Physical Therapy Goal    Goal Priority Disciplines Outcome Goal Variances Interventions   Physical Therapy Goal     PT, PT/OT      Description: Pt will receive PT treatment for a minimum of 3 of 7 days per week x 2 weeks in order to address the stated deficits. POC expires: 01/17/2022  1)Pt will perform supine to/from sit with Mod I.  2)Pt will perform sit to stand pivot with RW with SBA.  3) Pt will tolerate sitting up out of bed in bedside chair for 2 hours to increase out of bed tolerance.  4)Pt ambulate 10 ft x 2 with RW with CGA.                   History:     Past Medical History:   Diagnosis Date    CHF (congestive  heart failure)     Coronary artery disease        No past surgical history on file.    Time Tracking:     PT Received On: 01/03/22  PT Start Time: 0840     PT Stop Time: 0855  PT Total Time (min): 15 min     Billable Minutes: Evaluation 15 minutes and Total Time 15 minutes      01/03/2022

## 2022-01-03 NOTE — CONSULTS
[facility] Surg  Cardiology  Consult Note    Patient Name: Gene Rothman  MRN: [id number]  Admission Date: 1/2/2022  Hospital Length of Stay: 0 days  Code Status: Full Code   Attending Provider: Samuel Pantoja MD   Consulting Provider: Henry Pimentel Md, MD  Primary Care Physician: Amy Lopez MD  Principal Problem:Pulmonary edema    Patient information was obtained from patient, past medical records and ER records.     Inpatient consult to Cardiology  Consult performed by: Henry Pimentel MD  Consult ordered by: Sol Stuart NP  Reason for consult: CHF, PE, RV dysfunction         Subjective:     Chief Complaint:  SOB, edema      HPI:   Gene Rothman is a 76 y.o. male pt with h/o COVID-19, cardiomegaly, [place], hypothyroidism, HLD, arthritis, R wrist carpal tunnel syndrome, GERD, chronic lymphedema, cellulitis, CKD3 to ED with c/o gradually increasing SOB and BLE edema over the previous several weeks. He reports he was admitted here from 12/03/2021-12/14/2021 for cellulitis, sepsis and BETY, then discharged to [facility] from 12/14/2021-12/19/2021 until he tested positive for COVID and was sent back to the [facility] ED - he was then sent home from the ED after being deemed stable, but reports that his respiratory status has continued to decline. Symptoms are progressive and of moderate severity, without known mitigating or exacerbating factors. Denies chest pain, palpitations, wheezing, abdominal pain, N/V/D, constipation, dysuria, flank pain, HA, dizziness, fever, cough, falls, blurred vision. Found in ED to have BP of 165/74, O2 saturation of 83% and RR of 24 - pt was afebrile; WBCs 6.74, H&H 15.6&49.6, platelets 147, ferritin 212, Ddimer 11.28, Na 137, K+ 3.5, BUN 22, creatinine 1.6, GFR 41, , CRP 8.5, BNP 27, CPK 44, , troponin negative, lactic acid 1.7; EKG showed NSR with 1st degree AV block (78 BPM); COVID-19 negative, flu negative, Cxray showed pulmonary vascular congestion and cardiomegaly; VQ scan  showed intermediate probability for PE. In ED the pt was given 40mg IV lasix and placed on O2 at 2L per NC, with improvement in BP to 130/67, O2 saturation to 95% and RR to 20.     Cardiology consulted for CHF. ECHO with grade 1 DD, normal EF but severely dilated RV with severely decreased function with pulm HTN noted. Pt is on heparin drip for presumed PE as VQ intermediate prob.     Results for orders placed during the hospital encounter of 01/02/22    Echo    Interpretation Summary  · The left ventricle is normal in size with concentric remodeling and normal systolic function.  · The estimated ejection fraction is 55%.  · Grade I left ventricular diastolic dysfunction.  · Severe right ventricular enlargement with moderately to severely reduced right ventricular systolic function.  · Right atrial enlargement.  · Mild to moderate tricuspid regurgitation.  · Normal central venous pressure (3 mmHg).  · The estimated PA systolic pressure is 43 mmHg.  · There is pulmonary hypertension.        Interval History: no interval events    Review of Systems   Constitutional: Negative for fatigue and fever.   HENT: Negative for drooling, nosebleeds, tinnitus and trouble swallowing.    Respiratory: Positive for cough and shortness of breath.    Cardiovascular: Positive for leg swelling. Negative for chest pain.        Chronic leg swelling   Gastrointestinal: Negative for constipation and nausea.   Genitourinary: Negative for flank pain, frequency and urgency.   Musculoskeletal: Negative for arthralgias.   Skin:        Chronic stasis dermatitis both Lower extremities   Neurological: Negative for tremors, seizures, syncope and numbness.        Baseline wheelchair bound with very little full assistance ambulation possible   Psychiatric/Behavioral: Negative for agitation and behavioral problems.     Objective:     Vital Signs (Most Recent):  Temp: 97.5 °F (36.4 °C) (01/03/22 1325)  Pulse: 83 (01/03/22 1325)  Resp: 18 (01/03/22  1325)  BP: 133/61 (01/03/22 1325)  SpO2: (!) 82 % (01/03/22 1325) Vital Signs (24h Range):  Temp:  [97.4 °F (36.3 °C)-98 °F (36.7 °C)] 97.5 °F (36.4 °C)  Pulse:  [65-93] 83  Resp:  [17-24] 18  SpO2:  [82 %-97 %] 82 %  BP: (121-165)/(58-74) 133/61     Weight: (!) 189.9 kg (418 lb 10.5 oz)  Body mass index is 58.39 kg/m².    Intake/Output Summary (Last 24 hours) at 1/3/2022 1506  Last data filed at 1/3/2022 1400  Gross per 24 hour   Intake 1104.33 ml   Output 600 ml   Net 504.33 ml      Physical Exam  Vitals reviewed.   Constitutional:       General: He is not in acute distress.     Appearance: He is obese. He is ill-appearing. He is not toxic-appearing or diaphoretic.   HENT:      Head: Normocephalic and atraumatic.      Nose: Nose normal.      Mouth/Throat:      Mouth: Mucous membranes are moist.   Eyes:      General: No scleral icterus.     Pupils: Pupils are equal, round, and reactive to light.   Cardiovascular:      Rate and Rhythm: Normal rate and regular rhythm.      Pulses: Normal pulses.      Heart sounds: No murmur heard.      Pulmonary:      Effort: Pulmonary effort is normal. No respiratory distress.      Breath sounds: No stridor. No wheezing or rhonchi.   Abdominal:      General: Abdomen is flat. There is no distension.      Palpations: Abdomen is soft. There is no mass.      Tenderness: There is no abdominal tenderness. There is no guarding.   Musculoskeletal:      Cervical back: Normal range of motion.      Comments: Both lower extremities wrapped, visible parts chronic stasis dermatitis seen   Skin:     General: Skin is warm.      Comments: Both lower extremities wrapped   Neurological:      Mental Status: He is alert and oriented to person, place, and time. Mental status is at baseline.   Psychiatric:         Mood and Affect: Mood normal.         Significant Labs:   All pertinent labs within the past 24 hours have been reviewed.  CBC:   Recent Labs   Lab 01/02/22  1736 01/03/22  0831   WBC 6.74 7.69   7.69   HGB 15.6 15.0  15.0   HCT 49.6 46.7  46.7   * 153  153     CMP:   Recent Labs   Lab 01/02/22  1628 01/03/22  0831    139   K 3.5 3.0*    98   CO2 29 29   * 90   BUN 22 22   CREATININE 1.6* 1.5*   CALCIUM 8.6* 8.6*   PROT 7.9 7.6   ALBUMIN 3.0* 2.9*   BILITOT 0.5 0.9   ALKPHOS 88 94   AST 31 18   ALT 21 17   ANIONGAP 8 12   EGFRNONAA 41* 44*     Cardiac Markers:   Recent Labs   Lab 01/02/22  1755   BNP 27     Coagulation:   Recent Labs   Lab 01/03/22  0004 01/03/22  0004 01/03/22  0830   INR 1.0  --   --    APTT 26.8   < > 95.5*    < > = values in this interval not displayed.       Significant Imaging: as below   CXR:    FINDINGS:   Low lung volumes.  Pulmonary vascular congestion.  No pleural fluid or pneumothorax.  The heart is likely enlarged.  No significant bony findings.    Impression:       Pulmonary vascular congestion and cardiomegaly.          VQ scan:    This represents a intermediate probability  of pulmonary embolism.     Doppler veins 1/2/22:  1.  Findings most likely representing chronic recannulated nonocclusive clot in the right common femoral vein.  The right common femoral vein is partially compressible and there is flow surrounding the heterogeneous material in the common femoral vein.     2.  Negative for ultrasound evidence for acute DVT otherwise.    Arterial Doppler from        Review of Systems   Constitutional: Negative for fatigue and fever.   HENT: Negative for drooling, nosebleeds, tinnitus and trouble swallowing.    Cardiovascular: Positive for leg swelling. Negative for chest pain.        Chronic leg swelling   Respiratory: Positive for cough and shortness of breath.    Skin:        Chronic stasis dermatitis both Lower extremities   Musculoskeletal: Negative for arthralgias.   Gastrointestinal: Negative for constipation and nausea.   Genitourinary: Negative for flank pain, frequency and urgency.   Neurological: Negative for numbness, seizures,  syncope and tremors.        Baseline wheelchair bound with very little full assistance ambulation possible   Psychiatric/Behavioral: Negative for agitation and behavioral problems.       Physical Exam  Vitals reviewed.   Constitutional:       General: He is not in acute distress.     Appearance: He is obese. He is ill-appearing. He is not toxic-appearing or diaphoretic.   HENT:      Head: Normocephalic and atraumatic.      Nose: Nose normal.      Mouth/Throat:      Mouth: Mucous membranes are moist.   Eyes:      General: No scleral icterus.     Pupils: Pupils are equal, round, and reactive to light.   Cardiovascular:      Rate and Rhythm: Normal rate and regular rhythm.      Pulses: Normal pulses.      Heart sounds: No murmur heard.      Pulmonary:      Effort: Pulmonary effort is normal. No respiratory distress.      Breath sounds: No stridor. No wheezing or rhonchi.   Abdominal:      General: Abdomen is flat. There is no distension.      Palpations: Abdomen is soft. There is no mass.      Tenderness: There is no abdominal tenderness. There is no guarding.   Musculoskeletal:      Cervical back: Normal range of motion.      Comments: Both lower extremities wrapped, visible parts chronic stasis dermatitis seen   Skin:     General: Skin is warm.      Comments: Both lower extremities wrapped   Neurological:      Mental Status: He is alert and oriented to person, place, and time. Mental status is at baseline.   Psychiatric:         Mood and Affect: Mood normal.           Assessment and Plan:     * Pulmonary edema  Continue diuresis, transition to PO once euvolemic   Replete K > 4, Mg > 2    Morbid obesity with BMI of 50.0-59.9, adult  Needs weight loss with diet/exercise     CKD (chronic kidney disease) stage 3, GFR 30-59 ml/min  Cont to monitor     Hyperlipidemia  Cont statin     Hypothyroidism  Cont Synthroid, TSH 2.9    Bilateral lower leg cellulitis  Cont tx per primary team     Elevated d-dimer  Likely sec to PE  with h/o COVID, intermediate prob VQ and severe RV dysfunction/dilation  Cont heparin drip, transition to treatment dose Eliquis 10mg BID if no further procedures/interventions planned  Would repeat D dimer in 4 weeks to ensure treatment but continue treatment min 3 months if not lifelong         VTE Risk Mitigation (From admission, onward)         Ordered     heparin 25,000 units in dextrose 5% (100 units/ml) IV bolus from bag - ADDITIONAL PRN BOLUS - 60 units/kg  As needed (PRN)        Question:  Heparin Infusion Adjustment (DO NOT MODIFY ANSWER)  Answer:  [email or web address]\epic\Images\Pharmacy\HeparinInfusions\heparin HIGH INTENSITY nomogram for [facility] [id number]    01/02/22 2343     heparin 25,000 units in dextrose 5% (100 units/ml) IV bolus from bag - ADDITIONAL PRN BOLUS - 30 units/kg  As needed (PRN)        Question:  Heparin Infusion Adjustment (DO NOT MODIFY ANSWER)  Answer:  [email or web address]\epic\Images\Pharmacy\HeparinInfusions\heparin HIGH INTENSITY nomogram for [facility] [id number]    01/02/22 2343     heparin 25,000 units in dextrose 5% 250 mL (100 units/mL) infusion HIGH INTENSITY nomogram - [facility]  Continuous        Question Answer Comment   Heparin Infusion Adjustment (DO NOT MODIFY ANSWER) [email or web address]\epic\Images\Pharmacy\HeparinInfusions\heparin HIGH INTENSITY nomogram for [facility] [id number].pdf    Begin at (in units/kg/hr) 18        01/02/22 2343     Place sequential compression device  Until discontinued         01/02/22 2130                Thank you for your consult. I will follow-up with patient. Please contact us if you have any additional questions.    Henry Pimentel Md, MD  Cardiology   [facility] - Med S[facility]

## 2022-01-03 NOTE — ASSESSMENT & PLAN NOTE
Concern for pulmonary embolism (PE)  Unable to obtain CTA d/t renal function  VQ scan - intermediate probability for PE  Ddimer at 11.28 in ED  Continue titrating heparin gtt for now  ECHO pending to evaluate for R heart strain  Supplemental O2 prn   BLE venous doppler u/s pending      1/3  PE likely patient is morbidly obese, very little activity and was covid positive on 12/19/21  Cont Heparin drip, will transtion to an oral anticoagulation after 24-48 hours of heparin gtt, Will speak with Pharmacy about NAOC in morbidly obese  See what repeat ECHO says

## 2022-01-04 PROBLEM — I26.99 ACUTE PULMONARY EMBOLISM: Status: ACTIVE | Noted: 2022-01-04

## 2022-01-04 LAB
ANION GAP SERPL CALC-SCNC: 10 MMOL/L (ref 8–16)
APTT BLDCRRT: 48.4 SEC (ref 21–32)
APTT BLDCRRT: 48.6 SEC (ref 21–32)
BASOPHILS # BLD AUTO: 0.05 K/UL (ref 0–0.2)
BASOPHILS NFR BLD: 0.7 % (ref 0–1.9)
BUN SERPL-MCNC: 22 MG/DL (ref 8–23)
CALCIUM SERPL-MCNC: 8.2 MG/DL (ref 8.7–10.5)
CHLORIDE SERPL-SCNC: 97 MMOL/L (ref 95–110)
CO2 SERPL-SCNC: 32 MMOL/L (ref 23–29)
CREAT SERPL-MCNC: 1.7 MG/DL (ref 0.5–1.4)
DIFFERENTIAL METHOD: ABNORMAL
EOSINOPHIL # BLD AUTO: 0.4 K/UL (ref 0–0.5)
EOSINOPHIL NFR BLD: 5.1 % (ref 0–8)
ERYTHROCYTE [DISTWIDTH] IN BLOOD BY AUTOMATED COUNT: 14.4 % (ref 11.5–14.5)
EST. GFR  (AFRICAN AMERICAN): 44 ML/MIN/1.73 M^2
EST. GFR  (NON AFRICAN AMERICAN): 38 ML/MIN/1.73 M^2
GLUCOSE SERPL-MCNC: 98 MG/DL (ref 70–110)
HCT VFR BLD AUTO: 44.6 % (ref 40–54)
HGB BLD-MCNC: 13.8 G/DL (ref 14–18)
IMM GRANULOCYTES # BLD AUTO: 0.01 K/UL (ref 0–0.04)
IMM GRANULOCYTES NFR BLD AUTO: 0.1 % (ref 0–0.5)
LYMPHOCYTES # BLD AUTO: 2 K/UL (ref 1–4.8)
LYMPHOCYTES NFR BLD: 29 % (ref 18–48)
MCH RBC QN AUTO: 31.5 PG (ref 27–31)
MCHC RBC AUTO-ENTMCNC: 30.9 G/DL (ref 32–36)
MCV RBC AUTO: 102 FL (ref 82–98)
MONOCYTES # BLD AUTO: 0.9 K/UL (ref 0.3–1)
MONOCYTES NFR BLD: 12.4 % (ref 4–15)
NEUTROPHILS # BLD AUTO: 3.7 K/UL (ref 1.8–7.7)
NEUTROPHILS NFR BLD: 52.7 % (ref 38–73)
NRBC BLD-RTO: 0 /100 WBC
PLATELET # BLD AUTO: 162 K/UL (ref 150–450)
PMV BLD AUTO: 10.4 FL (ref 9.2–12.9)
POTASSIUM SERPL-SCNC: 3.5 MMOL/L (ref 3.5–5.1)
RBC # BLD AUTO: 4.38 M/UL (ref 4.6–6.2)
SODIUM SERPL-SCNC: 139 MMOL/L (ref 136–145)
WBC # BLD AUTO: 7 K/UL (ref 3.9–12.7)

## 2022-01-04 PROCEDURE — 27000221 HC OXYGEN, UP TO 24 HOURS: Mod: HCNC

## 2022-01-04 PROCEDURE — 25000003 PHARM REV CODE 250: Mod: HCNC | Performed by: NURSE PRACTITIONER

## 2022-01-04 PROCEDURE — 25000003 PHARM REV CODE 250: Mod: HCNC | Performed by: INTERNAL MEDICINE

## 2022-01-04 PROCEDURE — 21400001 HC TELEMETRY ROOM: Mod: HCNC

## 2022-01-04 PROCEDURE — 85730 THROMBOPLASTIN TIME PARTIAL: CPT | Mod: 91,HCNC | Performed by: INTERNAL MEDICINE

## 2022-01-04 PROCEDURE — 36415 COLL VENOUS BLD VENIPUNCTURE: CPT | Mod: HCNC | Performed by: INTERNAL MEDICINE

## 2022-01-04 PROCEDURE — 85025 COMPLETE CBC W/AUTO DIFF WBC: CPT | Mod: HCNC | Performed by: NURSE PRACTITIONER

## 2022-01-04 PROCEDURE — 99233 PR SUBSEQUENT HOSPITAL CARE,LEVL III: ICD-10-PCS | Mod: HCNC,,, | Performed by: INTERNAL MEDICINE

## 2022-01-04 PROCEDURE — 99900035 HC TECH TIME PER 15 MIN (STAT): Mod: HCNC

## 2022-01-04 PROCEDURE — 63600175 PHARM REV CODE 636 W HCPCS: Mod: HCNC | Performed by: NURSE PRACTITIONER

## 2022-01-04 PROCEDURE — 94761 N-INVAS EAR/PLS OXIMETRY MLT: CPT | Mod: HCNC

## 2022-01-04 PROCEDURE — 99233 SBSQ HOSP IP/OBS HIGH 50: CPT | Mod: HCNC,,, | Performed by: INTERNAL MEDICINE

## 2022-01-04 PROCEDURE — 85730 THROMBOPLASTIN TIME PARTIAL: CPT | Mod: HCNC | Performed by: INTERNAL MEDICINE

## 2022-01-04 PROCEDURE — 80048 BASIC METABOLIC PNL TOTAL CA: CPT | Mod: HCNC | Performed by: INTERNAL MEDICINE

## 2022-01-04 RX ORDER — FUROSEMIDE 40 MG/1
40 TABLET ORAL 2 TIMES DAILY
Status: DISCONTINUED | OUTPATIENT
Start: 2022-01-04 | End: 2022-01-05 | Stop reason: HOSPADM

## 2022-01-04 RX ORDER — CEPHALEXIN 500 MG/1
500 CAPSULE ORAL EVERY 6 HOURS
Qty: 20 CAPSULE | Refills: 0 | Status: SHIPPED | OUTPATIENT
Start: 2022-01-04 | End: 2022-01-09

## 2022-01-04 RX ORDER — POTASSIUM CHLORIDE 20 MEQ/1
40 TABLET, EXTENDED RELEASE ORAL ONCE
Status: COMPLETED | OUTPATIENT
Start: 2022-01-04 | End: 2022-01-04

## 2022-01-04 RX ORDER — FUROSEMIDE 40 MG/1
40 TABLET ORAL ONCE
Status: DISCONTINUED | OUTPATIENT
Start: 2022-01-04 | End: 2022-01-04

## 2022-01-04 RX ORDER — FUROSEMIDE 40 MG/1
40 TABLET ORAL 2 TIMES DAILY
Qty: 60 TABLET | Refills: 1 | Status: SHIPPED | OUTPATIENT
Start: 2022-01-05 | End: 2022-01-08

## 2022-01-04 RX ADMIN — ATORVASTATIN CALCIUM 40 MG: 40 TABLET, FILM COATED ORAL at 10:01

## 2022-01-04 RX ADMIN — LEVOTHYROXINE SODIUM 50 MCG: 50 TABLET ORAL at 06:01

## 2022-01-04 RX ADMIN — APIXABAN 10 MG: 2.5 TABLET, FILM COATED ORAL at 02:01

## 2022-01-04 RX ADMIN — FAMOTIDINE 20 MG: 20 TABLET ORAL at 10:01

## 2022-01-04 RX ADMIN — MICONAZOLE NITRATE: 20 POWDER TOPICAL at 10:01

## 2022-01-04 RX ADMIN — CEPHALEXIN 500 MG: 500 CAPSULE ORAL at 06:01

## 2022-01-04 RX ADMIN — FAMOTIDINE 20 MG: 20 TABLET ORAL at 08:01

## 2022-01-04 RX ADMIN — SENNOSIDES AND DOCUSATE SODIUM 1 TABLET: 50; 8.6 TABLET ORAL at 10:01

## 2022-01-04 RX ADMIN — HEPARIN SODIUM 14 UNITS/KG/HR: 1000 INJECTION, SOLUTION INTRAVENOUS; SUBCUTANEOUS at 03:01

## 2022-01-04 RX ADMIN — CEPHALEXIN 500 MG: 500 CAPSULE ORAL at 12:01

## 2022-01-04 RX ADMIN — POTASSIUM CHLORIDE 40 MEQ: 1500 TABLET, EXTENDED RELEASE ORAL at 10:01

## 2022-01-04 RX ADMIN — APIXABAN 10 MG: 2.5 TABLET, FILM COATED ORAL at 08:01

## 2022-01-04 RX ADMIN — FUROSEMIDE 40 MG: 40 TABLET ORAL at 08:01

## 2022-01-04 RX ADMIN — CEPHALEXIN 500 MG: 500 CAPSULE ORAL at 11:01

## 2022-01-04 RX ADMIN — TRAZODONE HYDROCHLORIDE 25 MG: 50 TABLET ORAL at 08:01

## 2022-01-04 NOTE — NURSING
- [place] nurse ([place]) spoke with lab at 1900 about aptt was due at 1830  - lab stated someone was on the floor to draw labs

## 2022-01-04 NOTE — ASSESSMENT & PLAN NOTE
Suspicion for new onset CHF - no previous ECHO available on chart  Cxray with pulmonary vascular congestion; BLE increased edema, BNP 27  Cardiology consulted  Continue IV lasix pending cardiology input  ECHO pending  Strict I&Os  Daily weights  Cardiac diet       1/3  ECHO from  21 using the MRN and  stated above:  · The left ventricle is normal in size with mild concentric hypertrophy and normal systolic function.  · Normal left ventricular diastolic function.  · The estimated PA systolic pressure is 48 mmHg.  · Normal right ventricular size with normal right ventricular systolic function.  · There is pulmonary hypertension.  · Intermediate central venous pressure (8 mmHg).  · The estimated ejection fraction is 55%.  · Mild mitral regurgitation.  · Mild tricuspid regurgitation.    Reduce Lasix to 40 mg IV QD  Continue supplemental O2      Lasix 40 mg PO BID  Discharge home if oxygen and meds delivered

## 2022-01-04 NOTE — PLAN OF CARE
Problem: Adult Inpatient Plan of Care  Goal: Plan of Care Review  Outcome: Ongoing, Progressing     Problem: Adult Inpatient Plan of Care  Goal: Patient-Specific Goal (Individualized)  Outcome: Ongoing, Progressing     Problem: Adult Inpatient Plan of Care  Goal: Absence of Hospital-Acquired Illness or Injury  Outcome: Ongoing, Progressing     Problem: Adult Inpatient Plan of Care  Goal: Optimal Comfort and Wellbeing  Outcome: Ongoing, Progressing     Problem: Adult Inpatient Plan of Care  Goal: Readiness for Transition of Care  Outcome: Ongoing, Progressing     Problem: Bariatric Environmental Safety  Goal: Safety Maintained with Care  Outcome: Ongoing, Progressing     Problem: Skin Injury Risk Increased  Goal: Skin Health and Integrity  Outcome: Ongoing, Progressing     Problem: Impaired Wound Healing  Goal: Optimal Wound Healing  Outcome: Ongoing, Progressing     Problem: Fall Injury Risk  Goal: Absence of Fall and Fall-Related Injury  Outcome: Ongoing, Progressing

## 2022-01-04 NOTE — ASSESSMENT & PLAN NOTE
X1 month per pt - seen by outpatient wound care  Wound care consulted  BC x2 pending  Continue IV vancomycin/cefepime for now  BLE doppler u/s pending      1/3  De-Escalate Vanco and Cefepime to a Cephalosprin  Will personally see the limb when unwrapped  Blood culture from his most recent admission was not positive  Likely chronic stasis       1/4  Legs examined  No cellulitis, abscess seen: chronic stasis dermatitis. Woudn care also saw

## 2022-01-04 NOTE — PROGRESS NOTES
Home Oxygen Evaluation    Date Performed: 1/4/2022    1) Patient's Home O2 Sat on room air, while at rest: 86%        If O2 sats on room air at rest are 88% or below, patient qualifies. No additional testing needed. Document N/A in steps 2 and 3. If 89% or above, complete steps 2.      2) Patient's O2 Sat on room air while exercising: NA        If O2 sats on room air while exercising remain 89% or above patient does not qualify, no further testing needed Document N/A in step 3. If O2 sats on room air while exercising are 88% or below, continue to step 3.      3) Patient's O2 Sat while exercising on O2: 94% at 3 LPM         (Must show improvement from #2 for patients to qualify)    If O2 sats improve on oxygen, patient qualifies for portable oxygen. If not, the patient does not qualify.

## 2022-01-04 NOTE — ASSESSMENT & PLAN NOTE
Intermediate risk PE, in bedbound patient with recent covid and elevated Dimer  Eliquis upon discharge

## 2022-01-04 NOTE — PROGRESS NOTES
[facility] Medicine  Progress Note    Patient Name: Gene Rothman  MRN: [id number]  Patient Class: IP- Inpatient   Admission Date: 1/2/2022  Length of Stay: 1 days  Attending Physician: Samuel Pantoja MD  Primary Care Provider: Amy Lopez MD        Subjective:     Principal Problem:Pulmonary edema        HPI:  77 y/o WM with hx of OCVID-19, cardiomegaly, [place], hypothyroidism, HLD, arthritis, R wrist carpal tunnel syndrome, GERD, chronic lymphedema, cellulitis, CKD3 to ED with c/o gradually increasing SOB and BLE edema over the previous several weeks. He reports he was admitted here from 12/03/2021-12/14/2021 for cellulitis, sepsis and BETY, then discharged to [place] from 12/14/2021-12/19/2021 until he tested positive for COVID and was sent back to the [facility] ED - he was then sent home from the ED after being deemed stable, but reports that his respiratory status has continued to decline. Symptoms are progressive and of moderate severity, without known mitigating or exacerbating factors. Denies chest pain, palpitations, wheezing, abdominal pain, N/V/D, constipation, dysuria, flank pain, HA, dizziness, fever, cough, falls, blurred vision. Found in ED to have BP of 165/74, O2 saturation of 83% and RR of 24 - pt was afebrile; WBCs 6.74, H&H 15.6&49.6, platelets 147, ferritin 212, Ddimer 11.28, Na 137, K+ 3.5, BUN 22, creatinine 1.6, GFR 41, , CRP 8.5, BNP 27, CPK 44, , troponin negative, lactic acid 1.7; EKG showed NSR with 1st degree AV block (78 BPM); COVID-19 negative, flu negative, Cxray showed pulmonary vascular congestion and cardiomegaly; VQ scan showed intermediate probability for PE. In ED the pt was given 40mg IV lasix and placed on O2 at 2L per NC, with improvement in BP to 130/67, O2 saturation to 95% and RR to 20. Hospital medicine was called and the pt was placed in observation with telemetry monitoring. Pt is a full code - surrogate decision maker is his son, Solo  Gene.       Overview/Hospital Course:  1/3 Patient with a different MRN and  1945 and MRN [id number]. Under these indices he was admitted at [facility] from 12/3/21 to 21 then discharged to [place].   Apparently at [place] he tested positive to Covid and was discharged from there to this ER on 21 to get Regeneron (it looks like it was set up for outpatient), then sent home.  He returned 22 for Dyspnea. Dimer elevated 11. VQ scan intermediate probability. Covid is negative this time.    Met on 2L supplemental O2      patient seen, euvolemic, requiring 2-3L oxygen, lungs CTA with reduced entry at bases. Started on Eliquis today, this MD explained the lack of data on patients > 40 BMI with NOAC, patient okay with going on Eliquis rather than coumadin with frequent blood draws and restrictions.  Lasix 40 mg PO BID x next 3 days then 40 mg QD, home health to draw BMP, home O2 to be delivered, wound care here recommends leaving wound to open air.       No new subjective & objective note has been filed under this hospital service since the last note was generated.      Assessment/Plan:      * Pulmonary edema  Suspicion for new onset CHF - no previous ECHO available on chart  Cxray with pulmonary vascular congestion; BLE increased edema, BNP 27  Cardiology consulted  Continue IV lasix pending cardiology input  ECHO pending  Strict I&Os  Daily weights  Cardiac diet       1/3  ECHO from  21 using the MRN and  stated above:  · The left ventricle is normal in size with mild concentric hypertrophy and normal systolic function.  · Normal left ventricular diastolic function.  · The estimated PA systolic pressure is 48 mmHg.  · Normal right ventricular size with normal right ventricular systolic function.  · There is pulmonary hypertension.  · Intermediate central venous pressure (8 mmHg).  · The estimated ejection fraction is 55%.  · Mild mitral regurgitation.  · Mild tricuspid regurgitation.    Reduce  Lasix to 40 mg IV QD  Continue supplemental O2    1/4  Lasix 40 mg PO BID  Discharge home if oxygen and meds delivered      Acute pulmonary embolism  Intermediate risk PE, in bedbound patient with recent covid and elevated Dimer  Eliquis upon discharge      Morbid obesity with BMI of 50.0-59.9, adult  Educated patient on health benefits of weight loss  Advised on appropriate diet/exercise options for healthy weight loss        CKD (chronic kidney disease) stage 3, GFR 30-59 ml/min  Creatinine at 1.6 in ED - baseline appears to be around 1.1-1.4  Monitor renal function labs closely in light of diuresis  Avoid nephrotoxins as able  Monitor I&Os  Consider nephrology consult for worsening renal function       GERD (gastroesophageal reflux disease)  Continue home pepcid      Hyperlipidemia  Continue home statin  Lipid panel pending      Hypothyroidism  Resumed home synthroid  TSH pending      Functional quadriplegia  Likely secondary to BLE cellulitis, R wrist carpal tunnel syndrome and obesity  PT/OT to eval/treat  Fall precautions  Encourage weight loss  Social work consult for d/c planning and possible rehab    1/3  PT/OT as tolerated    Bilateral lower leg cellulitis  X1 month per pt - seen by outpatient wound care  Wound care consulted  BC x2 pending  Continue IV vancomycin/cefepime for now  BLE doppler u/s pending      1/3  De-Escalate Vanco and Cefepime to a Cephalosprin  Will personally see the limb when unwrapped  Blood culture from his most recent admission was not positive  Likely chronic stasis       1/4  Legs examined  No cellulitis, abscess seen: chronic stasis dermatitis. Woudn care also saw      Elevated d-dimer  Concern for pulmonary embolism (PE)  Unable to obtain CTA d/t renal function  VQ scan - intermediate probability for PE  Ddimer at 11.28 in ED  Continue titrating heparin gtt for now  ECHO pending to evaluate for R heart strain  Supplemental O2 prn   BLE venous doppler u/s pending      1/3  PE  likely patient is morbidly obese, very little activity and was covid positive on 12/19/21  Cont Heparin drip, will transtion to an oral anticoagulation after 24-48 hours of heparin gtt, Will speak with Pharmacy about NAOC in morbidly obese  See what repeat ECHO says        VTE Risk Mitigation (From admission, onward)         Ordered     apixaban tablet 5 mg  2 times daily         01/04/22 1226     apixaban tablet 10 mg  2 times daily         01/04/22 1225     Place sequential compression device  Until discontinued         01/02/22 2130                Discharge Planning   JAMES: 1/4/2022     Code Status: Full Code   Is the patient medically ready for discharge?:     Reason for patient still in hospital (select all that apply): Pending disposition  Discharge Plan A: Home with family,Home Health                  Samuel Pantoja MD  Department of Hospital Medicine   [facility] - Med Surg

## 2022-01-04 NOTE — NURSING
- called lab for collected aptt   - informed  Still no value due to QC testing and should be resulted in 20-30 mins  -informed charge (sommer) about waiting for aptt

## 2022-01-04 NOTE — PLAN OF CARE
Accepted by [facility].  [facility] aware of oxygen order.       01/04/22 1531   Post-Acute Status   Post-Acute Authorization Home Health;E   HME Status Referrals Sent   Home Health Status Set-up Complete/Auth obtained

## 2022-01-05 ENCOUNTER — TELEPHONE (OUTPATIENT)
Dept: CARDIOLOGY | Facility: CLINIC | Age: 77
End: 2022-01-05
Payer: MEDICARE

## 2022-01-05 VITALS
HEIGHT: 71 IN | SYSTOLIC BLOOD PRESSURE: 133 MMHG | RESPIRATION RATE: 17 BRPM | HEART RATE: 78 BPM | TEMPERATURE: 98 F | BODY MASS INDEX: 44.1 KG/M2 | DIASTOLIC BLOOD PRESSURE: 73 MMHG | OXYGEN SATURATION: 98 % | WEIGHT: 315 LBS

## 2022-01-05 PROCEDURE — 25000003 PHARM REV CODE 250: Mod: HCNC | Performed by: INTERNAL MEDICINE

## 2022-01-05 PROCEDURE — 25000003 PHARM REV CODE 250: Mod: HCNC | Performed by: NURSE PRACTITIONER

## 2022-01-05 PROCEDURE — 27000221 HC OXYGEN, UP TO 24 HOURS: Mod: HCNC

## 2022-01-05 PROCEDURE — 94760 N-INVAS EAR/PLS OXIMETRY 1: CPT | Mod: HCNC

## 2022-01-05 RX ORDER — POTASSIUM CHLORIDE 20 MEQ/1
40 TABLET, EXTENDED RELEASE ORAL ONCE
Status: DISCONTINUED | OUTPATIENT
Start: 2022-01-05 | End: 2022-01-05 | Stop reason: HOSPADM

## 2022-01-05 RX ADMIN — APIXABAN 10 MG: 2.5 TABLET, FILM COATED ORAL at 09:01

## 2022-01-05 RX ADMIN — HYDROCODONE BITARTRATE AND ACETAMINOPHEN 1 TABLET: 5; 325 TABLET ORAL at 12:01

## 2022-01-05 RX ADMIN — ATORVASTATIN CALCIUM 40 MG: 40 TABLET, FILM COATED ORAL at 09:01

## 2022-01-05 RX ADMIN — MICONAZOLE NITRATE: 20 POWDER TOPICAL at 09:01

## 2022-01-05 RX ADMIN — CEPHALEXIN 500 MG: 500 CAPSULE ORAL at 12:01

## 2022-01-05 RX ADMIN — SENNOSIDES AND DOCUSATE SODIUM 1 TABLET: 50; 8.6 TABLET ORAL at 09:01

## 2022-01-05 RX ADMIN — LEVOTHYROXINE SODIUM 50 MCG: 50 TABLET ORAL at 05:01

## 2022-01-05 RX ADMIN — FUROSEMIDE 40 MG: 40 TABLET ORAL at 09:01

## 2022-01-05 RX ADMIN — CEPHALEXIN 500 MG: 500 CAPSULE ORAL at 05:01

## 2022-01-05 RX ADMIN — FAMOTIDINE 20 MG: 20 TABLET ORAL at 09:01

## 2022-01-05 NOTE — HOSPITAL COURSE
1/4/22 - Diuresing overnight, SOB and edema improving; renal function stable, BP and HR stable; remains on heparin drip can be changed to eliquis treatment dose

## 2022-01-05 NOTE — NURSING
- not able to complete care plan and education due to the patient already discharged and waiting for [facility]     - informed pt. Son that there is a wait for [facility] ambulance

## 2022-01-05 NOTE — CONSULTS
[facility]  Wound Care    Patient Name:  Gene Rothman   MRN:  [id number]  Date: 1/4/2022  Diagnosis: Pulmonary edema    History:     Past Medical History:   Diagnosis Date    CHF (congestive heart failure)     Coronary artery disease        Social History     Socioeconomic History    Marital status: Single   Tobacco Use    Smoking status: Never Smoker   Substance and Sexual Activity    Alcohol use: Not Currently    Drug use: Never       Precautions:     Allergies as of 01/02/2022    (No Known Allergies)       WO Assessment Details/Treatment     Consulted to this 76 year old male patient for wound care. hx of OCVID-19, cardiomegaly, [place], hypothyroidism, HLD, arthritis, R wrist carpal tunnel syndrome, GERD, chronic lymphedema, cellulitis, CKD3 to ED with c/o gradually increasing SOB and BLE edema over the previous several weeks. He is awake and alert. BLE with hemosiderin staining, dry flaky skin, old scabbed ulcerations that are approximated. Buttocks, ischium, tahir area with chronic discoloration. Gluteal cleft with intetrigo- partial thickness wound bed that is moist and pink. Patient discharging now. Discussed with Dr Scarlett escobar dc compression for now, home health nurse may evaluate and treat as needed. Recommend barrier cream to folds including gluteal cleft.     01/04/2022

## 2022-01-05 NOTE — NURSING
Pt was discharged and picked up via [facility] in stable condition. Discharge paperwork, medications, and hearing aids were given to pt and placed in a belongings bag and transported with him. Oxygen was delivered to pts home yesterday.

## 2022-01-05 NOTE — PLAN OF CARE
[facility] - Med [facility]urg  Discharge Final Note    Primary Care Provider: Ami Zarate DO    Expected Discharge Date: 1/5/2022    Final Discharge Note (most recent)     Final Note - 01/05/22 1053        Final Note    Assessment Type Final Discharge Note     Anticipated Discharge Disposition Home-Health Care [facility]     Hospital Resources/Appts/Education Provided Provided patient/caregiver with written discharge plan information;Appointments scheduled and added to AVS        Post-Acute Status    Discharge Delays None known at this time                 Important Message from Medicare  Important Message from Medicare regarding Discharge Appeal Rights: Given to patient/caregiver,Explained to patient/caregiver,Signed/date by patient/caregiver     Date IMM was signed: 01/04/22  Time IMM was signed: 0335    Contact Info     [facility]   Specialty: DME Provider    [address]  [place] [address]   Phone: [phone]       Next Steps: Follow up    Instructions: DME [facility]   Specialty: Home Health Services    [address]  [place] [address]   Phone: [phone]       Next Steps: Follow up    Instructions: Home Health    Amy Lopez MD   Specialty: Family Medicine    [facility]  [address]  [place] [place] [facility] [address]   Phone: [phone]       Next Steps: Follow up in 1 week(s)    Henry Pimentel Md, MD   Specialty: Cardiology, Internal Medicine    [address] [place] [address]   Phone: [phone]       Next Steps: Follow up in 1 week(s)        Patient to dc home with [facility]. FU appts scheduled and added to avs. No other cm needs.

## 2022-01-05 NOTE — DISCHARGE SUMMARY
[facility] Medicine  Discharge Summary      Patient Name: Gene Rothman  MRN: [id number]  Patient Class: IP- Inpatient  Admission Date: 1/2/2022  Hospital Length of Stay: 1 days  Discharge Date and Time:  01/05/2022 7:36 PM  Attending Physician: Samuel Pantoja MD   Discharging Provider: Samuel Pantoja MD  Primary Care Provider: Amy Lopez MD      HPI:   75 y/o WM with hx of OCVID-19, cardiomegaly, [place], hypothyroidism, HLD, arthritis, R wrist carpal tunnel syndrome, GERD, chronic lymphedema, cellulitis, CKD3 to ED with c/o gradually increasing SOB and BLE edema over the previous several weeks. He reports he was admitted here from 12/03/2021-12/14/2021 for cellulitis, sepsis and BETY, then discharged to [place] from 12/14/2021-12/19/2021 until he tested positive for COVID and was sent back to the [facility] ED - he was then sent home from the ED after being deemed stable, but reports that his respiratory status has continued to decline. Symptoms are progressive and of moderate severity, without known mitigating or exacerbating factors. Denies chest pain, palpitations, wheezing, abdominal pain, N/V/D, constipation, dysuria, flank pain, HA, dizziness, fever, cough, falls, blurred vision. Found in ED to have BP of 165/74, O2 saturation of 83% and RR of 24 - pt was afebrile; WBCs 6.74, H&H 15.6&49.6, platelets 147, ferritin 212, Ddimer 11.28, Na 137, K+ 3.5, BUN 22, creatinine 1.6, GFR 41, , CRP 8.5, BNP 27, CPK 44, , troponin negative, lactic acid 1.7; EKG showed NSR with 1st degree AV block (78 BPM); COVID-19 negative, flu negative, Cxray showed pulmonary vascular congestion and cardiomegaly; VQ scan showed intermediate probability for PE. In ED the pt was given 40mg IV lasix and placed on O2 at 2L per NC, with improvement in BP to 130/67, O2 saturation to 95% and RR to 20. Hospital medicine was called and the pt was placed in observation with telemetry monitoring. Pt is a full code -  surrogate decision maker is his son, Solo [place].       * No surgery found *      Hospital Course:   1/3 Patient with a different MRN and  1945 and MRN [id number]. Under these indices he was admitted at [facility] from 12/3/21 to 21 then discharged to [place].   Apparently at [place] he tested positive to Covid and was discharged from there to this ER on 21 to get Regeneron (it looks like it was set up for outpatient), then sent home.  He returned 22 for Dyspnea. Dimer elevated 11. VQ scan intermediate probability. Covid is negative this time.    Met on 2L supplemental O2      patient seen, euvolemic, requiring 2-3L oxygen, lungs CTA with reduced entry at bases. Started on Eliquis today, this MD explained the lack of data on patients > 40 BMI with NOAC, patient okay with going on Eliquis rather than coumadin with frequent blood draws and restrictions.  Lasix 40 mg PO BID x next 3 days then 40 mg QD, home health to draw BMP, home O2 to be delivered, wound care here recommends leaving wound to open air.   Patient discharge order is in and may leave today 22 or tomorrow 22 depending on getting his home O2 and home meds delivered     22 Patient seen and stable. Home O2 delivered and his meds hardik the Eliquis delivered. Home health ordered.  Didn't discharge yesterday because of logistic reasons.  Will discharge today    Goals of Care Treatment Preferences:  Code Status: Full Code      Consults:   Consults (From admission, onward)        Status Ordering Provider     Inpatient consult to Social Work  Once        Provider:  (Not yet assigned)    GEORGES Mcclain     Inpatient consult to Cardiology  Once        Provider:  Henry Pimentel MD    Completed GEORGES HONG          No new Assessment & Plan notes have been filed under this hospital service since the last note was generated.  Service: Hospital Medicine    Final Active Diagnoses:    Diagnosis Date Noted POA     "PRINCIPAL PROBLEM:  Pulmonary edema [J81.1] 01/02/2022 Yes    Acute pulmonary embolism [I26.99] 01/04/2022 Unknown    Elevated d-dimer [R79.89] 01/02/2022 Yes    Bilateral lower leg cellulitis [L03.116, L03.115] 01/02/2022 Yes    Functional quadriplegia [R53.2] 01/02/2022 Yes    Hypothyroidism [E03.9] 01/02/2022 Yes    Hyperlipidemia [E78.5] 01/02/2022 Yes    GERD (gastroesophageal reflux disease) [K21.9] 01/02/2022 Yes    CKD (chronic kidney disease) stage 3, GFR 30-59 ml/min [N18.30] 01/02/2022 Yes    Morbid obesity with BMI of 50.0-59.9, adult [E66.01, Z68.43] 01/02/2022 Not Applicable      Problems Resolved During this Admission:       Discharged Condition: stable    Disposition: Home-Health Care [facility]    Follow Up:   Follow-up Information     [facility]    Specialty: DME Provider  Why: DME  Contact information:  [address]  [place] [address]  [phone]             [facility].    Specialty: Home Health Services  Why: Home Health  Contact information:  [address]  [place] [address]  [phone]             Amy Lopez MD In 1 week.    Specialty: Family Medicine  Contact information:  [address]  [place] [address]  [phone]             Henry Pimentel Md, MD In 1 week.    Specialties: Cardiology, Internal Medicine  Contact information:  [address]  [place] [address]  [phone]                       Patient Instructions:      OXYGEN FOR HOME USE     Order Specific Question Answer Comments   Liter Flow 3    Duration Continuous    Qualifying Test Performed at: Rest    Oxygen saturation: 86    Portable mode: continuous    Route nasal cannula    Device: home concentrator with portable tanks    Length of need (in months): 99 mos    Patient condition with qualifying saturation CHF    Height: 5' 11" (1.803 m)    Weight: 184.8 kg (407 lb 6.6 oz)    Alternative treatment measures have been tried or " considered and deemed clinically ineffective. Yes      BASIC METABOLIC PANEL   Standing Status: Future Standing Exp. Date: 03/05/23   Order Comments: Forward result to PCP  Home cali can draw     D-DIMER, QUANTITATIVE   Standing Status: Future Standing Exp. Date: 03/05/23   Order Comments: Forward to PCP     Diet Cardiac   Order Comments: Restrict fluid to less than 1.5L daily     Notify patient's primary care physician of admit     Aide to provide assistance with personal care, ADLs, and vital signs     Measure electrolytes and kidney function    Order Comments: By adding BMP within 3-5 days of change in diuretic.     Weight gain   Order Comments: For weight gain of greater than 1 pounds over 2-3 days, or 3 pounds over one week.   Increase lasix (oral diuretic) dose to 40 mg every 12 hour    Notify physician.     Activity as tolerated       Significant Diagnostic Studies: to get BMP drawn by home health    Pending Diagnostic Studies:     None         Medications:  Reconciled Home Medications:      Medication List      START taking these medications    * apixaban 5 mg Tab  Commonly known as: ELIQUIS  Take 2 tablets (10 mg total) by mouth 2 (two) times daily. 10 mg oral two times daily to end night on 1/10/22 for 7 days     * ELIQUIS DVT-PE TREAT 30D START 5 mg (74 tabs) Dspk  Generic drug: apixaban  take 2 tabs twice a day for 7 days to end on 01/10/2022  then take 1 tab twice a day thereafter     * apixaban 5 mg Tab  Commonly known as: ELIQUIS  Take 1 tablet (5 mg total) by mouth 2 (two) times daily.  Start taking on: January 11, 2022     cephALEXin 500 MG capsule  Commonly known as: KEFLEX  Take 1 capsule (500 mg total) by mouth every 6 (six) hours. for 5 days     * furosemide 40 MG tablet  Commonly known as: LASIX  take 1 tab  twice a day for 3 days then 1  daily thereafter     * furosemide 40 MG tablet  Commonly known as: LASIX  Take 1 tablet (40 mg total) by mouth 2 (two) times a day. Two times daily for 3  days starting 1/5/22 then once tablet 40 mg daily for 3 days  Start taking on: January 5, 2022         * This list has 5 medication(s) that are the same as other medications prescribed for you. Read the directions carefully, and ask your doctor or other care provider to review them with you.            CONTINUE taking these medications    acetaminophen 500 MG tablet  Commonly known as: TYLENOL  Take 500 mg by mouth every 6 (six) hours as needed for Pain.     atorvastatin 40 MG tablet  Commonly known as: LIPITOR  Take 40 mg by mouth once daily.     famotidine 20 MG tablet  Commonly known as: PEPCID  Take 20 mg by mouth 2 (two) times daily.     HYDROcodone-acetaminophen 5-325 mg per tablet  Commonly known as: NORCO  Take 1 tablet by mouth every 6 (six) hours as needed for Pain.     levothyroxine 50 MCG tablet  Commonly known as: SYNTHROID  Take 50 mcg by mouth before breakfast.     miconazole NITRATE 2 % 2 % top powder  Commonly known as: MICOTIN  Apply topically 2 (two) times daily.     polyethylene glycol 17 gram Pwpk  Commonly known as: GLYCOLAX  Take 17 g by mouth 2 (two) times daily.     SENNA WITH DOCUSATE SODIUM 8.6-50 mg per tablet  Generic drug: senna-docusate 8.6-50 mg  Take 1 tablet by mouth once daily.     traZODone 50 MG tablet  Commonly known as: DESYREL  Take 25 mg by mouth every evening.            Indwelling Lines/Drains at time of discharge:   Lines/Drains/Airways     None                 Time spent on the discharge of patient: 30 minutes         Samuel Pantoja MD  Department of Hospital Medicine  [facility] - Med Surg

## 2022-01-05 NOTE — SUBJECTIVE & OBJECTIVE
Interval History: no interval events    Review of Systems   Constitutional: Negative for fatigue and fever.   HENT: Negative for drooling, nosebleeds, tinnitus and trouble swallowing.    Respiratory: Positive for cough and shortness of breath.    Cardiovascular: Positive for leg swelling. Negative for chest pain.        Chronic leg swelling   Gastrointestinal: Negative for constipation and nausea.   Genitourinary: Negative for flank pain, frequency and urgency.   Musculoskeletal: Negative for arthralgias.   Skin:        Chronic stasis dermatitis both Lower extremities   Neurological: Negative for tremors, seizures, syncope and numbness.        Baseline wheelchair bound with very little full assistance ambulation possible   Psychiatric/Behavioral: Negative for agitation and behavioral problems.     Objective:     Vital Signs (Most Recent):  Temp: 98.3 °F (36.8 °C) (01/05/22 0723)  Pulse: 75 (01/05/22 0723)  Resp: 17 (01/05/22 0723)  BP: 133/73 (01/05/22 0723)  SpO2: 98 % (01/05/22 0723) Vital Signs (24h Range):  Temp:  [97.3 °F (36.3 °C)-99.1 °F (37.3 °C)] 98.3 °F (36.8 °C)  Pulse:  [74-88] 75  Resp:  [17-20] 17  SpO2:  [86 %-98 %] 98 %  BP: (120-142)/(58-77) 133/73     Weight: (!) 181.9 kg (401 lb 0.3 oz)  Body mass index is 55.93 kg/m².    Intake/Output Summary (Last 24 hours) at 1/5/2022 0856  Last data filed at 1/5/2022 0800  Gross per 24 hour   Intake 847.5 ml   Output 1800 ml   Net -952.5 ml      Physical Exam  Vitals reviewed.   Constitutional:       General: He is not in acute distress.     Appearance: He is obese. He is ill-appearing. He is not toxic-appearing or diaphoretic.   HENT:      Head: Normocephalic and atraumatic.      Nose: Nose normal.      Mouth/Throat:      Mouth: Mucous membranes are moist.   Eyes:      General: No scleral icterus.     Pupils: Pupils are equal, round, and reactive to light.   Cardiovascular:      Rate and Rhythm: Normal rate and regular rhythm.      Pulses: Normal pulses.       Heart sounds: No murmur heard.      Pulmonary:      Effort: Pulmonary effort is normal. No respiratory distress.      Breath sounds: No stridor. No wheezing or rhonchi.   Abdominal:      General: Abdomen is flat. There is no distension.      Palpations: Abdomen is soft. There is no mass.      Tenderness: There is no abdominal tenderness. There is no guarding.   Musculoskeletal:      Cervical back: Normal range of motion.      Comments: Both lower extremities wrapped, visible parts chronic stasis dermatitis seen   Skin:     General: Skin is warm.      Comments: Both lower extremities wrapped   Neurological:      Mental Status: He is alert and oriented to person, place, and time. Mental status is at baseline.   Psychiatric:         Mood and Affect: Mood normal.         Significant Labs:   All pertinent labs within the past 24 hours have been reviewed.  CBC:   Recent Labs   Lab 01/04/22  0557   WBC 7.00   HGB 13.8*   HCT 44.6        CMP:   Recent Labs   Lab 01/04/22  0557      K 3.5   CL 97   CO2 32*   GLU 98   BUN 22   CREATININE 1.7*   CALCIUM 8.2*   ANIONGAP 10   EGFRNONAA 38*     Cardiac Markers:   No results for input(s): CKMB, MYOGLOBIN, BNP, TROPISTAT in the last 48 hours.  Coagulation:   Recent Labs   Lab 01/04/22  0557   APTT 48.6*       Significant Imaging: as below   CXR:    FINDINGS:   Low lung volumes.  Pulmonary vascular congestion.  No pleural fluid or pneumothorax.  The heart is likely enlarged.  No significant bony findings.    Impression:       Pulmonary vascular congestion and cardiomegaly.          VQ scan:    This represents a intermediate probability  of pulmonary embolism.     Doppler veins 1/2/22:  1.  Findings most likely representing chronic recannulated nonocclusive clot in the right common femoral vein.  The right common femoral vein is partially compressible and there is flow surrounding the heterogeneous material in the common femoral vein.     2.  Negative for ultrasound  evidence for acute DVT otherwise.    Arterial Doppler from        Review of Systems   Constitutional: Negative for fatigue and fever.   HENT: Negative for drooling, nosebleeds, tinnitus and trouble swallowing.    Cardiovascular: Positive for leg swelling. Negative for chest pain.        Chronic leg swelling   Respiratory: Positive for cough and shortness of breath.    Skin:        Chronic stasis dermatitis both Lower extremities   Musculoskeletal: Negative for arthralgias.   Gastrointestinal: Negative for constipation and nausea.   Genitourinary: Negative for flank pain, frequency and urgency.   Neurological: Negative for numbness, seizures, syncope and tremors.        Baseline wheelchair bound with very little full assistance ambulation possible   Psychiatric/Behavioral: Negative for agitation and behavioral problems.       Physical Exam  Vitals reviewed.   Constitutional:       General: He is not in acute distress.     Appearance: He is obese. He is ill-appearing. He is not toxic-appearing or diaphoretic.   HENT:      Head: Normocephalic and atraumatic.      Nose: Nose normal.      Mouth/Throat:      Mouth: Mucous membranes are moist.   Eyes:      General: No scleral icterus.     Pupils: Pupils are equal, round, and reactive to light.   Cardiovascular:      Rate and Rhythm: Normal rate and regular rhythm.      Pulses: Normal pulses.      Heart sounds: No murmur heard.      Pulmonary:      Effort: Pulmonary effort is normal. No respiratory distress.      Breath sounds: No stridor. No wheezing or rhonchi.   Abdominal:      General: Abdomen is flat. There is no distension.      Palpations: Abdomen is soft. There is no mass.      Tenderness: There is no abdominal tenderness. There is no guarding.   Musculoskeletal:      Cervical back: Normal range of motion.      Comments: Both lower extremities wrapped, visible parts chronic stasis dermatitis seen   Skin:     General: Skin is warm.      Comments: Both lower  extremities wrapped   Neurological:      Mental Status: He is alert and oriented to person, place, and time. Mental status is at baseline.   Psychiatric:         Mood and Affect: Mood normal.

## 2022-01-05 NOTE — PLAN OF CARE
Problem: Adult Inpatient Plan of Care  Goal: Plan of Care Review  Outcome: Met  Goal: Patient-Specific Goal (Individualized)  Outcome: Met  Goal: Absence of Hospital-Acquired Illness or Injury  Outcome: Met  Goal: Optimal Comfort and Wellbeing  Outcome: Met  Goal: Readiness for Transition of Care  Outcome: Met     Problem: Bariatric Environmental Safety  Goal: Safety Maintained with Care  Outcome: Met     Problem: Skin Injury Risk Increased  Goal: Skin Health and Integrity  Outcome: Met     Problem: Impaired Wound Healing  Goal: Optimal Wound Healing  Outcome: Met     Problem: Fall Injury Risk  Goal: Absence of Fall and Fall-Related Injury  Outcome: Met

## 2022-01-05 NOTE — TELEPHONE ENCOUNTER
Spoke with pts wife - did not want to make appt at this time- pt's wife stated that pt could not walk and did not know how she would get him into clinic for visit  I offered a virtual but pt's wife did not know how to do virtual - pts wife she would get her son to help with the virtual and call us back. Explained importance of following up with cardio would probably be best to continue with DR garcia since he saw pt in hospital. The wife sees DR Dodson    Gave wife number to call us back to make virtual - case management also aware

## 2022-01-05 NOTE — PROGRESS NOTES
[facility]  Cardiology  Progress Note    Patient Name: Gene Rothman  MRN: [id number]  Admission Date: 1/2/2022  Hospital Length of Stay: 2 days  Code Status: Full Code   Attending Physician: Samuel Pantoja MD   Primary Care Physician: Amy Lopez MD  Expected Discharge Date: 1/5/2022  Principal Problem:Pulmonary edema    Subjective:     Hospital Course:   1/4/22 - Diuresing overnight, SOB and edema improving; renal function stable, BP and HR stable; remains on heparin drip can be changed to eliquis treatment dose      Interval History: no interval events    Review of Systems   Constitutional: Negative for fatigue and fever.   HENT: Negative for drooling, nosebleeds, tinnitus and trouble swallowing.    Respiratory: Positive for cough and shortness of breath.    Cardiovascular: Positive for leg swelling. Negative for chest pain.        Chronic leg swelling   Gastrointestinal: Negative for constipation and nausea.   Genitourinary: Negative for flank pain, frequency and urgency.   Musculoskeletal: Negative for arthralgias.   Skin:        Chronic stasis dermatitis both Lower extremities   Neurological: Negative for tremors, seizures, syncope and numbness.        Baseline wheelchair bound with very little full assistance ambulation possible   Psychiatric/Behavioral: Negative for agitation and behavioral problems.     Objective:     Vital Signs (Most Recent):  Temp: 98.3 °F (36.8 °C) (01/05/22 0723)  Pulse: 75 (01/05/22 0723)  Resp: 17 (01/05/22 0723)  BP: 133/73 (01/05/22 0723)  SpO2: 98 % (01/05/22 0723) Vital Signs (24h Range):  Temp:  [97.3 °F (36.3 °C)-99.1 °F (37.3 °C)] 98.3 °F (36.8 °C)  Pulse:  [74-88] 75  Resp:  [17-20] 17  SpO2:  [86 %-98 %] 98 %  BP: (120-142)/(58-77) 133/73     Weight: (!) 181.9 kg (401 lb 0.3 oz)  Body mass index is 55.93 kg/m².    Intake/Output Summary (Last 24 hours) at 1/5/2022 0856  Last data filed at 1/5/2022 0800  Gross per 24 hour   Intake 847.5 ml   Output 1800 ml   Net  -952.5 ml      Physical Exam  Vitals reviewed.   Constitutional:       General: He is not in acute distress.     Appearance: He is obese. He is ill-appearing. He is not toxic-appearing or diaphoretic.   HENT:      Head: Normocephalic and atraumatic.      Nose: Nose normal.      Mouth/Throat:      Mouth: Mucous membranes are moist.   Eyes:      General: No scleral icterus.     Pupils: Pupils are equal, round, and reactive to light.   Cardiovascular:      Rate and Rhythm: Normal rate and regular rhythm.      Pulses: Normal pulses.      Heart sounds: No murmur heard.      Pulmonary:      Effort: Pulmonary effort is normal. No respiratory distress.      Breath sounds: No stridor. No wheezing or rhonchi.   Abdominal:      General: Abdomen is flat. There is no distension.      Palpations: Abdomen is soft. There is no mass.      Tenderness: There is no abdominal tenderness. There is no guarding.   Musculoskeletal:      Cervical back: Normal range of motion.      Comments: Both lower extremities wrapped, visible parts chronic stasis dermatitis seen   Skin:     General: Skin is warm.      Comments: Both lower extremities wrapped   Neurological:      Mental Status: He is alert and oriented to person, place, and time. Mental status is at baseline.   Psychiatric:         Mood and Affect: Mood normal.         Significant Labs:   All pertinent labs within the past 24 hours have been reviewed.  CBC:   Recent Labs   Lab 01/04/22  0557   WBC 7.00   HGB 13.8*   HCT 44.6        CMP:   Recent Labs   Lab 01/04/22  0557      K 3.5   CL 97   CO2 32*   GLU 98   BUN 22   CREATININE 1.7*   CALCIUM 8.2*   ANIONGAP 10   EGFRNONAA 38*     Cardiac Markers:   No results for input(s): CKMB, MYOGLOBIN, BNP, TROPISTAT in the last 48 hours.  Coagulation:   Recent Labs   Lab 01/04/22  0557   APTT 48.6*       Significant Imaging: as below   CXR:    FINDINGS:   Low lung volumes.  Pulmonary vascular congestion.  No pleural fluid or  pneumothorax.  The heart is likely enlarged.  No significant bony findings.    Impression:       Pulmonary vascular congestion and cardiomegaly.          VQ scan:    This represents a intermediate probability  of pulmonary embolism.     Doppler veins 1/2/22:  1.  Findings most likely representing chronic recannulated nonocclusive clot in the right common femoral vein.  The right common femoral vein is partially compressible and there is flow surrounding the heterogeneous material in the common femoral vein.     2.  Negative for ultrasound evidence for acute DVT otherwise.    Arterial Doppler from        Review of Systems   Constitutional: Negative for fatigue and fever.   HENT: Negative for drooling, nosebleeds, tinnitus and trouble swallowing.    Cardiovascular: Positive for leg swelling. Negative for chest pain.        Chronic leg swelling   Respiratory: Positive for cough and shortness of breath.    Skin:        Chronic stasis dermatitis both Lower extremities   Musculoskeletal: Negative for arthralgias.   Gastrointestinal: Negative for constipation and nausea.   Genitourinary: Negative for flank pain, frequency and urgency.   Neurological: Negative for numbness, seizures, syncope and tremors.        Baseline wheelchair bound with very little full assistance ambulation possible   Psychiatric/Behavioral: Negative for agitation and behavioral problems.       Physical Exam  Vitals reviewed.   Constitutional:       General: He is not in acute distress.     Appearance: He is obese. He is ill-appearing. He is not toxic-appearing or diaphoretic.   HENT:      Head: Normocephalic and atraumatic.      Nose: Nose normal.      Mouth/Throat:      Mouth: Mucous membranes are moist.   Eyes:      General: No scleral icterus.     Pupils: Pupils are equal, round, and reactive to light.   Cardiovascular:      Rate and Rhythm: Normal rate and regular rhythm.      Pulses: Normal pulses.      Heart sounds: No murmur  heard.      Pulmonary:      Effort: Pulmonary effort is normal. No respiratory distress.      Breath sounds: No stridor. No wheezing or rhonchi.   Abdominal:      General: Abdomen is flat. There is no distension.      Palpations: Abdomen is soft. There is no mass.      Tenderness: There is no abdominal tenderness. There is no guarding.   Musculoskeletal:      Cervical back: Normal range of motion.      Comments: Both lower extremities wrapped, visible parts chronic stasis dermatitis seen   Skin:     General: Skin is warm.      Comments: Both lower extremities wrapped   Neurological:      Mental Status: He is alert and oriented to person, place, and time. Mental status is at baseline.   Psychiatric:         Mood and Affect: Mood normal.           Assessment and Plan:       * Pulmonary edema  Continue diuresis, transition to PO once euvolemic   Replete K > 4, Mg > 2    Morbid obesity with BMI of 50.0-59.9, adult  Needs weight loss with diet/exercise     CKD (chronic kidney disease) stage 3, GFR 30-59 ml/min  Cont to monitor     Hyperlipidemia  Cont statin     Hypothyroidism  Cont Synthroid, TSH 2.9    Bilateral lower leg cellulitis  Cont tx per primary team     Elevated d-dimer  Likely sec to PE with h/o COVID, intermediate prob VQ and severe RV dysfunction/dilation  Cont heparin drip, transition to treatment dose Eliquis 10mg BID if no further procedures/interventions planned  Would repeat D dimer in 4 weeks to ensure treatment but continue treatment min 3 months if not lifelong         VTE Risk Mitigation (From admission, onward)         Ordered     apixaban tablet 5 mg  2 times daily         01/04/22 1226     apixaban tablet 10 mg  2 times daily         01/04/22 1225     Place sequential compression device  Until discontinued         01/02/22 9235                Henry Pimentel Md, MD  Cardiology  [facility]

## 2022-01-06 ENCOUNTER — LAB VISIT (OUTPATIENT)
Dept: LAB | Facility: HOSPITAL | Age: 77
End: 2022-01-06
Attending: INTERNAL MEDICINE
Payer: MEDICARE

## 2022-01-06 ENCOUNTER — EXTERNAL HOME HEALTH (OUTPATIENT)
Dept: HOME HEALTH SERVICES | Facility: HOSPITAL | Age: 77
End: 2022-01-06
Payer: MEDICARE

## 2022-01-06 DIAGNOSIS — U07.1 CLINICAL DIAGNOSIS OF COVID-19: Primary | ICD-10-CM

## 2022-01-06 LAB
ANION GAP SERPL CALC-SCNC: 10 MMOL/L (ref 8–16)
BUN SERPL-MCNC: 28 MG/DL (ref 8–23)
CALCIUM SERPL-MCNC: 8.8 MG/DL (ref 8.7–10.5)
CHLORIDE SERPL-SCNC: 97 MMOL/L (ref 95–110)
CO2 SERPL-SCNC: 30 MMOL/L (ref 23–29)
CREAT SERPL-MCNC: 1.6 MG/DL (ref 0.5–1.4)
EST. GFR  (AFRICAN AMERICAN): 47 ML/MIN/1.73 M^2
EST. GFR  (NON AFRICAN AMERICAN): 41 ML/MIN/1.73 M^2
GLUCOSE SERPL-MCNC: 128 MG/DL (ref 70–110)
POTASSIUM SERPL-SCNC: 3.3 MMOL/L (ref 3.5–5.1)
SODIUM SERPL-SCNC: 137 MMOL/L (ref 136–145)

## 2022-01-06 PROCEDURE — 80048 BASIC METABOLIC PNL TOTAL CA: CPT | Mod: HCNC | Performed by: INTERNAL MEDICINE

## 2022-01-06 PROCEDURE — 36415 COLL VENOUS BLD VENIPUNCTURE: CPT | Mod: HCNC | Performed by: INTERNAL MEDICINE

## 2022-01-07 ENCOUNTER — TELEPHONE (OUTPATIENT)
Dept: INTERNAL MEDICINE | Facility: CLINIC | Age: 77
End: 2022-01-07
Payer: MEDICARE

## 2022-01-07 ENCOUNTER — PES CALL (OUTPATIENT)
Dept: HOME HEALTH SERVICES | Facility: CLINIC | Age: 77
End: 2022-01-07
Payer: MEDICARE

## 2022-01-07 NOTE — TELEPHONE ENCOUNTER
Pt has a f/u with office on 1/10/22. Pt wouldn't be able to come into office unless they call EMS to bring him. Would like to know if an audio is possible for him to do. PT will need an order for home health aid per Pallavi with [facility]. Dr. Zarate is out of the office at this time. Please advise.

## 2022-01-07 NOTE — TELEPHONE ENCOUNTER
----- Message from Alondra Floyd sent at 1/7/2022  1:44 PM CST -----  Contact: Pallavi with [facility]  Pt was just discharged from hospital. Pt needs a home health aid. Pt is refusing to get new medication filled at pharmacy. [phone]

## 2022-01-08 LAB
BACTERIA BLD CULT: NORMAL
BACTERIA BLD CULT: NORMAL

## 2022-01-10 NOTE — TELEPHONE ENCOUNTER
Virtual visit is okay. Please schedule. He would need to be seen since I have not seen him in nearly three years.

## 2022-01-11 NOTE — PHYSICIAN QUERY
"PT Name: Gene Rothman  MR #: [id number]    DOCUMENTATION CLARIFICATION      CDS: Ольга Stoll RN, CCDS            Contact Information: [email or web address]    This form is a permanent document in the medical record.    Query Date: January 11, 2022    By submitting this query, we are merely seeking further clarification of documentation. Please utilize your independent clinical judgment when addressing the question(s) below.    The medical record contains the following:   Indicators   Supporting Clinical Findings Location in Medical Record    x "Pulmonary Edema"  Pulmonary Edema Suspicion for new onset CHF      Cardiology consulted for CHF   Assessment/Plan: Pulmonary Edema    1/3 H&P, NP      1/3 Cardiology, Dr. Loren pablo Wheezing, Productive cough, SOB, MAXWELL, Use of accessory muscles  c/o gradually increasing SOB and BLE edema over the previous several weeks...Respiratory status continued to decline...he returned 1/2/22 for dyspnea       1/3 H&P, NP [place] [facility] Radiology Findings  Cxray showed pulmonary vascular congestion     ECHO with grade 1 DD, normal EF but severely dilated RV with severely decreased function with pulm HTN noted.  · The left ventricle is normal in size with concentric remodeling and normal systolic function.  · The estimated ejection fraction is 55%.  · Grade I left ventricular diastolic dysfunction.  · Severe right ventricular enlargement with moderately to severely reduced right ventricular systolic function.  · Right atrial enlargement.  · Mild to moderate tricuspid regurgitation.  · Normal central venous pressure (3 mmHg).  · The estimated PA systolic pressure is 43 mmHg.    · There is pulmonary hypertension.    1/3 H&P, NP      1/3 Dr. Loren Franklin     CHF documented/Respiratory Failure documented             x Respiratory condition  Hx of COVID-19     Pulmonary hypertension  1/3 H&P, HERON Stuart     1/3 Dr. Loren Franklin      x RR                  PaO2              "     PaCO2                     O2 sat  1/3 1325: R 18, SpO2 82%   1/4 0725: R 18, SpO2 91% on 2L NC   1/4 1300: SpO2 86% on Room Air-Home O2 Eval   1/4 1304: SpO2 94% on 3L NC  Vitals    x Treatment:  40mg IV lasix and placed on O2 at 2L per NC   Continue IV lasix pending cardiology input   ECHO pending   Strict I&Os   Daily weights   Cardiac diet      Lasix 40 mg PO BID  1/3 H&P, NP                1/4 , Dr. Scarlett pablo Supplemental O2:  Home O2 delivered     1/4 DCS, Dr. Pantoja    Oxygen dependence      x Other:  BNP 27     Diuresing overnight, SOB and edema improving    1/3 H&P, NP      1/5 Cardiology, Dr. Pimentel       Provider, please clarify the Pulmonary Edema diagnosis associated with above clinical findings.    [ x   ] Acute pulmonary edema, cardiac cause: New onset CHF (please specify acuity and type): _Pulmonary edema due to acute decompensation of left ventricular diastolic heart dysfunction___________      [    ] Acute pulmonary edema, non-cardiac cause: Pulmonary Hypertension     [    ] Acute pulmonary edema, cardiac cause (please specify cardiac condition): ___________________     [    ] Acute pulmonary edema, non-cardiac cause (please specify causative condition): ___________________     [    ] Other respiratory diagnosis (please specify): _________________________________      [   ] Clinically Undetermined       Please document in your progress notes daily for the duration of treatment, until resolved, and include in your discharge summary.

## 2022-01-11 NOTE — PHYSICIAN QUERY
PT Name: Gene Rothman  MR #: [id number]     DOCUMENTATION CLARIFICATION     CDS: Ольга Stoll RN, CCDS            Contact Information: [email or web address]    This form is a permanent document in the medical record.     Query Date: January 11, 2022    By submitting this query, we are merely seeking further clarification of documentation.  Please utilize your independent clinical judgment when addressing the question(s) below.  The Medical Record contains the following   Indicators   Supporting Clinical Findings Location in Medical Record    x SOB, MAXWELL, Wheezing, Productive Cough, Use of Accessory Muscles, etc.  c/o gradually increasing SOB and BLE edema over the previous several weeks...Respiratory status continued to decline...he returned 1/2/22 for dyspnea...positive for shortness of breath...No respiratory distress     Shortness of breath2L/min      Shortness of breath3L/min    1/3 H&P, NP Narciso           1/3 0300 RN Respiratory Assessment     1/5 0723 RN Respiratory Assessment    x RR         ABGs         O2 sat  1/3 1325: R 18, SpO2 82%   1/3 1635: R 18, SpO2 92%   1/3 1952: R 18, SpO2 99% on 2L NC   1/4 0725: R 18, SpO2 91% on 2L NC   1/4 1300: SpO2 86% on Room Air-Home O2 Eval   1/4 1304: SpO2 94% on 3L NC    Vitals    x Hypoxia/Hypercapnia  Acute respiratory failure with hypoxia     1/2 ED, Dr. Pool    BiPAP/Intubation/Mechanical Ventilation      x Supplemental O2  Placed on O2 at 2L per NC     Requiring 2-3L oxygen    1/3 H&P, HERON Stuart     1/4 DCS, Dr. Pantoja    x Home O2, Oxygen Dependence  Home O2 delivered   1/4 [facility], Dr. Pantoja      x Respiratory distress or failure  Acute respiratory failure with hypoxia     1/2 ED, Dr. Pool    x Radiology findings  Cxray showed pulmonary vascular congestion    1/3 H&P, HERON Stuart    x Acute/Chronic Illness  Hx of OCVID-19, cardiomegaly...HLD...chronic lymphedema, cellulitis, CKD3 to ED with c/o gradually increasing SOB and BLE edema over the previous several  weeks    1/3 H&P, NP     x Treatment  Requiring 2-3L oxygen     40mg IV lasix and placed on O2 at 2L per NC   Continue IV lasix pending cardiology input   ECHO pending   Strict I&Os   Daily weights       Lasix 40 mg PO BID  1/4 DCS, Dr. Pantoja     1/3 H&P, NP                      1/4 , Dr. Pantoja    Other         The noted clinical guidelines are only system guidelines and do not replace the providers clinical judgment.    Provider, please specify the diagnosis or diagnoses associated with above clinical findings.   [    ] Chronic Respiratory Failure with Hypoxia - Continuous home oxygen use   [    ] Chronic Respiratory Failure, unspecified whether with hypoxia or hypercapnia   [    ] Hypoxia Only   [    ] Respiratory Failure ruled out    [  x  ] Other Respiratory Diagnosis (please specify): __Acute respiratory failure due to hypoxia_______________   [   ] Clinically Undetermined       Please document in your progress notes daily for the duration of treatment until resolved and include in your discharge summary.       Form No. [id number]

## 2022-01-18 ENCOUNTER — OFFICE VISIT (OUTPATIENT)
Dept: INTERNAL MEDICINE | Facility: CLINIC | Age: 77
End: 2022-01-18
Payer: MEDICARE

## 2022-01-18 DIAGNOSIS — L03.116 BILATERAL LOWER LEG CELLULITIS: ICD-10-CM

## 2022-01-18 DIAGNOSIS — L03.115 BILATERAL LOWER LEG CELLULITIS: ICD-10-CM

## 2022-01-18 DIAGNOSIS — U07.1 COVID-19: ICD-10-CM

## 2022-01-18 DIAGNOSIS — N18.30 STAGE 3 CHRONIC KIDNEY DISEASE, UNSPECIFIED WHETHER STAGE 3A OR 3B CKD: Primary | Chronic | ICD-10-CM

## 2022-01-18 DIAGNOSIS — J81.1 CHRONIC PULMONARY EDEMA: ICD-10-CM

## 2022-01-18 DIAGNOSIS — R53.2 FUNCTIONAL QUADRIPLEGIA: ICD-10-CM

## 2022-01-18 DIAGNOSIS — I26.99 ACUTE PULMONARY EMBOLISM, UNSPECIFIED PULMONARY EMBOLISM TYPE, UNSPECIFIED WHETHER ACUTE COR PULMONALE PRESENT: ICD-10-CM

## 2022-01-18 PROCEDURE — 1159F MED LIST DOCD IN RCRD: CPT | Mod: HCNC,CPTII,95, | Performed by: PHYSICIAN ASSISTANT

## 2022-01-18 PROCEDURE — 1160F RVW MEDS BY RX/DR IN RCRD: CPT | Mod: HCNC,CPTII,95, | Performed by: PHYSICIAN ASSISTANT

## 2022-01-18 PROCEDURE — 1159F PR MEDICATION LIST DOCUMENTED IN MEDICAL RECORD: ICD-10-PCS | Mod: HCNC,CPTII,95, | Performed by: PHYSICIAN ASSISTANT

## 2022-01-18 PROCEDURE — 99443 PR PHYSICIAN TELEPHONE EVALUATION 21-30 MIN: CPT | Mod: HCNC,95,, | Performed by: PHYSICIAN ASSISTANT

## 2022-01-18 PROCEDURE — 1160F PR REVIEW ALL MEDS BY PRESCRIBER/CLIN PHARMACIST DOCUMENTED: ICD-10-PCS | Mod: HCNC,CPTII,95, | Performed by: PHYSICIAN ASSISTANT

## 2022-01-18 PROCEDURE — 99443 PR PHYSICIAN TELEPHONE EVALUATION 21-30 MIN: ICD-10-PCS | Mod: HCNC,95,, | Performed by: PHYSICIAN ASSISTANT

## 2022-01-18 NOTE — PROGRESS NOTES
Subjective:      Patient ID: Gene Rothman is a 76 y.o. male.    Chief Complaint: No chief complaint on file.    The patient location is: [place]   The chief complaint leading to consultation is: hospital f/u    Visit type: audio only    Face to Face time with patient: 11:26-11:36  25 minutes of total time spent on the encounter, which includes face to face time and non-face to face time preparing to see the patient (eg, review of tests), Obtaining and/or reviewing separately obtained history, Documenting clinical information in the electronic or other health record, Independently interpreting results (not separately reported) and communicating results to the patient/family/caregiver, or Care coordination (not separately reported).     Each patient to whom he or she provides medical services by telemedicine is:  (1) informed of the relationship between the physician and patient and the respective role of any other health care provider with respect to management of the patient; and (2) notified that he or she may decline to receive medical services by telemedicine and may withdraw from such care at any time.    Notes: Patient is new to me, being seen today for hospital follow up.      Admitted on 1/2/21 for COVID (originally diagnosed 12/19), acute resp failure, elevated d-dimer, pulm edema, suspected pulm embolism.  Discharged 1/5 on home oxygen, home meds (+elisquis) and home health.    Feeling alright at home, denies resp symptoms  [facility] OT/PT    BMP 1/6, K 3.3     [facility] appt with [facility] on 1/21     Cardio appt scheduled for 2/28     Last visit with PCP April 2019.    Review of Systems   Constitutional: Negative for chills, diaphoresis and fever.   HENT: Negative for congestion, rhinorrhea and sore throat.    Respiratory: Negative for cough, shortness of breath and wheezing.         On O2, 95-96%   Cardiovascular: Negative for chest pain, palpitations and leg swelling.    Gastrointestinal: Negative for abdominal pain, constipation, diarrhea, nausea and vomiting.   Skin: Negative for rash.   Neurological: Negative for dizziness, light-headedness and headaches.       Objective:   There were no vitals taken for this visit.  Physical Exam   Video access unavailable  Assessment:      1. Stage 3 chronic kidney disease, unspecified whether stage 3a or 3b CKD    2. Acute pulmonary embolism, unspecified pulmonary embolism type, unspecified whether acute cor pulmonale present    3. COVID-19    4. Functional quadriplegia    5. Chronic pulmonary edema    6. Bilateral lower leg cellulitis       Plan:   Stage 3 chronic kidney disease, unspecified whether stage 3a or 3b CKD    Acute pulmonary embolism, unspecified pulmonary embolism type, unspecified whether acute cor pulmonale present  -     Ambulatory referral/consult to Hematology / Oncology; Future; Expected date: 01/26/2022    COVID-19    Functional quadriplegia    Chronic pulmonary edema    Bilateral lower leg cellulitis      Keep appt on Friday with [facility]    Will discuss with PCP whether Hem/Pulm f/u is appropriate regarding PE    Discussed worsening signs/symptoms and when to return to clinic or go to ED.   Patient expresses understanding and agrees with treatment plan.

## 2022-01-18 NOTE — Clinical Note
Informational only Patient with audio visit for hospital follow, has not been since by PCP since 2019.  Given his significant medical history, do not feel audio is adequate for evaluation.  However, I wanted to ensure he was stable.  He reports feeling fine.  Denies resp complaint at this time, is on home O.  Currently on elisquis for PE.  Thankfully, he is scheduled for [facility] with you visit later this week, I have advised him of such.  I have messaged [facility] to see if this is an option for patient's care in the future.  Please CC PCP on your visit later this week if possible, thank you!
Patient with audio visit for hospital follow, has not been since by you since 2019.  It looks like in previous encounter you ok'd virtual visit and stated he needed to be seen, however, this was strictly audio.  Given his significant medical history, do not feel audio is adequate for evaluation.  However, I wanted to ensure he was stable.  He reports feeling fine.  Denies resp complaint at thsi time, is on home O.  Currently on elisquis for PE.  Thankfully, he is scheduled for [facility] visit later this week.  I will CC that provider on today's visit as well and ask that she loop you in on her visit as well.    Would he need to see Pulm/Hem for PE follow up?    I will message Dayanna too in regards to possible [facility] since it appears he can not come in office 
Please schedule f/u with Hem/Onc for PE, thank you!
Would patient be candidate for [facility]?
You can access the [facility] Patient Portal, offered by [facility], by registering with the following website: [email or web address]

## 2022-01-19 ENCOUNTER — DOCUMENT SCAN (OUTPATIENT)
Dept: HOME HEALTH SERVICES | Facility: HOSPITAL | Age: 77
End: 2022-01-19
Payer: MEDICARE

## 2022-01-19 ENCOUNTER — TELEPHONE (OUTPATIENT)
Dept: INTERNAL MEDICINE | Facility: CLINIC | Age: 77
End: 2022-01-19

## 2022-01-19 NOTE — TELEPHONE ENCOUNTER
Spoke with pt and his wife, they both stated he is bed written and can not get out of the bed and can not get his wheelchair through the door

## 2022-01-19 NOTE — TELEPHONE ENCOUNTER
----- Message from Cassidy Dickerson PA-C sent at 1/19/2022  9:15 AM CST -----  Please schedule f/u with Hem/Onc for PE, thank you!

## 2022-01-20 NOTE — TELEPHONE ENCOUNTER
Tried reaching patient to schedule a HV with [facility]. A message was left for him to call the office back to schedule.

## 2022-01-21 ENCOUNTER — CARE AT HOME (OUTPATIENT)
Dept: HOME HEALTH SERVICES | Facility: CLINIC | Age: 77
End: 2022-01-21
Payer: MEDICARE

## 2022-01-21 DIAGNOSIS — I26.99 ACUTE PULMONARY EMBOLISM, UNSPECIFIED PULMONARY EMBOLISM TYPE, UNSPECIFIED WHETHER ACUTE COR PULMONALE PRESENT: ICD-10-CM

## 2022-01-21 DIAGNOSIS — J96.01 ACUTE RESPIRATORY FAILURE WITH HYPOXIA: ICD-10-CM

## 2022-01-21 DIAGNOSIS — E66.01 MORBID OBESITY WITH BMI OF 60.0-69.9, ADULT: Primary | Chronic | ICD-10-CM

## 2022-01-21 DIAGNOSIS — I73.9 PERIPHERAL VASCULAR DISEASE, UNSPECIFIED: ICD-10-CM

## 2022-01-21 DIAGNOSIS — R53.2 FUNCTIONAL QUADRIPLEGIA: ICD-10-CM

## 2022-01-21 PROCEDURE — 99499 RISK ADDL DX/OHS AUDIT: ICD-10-PCS | Mod: S$GLB,,, | Performed by: NURSE PRACTITIONER

## 2022-01-21 PROCEDURE — 99499 UNLISTED E&M SERVICE: CPT | Mod: S$GLB,,, | Performed by: NURSE PRACTITIONER

## 2022-01-21 PROCEDURE — 99350 PR HOME VISIT,ESTAB PATIENT,LEVEL IV: ICD-10-PCS | Mod: ,,, | Performed by: NURSE PRACTITIONER

## 2022-01-21 PROCEDURE — 99350 HOME/RES VST EST HIGH MDM 60: CPT | Mod: ,,, | Performed by: NURSE PRACTITIONER

## 2022-01-21 PROCEDURE — 1111F PR DISCHARGE MEDS RECONCILED W/ CURRENT OUTPATIENT MED LIST: ICD-10-PCS | Mod: CPTII,,, | Performed by: NURSE PRACTITIONER

## 2022-01-21 PROCEDURE — 1111F DSCHRG MED/CURRENT MED MERGE: CPT | Mod: CPTII,,, | Performed by: NURSE PRACTITIONER

## 2022-01-23 VITALS
TEMPERATURE: 98 F | DIASTOLIC BLOOD PRESSURE: 60 MMHG | SYSTOLIC BLOOD PRESSURE: 128 MMHG | HEART RATE: 63 BPM | RESPIRATION RATE: 20 BRPM | OXYGEN SATURATION: 97 %

## 2022-01-23 NOTE — PROGRESS NOTES
[facility]  Transition of Care Home Visit    Visit Date: 1/23/2022  Encounter Provider: Lilian SANCHEZ  PCP:  Ami Zarate DO    PRESENTING HISTORY      Patient ID: Gene Rothman is a 76 y.o. male.    Consult Requested By:  Zhane Davis  Reason for Consult:  Hospital Follow Up and established care    Gene is being seen at home due to physical debility that presents a taxing effort to leave the home, to mitigate high risk of hospital readmission and/or due to the limited availability of reliable or safe options for transportation to the point of access to the provider. Prior to treatment on this visit the chart was reviewed and patient verbal consent was obtained.      Chief Complaint: Transitional Care, Follow-up, and Establish Care    Patient was admitted to hospital on 01/02 and discharged on 01/05    History of Present Illness: Mr. Gene Rothman is a 76 y.o. male who was recently admitted to the hospital with a hx of OCVID-19, cardiomegaly, [place], hypothyroidism, HLD, arthritis, R wrist carpal tunnel syndrome, GERD, chronic lymphedema, cellulitis, CKD3 to ED with c/o gradually increasing SOB and BLE edema over the previous several weeks. He reports he was admitted here from 12/03/2021-12/14/2021 for cellulitis, sepsis and BETY, then discharged to [facility] from 12/14/2021-12/19/2021 until he tested positive for COVID and was sent back to the [facility] ED - he was then sent home from the ED after being deemed stable, but reports that his respiratory status has continued to decline. Symptoms are progressive and of moderate severity, without known mitigating or exacerbating factors. Denies chest pain, palpitations, wheezing, abdominal pain, N/V/D, constipation, dysuria, flank pain, HA, dizziness, fever, cough, falls, blurred vision. Found in ED to have BP of 165/74, O2 saturation of 83% and RR of 24 - pt was afebrile; WBCs 6.74, H&H 15.6&49.6, platelets 147, ferritin 212, Ddimer 11.28, Na 137, K+ 3.5, BUN  22, creatinine 1.6, GFR 41, , CRP 8.5, BNP 27, CPK 44, , troponin negative, lactic acid 1.7; EKG showed NSR with 1st degree AV block (78 BPM); COVID-19 negative, flu negative, Cxray showed pulmonary vascular congestion and cardiomegaly; VQ scan showed intermediate probability for PE. In ED the pt was given 40mg IV lasix and placed on O2 at 2L per NC, with improvement in BP to 130/67, O2 saturation to 95% and RR to 20. Hospital medicine was called and the pt was placed in observation with telemetry monitoring. Pt is a full code - surrogate decision maker is his son, [place].       ___________________________________________________________________    Today:    HPI:  Patient is being seen for follow up from hospital stay and is also an established patient with [facility]. Upon arrival patient was asleep in bed. Patient awakens with vigorous physical stimuli. Wife was present for the visit but slept most of the visit. See hospital course for details of last hospitalization.     Review of Systems   Constitutional: Positive for activity change and fatigue. Negative for appetite change.        Patient is bedbound since discharge from hospital   HENT: Negative for congestion, rhinorrhea and sore throat.    Respiratory: Negative for cough, chest tightness and shortness of breath.         Patient on 3L NC continuous   Cardiovascular: Positive for leg swelling. Negative for chest pain and palpitations.   Gastrointestinal: Negative.    Genitourinary: Negative for difficulty urinating.   Musculoskeletal: Positive for arthralgias, gait problem and myalgias.   Skin: Negative for wound.   Neurological: Positive for weakness. Negative for dizziness and light-headedness.   Psychiatric/Behavioral: Negative for agitation and confusion.       Assessments:  · Environmental: Patient and family live in a single story dwelling with no steps at entrance, house is in disarray and smells of multiple animals,  there is clutter and lighting and temperature are adequate  · Functional Status: Patient is bedbound and needs help with all of his ADLs  · Safety: Fall Precautions, Covid precautions  · Nutritional: adequate food in the home  · Home Health: [facility]  · DME/Supplies: wheelchair, hospital bed, oxygen concentrator, walker    PAST HISTORY:     Past Medical History:   Diagnosis Date    Arthritis     CHF (congestive heart failure)     Chronic kidney disease     Coronary artery disease     Depression     Hypertension     Hypothyroidism     Lymphedema of lower extremity     Nephrolithiasis     Obesity     Peripheral vascular disease     Recurrent cellulitis of lower leg     Sleep apnea     can't stand the CPAP , sleeps elevated in hospital bed or chair    Tobacco dependence     resolved       Past Surgical History:   Procedure Laterality Date    ADENOIDECTOMY      BACK SURGERY  ;  1976    x2 for disc; scar tissue removed    debridement of bilateral leg ulcers Bilateral 2017    Dr Hernandez    INNER EAR SURGERY Bilateral     separate - pinnaplasty , new eardrms constructed    KIDNEY STONE SURGERY Left  approx    open    TONSILLECTOMY         Family History   Problem Relation Age of Onset    Asthma Daughter          26 w/ asthma    Cancer Brother         skin-part of ext ear removed    Cancer Mother         ? abd also    Diabetes Mother     Hypertension Mother     Cancer Father         ? abdonimal origin stomach/liver or pancreas    Heart disease Father         pacer    Heart disease Brother         CABG3    Alcohol abuse Maternal Grandfather     Stroke Neg Hx     COPD Neg Hx     Mental illness Neg Hx     Mental retardation Neg Hx     Kidney disease Neg Hx        Social History     Socioeconomic History    Marital status: Single   Tobacco Use    Smoking status: Never Smoker    Smokeless tobacco: Never Used    Tobacco comment: chain smoker heavy x 10  years, lighter x 10 before    Substance and Sexual Activity    Alcohol use: Not Currently    Drug use: Never    Sexual activity: Never   Social History Narrative    ** Merged History Encounter **          Lives with wife and 2 sons and a daughter. No longer drives. Quit in 2012 because couldn't hold foot on pedal. 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 Uses a Rolator at wo    rk and wheelchair at home. No caffeine. Does not have a Living Will or Advanced Directive.         MEDICATIONS & ALLERGIES:     Current Outpatient Medications on File Prior to Visit   Medication Sig Dispense Refill    acetaminophen (TYLENOL) 500 MG tablet Take 500 mg by mouth every 6 (six) hours as needed for Pain.      acetaminophen (TYLENOL) 500 MG tablet Take 500 mg by mouth every 6 (six) hours as needed for Pain.      apixaban (ELIQUIS) 5 mg (74 tabs) DsPk take 2 tabs twice a day for 7 days to end on 01/10/2022  then take 1 tab twice a day thereafter 74 tablet 1    apixaban (ELIQUIS) 5 mg Tab Take 1 tablet (5 mg total) by mouth 2 (two) times daily. 60 tablet 1    atorvastatin (LIPITOR) 40 MG tablet Take 1 tablet (40 mg total) by mouth once daily. 90 tablet 3    atorvastatin (LIPITOR) 40 MG tablet Take 40 mg by mouth once daily.      famotidine (PEPCID) 20 MG tablet Take 1 tablet (20 mg total) by mouth 2 (two) times daily. 60 tablet 11    famotidine (PEPCID) 20 MG tablet Take 20 mg by mouth 2 (two) times daily.      furosemide (LASIX) 40 MG tablet take 1 tab  twice a day for 3 days then 1  daily thereafter 60 tablet 1    HYDROcodone-acetaminophen (NORCO) 5-325 mg per tablet Take 1 tablet by mouth every 6 (six) hours as needed for Pain. 12 tablet 0    HYDROcodone-acetaminophen (NORCO) 5-325 mg per tablet Take 1 tablet by mouth every 6 (six) hours as needed for Pain.      levothyroxine (SYNTHROID) 50 MCG tablet TAKE 1 TABLET (50 MCG TOTAL) BY  MOUTH BEFORE BREAKFAST. 90 tablet 0    levothyroxine (SYNTHROID) 50 MCG tablet Take 50 mcg by mouth before breakfast.      metronidazole 1% (METROGEL) 1 % Gel Apply topically 2 (two) times a day.      miconazole NITRATE 2 % (MICOTIN) 2 % top powder Apply topically 2 (two) times daily. Intertrigo to lower abdomen, groin, periarea: please apply Antifungal bid  0    miconazole NITRATE 2 % (MICOTIN) 2 % top powder Apply topically 2 (two) times daily.      miconazole nitrate 2% (MICOTIN) 2 % Oint Apply topically 2 (two) times daily. Intertrigo to lower abdomen, groin, periarea: please apply Antifungal bid  0    polyethylene glycol (GLYCOLAX) 17 gram PwPk Take 17 g by mouth 2 (two) times daily.  0    polyethylene glycol (GLYCOLAX) 17 gram PwPk Take 17 g by mouth 2 (two) times daily.      senna-docusate 8.6-50 mg (PERICOLACE) 8.6-50 mg per tablet Take 1 tablet by mouth once daily.      senna-docusate 8.6-50 mg (SENNA WITH DOCUSATE SODIUM) 8.6-50 mg per tablet Take 1 tablet by mouth once daily.      traZODone (DESYREL) 50 MG tablet Take 25 mg by mouth every evening.       No current facility-administered medications on file prior to visit.        Review of patient's allergies indicates:   Allergen Reactions    Bactrim [sulfamethoxazole-trimethoprim] Itching       OBJECTIVE:     Vital Signs:  Vitals:    01/21/22 1130   BP: 128/60   Pulse: 63   Resp: 20   Temp: 97.5 °F (36.4 °C)     There is no height or weight on file to calculate BMI.     Physical Exam:  Physical Exam  Constitutional:       General: He is not in acute distress.     Appearance: He is obese.   HENT:      Head: Normocephalic and atraumatic.      Nose: Nose normal.      Mouth/Throat:      Mouth: Mucous membranes are moist.   Eyes:      Pupils: Pupils are equal, round, and reactive to light.   Cardiovascular:      Rate and Rhythm: Normal rate and regular rhythm.      Pulses: Normal pulses.      Heart sounds: No murmur heard.  No friction rub. No  gallop.    Pulmonary:      Effort: Pulmonary effort is normal. No respiratory distress.      Breath sounds: Normal breath sounds.      Comments: O2 at 3L NC  Abdominal:      General: Bowel sounds are normal.      Palpations: Abdomen is soft.   Musculoskeletal:      Cervical back: Neck supple.      Right lower leg: Edema present.      Left lower leg: Edema present.   Skin:     General: Skin is warm and dry.   Neurological:      General: No focal deficit present.      Mental Status: He is alert and oriented to person, place, and time.   Psychiatric:         Mood and Affect: Mood normal.         Behavior: Behavior normal.         Laboratory  Lab Results   Component Value Date    WBC 7.00 01/04/2022    HGB 13.8 (L) 01/04/2022    HCT 44.6 01/04/2022     (H) 01/04/2022     01/04/2022     Lab Results   Component Value Date    INR 1.0 01/03/2022    INR 1.1 12/30/2016    INR 1.2 01/29/2011     Lab Results   Component Value Date    HGBA1C 5.6 01/03/2022     No results for input(s): POCTGLUCOSE in the last 72 hours.    Diagnostic Results:      TRANSITION OF CARE:     [facility] Contact Note: 01/07/2022    Family and/or Caretaker present at visit?  Yes.  Diagnostic tests reviewed/disposition: No diagnosic tests pending after this hospitalization.  Disease/illness education: Pulmonary embolism,   Home health/community services discussion/referrals: Patient has home health established at [facility].   Establishment or re-establishment of referral orders for community resources: No other necessary community resources.   Discussion with other health care providers: Spoke with Trenton DE JESUS about patient's potential for rehab.     Transition of Care Visit:     I have reviewed and updated the history and problem list.  I have reconciled the medication list.  I have discussed the hospitalization and current medical issues, prognosis and plans with the patient/family.  I  spent more than 50% of time discussing  the care with the patient/family.  Total Face-to-Face Encounter: 60 minutes.    Medications Reconciliation:   I have reconciled the patient's home medications and discharge medications with the patient/family. I have updated all changes.  Refer to After-Visit Medication List.    ASSESSMENT & PLAN:     HIGH RISK CONDITION(S):  Morbid Obesity, Pulmonary Embolism, Functional Quadraplegia, Respiratory Failure    Gene was seen today for transitional care, follow-up and establish care.    Diagnoses and all orders for this visit:    Morbid obesity with BMI of 60.0-69.9, adult    Acute respiratory failure with hypoxia  -     Ambulatory referral/consult to [facility]    Functional quadriplegia    Peripheral vascular disease, unspecified    Acute pulmonary embolism, unspecified pulmonary embolism type, unspecified whether acute cor pulmonale present    [facility] to schedule follow up appointment with patient in 4 weeks or sooner if needed  Continue all medications, treatments and therapies as ordered  Follow all instructions and recommendations  Maintain safety precautions  Comply with all medical appointments as scheduled  For worsening of symptoms or new symptoms call Primary Care Physician or Nurse Practitioner  For emergencies call 911 or report to the nearest Emergency Dept    Were controlled substances prescribed?  No    Instructions for the patient:    Scheduled Follow-up :  Future Appointments   Date Time Provider Department Center   2/28/2022  2:40 PM Didier Dodson MD [facility] [place] [place]       After Visit Medication List :     Medication List          Accurate as of January 21, 2022 11:59 PM. If you have any questions, ask your nurse or doctor.            CONTINUE taking these medications    * acetaminophen 500 MG tablet  Commonly known as: TYLENOL     * acetaminophen 500 MG tablet  Commonly known as: TYLENOL     * ELIQUIS DVT-PE TREAT 30D START 5 mg (74 tabs)  Dspk  Generic drug: apixaban  take 2 tabs twice a day for 7 days to end on 01/10/2022  then take 1 tab twice a day thereafter     * apixaban 5 mg Tab  Commonly known as: ELIQUIS  Take 1 tablet (5 mg total) by mouth 2 (two) times daily.     * atorvastatin 40 MG tablet  Commonly known as: LIPITOR  Take 1 tablet (40 mg total) by mouth once daily.     * atorvastatin 40 MG tablet  Commonly known as: LIPITOR     * famotidine 20 MG tablet  Commonly known as: PEPCID  Take 1 tablet (20 mg total) by mouth 2 (two) times daily.     * famotidine 20 MG tablet  Commonly known as: PEPCID     furosemide 40 MG tablet  Commonly known as: LASIX  take 1 tab  twice a day for 3 days then 1  daily thereafter     * HYDROcodone-acetaminophen 5-325 mg per tablet  Commonly known as: NORCO  Take 1 tablet by mouth every 6 (six) hours as needed for Pain.     * HYDROcodone-acetaminophen 5-325 mg per tablet  Commonly known as: NORCO     * levothyroxine 50 MCG tablet  Commonly known as: SYNTHROID  TAKE 1 TABLET (50 MCG TOTAL) BY MOUTH BEFORE BREAKFAST.     * levothyroxine 50 MCG tablet  Commonly known as: SYNTHROID     metronidazole 1% 1 % Gel  Commonly known as: METROGEL  Apply topically 2 (two) times a day.     * miconazole NITRATE 2 % 2 % top powder  Commonly known as: MICOTIN  Apply topically 2 (two) times daily. Intertrigo to lower abdomen, groin, periarea: please apply Antifungal bid     * miconazole nitrate 2% 2 % Oint  Commonly known as: MICOTIN  Apply topically 2 (two) times daily. Intertrigo to lower abdomen, groin, periarea: please apply Antifungal bid     * miconazole NITRATE 2 % 2 % top powder  Commonly known as: MICOTIN     * polyethylene glycol 17 gram Pwpk  Commonly known as: GLYCOLAX  Take 17 g by mouth 2 (two) times daily.     * polyethylene glycol 17 gram Pwpk  Commonly known as: GLYCOLAX     * senna-docusate 8.6-50 mg 8.6-50 mg per tablet  Commonly known as: PERICOLACE  Take 1 tablet by mouth once daily.     * SENNA WITH DOCUSATE  SODIUM 8.6-50 mg per tablet  Generic drug: senna-docusate 8.6-50 mg     traZODone 50 MG tablet  Commonly known as: DESYREL         * This list has 19 medication(s) that are the same as other medications prescribed for you. Read the directions carefully, and ask your doctor or other care provider to review them with you.                Signature:

## 2022-01-24 ENCOUNTER — TELEPHONE (OUTPATIENT)
Dept: PRIMARY CARE CLINIC | Facility: CLINIC | Age: 77
End: 2022-01-24
Payer: MEDICARE

## 2022-01-24 NOTE — TELEPHONE ENCOUNTER
----- Message from Alexandrea Morales NP sent at 1/24/2022  8:05 AM CST -----  GM. Looks like [facility] at home NP established care. Not sure exactly what that means since they don't assume role as PCP. Would you follow up with pt and/or [facility] at home NP please?

## 2022-01-24 NOTE — TELEPHONE ENCOUNTER
Spoke with the patient. He states that he was setup with a nurse practitioner that saw him at home already. She saw him Friday & is scheduled to see him again in 2 weeks. He says this was setup through [facility]. He says he is unsure of what to do.

## 2022-02-09 RX ORDER — LEVOTHYROXINE SODIUM 50 UG/1
50 TABLET ORAL
Qty: 90 TABLET | Refills: 0 | Status: CANCELLED | OUTPATIENT
Start: 2022-02-09 | End: 2022-05-10

## 2022-02-09 NOTE — TELEPHONE ENCOUNTER
No new care gaps identified.  Powered by [employer]. Reference number: [id number].   2/09/2022 9:54:32 AM CST

## 2022-02-10 RX ORDER — LEVOTHYROXINE SODIUM 50 UG/1
50 TABLET ORAL
Qty: 90 TABLET | Refills: 1 | Status: SHIPPED | OUTPATIENT
Start: 2022-02-10 | End: 2022-09-15 | Stop reason: SDUPTHER

## 2022-02-10 NOTE — TELEPHONE ENCOUNTER
----- Message from Maritza Jean sent at 2/10/2022 12:04 PM CST -----  Contact: ollie [phone]  Requesting an RX refill or new RX.    Is this a refill or new RX: Refill    RX name and strength :  levothyroxine (SYNTHROID) 50 MCG tablet    Pharmacy name and phone # :  [facility] - [facility] [id number]    The doctors have asked that we provide their patients with the following 2 reminders -- prescription refills can take up to 72 hours, and a friendly reminder that in the future you can use your [facility] account to request refills: Yes

## 2022-03-02 ENCOUNTER — PATIENT MESSAGE (OUTPATIENT)
Dept: RESEARCH | Facility: HOSPITAL | Age: 77
End: 2022-03-02
Payer: MEDICARE

## 2022-03-10 ENCOUNTER — EXTERNAL HOME HEALTH (OUTPATIENT)
Dept: HOME HEALTH SERVICES | Facility: HOSPITAL | Age: 77
End: 2022-03-10
Payer: MEDICARE

## 2022-04-05 ENCOUNTER — TELEPHONE (OUTPATIENT)
Dept: INTERNAL MEDICINE | Facility: CLINIC | Age: 77
End: 2022-04-05
Payer: MEDICARE

## 2022-04-05 DIAGNOSIS — R53.81 DEBILITY: Primary | ICD-10-CM

## 2022-04-05 DIAGNOSIS — I87.8 VENOUS STASIS OF BOTH LOWER EXTREMITIES: ICD-10-CM

## 2022-04-05 NOTE — TELEPHONE ENCOUNTER
Patient need a PA. I spoke with patient and was advised to contacted [facility] [phone]. Spoke with Lynda the rep at [facility] and she's checking to see if they have any in stock. She states he need a repair script for repairs to hospital bed. He needs repairs and still using it and cannot use a ordinary bed and ICD codes has to be on there. Patient was also notified again and updated

## 2022-04-05 NOTE — TELEPHONE ENCOUNTER
----- Message from Sydney Samuel sent at 4/5/2022  2:35 PM CDT -----  Contact: self/[phone]  Patient is returning a phone call.  Who left a message for the patient: MA  Does patient know what this is regarding:  FOR HOSPITAL BED  Would you like a call back, or a response through your [facility] portal?:  call back  Comments:        PLEASE ADVISE

## 2022-04-05 NOTE — TELEPHONE ENCOUNTER
----- Message from Nany Espinoza sent at 4/5/2022  9:06 AM CDT -----  Contact: [phone] @ Patient  Good Morning  Patient need a P.A. for his hospital bed. Patient is upset and would like a call.    Please call and advise

## 2022-04-07 ENCOUNTER — DOCUMENT SCAN (OUTPATIENT)
Dept: HOME HEALTH SERVICES | Facility: HOSPITAL | Age: 77
End: 2022-04-07
Payer: MEDICARE

## 2022-04-07 NOTE — TELEPHONE ENCOUNTER
Order faxed to [employer] [facility] [phone].   Called pt no answer left voicemail that order was faxed and confirmed by [employer] [facility].

## 2022-04-19 ENCOUNTER — TELEPHONE (OUTPATIENT)
Dept: INTERNAL MEDICINE | Facility: CLINIC | Age: 77
End: 2022-04-19
Payer: MEDICARE

## 2022-04-19 NOTE — TELEPHONE ENCOUNTER
Spoke with pt wife about the fax we received for his hospital bed. I advised the pt wife that we are working on sending the required documentation in order for him to get the bed or repairs. Patient was advised that we will contact them back once this is completed. Patient states and verbalized understanding

## 2022-04-19 NOTE — TELEPHONE ENCOUNTER
----- Message from Yuridia Tovar sent at 4/19/2022 12:34 PM CDT -----  Contact: Lynda with [employer] phone [phone]  Lynda with [employer] phone [phone], Calling because the patient needs a new hospital bed, there is not enough documentation in the 01/18/2022 note to support the order. Please advise. Thanks.

## 2022-04-20 ENCOUNTER — TELEPHONE (OUTPATIENT)
Dept: INTERNAL MEDICINE | Facility: CLINIC | Age: 77
End: 2022-04-20
Payer: MEDICARE

## 2022-04-20 NOTE — TELEPHONE ENCOUNTER
----- Message from Fernando Morrell sent at 4/20/2022  2:45 PM CDT -----  Contact: PT  Calling to send an urgent message. Talked about his hospital bed was told he need more info from the Dr. He can't get out of bed for than a month. Call back at [phone]. No parts to repair due to stop of production

## 2022-04-20 NOTE — TELEPHONE ENCOUNTER
Spoke with patient about placing order for his hospital bed. I told patient that we faxed over all required paperwork that they asked for. We spoke with the company and faxed paperwork over 3 times. I told patient we will reach out to the comp-any yet again to make sure the paperwork they are asking for was all faxed so we can resolve this problem for the patient.

## 2022-04-22 ENCOUNTER — TELEPHONE (OUTPATIENT)
Dept: PRIMARY CARE CLINIC | Facility: CLINIC | Age: 77
End: 2022-04-22
Payer: MEDICARE

## 2022-04-22 NOTE — TELEPHONE ENCOUNTER
Spoke with Lynda with [employer]. She informed me that she had just spoken with someone from Dr. Zarate office regarding the patient.

## 2022-04-22 NOTE — TELEPHONE ENCOUNTER
----- Message from Risa Benson sent at 4/22/2022  9:41 AM CDT -----  Regarding: Lynda/[employer]  States she is calling regarding his hospital bed. States she received chart notes. States she needs to know where to send the order to and it has to be signed. Please call Lynda [phone]. Thank you

## 2022-04-28 ENCOUNTER — CARE AT HOME (OUTPATIENT)
Dept: HOME HEALTH SERVICES | Facility: CLINIC | Age: 77
End: 2022-04-28
Payer: MEDICARE

## 2022-04-28 VITALS
TEMPERATURE: 97 F | SYSTOLIC BLOOD PRESSURE: 110 MMHG | HEART RATE: 71 BPM | RESPIRATION RATE: 18 BRPM | DIASTOLIC BLOOD PRESSURE: 60 MMHG | OXYGEN SATURATION: 96 %

## 2022-04-28 DIAGNOSIS — Z09 FOLLOW UP: ICD-10-CM

## 2022-04-28 DIAGNOSIS — I87.2 VENOUS STASIS DERMATITIS OF BOTH LOWER EXTREMITIES: Chronic | ICD-10-CM

## 2022-04-28 DIAGNOSIS — E78.5 HYPERLIPIDEMIA, UNSPECIFIED HYPERLIPIDEMIA TYPE: Primary | ICD-10-CM

## 2022-04-28 PROCEDURE — 99349 PR HOME VISIT,ESTAB PATIENT,LEVEL III: ICD-10-PCS | Mod: S$GLB,,, | Performed by: NURSE PRACTITIONER

## 2022-04-28 PROCEDURE — 99349 HOME/RES VST EST MOD MDM 40: CPT | Mod: S$GLB,,, | Performed by: NURSE PRACTITIONER

## 2022-04-28 RX ORDER — ATORVASTATIN CALCIUM 40 MG/1
40 TABLET, FILM COATED ORAL DAILY
Qty: 90 TABLET | Refills: 3 | Status: SHIPPED | OUTPATIENT
Start: 2022-04-28 | End: 2023-01-01 | Stop reason: SDUPTHER

## 2022-04-29 NOTE — PROGRESS NOTES
[facility] @ [place]  Medical Home Visit    Visit Date: 4/28/2022  Encounter Provider: Kyara Stephens  PCP:  Ami Zarate DO    Subjective:      Patient ID: Gene Rothman is a 76 y.o. male.    Consult Requested By:  No ref. provider found  Reason for Consult:  Follow up for established care    Gene is being seen at home due to being seen at home due to physical debility that presents a taxing effort to leave the home, to mitigate high risk of hospital readmission and/or due to the limited availability of reliable or safe options for transportation to the point of access to the provider. Prior to treatment on this visit the chart was reviewed and patient verbal consent was obtained.    Chief Complaint: Follow-up      Patient is being seen today for a follow up for established care. Upon arrival patient was sleeping. He awakens easily with verbal stimuli. Patient is bed bound and is awaiting his new hospital bed. Patient has no complaints of at this time. He completed his PT with [facility] about 2 weeks ago. Patient states that he wants to get out of the bed but he is scared that the bed is going to break and he will not be able to get back into it. He reports that he has no feelings in his feet but that is not a new complaint. VSS and patient reports compliance with all medications. He is out of his atorvastatin. I will reorder it for him.          Review of Systems   Constitutional: Positive for fatigue. Negative for activity change, appetite change, chills and fever.   HENT: Negative.    Eyes: Negative.    Respiratory: Positive for shortness of breath (with exertion). Negative for chest tightness and wheezing.    Cardiovascular: Positive for leg swelling. Negative for chest pain and palpitations.   Gastrointestinal: Negative for abdominal pain, constipation, diarrhea, nausea and vomiting.   Endocrine: Negative.    Genitourinary: Negative for difficulty urinating and dysuria.   Musculoskeletal: Positive for  arthralgias, gait problem and myalgias.   Skin: Positive for color change (to BLE ).   Allergic/Immunologic: Negative.    Neurological: Positive for weakness and numbness (to bilateral feet ). Negative for dizziness, tremors, facial asymmetry and speech difficulty.   Hematological: Bruises/bleeds easily.   Psychiatric/Behavioral: Negative.        Assessments:  · Environmental: Patient lives in a single story home with his family. There are no steps at the entrance, lighting is dim and temperature is comfortable  · Functional Status: Patient is bedbound  · Safety: Fall precautions  · Nutritional: Adequate food in the home  · Home Health/DME/Supplies: No home health. DMEs: hospital bed     Objective:   Physical Exam  Constitutional:       General: He is not in acute distress.     Appearance: He is obese.   HENT:      Head: Normocephalic.      Nose: Nose normal.      Mouth/Throat:      Mouth: Mucous membranes are moist.   Eyes:      Pupils: Pupils are equal, round, and reactive to light.   Cardiovascular:      Rate and Rhythm: Normal rate and regular rhythm.      Pulses: Normal pulses.      Heart sounds: Normal heart sounds.   Pulmonary:      Effort: Pulmonary effort is normal.      Breath sounds: Normal breath sounds.   Abdominal:      Palpations: Abdomen is soft.      Tenderness: There is no abdominal tenderness. There is no guarding.   Musculoskeletal:      Cervical back: Normal range of motion.      Right lower leg: Edema (non pitting 3+) present.      Left lower leg: Edema (non pitting 3+) present.   Skin:     General: Skin is warm and dry.      Capillary Refill: Capillary refill takes less than 2 seconds.      Coloration: Skin is pale.   Neurological:      General: No focal deficit present.      Mental Status: He is alert and oriented to person, place, and time. Mental status is at baseline.      Motor: Weakness present.      Gait: Gait abnormal.   Psychiatric:         Mood and Affect: Mood normal.          Behavior: Behavior normal.         Vitals:    04/28/22 1300   BP: 110/60   Pulse: 71   Resp: 18   Temp: 97.4 °F (36.3 °C)   SpO2: 96%   PainSc: 0-No pain     There is no height or weight on file to calculate BMI.    Assessment:     1. Hyperlipidemia, unspecified hyperlipidemia type        Plan:     Ethical / Legal: Advance Care Planning   · Surrogate decision maker:  Name Alise Rothman, Relationship: Daughter  · Code Status:  Full  · [place]POST:  None  · Other advance directive:  None, Capacity to make medical decisions:  Yes, Conflict None       Problem List Items Addressed This Visit        Cardiac/Vascular    Hyperlipidemia - Primary    Relevant Medications    atorvastatin (LIPITOR) 40 MG tablet      Encounter for Medical Follow-Up and Medication Review  - [facility] to schedule follow-up visit with patient in 4 weeks or PRN.     Patient Instructions Given:  - Continue all medications, treatments and therapies as ordered.   - Follow all instructions, recommendations as discussed.  - Maintain Safety Precautions at all times.  - Attend all medical appointments as scheduled.  - For worsening symptoms: call Primary Care Physician or Nurse Practitioner.  - For emergencies, call 911 or immediately report to the nearest emergency room           Were controlled substances prescribed?  No    Follow Up Appointments:   No future appointments.    Signature: Kyara Stephens NP

## 2022-04-29 NOTE — ASSESSMENT & PLAN NOTE
Patient has no open wounds or redness to BLE   Recommended patient to get compression stocking to wear during the day

## 2022-05-26 ENCOUNTER — CARE AT HOME (OUTPATIENT)
Dept: HOME HEALTH SERVICES | Facility: CLINIC | Age: 77
End: 2022-05-26
Payer: MEDICARE

## 2022-05-26 VITALS
DIASTOLIC BLOOD PRESSURE: 78 MMHG | OXYGEN SATURATION: 94 % | RESPIRATION RATE: 18 BRPM | SYSTOLIC BLOOD PRESSURE: 118 MMHG | HEART RATE: 62 BPM | TEMPERATURE: 98 F

## 2022-05-26 DIAGNOSIS — R53.81 PHYSICAL DEBILITY: Primary | ICD-10-CM

## 2022-05-26 DIAGNOSIS — Z79.899 MEDICATION MANAGEMENT: ICD-10-CM

## 2022-05-26 DIAGNOSIS — Z09 FOLLOW UP: ICD-10-CM

## 2022-05-26 PROCEDURE — 99348 HOME/RES VST EST LOW MDM 30: CPT | Mod: ICN,,, | Performed by: NURSE PRACTITIONER

## 2022-05-26 PROCEDURE — 99348 PR HOME VISIT,ESTAB PATIENT,LEVEL II: ICD-10-PCS | Mod: ICN,,, | Performed by: NURSE PRACTITIONER

## 2022-05-26 NOTE — ASSESSMENT & PLAN NOTE
Patient is bed bound at present   Will order PT back into the home   Encourage weight loss and exercise daily as instructed by PT

## 2022-05-26 NOTE — PROGRESS NOTES
[facility]  Medical Home Visit    Visit Date: 5/26/2022  Encounter Provider: Kyara Stephens  PCP:  Ami Zarate DO    Subjective:      Patient ID: Gene Rothman is a 76 y.o. male.    Consult Requested By:  No ref. provider found  Reason for Consult:  Follow Up for an established care patient    Gene is being seen at home due to being seen at home due to physical debility that presents a taxing effort to leave the home, to mitigate high risk of hospital readmission and/or due to the limited availability of reliable or safe options for transportation to the point of access to the provider. Prior to treatment on this visit the chart was reviewed and patient verbal consent was obtained.    Chief Complaint: No chief complaint on file.      Patient is being seen today for a follow up visit for an established care patient. Upon arrival patient was lying asleep in his hospital bed. Patient awakens easily with verbal stimuli. Patient reports no issues at present. Medications reviewed with patient and patient reports compliance with all medications. VSS. Patient did report that he was prescribed eliquis and he does not want to take it. I offered to refill but he refused and states that he is taking an 81mg aspirin instead.          Review of Systems   Constitutional: Positive for fatigue. Negative for activity change, appetite change, chills and fever.   HENT: Negative.    Eyes: Negative.    Respiratory: Negative for cough, chest tightness and shortness of breath.    Cardiovascular: Positive for leg swelling. Negative for chest pain and palpitations.   Gastrointestinal: Negative.    Endocrine: Negative.    Genitourinary: Negative for difficulty urinating, frequency and urgency.   Musculoskeletal: Positive for back pain.   Skin: Negative.    Allergic/Immunologic: Negative.    Neurological: Positive for weakness. Negative for dizziness, syncope, light-headedness and headaches.   Hematological: Negative.     Psychiatric/Behavioral: Negative.        Assessments:  · Environmental: Patient lives in a single story home with his family. The home is in poor condition. Lighting is dim and temperature is comfortable  · Functional Status: Patient is bed bound and needs assistance with all ADLs  · Safety: Fall Precautions  · Nutritional: Adequate food in the home  · Home Health/DME/Supplies: No Home health; DMEs: hospital bed, trapeze, wheelchair    Objective:   Physical Exam  Vitals reviewed.   HENT:      Head: Normocephalic.   Cardiovascular:      Rate and Rhythm: Normal rate and regular rhythm.      Pulses: Normal pulses.      Heart sounds: Normal heart sounds.   Pulmonary:      Effort: Pulmonary effort is normal.   Abdominal:      General: Bowel sounds are normal.      Palpations: Abdomen is soft.   Musculoskeletal:      Right lower leg: Edema present.      Left lower leg: Edema present.   Skin:     General: Skin is warm and dry.      Capillary Refill: Capillary refill takes less than 2 seconds.   Neurological:      Mental Status: He is alert and oriented to person, place, and time. Mental status is at baseline.         Vitals:    05/26/22 1330   BP: 118/78   Pulse: 62   Resp: 18   Temp: 97.8 °F (36.6 °C)   SpO2: (!) 94%   PainSc: 0-No pain     There is no height or weight on file to calculate BMI.    Assessment:     1. Physical debility    2. Follow up    3. Medication management        Plan:     Ethical / Legal: Advance Care Planning   · Surrogate decision maker:  Ion Rothman, Relationship: Daughter  · Code Status:  Full  · [place]POST:  None  · Other advance directive: None, Capacity to make medical decisions:  Yes, Conflict None       Problem List Items Addressed This Visit        Other    Medication management    Current Assessment & Plan     Medications reviewed with patient   Patient reports compliance with all medications except for eliquis           Physical debility - Primary    Current Assessment & Plan      Patient is bed bound at present   Will order PT back into the home   Encourage weight loss and exercise daily as instructed by PT             Other Visit Diagnoses     Follow up           Encounter for Medical Follow-Up and Medication Review  - [facility] to schedule follow-up visit with patient in 4 weeks or PRN.     Patient Instructions Given:  - Continue all medications, treatments and therapies as ordered.   - Follow all instructions, recommendations as discussed.  - Maintain Safety Precautions at all times.  - Attend all medical appointments as scheduled.  - For worsening symptoms: call Primary Care Physician or Nurse Practitioner.  - For emergencies, call 911 or immediately report to the nearest emergency room         Were controlled substances prescribed?  No    Follow Up Appointments:   No future appointments.    Signature: Kyara Stephens NP

## 2022-05-26 NOTE — ASSESSMENT & PLAN NOTE
Medications reviewed with patient   Patient reports compliance with all medications except for eliquis

## 2022-06-11 ENCOUNTER — HOSPITAL ENCOUNTER (INPATIENT)
Facility: HOSPITAL | Age: 77
LOS: 3 days | Discharge: HOSPICE | DRG: 299 | End: 2022-06-14
Attending: EMERGENCY MEDICINE | Admitting: INTERNAL MEDICINE
Payer: MEDICARE

## 2022-06-11 DIAGNOSIS — L03.116 CELLULITIS OF LEFT LEG WITHOUT FOOT: Primary | ICD-10-CM

## 2022-06-11 DIAGNOSIS — R65.21 SEPSIS WITH ACUTE ORGAN DYSFUNCTION AND SEPTIC SHOCK, DUE TO UNSPECIFIED ORGANISM, UNSPECIFIED TYPE: ICD-10-CM

## 2022-06-11 DIAGNOSIS — R74.8 ELEVATED LIVER ENZYMES: ICD-10-CM

## 2022-06-11 DIAGNOSIS — R53.81 PHYSICAL DEBILITY: ICD-10-CM

## 2022-06-11 DIAGNOSIS — L97.922 LEG ULCER, LEFT, WITH FAT LAYER EXPOSED: ICD-10-CM

## 2022-06-11 DIAGNOSIS — A41.9 SEPSIS WITH ACUTE ORGAN DYSFUNCTION AND SEPTIC SHOCK, DUE TO UNSPECIFIED ORGANISM, UNSPECIFIED TYPE: ICD-10-CM

## 2022-06-11 DIAGNOSIS — R79.89 ELEVATED SERUM CREATININE: ICD-10-CM

## 2022-06-11 LAB
ALBUMIN SERPL BCP-MCNC: 2.7 G/DL (ref 3.5–5.2)
ALP SERPL-CCNC: 94 U/L (ref 55–135)
ALT SERPL W/O P-5'-P-CCNC: 75 U/L (ref 10–44)
ANION GAP SERPL CALC-SCNC: 11 MMOL/L (ref 8–16)
AST SERPL-CCNC: 110 U/L (ref 10–40)
BASOPHILS # BLD AUTO: 0.07 K/UL (ref 0–0.2)
BASOPHILS NFR BLD: 0.4 % (ref 0–1.9)
BILIRUB SERPL-MCNC: 1 MG/DL (ref 0.1–1)
BUN SERPL-MCNC: 28 MG/DL (ref 8–23)
CALCIUM SERPL-MCNC: 8.4 MG/DL (ref 8.7–10.5)
CHLORIDE SERPL-SCNC: 105 MMOL/L (ref 95–110)
CO2 SERPL-SCNC: 25 MMOL/L (ref 23–29)
CREAT SERPL-MCNC: 2.1 MG/DL (ref 0.5–1.4)
DIFFERENTIAL METHOD: ABNORMAL
EOSINOPHIL # BLD AUTO: 0 K/UL (ref 0–0.5)
EOSINOPHIL NFR BLD: 0.1 % (ref 0–8)
ERYTHROCYTE [DISTWIDTH] IN BLOOD BY AUTOMATED COUNT: 14.7 % (ref 11.5–14.5)
EST. GFR  (AFRICAN AMERICAN): 34 ML/MIN/1.73 M^2
EST. GFR  (NON AFRICAN AMERICAN): 29 ML/MIN/1.73 M^2
GLUCOSE SERPL-MCNC: 134 MG/DL (ref 70–110)
HCT VFR BLD AUTO: 42.4 % (ref 40–54)
HGB BLD-MCNC: 13.6 G/DL (ref 14–18)
IMM GRANULOCYTES # BLD AUTO: 0.25 K/UL (ref 0–0.04)
IMM GRANULOCYTES NFR BLD AUTO: 1.4 % (ref 0–0.5)
LACTATE SERPL-SCNC: 2.2 MMOL/L (ref 0.5–2.2)
LYMPHOCYTES # BLD AUTO: 0.5 K/UL (ref 1–4.8)
LYMPHOCYTES NFR BLD: 2.8 % (ref 18–48)
MCH RBC QN AUTO: 31.9 PG (ref 27–31)
MCHC RBC AUTO-ENTMCNC: 32.1 G/DL (ref 32–36)
MCV RBC AUTO: 99 FL (ref 82–98)
MONOCYTES # BLD AUTO: 1.2 K/UL (ref 0.3–1)
MONOCYTES NFR BLD: 6.9 % (ref 4–15)
NEUTROPHILS # BLD AUTO: 15.3 K/UL (ref 1.8–7.7)
NEUTROPHILS NFR BLD: 88.4 % (ref 38–73)
NRBC BLD-RTO: 0 /100 WBC
PLATELET # BLD AUTO: 186 K/UL (ref 150–450)
PMV BLD AUTO: 9.7 FL (ref 9.2–12.9)
POTASSIUM SERPL-SCNC: 3.7 MMOL/L (ref 3.5–5.1)
PROT SERPL-MCNC: 6.8 G/DL (ref 6–8.4)
RBC # BLD AUTO: 4.27 M/UL (ref 4.6–6.2)
SODIUM SERPL-SCNC: 141 MMOL/L (ref 136–145)
WBC # BLD AUTO: 17.33 K/UL (ref 3.9–12.7)

## 2022-06-11 PROCEDURE — 87186 SC STD MICRODIL/AGAR DIL: CPT | Performed by: EMERGENCY MEDICINE

## 2022-06-11 PROCEDURE — 80053 COMPREHEN METABOLIC PANEL: CPT | Performed by: EMERGENCY MEDICINE

## 2022-06-11 PROCEDURE — 96366 THER/PROPH/DIAG IV INF ADDON: CPT

## 2022-06-11 PROCEDURE — 99291 CRITICAL CARE FIRST HOUR: CPT | Mod: 25

## 2022-06-11 PROCEDURE — 85025 COMPLETE CBC W/AUTO DIFF WBC: CPT | Performed by: EMERGENCY MEDICINE

## 2022-06-11 PROCEDURE — 87077 CULTURE AEROBIC IDENTIFY: CPT | Performed by: EMERGENCY MEDICINE

## 2022-06-11 PROCEDURE — 11000001 HC ACUTE MED/SURG PRIVATE ROOM

## 2022-06-11 PROCEDURE — 96365 THER/PROPH/DIAG IV INF INIT: CPT

## 2022-06-11 PROCEDURE — 87040 BLOOD CULTURE FOR BACTERIA: CPT | Mod: 59 | Performed by: EMERGENCY MEDICINE

## 2022-06-11 PROCEDURE — 63600175 PHARM REV CODE 636 W HCPCS: Performed by: EMERGENCY MEDICINE

## 2022-06-11 PROCEDURE — 83605 ASSAY OF LACTIC ACID: CPT | Performed by: EMERGENCY MEDICINE

## 2022-06-11 RX ORDER — CEFAZOLIN SODIUM 1 G/50ML
1 SOLUTION INTRAVENOUS
Status: COMPLETED | OUTPATIENT
Start: 2022-06-11 | End: 2022-06-11

## 2022-06-11 RX ADMIN — CEFAZOLIN SODIUM 1 G: 1 SOLUTION INTRAVENOUS at 09:06

## 2022-06-12 PROBLEM — L97.229 VENOUS STASIS ULCER OF LEFT CALF: Status: ACTIVE | Noted: 2021-12-05

## 2022-06-12 PROBLEM — I83.022 VENOUS STASIS ULCER OF LEFT CALF: Status: ACTIVE | Noted: 2021-12-05

## 2022-06-12 PROBLEM — R74.8 ELEVATED LIVER ENZYMES: Status: ACTIVE | Noted: 2022-06-12

## 2022-06-12 LAB — SARS-COV-2 RDRP RESP QL NAA+PROBE: NEGATIVE

## 2022-06-12 PROCEDURE — 96361 HYDRATE IV INFUSION ADD-ON: CPT

## 2022-06-12 PROCEDURE — 25000003 PHARM REV CODE 250: Performed by: NURSE PRACTITIONER

## 2022-06-12 PROCEDURE — U0002 COVID-19 LAB TEST NON-CDC: HCPCS | Performed by: EMERGENCY MEDICINE

## 2022-06-12 PROCEDURE — 94761 N-INVAS EAR/PLS OXIMETRY MLT: CPT

## 2022-06-12 PROCEDURE — 27000221 HC OXYGEN, UP TO 24 HOURS

## 2022-06-12 PROCEDURE — 25000003 PHARM REV CODE 250: Performed by: INTERNAL MEDICINE

## 2022-06-12 PROCEDURE — 11000001 HC ACUTE MED/SURG PRIVATE ROOM

## 2022-06-12 PROCEDURE — G0378 HOSPITAL OBSERVATION PER HR: HCPCS

## 2022-06-12 PROCEDURE — 63600175 PHARM REV CODE 636 W HCPCS: Performed by: NURSE PRACTITIONER

## 2022-06-12 PROCEDURE — 96367 TX/PROPH/DG ADDL SEQ IV INF: CPT

## 2022-06-12 PROCEDURE — A4216 STERILE WATER/SALINE, 10 ML: HCPCS | Performed by: INTERNAL MEDICINE

## 2022-06-12 RX ORDER — LANOLIN ALCOHOL/MO/W.PET/CERES
800 CREAM (GRAM) TOPICAL
Status: DISCONTINUED | OUTPATIENT
Start: 2022-06-12 | End: 2022-06-12

## 2022-06-12 RX ORDER — SODIUM CHLORIDE 9 MG/ML
INJECTION, SOLUTION INTRAVENOUS CONTINUOUS
Status: DISCONTINUED | OUTPATIENT
Start: 2022-06-12 | End: 2022-06-14 | Stop reason: HOSPADM

## 2022-06-12 RX ORDER — TALC
6 POWDER (GRAM) TOPICAL NIGHTLY PRN
Status: DISCONTINUED | OUTPATIENT
Start: 2022-06-12 | End: 2022-06-14 | Stop reason: HOSPADM

## 2022-06-12 RX ORDER — ATORVASTATIN CALCIUM 40 MG/1
40 TABLET, FILM COATED ORAL DAILY
Status: DISCONTINUED | OUTPATIENT
Start: 2022-06-12 | End: 2022-06-12

## 2022-06-12 RX ORDER — LINEZOLID 600 MG/1
600 TABLET, FILM COATED ORAL EVERY 12 HOURS
Status: DISCONTINUED | OUTPATIENT
Start: 2022-06-12 | End: 2022-06-14 | Stop reason: HOSPADM

## 2022-06-12 RX ORDER — METRONIDAZOLE 10 MG/G
GEL TOPICAL 2 TIMES DAILY
Status: DISCONTINUED | OUTPATIENT
Start: 2022-06-12 | End: 2022-06-14 | Stop reason: HOSPADM

## 2022-06-12 RX ORDER — HYDROCODONE BITARTRATE AND ACETAMINOPHEN 5; 325 MG/1; MG/1
1 TABLET ORAL EVERY 6 HOURS PRN
Status: DISCONTINUED | OUTPATIENT
Start: 2022-06-12 | End: 2022-06-14 | Stop reason: HOSPADM

## 2022-06-12 RX ORDER — IBUPROFEN 200 MG
24 TABLET ORAL
Status: DISCONTINUED | OUTPATIENT
Start: 2022-06-12 | End: 2022-06-14 | Stop reason: HOSPADM

## 2022-06-12 RX ORDER — LEVOTHYROXINE SODIUM 50 UG/1
50 TABLET ORAL
Status: DISCONTINUED | OUTPATIENT
Start: 2022-06-12 | End: 2022-06-14 | Stop reason: HOSPADM

## 2022-06-12 RX ORDER — IBUPROFEN 200 MG
16 TABLET ORAL
Status: DISCONTINUED | OUTPATIENT
Start: 2022-06-12 | End: 2022-06-14 | Stop reason: HOSPADM

## 2022-06-12 RX ORDER — ACETAMINOPHEN 325 MG/1
650 TABLET ORAL EVERY 4 HOURS PRN
Status: DISCONTINUED | OUTPATIENT
Start: 2022-06-12 | End: 2022-06-14 | Stop reason: HOSPADM

## 2022-06-12 RX ORDER — POLYETHYLENE GLYCOL 3350 17 G/17G
17 POWDER, FOR SOLUTION ORAL DAILY PRN
Status: DISCONTINUED | OUTPATIENT
Start: 2022-06-12 | End: 2022-06-14 | Stop reason: HOSPADM

## 2022-06-12 RX ORDER — LINEZOLID 600 MG/1
600 TABLET, FILM COATED ORAL EVERY 12 HOURS
Status: DISCONTINUED | OUTPATIENT
Start: 2022-06-12 | End: 2022-06-12

## 2022-06-12 RX ORDER — GLUCAGON 1 MG
1 KIT INJECTION
Status: DISCONTINUED | OUTPATIENT
Start: 2022-06-12 | End: 2022-06-14 | Stop reason: HOSPADM

## 2022-06-12 RX ORDER — NALOXONE HCL 0.4 MG/ML
0.02 VIAL (ML) INJECTION
Status: DISCONTINUED | OUTPATIENT
Start: 2022-06-12 | End: 2022-06-14 | Stop reason: HOSPADM

## 2022-06-12 RX ORDER — SODIUM CHLORIDE 0.9 % (FLUSH) 0.9 %
10 SYRINGE (ML) INJECTION EVERY 8 HOURS
Status: DISCONTINUED | OUTPATIENT
Start: 2022-06-12 | End: 2022-06-14 | Stop reason: HOSPADM

## 2022-06-12 RX ADMIN — TRAZODONE HYDROCHLORIDE 25 MG: 50 TABLET ORAL at 04:06

## 2022-06-12 RX ADMIN — LINEZOLID 600 MG: 600 TABLET, FILM COATED ORAL at 04:06

## 2022-06-12 RX ADMIN — TRAZODONE HYDROCHLORIDE 25 MG: 50 TABLET ORAL at 09:06

## 2022-06-12 RX ADMIN — METRONIDAZOLE: 10 GEL TOPICAL at 10:06

## 2022-06-12 RX ADMIN — SODIUM CHLORIDE: 0.9 INJECTION, SOLUTION INTRAVENOUS at 12:06

## 2022-06-12 RX ADMIN — Medication 10 ML: at 06:06

## 2022-06-12 RX ADMIN — METRONIDAZOLE: 10 GEL TOPICAL at 08:06

## 2022-06-12 RX ADMIN — CEFTRIAXONE 2 G: 2 INJECTION, SOLUTION INTRAVENOUS at 06:06

## 2022-06-12 RX ADMIN — LEVOTHYROXINE SODIUM 50 MCG: 50 TABLET ORAL at 06:06

## 2022-06-12 RX ADMIN — LINEZOLID 600 MG: 600 TABLET, FILM COATED ORAL at 08:06

## 2022-06-12 NOTE — PLAN OF CARE
77 y/o WM admitted with a  Dx of B/L LE cellulitis   And nazario . Started on Po zyvox  and  IVF . He report feeling better . The blood cx NGTD . Tx reviewed. Cont current tx

## 2022-06-12 NOTE — SUBJECTIVE & OBJECTIVE
Past Medical History:   Diagnosis Date    Arthritis     CHF (congestive heart failure)     Chronic kidney disease     Coronary artery disease     Depression     Hypertension     Hypothyroidism     Lymphedema of lower extremity     Nephrolithiasis     Obesity     Peripheral vascular disease     Recurrent cellulitis of lower leg     Sleep apnea     can't stand the CPAP , sleeps elevated in hospital bed or chair    Tobacco dependence     resolved       Past Surgical History:   Procedure Laterality Date    ADENOIDECTOMY  1961    BACK SURGERY  1975;  1976    x2 for disc; scar tissue removed    debridement of bilateral leg ulcers Bilateral 01/01/2017    Dr Hernandez    INNER EAR SURGERY Bilateral 1961    separate - pinnaplasty , new eardrms constructed    KIDNEY STONE SURGERY Left 1976 approx    open    TONSILLECTOMY  1961       Review of patient's allergies indicates:   Allergen Reactions    Bactrim [sulfamethoxazole-trimethoprim] Itching       No current facility-administered medications on file prior to encounter.     Current Outpatient Medications on File Prior to Encounter   Medication Sig    acetaminophen (TYLENOL) 500 MG tablet Take 500 mg by mouth every 6 (six) hours as needed for Pain.    atorvastatin (LIPITOR) 40 MG tablet Take 1 tablet (40 mg total) by mouth once daily.    famotidine (PEPCID) 20 MG tablet Take 1 tablet (20 mg total) by mouth 2 (two) times daily.    furosemide (LASIX) 40 MG tablet take 1 tab  twice a day for 3 days then 1  daily thereafter    levothyroxine (SYNTHROID) 50 MCG tablet Take 1 tablet (50 mcg total) by mouth before breakfast.    metronidazole 1% (METROGEL) 1 % Gel Apply topically 2 (two) times a day.    miconazole NITRATE 2 % (MICOTIN) 2 % top powder Apply topically 2 (two) times daily.    miconazole nitrate 2% (MICOTIN) 2 % Oint Apply topically 2 (two) times daily. Intertrigo to lower abdomen, groin, periarea: please apply Antifungal bid    polyethylene glycol (GLYCOLAX) 17 gram PwPk  Take 17 g by mouth 2 (two) times daily.    senna-docusate 8.6-50 mg (SENNA WITH DOCUSATE SODIUM) 8.6-50 mg per tablet Take 1 tablet by mouth once daily.    traZODone (DESYREL) 50 MG tablet Take 25 mg by mouth every evening.     Family History       Problem Relation (Age of Onset)    Alcohol abuse Maternal Grandfather    Asthma Daughter    Cancer Brother, Mother, Father    Diabetes Mother    Heart disease Father, Brother    Hypertension Mother          Tobacco Use    Smoking status: Never Smoker    Smokeless tobacco: Never Used    Tobacco comment: chain smoker heavy x 10 years, lighter x 10 before    Substance and Sexual Activity    Alcohol use: Not Currently    Drug use: Never    Sexual activity: Never     Review of Systems   Constitutional:  Positive for activity change.   HENT:  Positive for hearing loss.    Skin:  Positive for color change, rash and wound.   Objective:     Vital Signs (Most Recent):  Temp: 98.3 °F (36.8 °C) (06/12/22 0249)  Pulse: (!) 50 (06/12/22 0249)  Resp: 19 (06/12/22 0249)  BP: 129/60 (06/12/22 0249)  SpO2: 96 % (06/12/22 0249)   Vital Signs (24h Range):  Temp:  [98.3 °F (36.8 °C)-99.9 °F (37.7 °C)] 98.3 °F (36.8 °C)  Pulse:  [] 50  Resp:  [19-31] 19  SpO2:  [94 %-98 %] 96 %  BP: ()/(48-64) 129/60     Weight: (!) 197.1 kg (434 lb 8 oz)  Body mass index is 60.6 kg/m².    Physical Exam  Vitals and nursing note reviewed.   Constitutional:       Appearance: He is well-developed. He is obese.   HENT:      Head: Normocephalic and atraumatic.      Nose: Nose normal.   Eyes:      Comments: PERRL   Cardiovascular:      Rate and Rhythm: Normal rate and regular rhythm.      Heart sounds: Normal heart sounds.   Pulmonary:      Effort: Pulmonary effort is normal. No respiratory distress.      Breath sounds: Normal breath sounds. No wheezing or rales.   Abdominal:      General: Bowel sounds are normal. There is no distension.   Genitourinary:     Comments: Scrotal  excoriation  Musculoskeletal:         General: Normal range of motion.      Cervical back: Normal range of motion and neck supple.      Right lower leg: Edema present.      Left lower leg: Edema present.   Skin:     General: Skin is dry.      Findings: Lesion present. No bruising.      Comments: See pics   Neurological:      Mental Status: He is alert and oriented to person, place, and time.                 Significant Labs: All pertinent labs within the past 24 hours have been reviewed.  BMP:   Recent Labs   Lab 06/11/22 2052   *      K 3.7      CO2 25   BUN 28*   CREATININE 2.1*   CALCIUM 8.4*     CBC:   Recent Labs   Lab 06/11/22 2052   WBC 17.33*   HGB 13.6*   HCT 42.4          Significant Imaging: I have reviewed all pertinent imaging results/findings within the past 24 hours.

## 2022-06-12 NOTE — HPI
" 76 y.o. male with history of  severe obesity, CHF, hx VTE,hearing loss , CAD, lymphedema, recurrent LE cellulitis, who presents with report of lower extremity hyperpigmented and "cellulitis " as per patient . He reports that he has severe hearing loss and the hearing aid battery is low at this time. We used written language to communicate. He reports subjective fever . He mentioned that he was not planning to stay long in the hospital .   Labs and imaging test reviewed.    Component      Latest Ref Rng & Units 6/11/2022 1/6/2022              Creatinine      0.5 - 1.4 mg/dL 2.1 (H) 1.6 (H)     Component      Latest Ref Rng & Units 6/11/2022 1/6/2022 1/4/2022 1/3/2022                AST      10 - 40 U/L 110 (H)   18   ALT      10 - 44 U/L 75 (H)   17     Component      Latest Ref Rng & Units 6/11/2022 1/4/2022              WBC      3.90 - 12.70 K/uL 17.33 (H) 7.00   He will be placed on observation for cellulitis      "

## 2022-06-12 NOTE — H&P
"[facility]  History & Physical    Patient Name: Gene Rothman  MRN: [id number]  Patient Class: IP- Inpatient  Admission Date: 6/11/2022  Attending Physician: Russ Thomas, *   Primary Care Provider: Ami Zarate DO         Patient information was obtained from patient, past medical records and ER records.     Subjective:     Principal Problem:Venous stasis ulcer of left calf    Chief Complaint:   Chief Complaint   Patient presents with    Bilateral Leg Redness     Pt from home by EMS c/o bilateral leg redness and weeping, 99.9 oral temp at home, SPO2 on room air 90%        HPI:    76 y.o. male with history of  severe obesity, CHF, hx VTE,hearing loss , CAD, lymphedema, recurrent LE cellulitis, who presents with report of lower extremity hyperpigmented and "cellulitis " as per patient . He reports that he has severe hearing loss and the hearing aid battery is low at this time. We used written language to communicate. He reports subjective fever . He mentioned that he was not planning to stay long in the hospital .   Labs and imaging test reviewed.    Component      Latest Ref Rng & Units 6/11/2022 1/6/2022              Creatinine      0.5 - 1.4 mg/dL 2.1 (H) 1.6 (H)     Component      Latest Ref Rng & Units 6/11/2022 1/6/2022 1/4/2022 1/3/2022                AST      10 - 40 U/L 110 (H)   18   ALT      10 - 44 U/L 75 (H)   17     Component      Latest Ref Rng & Units 6/11/2022 1/4/2022              WBC      3.90 - 12.70 K/uL 17.33 (H) 7.00   He will be placed on observation for cellulitis          Past Medical History:   Diagnosis Date    Arthritis     CHF (congestive heart failure)     Chronic kidney disease     Coronary artery disease     Depression     Hypertension     Hypothyroidism     Lymphedema of lower extremity     Nephrolithiasis     Obesity     Peripheral vascular disease     Recurrent cellulitis of lower leg     Sleep apnea     can't stand the CPAP , sleeps " elevated in hospital bed or chair    Tobacco dependence     resolved       Past Surgical History:   Procedure Laterality Date    ADENOIDECTOMY  1961    BACK SURGERY  1975;  1976    x2 for disc; scar tissue removed    debridement of bilateral leg ulcers Bilateral 01/01/2017    Dr Hernandez    INNER EAR SURGERY Bilateral 1961    separate - pinnaplasty , new eardrms constructed    KIDNEY STONE SURGERY Left 1976 approx    open    TONSILLECTOMY  1961       Review of patient's allergies indicates:   Allergen Reactions    Bactrim [sulfamethoxazole-trimethoprim] Itching       No current facility-administered medications on file prior to encounter.     Current Outpatient Medications on File Prior to Encounter   Medication Sig    acetaminophen (TYLENOL) 500 MG tablet Take 500 mg by mouth every 6 (six) hours as needed for Pain.    atorvastatin (LIPITOR) 40 MG tablet Take 1 tablet (40 mg total) by mouth once daily.    famotidine (PEPCID) 20 MG tablet Take 1 tablet (20 mg total) by mouth 2 (two) times daily.    furosemide (LASIX) 40 MG tablet take 1 tab  twice a day for 3 days then 1  daily thereafter    levothyroxine (SYNTHROID) 50 MCG tablet Take 1 tablet (50 mcg total) by mouth before breakfast.    metronidazole 1% (METROGEL) 1 % Gel Apply topically 2 (two) times a day.    miconazole NITRATE 2 % (MICOTIN) 2 % top powder Apply topically 2 (two) times daily.    miconazole nitrate 2% (MICOTIN) 2 % Oint Apply topically 2 (two) times daily. Intertrigo to lower abdomen, groin, periarea: please apply Antifungal bid    polyethylene glycol (GLYCOLAX) 17 gram PwPk Take 17 g by mouth 2 (two) times daily.    senna-docusate 8.6-50 mg (SENNA WITH DOCUSATE SODIUM) 8.6-50 mg per tablet Take 1 tablet by mouth once daily.    traZODone (DESYREL) 50 MG tablet Take 25 mg by mouth every evening.     Family History       Problem Relation (Age of Onset)    Alcohol abuse Maternal Grandfather    Asthma Daughter    Cancer Brother,  Mother, Father    Diabetes Mother    Heart disease Father, Brother    Hypertension Mother          Tobacco Use    Smoking status: Never Smoker    Smokeless tobacco: Never Used    Tobacco comment: chain smoker heavy x 10 years, lighter x 10 before    Substance and Sexual Activity    Alcohol use: Not Currently    Drug use: Never    Sexual activity: Never     Review of Systems   Constitutional:  Positive for activity change.   HENT:  Positive for hearing loss.    Skin:  Positive for color change, rash and wound.   Objective:     Vital Signs (Most Recent):  Temp: 98.3 °F (36.8 °C) (06/12/22 0249)  Pulse: (!) 50 (06/12/22 0249)  Resp: 19 (06/12/22 0249)  BP: 129/60 (06/12/22 0249)  SpO2: 96 % (06/12/22 0249)   Vital Signs (24h Range):  Temp:  [98.3 °F (36.8 °C)-99.9 °F (37.7 °C)] 98.3 °F (36.8 °C)  Pulse:  [] 50  Resp:  [19-31] 19  SpO2:  [94 %-98 %] 96 %  BP: ()/(48-64) 129/60     Weight: (!) 197.1 kg (434 lb 8 oz)  Body mass index is 60.6 kg/m².    Physical Exam  Vitals and nursing note reviewed.   Constitutional:       Appearance: He is well-developed. He is obese.   HENT:      Head: Normocephalic and atraumatic.      Nose: Nose normal.   Eyes:      Comments: PERRL   Cardiovascular:      Rate and Rhythm: Normal rate and regular rhythm.      Heart sounds: Normal heart sounds.   Pulmonary:      Effort: Pulmonary effort is normal. No respiratory distress.      Breath sounds: Normal breath sounds. No wheezing or rales.   Abdominal:      General: Bowel sounds are normal. There is no distension.   Genitourinary:     Comments: Scrotal excoriation  Musculoskeletal:         General: Normal range of motion.      Cervical back: Normal range of motion and neck supple.      Right lower leg: Edema present.      Left lower leg: Edema present.   Skin:     General: Skin is dry.      Findings: Lesion present. No bruising.      Comments: See pics   Neurological:      Mental Status: He is alert and oriented to person,  place, and time.                 Significant Labs: All pertinent labs within the past 24 hours have been reviewed.  BMP:   Recent Labs   Lab 06/11/22 2052   *      K 3.7      CO2 25   BUN 28*   CREATININE 2.1*   CALCIUM 8.4*     CBC:   Recent Labs   Lab 06/11/22 2052   WBC 17.33*   HGB 13.6*   HCT 42.4          Significant Imaging: I have reviewed all pertinent imaging results/findings within the past 24 hours.    Assessment/Plan:     * Venous stasis ulcer of left calf    Continue wound care     Elevated liver enzymes  Will hold lipitor , could be related to fatty liver-will do liver ultrasound in AM.  Avoid hepatotoxic meds       Candidal dermatitis  Add topical miconazole       Venous stasis dermatitis of both lower extremities    Continue wound care-out patient follow up , will add 7 days of PO zyvox    CKD (chronic kidney disease) stage 3, GFR 30-59 ml/min    Will monitor closely , avoid nephrotoxic meds    Bilateral hearing loss    Will continue hearing aid and will get batteries from home    Morbid obesity with BMI of 60.0-69.9, adult    Out patient follow up for weight loss surgery     Acquired hypothyroidism  Will resume synthroid        VTE Risk Mitigation (From admission, onward)         Ordered     IP VTE HIGH RISK PATIENT  Once         06/12/22 0217     Place sequential compression device  Until discontinued         06/12/22 0217                   Tej Juan MD  Department of Hospital Medicine   [facility]

## 2022-06-12 NOTE — ED PROVIDER NOTES
Encounter Date: 2022       History     Chief Complaint   Patient presents with    Bilateral Leg Redness     Pt from home by EMS c/o bilateral leg redness and weeping, 99.9 oral temp at home, SPO2 on room air 90%     Geen Rothman is a 76 y.o. male w/ PMH including debility s/t severe obesity, CHF, hx VTE recently stopped Eliquis because of cost, CAD, lymphedema, recurrent LE cellulitis, who presents with report of one day of moderate, oozing, worsening left leg wound and cellulitis (though it appears that one day is unrealistic based on exam), along with subjective fever. He's had several prior episodes.         Review of patient's allergies indicates:   Allergen Reactions    Bactrim [sulfamethoxazole-trimethoprim] Itching     Past Medical History:   Diagnosis Date    Arthritis     CHF (congestive heart failure)     Chronic kidney disease     Coronary artery disease     Depression     Hypertension     Hypothyroidism     Lymphedema of lower extremity     Nephrolithiasis     Obesity     Peripheral vascular disease     Recurrent cellulitis of lower leg     Sleep apnea     can't stand the CPAP , sleeps elevated in hospital bed or chair    Tobacco dependence     resolved     Past Surgical History:   Procedure Laterality Date    ADENOIDECTOMY      BACK SURGERY  ;  1976    x2 for disc; scar tissue removed    debridement of bilateral leg ulcers Bilateral 2017    Dr Hernandez    INNER EAR SURGERY Bilateral     separate - pinnaplasty , new eardrms constructed    KIDNEY STONE SURGERY Left  approx    open    TONSILLECTOMY       Family History   Problem Relation Age of Onset    Asthma Daughter          26 w/ asthma    Cancer Brother         skin-part of ext ear removed    Cancer Mother         ? abd also    Diabetes Mother     Hypertension Mother     Cancer Father         ? abdonimal origin stomach/liver or pancreas    Heart disease Father         pacer    Heart  disease Brother         CABG3    Alcohol abuse Maternal Grandfather     Stroke Neg Hx     COPD Neg Hx     Mental illness Neg Hx     Mental retardation Neg Hx     Kidney disease Neg Hx      Social History     Tobacco Use    Smoking status: Never Smoker    Smokeless tobacco: Never Used    Tobacco comment: chain smoker heavy x 10 years, lighter x 10 before    Substance Use Topics    Alcohol use: Not Currently    Drug use: Never     Review of Systems   Constitutional: Positive for fever.   HENT: Negative.    Eyes: Negative.    Respiratory: Positive for shortness of breath (chronic).    Cardiovascular: Negative.    Gastrointestinal: Negative.    Endocrine: Negative.    Genitourinary: Negative.    Musculoskeletal: Negative.    Skin: Positive for rash and wound.   Allergic/Immunologic: Negative.    Neurological: Negative.    Hematological: Negative.    Psychiatric/Behavioral: Negative.    All other systems reviewed and are negative.      Physical Exam     Initial Vitals [06/11/22 1924]   BP Pulse Resp Temp SpO2   (!) 90/54 100 20 99.9 °F (37.7 °C) (!) 94 %      MAP       --         Physical Exam    Nursing note and vitals reviewed.  Constitutional: He appears well-developed and well-nourished. No distress.   HENT:   Head: Normocephalic and atraumatic.   Eyes: Conjunctivae and EOM are normal. Pupils are equal, round, and reactive to light.   Neck: Neck supple.   Cardiovascular: Normal rate, regular rhythm and intact distal pulses.   Pulmonary/Chest: No respiratory distress.   Abdominal: He exhibits no distension.   Musculoskeletal:         General: No edema. Normal range of motion.      Cervical back: Neck supple.     Neurological: He is alert and oriented to person, place, and time. He has normal strength.   Skin: Skin is warm and dry. Capillary refill takes less than 2 seconds.   Bilateral severe venous stasis dermatitis, skin flaking, lymphedema, and chronic skin changes. Left posterior distal calf purulent  oozing ulcer with surrounding erythema under a bandage which was removed.     Psychiatric: He has a normal mood and affect. His behavior is normal. Judgment and thought content normal.         ED Course   Critical Care    Date/Time: 6/11/2022 10:30 PM  Performed by: Leroy Metz MD  Authorized by: Leroy Metz MD   Direct patient critical care time: 12 minutes  Additional history critical care time: 7 minutes  Ordering / reviewing critical care time: 7 minutes  Documentation critical care time: 7 minutes  Consulting other physicians critical care time: 3 minutes  Total critical care time (exclusive of procedural time) : 36 minutes  Critical care time was exclusive of separately billable procedures and treating other patients and teaching time.  Critical care was necessary to treat or prevent imminent or life-threatening deterioration of the following conditions: sepsis, renal failure, shock and hepatic failure.  Critical care was time spent personally by me on the following activities: blood draw for specimens, development of treatment plan with patient or surrogate, discussions with consultants, interpretation of cardiac output measurements, evaluation of patient's response to treatment, examination of patient, obtaining history from patient or surrogate, ordering and performing treatments and interventions, ordering and review of laboratory studies, ordering and review of radiographic studies, pulse oximetry, re-evaluation of patient's condition and review of old charts.        Labs Reviewed   CBC W/ AUTO DIFFERENTIAL - Abnormal; Notable for the following components:       Result Value    WBC 17.33 (*)     RBC 4.27 (*)     Hemoglobin 13.6 (*)     MCV 99 (*)     MCH 31.9 (*)     RDW 14.7 (*)     Immature Granulocytes 1.4 (*)     Gran # (ANC) 15.3 (*)     Immature Grans (Abs) 0.25 (*)     Lymph # 0.5 (*)     Mono # 1.2 (*)     Gran % 88.4 (*)     Lymph % 2.8 (*)     All other components within normal  limits   COMPREHENSIVE METABOLIC PANEL - Abnormal; Notable for the following components:    Glucose 134 (*)     BUN 28 (*)     Creatinine 2.1 (*)     Calcium 8.4 (*)     Albumin 2.7 (*)      (*)     ALT 75 (*)     eGFR if  34 (*)     eGFR if non  29 (*)     All other components within normal limits   CULTURE, BLOOD   CULTURE, BLOOD   LACTIC ACID, PLASMA   URINALYSIS, REFLEX TO URINE CULTURE          Imaging Results    None          Medications   vancomycin - pharmacy to dose (has no administration in time range)   lactated ringers bolus 30 mL/kg (Ideal) (has no administration in time range)   ceFAZolin (ANCEF) 1 gram in dextrose 5 % 50 mL IVPB (premix) (1 g Intravenous New Bag 6/11/22 2111)                     Recent Results (from the past 24 hour(s))   Lactic acid, plasma    Collection Time: 06/11/22  8:52 PM   Result Value Ref Range    Lactate (Lactic Acid) 2.2 0.5 - 2.2 mmol/L   CBC auto differential    Collection Time: 06/11/22  8:52 PM   Result Value Ref Range    WBC 17.33 (H) 3.90 - 12.70 K/uL    RBC 4.27 (L) 4.60 - 6.20 M/uL    Hemoglobin 13.6 (L) 14.0 - 18.0 g/dL    Hematocrit 42.4 40.0 - 54.0 %    MCV 99 (H) 82 - 98 fL    MCH 31.9 (H) 27.0 - 31.0 pg    MCHC 32.1 32.0 - 36.0 g/dL    RDW 14.7 (H) 11.5 - 14.5 %    Platelets 186 150 - 450 K/uL    MPV 9.7 9.2 - 12.9 fL    Immature Granulocytes 1.4 (H) 0.0 - 0.5 %    Gran # (ANC) 15.3 (H) 1.8 - 7.7 K/uL    Immature Grans (Abs) 0.25 (H) 0.00 - 0.04 K/uL    Lymph # 0.5 (L) 1.0 - 4.8 K/uL    Mono # 1.2 (H) 0.3 - 1.0 K/uL    Eos # 0.0 0.0 - 0.5 K/uL    Baso # 0.07 0.00 - 0.20 K/uL    nRBC 0 0 /100 WBC    Gran % 88.4 (H) 38.0 - 73.0 %    Lymph % 2.8 (L) 18.0 - 48.0 %    Mono % 6.9 4.0 - 15.0 %    Eosinophil % 0.1 0.0 - 8.0 %    Basophil % 0.4 0.0 - 1.9 %    Differential Method Automated    Comprehensive metabolic panel    Collection Time: 06/11/22  8:52 PM   Result Value Ref Range    Sodium 141 136 - 145 mmol/L    Potassium 3.7  3.5 - 5.1 mmol/L    Chloride 105 95 - 110 mmol/L    CO2 25 23 - 29 mmol/L    Glucose 134 (H) 70 - 110 mg/dL    BUN 28 (H) 8 - 23 mg/dL    Creatinine 2.1 (H) 0.5 - 1.4 mg/dL    Calcium 8.4 (L) 8.7 - 10.5 mg/dL    Total Protein 6.8 6.0 - 8.4 g/dL    Albumin 2.7 (L) 3.5 - 5.2 g/dL    Total Bilirubin 1.0 0.1 - 1.0 mg/dL    Alkaline Phosphatase 94 55 - 135 U/L     (H) 10 - 40 U/L    ALT 75 (H) 10 - 44 U/L    Anion Gap 11 8 - 16 mmol/L    eGFR if African American 34 (A) >60 mL/min/1.73 m^2    eGFR if non African American 29 (A) >60 mL/min/1.73 m^2       Medications   vancomycin - pharmacy to dose (has no administration in time range)   lactated ringers bolus 30 mL/kg (Ideal) (has no administration in time range)   ceFAZolin (ANCEF) 1 gram in dextrose 5 % 50 mL IVPB (premix) (1 g Intravenous New Bag 6/11/22 2111)       I discussed the patient's case with Oralia Denton NP, who accepts the patient for admission.      Admitting MD:  Dr. Juan  Admitting service:  Hospital Medicine   Admission type:  Inpt med/surg         Clinical Impression:   Final diagnoses:  [L03.116] Cellulitis of left leg without foot (Primary)  [L97.922] Leg ulcer, left, with fat layer exposed  [A41.9, R65.21] Sepsis with acute organ dysfunction and septic shock, due to unspecified organism, unspecified type  [R79.89] Elevated serum creatinine  [R74.8] Elevated liver enzymes          ED Disposition Condition    Admit               Leroy Metz MD  06/13/22 2179

## 2022-06-12 NOTE — NURSING
Received pt via bed, awake and alert, assisted with transfer to bed x 5 staff. Pt requires max assist with transfer and bed mobility. Assessment per flowsheet. BLE dry, scaly, edematous. LLE has what appears to be an ulcer on lateral exterior side, scrotum excoriated, pt extremely [place]. Bed low, call light within reach. Will continue to monitor.

## 2022-06-12 NOTE — ASSESSMENT & PLAN NOTE
Will hold lipitor , could be related to fatty liver-will do liver ultrasound in AM.  Avoid hepatotoxic meds

## 2022-06-13 PROBLEM — R74.8 ELEVATED LIVER ENZYMES: Status: RESOLVED | Noted: 2022-06-12 | Resolved: 2022-06-13

## 2022-06-13 LAB
ALBUMIN SERPL BCP-MCNC: 2.6 G/DL (ref 3.5–5.2)
ALP SERPL-CCNC: 84 U/L (ref 55–135)
ALT SERPL W/O P-5'-P-CCNC: 32 U/L (ref 10–44)
ANION GAP SERPL CALC-SCNC: 9 MMOL/L (ref 8–16)
AST SERPL-CCNC: 39 U/L (ref 10–40)
BASOPHILS # BLD AUTO: 0.04 K/UL (ref 0–0.2)
BASOPHILS NFR BLD: 0.4 % (ref 0–1.9)
BILIRUB SERPL-MCNC: 0.9 MG/DL (ref 0.1–1)
BUN SERPL-MCNC: 28 MG/DL (ref 8–23)
CALCIUM SERPL-MCNC: 8.4 MG/DL (ref 8.7–10.5)
CHLORIDE SERPL-SCNC: 104 MMOL/L (ref 95–110)
CO2 SERPL-SCNC: 26 MMOL/L (ref 23–29)
CREAT SERPL-MCNC: 1.7 MG/DL (ref 0.5–1.4)
DIFFERENTIAL METHOD: ABNORMAL
EOSINOPHIL # BLD AUTO: 0.3 K/UL (ref 0–0.5)
EOSINOPHIL NFR BLD: 2.9 % (ref 0–8)
ERYTHROCYTE [DISTWIDTH] IN BLOOD BY AUTOMATED COUNT: 14.7 % (ref 11.5–14.5)
EST. GFR  (AFRICAN AMERICAN): 44 ML/MIN/1.73 M^2
EST. GFR  (NON AFRICAN AMERICAN): 38 ML/MIN/1.73 M^2
GLUCOSE SERPL-MCNC: 86 MG/DL (ref 70–110)
HCT VFR BLD AUTO: 44.6 % (ref 40–54)
HGB BLD-MCNC: 13.6 G/DL (ref 14–18)
IMM GRANULOCYTES # BLD AUTO: 0.05 K/UL (ref 0–0.04)
IMM GRANULOCYTES NFR BLD AUTO: 0.5 % (ref 0–0.5)
LYMPHOCYTES # BLD AUTO: 1.3 K/UL (ref 1–4.8)
LYMPHOCYTES NFR BLD: 14.4 % (ref 18–48)
MCH RBC QN AUTO: 31.6 PG (ref 27–31)
MCHC RBC AUTO-ENTMCNC: 30.5 G/DL (ref 32–36)
MCV RBC AUTO: 104 FL (ref 82–98)
MONOCYTES # BLD AUTO: 1 K/UL (ref 0.3–1)
MONOCYTES NFR BLD: 10.5 % (ref 4–15)
NEUTROPHILS # BLD AUTO: 6.6 K/UL (ref 1.8–7.7)
NEUTROPHILS NFR BLD: 71.3 % (ref 38–73)
NRBC BLD-RTO: 0 /100 WBC
PLATELET # BLD AUTO: 162 K/UL (ref 150–450)
PMV BLD AUTO: 10 FL (ref 9.2–12.9)
POTASSIUM SERPL-SCNC: 4.5 MMOL/L (ref 3.5–5.1)
PROT SERPL-MCNC: 6.8 G/DL (ref 6–8.4)
RBC # BLD AUTO: 4.31 M/UL (ref 4.6–6.2)
SODIUM SERPL-SCNC: 139 MMOL/L (ref 136–145)
WBC # BLD AUTO: 9.3 K/UL (ref 3.9–12.7)

## 2022-06-13 PROCEDURE — 99497 ADVNCD CARE PLAN 30 MIN: CPT | Mod: ,,, | Performed by: NURSE PRACTITIONER

## 2022-06-13 PROCEDURE — 25000003 PHARM REV CODE 250: Performed by: INTERNAL MEDICINE

## 2022-06-13 PROCEDURE — 97163 PT EVAL HIGH COMPLEX 45 MIN: CPT

## 2022-06-13 PROCEDURE — A4216 STERILE WATER/SALINE, 10 ML: HCPCS | Performed by: INTERNAL MEDICINE

## 2022-06-13 PROCEDURE — 80053 COMPREHEN METABOLIC PANEL: CPT | Performed by: INTERNAL MEDICINE

## 2022-06-13 PROCEDURE — 63600175 PHARM REV CODE 636 W HCPCS: Performed by: NURSE PRACTITIONER

## 2022-06-13 PROCEDURE — 25000003 PHARM REV CODE 250: Performed by: NURSE PRACTITIONER

## 2022-06-13 PROCEDURE — 94761 N-INVAS EAR/PLS OXIMETRY MLT: CPT

## 2022-06-13 PROCEDURE — 27000221 HC OXYGEN, UP TO 24 HOURS

## 2022-06-13 PROCEDURE — 96361 HYDRATE IV INFUSION ADD-ON: CPT

## 2022-06-13 PROCEDURE — 99497 PR ADVNCD CARE PLAN 30 MIN: ICD-10-PCS | Mod: ,,, | Performed by: NURSE PRACTITIONER

## 2022-06-13 PROCEDURE — 36415 COLL VENOUS BLD VENIPUNCTURE: CPT | Performed by: INTERNAL MEDICINE

## 2022-06-13 PROCEDURE — 11000001 HC ACUTE MED/SURG PRIVATE ROOM

## 2022-06-13 PROCEDURE — 97166 OT EVAL MOD COMPLEX 45 MIN: CPT

## 2022-06-13 PROCEDURE — 85025 COMPLETE CBC W/AUTO DIFF WBC: CPT | Performed by: INTERNAL MEDICINE

## 2022-06-13 RX ORDER — HYDRALAZINE HYDROCHLORIDE 10 MG/1
10 TABLET, FILM COATED ORAL EVERY 8 HOURS
Status: DISCONTINUED | OUTPATIENT
Start: 2022-06-13 | End: 2022-06-14 | Stop reason: HOSPADM

## 2022-06-13 RX ADMIN — LINEZOLID 600 MG: 600 TABLET, FILM COATED ORAL at 10:06

## 2022-06-13 RX ADMIN — HYDRALAZINE HYDROCHLORIDE 10 MG: 10 TABLET, FILM COATED ORAL at 02:06

## 2022-06-13 RX ADMIN — LEVOTHYROXINE SODIUM 50 MCG: 50 TABLET ORAL at 06:06

## 2022-06-13 RX ADMIN — CEFTRIAXONE 2 G: 2 INJECTION, SOLUTION INTRAVENOUS at 08:06

## 2022-06-13 RX ADMIN — Medication 10 ML: at 06:06

## 2022-06-13 RX ADMIN — Medication 10 ML: at 02:06

## 2022-06-13 RX ADMIN — METRONIDAZOLE: 10 GEL TOPICAL at 08:06

## 2022-06-13 RX ADMIN — Medication 10 ML: at 09:06

## 2022-06-13 RX ADMIN — SODIUM CHLORIDE: 0.9 INJECTION, SOLUTION INTRAVENOUS at 10:06

## 2022-06-13 RX ADMIN — TRAZODONE HYDROCHLORIDE 25 MG: 50 TABLET ORAL at 08:06

## 2022-06-13 RX ADMIN — HYDRALAZINE HYDROCHLORIDE 10 MG: 10 TABLET, FILM COATED ORAL at 09:06

## 2022-06-13 RX ADMIN — METRONIDAZOLE: 10 GEL TOPICAL at 10:06

## 2022-06-13 RX ADMIN — LINEZOLID 600 MG: 600 TABLET, FILM COATED ORAL at 08:06

## 2022-06-13 NOTE — ASSESSMENT & PLAN NOTE
Lab Results   Component Value Date    BUN 28 (H) 06/13/2022    BUN 28 (H) 06/11/2022    BUN 28 (H) 01/06/2022    CREATININE 1.7 (H) 06/13/2022    CREATININE 2.1 (H) 06/11/2022    CREATININE 1.6 (H) 01/06/2022     Monitor and Trend  IVF  Avoid nephrotoxic meds    06/13/2022  Improving, continue plan above

## 2022-06-13 NOTE — ASSESSMENT & PLAN NOTE
Body mass index is 67.08 kg/m². Morbid obesity complicates all aspects of disease management from diagnostic modalities to treatment. Weight loss encouraged and health benefits explained to patient.    Recommend OP follow up for weight loss surgery

## 2022-06-13 NOTE — ASSESSMENT & PLAN NOTE
Antibiotics (From admission, onward)            Start     Stop Route Frequency Ordered    06/12/22 1915  cefTRIAXone (ROCEPHIN) 2 g/50 mL D5W IVPB         -- IV Every 24 hours (non-standard times) 06/12/22 1802 06/12/22 0445  linezolid tablet 600 mg         06/19 0859 Oral Every 12 hours 06/12/22 0440        Wound care IP and OP f/u  **add 7 days of PO zyvox upon discharge- per ID

## 2022-06-13 NOTE — ASSESSMENT & PLAN NOTE
Patient has chronic hypothyroidism. TFTs reviewed-   Lab Results   Component Value Date    TSH 2.997 01/03/2022   Will continue chronic levothyroxine and adjust for and clinical changes.

## 2022-06-13 NOTE — NURSING
Received pt lying in supine position awake and alert visiting with family. Assessment per flowsheet, denies any pain or discomfort, bed low, call light within reach.

## 2022-06-13 NOTE — SUBJECTIVE & OBJECTIVE
Interval History: see above     Review of Systems   Constitutional:  Positive for activity change, fatigue and fever.   HENT:  Positive for hearing loss. Negative for congestion, postnasal drip and trouble swallowing.    Respiratory:  Negative for cough, shortness of breath and wheezing.    Cardiovascular:  Positive for leg swelling. Negative for chest pain and palpitations.   Gastrointestinal:  Negative for abdominal distention, abdominal pain, nausea and vomiting.   Genitourinary:  Positive for dysuria. Negative for difficulty urinating and urgency.   Musculoskeletal:  Positive for arthralgias, back pain, gait problem and myalgias.   Skin:  Positive for color change, rash and wound.   Psychiatric/Behavioral:  Positive for dysphoric mood. Negative for self-injury and suicidal ideas.         +Depressed   Objective:     Vital Signs (Most Recent):  Temp: 98.1 °F (36.7 °C) (06/13/22 0821)  Pulse: 65 (06/13/22 0821)  Resp: 17 (06/13/22 0821)  BP: 138/64 (06/13/22 0821)  SpO2: 96 % (06/13/22 0821) Vital Signs (24h Range):  Temp:  [97.6 °F (36.4 °C)-98.1 °F (36.7 °C)] 98.1 °F (36.7 °C)  Pulse:  [56-68] 65  Resp:  [17-19] 17  SpO2:  [95 %-98 %] 96 %  BP: (121-157)/(58-70) 138/64     Weight: (!) 218.2 kg (480 lb 15.6 oz)  Body mass index is 67.08 kg/m².    Intake/Output Summary (Last 24 hours) at 6/13/2022 1148  Last data filed at 6/13/2022 0845  Gross per 24 hour   Intake 883.21 ml   Output 1225 ml   Net -341.79 ml      Physical Exam  Vitals and nursing note reviewed.   Constitutional:       Appearance: He is well-developed. He is morbidly obese. He is ill-appearing.   HENT:      Head: Normocephalic and atraumatic.      Nose: Nose normal.   Eyes:      Extraocular Movements: Extraocular movements intact.      Conjunctiva/sclera: Conjunctivae normal.   Cardiovascular:      Rate and Rhythm: Normal rate and regular rhythm.      Heart sounds: Normal heart sounds.   Pulmonary:      Effort: Pulmonary effort is normal. No  respiratory distress.      Breath sounds: Normal breath sounds. No wheezing or rales.   Abdominal:      General: Bowel sounds are normal. There is no distension.   Genitourinary:     Comments: Scrotal excoriation  Musculoskeletal:         General: Normal range of motion.      Cervical back: Normal range of motion and neck supple.      Right lower leg: Edema present.      Left lower leg: Edema present.   Feet:      Right foot:      Skin integrity: Blister, skin breakdown, erythema, warmth, callus, dry skin and fissure present.      Toenail Condition: Right toenails are abnormally thick, long and ingrown. Fungal disease present.     Left foot:      Skin integrity: Blister, skin breakdown, erythema, warmth, callus, dry skin and fissure present.      Toenail Condition: Left toenails are abnormally thick, long and ingrown. Fungal disease present.  Skin:     General: Skin is dry.      Findings: Erythema and rash present.      Comments: Excoriated skin to abdominal and perineal folds  Venous stasis ulcerations with cellulitis to bilateral lower extremities   Neurological:      Mental Status: He is alert and oriented to person, place, and time.   Psychiatric:         Mood and Affect: Mood is depressed. Affect is blunt.         Behavior: Behavior is withdrawn.         Thought Content: Thought content is not delusional. Thought content does not include suicidal ideation. Thought content does not include homicidal or suicidal plan.         Cognition and Memory: Cognition is not impaired. Memory is not impaired.             Significant Labs: All pertinent labs within the past 24 hours have been reviewed.  Recent Lab Results         06/13/22  0609        Albumin 2.6       Alkaline Phosphatase 84       ALT 32       Anion Gap 9       AST 39       Baso # 0.04       Basophil % 0.4       BILIRUBIN TOTAL 0.9  Comment: For infants and newborns, interpretation of results should be based  on gestational age, weight and in agreement with  clinical  observations.    Premature Infant recommended reference ranges:  Up to 24 hours.............<8.0 mg/dL  Up to 48 hours............<12.0 mg/dL  3-5 days..................<15.0 mg/dL  6-29 days.................<15.0 mg/dL         BUN 28       Calcium 8.4       Chloride 104       CO2 26       Creatinine 1.7       Differential Method Automated       eGFR if  44       eGFR if non  38  Comment: Calculation used to obtain the estimated glomerular filtration  rate (eGFR) is the CKD-EPI equation.          Eos # 0.3       Eosinophil % 2.9       Glucose 86       Gran # (ANC) 6.6       Gran % 71.3       Hematocrit 44.6       Hemoglobin 13.6       Immature Grans (Abs) 0.05  Comment: Mild elevation in immature granulocytes is non specific and   can be seen in a variety of conditions including stress response,   acute inflammation, trauma and pregnancy. Correlation with other   laboratory and clinical findings is essential.         Immature Granulocytes 0.5       Lymph # 1.3       Lymph % 14.4       MCH 31.6       MCHC 30.5              Mono # 1.0       Mono % 10.5       MPV 10.0       nRBC 0       Platelets 162       Potassium 4.5       PROTEIN TOTAL 6.8       RBC 4.31       RDW 14.7       Sodium 139       WBC 9.30               Significant Imaging: I have reviewed all pertinent imaging results/findings within the past 24 hours.  Imaging Results    None

## 2022-06-13 NOTE — PROGRESS NOTES
I had the opportunity to meet with patient to introduce palliative medicine and our supportive role.  Patient is a good historian and seems to understand his health issues.  He tells me it is becoming more and more difficult at home.  He is bedbound and total care dependent.  His wife is 73 yo with her own medical problems.  They have two adult children who lives with them.  Daughter also requires a wheelchair.  Patient and his wife had arrangement to transition to nursing home if she could no longer care for him at home.  Both patient and wife wish to avoid placement if at all possible.  I explained our role as it relates to goals of care and advance care planning.  He has not completed HCPOA, living will but open to doing this while here in the hospital.  Advance Care Planning  I did inquire about his end of life wishes.  The patient wishes to have a natural, peaceful death.  Along those lines, the patient does not wish to have CPR or other invasive treatments performed when his heart and/or breathing stops.  I communicated to the patient that a DNR order would be placed in his medical record to reflect this preference.  A DNR form was completed and will be scanned into EPIC.  I plan to complete a LaPOST with him prior to discharge indicating such.  He did ask I call his wife.  I shared with her our discussion.  She is tearful and tells me they recently lost a son Palmer.  Hospice was recommended but he didn't make it out of the hospital.  We discussed his desire for conservative medical management and their desires to keep him home.  Because of this, patient my meet criteria for hospice care.  We decided to continue this discussion tomorrow when she plans to come to bedside.  Family meeting to resume at 10am to clarify future plan of care and complete ACP.      I spent a total of 30 minutes engaging the patient and his family in this advance care planning discussion.        Alliosn Yeboah, HERON  Palliative  [facility]  [phone]

## 2022-06-13 NOTE — HOSPITAL COURSE
Gene Rothman was admitted for bilateral lower extremity venous stasis ulcerations and subsequent cellulitis. Blood cultures growing gram - rods this morning. Leukocytosis and elevated liver enzymes resolved. BETY improving. Continue IV ABX and await final culture and sensitivities. Patient is down and depressed today on visit. Reports his wife is struggling with taking care of him at home. BP consistently elevated since admission will add hydralazine. Consult wound care, palliative and SW.     6/14 Pt was seen and examined at bedside . He was determined to be suitable for d/c   Pt and family request to be d/c with home hospice . Palliative care consulted .  He will be d/c on po Levaquin renal dose   q 48 hrs x 14 days and zyvox 600 mg po bid  for a total of 7 days .

## 2022-06-13 NOTE — PLAN OF CARE
PT REQUIRES TOTAL CARE AND IS BED BOUND AS PLOF AND WILL BE D/C TO [facility] FOR ROM AND POSITIONING.

## 2022-06-13 NOTE — PT/OT/SLP EVAL
"Occupational Therapy   Evaluation and Discharge Note    Name: Gene Rothman  MRN: [id number]  Admitting Diagnosis:  Venous stasis ulcer of left calf   Recent Surgery: * No surgery found *      Recommendations:     Discharge Recommendations: nursing facility, basic  Discharge Equipment Recommendations:  none  Barriers to discharge:  None    Assessment:     Gene Rothman is a 76 y.o. male with a medical diagnosis of Venous stasis ulcer of left calf. At this time, patient is functioning at their prior level of function and does not require further acute OT services.     Plan:     During this hospitalization, patient does not require further acute OT services.  Please re-consult if situation changes.    · Plan of Care Reviewed with: patient    Subjective     Chief Complaint: Reported "I haven't been out of the bed since December of last year."  Patient/Family Comments/goals: none reported    Occupational Profile:  Living Environment: Patient resides in a one story home with no steps to enter, with his 74 year old wife.  Previous level of function: patient reports that he has been bedbound since December 2021. He does not transfer OOB, but sits EOB with HH therapy. Reports that his wife helps him with supine toileting needs, but that bathing is completed rarely due to physicality of task.  Roles and Routines: n/a  Equipment Used at home:  hospital bed, wheelchair, walker, standard, rollator, cane, straight, trapeze  Assistance upon Discharge: wife    Pain/Comfort:  · Pain Rating 1: 6/10  · Location - Side 1: Right  · Location 1: knee  · Pain Addressed 1: Cessation of Activity    Objective:     Communicated with: Nurse, Christiano, prior to session.  Patient found supine with peripheral IV, PureWick, oxygen upon OT entry to room.    General Precautions: Standard, fall, respiratory   Orthopedic Precautions:N/A   Braces: N/A  Respiratory Status: Nasal cannula, flow 2 L/min     Bed Mobility:    · Patient declined physical " "engagement in B rolling or sup>sit due to "bed type". Offered to manipulate bed/mattress and get cane (which patient reports that he uses at home to assist in his mobility on bed rails) to mimic home setting, but he continued to decline.     Activities of Daily Living:  · Upper Body Dressing: total assistance .  · Lower Body Dressing: total assistance .  · Toileting: total assistance .    Cognitive/Visual Perceptual:  Cognitive/Psychosocial Skills:     -       Oriented to: Person, Place, Time and Situation   -       Follows Commands/attention:Follows one-step commands    Physical Exam:  Balance:    -       not assessed  Upper Extremity Range of Motion:     -       Right Upper Extremity: WFL  -       Left Upper Extremity: WFL  Upper Extremity Strength:    -       Right Upper Extremity: Deficits: grossly 4/5  -       Left Upper Extremity: Deficits: grossly 45   Strength:    -       Right Upper Extremity: Deficits: fair  -       Left Upper Extremity: Deficits: fair    AMPAC 6 Click ADL:  AMPAC Total Score: 9    Treatment & Education:  Patient educated on role of OT in acute setting and benefits of participation. Patient reported that he is at his functional baseline. Therapist offered to send tech via  for completion of B UE AROM therex in supine and he was in agreement with plan. Skilled intervention not warranted or appropriate.  Education:    Patient left supine with all lines intact and call button in reach    History:     Past Medical History:   Diagnosis Date    Arthritis     CHF (congestive heart failure)     Chronic kidney disease     Coronary artery disease     Depression     Hypertension     Hypothyroidism     Lymphedema of lower extremity     Nephrolithiasis     Obesity     Peripheral vascular disease     Recurrent cellulitis of lower leg     Sleep apnea     can't stand the CPAP , sleeps elevated in hospital bed or chair    Tobacco dependence     resolved       Past Surgical History: "   Procedure Laterality Date    ADENOIDECTOMY  1961    BACK SURGERY  1975;  1976    x2 for disc; scar tissue removed    debridement of bilateral leg ulcers Bilateral 01/01/2017    Dr Hernandez    INNER EAR SURGERY Bilateral 1961    separate - pinnaplasty , new eardrms constructed    KIDNEY STONE SURGERY Left 1976 approx    open    TONSILLECTOMY  1961       Time Tracking:     OT Date of Treatment: 06/13/22  OT Start Time: 0855  OT Stop Time: 0920  OT Total Time (min): 25 min    Billable Minutes:Evaluation 25    6/13/2022

## 2022-06-13 NOTE — PLAN OF CARE
Swer attemepted to complete initial assessment. Pt was asleep. Swer contacted pt's daughter X2, no answer.

## 2022-06-13 NOTE — PROGRESS NOTES
"[facility] Medicine  Progress Note    Patient Name: Gene Rothman  MRN: [id number]  Patient Class: IP- Inpatient   Admission Date: 6/11/2022  Length of Stay: 2 days  Attending Physician: Russ Thomas, *  Primary Care Provider: Ami Zarate DO        Subjective:     Principal Problem:Venous stasis ulcer of left calf        HPI:   76 y.o. male with history of  severe obesity, CHF, hx VTE,hearing loss , CAD, lymphedema, recurrent LE cellulitis, who presents with report of lower extremity hyperpigmented and "cellulitis " as per patient . He reports that he has severe hearing loss and the hearing aid battery is low at this time. We used written language to communicate. He reports subjective fever . He mentioned that he was not planning to stay long in the hospital .   Labs and imaging test reviewed.    Component      Latest Ref Rng & Units 6/11/2022 1/6/2022              Creatinine      0.5 - 1.4 mg/dL 2.1 (H) 1.6 (H)     Component      Latest Ref Rng & Units 6/11/2022 1/6/2022 1/4/2022 1/3/2022                AST      10 - 40 U/L 110 (H)   18   ALT      10 - 44 U/L 75 (H)   17     Component      Latest Ref Rng & Units 6/11/2022 1/4/2022              WBC      3.90 - 12.70 K/uL 17.33 (H) 7.00   He will be placed on observation for cellulitis          Overview/Hospital Course:  Gene Rothman was admitted for bilateral lower extremity venous stasis ulcerations and subsequent cellulitis. Blood cultures growing gram - rods this morning. Leukocytosis and elevated liver enzymes resolved. BETY improving. Continue IV ABX and await final culture and sensitivities. Patient is down and depressed today on visit. Reports his wife is struggling with taking care of him at home. BP consistently elevated since admission will add hydralazine. Consult wound care, palliative and SW.       Interval History: see above     Review of Systems   Constitutional:  Positive for activity change, fatigue and fever.   HENT:  " Positive for hearing loss. Negative for congestion, postnasal drip and trouble swallowing.    Respiratory:  Negative for cough, shortness of breath and wheezing.    Cardiovascular:  Positive for leg swelling. Negative for chest pain and palpitations.   Gastrointestinal:  Negative for abdominal distention, abdominal pain, nausea and vomiting.   Genitourinary:  Positive for dysuria. Negative for difficulty urinating and urgency.   Musculoskeletal:  Positive for arthralgias, back pain, gait problem and myalgias.   Skin:  Positive for color change, rash and wound.   Psychiatric/Behavioral:  Positive for dysphoric mood. Negative for self-injury and suicidal ideas.         +Depressed   Objective:     Vital Signs (Most Recent):  Temp: 98.1 °F (36.7 °C) (06/13/22 0821)  Pulse: 65 (06/13/22 0821)  Resp: 17 (06/13/22 0821)  BP: 138/64 (06/13/22 0821)  SpO2: 96 % (06/13/22 0821) Vital Signs (24h Range):  Temp:  [97.6 °F (36.4 °C)-98.1 °F (36.7 °C)] 98.1 °F (36.7 °C)  Pulse:  [56-68] 65  Resp:  [17-19] 17  SpO2:  [95 %-98 %] 96 %  BP: (121-157)/(58-70) 138/64     Weight: (!) 218.2 kg (480 lb 15.6 oz)  Body mass index is 67.08 kg/m².    Intake/Output Summary (Last 24 hours) at 6/13/2022 1148  Last data filed at 6/13/2022 0845  Gross per 24 hour   Intake 883.21 ml   Output 1225 ml   Net -341.79 ml      Physical Exam  Vitals and nursing note reviewed.   Constitutional:       Appearance: He is well-developed. He is morbidly obese. He is ill-appearing.   HENT:      Head: Normocephalic and atraumatic.      Nose: Nose normal.   Eyes:      Extraocular Movements: Extraocular movements intact.      Conjunctiva/sclera: Conjunctivae normal.   Cardiovascular:      Rate and Rhythm: Normal rate and regular rhythm.      Heart sounds: Normal heart sounds.   Pulmonary:      Effort: Pulmonary effort is normal. No respiratory distress.      Breath sounds: Normal breath sounds. No wheezing or rales.   Abdominal:      General: Bowel sounds are  normal. There is no distension.   Genitourinary:     Comments: Scrotal excoriation  Musculoskeletal:         General: Normal range of motion.      Cervical back: Normal range of motion and neck supple.      Right lower leg: Edema present.      Left lower leg: Edema present.   Feet:      Right foot:      Skin integrity: Blister, skin breakdown, erythema, warmth, callus, dry skin and fissure present.      Toenail Condition: Right toenails are abnormally thick, long and ingrown. Fungal disease present.     Left foot:      Skin integrity: Blister, skin breakdown, erythema, warmth, callus, dry skin and fissure present.      Toenail Condition: Left toenails are abnormally thick, long and ingrown. Fungal disease present.  Skin:     General: Skin is dry.      Findings: Erythema and rash present.      Comments: Excoriated skin to abdominal and perineal folds  Venous stasis ulcerations with cellulitis to bilateral lower extremities   Neurological:      Mental Status: He is alert and oriented to person, place, and time.   Psychiatric:         Mood and Affect: Mood is depressed. Affect is blunt.         Behavior: Behavior is withdrawn.         Thought Content: Thought content is not delusional. Thought content does not include suicidal ideation. Thought content does not include homicidal or suicidal plan.         Cognition and Memory: Cognition is not impaired. Memory is not impaired.             Significant Labs: All pertinent labs within the past 24 hours have been reviewed.  Recent Lab Results         06/13/22  0609        Albumin 2.6       Alkaline Phosphatase 84       ALT 32       Anion Gap 9       AST 39       Baso # 0.04       Basophil % 0.4       BILIRUBIN TOTAL 0.9  Comment: For infants and newborns, interpretation of results should be based  on gestational age, weight and in agreement with clinical  observations.    Premature Infant recommended reference ranges:  Up to 24 hours.............<8.0 mg/dL  Up to 48  hours............<12.0 mg/dL  3-5 days..................<15.0 mg/dL  6-29 days.................<15.0 mg/dL         BUN 28       Calcium 8.4       Chloride 104       CO2 26       Creatinine 1.7       Differential Method Automated       eGFR if  44       eGFR if non  38  Comment: Calculation used to obtain the estimated glomerular filtration  rate (eGFR) is the CKD-EPI equation.          Eos # 0.3       Eosinophil % 2.9       Glucose 86       Gran # (ANC) 6.6       Gran % 71.3       Hematocrit 44.6       Hemoglobin 13.6       Immature Grans (Abs) 0.05  Comment: Mild elevation in immature granulocytes is non specific and   can be seen in a variety of conditions including stress response,   acute inflammation, trauma and pregnancy. Correlation with other   laboratory and clinical findings is essential.         Immature Granulocytes 0.5       Lymph # 1.3       Lymph % 14.4       MCH 31.6       MCHC 30.5              Mono # 1.0       Mono % 10.5       MPV 10.0       nRBC 0       Platelets 162       Potassium 4.5       PROTEIN TOTAL 6.8       RBC 4.31       RDW 14.7       Sodium 139       WBC 9.30               Significant Imaging: I have reviewed all pertinent imaging results/findings within the past 24 hours.  Imaging Results    None             Assessment/Plan:      * Venous stasis ulcer of left calf  Consult wound care   Bariatric bed    Candidal dermatitis  Add topical miconazole   Wound care      Venous stasis dermatitis of both lower extremities  Antibiotics (From admission, onward)            Start     Stop Route Frequency Ordered    06/12/22 1915  cefTRIAXone (ROCEPHIN) 2 g/50 mL D5W IVPB         -- IV Every 24 hours (non-standard times) 06/12/22 1802 06/12/22 0445  linezolid tablet 600 mg         06/19 0859 Oral Every 12 hours 06/12/22 0440        Wound care IP and OP f/u  **add 7 days of PO zyvox upon discharge- per ID    CKD (chronic kidney disease) stage 3, GFR 30-59  ml/min  Lab Results   Component Value Date    BUN 28 (H) 06/13/2022    BUN 28 (H) 06/11/2022    BUN 28 (H) 01/06/2022    CREATININE 1.7 (H) 06/13/2022    CREATININE 2.1 (H) 06/11/2022    CREATININE 1.6 (H) 01/06/2022     Monitor and Trend  IVF  Avoid nephrotoxic meds    06/13/2022  Improving, continue plan above    Bilateral hearing loss  Will continue hearing aid and will get batteries from home    Morbid obesity with BMI of 60.0-69.9, adult  Body mass index is 67.08 kg/m². Morbid obesity complicates all aspects of disease management from diagnostic modalities to treatment. Weight loss encouraged and health benefits explained to patient.    Recommend OP follow up for weight loss surgery     Acquired hypothyroidism  Patient has chronic hypothyroidism. TFTs reviewed-   Lab Results   Component Value Date    TSH 2.997 01/03/2022   Will continue chronic levothyroxine and adjust for and clinical changes.      VTE Risk Mitigation (From admission, onward)         Ordered     IP VTE HIGH RISK PATIENT  Once         06/12/22 0217     Place sequential compression device  Until discontinued         06/12/22 0217                Discharge Planning   JAMES:      Code Status: Full Code   Is the patient medically ready for discharge?:     Reason for patient still in hospital (select all that apply): Patient trending condition                     Paige Tovar NP  Department of Hospital Medicine   [facility]

## 2022-06-13 NOTE — PT/OT/SLP EVAL
Physical Therapy Evaluation and Discharge Note    Patient Name:  Gene Rothman   MRN:  [id number]    Recommendations:     Discharge Recommendations:  nursing facility, basic (24 HOUR [facility])   Discharge Equipment Recommendations: none   Barriers to discharge: Decreased caregiver support    Assessment:     Gene Rothman is a 76 y.o. male admitted with a medical diagnosis of Venous stasis ulcer of left calf. .  At this time, patient is functioning at their prior level of function and does not require further acute PT services.     Recent Surgery: * No surgery found *      Plan:     During this hospitalization, patient does not require further acute PT services.  Please re-consult if situation changes.      Subjective     Chief Complaint: PAIN R KNEE   Patient/Family Comments/goals: GO HOME   Pain/Comfort:  · Pain Rating 1: 6/10  · Location - Side 1: Right  · Location 1: knee  · Pain Addressed 1: Cessation of Activity    Patients cultural, spiritual, Rastafari conflicts given the current situation:      Living Environment:   PT LIVES AT HOME WITH WIFE IN A ONE STORY HOME WITH NO STEPS  Prior to admission, patients level of function was BED BOUND SINCE December 2021.  Equipment used at home: hospital bed, wheelchair, walker, standard, rollator, cane, straight.  DME owned (not currently used): rolling walker and wheelchair.  Upon discharge, patient will have assistance from WIFE .    Objective:     Communicated with NURSE GAVIN  AND Epic CHART REVIEW prior to session.  Patient found supine with peripheral IV, PureWick upon PT entry to room.    General Precautions: Standard, fall   Orthopedic Precautions:N/A   Braces: N/A       Exams:  · RLE ROM: IMPAIRED  · RLE Strength: IMPAIRED  · LLE ROM: IMPAIRED  · LLE Strength: IMPAIRED    Functional Mobility:  · Bed Mobility:     · Rolling Left:  maximal assistance and of 2 persons  · Rolling Right: maximal assistance and of 2 persons    AM-PAC 6 CLICK MOBILITY  Total  Score:7       Therapeutic Activities and Exercises:   PT B LE ROM LIMITED WITH INC PAIN AND B LE SWELLING. PT LEFT SUP IN BED WITH ALL NEEDS MET.     AM-PAC 6 CLICK MOBILITY  Total Score:7     Patient left HOB elevated with call button in reach.    GOALS:   Multidisciplinary Problems     Physical Therapy Goals     Not on file                History:     Past Medical History:   Diagnosis Date    Arthritis     CHF (congestive heart failure)     Chronic kidney disease     Coronary artery disease     Depression     Hypertension     Hypothyroidism     Lymphedema of lower extremity     Nephrolithiasis     Obesity     Peripheral vascular disease     Recurrent cellulitis of lower leg     Sleep apnea     can't stand the CPAP , sleeps elevated in hospital bed or chair    Tobacco dependence     resolved       Past Surgical History:   Procedure Laterality Date    ADENOIDECTOMY  1961    BACK SURGERY  1975;  1976    x2 for disc; scar tissue removed    debridement of bilateral leg ulcers Bilateral 01/01/2017    Dr Hernandez    INNER EAR SURGERY Bilateral 1961    separate - pinnaplasty , new eardrms constructed    KIDNEY STONE SURGERY Left 1976 approx    open    TONSILLECTOMY  1961       Time Tracking:     PT Received On: 06/13/22  PT Start Time: 0945     PT Stop Time: 1000  PT Total Time (min): 15 min     Billable Minutes: Evaluation 15      06/13/2022

## 2022-06-13 NOTE — PLAN OF CARE
OT rafael completed. Patient at baseline dependent bedbound status. Continued skilled intervention not warranted. Will place on  for AROM in supine as tolerated.

## 2022-06-14 VITALS
HEIGHT: 71 IN | TEMPERATURE: 98 F | BODY MASS INDEX: 44.1 KG/M2 | OXYGEN SATURATION: 95 % | DIASTOLIC BLOOD PRESSURE: 65 MMHG | SYSTOLIC BLOOD PRESSURE: 140 MMHG | HEART RATE: 61 BPM | RESPIRATION RATE: 18 BRPM | WEIGHT: 315 LBS

## 2022-06-14 LAB
ALBUMIN SERPL BCP-MCNC: 2.4 G/DL (ref 3.5–5.2)
ALP SERPL-CCNC: 85 U/L (ref 55–135)
ALT SERPL W/O P-5'-P-CCNC: 23 U/L (ref 10–44)
ANION GAP SERPL CALC-SCNC: 10 MMOL/L (ref 8–16)
AST SERPL-CCNC: 27 U/L (ref 10–40)
BASOPHILS # BLD AUTO: 0.03 K/UL (ref 0–0.2)
BASOPHILS NFR BLD: 0.4 % (ref 0–1.9)
BILIRUB SERPL-MCNC: 0.6 MG/DL (ref 0.1–1)
BUN SERPL-MCNC: 26 MG/DL (ref 8–23)
CALCIUM SERPL-MCNC: 8.1 MG/DL (ref 8.7–10.5)
CHLORIDE SERPL-SCNC: 106 MMOL/L (ref 95–110)
CO2 SERPL-SCNC: 27 MMOL/L (ref 23–29)
CREAT SERPL-MCNC: 1.5 MG/DL (ref 0.5–1.4)
DIFFERENTIAL METHOD: ABNORMAL
EOSINOPHIL # BLD AUTO: 0.3 K/UL (ref 0–0.5)
EOSINOPHIL NFR BLD: 3.7 % (ref 0–8)
ERYTHROCYTE [DISTWIDTH] IN BLOOD BY AUTOMATED COUNT: 14.3 % (ref 11.5–14.5)
EST. GFR  (AFRICAN AMERICAN): 51 ML/MIN/1.73 M^2
EST. GFR  (NON AFRICAN AMERICAN): 44 ML/MIN/1.73 M^2
GLUCOSE SERPL-MCNC: 84 MG/DL (ref 70–110)
HCT VFR BLD AUTO: 42 % (ref 40–54)
HGB BLD-MCNC: 13.1 G/DL (ref 14–18)
IMM GRANULOCYTES # BLD AUTO: 0.02 K/UL (ref 0–0.04)
IMM GRANULOCYTES NFR BLD AUTO: 0.3 % (ref 0–0.5)
LYMPHOCYTES # BLD AUTO: 1.5 K/UL (ref 1–4.8)
LYMPHOCYTES NFR BLD: 19.5 % (ref 18–48)
MCH RBC QN AUTO: 31.6 PG (ref 27–31)
MCHC RBC AUTO-ENTMCNC: 31.2 G/DL (ref 32–36)
MCV RBC AUTO: 101 FL (ref 82–98)
MONOCYTES # BLD AUTO: 0.9 K/UL (ref 0.3–1)
MONOCYTES NFR BLD: 11.9 % (ref 4–15)
NEUTROPHILS # BLD AUTO: 4.9 K/UL (ref 1.8–7.7)
NEUTROPHILS NFR BLD: 64.2 % (ref 38–73)
NRBC BLD-RTO: 0 /100 WBC
PLATELET # BLD AUTO: 152 K/UL (ref 150–450)
PMV BLD AUTO: 9.8 FL (ref 9.2–12.9)
POTASSIUM SERPL-SCNC: 3.9 MMOL/L (ref 3.5–5.1)
PROT SERPL-MCNC: 6.5 G/DL (ref 6–8.4)
RBC # BLD AUTO: 4.15 M/UL (ref 4.6–6.2)
SODIUM SERPL-SCNC: 143 MMOL/L (ref 136–145)
WBC # BLD AUTO: 7.64 K/UL (ref 3.9–12.7)

## 2022-06-14 PROCEDURE — 99223 PR INITIAL HOSPITAL CARE,LEVL III: ICD-10-PCS | Mod: ,,, | Performed by: NURSE PRACTITIONER

## 2022-06-14 PROCEDURE — A4216 STERILE WATER/SALINE, 10 ML: HCPCS | Performed by: INTERNAL MEDICINE

## 2022-06-14 PROCEDURE — 25000003 PHARM REV CODE 250: Performed by: INTERNAL MEDICINE

## 2022-06-14 PROCEDURE — 94761 N-INVAS EAR/PLS OXIMETRY MLT: CPT

## 2022-06-14 PROCEDURE — 99497 PR ADVNCD CARE PLAN 30 MIN: ICD-10-PCS | Mod: 25,,, | Performed by: NURSE PRACTITIONER

## 2022-06-14 PROCEDURE — 25000003 PHARM REV CODE 250: Performed by: NURSE PRACTITIONER

## 2022-06-14 PROCEDURE — 87040 BLOOD CULTURE FOR BACTERIA: CPT | Performed by: INTERNAL MEDICINE

## 2022-06-14 PROCEDURE — 85025 COMPLETE CBC W/AUTO DIFF WBC: CPT | Performed by: INTERNAL MEDICINE

## 2022-06-14 PROCEDURE — 99497 ADVNCD CARE PLAN 30 MIN: CPT | Mod: 25,,, | Performed by: NURSE PRACTITIONER

## 2022-06-14 PROCEDURE — 27000221 HC OXYGEN, UP TO 24 HOURS

## 2022-06-14 PROCEDURE — 80053 COMPREHEN METABOLIC PANEL: CPT | Performed by: INTERNAL MEDICINE

## 2022-06-14 PROCEDURE — 99223 1ST HOSP IP/OBS HIGH 75: CPT | Mod: ,,, | Performed by: NURSE PRACTITIONER

## 2022-06-14 PROCEDURE — 36415 COLL VENOUS BLD VENIPUNCTURE: CPT | Performed by: INTERNAL MEDICINE

## 2022-06-14 RX ORDER — LINEZOLID 600 MG/1
600 TABLET, FILM COATED ORAL EVERY 12 HOURS
Qty: 8 TABLET | Refills: 0 | Status: SHIPPED | OUTPATIENT
Start: 2022-06-14 | End: 2022-06-18

## 2022-06-14 RX ORDER — MICONAZOLE NITRATE 2 %
POWDER (GRAM) TOPICAL 2 TIMES DAILY
Status: DISCONTINUED | OUTPATIENT
Start: 2022-06-14 | End: 2022-06-14 | Stop reason: HOSPADM

## 2022-06-14 RX ORDER — LEVOFLOXACIN 750 MG/1
750 TABLET ORAL EVERY OTHER DAY
Qty: 7 TABLET | Refills: 0 | Status: SHIPPED | OUTPATIENT
Start: 2022-06-14 | End: 2022-06-28

## 2022-06-14 RX ADMIN — SODIUM CHLORIDE: 0.9 INJECTION, SOLUTION INTRAVENOUS at 05:06

## 2022-06-14 RX ADMIN — HYDRALAZINE HYDROCHLORIDE 10 MG: 10 TABLET, FILM COATED ORAL at 05:06

## 2022-06-14 RX ADMIN — METRONIDAZOLE: 10 GEL TOPICAL at 09:06

## 2022-06-14 RX ADMIN — Medication 10 ML: at 05:06

## 2022-06-14 RX ADMIN — LEVOTHYROXINE SODIUM 50 MCG: 50 TABLET ORAL at 05:06

## 2022-06-14 RX ADMIN — LINEZOLID 600 MG: 600 TABLET, FILM COATED ORAL at 09:06

## 2022-06-14 RX ADMIN — HYDRALAZINE HYDROCHLORIDE 10 MG: 10 TABLET, FILM COATED ORAL at 01:06

## 2022-06-14 RX ADMIN — MICONAZOLE NITRATE: 20 POWDER TOPICAL at 09:06

## 2022-06-14 NOTE — PLAN OF CARE
Patient remains free of injury. AAOx4. Discharge instructions reviewed, patient verbalizes understanding. Patient to transport home via ambulance.    Problem: Adult Inpatient Plan of Care  Goal: Plan of Care Review  Outcome: Met  Goal: Patient-Specific Goal (Individualized)  Outcome: Met  Goal: Absence of Hospital-Acquired Illness or Injury  Outcome: Met  Goal: Optimal Comfort and Wellbeing  Outcome: Met  Goal: Readiness for Transition of Care  Outcome: Met

## 2022-06-14 NOTE — PLAN OF CARE
Problem: Adult Inpatient Plan of Care  Goal: Plan of Care Review  Outcome: Ongoing, Progressing  Goal: Patient-Specific Goal (Individualized)  Outcome: Ongoing, Progressing  Goal: Absence of Hospital-Acquired Illness or Injury  Outcome: Ongoing, Progressing  Goal: Optimal Comfort and Wellbeing  Outcome: Ongoing, Progressing  Goal: Readiness for Transition of Care  Outcome: Ongoing, Progressing     Problem: Bariatric Environmental Safety  Goal: Safety Maintained with Care  Outcome: Ongoing, Progressing     Problem: Impaired Wound Healing  Goal: Optimal Wound Healing  Outcome: Ongoing, Progressing     Problem: Skin Injury Risk Increased  Goal: Skin Health and Integrity  Outcome: Ongoing, Progressing     Problem: Coping Ineffective  Goal: Effective Coping  Outcome: Ongoing, Progressing

## 2022-06-14 NOTE — PLAN OF CARE
[facility]  Initial Discharge Assessment       Primary Care Provider: Ami Zarate DO    Admission Diagnosis: Elevated liver enzymes [R74.8]  Elevated serum creatinine [R79.89]  Cellulitis of left leg without foot [L03.116]  Leg ulcer, left, with fat layer exposed [L97.922]  Sepsis with acute organ dysfunction and septic shock, due to unspecified organism, unspecified type [A41.9, R65.21]    Admission Date: 6/11/2022  Expected Discharge Date: 6/14/2022    Discharge Barriers Identified: None    Payor: HU[employer] MANAGED MEDICARE / Plan: HUMANA TOTAL CARE ADVANTAGE / Product Type: Medicare Advantage /     Extended Emergency Contact Information  Primary Emergency Contact: Alise Rothman   [facility]  Home Phone: [phone]  Work Phone: [phone]  Relation: Daughter  Secondary Emergency Contact: [place]  [employer] Phone: [phone]  Relation: Son    Discharge Plan A: Hospice/home  Discharge Plan B: Hospice/home      [facility] [address] [place] [address]  Phone: [phone] Fax: [phone]    [facility] (Now [facility]) - [place], [place] - [address] [facility]  [address] [facility] [address]  Phone: [phone] Fax: [phone]    [facility] [address] [place] [address]  Phone: [phone] Fax: [phone]    [facility]  [place] - [address]   [address] [facility]   [place] [address]  Phone: [phone] Fax: [phone]      Initial Assessment (most recent)     Adult Discharge Assessment - 06/14/22 1222        Discharge Assessment    Assessment Type Discharge Planning Reassessment     Confirmed/corrected address, phone number and insurance Yes     Confirmed Demographics Correct on Facesheet     Source of Information patient     Communicated JAMES with patient/caregiver Date not available/Unable to determine     Reason For Admission elevated  liver enzymes     Lives With spouse     Facility Arrived From: home     Do you expect to return to your current living situation? Yes     Do you have help at home or someone to help you manage your care at home? No     Prior to hospitilization cognitive status: Alert/Oriented     Current cognitive status: Alert/Oriented     Walking or Climbing Stairs Difficulty ambulation difficulty, dependent;transferring difficulty, dependent     Dressing/Bathing Difficulty dressing difficulty, dependent;bathing difficulty, dependent     Home Accessibility wheelchair accessible     Home Layout Able to live on 1st floor     Equipment Currently Used at Home hospital bed;lift device;rollator;wheelchair;walker, standard     Readmission within 30 days? No     Patient currently being followed by outpatient case management? No     Do you currently have service(s) that help you manage your care at home? No     Do you take prescription medications? Yes     Do you have prescription coverage? Yes     Coverage [employer] Medicare     Do you have any problems affording any of your prescribed medications? No     Is the patient taking medications as prescribed? yes     Who is going to help you get home at discharge? Patient will need Transport homr     How do you get to doctors appointments? family or friend will provide     Are you on dialysis? No     Do you take coumadin? No     Discharge Plan A Hospice/home     Discharge Plan B Hospice/home     DME Needed Upon Discharge  none     Discharge Plan discussed with: Patient     Discharge Barriers Identified None               Initial assessment completed with patient. Patient reports dependence with ADL's  prior to hospitalization. Patient uses a hospital bed, lift, wheelchair, rollator, and a standard walker at home. Patient currently has no cm needs upon DC. CM will continue following to assist with other needs.   CM provided a transitional care folder, information on advanced directives, information  on pharmacy bedside delivery, and discharge planning begins on admission with contact information for any needs/questions.

## 2022-06-14 NOTE — PLAN OF CARE
[facility]  Discharge Final Note    Primary Care Provider: Ami Zarate DO    Expected Discharge Date: 6/14/2022    Final Discharge Note (most recent)     Final Note - 06/14/22 1453        Final Note    Assessment Type Final Discharge Note     Anticipated Discharge Disposition Hospice/Home     Hospital Resources/Appts/Education Provided Provided patient/caregiver with written discharge plan information;Appointments scheduled and added to AVS        Post-Acute Status    Discharge Delays None known at this time                 Important Message from Medicare             Contact Info     [facility]   Specialty: Hospice Services    [address] [place] [id number]   Phone: [phone]       Next Steps: Follow up    Instructions: As needed, hospice care        [facility] with [facility]. Transport to be arranged by the charge nurse. No other cm needs.

## 2022-06-14 NOTE — CONSULTS
"Advance Care Planning    Consult Note  Palliative Medicine      Consult Requested By: RAZIA Tovar NP with [facility] [place]  Reason for Consult: Goals of care and Advance care planning  SUBJECTIVE:     History of Present Illness:  Gene Rothman is a 76 y.o. 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  Patient with know history of PVD and recurrent LE infections/cellulitis.  Venous statis ulcer to left calf. Patient being treated for bacterial and candidal dermatitis.  We were consulted to clarify goals of care in a patient with morbid obesity and complication of functional quadriplegia.    NARRATIVE     I had the opportunity to meet again with Mr. Rothman.  Today's meeting also included his wife Emy and son Solo.  Explained palliative medicine and our supportive role.  We talked openly about his home care and past assistance.  He reports home health in the past.  It has become increasingly difficult for his wife to care for him so their son Solo also assist with the day to day care.  Patient has long standing history of PVD with recurrent cellulitis/infections. He and his family have had discussions in the past about nursing home placement if they were no longer able to meet his needs.  Both family and patient are hoping to avoid placement if at all possible.  He also does not want to keep coming back and forth to the hospital.  They describe it as an "ordeal" due to his morbid obesity with transfers/availability of bariatric equipment.  It was at this point, I explained the difference between palliative care and hospice.  It seems he is on board with philosophy and could certainly benefit from more care and support in the home setting.  His wife is tearful when discussing hospice as their only experience comes from a son jacquelyn who "  in a hospital the day after hospice was mentioned.  I clarified some of the misperceptions of hospice.  I also explained criteria which includes his CHF and recurrent infections.  As long as he shows evidence of continued decline, he can continue with hospice services.  If not, he could transition to palliative care at home or  with hospice care help with transition to nursing home if it comes to it.      We talked extensively about the benefits of hospice, how the resource is provided, who is eligible, the difference between hospice and home health, and what hospice really looks like when it is utilized. In the end, the patient and his family all agree hospice is his best option.      I also reviewed his decision for DNR yesterday with his family so they are aware of his EOL wishes.  Will ask hospice team to complete LaPOST.    All questions and concerns were addressed in an effort to enhance understanding of patient's overall condition. They were appreciative of the information and know how to reach me for any additional questions or concerns.    ASSESSEMENT / PLAN     1.  Encounter for Palliative Care   -patient is decisional   -patient is accompanied by his wife and son Solo   -philosophy of palliative medicine reviewed with patient and family   -reviewed importance of ACP documents and completed    -legal surrogate: wife Emy Rothman at [phone]   -pt is a DNR;can complete LaPOST with hospice team   -consult [facility] for admit to home hospice    2.     PVD          Recurrent cellulitis to LE          Venous stasis ulcer left calf   -continue would care   -PO zyvox and topical miconazole    3.    Morbid obesity BMI 67.25         Functional quadriplegia   -lifestyle modification   -supportive care   -bariatric bed/supplies   -total care dependent    4.    Acute respiratory failure         CHF   -supplemental oxygen as needed   -diuresis as needed    Thank you for allowing  Palliative Medicine to be involved in the care of Gene Rothman.    Past Medical History:   Diagnosis Date    Arthritis     CHF (congestive heart failure)     Chronic kidney disease     Coronary artery disease     Depression     Hypertension     Hypothyroidism     Lymphedema of lower extremity     Nephrolithiasis     Obesity     Peripheral vascular disease     Recurrent cellulitis of lower leg     Sleep apnea     can't stand the CPAP , sleeps elevated in hospital bed or chair    Tobacco dependence     resolved     Past Surgical History:   Procedure Laterality Date    ADENOIDECTOMY      BACK SURGERY  ;  1976    x2 for disc; scar tissue removed    debridement of bilateral leg ulcers Bilateral 2017    Dr Hernandez    INNER EAR SURGERY Bilateral     separate - pinnaplasty , new eardrms constructed    KIDNEY STONE SURGERY Left  approx    open    TONSILLECTOMY       Family History   Problem Relation Age of Onset    Asthma Daughter          26 w/ asthma    Cancer Brother         skin-part of ext ear removed    Cancer Mother         ? abd also    Diabetes Mother     Hypertension Mother     Cancer Father         ? abdonimal origin stomach/liver or pancreas    Heart disease Father         pacer    Heart disease Brother         CABG3    Alcohol abuse Maternal Grandfather     Stroke Neg Hx     COPD Neg Hx     Mental illness Neg Hx     Mental retardation Neg Hx     Kidney disease Neg Hx      Social History     Socioeconomic History    Marital status:    Tobacco Use    Smoking status: Never Smoker    Smokeless tobacco: Never Used    Tobacco comment: chain smoker heavy x 10 years, lighter x 10 before    Substance and Sexual Activity    Alcohol use: Not Currently    Drug use: Never    Sexual activity: Never   Social History Narrative    ** Merged History Encounter **          Lives with wife and 2 sons and a daughter. No longer drives. Quit in   because couldn't hold foot on pedal. 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 Uses a Rolator at wo    rk and wheelchair at home. No caffeine. Does not have a Living Will or Advanced Directive.        Review of patient's allergies indicates:   Allergen Reactions    Bactrim [sulfamethoxazole-trimethoprim] Itching       Medications:    Current Facility-Administered Medications:     0.9%  NaCl infusion, , Intravenous, Continuous, Paige Tovar NP, Last Rate: 50 mL/hr at 06/14/22 0601, Rate Verify at 06/14/22 0601    acetaminophen tablet 650 mg, 650 mg, Oral, Q4H PRN, Tej Juan MD    cefTRIAXone (ROCEPHIN) 2 g/50 mL D5W IVPB, 2 g, Intravenous, Q24H, Doris Whelan NP, Stopped at 06/13/22 2128    dextrose 10% bolus 125 mL, 12.5 g, Intravenous, PRN, Tej Juan MD    dextrose 10% bolus 250 mL, 25 g, Intravenous, PRN, Tej Juan MD    glucagon (human recombinant) injection 1 mg, 1 mg, Intramuscular, PRN, Tej Juan MD    glucose chewable tablet 16 g, 16 g, Oral, PRN, Tej Juan MD    glucose chewable tablet 24 g, 24 g, Oral, PRN, Tej Juan MD    hydrALAZINE tablet 10 mg, 10 mg, Oral, Q8H, Paige Tovar NP, 10 mg at 06/14/22 0514    HYDROcodone-acetaminophen 5-325 mg per tablet 1 tablet, 1 tablet, Oral, Q6H PRN, Tej Juan MD    levothyroxine tablet 50 mcg, 50 mcg, Oral, Before breakfast, Tej Juan MD, 50 mcg at 06/14/22 0514    linezolid tablet 600 mg, 600 mg, Oral, Q12H, Tej Juan MD, 600 mg at 06/14/22 0922    melatonin tablet 6 mg, 6 mg, Oral, Nightly PRN, Tej Juan MD    metronidazole 1% gel, , Topical (Top), BID, Tej Juan MD, Given at 06/14/22 0922    miconazole NITRATE 2 % top powder, , Topical (Top), BID, Russ Thomas MD, Given at 06/14/22 0922    naloxone 0.4 mg/mL injection 0.02 mg, 0.02 mg, Intravenous, PRN, Tej Juan MD    polyethylene glycol  packet 17 g, 17 g, Oral, Daily PRN, Tej Juan MD    sodium chloride 0.9% flush 10 mL, 10 mL, Intravenous, Q8H, Tej Juan MD, 10 mL at 06/14/22 0514    trazodone split tablet 25 mg, 25 mg, Oral, QHS, Tej Juan MD, 25 mg at 06/13/22 2026    ROS:  Review of Systems   Constitutional: Positive for activity change and fatigue.   HENT: Positive for hearing loss and trouble swallowing. Negative for congestion.    Eyes: Negative.    Respiratory: Positive for shortness of breath.    Cardiovascular: Positive for leg swelling.   Gastrointestinal: Negative for abdominal distention, constipation and nausea.   Endocrine: Negative.    Genitourinary: Negative.    Musculoskeletal: Positive for gait problem.   Skin: Positive for color change and wound.   Allergic/Immunologic: Negative.    Neurological: Positive for weakness.   Hematological: Negative.    Psychiatric/Behavioral: Positive for dysphoric mood. Negative for agitation and decreased concentration. The patient is not nervous/anxious.        OBJECTIVE:     Physical Exam:  Vitals: Temp: 98 °F (36.7 °C) (06/14/22 0735)  Pulse: (!) 58 (06/14/22 0735)  Resp: 16 (06/14/22 0735)  BP: (!) 140/63 (06/14/22 0735)  SpO2: 96 % (06/14/22 0735)    Physical Exam  Vitals and nursing note reviewed.   Constitutional:       General: He is awake.      Appearance: He is morbidly obese.      Interventions: Nasal cannula in place.   HENT:      Head: Normocephalic and atraumatic.      Ears:      Comments: Hearing aide right     Nose: No congestion.      Mouth/Throat:      Mouth: Mucous membranes are dry.      Pharynx: Oropharynx is clear.   Eyes:      Extraocular Movements: Extraocular movements intact.   Cardiovascular:      Rate and Rhythm: Bradycardia present.   Pulmonary:      Effort: Pulmonary effort is normal. No respiratory distress.   Abdominal:      Palpations: Abdomen is soft.      Comments: obese   Musculoskeletal:         General: Swelling present.      Cervical back:  Neck supple.   Skin:     Findings: Erythema and rash present.      Comments: Chronic skin changes from venous stasis   Neurological:      General: No focal deficit present.      Mental Status: He is alert and oriented to person, place, and time.      Motor: Weakness present.      Gait: Gait abnormal.   Psychiatric:         Mood and Affect: Mood normal.         Behavior: Behavior normal. Behavior is cooperative.         Thought Content: Thought content normal.         Judgment: Judgment normal.         Review of Symptoms    Symptom Assessment (ESAS 0-10 Scale)  Unable to complete assessment due to Other         Performance Status:  40    Living Arrangements:  Lives with family and Lives in home      Advance Directives:   Living Will: No    LaPOST: No    Do Not Resuscitate Status: Yes    Medical Power of : No      Decision Making:  Patient answered questions      Labs:  WBC   Date Value Ref Range Status   06/14/2022 7.64 3.90 - 12.70 K/uL Final     Hemoglobin   Date Value Ref Range Status   06/14/2022 13.1 (L) 14.0 - 18.0 g/dL Final     Hematocrit   Date Value Ref Range Status   06/14/2022 42.0 40.0 - 54.0 % Final     MCV   Date Value Ref Range Status   06/14/2022 101 (H) 82 - 98 fL Final     Platelets   Date Value Ref Range Status   06/14/2022 152 150 - 450 K/uL Final       BMP  Lab Results   Component Value Date     06/14/2022    K 3.9 06/14/2022     06/14/2022    CO2 27 06/14/2022    BUN 26 (H) 06/14/2022    CREATININE 1.5 (H) 06/14/2022    CALCIUM 8.1 (L) 06/14/2022    ANIONGAP 10 06/14/2022    ESTGFRAFRICA 51 (A) 06/14/2022    EGFRNONAA 44 (A) 06/14/2022       Lab Results   Component Value Date    AST 27 06/14/2022    ALKPHOS 85 06/14/2022    BILITOT 0.6 06/14/2022       Albumin   Date Value Ref Range Status   06/14/2022 2.4 (L) 3.5 - 5.2 g/dL Final       Radiology:None    Discussed case and visit details with Dr. De Paz with [facility] [place]     Medical decision making: untreated symptoms management of  "more than one chronic illness in exacerbation or progression of disease    Plan required increased review of medication choice, interaction, dosing, frequency, and route due to patient complexity. Patient complexity increased by: advanced age and renal insufficiency    25 min ACP time spent discussing: code status, assessed patient specific goals and addressed the best way to achieve them, coordination of care and emotional support, formulating and communicating prognosis, exploring burden/ benefit of various approaches of treatment, inquired about existing or willingness to complete advance directive documents.        Vicky Yeboah NP  Palliative Medicine    Portions of this note may have been created with voice recognition software. Occasional "wrong word" or "sound alike" substitutions may have occurred due to the inherent limitations of voice recognition software. Please, read the note carefully and recognize, using context, where substitutions have occurred.     "

## 2022-06-14 NOTE — DISCHARGE SUMMARY
"[facility] Medicine  Discharge Summary      Patient Name: Gene Rothman  MRN: [id number]  Patient Class: IP- Inpatient  Admission Date: 6/11/2022  Hospital Length of Stay: 3 days  Discharge Date and Time:  06/14/2022 2:24 PM  Attending Physician: Russ Thomas, *   Discharging Provider: Russ Thomas MD  Primary Care Provider: Ami Zarate DO      HPI:    76 y.o. male with history of  severe obesity, CHF, hx VTE,hearing loss , CAD, lymphedema, recurrent LE cellulitis, who presents with report of lower extremity hyperpigmented and "cellulitis " as per patient . He reports that he has severe hearing loss and the hearing aid battery is low at this time. We used written language to communicate. He reports subjective fever . He mentioned that he was not planning to stay long in the hospital .   Labs and imaging test reviewed.    Component      Latest Ref Rng & Units 6/11/2022 1/6/2022              Creatinine      0.5 - 1.4 mg/dL 2.1 (H) 1.6 (H)     Component      Latest Ref Rng & Units 6/11/2022 1/6/2022 1/4/2022 1/3/2022                AST      10 - 40 U/L 110 (H)   18   ALT      10 - 44 U/L 75 (H)   17     Component      Latest Ref Rng & Units 6/11/2022 1/4/2022              WBC      3.90 - 12.70 K/uL 17.33 (H) 7.00   He will be placed on observation for cellulitis          * No surgery found *      Hospital Course:   Gene Rothman was admitted for bilateral lower extremity venous stasis ulcerations and subsequent cellulitis. Blood cultures growing gram - rods this morning. Leukocytosis and elevated liver enzymes resolved. BETY improving. Continue IV ABX and await final culture and sensitivities. Patient is down and depressed today on visit. Reports his wife is struggling with taking care of him at home. BP consistently elevated since admission will add hydralazine. Consult wound care, palliative and SW.     6/14 Pt was seen and examined at bedside . He was determined to be suitable " for d/c   Pt and family request to be d/c with home hospice . Palliative care consulted .  He will be d/c on po Levaquin renal dose   q 48 hrs x 14 days and zyvox 600 mg po bid  for a total of 7 days .        Goals of Care Treatment Preferences:  Code Status: DNR      Consults:   Consults (From admission, onward)        Status Ordering Provider     Inpatient consult to Social Work  Once        Provider:  (Not yet assigned)    Completed MARIBEL YEBOAH     Inpatient consult to Social Work  Once        Provider:  (Not yet assigned)    Completed ERVIN HATFIELD     Inpatient consult to Palliative Care  Once        Provider:  Maribel Yeboah NP    Completed ERVIN HATFIELD          No new Assessment & Plan notes have been filed under this hospital service since the last note was generated.  Service: Hospital Medicine    Final Active Diagnoses:    Diagnosis Date Noted POA    PRINCIPAL PROBLEM:  Venous stasis ulcer of left calf [I83.022, L97.229] 12/05/2021 Yes    Candidal dermatitis [B37.2] 12/04/2021 Yes    Venous stasis dermatitis of both lower extremities [I87.2] 09/29/2017 Yes     Chronic    CKD (chronic kidney disease) stage 3, GFR 30-59 ml/min [N18.30] 04/08/2016 Yes     Chronic    Bilateral hearing loss [H91.93] 04/05/2016 Yes     Chronic    Morbid obesity with BMI of 60.0-69.9, adult [E66.01, Z68.44] 12/19/2014 Not Applicable     Chronic    Acquired hypothyroidism [E03.9] 12/19/2014 Yes     Chronic      Problems Resolved During this Admission:    Diagnosis Date Noted Date Resolved POA    Elevated liver enzymes [R74.8] 06/12/2022 06/13/2022 Yes       Discharged Condition: stable    Disposition: Home or Self Care    Follow Up:   Follow-up Information     [facility] Follow up.    Specialty: Hospice Services  Why: As needed, hospice care  Contact information:  [address]  [place] [address] [phone]                       Patient Instructions:      Diet Cardiac      Activity as tolerated       Significant Diagnostic Studies: Labs:   BMP:   Recent Labs   Lab 06/13/22  0609 06/14/22  0836   GLU 86 84    143   K 4.5 3.9    106   CO2 26 27   BUN 28* 26*   CREATININE 1.7* 1.5*   CALCIUM 8.4* 8.1*   , CMP   Recent Labs   Lab 06/13/22  0609 06/14/22  0836    143   K 4.5 3.9    106   CO2 26 27   GLU 86 84   BUN 28* 26*   CREATININE 1.7* 1.5*   CALCIUM 8.4* 8.1*   PROT 6.8 6.5   ALBUMIN 2.6* 2.4*   BILITOT 0.9 0.6   ALKPHOS 84 85   AST 39 27   ALT 32 23   ANIONGAP 9 10   ESTGFRAFRICA 44* 51*   EGFRNONAA 38* 44*    and CBC   Recent Labs   Lab 06/13/22  0609 06/14/22  0836   WBC 9.30 7.64   HGB 13.6* 13.1*   HCT 44.6 42.0    152     Microbiology:   Blood Culture   Lab Results   Component Value Date    LABBLOO Gram stain anali bottle: Gram negative rods 06/11/2022    LABBLOO Positive results previously called 06/12/2022 06/11/2022    LABBLOO (A) 06/11/2022     JOSH RETTGERI  For susceptibility see order #[id number]         Pending Diagnostic Studies:     None         Medications:  Reconciled Home Medications:      Medication List      START taking these medications    levoFLOXacin 750 MG tablet  Commonly known as: LEVAQUIN  Take 1 tablet (750 mg total) by mouth every other day. for 14 days     linezolid 600 mg Tab  Commonly known as: ZYVOX  Take 1 tablet (600 mg total) by mouth every 12 (twelve) hours. for 4 days        CONTINUE taking these medications    acetaminophen 500 MG tablet  Commonly known as: TYLENOL  Take 500 mg by mouth every 6 (six) hours as needed for Pain.     atorvastatin 40 MG tablet  Commonly known as: LIPITOR  Take 1 tablet (40 mg total) by mouth once daily.     famotidine 20 MG tablet  Commonly known as: PEPCID  Take 1 tablet (20 mg total) by mouth 2 (two) times daily.     furosemide 40 MG tablet  Commonly known as: LASIX  take 1 tab  twice a day for 3 days then 1  daily thereafter     levothyroxine 50 MCG tablet  Commonly known  as: SYNTHROID  Take 1 tablet (50 mcg total) by mouth before breakfast.     metronidazole 1% 1 % Gel  Commonly known as: METROGEL  Apply topically 2 (two) times a day.     * miconazole nitrate 2% 2 % Oint  Commonly known as: MICOTIN  Apply topically 2 (two) times daily. Intertrigo to lower abdomen, groin, periarea: please apply Antifungal bid     * miconazole NITRATE 2 % 2 % top powder  Commonly known as: MICOTIN  Apply topically 2 (two) times daily.     polyethylene glycol 17 gram Pwpk  Commonly known as: GLYCOLAX  Take 17 g by mouth 2 (two) times daily.     SENNA WITH DOCUSATE SODIUM 8.6-50 mg per tablet  Generic drug: senna-docusate 8.6-50 mg  Take 1 tablet by mouth once daily.     traZODone 50 MG tablet  Commonly known as: DESYREL  Take 25 mg by mouth every evening.         * This list has 2 medication(s) that are the same as other medications prescribed for you. Read the directions carefully, and ask your doctor or other care provider to review them with you.                Indwelling Lines/Drains at time of discharge:   Lines/Drains/Airways     None                 Time spent on the discharge of patient: 31 minutes         Russ Thomas MD  Department of Hospital Medicine  [facility]

## 2022-06-14 NOTE — CONSULTS
[facility]  Wound Care    Patient Name:  Gene Rohtman   MRN:  [id number]  Date: 6/14/2022  Diagnosis: Venous stasis ulcer of left calf    History:     Past Medical History:   Diagnosis Date    Arthritis     CHF (congestive heart failure)     Chronic kidney disease     Coronary artery disease     Depression     Hypertension     Hypothyroidism     Lymphedema of lower extremity     Nephrolithiasis     Obesity     Peripheral vascular disease     Recurrent cellulitis of lower leg     Sleep apnea     can't stand the CPAP , sleeps elevated in hospital bed or chair    Tobacco dependence     resolved       Social History     Socioeconomic History    Marital status:    Tobacco Use    Smoking status: Never Smoker    Smokeless tobacco: Never Used    Tobacco comment: chain smoker heavy x 10 years, lighter x 10 before    Substance and Sexual Activity    Alcohol use: Not Currently    Drug use: Never    Sexual activity: Never   Social History Narrative    ** Merged History Encounter **          Lives with wife and 2 sons and a daughter. No longer drives. Quit in 2012 because couldn't hold foot on pedal. 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 Uses a Rolator at wo    rk and wheelchair at home. No caffeine. Does not have a Living Will or Advanced Directive.         Precautions:     Allergies as of 06/11/2022 - Reviewed 06/11/2022   Allergen Reaction Noted    Bactrim [sulfamethoxazole-trimethoprim] Itching 08/29/2014       [facility] Assessment Details/Treatment     Consulted to this 76 year old male patient for wound care.history of  super obesity, CHF, cardiomegaly, history of VTE (PE / DVT) on OAC, hearing loss, CAD, CKD, lymphedema, recurrent LE cellulitis who presented with worsening edema, leg redness and weeping of BLE. currenly admitted with BLE with cellulitis. He is awake and alert. BLE with hemosiderin staining, dry  flaky skin, multiple weeping ulcerations to BLE that are heavily draining, legs edematous with erythema, wounds pink and moist.  Buttocks, ischium, tahir area with chronic discoloration. Gluteal cleft with intetrigo- partial thickness wound bed that is moist and pink with surrounding fungal rash. Same also noted to groin and lower abdominal fold. Antifungal barrier ordered for these areas. Recommend draping BLE with incontinence pads. Once weeping resolves, will consider light compression. Orders placed, will follow.       06/14/2022

## 2022-06-15 ENCOUNTER — PATIENT OUTREACH (OUTPATIENT)
Dept: ADMINISTRATIVE | Facility: CLINIC | Age: 77
End: 2022-06-15
Payer: MEDICARE

## 2022-06-15 LAB
BACTERIA BLD CULT: ABNORMAL

## 2022-06-17 LAB
BACTERIA BLD CULT: ABNORMAL

## 2022-06-19 LAB
BACTERIA BLD CULT: NORMAL
BACTERIA BLD CULT: NORMAL

## 2022-06-21 ENCOUNTER — EXTERNAL HOME HEALTH (OUTPATIENT)
Dept: HOME HEALTH SERVICES | Facility: HOSPITAL | Age: 77
End: 2022-06-21
Payer: MEDICARE

## 2022-07-07 ENCOUNTER — CARE AT HOME (OUTPATIENT)
Dept: HOME HEALTH SERVICES | Facility: CLINIC | Age: 77
End: 2022-07-07
Payer: MEDICARE

## 2022-07-07 VITALS
DIASTOLIC BLOOD PRESSURE: 70 MMHG | OXYGEN SATURATION: 96 % | HEART RATE: 68 BPM | SYSTOLIC BLOOD PRESSURE: 128 MMHG | RESPIRATION RATE: 18 BRPM | TEMPERATURE: 98 F

## 2022-07-07 DIAGNOSIS — R53.81 PHYSICAL DEBILITY: ICD-10-CM

## 2022-07-07 DIAGNOSIS — Z09 FOLLOW UP: Primary | ICD-10-CM

## 2022-07-07 PROCEDURE — 1111F PR DISCHARGE MEDS RECONCILED W/ CURRENT OUTPATIENT MED LIST: ICD-10-PCS | Mod: CPTII,S$GLB,ICN, | Performed by: NURSE PRACTITIONER

## 2022-07-07 PROCEDURE — 99348 PR HOME VISIT,ESTAB PATIENT,LEVEL II: ICD-10-PCS | Mod: GW,ICN,, | Performed by: NURSE PRACTITIONER

## 2022-07-07 PROCEDURE — 99348 HOME/RES VST EST LOW MDM 30: CPT | Mod: GW,ICN,, | Performed by: NURSE PRACTITIONER

## 2022-07-07 PROCEDURE — 1111F DSCHRG MED/CURRENT MED MERGE: CPT | Mod: CPTII,S$GLB,ICN, | Performed by: NURSE PRACTITIONER

## 2022-08-16 ENCOUNTER — TELEPHONE (OUTPATIENT)
Dept: INTERNAL MEDICINE | Facility: CLINIC | Age: 77
End: 2022-08-16
Payer: MEDICARE

## 2022-08-16 NOTE — TELEPHONE ENCOUNTER
----- Message from Pradeep Stewart MA sent at 8/16/2022 10:41 AM CDT -----  Contact: [phone]  Can you please advise before speaking with patient about this matter?  ----- Message -----  From: Heather Noonan  Sent: 8/16/2022  10:35 AM CDT  To: [facility] Staff    Pt would like to consult with a nurse in regards to issues he is having. Pt stated that a different provider placed him into hospice and he would like to know what can be done to be taken out of hospice. Please call him back at [phone]. Thanks KB

## 2022-08-16 NOTE — TELEPHONE ENCOUNTER
Gave patient Dr. Zarate msg>>>>Patient can speak to the provider that ordered hospice or notify hospice that he no longer wants the services.

## 2022-08-16 NOTE — TELEPHONE ENCOUNTER
----- Message from Heather Noonan sent at 8/16/2022 10:32 AM CDT -----  Contact: [phone]  Pt would like to consult with a nurse in regards to issues he is having. Pt stated that a different provider placed him into hospice and he would like to know what can be done to be taken out of hospice. Please call him back at [phone]. Thanks KB

## 2022-08-16 NOTE — TELEPHONE ENCOUNTER
Patient can speak to the provider that ordered hospice or notify hospice that he no longer wants the services.

## 2022-08-21 ENCOUNTER — HOSPITAL ENCOUNTER (EMERGENCY)
Facility: HOSPITAL | Age: 77
Discharge: HOME | End: 2022-08-21
Attending: EMERGENCY MEDICINE
Payer: MEDICARE

## 2022-08-21 VITALS
OXYGEN SATURATION: 93 % | TEMPERATURE: 99 F | WEIGHT: 315 LBS | RESPIRATION RATE: 20 BRPM | HEART RATE: 61 BPM | DIASTOLIC BLOOD PRESSURE: 62 MMHG | SYSTOLIC BLOOD PRESSURE: 117 MMHG | BODY MASS INDEX: 59.96 KG/M2

## 2022-08-21 DIAGNOSIS — R31.9 URINARY TRACT INFECTION WITH HEMATURIA, SITE UNSPECIFIED: Primary | ICD-10-CM

## 2022-08-21 DIAGNOSIS — R10.84 ABDOMINAL PAIN, GENERALIZED: ICD-10-CM

## 2022-08-21 DIAGNOSIS — N39.0 URINARY TRACT INFECTION WITH HEMATURIA, SITE UNSPECIFIED: Primary | ICD-10-CM

## 2022-08-21 DIAGNOSIS — K59.00 CONSTIPATION: ICD-10-CM

## 2022-08-21 LAB
ALBUMIN SERPL BCP-MCNC: 2.9 G/DL (ref 3.5–5.2)
ALP SERPL-CCNC: 85 U/L (ref 55–135)
ALT SERPL W/O P-5'-P-CCNC: 14 U/L (ref 10–44)
ANION GAP SERPL CALC-SCNC: 10 MMOL/L (ref 8–16)
AST SERPL-CCNC: 19 U/L (ref 10–40)
BACTERIA #/AREA URNS HPF: ABNORMAL /HPF
BASOPHILS # BLD AUTO: 0.07 K/UL (ref 0–0.2)
BASOPHILS NFR BLD: 0.7 % (ref 0–1.9)
BILIRUB SERPL-MCNC: 0.5 MG/DL (ref 0.1–1)
BILIRUB UR QL STRIP: NEGATIVE
BUN SERPL-MCNC: 27 MG/DL (ref 8–23)
CALCIUM SERPL-MCNC: 8.6 MG/DL (ref 8.7–10.5)
CHLORIDE SERPL-SCNC: 105 MMOL/L (ref 95–110)
CLARITY UR: ABNORMAL
CO2 SERPL-SCNC: 24 MMOL/L (ref 23–29)
COLOR UR: YELLOW
CREAT SERPL-MCNC: 1.8 MG/DL (ref 0.5–1.4)
DIFFERENTIAL METHOD: ABNORMAL
EOSINOPHIL # BLD AUTO: 0.5 K/UL (ref 0–0.5)
EOSINOPHIL NFR BLD: 5.5 % (ref 0–8)
ERYTHROCYTE [DISTWIDTH] IN BLOOD BY AUTOMATED COUNT: 15.2 % (ref 11.5–14.5)
EST. GFR  (NO RACE VARIABLE): 38 ML/MIN/1.73 M^2
GLUCOSE SERPL-MCNC: 106 MG/DL (ref 70–110)
GLUCOSE UR QL STRIP: NEGATIVE
HCT VFR BLD AUTO: 44.1 % (ref 40–54)
HGB BLD-MCNC: 14.2 G/DL (ref 14–18)
HGB UR QL STRIP: ABNORMAL
HYALINE CASTS #/AREA URNS LPF: 0 /LPF
IMM GRANULOCYTES # BLD AUTO: 0.02 K/UL (ref 0–0.04)
IMM GRANULOCYTES NFR BLD AUTO: 0.2 % (ref 0–0.5)
KETONES UR QL STRIP: ABNORMAL
LEUKOCYTE ESTERASE UR QL STRIP: ABNORMAL
LIPASE SERPL-CCNC: 12 U/L (ref 4–60)
LYMPHOCYTES # BLD AUTO: 2.1 K/UL (ref 1–4.8)
LYMPHOCYTES NFR BLD: 21.5 % (ref 18–48)
MCH RBC QN AUTO: 31.9 PG (ref 27–31)
MCHC RBC AUTO-ENTMCNC: 32.2 G/DL (ref 32–36)
MCV RBC AUTO: 99 FL (ref 82–98)
MICROSCOPIC COMMENT: ABNORMAL
MONOCYTES # BLD AUTO: 0.9 K/UL (ref 0.3–1)
MONOCYTES NFR BLD: 9 % (ref 4–15)
NEUTROPHILS # BLD AUTO: 6.1 K/UL (ref 1.8–7.7)
NEUTROPHILS NFR BLD: 63.1 % (ref 38–73)
NITRITE UR QL STRIP: POSITIVE
NRBC BLD-RTO: 0 /100 WBC
PH UR STRIP: 6 [PH] (ref 5–8)
PLATELET # BLD AUTO: 180 K/UL (ref 150–450)
PMV BLD AUTO: 10.1 FL (ref 9.2–12.9)
POTASSIUM SERPL-SCNC: 4.7 MMOL/L (ref 3.5–5.1)
PROT SERPL-MCNC: 7.6 G/DL (ref 6–8.4)
PROT UR QL STRIP: ABNORMAL
RBC # BLD AUTO: 4.45 M/UL (ref 4.6–6.2)
RBC #/AREA URNS HPF: 15 /HPF (ref 0–4)
SODIUM SERPL-SCNC: 139 MMOL/L (ref 136–145)
SP GR UR STRIP: 1.02 (ref 1–1.03)
SQUAMOUS #/AREA URNS HPF: 2 /HPF
UNIDENT CRYS URNS QL MICRO: ABNORMAL
URN SPEC COLLECT METH UR: ABNORMAL
UROBILINOGEN UR STRIP-ACNC: NEGATIVE EU/DL
WBC # BLD AUTO: 9.66 K/UL (ref 3.9–12.7)
WBC #/AREA URNS HPF: >100 /HPF (ref 0–5)
WBC CLUMPS URNS QL MICRO: ABNORMAL
YEAST URNS QL MICRO: ABNORMAL

## 2022-08-21 PROCEDURE — 93010 EKG 12-LEAD: ICD-10-PCS | Mod: GW,,, | Performed by: STUDENT IN AN ORGANIZED HEALTH CARE EDUCATION/TRAINING PROGRAM

## 2022-08-21 PROCEDURE — 93010 ELECTROCARDIOGRAM REPORT: CPT | Mod: GW,,, | Performed by: STUDENT IN AN ORGANIZED HEALTH CARE EDUCATION/TRAINING PROGRAM

## 2022-08-21 PROCEDURE — 99285 EMERGENCY DEPT VISIT HI MDM: CPT | Mod: 25

## 2022-08-21 PROCEDURE — 87088 URINE BACTERIA CULTURE: CPT | Performed by: EMERGENCY MEDICINE

## 2022-08-21 PROCEDURE — 87086 URINE CULTURE/COLONY COUNT: CPT | Performed by: EMERGENCY MEDICINE

## 2022-08-21 PROCEDURE — 85025 COMPLETE CBC W/AUTO DIFF WBC: CPT | Performed by: EMERGENCY MEDICINE

## 2022-08-21 PROCEDURE — 80053 COMPREHEN METABOLIC PANEL: CPT | Performed by: EMERGENCY MEDICINE

## 2022-08-21 PROCEDURE — 93005 ELECTROCARDIOGRAM TRACING: CPT

## 2022-08-21 PROCEDURE — 87186 SC STD MICRODIL/AGAR DIL: CPT | Performed by: EMERGENCY MEDICINE

## 2022-08-21 PROCEDURE — 87077 CULTURE AEROBIC IDENTIFY: CPT | Performed by: EMERGENCY MEDICINE

## 2022-08-21 PROCEDURE — 81000 URINALYSIS NONAUTO W/SCOPE: CPT | Performed by: EMERGENCY MEDICINE

## 2022-08-21 PROCEDURE — 83690 ASSAY OF LIPASE: CPT | Performed by: EMERGENCY MEDICINE

## 2022-08-21 RX ORDER — NITROFURANTOIN 25; 75 MG/1; MG/1
100 CAPSULE ORAL 2 TIMES DAILY
Qty: 14 CAPSULE | Refills: 0 | Status: SHIPPED | OUTPATIENT
Start: 2022-08-21 | End: 2022-09-16 | Stop reason: SDUPTHER

## 2022-08-21 NOTE — ED PROVIDER NOTES
SCRIBE #1 NOTE: I, Tej Link, am scribing for, and in the presence of, Mounika Tucker MD. I have scribed the entire note.       History     Chief Complaint   Patient presents with    Abdominal Pain     Abdominal pain and constipation     Review of patient's allergies indicates:   Allergen Reactions    Bactrim [sulfamethoxazole-trimethoprim] Itching         History of Present Illness     HPI    8/21/2022, 8:47 AM  History obtained from the patient      History of Present Illness: Gene Rothman is a 76 y.o. male patient with a PMHx of HTN, peripheral vascular disease, hypothyroidism, CHF, and CAD who presents to the Emergency Department for evaluation of abdominal pain which onset gradually 9 days PTA . Pt states he had a stomach bug 12 days PTA causing 9 days of diarrhea. Pt states 9 days PTA he had a very large bowel movement and has not had bowel movement since. Pt states that he tried all day yesterday to have a bowel movement with no success, only releasing a small amounts of gas. Pt states that while trying to have a bowel movement he experienced pain and had small amounts of blood on the toilet paper. Symptoms are constant and moderate in severity. No mitigating or exacerbating factors reported. Associated sxs include constipation. Patient denies any fever, n/v, cough, dysuria, hematuria, urinary frequency, urinary urgency, and all other sxs at this time. No further complaints or concerns at this time.       Arrival mode:  EMS     PCP: Ami Zarate DO        Past Medical History:  Past Medical History:   Diagnosis Date    Arthritis     CHF (congestive heart failure)     Chronic kidney disease     Coronary artery disease     Depression     Hypertension     Hypothyroidism     Lymphedema of lower extremity     Nephrolithiasis     Obesity     Peripheral vascular disease     Recurrent cellulitis of lower leg     Sleep apnea     can't stand the CPAP , sleeps elevated in hospital bed or chair     Tobacco dependence     resolved       Past Surgical History:  Past Surgical History:   Procedure Laterality Date    ADENOIDECTOMY      BACK SURGERY  ;  1976    x2 for disc; scar tissue removed    debridement of bilateral leg ulcers Bilateral 2017    Dr Hernandez    INNER EAR SURGERY Bilateral     separate - pinnaplasty , new eardrms constructed    KIDNEY STONE SURGERY Left  approx    open    TONSILLECTOMY           Family History:  Family History   Problem Relation Age of Onset    Asthma Daughter          26 w/ asthma    Cancer Brother         skin-part of ext ear removed    Cancer Mother         ? abd also    Diabetes Mother     Hypertension Mother     Cancer Father         ? abdonimal origin stomach/liver or pancreas    Heart disease Father         pacer    Heart disease Brother         CABG3    Alcohol abuse Maternal Grandfather     Stroke Neg Hx     COPD Neg Hx     Mental illness Neg Hx     Mental retardation Neg Hx     Kidney disease Neg Hx        Social History:  Social History     Tobacco Use    Smoking status: Never Smoker    Smokeless tobacco: Never Used    Tobacco comment: chain smoker heavy x 10 years, lighter x 10 before    Substance and Sexual Activity    Alcohol use: Not Currently    Drug use: Never    Sexual activity: Never        Review of Systems     Review of Systems   Constitutional: Negative for fever.   HENT: Negative for sore throat.    Respiratory: Negative for cough and shortness of breath.    Cardiovascular: Negative for chest pain.   Gastrointestinal: Positive for abdominal pain and constipation. Negative for nausea and vomiting.   Genitourinary: Negative for dysuria, frequency, hematuria and urgency.   Musculoskeletal: Negative for back pain.   Skin: Negative for rash.   Neurological: Negative for weakness.   Hematological: Does not bruise/bleed easily.   All other systems reviewed and are negative.       Physical Exam     Initial Vitals    BP Pulse Resp Temp SpO2   08/21/22 0648 08/21/22 0648 08/21/22 0648 08/21/22 0931 08/21/22 0648   109/78 79 20 98.7 °F (37.1 °C) 100 %      MAP       --                 Physical Exam  Nursing Notes and Vital Signs Reviewed.  Constitutional: Patient is in no acute distress. Morbidly obese.  Head: Atraumatic. Normocephalic.  Eyes: PERRL. EOM intact. Conjunctivae are not pale. No scleral icterus.  ENT: Mucous membranes are moist. Oropharynx is clear and symmetric.    Neck: Supple. Full ROM. No lymphadenopathy.  Cardiovascular: Regular rate. Regular rhythm. No murmurs, rubs, or gallops. Distal pulses are 2+ and symmetric.  Pulmonary/Chest: No respiratory distress. Clear to auscultation bilaterally. No wheezing or rales.  Abdominal: Soft and non-distended.  There is no tenderness.  No rebound, guarding, or rigidity. Good bowel sounds.  Genitourinary: No CVA tenderness  Musculoskeletal: Moves all extremities. No obvious deformities. No edema. No calf tenderness.  Skin: Warm and dry.  Neurological:  Alert, awake, and appropriate.  Normal speech.  No acute focal neurological deficits are appreciated.  Psychiatric: Normal affect. Good eye contact. Appropriate in content.     ED Course   Procedures  ED Vital Signs:  Vitals:    08/21/22 0648 08/21/22 0718 08/21/22 0730 08/21/22 0900   BP: 109/78 104/64  104/66   Pulse: 79 60 (!) 56 (!) 58   Resp: 20 15 (!) 22 18   Temp:       TempSrc:       SpO2: 100% (!) 94% (!) 94% (!) 93%   Weight:        08/21/22 0931 08/21/22 1128 08/21/22 1222 08/21/22 1330   BP:  126/63     Pulse:  (!) 57     Resp:  (!) 24     Temp: 98.7 °F (37.1 °C)   98.1 °F (36.7 °C)   TempSrc: Oral   Axillary   SpO2:  (!) 93%     Weight:   (!) 195 kg (429 lb 14.4 oz)     08/21/22 1447   BP: 117/62   Pulse: 61   Resp: 20   Temp: 98.6 °F (37 °C)   TempSrc: Axillary   SpO2: (!) 93%   Weight:        Abnormal Lab Results:  Labs Reviewed   CULTURE, URINE - Abnormal; Notable for the following components:       Result  Value    Urine Culture, Routine   (*)     Value: GRAM NEGATIVE GWEN  >100,000 cfu/ml  Identification and susceptibility pending      All other components within normal limits    Narrative:     Specimen Source->Urine   CBC W/ AUTO DIFFERENTIAL - Abnormal; Notable for the following components:    RBC 4.45 (*)     MCV 99 (*)     MCH 31.9 (*)     RDW 15.2 (*)     All other components within normal limits   COMPREHENSIVE METABOLIC PANEL - Abnormal; Notable for the following components:    BUN 27 (*)     Creatinine 1.8 (*)     Calcium 8.6 (*)     Albumin 2.9 (*)     eGFR 38 (*)     All other components within normal limits   URINALYSIS, REFLEX TO URINE CULTURE - Abnormal; Notable for the following components:    Appearance, UA Hazy (*)     Protein, UA 1+ (*)     Ketones, UA Trace (*)     Occult Blood UA 1+ (*)     Nitrite, UA Positive (*)     Leukocytes, UA 3+ (*)     All other components within normal limits    Narrative:     Specimen Source->Urine   URINALYSIS MICROSCOPIC - Abnormal; Notable for the following components:    RBC, UA 15 (*)     WBC, UA >100 (*)     WBC Clumps, UA Many (*)     Bacteria Moderate (*)     Yeast, UA Few (*)     All other components within normal limits    Narrative:     Specimen Source->Urine   LIPASE        All Lab Results:  Results for orders placed or performed during the hospital encounter of 08/21/22   Urine culture    Specimen: Urine   Result Value Ref Range    Urine Culture, Routine (A)      GRAM NEGATIVE GWEN  >100,000 cfu/ml  Identification and susceptibility pending     CBC W/ AUTO DIFFERENTIAL   Result Value Ref Range    WBC 9.66 3.90 - 12.70 K/uL    RBC 4.45 (L) 4.60 - 6.20 M/uL    Hemoglobin 14.2 14.0 - 18.0 g/dL    Hematocrit 44.1 40.0 - 54.0 %    MCV 99 (H) 82 - 98 fL    MCH 31.9 (H) 27.0 - 31.0 pg    MCHC 32.2 32.0 - 36.0 g/dL    RDW 15.2 (H) 11.5 - 14.5 %    Platelets 180 150 - 450 K/uL    MPV 10.1 9.2 - 12.9 fL    Immature Granulocytes 0.2 0.0 - 0.5 %    Gran # (ANC) 6.1 1.8 -  7.7 K/uL    Immature Grans (Abs) 0.02 0.00 - 0.04 K/uL    Lymph # 2.1 1.0 - 4.8 K/uL    Mono # 0.9 0.3 - 1.0 K/uL    Eos # 0.5 0.0 - 0.5 K/uL    Baso # 0.07 0.00 - 0.20 K/uL    nRBC 0 0 /100 WBC    Gran % 63.1 38.0 - 73.0 %    Lymph % 21.5 18.0 - 48.0 %    Mono % 9.0 4.0 - 15.0 %    Eosinophil % 5.5 0.0 - 8.0 %    Basophil % 0.7 0.0 - 1.9 %    Differential Method Automated    Comp. Metabolic Panel   Result Value Ref Range    Sodium 139 136 - 145 mmol/L    Potassium 4.7 3.5 - 5.1 mmol/L    Chloride 105 95 - 110 mmol/L    CO2 24 23 - 29 mmol/L    Glucose 106 70 - 110 mg/dL    BUN 27 (H) 8 - 23 mg/dL    Creatinine 1.8 (H) 0.5 - 1.4 mg/dL    Calcium 8.6 (L) 8.7 - 10.5 mg/dL    Total Protein 7.6 6.0 - 8.4 g/dL    Albumin 2.9 (L) 3.5 - 5.2 g/dL    Total Bilirubin 0.5 0.1 - 1.0 mg/dL    Alkaline Phosphatase 85 55 - 135 U/L    AST 19 10 - 40 U/L    ALT 14 10 - 44 U/L    Anion Gap 10 8 - 16 mmol/L    eGFR 38 (A) >60 mL/min/1.73 m^2   Lipase   Result Value Ref Range    Lipase 12 4 - 60 U/L   Urinalysis, Reflex to Urine Culture Urine, Clean Catch    Specimen: Urine   Result Value Ref Range    Specimen UA Urine, Clean Catch     Color, UA Yellow Yellow, Straw, Cindy    Appearance, UA Hazy (A) Clear    pH, UA 6.0 5.0 - 8.0    Specific Gravity, UA 1.020 1.005 - 1.030    Protein, UA 1+ (A) Negative    Glucose, UA Negative Negative    Ketones, UA Trace (A) Negative    Bilirubin (UA) Negative Negative    Occult Blood UA 1+ (A) Negative    Nitrite, UA Positive (A) Negative    Urobilinogen, UA Negative <2.0 EU/dL    Leukocytes, UA 3+ (A) Negative   Urinalysis Microscopic   Result Value Ref Range    RBC, UA 15 (H) 0 - 4 /hpf    WBC, UA >100 (H) 0 - 5 /hpf    WBC Clumps, UA Many (A) None-Rare    Bacteria Moderate (A) None-Occ /hpf    Yeast, UA Few (A) None    Squam Epithel, UA 2 /hpf    Hyaline Casts, UA 0 0-1/lpf /lpf    Unclass Gali UA Occasional None-Moderate    Microscopic Comment SEE COMMENT          Imaging Results:  Imaging  Results          X-Ray Abdomen AP 1 View (KUB) (Final result)  Result time 08/21/22 09:30:33    Final result by Дмитрий Yeung MD (08/21/22 09:30:33)                 Impression:      See above.      Electronically signed by: Дмитрий Yeung MD  Date:    08/21/2022  Time:    09:30             Narrative:    EXAMINATION:  XR ABDOMEN AP 1 VIEW    CLINICAL HISTORY:  Constipation, unspecified    TECHNIQUE:  Single AP View of the abdomen was performed.    COMPARISON:  None    FINDINGS:  Very large patient with protuberant abdomen.    Low-grade rectal fecal impaction.    Several calcifications overlie the kidneys consistent with nephrolithiasis.                                 The EKG was ordered, reviewed, and independently interpreted by the ED provider.  Interpretation time: 08:37  Rate: 61 BPM  Rhythm: Sinus rhythm with 1st degree AV block with premature atrial complexes in a pattern of bigenimy  Interpretation: Low voltage QRS. No acute ST changes. No STEMI.           The Emergency Provider reviewed the vital signs and test results, which are outlined above.     ED Discussion       11:06 AM: Reassessed pt at this time. Discussed with pt all pertinent ED information and results. Discussed pt dx and plan of tx. Gave pt all f/u and return to the ED instructions. All questions and concerns were addressed at this time. Pt expresses understanding of information and instructions, and is comfortable with plan to discharge. Pt is stable for discharge.    I discussed with patient and/or family/caretaker that evaluation in the ED does not suggest any emergent or life threatening medical conditions requiring immediate intervention beyond what was provided in the ED, and I believe patient is safe for discharge.  Regardless, an unremarkable evaluation in the ED does not preclude the development or presence of a serious of life threatening condition. As such, patient was instructed to return immediately for any worsening or  change in current symptoms.         Medical Decision Making:   Clinical Tests:   Lab Tests: Ordered and Reviewed  Radiological Study: Reviewed and Ordered  Medical Tests: Ordered and Reviewed           ED Medication(s):  Medications - No data to display    Discharge Medication List as of 8/21/2022 11:08 AM      START taking these medications    Details   nitrofurantoin, macrocrystal-monohydrate, (MACROBID) 100 MG capsule Take 1 capsule (100 mg total) by mouth 2 (two) times daily., Starting Sun 8/21/2022, Print              Follow-up Information     Ami Zarate,  In 2 days.    Specialty: Internal Medicine  Contact information:  [address] [place] [address] [phone]             [facility] - Emergency Dept..    Specialty: Emergency Medicine  Why: As needed, If symptoms worsen  Contact information:  [address]  [facility] [address] [phone]                           Scribe Attestation:   Scribe #1: I performed the above scribed service and the documentation accurately describes the services I performed. I attest to the accuracy of the note.     Attending:   Physician Attestation Statement for Scribe #1: I, Mounika Tucker MD, personally performed the services described in this documentation, as scribed by Tej Link, in my presence, and it is both accurate and complete.           Clinical Impression       ICD-10-CM ICD-9-CM   1. Urinary tract infection with hematuria, site unspecified  N39.0 599.0    R31.9 599.70   2. Abdominal pain, generalized  R10.84 789.07   3. Constipation  K59.00 564.00       Disposition:   Disposition: Discharged  Condition: Stable         Mounika Tucker MD  08/23/22 0549

## 2022-08-21 NOTE — ED NOTES
"Patient identifiers verified and correct for Gene Rothman.  Pt to room 8 via EMS with c/o constipation for 5 days. Pt reports having a "stomach bug with diarrhea" the week before and is now "stopped up". Pt denies any pain other than discomfort from bed. Pt provided a urinal, but states is unable to pee at this time. Will check back.   LOC: The patient is awake, alert and aware of environment with an appropriate affect, the patient is oriented x 3 and speaking appropriately.  APPEARANCE: Patient uncomfortable in bed and in no acute distress  SKIN: The skin is warm and dry, color consistent with ethnicity, patient has normal skin turgor and moist mucus membranes, skin intact, no breakdown or bruising noted.  MUSCULOSKELETAL: Patient moving all extremities spontaneously.  RESPIRATORY: Airway is open and patent, respirations are spontaneous.  CARDIAC: Patient has a normal rate, no periphreal edema noted, capillary refill < 3 seconds.  ABDOMEN: Soft and non tender to palpation.     "

## 2022-08-22 ENCOUNTER — TELEPHONE (OUTPATIENT)
Dept: INTERNAL MEDICINE | Facility: CLINIC | Age: 77
End: 2022-08-22
Payer: MEDICARE

## 2022-08-23 ENCOUNTER — TELEPHONE (OUTPATIENT)
Dept: INTERNAL MEDICINE | Facility: CLINIC | Age: 77
End: 2022-08-23
Payer: MEDICARE

## 2022-08-23 DIAGNOSIS — E66.01 MORBID OBESITY WITH BMI OF 50.0-59.9, ADULT: Primary | ICD-10-CM

## 2022-08-23 DIAGNOSIS — R53.81 DEBILITY: ICD-10-CM

## 2022-08-23 LAB — BACTERIA UR CULT: ABNORMAL

## 2022-08-23 NOTE — TELEPHONE ENCOUNTER
Patient states he's doesn't know how to do a virtual visit. Patient is not able to come in office for a visit. I informed patient Dr. Zarate is not able to do a phone visit any more can you please assist? Patient visit is 8/24/22

## 2022-08-23 NOTE — TELEPHONE ENCOUNTER
----- Message from Alondra Floyd sent at 8/23/2022 12:52 PM CDT -----  Regarding: virtual visit  Contact: pt  Returning call to office. Can be reached at  [phone]    Pt states he doesn't have a computer and doesn't know how to do a virtual appt.

## 2022-08-23 NOTE — TELEPHONE ENCOUNTER
Patient will need to utilize [facility] or [facility] for home visits. I have not physically seen him in over 3 years and he would have to be seen by me to re-establish care any way. Looks like he has been seen by [facility] recently.

## 2022-08-26 NOTE — TELEPHONE ENCOUNTER
Patient stated that he is completely bed bound. He is interested in [facility] through [facility].

## 2022-09-06 ENCOUNTER — PES CALL (OUTPATIENT)
Dept: ADMINISTRATIVE | Facility: CLINIC | Age: 77
End: 2022-09-06
Payer: MEDICARE

## 2022-09-06 ENCOUNTER — NURSE TRIAGE (OUTPATIENT)
Dept: ADMINISTRATIVE | Facility: CLINIC | Age: 77
End: 2022-09-06
Payer: MEDICARE

## 2022-09-06 NOTE — TELEPHONE ENCOUNTER
Pt stated that O2 stat was in 70s last night without oxygen. Pt stated that he has is oxygen on now and O2 stats is 89-91%. Pt c/o SOB at rest. Care advice to go to ED/EMS. Verbalized understanding. Encounter routed to provider.       Reason for Disposition   MODERATE difficulty breathing (e.g., speaks in phrases, SOB even at rest, pulse 100-120) of new onset or worse than normal    Additional Information   Negative: Breathing stopped and hasn't returned   Negative: Choking on something   Negative: SEVERE difficulty breathing (e.g., struggling for each breath, speaks in single words, pulse > 120)   Negative: Bluish lips, tongue, or face now   Negative: Difficult to awaken or acting confused (e.g., disoriented, slurred speech)   Negative: Passed out (i.e., fainted, collapsed and was not responding)   Negative: Wheezing started suddenly after medicine, an allergic food, or bee sting   Negative: Stridor   Negative: Slow, shallow and weak breathing   Negative: Sounds like a life-threatening emergency to the triager    Protocols used: Breathing Difficulty-A-OH

## 2022-09-15 ENCOUNTER — CARE AT HOME (OUTPATIENT)
Dept: HOME HEALTH SERVICES | Facility: CLINIC | Age: 77
End: 2022-09-15
Payer: MEDICARE

## 2022-09-15 DIAGNOSIS — E66.01 MORBID OBESITY WITH BMI OF 50.0-59.9, ADULT: ICD-10-CM

## 2022-09-15 DIAGNOSIS — N39.0 URINARY TRACT INFECTION WITHOUT HEMATURIA, SITE UNSPECIFIED: Primary | ICD-10-CM

## 2022-09-15 DIAGNOSIS — I11.0 HYPERTENSIVE HEART DISEASE WITH HEART FAILURE: ICD-10-CM

## 2022-09-15 PROCEDURE — 99350 HOME/RES VST EST HIGH MDM 60: CPT | Mod: ,,, | Performed by: NURSE PRACTITIONER

## 2022-09-15 PROCEDURE — 99350 PR HOME VISIT,ESTAB PATIENT,LEVEL IV: ICD-10-PCS | Mod: ,,, | Performed by: NURSE PRACTITIONER

## 2022-09-15 RX ORDER — LEVOTHYROXINE SODIUM 50 UG/1
50 TABLET ORAL
Qty: 30 TABLET | Refills: 0 | Status: SHIPPED | OUTPATIENT
Start: 2022-09-15 | End: 2023-01-01 | Stop reason: SDUPTHER

## 2022-09-16 ENCOUNTER — HOSPITAL ENCOUNTER (INPATIENT)
Facility: HOSPITAL | Age: 77
LOS: 4 days | Discharge: HOSPICE | DRG: 193 | End: 2022-09-21
Attending: FAMILY MEDICINE | Admitting: FAMILY MEDICINE
Payer: MEDICARE

## 2022-09-16 VITALS
OXYGEN SATURATION: 95 % | SYSTOLIC BLOOD PRESSURE: 120 MMHG | RESPIRATION RATE: 22 BRPM | DIASTOLIC BLOOD PRESSURE: 76 MMHG | HEART RATE: 73 BPM | TEMPERATURE: 97 F

## 2022-09-16 DIAGNOSIS — I26.99 ACUTE PULMONARY EMBOLISM, UNSPECIFIED PULMONARY EMBOLISM TYPE, UNSPECIFIED WHETHER ACUTE COR PULMONALE PRESENT: ICD-10-CM

## 2022-09-16 DIAGNOSIS — I26.99 OTHER ACUTE PULMONARY EMBOLISM, UNSPECIFIED WHETHER ACUTE COR PULMONALE PRESENT: ICD-10-CM

## 2022-09-16 DIAGNOSIS — J96.01 ACUTE HYPOXEMIC RESPIRATORY FAILURE: ICD-10-CM

## 2022-09-16 DIAGNOSIS — R06.02 SHORTNESS OF BREATH: ICD-10-CM

## 2022-09-16 DIAGNOSIS — I26.90 ACUTE SEPTIC PULMONARY EMBOLISM, UNSPECIFIED WHETHER ACUTE COR PULMONALE PRESENT: Primary | ICD-10-CM

## 2022-09-16 DIAGNOSIS — J18.9 PNEUMONIA OF LEFT UPPER LOBE DUE TO INFECTIOUS ORGANISM: ICD-10-CM

## 2022-09-16 PROBLEM — I11.0 HYPERTENSIVE HEART DISEASE WITH HEART FAILURE: Status: ACTIVE | Noted: 2022-09-16

## 2022-09-16 LAB
ALBUMIN SERPL BCP-MCNC: 2.6 G/DL (ref 3.5–5.2)
ALP SERPL-CCNC: 75 U/L (ref 55–135)
ALT SERPL W/O P-5'-P-CCNC: 25 U/L (ref 10–44)
ANION GAP SERPL CALC-SCNC: 9 MMOL/L (ref 8–16)
AST SERPL-CCNC: 25 U/L (ref 10–40)
BASOPHILS # BLD AUTO: 0.03 K/UL (ref 0–0.2)
BASOPHILS NFR BLD: 0.4 % (ref 0–1.9)
BILIRUB SERPL-MCNC: 0.4 MG/DL (ref 0.1–1)
BNP SERPL-MCNC: 289 PG/ML (ref 0–99)
BUN SERPL-MCNC: 18 MG/DL (ref 8–23)
CALCIUM SERPL-MCNC: 8.8 MG/DL (ref 8.7–10.5)
CHLORIDE SERPL-SCNC: 105 MMOL/L (ref 95–110)
CO2 SERPL-SCNC: 29 MMOL/L (ref 23–29)
CREAT SERPL-MCNC: 1.4 MG/DL (ref 0.5–1.4)
DIFFERENTIAL METHOD: ABNORMAL
EOSINOPHIL # BLD AUTO: 0.2 K/UL (ref 0–0.5)
EOSINOPHIL NFR BLD: 3.1 % (ref 0–8)
ERYTHROCYTE [DISTWIDTH] IN BLOOD BY AUTOMATED COUNT: 15 % (ref 11.5–14.5)
EST. GFR  (NO RACE VARIABLE): 51 ML/MIN/1.73 M^2
GLUCOSE SERPL-MCNC: 150 MG/DL (ref 70–110)
HCT VFR BLD AUTO: 43.5 % (ref 40–54)
HGB BLD-MCNC: 13.5 G/DL (ref 14–18)
IMM GRANULOCYTES # BLD AUTO: 0.02 K/UL (ref 0–0.04)
IMM GRANULOCYTES NFR BLD AUTO: 0.3 % (ref 0–0.5)
LACTATE SERPL-SCNC: 1.2 MMOL/L (ref 0.5–2.2)
LYMPHOCYTES # BLD AUTO: 1.3 K/UL (ref 1–4.8)
LYMPHOCYTES NFR BLD: 16 % (ref 18–48)
MCH RBC QN AUTO: 31.8 PG (ref 27–31)
MCHC RBC AUTO-ENTMCNC: 31 G/DL (ref 32–36)
MCV RBC AUTO: 103 FL (ref 82–98)
MONOCYTES # BLD AUTO: 0.8 K/UL (ref 0.3–1)
MONOCYTES NFR BLD: 10.3 % (ref 4–15)
NEUTROPHILS # BLD AUTO: 5.5 K/UL (ref 1.8–7.7)
NEUTROPHILS NFR BLD: 69.9 % (ref 38–73)
NRBC BLD-RTO: 0 /100 WBC
PLATELET # BLD AUTO: 212 K/UL (ref 150–450)
PMV BLD AUTO: 9.4 FL (ref 9.2–12.9)
POTASSIUM SERPL-SCNC: 3.5 MMOL/L (ref 3.5–5.1)
PROCALCITONIN SERPL IA-MCNC: 0.09 NG/ML
PROT SERPL-MCNC: 7.3 G/DL (ref 6–8.4)
RBC # BLD AUTO: 4.24 M/UL (ref 4.6–6.2)
SARS-COV-2 RDRP RESP QL NAA+PROBE: NEGATIVE
SODIUM SERPL-SCNC: 143 MMOL/L (ref 136–145)
TROPONIN I SERPL DL<=0.01 NG/ML-MCNC: 0.03 NG/ML (ref 0–0.03)
WBC # BLD AUTO: 7.83 K/UL (ref 3.9–12.7)

## 2022-09-16 PROCEDURE — 93010 ELECTROCARDIOGRAM REPORT: CPT | Mod: ,,, | Performed by: INTERNAL MEDICINE

## 2022-09-16 PROCEDURE — 83880 ASSAY OF NATRIURETIC PEPTIDE: CPT | Performed by: FAMILY MEDICINE

## 2022-09-16 PROCEDURE — 99900035 HC TECH TIME PER 15 MIN (STAT)

## 2022-09-16 PROCEDURE — 83605 ASSAY OF LACTIC ACID: CPT | Performed by: FAMILY MEDICINE

## 2022-09-16 PROCEDURE — 93010 EKG 12-LEAD: ICD-10-PCS | Mod: ,,, | Performed by: INTERNAL MEDICINE

## 2022-09-16 PROCEDURE — U0002 COVID-19 LAB TEST NON-CDC: HCPCS | Performed by: FAMILY MEDICINE

## 2022-09-16 PROCEDURE — 84484 ASSAY OF TROPONIN QUANT: CPT | Performed by: FAMILY MEDICINE

## 2022-09-16 PROCEDURE — 25000242 PHARM REV CODE 250 ALT 637 W/ HCPCS: Performed by: FAMILY MEDICINE

## 2022-09-16 PROCEDURE — 93005 ELECTROCARDIOGRAM TRACING: CPT

## 2022-09-16 PROCEDURE — 94660 CPAP INITIATION&MGMT: CPT

## 2022-09-16 PROCEDURE — 94640 AIRWAY INHALATION TREATMENT: CPT

## 2022-09-16 PROCEDURE — 27000190 HC CPAP FULL FACE MASK W/VALVE

## 2022-09-16 PROCEDURE — 99285 EMERGENCY DEPT VISIT HI MDM: CPT | Mod: 25

## 2022-09-16 PROCEDURE — 84145 PROCALCITONIN (PCT): CPT | Performed by: FAMILY MEDICINE

## 2022-09-16 PROCEDURE — 87040 BLOOD CULTURE FOR BACTERIA: CPT | Mod: 59 | Performed by: FAMILY MEDICINE

## 2022-09-16 PROCEDURE — 85025 COMPLETE CBC W/AUTO DIFF WBC: CPT | Performed by: FAMILY MEDICINE

## 2022-09-16 PROCEDURE — 80053 COMPREHEN METABOLIC PANEL: CPT | Performed by: FAMILY MEDICINE

## 2022-09-16 RX ORDER — IPRATROPIUM BROMIDE AND ALBUTEROL SULFATE 2.5; .5 MG/3ML; MG/3ML
3 SOLUTION RESPIRATORY (INHALATION) ONCE
Status: COMPLETED | OUTPATIENT
Start: 2022-09-16 | End: 2022-09-16

## 2022-09-16 RX ORDER — NITROFURANTOIN 25; 75 MG/1; MG/1
100 CAPSULE ORAL 2 TIMES DAILY
Qty: 14 CAPSULE | Refills: 0 | Status: ON HOLD | OUTPATIENT
Start: 2022-09-16 | End: 2022-09-21 | Stop reason: HOSPADM

## 2022-09-16 RX ADMIN — IPRATROPIUM BROMIDE AND ALBUTEROL SULFATE 3 ML: 2.5; .5 SOLUTION RESPIRATORY (INHALATION) at 11:09

## 2022-09-16 NOTE — PROGRESS NOTES
[facility]  Medical Home Visit    Visit Date: 9/16/2022  Encounter Provider: Kyara Stephens  PCP:  Ami Zarate DO    Subjective:      Patient ID: Gnee Rothman is a 76 y.o. male.    Consult Requested By:  Dr. Ami Zarate  Reason for Consult:  Re-establish care    Gene is being seen at home due to being seen at home due to physical debility that presents a taxing effort to leave the home, to mitigate high risk of hospital readmission and/or due to the limited availability of reliable or safe options for transportation to the point of access to the provider. Prior to treatment on this visit the chart was reviewed and patient verbal consent was obtained.    Chief Complaint: Establish Care      Patient is being seen today to reestablish care. Patient had hospice but was discharged about 3 weeks ago because he was not declining (per patient). Patient was seen in the ER for constipation and UTI but states that he never got medication for the UTI. Will represcribe the medication for him. Patient is on oxygen with a mask and he had to increase his oxygen to 3.5L per face mask  instead of NC because he was unable to get his O2 sats >88 with NC. Patient is alert but drifts off to sleep easily and is slightly confused. VSS and patient reports compliance to all medications.         Review of Systems   Constitutional:  Positive for fatigue. Negative for chills and fever.   HENT: Negative.     Eyes: Negative.    Respiratory:  Positive for shortness of breath.    Cardiovascular: Negative.    Gastrointestinal:  Positive for constipation.   Endocrine: Negative.    Genitourinary: Negative.    Musculoskeletal:  Positive for arthralgias.   Skin: Negative.    Allergic/Immunologic: Negative.    Neurological:  Negative for weakness.   Psychiatric/Behavioral:  Positive for sleep disturbance.      Assessments:  Environmental: Patient lives in a single story home with his family. There are no steps at the entrance. House is in poor  condition, lighting is dim and temperature is comfortable  Functional Status: Patient is bed bound and needs assistance with ADLs  Safety: Fall precautions  Nutritional: Adequate food in the home  Home Health/DME/Supplies: none, DMEs: hospital bed, Wheelchair    Objective:   Physical Exam  Vitals reviewed.   Constitutional:       General: He is awake. He is not in acute distress.     Appearance: He is morbidly obese.      Interventions: Face mask in place.   HENT:      Head: Normocephalic and atraumatic.      Nose: Nose normal.      Mouth/Throat:      Mouth: Mucous membranes are moist.      Pharynx: Oropharynx is clear.   Cardiovascular:      Rate and Rhythm: Normal rate and regular rhythm.      Heart sounds: Normal heart sounds.   Pulmonary:      Breath sounds: Examination of the right-upper field reveals decreased breath sounds. Examination of the left-upper field reveals decreased breath sounds. Examination of the right-lower field reveals decreased breath sounds. Examination of the left-lower field reveals decreased breath sounds. Decreased breath sounds present.   Abdominal:      General: Bowel sounds are normal.      Palpations: Abdomen is soft.   Musculoskeletal:      Right lower leg: Edema present.      Left lower leg: Edema present.   Skin:     General: Skin is warm and dry.      Capillary Refill: Capillary refill takes 2 to 3 seconds.   Neurological:      General: No focal deficit present.      Mental Status: Mental status is at baseline.      Motor: Weakness present.   Psychiatric:         Mood and Affect: Mood normal.         Behavior: Behavior normal. Behavior is cooperative.         Thought Content: Thought content normal.         Judgment: Judgment normal.       Vitals:    09/16/22 1310   BP: 120/76   Pulse: 73   Resp: (!) 22   Temp: 97.3 °F (36.3 °C)   SpO2: 95%   PainSc: 0-No pain     There is no height or weight on file to calculate BMI.    Assessment:     1. Urinary tract infection without  hematuria, site unspecified    2. Morbid obesity with BMI of 50.0-59.9, adult    3. Hypertensive heart disease with heart failure        Plan:     Ethical / Legal: Advance Care Planning   Surrogate decision maker:  Name Alise Rothman, Relationship: Daughter  Code Status:  Full Code  [place]:  None  Other advance directive:  None, Capacity to make medical decisions:  Yes, Conflict None       1. Urinary tract infection without hematuria, site unspecified  -     nitrofurantoin, macrocrystal-monohydrate, (MACROBID) 100 MG capsule  -     Ambulatory referral/consult to Home Health    2. Morbid obesity with BMI of 50.0-59.9, adult  -     Ambulatory referral/consult to [facility]    3. Hypertensive heart disease with heart failure  -     Ambulatory referral/consult to Home Health   Encounter for Medical Follow-Up and Medication Review  - [facility] to schedule follow-up visit with patient in 4 weeks or PRN     Patient Instructions Given:  - Continue all medications, treatments and therapies as ordered.   - Follow all instructions, recommendations as discussed.  - Maintain Safety Precautions at all times.  - Attend all medical appointments as scheduled.  - For worsening symptoms: call Primary Care Physician or Nurse Practitioner.  - For emergencies, call 911 or immediately report to the nearest emergency room       Were controlled substances prescribed?  No    Follow Up Appointments:   Future Appointments   Date Time Provider Department Center   10/5/2022  4:20 PM [facility]       Signature: Kyara Stephens NP

## 2022-09-17 PROBLEM — J96.01 ACUTE HYPOXEMIC RESPIRATORY FAILURE: Status: ACTIVE | Noted: 2022-09-17

## 2022-09-17 PROBLEM — N39.0 UTI (URINARY TRACT INFECTION): Status: ACTIVE | Noted: 2022-09-17

## 2022-09-17 PROBLEM — J18.9 PNEUMONIA: Status: ACTIVE | Noted: 2022-09-17

## 2022-09-17 LAB
ALLENS TEST: ABNORMAL
BACTERIA #/AREA URNS HPF: ABNORMAL /HPF
BILIRUB UR QL STRIP: NEGATIVE
CLARITY UR: ABNORMAL
COLOR UR: YELLOW
DELSYS: ABNORMAL
FIO2: 40
FLOW: 15
GLUCOSE UR QL STRIP: NEGATIVE
HCO3 UR-SCNC: 34.2 MMOL/L (ref 24–28)
HGB UR QL STRIP: ABNORMAL
HYALINE CASTS #/AREA URNS LPF: 0 /LPF
KETONES UR QL STRIP: ABNORMAL
LACTATE SERPL-SCNC: 1.4 MMOL/L (ref 0.5–2.2)
LEUKOCYTE ESTERASE UR QL STRIP: ABNORMAL
MICROSCOPIC COMMENT: ABNORMAL
MODE: ABNORMAL
NITRITE UR QL STRIP: POSITIVE
PCO2 BLDA: 64.4 MMHG (ref 35–45)
PH SMN: 7.33 [PH] (ref 7.35–7.45)
PH UR STRIP: 6 [PH] (ref 5–8)
PO2 BLDA: 56 MMHG (ref 80–100)
POC BE: 8 MMOL/L
POC SATURATED O2: 85 % (ref 95–100)
PROT UR QL STRIP: ABNORMAL
RBC #/AREA URNS HPF: 34 /HPF (ref 0–4)
SAMPLE: ABNORMAL
SITE: ABNORMAL
SP GR UR STRIP: 1.01 (ref 1–1.03)
URN SPEC COLLECT METH UR: ABNORMAL
UROBILINOGEN UR STRIP-ACNC: NEGATIVE EU/DL
WBC #/AREA URNS HPF: >100 /HPF (ref 0–5)
WBC CLUMPS URNS QL MICRO: ABNORMAL

## 2022-09-17 PROCEDURE — 36600 WITHDRAWAL OF ARTERIAL BLOOD: CPT

## 2022-09-17 PROCEDURE — 99900035 HC TECH TIME PER 15 MIN (STAT)

## 2022-09-17 PROCEDURE — 36415 COLL VENOUS BLD VENIPUNCTURE: CPT | Performed by: FAMILY MEDICINE

## 2022-09-17 PROCEDURE — 87186 SC STD MICRODIL/AGAR DIL: CPT | Performed by: FAMILY MEDICINE

## 2022-09-17 PROCEDURE — 63600175 PHARM REV CODE 636 W HCPCS: Performed by: FAMILY MEDICINE

## 2022-09-17 PROCEDURE — 87088 URINE BACTERIA CULTURE: CPT | Performed by: FAMILY MEDICINE

## 2022-09-17 PROCEDURE — 94761 N-INVAS EAR/PLS OXIMETRY MLT: CPT

## 2022-09-17 PROCEDURE — 25000003 PHARM REV CODE 250: Performed by: FAMILY MEDICINE

## 2022-09-17 PROCEDURE — 87077 CULTURE AEROBIC IDENTIFY: CPT | Performed by: FAMILY MEDICINE

## 2022-09-17 PROCEDURE — 21400001 HC TELEMETRY ROOM

## 2022-09-17 PROCEDURE — 96365 THER/PROPH/DIAG IV INF INIT: CPT

## 2022-09-17 PROCEDURE — 81000 URINALYSIS NONAUTO W/SCOPE: CPT | Performed by: FAMILY MEDICINE

## 2022-09-17 PROCEDURE — 83605 ASSAY OF LACTIC ACID: CPT | Performed by: FAMILY MEDICINE

## 2022-09-17 PROCEDURE — 87086 URINE CULTURE/COLONY COUNT: CPT | Performed by: FAMILY MEDICINE

## 2022-09-17 PROCEDURE — 27000221 HC OXYGEN, UP TO 24 HOURS

## 2022-09-17 PROCEDURE — 63600175 PHARM REV CODE 636 W HCPCS: Performed by: STUDENT IN AN ORGANIZED HEALTH CARE EDUCATION/TRAINING PROGRAM

## 2022-09-17 RX ORDER — ONDANSETRON 4 MG/1
4 TABLET, ORALLY DISINTEGRATING ORAL EVERY 6 HOURS PRN
Status: DISCONTINUED | OUTPATIENT
Start: 2022-09-17 | End: 2022-09-21 | Stop reason: HOSPADM

## 2022-09-17 RX ORDER — FUROSEMIDE 40 MG/1
40 TABLET ORAL DAILY
Status: DISCONTINUED | OUTPATIENT
Start: 2022-09-17 | End: 2022-09-17

## 2022-09-17 RX ORDER — ATORVASTATIN CALCIUM 40 MG/1
40 TABLET, FILM COATED ORAL DAILY
Status: DISCONTINUED | OUTPATIENT
Start: 2022-09-17 | End: 2022-09-21 | Stop reason: HOSPADM

## 2022-09-17 RX ORDER — FUROSEMIDE 10 MG/ML
40 INJECTION INTRAMUSCULAR; INTRAVENOUS
Status: DISCONTINUED | OUTPATIENT
Start: 2022-09-17 | End: 2022-09-21 | Stop reason: HOSPADM

## 2022-09-17 RX ORDER — HYDROCODONE BITARTRATE AND ACETAMINOPHEN 10; 325 MG/1; MG/1
1 TABLET ORAL EVERY 6 HOURS PRN
Status: DISCONTINUED | OUTPATIENT
Start: 2022-09-17 | End: 2022-09-21 | Stop reason: HOSPADM

## 2022-09-17 RX ORDER — LEVOTHYROXINE SODIUM 50 UG/1
50 TABLET ORAL
Status: DISCONTINUED | OUTPATIENT
Start: 2022-09-17 | End: 2022-09-21 | Stop reason: HOSPADM

## 2022-09-17 RX ORDER — ENOXAPARIN SODIUM 100 MG/ML
40 INJECTION SUBCUTANEOUS EVERY 12 HOURS
Status: DISCONTINUED | OUTPATIENT
Start: 2022-09-17 | End: 2022-09-19

## 2022-09-17 RX ORDER — ACETAMINOPHEN 325 MG/1
650 TABLET ORAL EVERY 6 HOURS PRN
Status: DISCONTINUED | OUTPATIENT
Start: 2022-09-17 | End: 2022-09-21 | Stop reason: HOSPADM

## 2022-09-17 RX ADMIN — CEFTRIAXONE 1 G: 1 INJECTION, SOLUTION INTRAVENOUS at 10:09

## 2022-09-17 RX ADMIN — LEVOTHYROXINE SODIUM 50 MCG: 50 TABLET ORAL at 05:09

## 2022-09-17 RX ADMIN — ENOXAPARIN SODIUM 40 MG: 100 INJECTION SUBCUTANEOUS at 10:09

## 2022-09-17 RX ADMIN — PIPERACILLIN SODIUM AND TAZOBACTAM SODIUM 4.5 G: 4; .5 INJECTION, POWDER, LYOPHILIZED, FOR SOLUTION INTRAVENOUS at 12:09

## 2022-09-17 RX ADMIN — FUROSEMIDE 40 MG: 10 INJECTION, SOLUTION INTRAMUSCULAR; INTRAVENOUS at 11:09

## 2022-09-17 RX ADMIN — AZITHROMYCIN MONOHYDRATE 500 MG: 500 INJECTION, POWDER, LYOPHILIZED, FOR SOLUTION INTRAVENOUS at 11:09

## 2022-09-17 RX ADMIN — ATORVASTATIN CALCIUM 40 MG: 40 TABLET, FILM COATED ORAL at 10:09

## 2022-09-17 RX ADMIN — FUROSEMIDE 40 MG: 10 INJECTION, SOLUTION INTRAMUSCULAR; INTRAVENOUS at 10:09

## 2022-09-17 NOTE — PLAN OF CARE
Patient admitted to unit from ER with diagnosis of Left Lobe Pneumonia and UTI.  Pt remains fall free this shift.  Pt AAOx4, verbal, clear speech.  +4 edema and redness to bilateral lower extremities   Telemonitoring in progress,   O2 per simple mask @ 6L  External male catheter in place  Patient bedbound and dependent with transfers  Bed low, side rails up x 2, wheels locked, call light in reach.  Bed alarm maintained for safety.  Patient instructed to call for assistance.  Hourly rounding completed.  POC updated and reviewed with pt. Will continue POC.

## 2022-09-17 NOTE — ED PROVIDER NOTES
SCRIBE #1 NOTE: I, Bradford Brannon, am scribing for, and in the presence of, Lynda Holly MD. I have scribed the entire note.       History     Chief Complaint   Patient presents with    Shortness of Breath     C/O SOB AND FOUND WITH O2 80'S ON RA. DENIES CP     Review of patient's allergies indicates:   Allergen Reactions    Bactrim [sulfamethoxazole-trimethoprim] Itching         History of Present Illness     HPI    9/16/2022, 10:54 PM  History obtained from the patient      History of Present Illness: Gene Rothman is a 76 y.o. male patient with a PMHx of CHF, CKD, CAD, HTN, hypothyroidism, peripheral vascular disease, and sleep apnea who presents to the Emergency Department for evaluation of SOB which onset PTA. Symptoms are constant and moderate in severity. No mitigating or exacerbating factors reported. No associated sxs reported. Patient denies any fever, CP, abdominal pain, n/v/d, cough, myalgias, and all other sxs at this time. Pt reports he is currently on oxygen at home and has a mask for oxygen administration; pt does not know how much oxygen he is receiving at home. No further complaints or concerns at this time.       Arrival mode: Ambulance Service    PCP: Ami Zarate DO        Past Medical History:  Past Medical History:   Diagnosis Date    Acute hypoxemic respiratory failure 9/17/2022    Arthritis     CHF (congestive heart failure)     Chronic kidney disease     Coronary artery disease     Depression     Hypertension     Hypertensive heart disease with heart failure 9/16/2022    Hypothyroidism     Lymphedema of lower extremity     Nephrolithiasis     Obesity     Peripheral vascular disease     Pneumonia 9/17/2022    Recurrent cellulitis of lower leg     Sleep apnea     can't stand the CPAP , sleeps elevated in hospital bed or chair    Tobacco dependence     resolved       Past Surgical History:  Past Surgical History:   Procedure Laterality Date    ADENOIDECTOMY  1961    BACK SURGERY  1975;   1976    x2 for disc; scar tissue removed    debridement of bilateral leg ulcers Bilateral 2017    Dr Hernandez    INNER EAR SURGERY Bilateral     separate - pinnaplasty , new eardrms constructed    KIDNEY STONE SURGERY Left  approx    open    TONSILLECTOMY           Family History:  Family History   Problem Relation Age of Onset    Asthma Daughter          26 w/ asthma    Cancer Brother         skin-part of ext ear removed    Cancer Mother         ? abd also    Diabetes Mother     Hypertension Mother     Cancer Father         ? abdonimal origin stomach/liver or pancreas    Heart disease Father         pacer    Heart disease Brother         CABG3    Alcohol abuse Maternal Grandfather     Stroke Neg Hx     COPD Neg Hx     Mental illness Neg Hx     Mental retardation Neg Hx     Kidney disease Neg Hx        Social History:  Social History     Tobacco Use    Smoking status: Never    Smokeless tobacco: Never    Tobacco comments:     chain smoker heavy x 10 years, lighter x 10 before    Substance and Sexual Activity    Alcohol use: Not Currently    Drug use: Never    Sexual activity: Never        Review of Systems     Review of Systems   Constitutional:  Negative for fever.   HENT:  Negative for sore throat.    Respiratory:  Positive for shortness of breath. Negative for cough.    Cardiovascular:  Negative for chest pain.   Gastrointestinal:  Negative for abdominal pain, nausea and vomiting.   Genitourinary:  Negative for dysuria.   Musculoskeletal:  Negative for back pain and myalgias.   Skin:  Negative for rash.   Neurological:  Negative for weakness.   Hematological:  Does not bruise/bleed easily.   All other systems reviewed and are negative.     Physical Exam     Initial Vitals [22 2253]   BP Pulse Resp Temp SpO2   (!) 147/104 84 (!) 22 97.9 °F (36.6 °C) 100 %      MAP       --          Physical Exam  Nursing Notes and Vital Signs Reviewed.  Constitutional: Patient is in mild distress. Morbidly  "obese.  Head: Atraumatic. Normocephalic.  Eyes: PERRL. EOM intact. Conjunctivae are not pale. No scleral icterus.  ENT: Mucous membranes are moist. Oropharynx is clear and symmetric.    Neck: Supple. Full ROM.  Cardiovascular: Regular rate. Regular rhythm. No murmurs, rubs, or gallops. Distal pulses are 2+ and symmetric.  Pulmonary/Chest: Tachypnic; respiratory distress. Clear to auscultation bilaterally. No wheezing or rales.  Abdominal: Soft and non-distended.  There is no tenderness.  No rebound, guarding, or rigidity.  Musculoskeletal: Moves all extremities. No obvious deformities. No edema.  Skin: Venous stasis skin changes of BLE.  Neurological:  Alert, awake, and appropriate.  Normal speech.  No acute focal neurological deficits are appreciated.  Psychiatric: Normal affect. Good eye contact. Appropriate in content.     ED Course   Procedures  ED Vital Signs:  Vitals:    09/16/22 2304 09/16/22 2308 09/16/22 2319 09/16/22 2333   BP: (!) 149/66  (!) 144/65 (!) 125/58   Pulse: 80 (!) 46 69 71   Resp: (!) 27 (!) 24 (!) 30 (!) 23   Temp:       TempSrc:       SpO2: (!) 90% 95% (!) 89% (!) 86%   Weight:       Height:        09/16/22 2345 09/17/22 0003 09/17/22 0053 09/17/22 0059   BP:  (!) 147/71 125/63 138/65   Pulse: 78 86 80 77   Resp: (!) 33 (!) 30 (!) 29 (!) 30   Temp:       TempSrc:       SpO2: (!) 93% (!) 92% (!) 92% (!) 91%   Weight:       Height:        09/17/22 0123 09/17/22 0334 09/17/22 0500 09/17/22 0714   BP: (!) 142/64 (!) 122/59  (!) 118/59   Pulse: 73 68  80   Resp: (!) 21 (!) 22  18   Temp:  98.4 °F (36.9 °C)  99.9 °F (37.7 °C)   TempSrc:  Oral     SpO2: (!) 93% (!) 92%  (!) 92%   Weight:  (!) 200.5 kg (442 lb 0.4 oz) (!) 200.5 kg (442 lb 0.4 oz)    Height:  5' 11" (1.803 m)      09/17/22 0829 09/17/22 1230 09/17/22 1541   BP:  (!) 117/56 (!) 142/72   Pulse: 84 93 74   Resp: 18 18 20   Temp:  99 °F (37.2 °C) 97.2 °F (36.2 °C)   TempSrc:      SpO2: (!) 92% (!) 94% (!) 92%   Weight:      Height:    "       Abnormal Lab Results:  Labs Reviewed   CBC W/ AUTO DIFFERENTIAL - Abnormal; Notable for the following components:       Result Value    RBC 4.24 (*)     Hemoglobin 13.5 (*)      (*)     MCH 31.8 (*)     MCHC 31.0 (*)     RDW 15.0 (*)     Lymph % 16.0 (*)     All other components within normal limits   COMPREHENSIVE METABOLIC PANEL - Abnormal; Notable for the following components:    Glucose 150 (*)     Albumin 2.6 (*)     eGFR 51 (*)     All other components within normal limits   URINALYSIS, REFLEX TO URINE CULTURE - Abnormal; Notable for the following components:    Appearance, UA Cloudy (*)     Protein, UA 1+ (*)     Ketones, UA Trace (*)     Occult Blood UA 1+ (*)     Nitrite, UA Positive (*)     Leukocytes, UA 3+ (*)     All other components within normal limits    Narrative:     Specimen Source->Urine   B-TYPE NATRIURETIC PEPTIDE - Abnormal; Notable for the following components:     (*)     All other components within normal limits   URINALYSIS MICROSCOPIC - Abnormal; Notable for the following components:    RBC, UA 34 (*)     WBC, UA >100 (*)     WBC Clumps, UA Many (*)     Bacteria Many (*)     All other components within normal limits    Narrative:     Specimen Source->Urine   ISTAT PROCEDURE - Abnormal; Notable for the following components:    POC PH 7.332 (*)     POC PCO2 64.4 (*)     POC PO2 56 (*)     POC HCO3 34.2 (*)     POC SATURATED O2 85 (*)     All other components within normal limits   CULTURE, URINE   LACTIC ACID, PLASMA   TROPONIN I   PROCALCITONIN   SARS-COV-2 RNA AMPLIFICATION, QUAL   D DIMER, QUANTITATIVE        All Lab Results:  Results for orders placed or performed during the hospital encounter of 09/16/22   Blood culture x two cultures. Draw prior to antibiotics.    Specimen: Peripheral, Hand, Right; Blood   Result Value Ref Range    Blood Culture, Routine No Growth to date    Blood culture x two cultures. Draw prior to antibiotics.    Specimen: Peripheral,  Antecubital, Right; Blood   Result Value Ref Range    Blood Culture, Routine No Growth to date    CBC auto differential   Result Value Ref Range    WBC 7.83 3.90 - 12.70 K/uL    RBC 4.24 (L) 4.60 - 6.20 M/uL    Hemoglobin 13.5 (L) 14.0 - 18.0 g/dL    Hematocrit 43.5 40.0 - 54.0 %     (H) 82 - 98 fL    MCH 31.8 (H) 27.0 - 31.0 pg    MCHC 31.0 (L) 32.0 - 36.0 g/dL    RDW 15.0 (H) 11.5 - 14.5 %    Platelets 212 150 - 450 K/uL    MPV 9.4 9.2 - 12.9 fL    Immature Granulocytes 0.3 0.0 - 0.5 %    Gran # (ANC) 5.5 1.8 - 7.7 K/uL    Immature Grans (Abs) 0.02 0.00 - 0.04 K/uL    Lymph # 1.3 1.0 - 4.8 K/uL    Mono # 0.8 0.3 - 1.0 K/uL    Eos # 0.2 0.0 - 0.5 K/uL    Baso # 0.03 0.00 - 0.20 K/uL    nRBC 0 0 /100 WBC    Gran % 69.9 38.0 - 73.0 %    Lymph % 16.0 (L) 18.0 - 48.0 %    Mono % 10.3 4.0 - 15.0 %    Eosinophil % 3.1 0.0 - 8.0 %    Basophil % 0.4 0.0 - 1.9 %    Differential Method Automated    Comprehensive metabolic panel   Result Value Ref Range    Sodium 143 136 - 145 mmol/L    Potassium 3.5 3.5 - 5.1 mmol/L    Chloride 105 95 - 110 mmol/L    CO2 29 23 - 29 mmol/L    Glucose 150 (H) 70 - 110 mg/dL    BUN 18 8 - 23 mg/dL    Creatinine 1.4 0.5 - 1.4 mg/dL    Calcium 8.8 8.7 - 10.5 mg/dL    Total Protein 7.3 6.0 - 8.4 g/dL    Albumin 2.6 (L) 3.5 - 5.2 g/dL    Total Bilirubin 0.4 0.1 - 1.0 mg/dL    Alkaline Phosphatase 75 55 - 135 U/L    AST 25 10 - 40 U/L    ALT 25 10 - 44 U/L    Anion Gap 9 8 - 16 mmol/L    eGFR 51 (A) >60 mL/min/1.73 m^2   Lactic acid, plasma #1   Result Value Ref Range    Lactate (Lactic Acid) 1.2 0.5 - 2.2 mmol/L   Urinalysis, Reflex to Urine Culture Urine, Clean Catch    Specimen: Urine   Result Value Ref Range    Specimen UA Urine, Clean Catch     Color, UA Yellow Yellow, Straw, Cindy    Appearance, UA Cloudy (A) Clear    pH, UA 6.0 5.0 - 8.0    Specific Gravity, UA 1.010 1.005 - 1.030    Protein, UA 1+ (A) Negative    Glucose, UA Negative Negative    Ketones, UA Trace (A) Negative     Bilirubin (UA) Negative Negative    Occult Blood UA 1+ (A) Negative    Nitrite, UA Positive (A) Negative    Urobilinogen, UA Negative <2.0 EU/dL    Leukocytes, UA 3+ (A) Negative   Brain natriuretic peptide   Result Value Ref Range     (H) 0 - 99 pg/mL   Troponin I   Result Value Ref Range    Troponin I 0.026 0.000 - 0.026 ng/mL   Procalcitonin   Result Value Ref Range    Procalcitonin 0.09 <0.25 ng/mL   COVID-19 Rapid Screening   Result Value Ref Range    SARS-CoV-2 RNA, Amplification, Qual Negative Negative   Urinalysis Microscopic   Result Value Ref Range    RBC, UA 34 (H) 0 - 4 /hpf    WBC, UA >100 (H) 0 - 5 /hpf    WBC Clumps, UA Many (A) None-Rare    Bacteria Many (A) None-Occ /hpf    Hyaline Casts, UA 0 0-1/lpf /lpf    Microscopic Comment SEE COMMENT    Lactic acid, plasma #2   Result Value Ref Range    Lactate (Lactic Acid) 1.4 0.5 - 2.2 mmol/L   ISTAT PROCEDURE   Result Value Ref Range    POC PH 7.332 (L) 7.35 - 7.45    POC PCO2 64.4 (HH) 35 - 45 mmHg    POC PO2 56 (LL) 80 - 100 mmHg    POC HCO3 34.2 (H) 24 - 28 mmol/L    POC BE 8 -2 to 2 mmol/L    POC SATURATED O2 85 (L) 95 - 100 %    Sample ARTERIAL     Site RR     Allens Test N/A     DelSys Venti Mask     Mode SPONT     Flow 15     FiO2 40          Imaging Results:  Imaging Results              X-Ray Chest AP Portable (Final result)  Result time 09/17/22 00:09:40      Final result by Reyna South MD (09/17/22 00:09:40)                   Impression:      Predominant left upper lobe opacity consistent with left upper lobe pneumonia      Electronically signed by: Brannon Orellana  Date:    09/17/2022  Time:    00:09               Narrative:    EXAMINATION:  XR CHEST AP PORTABLE    CLINICAL HISTORY:  Sepsis;    TECHNIQUE:  Single frontal view of the chest was performed.    COMPARISON:  Prior    FINDINGS:  Left upper lobe opacity.  Cardiomegaly.  Left costophrenic angle opacity may relate to associated pleural effusion.    Bones are intact.                                        The EKG was ordered, reviewed, and independently interpreted by the ED provider.  Interpretation time: 23:14  Rate: 63 BPM  Rhythm: Sinus rhythm with 2nd degree A-V block (Mobitz I)  Interpretation: T wave abnormality, consider anterolateral ischemia  Prolonged QT. No STEMI.           The Emergency Provider reviewed the vital signs and test results, which are outlined above.     ED Discussion       12:37 AM: Discussed case with Oralia Denton NP ([facility]). Dr. Alberts agrees with current care and management of pt and accepts admission.   Admitting Service: [facility]  Admitting Physician: Dr. Alberts  Admit to: Inpatient Tele    12:39 AM: Re-evaluated pt. I have discussed test results, shared treatment plan, and the need for admission with patient and family at bedside. Pt and family express understanding at this time and agree with all information. All questions answered. Pt and family have no further questions or concerns at this time. Pt is ready for admit.         Medical Decision Making:   Clinical Tests:   Lab Tests: Ordered and Reviewed  Radiological Study: Ordered and Reviewed  Medical Tests: Ordered and Reviewed         ED Medication(s):  Medications   atorvastatin tablet 40 mg (40 mg Oral Given 9/17/22 1007)   levothyroxine tablet 50 mcg (50 mcg Oral Given 9/17/22 0553)   acetaminophen tablet 650 mg (has no administration in time range)   ondansetron disintegrating tablet 4 mg (has no administration in time range)   cefTRIAXone (ROCEPHIN) 1 g/50 mL D5W IVPB (0 g Intravenous Stopped 9/17/22 1046)   azithromycin 500 mg in dextrose 5 % 250 mL IVPB (ready to mix system) (0 mg Intravenous Stopped 9/17/22 1226)   HYDROcodone-acetaminophen  mg per tablet 1 tablet (has no administration in time range)   enoxaparin injection 40 mg (40 mg Subcutaneous Given 9/17/22 1008)   furosemide injection 40 mg (40 mg Intravenous Given 9/17/22 1107)   albuterol-ipratropium 2.5 mg-0.5 mg/3  mL nebulizer solution 3 mL (3 mLs Nebulization Given 9/16/22 2775)   piperacillin-tazobactam 4.5 g in dextrose 5 % 100 mL IVPB (ready to mix system) (0 g Intravenous Stopped 9/17/22 0103)       Current Discharge Medication List                  Scribe Attestation:   Scribe #1: I performed the above scribed service and the documentation accurately describes the services I performed. I attest to the accuracy of the note.     Attending:   Physician Attestation Statement for Scribe #1: I, Lynda Holly MD, personally performed the services described in this documentation, as scribed by Bradford Brannon, in my presence, and it is both accurate and complete.           Clinical Impression       ICD-10-CM ICD-9-CM   1. Pneumonia of left upper lobe due to infectious organism  J18.9 486   2. Shortness of breath  R06.02 786.05       Disposition:   Disposition: Admitted  Condition: Fair       Lynda Holly MD  09/17/22 2400

## 2022-09-17 NOTE — ASSESSMENT & PLAN NOTE
MARY pna on chest xray  procal normal  Attempted to obtain CT chest   However due to body habitus pt can't get CT   Will cont rocephin and azithromycin   Wean O2 as tolerated  Hx of PE in the past  Will obtain D-dimer  May need vq scan if positive

## 2022-09-17 NOTE — SUBJECTIVE & OBJECTIVE
Past Medical History:   Diagnosis Date    Arthritis     CHF (congestive heart failure)     Chronic kidney disease     Coronary artery disease     Depression     Hypertension     Hypertensive heart disease with heart failure 9/16/2022    Hypothyroidism     Lymphedema of lower extremity     Nephrolithiasis     Obesity     Peripheral vascular disease     Recurrent cellulitis of lower leg     Sleep apnea     can't stand the CPAP , sleeps elevated in hospital bed or chair    Tobacco dependence     resolved       Past Surgical History:   Procedure Laterality Date    ADENOIDECTOMY  1961    BACK SURGERY  1975;  1976    x2 for disc; scar tissue removed    debridement of bilateral leg ulcers Bilateral 01/01/2017    Dr Hernandez    INNER EAR SURGERY Bilateral 1961    separate - pinnaplasty , new eardrms constructed    KIDNEY STONE SURGERY Left 1976 approx    open    TONSILLECTOMY  1961       Review of patient's allergies indicates:   Allergen Reactions    Bactrim [sulfamethoxazole-trimethoprim] Itching       No current facility-administered medications on file prior to encounter.     Current Outpatient Medications on File Prior to Encounter   Medication Sig    acetaminophen (TYLENOL) 500 MG tablet Take 500 mg by mouth every 6 (six) hours as needed for Pain.    atorvastatin (LIPITOR) 40 MG tablet Take 1 tablet (40 mg total) by mouth once daily.    levothyroxine (SYNTHROID) 50 MCG tablet Take 1 tablet (50 mcg total) by mouth before breakfast.    metronidazole 1% (METROGEL) 1 % Gel Apply topically 2 (two) times a day.    miconazole NITRATE 2 % (MICOTIN) 2 % top powder Apply topically 2 (two) times daily.    miconazole nitrate 2% (MICOTIN) 2 % Oint Apply topically 2 (two) times daily. Intertrigo to lower abdomen, groin, periarea: please apply Antifungal bid    nitrofurantoin, macrocrystal-monohydrate, (MACROBID) 100 MG capsule Take 1 capsule (100 mg total) by mouth 2 (two) times daily.    polyethylene glycol (GLYCOLAX) 17 gram PwPk  Take 17 g by mouth 2 (two) times daily.    senna-docusate 8.6-50 mg (PERICOLACE) 8.6-50 mg per tablet Take 1 tablet by mouth once daily.     Family History       Problem Relation (Age of Onset)    Alcohol abuse Maternal Grandfather    Asthma Daughter    Cancer Brother, Mother, Father    Diabetes Mother    Heart disease Father, Brother    Hypertension Mother          Tobacco Use    Smoking status: Never    Smokeless tobacco: Never    Tobacco comments:     chain smoker heavy x 10 years, lighter x 10 before    Substance and Sexual Activity    Alcohol use: Not Currently    Drug use: Never    Sexual activity: Never     Review of Systems   Constitutional:  Negative for fatigue and fever.   HENT:  Positive for hearing loss. Negative for sinus pressure.    Eyes:  Negative for visual disturbance.   Respiratory:  Positive for cough and shortness of breath.    Cardiovascular:  Positive for leg swelling. Negative for chest pain.   Gastrointestinal:  Negative for nausea and vomiting.   Genitourinary:  Negative for difficulty urinating.   Musculoskeletal:  Negative for back pain.   Skin:  Negative for rash.   Neurological:  Negative for headaches.   Psychiatric/Behavioral:  Negative for confusion.    Objective:     Vital Signs (Most Recent):  Temp: 97.9 °F (36.6 °C) (09/16/22 2253)  Pulse: 77 (09/17/22 0059)  Resp: (!) 30 (09/17/22 0059)  BP: 138/65 (09/17/22 0059)  SpO2: (!) 91 % (09/17/22 0059)   Vital Signs (24h Range):  Temp:  [97.3 °F (36.3 °C)-97.9 °F (36.6 °C)] 97.9 °F (36.6 °C)  Pulse:  [46-86] 77  Resp:  [22-33] 30  SpO2:  [86 %-100 %] 91 %  BP: (120-149)/() 138/65     Weight: (!) 200.5 kg (442 lb)  Body mass index is 61.65 kg/m².    Physical Exam  Constitutional:       General: He is not in acute distress.     Appearance: He is well-developed. He is obese. He is not diaphoretic.   HENT:      Head: Normocephalic and atraumatic.   Eyes:      Pupils: Pupils are equal, round, and reactive to light.   Cardiovascular:       Rate and Rhythm: Normal rate and regular rhythm.      Heart sounds: Normal heart sounds. No murmur heard.    No friction rub. No gallop.   Pulmonary:      Effort: Pulmonary effort is normal. No respiratory distress.      Breath sounds: Normal breath sounds. No stridor. No wheezing or rales.   Abdominal:      General: Bowel sounds are normal. There is no distension.      Palpations: Abdomen is soft. There is no mass.      Tenderness: There is no abdominal tenderness. There is no guarding.   Musculoskeletal:      Right lower leg: Edema present.      Left lower leg: Edema present.   Skin:     General: Skin is warm.      Findings: No erythema.      Comments: Hyperpigmentation noted on bilateral lower extremity    Neurological:      Mental Status: He is alert and oriented to person, place, and time.         CRANIAL NERVES     CN III, IV, VI   Pupils are equal, round, and reactive to light.     Significant Labs:   Results for orders placed or performed during the hospital encounter of 09/16/22   CBC auto differential   Result Value Ref Range    WBC 7.83 3.90 - 12.70 K/uL    RBC 4.24 (L) 4.60 - 6.20 M/uL    Hemoglobin 13.5 (L) 14.0 - 18.0 g/dL    Hematocrit 43.5 40.0 - 54.0 %     (H) 82 - 98 fL    MCH 31.8 (H) 27.0 - 31.0 pg    MCHC 31.0 (L) 32.0 - 36.0 g/dL    RDW 15.0 (H) 11.5 - 14.5 %    Platelets 212 150 - 450 K/uL    MPV 9.4 9.2 - 12.9 fL    Immature Granulocytes 0.3 0.0 - 0.5 %    Gran # (ANC) 5.5 1.8 - 7.7 K/uL    Immature Grans (Abs) 0.02 0.00 - 0.04 K/uL    Lymph # 1.3 1.0 - 4.8 K/uL    Mono # 0.8 0.3 - 1.0 K/uL    Eos # 0.2 0.0 - 0.5 K/uL    Baso # 0.03 0.00 - 0.20 K/uL    nRBC 0 0 /100 WBC    Gran % 69.9 38.0 - 73.0 %    Lymph % 16.0 (L) 18.0 - 48.0 %    Mono % 10.3 4.0 - 15.0 %    Eosinophil % 3.1 0.0 - 8.0 %    Basophil % 0.4 0.0 - 1.9 %    Differential Method Automated    Comprehensive metabolic panel   Result Value Ref Range    Sodium 143 136 - 145 mmol/L    Potassium 3.5 3.5 - 5.1 mmol/L     Chloride 105 95 - 110 mmol/L    CO2 29 23 - 29 mmol/L    Glucose 150 (H) 70 - 110 mg/dL    BUN 18 8 - 23 mg/dL    Creatinine 1.4 0.5 - 1.4 mg/dL    Calcium 8.8 8.7 - 10.5 mg/dL    Total Protein 7.3 6.0 - 8.4 g/dL    Albumin 2.6 (L) 3.5 - 5.2 g/dL    Total Bilirubin 0.4 0.1 - 1.0 mg/dL    Alkaline Phosphatase 75 55 - 135 U/L    AST 25 10 - 40 U/L    ALT 25 10 - 44 U/L    Anion Gap 9 8 - 16 mmol/L    eGFR 51 (A) >60 mL/min/1.73 m^2   Lactic acid, plasma #1   Result Value Ref Range    Lactate (Lactic Acid) 1.2 0.5 - 2.2 mmol/L   Urinalysis, Reflex to Urine Culture Urine, Clean Catch    Specimen: Urine   Result Value Ref Range    Specimen UA Urine, Clean Catch     Color, UA Yellow Yellow, Straw, Cindy    Appearance, UA Cloudy (A) Clear    pH, UA 6.0 5.0 - 8.0    Specific Gravity, UA 1.010 1.005 - 1.030    Protein, UA 1+ (A) Negative    Glucose, UA Negative Negative    Ketones, UA Trace (A) Negative    Bilirubin (UA) Negative Negative    Occult Blood UA 1+ (A) Negative    Nitrite, UA Positive (A) Negative    Urobilinogen, UA Negative <2.0 EU/dL    Leukocytes, UA 3+ (A) Negative   Brain natriuretic peptide   Result Value Ref Range     (H) 0 - 99 pg/mL   Troponin I   Result Value Ref Range    Troponin I 0.026 0.000 - 0.026 ng/mL   Procalcitonin   Result Value Ref Range    Procalcitonin 0.09 <0.25 ng/mL   COVID-19 Rapid Screening   Result Value Ref Range    SARS-CoV-2 RNA, Amplification, Qual Negative Negative   Urinalysis Microscopic   Result Value Ref Range    RBC, UA 34 (H) 0 - 4 /hpf    WBC, UA >100 (H) 0 - 5 /hpf    WBC Clumps, UA Many (A) None-Rare    Bacteria Many (A) None-Occ /hpf    Hyaline Casts, UA 0 0-1/lpf /lpf    Microscopic Comment SEE COMMENT    ISTAT PROCEDURE   Result Value Ref Range    POC PH 7.332 (L) 7.35 - 7.45    POC PCO2 64.4 (HH) 35 - 45 mmHg    POC PO2 56 (LL) 80 - 100 mmHg    POC HCO3 34.2 (H) 24 - 28 mmol/L    POC BE 8 -2 to 2 mmol/L    POC SATURATED O2 85 (L) 95 - 100 %    Sample ARTERIAL      Site RR     Allens Test N/A     DelSys Venti Mask     Mode SPONT     Flow 15     FiO2 40         Significant Imaging: X-Ray Chest AP Portable  Narrative: EXAMINATION:  XR CHEST AP PORTABLE    CLINICAL HISTORY:  Sepsis;    TECHNIQUE:  Single frontal view of the chest was performed.    COMPARISON:  Prior    FINDINGS:  Left upper lobe opacity.  Cardiomegaly.  Left costophrenic angle opacity may relate to associated pleural effusion.    Bones are intact.  Impression: Predominant left upper lobe opacity consistent with left upper lobe pneumonia    Electronically signed by: Brannon Orellana  Date:    09/17/2022  Time:    00:09

## 2022-09-17 NOTE — PLAN OF CARE
Assessment attempted, patient asleep-no family at bedside.Shubham Barclay LMSW 9/17/2022 1:32 PM

## 2022-09-17 NOTE — PROGRESS NOTES
Pharmacist Renal Dose Adjustment Note    Gene Rothman is a 76 y.o. male being treated with the medication enoxaparin    Patient Data:    Vital Signs (Most Recent):  Temp: 97.9 °F (36.6 °C) (09/16/22 2253)  Pulse: 73 (09/17/22 0123)  Resp: (!) 21 (09/17/22 0123)  BP: (!) 142/64 (09/17/22 0123)  SpO2: (!) 93 % (09/17/22 0123)   Vital Signs (72h Range):  Temp:  [97.3 °F (36.3 °C)-97.9 °F (36.6 °C)]   Pulse:  [46-86]   Resp:  [21-33]   BP: (120-149)/()   SpO2:  [86 %-100 %]      Recent Labs   Lab 09/16/22 2258   CREATININE 1.4     Serum creatinine: 1.4 mg/dL 09/16/22 2258  Estimated creatinine clearance: 77.1 mL/min    Enoxaparin 40 mg q24h will be changed to enoxaparin 40 mg q12h for CrCl >30 mL/min and BMI >40.     Pharmacist's Name: Delphine Shah  Pharmacist's Extension: [phone]

## 2022-09-17 NOTE — ASSESSMENT & PLAN NOTE
Body mass index is 61.65 kg/m². Morbid obesity complicates all aspects of disease management from diagnostic modalities to treatment. Weight loss encouraged and health benefits explained to patient.

## 2022-09-17 NOTE — H&P
"[facility] - Emergency Dept.  [facility] Medicine  History & Physical    Patient Name: Gene Rothman  MRN: [id number]  Patient Class: IP- Inpatient  Admission Date: 9/16/2022  Attending Physician: Dhaval Alberts MD  Primary Care Provider: Ami Zarate DO         Patient information was obtained from patient, spouse/SO and ER records.     Subjective:     Principal Problem:Acute hypoxemic respiratory failure    Chief Complaint:   Chief Complaint   Patient presents with    Shortness of Breath     C/O SOB AND FOUND WITH O2 80'S ON RA. DENIES CP        HPI: Patient is a 76 y.o.  male with a PMHx of CHF, CKD, CAD, HTN, hypothyroidism, peripheral vascular disease, and sleep apnea who presents to the Emergency Department for evaluation of SOB which onset PTA. Patient hard of hearing however wife is at bedside. Per report patient had worsening sob. No chest pain reported. He does report having "pain all over". Currently on home o2.     In the ED, patient was started on bipap for hypoxia and weaned down to venti mask. Chest xray showed MARY pna and he was started on zosyn. U/a indicative of uti. BNP: 238. Patient was admitted for acute hypoxic respiratory failure.       Past Medical History:   Diagnosis Date    Arthritis     CHF (congestive heart failure)     Chronic kidney disease     Coronary artery disease     Depression     Hypertension     Hypertensive heart disease with heart failure 9/16/2022    Hypothyroidism     Lymphedema of lower extremity     Nephrolithiasis     Obesity     Peripheral vascular disease     Recurrent cellulitis of lower leg     Sleep apnea     can't stand the CPAP , sleeps elevated in hospital bed or chair    Tobacco dependence     resolved       Past Surgical History:   Procedure Laterality Date    ADENOIDECTOMY  1961    BACK SURGERY  1975;  1976    x2 for disc; scar tissue removed    debridement of bilateral leg ulcers Bilateral 01/01/2017    Dr Hernandez    INNER EAR SURGERY Bilateral " 1961    separate - pinnaplasty , new eardrms constructed    KIDNEY STONE SURGERY Left 1976 approx    open    TONSILLECTOMY  1961       Review of patient's allergies indicates:   Allergen Reactions    Bactrim [sulfamethoxazole-trimethoprim] Itching       No current facility-administered medications on file prior to encounter.     Current Outpatient Medications on File Prior to Encounter   Medication Sig    acetaminophen (TYLENOL) 500 MG tablet Take 500 mg by mouth every 6 (six) hours as needed for Pain.    atorvastatin (LIPITOR) 40 MG tablet Take 1 tablet (40 mg total) by mouth once daily.    levothyroxine (SYNTHROID) 50 MCG tablet Take 1 tablet (50 mcg total) by mouth before breakfast.    metronidazole 1% (METROGEL) 1 % Gel Apply topically 2 (two) times a day.    miconazole NITRATE 2 % (MICOTIN) 2 % top powder Apply topically 2 (two) times daily.    miconazole nitrate 2% (MICOTIN) 2 % Oint Apply topically 2 (two) times daily. Intertrigo to lower abdomen, groin, periarea: please apply Antifungal bid    nitrofurantoin, macrocrystal-monohydrate, (MACROBID) 100 MG capsule Take 1 capsule (100 mg total) by mouth 2 (two) times daily.    polyethylene glycol (GLYCOLAX) 17 gram PwPk Take 17 g by mouth 2 (two) times daily.    senna-docusate 8.6-50 mg (PERICOLACE) 8.6-50 mg per tablet Take 1 tablet by mouth once daily.     Family History       Problem Relation (Age of Onset)    Alcohol abuse Maternal Grandfather    Asthma Daughter    Cancer Brother, Mother, Father    Diabetes Mother    Heart disease Father, Brother    Hypertension Mother          Tobacco Use    Smoking status: Never    Smokeless tobacco: Never    Tobacco comments:     chain smoker heavy x 10 years, lighter x 10 before    Substance and Sexual Activity    Alcohol use: Not Currently    Drug use: Never    Sexual activity: Never     Review of Systems   Constitutional:  Negative for fatigue and fever.   HENT:  Positive for hearing loss. Negative for  sinus pressure.    Eyes:  Negative for visual disturbance.   Respiratory:  Positive for cough and shortness of breath.    Cardiovascular:  Positive for leg swelling. Negative for chest pain.   Gastrointestinal:  Negative for nausea and vomiting.   Genitourinary:  Negative for difficulty urinating.   Musculoskeletal:  Negative for back pain.   Skin:  Negative for rash.   Neurological:  Negative for headaches.   Psychiatric/Behavioral:  Negative for confusion.    Objective:     Vital Signs (Most Recent):  Temp: 97.9 °F (36.6 °C) (09/16/22 2253)  Pulse: 77 (09/17/22 0059)  Resp: (!) 30 (09/17/22 0059)  BP: 138/65 (09/17/22 0059)  SpO2: (!) 91 % (09/17/22 0059)   Vital Signs (24h Range):  Temp:  [97.3 °F (36.3 °C)-97.9 °F (36.6 °C)] 97.9 °F (36.6 °C)  Pulse:  [46-86] 77  Resp:  [22-33] 30  SpO2:  [86 %-100 %] 91 %  BP: (120-149)/() 138/65     Weight: (!) 200.5 kg (442 lb)  Body mass index is 61.65 kg/m².    Physical Exam  Constitutional:       General: He is not in acute distress.     Appearance: He is well-developed. He is obese. He is not diaphoretic.   HENT:      Head: Normocephalic and atraumatic.   Eyes:      Pupils: Pupils are equal, round, and reactive to light.   Cardiovascular:      Rate and Rhythm: Normal rate and regular rhythm.      Heart sounds: Normal heart sounds. No murmur heard.    No friction rub. No gallop.   Pulmonary:      Effort: Pulmonary effort is normal. No respiratory distress.      Breath sounds: Normal breath sounds. No stridor. No wheezing or rales.   Abdominal:      General: Bowel sounds are normal. There is no distension.      Palpations: Abdomen is soft. There is no mass.      Tenderness: There is no abdominal tenderness. There is no guarding.   Musculoskeletal:      Right lower leg: Edema present.      Left lower leg: Edema present.   Skin:     General: Skin is warm.      Findings: No erythema.      Comments: Hyperpigmentation noted on bilateral lower extremity    Neurological:       Mental Status: He is alert and oriented to person, place, and time.         CRANIAL NERVES     CN III, IV, VI   Pupils are equal, round, and reactive to light.     Significant Labs:   Results for orders placed or performed during the hospital encounter of 09/16/22   CBC auto differential   Result Value Ref Range    WBC 7.83 3.90 - 12.70 K/uL    RBC 4.24 (L) 4.60 - 6.20 M/uL    Hemoglobin 13.5 (L) 14.0 - 18.0 g/dL    Hematocrit 43.5 40.0 - 54.0 %     (H) 82 - 98 fL    MCH 31.8 (H) 27.0 - 31.0 pg    MCHC 31.0 (L) 32.0 - 36.0 g/dL    RDW 15.0 (H) 11.5 - 14.5 %    Platelets 212 150 - 450 K/uL    MPV 9.4 9.2 - 12.9 fL    Immature Granulocytes 0.3 0.0 - 0.5 %    Gran # (ANC) 5.5 1.8 - 7.7 K/uL    Immature Grans (Abs) 0.02 0.00 - 0.04 K/uL    Lymph # 1.3 1.0 - 4.8 K/uL    Mono # 0.8 0.3 - 1.0 K/uL    Eos # 0.2 0.0 - 0.5 K/uL    Baso # 0.03 0.00 - 0.20 K/uL    nRBC 0 0 /100 WBC    Gran % 69.9 38.0 - 73.0 %    Lymph % 16.0 (L) 18.0 - 48.0 %    Mono % 10.3 4.0 - 15.0 %    Eosinophil % 3.1 0.0 - 8.0 %    Basophil % 0.4 0.0 - 1.9 %    Differential Method Automated    Comprehensive metabolic panel   Result Value Ref Range    Sodium 143 136 - 145 mmol/L    Potassium 3.5 3.5 - 5.1 mmol/L    Chloride 105 95 - 110 mmol/L    CO2 29 23 - 29 mmol/L    Glucose 150 (H) 70 - 110 mg/dL    BUN 18 8 - 23 mg/dL    Creatinine 1.4 0.5 - 1.4 mg/dL    Calcium 8.8 8.7 - 10.5 mg/dL    Total Protein 7.3 6.0 - 8.4 g/dL    Albumin 2.6 (L) 3.5 - 5.2 g/dL    Total Bilirubin 0.4 0.1 - 1.0 mg/dL    Alkaline Phosphatase 75 55 - 135 U/L    AST 25 10 - 40 U/L    ALT 25 10 - 44 U/L    Anion Gap 9 8 - 16 mmol/L    eGFR 51 (A) >60 mL/min/1.73 m^2   Lactic acid, plasma #1   Result Value Ref Range    Lactate (Lactic Acid) 1.2 0.5 - 2.2 mmol/L   Urinalysis, Reflex to Urine Culture Urine, Clean Catch    Specimen: Urine   Result Value Ref Range    Specimen UA Urine, Clean Catch     Color, UA Yellow Yellow, Straw, Cindy    Appearance, UA Cloudy (A) Clear     pH, UA 6.0 5.0 - 8.0    Specific Gravity, UA 1.010 1.005 - 1.030    Protein, UA 1+ (A) Negative    Glucose, UA Negative Negative    Ketones, UA Trace (A) Negative    Bilirubin (UA) Negative Negative    Occult Blood UA 1+ (A) Negative    Nitrite, UA Positive (A) Negative    Urobilinogen, UA Negative <2.0 EU/dL    Leukocytes, UA 3+ (A) Negative   Brain natriuretic peptide   Result Value Ref Range     (H) 0 - 99 pg/mL   Troponin I   Result Value Ref Range    Troponin I 0.026 0.000 - 0.026 ng/mL   Procalcitonin   Result Value Ref Range    Procalcitonin 0.09 <0.25 ng/mL   COVID-19 Rapid Screening   Result Value Ref Range    SARS-CoV-2 RNA, Amplification, Qual Negative Negative   Urinalysis Microscopic   Result Value Ref Range    RBC, UA 34 (H) 0 - 4 /hpf    WBC, UA >100 (H) 0 - 5 /hpf    WBC Clumps, UA Many (A) None-Rare    Bacteria Many (A) None-Occ /hpf    Hyaline Casts, UA 0 0-1/lpf /lpf    Microscopic Comment SEE COMMENT    ISTAT PROCEDURE   Result Value Ref Range    POC PH 7.332 (L) 7.35 - 7.45    POC PCO2 64.4 (HH) 35 - 45 mmHg    POC PO2 56 (LL) 80 - 100 mmHg    POC HCO3 34.2 (H) 24 - 28 mmol/L    POC BE 8 -2 to 2 mmol/L    POC SATURATED O2 85 (L) 95 - 100 %    Sample ARTERIAL     Site RR     Allens Test N/A     DelSys Venti Mask     Mode SPONT     Flow 15     FiO2 40         Significant Imaging: X-Ray Chest AP Portable  Narrative: EXAMINATION:  XR CHEST AP PORTABLE    CLINICAL HISTORY:  Sepsis;    TECHNIQUE:  Single frontal view of the chest was performed.    COMPARISON:  Prior    FINDINGS:  Left upper lobe opacity.  Cardiomegaly.  Left costophrenic angle opacity may relate to associated pleural effusion.    Bones are intact.  Impression: Predominant left upper lobe opacity consistent with left upper lobe pneumonia    Electronically signed by: Brannon Orellana  Date:    09/17/2022  Time:    00:09      Assessment/Plan:     * Acute hypoxemic respiratory failure  MARY pna on chest xray  procal normal  Attempted  to obtain CT chest   However due to body habitus pt can't get CT   Will cont rocephin and azithromycin   Wean O2 as tolerated  Hx of PE in the past  Will obtain D-dimer  May need vq scan if positive       Pneumonia  Rocephin and azithro      UTI (urinary tract infection)  U/a indicative of uti  Last urine culture reviewed  Cont on rocephin  Cultures pending       Sleep apnea  cpap qhs       Morbid obesity with BMI of 60.0-69.9, adult  Body mass index is 61.65 kg/m². Morbid obesity complicates all aspects of disease management from diagnostic modalities to treatment. Weight loss encouraged and health benefits explained to patient.         Acquired hypothyroidism  Continue synthroid         VTE Risk Mitigation (From admission, onward)         Ordered     enoxaparin injection 40 mg  Daily         09/17/22 8615                   Dhaval Alberts MD  Department of Hospital Medicine   '[place] - Emergency Dept.

## 2022-09-17 NOTE — HPI
"Patient is a 76 y.o.  male with a PMHx of CHF, CKD, CAD, HTN, hypothyroidism, peripheral vascular disease, and sleep apnea who presents to the Emergency Department for evaluation of SOB which onset PTA. Patient hard of hearing however wife is at bedside. Per report patient had worsening sob. No chest pain reported. He does report having "pain all over". Currently on home o2.     In the ED, patient was started on bipap for hypoxia and weaned down to venti mask. Chest xray showed MARY pna and he was started on zosyn. U/a indicative of uti. BNP: 238. Patient was admitted for acute hypoxic respiratory failure.   "

## 2022-09-18 LAB
ANION GAP SERPL CALC-SCNC: 12 MMOL/L (ref 8–16)
BUN SERPL-MCNC: 19 MG/DL (ref 8–23)
CALCIUM SERPL-MCNC: 8 MG/DL (ref 8.7–10.5)
CHLORIDE SERPL-SCNC: 96 MMOL/L (ref 95–110)
CO2 SERPL-SCNC: 35 MMOL/L (ref 23–29)
CREAT SERPL-MCNC: 1.5 MG/DL (ref 0.5–1.4)
EST. GFR  (NO RACE VARIABLE): 47 ML/MIN/1.73 M^2
GLUCOSE SERPL-MCNC: 95 MG/DL (ref 70–110)
POTASSIUM SERPL-SCNC: 3.3 MMOL/L (ref 3.5–5.1)
SODIUM SERPL-SCNC: 143 MMOL/L (ref 136–145)

## 2022-09-18 PROCEDURE — 27000221 HC OXYGEN, UP TO 24 HOURS

## 2022-09-18 PROCEDURE — 63600175 PHARM REV CODE 636 W HCPCS: Performed by: FAMILY MEDICINE

## 2022-09-18 PROCEDURE — 36415 COLL VENOUS BLD VENIPUNCTURE: CPT | Performed by: NURSE PRACTITIONER

## 2022-09-18 PROCEDURE — 63600175 PHARM REV CODE 636 W HCPCS: Performed by: STUDENT IN AN ORGANIZED HEALTH CARE EDUCATION/TRAINING PROGRAM

## 2022-09-18 PROCEDURE — 25000003 PHARM REV CODE 250: Performed by: FAMILY MEDICINE

## 2022-09-18 PROCEDURE — 80048 BASIC METABOLIC PNL TOTAL CA: CPT | Performed by: NURSE PRACTITIONER

## 2022-09-18 PROCEDURE — 21400001 HC TELEMETRY ROOM

## 2022-09-18 PROCEDURE — 99900035 HC TECH TIME PER 15 MIN (STAT)

## 2022-09-18 PROCEDURE — 94761 N-INVAS EAR/PLS OXIMETRY MLT: CPT

## 2022-09-18 RX ADMIN — ENOXAPARIN SODIUM 40 MG: 100 INJECTION SUBCUTANEOUS at 09:09

## 2022-09-18 RX ADMIN — CEFTRIAXONE 1 G: 1 INJECTION, SOLUTION INTRAVENOUS at 12:09

## 2022-09-18 RX ADMIN — LEVOTHYROXINE SODIUM 50 MCG: 50 TABLET ORAL at 05:09

## 2022-09-18 RX ADMIN — ATORVASTATIN CALCIUM 40 MG: 40 TABLET, FILM COATED ORAL at 08:09

## 2022-09-18 RX ADMIN — FUROSEMIDE 40 MG: 10 INJECTION, SOLUTION INTRAMUSCULAR; INTRAVENOUS at 10:09

## 2022-09-18 RX ADMIN — ENOXAPARIN SODIUM 40 MG: 100 INJECTION SUBCUTANEOUS at 08:09

## 2022-09-18 RX ADMIN — FUROSEMIDE 40 MG: 10 INJECTION, SOLUTION INTRAMUSCULAR; INTRAVENOUS at 08:09

## 2022-09-18 RX ADMIN — AZITHROMYCIN MONOHYDRATE 500 MG: 500 INJECTION, POWDER, LYOPHILIZED, FOR SOLUTION INTRAVENOUS at 09:09

## 2022-09-18 NOTE — HOSPITAL COURSE
Mr Rothman is a 76 year old male who was admitted with acute on chronic hypoxic respiratory failure 2/2 MARY PNA and decompensated HF. Also found to have UTI. IV antibiotics and  IV furosemide initiated .  Patient with increased O2 needs overnight, currently on 15 L 50% FiO2 per venti mask. . He is on 2-3 L supplemental O2 at baseline. D- dimer pending, will check venous doppler and VQ scan r/o PE. Pulmonology consulted.     9/20 Venous doppler shows subacute DVT noted throughout the left femoral vein and popliteal vein are new as compared to the prior lower extremity ultrasound on 1/03/2022. VQ scan shows intermediate possibility of PE, CT deferred 2/2 body habitus. Heparin gtt initiated. O2 weaned down, sats currently  94% on 4 L O2 per simple face mask. Patient evaluated by pulmonology. Ventilatory deficit is irreversible with recurrent venous thromboembolism, pulmonary hypertension acute on chronic respiratory failure and morbid obesity with limited mobility. Will consult palliative medicine, patient agreeable to hospice. Case management consulted- per chart review patient previously under care of [facility] hospice.    9/21 No acute changes overnight. Patient on 4 L O2 per simple face mask. Plan to discharge home with hospice. At length discussion regarding goals of care and code status held with patient and family. Patient wish to remain full code at this time. Will transition to Apixaban at discharge, recommend continue augmentin for treatment of UTI and PNA. Plan dc home with hospice today.

## 2022-09-19 PROBLEM — R79.89 ELEVATED BRAIN NATRIURETIC PEPTIDE (BNP) LEVEL: Status: ACTIVE | Noted: 2022-09-19

## 2022-09-19 PROBLEM — J96.22 ACUTE ON CHRONIC RESPIRATORY FAILURE WITH HYPERCAPNIA: Status: ACTIVE | Noted: 2022-09-19

## 2022-09-19 LAB
ALBUMIN SERPL BCP-MCNC: 2.7 G/DL (ref 3.5–5.2)
ALP SERPL-CCNC: 79 U/L (ref 55–135)
ALT SERPL W/O P-5'-P-CCNC: 15 U/L (ref 10–44)
ANION GAP SERPL CALC-SCNC: 12 MMOL/L (ref 8–16)
APTT BLDCRRT: 25.8 SEC (ref 21–32)
AST SERPL-CCNC: 13 U/L (ref 10–40)
BACTERIA UR CULT: ABNORMAL
BASOPHILS # BLD AUTO: 0.03 K/UL (ref 0–0.2)
BASOPHILS NFR BLD: 0.4 % (ref 0–1.9)
BILIRUB SERPL-MCNC: 1.2 MG/DL (ref 0.1–1)
BUN SERPL-MCNC: 22 MG/DL (ref 8–23)
CALCIUM SERPL-MCNC: 8.3 MG/DL (ref 8.7–10.5)
CHLORIDE SERPL-SCNC: 89 MMOL/L (ref 95–110)
CO2 SERPL-SCNC: 40 MMOL/L (ref 23–29)
CREAT SERPL-MCNC: 1.6 MG/DL (ref 0.5–1.4)
D DIMER PPP IA.FEU-MCNC: 2.48 MG/L FEU
DIFFERENTIAL METHOD: ABNORMAL
EOSINOPHIL # BLD AUTO: 0.4 K/UL (ref 0–0.5)
EOSINOPHIL NFR BLD: 5 % (ref 0–8)
ERYTHROCYTE [DISTWIDTH] IN BLOOD BY AUTOMATED COUNT: 14.8 % (ref 11.5–14.5)
EST. GFR  (NO RACE VARIABLE): 44 ML/MIN/1.73 M^2
GLUCOSE SERPL-MCNC: 120 MG/DL (ref 70–110)
HCT VFR BLD AUTO: 45.6 % (ref 40–54)
HGB BLD-MCNC: 13.5 G/DL (ref 14–18)
IMM GRANULOCYTES # BLD AUTO: 0.03 K/UL (ref 0–0.04)
IMM GRANULOCYTES NFR BLD AUTO: 0.4 % (ref 0–0.5)
INR PPP: 1 (ref 0.8–1.2)
LYMPHOCYTES # BLD AUTO: 1.8 K/UL (ref 1–4.8)
LYMPHOCYTES NFR BLD: 23.9 % (ref 18–48)
MAGNESIUM SERPL-MCNC: 1.8 MG/DL (ref 1.6–2.6)
MCH RBC QN AUTO: 30.1 PG (ref 27–31)
MCHC RBC AUTO-ENTMCNC: 29.6 G/DL (ref 32–36)
MCV RBC AUTO: 102 FL (ref 82–98)
MONOCYTES # BLD AUTO: 0.9 K/UL (ref 0.3–1)
MONOCYTES NFR BLD: 12 % (ref 4–15)
NEUTROPHILS # BLD AUTO: 4.3 K/UL (ref 1.8–7.7)
NEUTROPHILS NFR BLD: 58.3 % (ref 38–73)
NRBC BLD-RTO: 0 /100 WBC
PLATELET # BLD AUTO: 215 K/UL (ref 150–450)
PMV BLD AUTO: 9.4 FL (ref 9.2–12.9)
POTASSIUM SERPL-SCNC: 3 MMOL/L (ref 3.5–5.1)
PROT SERPL-MCNC: 7 G/DL (ref 6–8.4)
PROTHROMBIN TIME: 10.9 SEC (ref 9–12.5)
RBC # BLD AUTO: 4.48 M/UL (ref 4.6–6.2)
SODIUM SERPL-SCNC: 141 MMOL/L (ref 136–145)
WBC # BLD AUTO: 7.42 K/UL (ref 3.9–12.7)

## 2022-09-19 PROCEDURE — 85379 FIBRIN DEGRADATION QUANT: CPT | Performed by: NURSE PRACTITIONER

## 2022-09-19 PROCEDURE — 21400001 HC TELEMETRY ROOM

## 2022-09-19 PROCEDURE — 36415 COLL VENOUS BLD VENIPUNCTURE: CPT | Performed by: NURSE PRACTITIONER

## 2022-09-19 PROCEDURE — 83735 ASSAY OF MAGNESIUM: CPT | Performed by: NURSE PRACTITIONER

## 2022-09-19 PROCEDURE — 99900035 HC TECH TIME PER 15 MIN (STAT)

## 2022-09-19 PROCEDURE — 25000003 PHARM REV CODE 250: Performed by: NURSE PRACTITIONER

## 2022-09-19 PROCEDURE — 99291 CRITICAL CARE FIRST HOUR: CPT | Mod: ,,, | Performed by: INTERNAL MEDICINE

## 2022-09-19 PROCEDURE — 85610 PROTHROMBIN TIME: CPT | Performed by: NURSE PRACTITIONER

## 2022-09-19 PROCEDURE — 85730 THROMBOPLASTIN TIME PARTIAL: CPT | Performed by: NURSE PRACTITIONER

## 2022-09-19 PROCEDURE — 63600175 PHARM REV CODE 636 W HCPCS: Performed by: NURSE PRACTITIONER

## 2022-09-19 PROCEDURE — 85025 COMPLETE CBC W/AUTO DIFF WBC: CPT | Performed by: NURSE PRACTITIONER

## 2022-09-19 PROCEDURE — 80053 COMPREHEN METABOLIC PANEL: CPT | Performed by: NURSE PRACTITIONER

## 2022-09-19 PROCEDURE — 25000003 PHARM REV CODE 250: Performed by: FAMILY MEDICINE

## 2022-09-19 PROCEDURE — 63600175 PHARM REV CODE 636 W HCPCS: Performed by: STUDENT IN AN ORGANIZED HEALTH CARE EDUCATION/TRAINING PROGRAM

## 2022-09-19 PROCEDURE — 94761 N-INVAS EAR/PLS OXIMETRY MLT: CPT

## 2022-09-19 PROCEDURE — 63700000 PHARM REV CODE 250 ALT 637 W/O HCPCS: Performed by: FAMILY MEDICINE

## 2022-09-19 PROCEDURE — 63600175 PHARM REV CODE 636 W HCPCS: Performed by: FAMILY MEDICINE

## 2022-09-19 PROCEDURE — 99291 PR CRITICAL CARE, E/M 30-74 MINUTES: ICD-10-PCS | Mod: ,,, | Performed by: INTERNAL MEDICINE

## 2022-09-19 PROCEDURE — 27000221 HC OXYGEN, UP TO 24 HOURS

## 2022-09-19 RX ORDER — POTASSIUM CHLORIDE 20 MEQ/1
40 TABLET, EXTENDED RELEASE ORAL ONCE
Status: COMPLETED | OUTPATIENT
Start: 2022-09-19 | End: 2022-09-19

## 2022-09-19 RX ORDER — AZITHROMYCIN 250 MG/1
500 TABLET, FILM COATED ORAL ONCE
Status: COMPLETED | OUTPATIENT
Start: 2022-09-19 | End: 2022-09-19

## 2022-09-19 RX ORDER — LANOLIN ALCOHOL/MO/W.PET/CERES
400 CREAM (GRAM) TOPICAL ONCE
Status: COMPLETED | OUTPATIENT
Start: 2022-09-19 | End: 2022-09-19

## 2022-09-19 RX ORDER — HEPARIN SODIUM,PORCINE/D5W 25000/250
0-40 INTRAVENOUS SOLUTION INTRAVENOUS CONTINUOUS
Status: DISCONTINUED | OUTPATIENT
Start: 2022-09-19 | End: 2022-09-21 | Stop reason: HOSPADM

## 2022-09-19 RX ORDER — ENOXAPARIN SODIUM 100 MG/ML
1 INJECTION SUBCUTANEOUS
Status: DISCONTINUED | OUTPATIENT
Start: 2022-09-19 | End: 2022-09-19

## 2022-09-19 RX ADMIN — FUROSEMIDE 40 MG: 10 INJECTION, SOLUTION INTRAMUSCULAR; INTRAVENOUS at 09:09

## 2022-09-19 RX ADMIN — ENOXAPARIN SODIUM 40 MG: 100 INJECTION SUBCUTANEOUS at 08:09

## 2022-09-19 RX ADMIN — POTASSIUM CHLORIDE 40 MEQ: 1500 TABLET, EXTENDED RELEASE ORAL at 12:09

## 2022-09-19 RX ADMIN — MAGNESIUM OXIDE TAB 400 MG (241.3 MG ELEMENTAL MG) 400 MG: 400 (241.3 MG) TAB at 12:09

## 2022-09-19 RX ADMIN — LEVOTHYROXINE SODIUM 50 MCG: 50 TABLET ORAL at 05:09

## 2022-09-19 RX ADMIN — CEFTRIAXONE 1 G: 1 INJECTION, SOLUTION INTRAVENOUS at 09:09

## 2022-09-19 RX ADMIN — FUROSEMIDE 40 MG: 10 INJECTION, SOLUTION INTRAMUSCULAR; INTRAVENOUS at 08:09

## 2022-09-19 RX ADMIN — AZITHROMYCIN MONOHYDRATE 500 MG: 250 TABLET ORAL at 08:09

## 2022-09-19 RX ADMIN — HEPARIN SODIUM 18 UNITS/KG/HR: 10000 INJECTION, SOLUTION INTRAVENOUS at 06:09

## 2022-09-19 RX ADMIN — ATORVASTATIN CALCIUM 40 MG: 40 TABLET, FILM COATED ORAL at 08:09

## 2022-09-19 NOTE — HPI
76-year-old male patient with past medical history of CHF, CKD, CAD, HTN, hypothyroidism, peripheral vascular disease, and sleep apnea states he is not compliant with CPAP therapy, has oxygen via face mask at home admitted to the hospital for worsening shortness of breath and severe hypoxemia.

## 2022-09-19 NOTE — ASSESSMENT & PLAN NOTE
Patient with Hypercapnic and Hypoxic Respiratory failure which is Acute on chronic.  he is on home oxygen at [facility]. Supplemental oxygen was provided and noted- Oxygen Concentration (%):  [40-50] 50.   Signs/symptoms of respiratory failure include- tachypnea, increased work of breathing and respiratory distress. Contributing diagnoses includes - CHF, COPD and Pneumonia Labs and images were reviewed. Patient Has recent ABG, which has been reviewed. Will treat underlying causes and adjust management of respiratory failure as follows- O2 albuterol Atrovent IV Rocephin completed azithromycin anticoagulation IV Lasix , intolerance to CPAP.  Encouraged patient to try to adapt to noninvasive ventilation during sleep

## 2022-09-19 NOTE — SUBJECTIVE & OBJECTIVE
Interval History: patient lying in bed resting, denies complaints, reports no significant improvement in dyspnea     Review of Systems   Constitutional:  Negative for appetite change.   HENT:  Negative for congestion.    Respiratory:  Positive for shortness of breath. Negative for cough.    Cardiovascular:  Positive for leg swelling. Negative for chest pain.   Gastrointestinal:  Negative for abdominal pain, diarrhea, nausea and vomiting.   Objective:     Vital Signs (Most Recent):  Temp: 98.4 °F (36.9 °C) (09/19/22 1237)  Pulse: 74 (09/19/22 1301)  Resp: 18 (09/19/22 1237)  BP: 135/65 (09/19/22 1237)  SpO2: (!) 94 % (09/19/22 1237)   Vital Signs (24h Range):  Temp:  [97.9 °F (36.6 °C)-98.4 °F (36.9 °C)] 98.4 °F (36.9 °C)  Pulse:  [63-80] 74  Resp:  [17-20] 18  SpO2:  [91 %-97 %] 94 %  BP: (114-136)/(56-68) 135/65     Weight: (!) 194.5 kg (428 lb 12.7 oz)  Body mass index is 59.8 kg/m².    Intake/Output Summary (Last 24 hours) at 9/19/2022 1421  Last data filed at 9/19/2022 1300  Gross per 24 hour   Intake 720 ml   Output 800 ml   Net -80 ml      Physical Exam  Constitutional:       General: He is not in acute distress.     Appearance: He is well-developed. He is obese.   HENT:      Head: Normocephalic and atraumatic.      Mouth/Throat:      Mouth: Mucous membranes are dry.   Eyes:      Conjunctiva/sclera: Conjunctivae normal.      Pupils: Pupils are equal, round, and reactive to light.   Neck:      Vascular: No JVD.   Cardiovascular:      Rate and Rhythm: Normal rate and regular rhythm.      Heart sounds: Normal heart sounds.   Pulmonary:      Effort: Pulmonary effort is normal. No respiratory distress.      Breath sounds: Normal breath sounds. No wheezing.      Comments: On 15 L O2, no resp distress   Abdominal:      General: Bowel sounds are normal. There is no distension.      Palpations: Abdomen is soft.      Tenderness: There is no abdominal tenderness. There is no guarding.   Musculoskeletal:         General:  Swelling present. No tenderness. Normal range of motion.      Cervical back: Normal range of motion and neck supple.      Right lower leg: Edema present.      Left lower leg: Edema present.   Skin:     General: Skin is warm and dry.      Capillary Refill: Capillary refill takes less than 2 seconds.      Findings: Bruising present. No erythema.   Neurological:      Mental Status: He is alert and oriented to person, place, and time.   Psychiatric:         Behavior: Behavior normal.       Significant Labs: All pertinent labs within the past 24 hours have been reviewed.  CBC:   Recent Labs   Lab 09/19/22  1007   WBC 7.42   HGB 13.5*   HCT 45.6        CMP:   Recent Labs   Lab 09/18/22  1309 09/19/22  1007    141   K 3.3* 3.0*   CL 96 89*   CO2 35* 40*   GLU 95 120*   BUN 19 22   CREATININE 1.5* 1.6*   CALCIUM 8.0* 8.3*   PROT  --  7.0   ALBUMIN  --  2.7*   BILITOT  --  1.2*   ALKPHOS  --  79   AST  --  13   ALT  --  15   ANIONGAP 12 12         Significant Imaging: I have reviewed all pertinent imaging results/findings within the past 24 hours.

## 2022-09-19 NOTE — PLAN OF CARE
[facility] - Telemetry (Hospital)  Initial Discharge Assessment       Primary Care Provider: Ami Zarate DO    Admission Diagnosis: Shortness of breath [R06.02]  Pneumonia of left upper lobe due to infectious organism [J18.9]    Admission Date: 9/16/2022  Expected Discharge Date:     Discharge Barriers Identified: Transportation, Bariatric    Payor: HU[employer] MANAGED MEDICARE / Plan: HU[employer]NA TOTAL CARE ADVANTAGE / Product Type: Medicare Advantage /     Extended Emergency Contact Information  Primary Emergency Contact: StephanAlise   [employer]  Home Phone: [phone]  Work Phone: [phone]  Relation: Daughter  Secondary Emergency Contact: Solo [place]  Mobile Phone: [phone]  Relation: Son    Discharge Plan A: [facility] - [place], [place] - [address]  [address] [address]  Phone: [phone] Fax: [phone]    [facility]  [address]  [place] [address]  Phone: [phone] Fax: [phone]    [facility] [address] [place] [address] [place] - [address] [facility] [address]  [address] [facility] [address]  [facility] [address]  Phone: [phone] Fax: [phone]      Initial Assessment (most recent)       Adult Discharge Assessment - 09/19/22 1331          Discharge Assessment    Assessment Type Discharge Planning Assessment     Confirmed/corrected address, phone number and insurance Yes     Confirmed Demographics Correct on Facesheet     Source of Information patient;family     Communicated AJMES with patient/caregiver Date not available/Unable to determine     Reason For Admission respiratory failure     Lives With spouse     Facility Arrived From: home     Do you expect to return to your current living situation? Yes     Do you have help at home or someone to help you manage your care at home? Yes     Who are your caregiver(s) and their phone number(s)? wife and son     Prior to hospitilization cognitive status:  Alert/Oriented     Current cognitive status: Alert/Oriented     Walking or Climbing Stairs Difficulty ambulation difficulty, dependent;stair climbing difficulty, dependent;transferring difficulty, dependent     Dressing/Bathing Difficulty bathing difficulty, dependent;dressing difficulty, dependent     Equipment Currently Used at Home hospital bed;oxygen;CPAP;wheelchair     Readmission within 30 days? No     Patient currently being followed by outpatient case management? No     Do you currently have service(s) that help you manage your care at home? No     Do you take prescription medications? Yes     Do you have prescription coverage? Yes     Do you have any problems affording any of your prescribed medications? No     Is the patient taking medications as prescribed? yes     Who is going to help you get home at discharge? patient will require [facility]     How do you get to doctors appointments? other (see comments)     Are you on dialysis? No     Do you take coumadin? No     Discharge Plan A Home Health     DME Needed Upon Discharge  none     Discharge Plan discussed with: Patient;Spouse/sig other     Discharge Barriers Identified Transportation;Bariatric                   Anticipated DC Dispo: home, patient may benefit from HHC  Prior Level of Function: lives with spouse. Patient is bedbound and wife assist with ADLS. Patient has not ambulated in several years he states.  PCP: Dr. Zarate  Comments:  CM met with patient and wife at bedside to introduce role and discuss d/c planning. Patient lives with spouse, son assists with care. Patient has home O2 and CPAP, does not know DME agency. Patient states he does not wear his CPAP because he/spouse do not know how to clean it. CM following.

## 2022-09-19 NOTE — HOSPITAL COURSE
Chest x-ray ABG reviewed.  O2 sat 96% on 50% FiO2 via face mask.  Lower extremity ultrasound positive for left lower extremity DVT.  V/Q scan intermediate probability for PE.  9/20 seen and examined , UO 3.1L last 24 hrs , - 5.2 liters fluid balance on IV heparin gtt , on O2 via FM SOB , decreased activity and appetite On IV heparin for recurrent lower extremity DVT  9/21 seen and examined.  O2 sat 93% on 4 L. On IV heparin.  -5.2 L urine output.  No distress.  Afebrile.  On Rocephin for UTI.

## 2022-09-19 NOTE — PLAN OF CARE
Pt remains fall free this shift.  Pt AAOx4, verbal, clear speech.  Skin warm and dry. No new skin issues.  Telemonitoring in progress,   O2 @ 15L per simplemask  External catheter in place  Patient bedbound d/t weight  Bed low, side rails up x 2, wheels locked, call light in reach.  Bed alarm maintained for safety.  Patient instructed to call for assistance.  Hourly rounding completed.  24 hour chart check completed  POC updated and reviewed with pt. Will continue POC.

## 2022-09-19 NOTE — CONSULTS
[facility] Tele[facility] ([facility])  Critical Care Medicine  Consult Note    Patient Name: Gene Rothman  MRN: [id number]  Admission Date: 9/16/2022  Hospital Length of Stay: 2 days  Code Status: Prior  Attending Physician: Nati Williamson MD   Primary Care Provider: Ami Zarate DO   Principal Problem: Acute hypoxemic respiratory failure    [unfilled]  Subjective:     HPI:  76-year-old male patient with past medical history of CHF, CKD, CAD, HTN, hypothyroidism, peripheral vascular disease, and sleep apnea states he is not compliant with CPAP therapy, has oxygen via face mask at home admitted to the hospital for worsening shortness of breath and severe hypoxemia.      Hospital/ICU Course:  Chest x-ray ABG reviewed.  O2 sat 96% on 50% FiO2 via face mask.  Lower extremity ultrasound positive for left lower extremity DVT.  V/Q scan intermediate probability for PE.      Past Medical History:   Diagnosis Date    Acute hypoxemic respiratory failure 9/17/2022    Arthritis     CHF (congestive heart failure)     Chronic kidney disease     Coronary artery disease     Depression     Hypertension     Hypertensive heart disease with heart failure 9/16/2022    Hypothyroidism     Lymphedema of lower extremity     Nephrolithiasis     Obesity     Peripheral vascular disease     Pneumonia 9/17/2022    Recurrent cellulitis of lower leg     Sleep apnea     can't stand the CPAP , sleeps elevated in hospital bed or chair    Tobacco dependence     resolved       Past Surgical History:   Procedure Laterality Date    ADENOIDECTOMY  1961    BACK SURGERY  1975;  1976    x2 for disc; scar tissue removed    debridement of bilateral leg ulcers Bilateral 01/01/2017    Dr Hernandez    INNER EAR SURGERY Bilateral 1961    separate - pinnaplasty , new eardrms constructed    KIDNEY STONE SURGERY Left 1976 approx    open    TONSILLECTOMY  1961       Review of patient's allergies indicates:   Allergen Reactions    Bactrim  [sulfamethoxazole-trimethoprim] Itching       Family History       Problem Relation (Age of Onset)    Alcohol abuse Maternal Grandfather    Asthma Daughter    Cancer Brother, Mother, Father    Diabetes Mother    Heart disease Father, Brother    Hypertension Mother          Tobacco Use    Smoking status: Never    Smokeless tobacco: Never    Tobacco comments:     chain smoker heavy x 10 years, lighter x 10 before    Substance and Sexual Activity    Alcohol use: Not Currently    Drug use: Never    Sexual activity: Never         Review of Systems   Unable to perform ROS: Acuity of condition   Objective:     Vital Signs (Most Recent):  Temp: 98.4 °F (36.9 °C) (09/19/22 1237)  Pulse: 68 (09/19/22 1501)  Resp: 18 (09/19/22 1237)  BP: 135/65 (09/19/22 1237)  SpO2: 95 % (09/19/22 1609) Vital Signs (24h Range):  Temp:  [97.9 °F (36.6 °C)-98.4 °F (36.9 °C)] 98.4 °F (36.9 °C)  Pulse:  [63-80] 68  Resp:  [17-20] 18  SpO2:  [91 %-97 %] 95 %  BP: (114-136)/(56-68) 135/65     Weight: (!) 194.5 kg (428 lb 12.7 oz)  Body mass index is 59.8 kg/m².      Intake/Output Summary (Last 24 hours) at 9/19/2022 1643  Last data filed at 9/19/2022 1300  Gross per 24 hour   Intake 600 ml   Output 800 ml   Net -200 ml       Physical Exam  Vitals and nursing note reviewed.   Constitutional:       General: He is in acute distress.      Appearance: He is well-developed. He is obese. He is ill-appearing and toxic-appearing.   HENT:      Head: Normocephalic and atraumatic.      Nose: Nose normal.   Eyes:      Extraocular Movements: Extraocular movements intact.   Cardiovascular:      Rate and Rhythm: Normal rate and regular rhythm.   Pulmonary:      Effort: Pulmonary effort is normal.      Breath sounds: No stridor. Wheezing present.   Abdominal:      General: There is no distension.   Musculoskeletal:         General: Swelling, tenderness, deformity and signs of injury present.      Cervical back: Normal range of motion and neck supple.    Skin:     General: Skin is warm and dry.      Findings: Erythema, lesion and rash present.   Neurological:      General: No focal deficit present.      Mental Status: He is alert and oriented to person, place, and time. Mental status is at baseline.   Psychiatric:         Mood and Affect: Mood normal.         Behavior: Behavior normal.         Thought Content: Thought content normal.       Vents:  Oxygen Concentration (%): (S) 50 (09/19/22 0809)    Lines/Drains/Airways       Peripheral Intravenous Line  Duration                  Peripheral IV - Single Lumen 09/18/22 1026 22 G Anterior;Right Forearm 1 day                    Significant Labs:    CBC/Anemia Profile:  Recent Labs   Lab 09/19/22  1007   WBC 7.42   HGB 13.5*   HCT 45.6      *   RDW 14.8*        Chemistries:  Recent Labs   Lab 09/18/22  1309 09/19/22  1007    141   K 3.3* 3.0*   CL 96 89*   CO2 35* 40*   BUN 19 22   CREATININE 1.5* 1.6*   CALCIUM 8.0* 8.3*   ALBUMIN  --  2.7*   PROT  --  7.0   BILITOT  --  1.2*   ALKPHOS  --  79   ALT  --  15   AST  --  13   MG  --  1.8      Latest Reference Range & Units 01/02/22 17:55 09/16/22 22:58   BNP 0 - 99 pg/mL 27 289 (H)   Troponin I 0.000 - 0.026 ng/mL  0.026   (H): Data is abnormally high    EKG9/16/2022     Vent. Rate : 063 BPM     Atrial Rate : 094 BPM      P-R Int : 000 ms          QRS Dur : 108 ms       QT Int : 488 ms       P-R-T Axes : 069 052 008 degrees      QTc Int : 499 ms     Sinus rhythm with 2nd degree A-V block (Mobitz I)   T wave abnormality, consider anterolateral ischemia   Prolonged QT   Abnormal ECG   When compared with ECG of 21-AUG-2022 08:37,   Premature atrial complexes are no longer Present   Sinus rhythm is now with 2nd degree A-V block (Mobitz I)   Nonspecific T wave abnormality now evident in Inferior leads   T wave inversion now evident in Anterior-lateral leads me in        2D echo January 20, 2022     The left ventricle is normal in size with concentric  remodeling and normal systolic function.   The estimated ejection fraction is 55%.   Grade I left ventricular diastolic dysfunction.   Severe right ventricular enlargement with moderately to severely reduced right ventricular systolic function.   Right atrial enlargement.   Mild to moderate tricuspid regurgitation.   Normal central venous pressure (3 mmHg).   The estimated PA systolic pressure is 43 mmHg.   There is pulmonary hypertension.  Salvador 5           Significant Imaging:   Chest x-ray 09/16/2022      TECHNIQUE:  Single frontal view of the chest was performed.     COMPARISON:  Prior     FINDINGS:  Left upper lobe opacity.  Cardiomegaly.  Left costophrenic angle opacity may relate to associated pleural effusion.     Bones are intact.     Impression:     Predominant left upper lobe opacity consistent with left upper lobe pneumonia      Venous lower extremity ultrasound 09/19/2022     FINDINGS:  Right thigh veins: The common femoral, femoral, popliteal, upper greater saphenous, and deep femoral veins are patent and free of thrombus. The veins are normally compressible and have normal phasic flow and augmentation response.     Right calf veins: The visualized calf veins are patent.     Left thigh veins: The left common femoral vein is patent.  There is partially compressible thrombus involving the left proximal, mid and distal femoral vein.  There is similar, partially compressible thrombus within the popliteal vein.     Left calf veins: The visualized calf veins are patent.     Miscellaneous: None     Impression:     Partially compressible, possibly subacute DVT noted throughout the left femoral vein and popliteal vein are new as compared to the prior lower extremity ultrasound 01/03/2022.           ABG  Recent Labs   Lab 09/17/22  0059   PH 7.332*   PO2 56*   PCO2 64.4*   HCO3 34.2*   BE 8     Assessment/Plan:     Pulmonary  * Acute hypoxemic respiratory failure  Patient with Hypercapnic and Hypoxic  Respiratory failure which is Acute on chronic.  he is on home oxygen at [facility]. Supplemental oxygen was provided and noted- Oxygen Concentration (%):  [40-50] 50.   Signs/symptoms of respiratory failure include- tachypnea, increased work of breathing and respiratory distress. Contributing diagnoses includes - CHF, COPD and Pneumonia Labs and images were reviewed. Patient Has recent ABG, which has been reviewed. Will treat underlying causes and adjust management of respiratory failure as follows- O2 albuterol Atrovent IV Rocephin completed azithromycin anticoagulation IV Lasix , intolerance to CPAP.  Encouraged patient to try to adapt to noninvasive ventilation during sleep    Acute on chronic respiratory failure with hypercapnia  Patient with Hypercapnic Respiratory failure which is Acute on chronic.  he is on home oxygen at  [facility]. Supplemental oxygen was provided and noted- Oxygen Concentration (%):  [40-50] 50.   Signs/symptoms of respiratory failure include- increased work of breathing. Contributing diagnoses includes - CHF, COPD, Obesity Hypoventilation and Pneumonia Labs and images were reviewed. Patient Has recent ABG, which has been reviewed. Will treat underlying causes and adjust management of respiratory failure as follows- O2 nebs BiPAP    Pneumonia  Received 3 days of azithromycin.  Completing IV Rocephin    Cardiac/Vascular  Elevated brain natriuretic peptide (BNP) level  IV Lasix strict I&Os.  EF within normal.  Heart failure with preserved EF    Renal/  UTI (urinary tract infection)  Pansensitive E coli.  IV ceftriaxone         Critical Care Daily Checklist:    A: Awake: RASS Goal/Actual Goal:    Actual:     B: Spontaneous Breathing Trial Performed?  not intubated    C: SAT & SBT Coordinated?                        D: Delirium: CAM-ICU     E: Early Mobility Performed? Yes   F: Feeding Goal:    Status:     Current Diet Order   Procedures    Diet Cardiac      AS: Analgesia/Sedation Not sedated    T: Thromboembolic Prophylaxis IV heparin gtt    H: HOB > 300 Yes   U: Stress Ulcer Prophylaxis (if needed) Famotidine    G: Glucose Control FS prn    B: Bowel Function Stool Occurrence: 0   I: Indwelling Catheter (Lines & Bowman) Necessity No bowman    D: De-escalation of Antimicrobials/Pharmacotherapies Rocephine IV     Plan for the day/ETD Y recommend palliative care Cs    Code Status:  Family/Goals of Care: Prior     Discussed with HM   Critical Care Time: 40 minutes    Critical secondary to Patient has a condition that poses threat to life and bodily function: RESP FAILURE acute on chronic + DVT + BETY on CKD      Critical care was time spent personally by me on the following activities: development of treatment plan with patient or surrogate and bedside caregivers, discussions with consultants, evaluation of patient's response to treatment, examination of patient, ordering and performing treatments and interventions, ordering and review of laboratory studies, ordering and review of radiographic studies, pulse oximetry, re-evaluation of patient's condition. This critical care time did not overlap with that of any other provider or involve time for any procedures.     Thank you for your consult. I will follow-up with patient. Please contact us if you have any additional questions.     Helen Modi MD  Critical Care Medicine  [facility] Telemetry ([facility])

## 2022-09-19 NOTE — ASSESSMENT & PLAN NOTE
MARY pna on chest xray  procal normal  Attempted to obtain CT chest   However due to body habitus pt can't get CT   Will cont rocephin and azithromycin   Wean O2 as tolerated  Hx of PE in the past  Will obtain D-dimer  May need vq scan if positive     9/19   O2 needs increased   Check VQ scan and LE venous doppler   D-dimer pending   Consult pulmonology

## 2022-09-19 NOTE — ASSESSMENT & PLAN NOTE
Patient with Hypercapnic Respiratory failure which is Acute on chronic.  he is on home oxygen at [facility]. Supplemental oxygen was provided and noted- Oxygen Concentration (%):  [40-50] 50.   Signs/symptoms of respiratory failure include- increased work of breathing. Contributing diagnoses includes - CHF, COPD, Obesity Hypoventilation and Pneumonia Labs and images were reviewed. Patient Has recent ABG, which has been reviewed. Will treat underlying causes and adjust management of respiratory failure as follows- O2 nebs BiPAP

## 2022-09-19 NOTE — SUBJECTIVE & OBJECTIVE
Past Medical History:   Diagnosis Date    Acute hypoxemic respiratory failure 9/17/2022    Arthritis     CHF (congestive heart failure)     Chronic kidney disease     Coronary artery disease     Depression     Hypertension     Hypertensive heart disease with heart failure 9/16/2022    Hypothyroidism     Lymphedema of lower extremity     Nephrolithiasis     Obesity     Peripheral vascular disease     Pneumonia 9/17/2022    Recurrent cellulitis of lower leg     Sleep apnea     can't stand the CPAP , sleeps elevated in hospital bed or chair    Tobacco dependence     resolved       Past Surgical History:   Procedure Laterality Date    ADENOIDECTOMY  1961    BACK SURGERY  1975;  1976    x2 for disc; scar tissue removed    debridement of bilateral leg ulcers Bilateral 01/01/2017    Dr Hernandez    INNER EAR SURGERY Bilateral 1961    separate - pinnaplasty , new eardrms constructed    KIDNEY STONE SURGERY Left 1976 approx    open    TONSILLECTOMY  1961       Review of patient's allergies indicates:   Allergen Reactions    Bactrim [sulfamethoxazole-trimethoprim] Itching       Family History       Problem Relation (Age of Onset)    Alcohol abuse Maternal Grandfather    Asthma Daughter    Cancer Brother, Mother, Father    Diabetes Mother    Heart disease Father, Brother    Hypertension Mother          Tobacco Use    Smoking status: Never    Smokeless tobacco: Never    Tobacco comments:     chain smoker heavy x 10 years, lighter x 10 before    Substance and Sexual Activity    Alcohol use: Not Currently    Drug use: Never    Sexual activity: Never         Review of Systems   Unable to perform ROS: Acuity of condition   Objective:     Vital Signs (Most Recent):  Temp: 98.4 °F (36.9 °C) (09/19/22 1237)  Pulse: 68 (09/19/22 1501)  Resp: 18 (09/19/22 1237)  BP: 135/65 (09/19/22 1237)  SpO2: 95 % (09/19/22 1609) Vital Signs (24h Range):  Temp:  [97.9 °F (36.6 °C)-98.4 °F (36.9 °C)] 98.4 °F (36.9 °C)  Pulse:  [63-80] 68  Resp:  [17-20]  18  SpO2:  [91 %-97 %] 95 %  BP: (114-136)/(56-68) 135/65     Weight: (!) 194.5 kg (428 lb 12.7 oz)  Body mass index is 59.8 kg/m².      Intake/Output Summary (Last 24 hours) at 9/19/2022 1643  Last data filed at 9/19/2022 1300  Gross per 24 hour   Intake 600 ml   Output 800 ml   Net -200 ml       Physical Exam  Vitals and nursing note reviewed.   Constitutional:       General: He is in acute distress.      Appearance: He is well-developed. He is obese. He is ill-appearing and toxic-appearing.   HENT:      Head: Normocephalic and atraumatic.      Nose: Nose normal.   Eyes:      Extraocular Movements: Extraocular movements intact.   Cardiovascular:      Rate and Rhythm: Normal rate and regular rhythm.   Pulmonary:      Effort: Pulmonary effort is normal.      Breath sounds: No stridor. Wheezing present.   Abdominal:      General: There is no distension.   Musculoskeletal:         General: Swelling, tenderness, deformity and signs of injury present.      Cervical back: Normal range of motion and neck supple.   Skin:     General: Skin is warm and dry.      Findings: Erythema, lesion and rash present.   Neurological:      General: No focal deficit present.      Mental Status: He is alert and oriented to person, place, and time. Mental status is at baseline.   Psychiatric:         Mood and Affect: Mood normal.         Behavior: Behavior normal.         Thought Content: Thought content normal.       Vents:  Oxygen Concentration (%): (S) 50 (09/19/22 0809)    Lines/Drains/Airways       Peripheral Intravenous Line  Duration                  Peripheral IV - Single Lumen 09/18/22 1026 22 G Anterior;Right Forearm 1 day                    Significant Labs:    CBC/Anemia Profile:  Recent Labs   Lab 09/19/22  1007   WBC 7.42   HGB 13.5*   HCT 45.6      *   RDW 14.8*        Chemistries:  Recent Labs   Lab 09/18/22  1309 09/19/22  1007    141   K 3.3* 3.0*   CL 96 89*   CO2 35* 40*   BUN 19 22   CREATININE 1.5*  1.6*   CALCIUM 8.0* 8.3*   ALBUMIN  --  2.7*   PROT  --  7.0   BILITOT  --  1.2*   ALKPHOS  --  79   ALT  --  15   AST  --  13   MG  --  1.8      Latest Reference Range & Units 01/02/22 17:55 09/16/22 22:58   BNP 0 - 99 pg/mL 27 289 (H)   Troponin I 0.000 - 0.026 ng/mL  0.026   (H): Data is abnormally high    EKG9/16/2022     Vent. Rate : 063 BPM     Atrial Rate : 094 BPM      P-R Int : 000 ms          QRS Dur : 108 ms       QT Int : 488 ms       P-R-T Axes : 069 052 008 degrees      QTc Int : 499 ms     Sinus rhythm with 2nd degree A-V block (Mobitz I)   T wave abnormality, consider anterolateral ischemia   Prolonged QT   Abnormal ECG   When compared with ECG of 21-AUG-2022 08:37,   Premature atrial complexes are no longer Present   Sinus rhythm is now with 2nd degree A-V block (Mobitz I)   Nonspecific T wave abnormality now evident in Inferior leads   T wave inversion now evident in Anterior-lateral leads me in        2D echo January 20, 2022    The left ventricle is normal in size with concentric remodeling and normal systolic function.  The estimated ejection fraction is 55%.  Grade I left ventricular diastolic dysfunction.  Severe right ventricular enlargement with moderately to severely reduced right ventricular systolic function.  Right atrial enlargement.  Mild to moderate tricuspid regurgitation.  Normal central venous pressure (3 mmHg).  The estimated PA systolic pressure is 43 mmHg.  There is pulmonary hypertension.  Salvador 5           Significant Imaging:   Chest x-ray 09/16/2022      TECHNIQUE:  Single frontal view of the chest was performed.     COMPARISON:  Prior     FINDINGS:  Left upper lobe opacity.  Cardiomegaly.  Left costophrenic angle opacity may relate to associated pleural effusion.     Bones are intact.     Impression:     Predominant left upper lobe opacity consistent with left upper lobe pneumonia      Venous lower extremity ultrasound 09/19/2022     FINDINGS:  Right thigh veins: The common  femoral, femoral, popliteal, upper greater saphenous, and deep femoral veins are patent and free of thrombus. The veins are normally compressible and have normal phasic flow and augmentation response.     Right calf veins: The visualized calf veins are patent.     Left thigh veins: The left common femoral vein is patent.  There is partially compressible thrombus involving the left proximal, mid and distal femoral vein.  There is similar, partially compressible thrombus within the popliteal vein.     Left calf veins: The visualized calf veins are patent.     Miscellaneous: None     Impression:     Partially compressible, possibly subacute DVT noted throughout the left femoral vein and popliteal vein are new as compared to the prior lower extremity ultrasound 01/03/2022.

## 2022-09-19 NOTE — PROGRESS NOTES
Pharmacist Intervention IV to PO Note    Gene Rothman is a 76 y.o. male being treated with IV medication azithromycin    Patient Data:    Vital Signs (Most Recent):  Temp: 98.1 °F (36.7 °C) (09/19/22 0116)  Pulse: 68 (09/19/22 0116)  Resp: 17 (09/19/22 0116)  BP: 127/68 (09/19/22 0116)  SpO2: 97 % (09/19/22 0116) Vital Signs (72h Range):  Temp:  [97.2 °F (36.2 °C)-99.9 °F (37.7 °C)]   Pulse:  [46-93]   Resp:  [17-33]   BP: (112-154)/()   SpO2:  [86 %-100 %]      CBC:  Recent Labs   Lab 09/16/22 2258   WBC 7.83   RBC 4.24*   HGB 13.5*   HCT 43.5      *   MCH 31.8*   MCHC 31.0*     CMP:     Recent Labs   Lab 09/16/22 2258 09/18/22  1309   * 95   CALCIUM 8.8 8.0*   ALBUMIN 2.6*  --    PROT 7.3  --     143   K 3.5 3.3*   CO2 29 35*    96   BUN 18 19   CREATININE 1.4 1.5*   ALKPHOS 75  --    ALT 25  --    AST 25  --    BILITOT 0.4  --        Dietary Orders:  Diet Orders            Diet Cardiac: Cardiac starting at 09/17 0103            Based on the following criteria, this patient qualifies for intravenous to oral conversion:  [x] The patients gastrointestinal tract is functioning (tolerating medications via oral or enteral route for 24 hours and tolerating food or enteral feeds for 24 hours).  [x] The patient is hemodynamically stable for 24 hours (heart rate <100 beats per minute, systolic blood pressure >99 mm Hg, and respiratory rate <20 breaths per minute).  [x] The patient shows clinical improvement (afebrile for at least 24 hours and white blood cell count downtrending or normalized). Additionally, the patient must be non-neutropenic (absolute neutrophil count >500 cells/mm3).  [x] For antimicrobials, the patient has received IV therapy for at least 24 hours.    IV medication (azithromycin 500 mg q24h) will be changed to oral medication (azithromycin 500 mg q24h). Original order was written for 3 doses, and patient has received 2/3 doses. New order will be a one time dose  to complete original duration.     Pharmacist's Name: Delphine Shah  Pharmacist's Extension: [phone]

## 2022-09-19 NOTE — PLAN OF CARE
Pt AAO x4.  Pt remains free of falls throughout shift.  Pt compalins of generalized pain but declines PRN medication for pain.  Plan of care reviewed. Pt verbalizes understanding.  Pt moving and turning independently. Frequent weight shifting encouraged.  Normal sinus rhythm to sinus janice with second degree block (occasional wenchebach) on monitor. Notified Lissette Berry NP of this.   No sensation to BLE, purple in color, cool with faint palpable pulse. Made Lissette Berry NP aware.   Oxygen at 6L via simple face mask (pt brought mask from home because we no longer stock these masks).O2 sat around 95%. Titrating as pt tolerates. Pt states he wears about 3L via face mask.   Hourly rounding completed.  Will continue to monitor.

## 2022-09-19 NOTE — PROGRESS NOTES
"[facility] Medicine  Progress Note    Patient Name: Gene Rothman  MRN: [id number]  Patient Class: IP- Inpatient   Admission Date: 9/16/2022  Length of Stay: 2 days  Attending Physician: Nati Willaimson MD  Primary Care Provider: Ami Zarate DO        Subjective:     Principal Problem:Acute hypoxemic respiratory failure        HPI:  Patient is a 76 y.o.  male with a PMHx of CHF, CKD, CAD, HTN, hypothyroidism, peripheral vascular disease, and sleep apnea who presents to the Emergency Department for evaluation of SOB which onset PTA. Patient hard of hearing however wife is at bedside. Per report patient had worsening sob. No chest pain reported. He does report having "pain all over". Currently on home o2.     In the ED, patient was started on bipap for hypoxia and weaned down to venti mask. Chest xray showed MARY pna and he was started on zosyn. U/a indicative of uti. BNP: 238. Patient was admitted for acute hypoxic respiratory failure.       Overview/Hospital Course:  Mr Rothman is a 76 year old male who was admitted with acute on chronic hypoxic respiratory failure 2/2 MARY PNA and decompensated HF. Also found to have UTI. IV antibiotics and  IV furosemide initiated .  Patient with increased O2 needs overnight, currently on 15 L 50% FiO2 per venti mask. . He is on 2-3 L supplemental O2 at baseline. D- dimer pending, will check venous doppler and VQ scan r/o PE. Pulmonology consulted.       Interval History: patient lying in bed resting, denies complaints, reports no significant improvement in dyspnea     Review of Systems   Constitutional:  Negative for appetite change.   HENT:  Negative for congestion.    Respiratory:  Positive for shortness of breath. Negative for cough.    Cardiovascular:  Positive for leg swelling. Negative for chest pain.   Gastrointestinal:  Negative for abdominal pain, diarrhea, nausea and vomiting.   Objective:     Vital Signs (Most Recent):  Temp: 98.4 " °F (36.9 °C) (09/19/22 1237)  Pulse: 74 (09/19/22 1301)  Resp: 18 (09/19/22 1237)  BP: 135/65 (09/19/22 1237)  SpO2: (!) 94 % (09/19/22 1237)   Vital Signs (24h Range):  Temp:  [97.9 °F (36.6 °C)-98.4 °F (36.9 °C)] 98.4 °F (36.9 °C)  Pulse:  [63-80] 74  Resp:  [17-20] 18  SpO2:  [91 %-97 %] 94 %  BP: (114-136)/(56-68) 135/65     Weight: (!) 194.5 kg (428 lb 12.7 oz)  Body mass index is 59.8 kg/m².    Intake/Output Summary (Last 24 hours) at 9/19/2022 1421  Last data filed at 9/19/2022 1300  Gross per 24 hour   Intake 720 ml   Output 800 ml   Net -80 ml      Physical Exam  Constitutional:       General: He is not in acute distress.     Appearance: He is well-developed. He is obese.   HENT:      Head: Normocephalic and atraumatic.      Mouth/Throat:      Mouth: Mucous membranes are dry.   Eyes:      Conjunctiva/sclera: Conjunctivae normal.      Pupils: Pupils are equal, round, and reactive to light.   Neck:      Vascular: No JVD.   Cardiovascular:      Rate and Rhythm: Normal rate and regular rhythm.      Heart sounds: Normal heart sounds.   Pulmonary:      Effort: Pulmonary effort is normal. No respiratory distress.      Breath sounds: Normal breath sounds. No wheezing.      Comments: On 15 L O2, no resp distress   Abdominal:      General: Bowel sounds are normal. There is no distension.      Palpations: Abdomen is soft.      Tenderness: There is no abdominal tenderness. There is no guarding.   Musculoskeletal:         General: Swelling present. No tenderness. Normal range of motion.      Cervical back: Normal range of motion and neck supple.      Right lower leg: Edema present.      Left lower leg: Edema present.   Skin:     General: Skin is warm and dry.      Capillary Refill: Capillary refill takes less than 2 seconds.      Findings: Bruising present. No erythema.   Neurological:      Mental Status: He is alert and oriented to person, place, and time.   Psychiatric:         Behavior: Behavior normal.        Significant Labs: All pertinent labs within the past 24 hours have been reviewed.  CBC:   Recent Labs   Lab 09/19/22  1007   WBC 7.42   HGB 13.5*   HCT 45.6        CMP:   Recent Labs   Lab 09/18/22  1309 09/19/22  1007    141   K 3.3* 3.0*   CL 96 89*   CO2 35* 40*   GLU 95 120*   BUN 19 22   CREATININE 1.5* 1.6*   CALCIUM 8.0* 8.3*   PROT  --  7.0   ALBUMIN  --  2.7*   BILITOT  --  1.2*   ALKPHOS  --  79   AST  --  13   ALT  --  15   ANIONGAP 12 12         Significant Imaging: I have reviewed all pertinent imaging results/findings within the past 24 hours.      Assessment/Plan:      * Acute hypoxemic respiratory failure  MARY pna on chest xray  procal normal  Attempted to obtain CT chest   However due to body habitus pt can't get CT   Will cont rocephin and azithromycin   Wean O2 as tolerated  Hx of PE in the past  Will obtain D-dimer  May need vq scan if positive     9/19   O2 needs increased   Check VQ scan and LE venous doppler   D-dimer pending   Consult pulmonology       Pneumonia  Rocephin and azithro      UTI (urinary tract infection)  U/a indicative of uti  Last urine culture reviewed  Cont on rocephin  Cultures pending       Sleep apnea  cpap qhs       Morbid obesity with BMI of 60.0-69.9, adult  Body mass index is 61.65 kg/m². Morbid obesity complicates all aspects of disease management from diagnostic modalities to treatment. Weight loss encouraged and health benefits explained to patient.         Acquired hypothyroidism  Continue synthroid         VTE Risk Mitigation (From admission, onward)         Ordered     enoxaparin injection 40 mg  Every 12 hours         09/17/22 0301                Discharge Planning   JAMES: 9/21/22      Code Status: Prior   Is the patient medically ready for discharge?:     Reason for patient still in hospital (select all that apply): Patient trending condition and Consult recommendations  Discharge Plan A: Home Health                  Lissette Berry  NP  Department of Hospital Medicine   [facility] Telemetry ([facility])

## 2022-09-20 PROBLEM — I82.90 VENOUS THROMBOEMBOLISM: Status: ACTIVE | Noted: 2022-09-20

## 2022-09-20 LAB
ALBUMIN SERPL BCP-MCNC: 2.7 G/DL (ref 3.5–5.2)
ALP SERPL-CCNC: 74 U/L (ref 55–135)
ALT SERPL W/O P-5'-P-CCNC: 15 U/L (ref 10–44)
ANION GAP SERPL CALC-SCNC: 14 MMOL/L (ref 8–16)
APTT BLDCRRT: 63.5 SEC (ref 21–32)
APTT BLDCRRT: 63.5 SEC (ref 21–32)
APTT BLDCRRT: 64.2 SEC (ref 21–32)
AST SERPL-CCNC: 15 U/L (ref 10–40)
BASOPHILS # BLD AUTO: 0.03 K/UL (ref 0–0.2)
BASOPHILS NFR BLD: 0.4 % (ref 0–1.9)
BILIRUB SERPL-MCNC: 1.1 MG/DL (ref 0.1–1)
BUN SERPL-MCNC: 23 MG/DL (ref 8–23)
CALCIUM SERPL-MCNC: 8.4 MG/DL (ref 8.7–10.5)
CHLORIDE SERPL-SCNC: 88 MMOL/L (ref 95–110)
CO2 SERPL-SCNC: 39 MMOL/L (ref 23–29)
CREAT SERPL-MCNC: 1.5 MG/DL (ref 0.5–1.4)
DIFFERENTIAL METHOD: ABNORMAL
EOSINOPHIL # BLD AUTO: 0.3 K/UL (ref 0–0.5)
EOSINOPHIL NFR BLD: 3.6 % (ref 0–8)
ERYTHROCYTE [DISTWIDTH] IN BLOOD BY AUTOMATED COUNT: 14.6 % (ref 11.5–14.5)
EST. GFR  (NO RACE VARIABLE): 47 ML/MIN/1.73 M^2
GLUCOSE SERPL-MCNC: 95 MG/DL (ref 70–110)
HCT VFR BLD AUTO: 45.2 % (ref 40–54)
HGB BLD-MCNC: 13.8 G/DL (ref 14–18)
IMM GRANULOCYTES # BLD AUTO: 0.03 K/UL (ref 0–0.04)
IMM GRANULOCYTES NFR BLD AUTO: 0.4 % (ref 0–0.5)
LYMPHOCYTES # BLD AUTO: 1.8 K/UL (ref 1–4.8)
LYMPHOCYTES NFR BLD: 23.8 % (ref 18–48)
MCH RBC QN AUTO: 31.1 PG (ref 27–31)
MCHC RBC AUTO-ENTMCNC: 30.5 G/DL (ref 32–36)
MCV RBC AUTO: 102 FL (ref 82–98)
MONOCYTES # BLD AUTO: 0.9 K/UL (ref 0.3–1)
MONOCYTES NFR BLD: 12.2 % (ref 4–15)
NEUTROPHILS # BLD AUTO: 4.6 K/UL (ref 1.8–7.7)
NEUTROPHILS NFR BLD: 59.6 % (ref 38–73)
NRBC BLD-RTO: 0 /100 WBC
PLATELET # BLD AUTO: 207 K/UL (ref 150–450)
PMV BLD AUTO: 9.8 FL (ref 9.2–12.9)
POTASSIUM SERPL-SCNC: 3.5 MMOL/L (ref 3.5–5.1)
PROT SERPL-MCNC: 7.3 G/DL (ref 6–8.4)
RBC # BLD AUTO: 4.44 M/UL (ref 4.6–6.2)
SODIUM SERPL-SCNC: 141 MMOL/L (ref 136–145)
WBC # BLD AUTO: 7.7 K/UL (ref 3.9–12.7)

## 2022-09-20 PROCEDURE — 25000003 PHARM REV CODE 250: Performed by: FAMILY MEDICINE

## 2022-09-20 PROCEDURE — 63600175 PHARM REV CODE 636 W HCPCS: Performed by: FAMILY MEDICINE

## 2022-09-20 PROCEDURE — 80053 COMPREHEN METABOLIC PANEL: CPT | Performed by: NURSE PRACTITIONER

## 2022-09-20 PROCEDURE — 27000221 HC OXYGEN, UP TO 24 HOURS

## 2022-09-20 PROCEDURE — 36415 COLL VENOUS BLD VENIPUNCTURE: CPT | Performed by: STUDENT IN AN ORGANIZED HEALTH CARE EDUCATION/TRAINING PROGRAM

## 2022-09-20 PROCEDURE — 63600175 PHARM REV CODE 636 W HCPCS: Performed by: NURSE PRACTITIONER

## 2022-09-20 PROCEDURE — 99233 SBSQ HOSP IP/OBS HIGH 50: CPT | Mod: ,,, | Performed by: INTERNAL MEDICINE

## 2022-09-20 PROCEDURE — 94761 N-INVAS EAR/PLS OXIMETRY MLT: CPT

## 2022-09-20 PROCEDURE — 85730 THROMBOPLASTIN TIME PARTIAL: CPT | Mod: 91 | Performed by: STUDENT IN AN ORGANIZED HEALTH CARE EDUCATION/TRAINING PROGRAM

## 2022-09-20 PROCEDURE — 99233 PR SUBSEQUENT HOSPITAL CARE,LEVL III: ICD-10-PCS | Mod: ,,, | Performed by: INTERNAL MEDICINE

## 2022-09-20 PROCEDURE — 36415 COLL VENOUS BLD VENIPUNCTURE: CPT | Performed by: NURSE PRACTITIONER

## 2022-09-20 PROCEDURE — 99900035 HC TECH TIME PER 15 MIN (STAT)

## 2022-09-20 PROCEDURE — 85730 THROMBOPLASTIN TIME PARTIAL: CPT | Performed by: STUDENT IN AN ORGANIZED HEALTH CARE EDUCATION/TRAINING PROGRAM

## 2022-09-20 PROCEDURE — 85025 COMPLETE CBC W/AUTO DIFF WBC: CPT | Performed by: NURSE PRACTITIONER

## 2022-09-20 PROCEDURE — 63600175 PHARM REV CODE 636 W HCPCS: Performed by: STUDENT IN AN ORGANIZED HEALTH CARE EDUCATION/TRAINING PROGRAM

## 2022-09-20 PROCEDURE — 21400001 HC TELEMETRY ROOM

## 2022-09-20 RX ADMIN — HEPARIN SODIUM 18 UNITS/KG/HR: 10000 INJECTION, SOLUTION INTRAVENOUS at 03:09

## 2022-09-20 RX ADMIN — FUROSEMIDE 40 MG: 10 INJECTION, SOLUTION INTRAMUSCULAR; INTRAVENOUS at 10:09

## 2022-09-20 RX ADMIN — CEFTRIAXONE 1 G: 1 INJECTION, SOLUTION INTRAVENOUS at 10:09

## 2022-09-20 RX ADMIN — LEVOTHYROXINE SODIUM 50 MCG: 50 TABLET ORAL at 06:09

## 2022-09-20 RX ADMIN — ATORVASTATIN CALCIUM 40 MG: 40 TABLET, FILM COATED ORAL at 10:09

## 2022-09-20 RX ADMIN — FUROSEMIDE 40 MG: 10 INJECTION, SOLUTION INTRAMUSCULAR; INTRAVENOUS at 09:09

## 2022-09-20 RX ADMIN — HEPARIN SODIUM 18 UNITS/KG/HR: 10000 INJECTION, SOLUTION INTRAVENOUS at 02:09

## 2022-09-20 NOTE — PROGRESS NOTES
[facility] Tele[facility] ([facility])  Pulmonology  Progress Note    Patient Name: Gene Rothman  MRN: [id number]  Admission Date: 9/16/2022  Hospital Length of Stay: 3 days  Code Status: Prior  Attending Provider: Nati Williamson MD  Primary Care Provider: Ami Zarate DO   Principal Problem: Acute hypoxemic respiratory failure    Subjective:     9/20 seen and examined , UO 3.1L last 24 hrs , - 5.2 liters fluid balance on IV heparin gtt , on O2 via FM SOB , decreased activity and appetite On IV heparin for recurrent lower extremity DVT  Review of Systems   Constitutional:  Positive for activity change and fatigue. Negative for chills and fever.   HENT:  Negative for drooling, ear discharge and nosebleeds.    Eyes:  Negative for pain, discharge and itching.   Respiratory:  Positive for cough and shortness of breath. Negative for choking.    Cardiovascular:  Negative for chest pain.   Gastrointestinal:  Negative for anal bleeding.   Endocrine: Negative for cold intolerance.   Genitourinary:  Negative for hematuria.   Musculoskeletal:  Positive for arthralgias. Negative for neck stiffness.   Skin:  Negative for rash.   Allergic/Immunologic: Negative for immunocompromised state.   Neurological:  Positive for weakness. Negative for seizures and facial asymmetry.   Hematological:  Negative for adenopathy.   Psychiatric/Behavioral:  Negative for behavioral problems, self-injury and suicidal ideas.    Objective:     Vital Signs (Most Recent):  Temp: 97.8 °F (36.6 °C) (09/20/22 1119)  Pulse: 61 (09/20/22 1119)  Resp: 20 (09/20/22 1119)  BP: 134/66 (09/20/22 1121)  SpO2: 100 % (09/20/22 1119) Vital Signs (24h Range):  Temp:  [97.5 °F (36.4 °C)-98.4 °F (36.9 °C)] 97.8 °F (36.6 °C)  Pulse:  [60-82] 61  Resp:  [20-48] 20  SpO2:  [94 %-100 %] 100 %  BP: (121-134)/(58-75) 134/66     Weight: (!) 188.2 kg (415 lb)  Body mass index is 57.88 kg/m².      Intake/Output Summary (Last 24 hours) at 9/20/2022 1305  Last data filed at 9/20/2022  1237  Gross per 24 hour   Intake 1370 ml   Output 3900 ml   Net -2530 ml         Physical Exam  Vitals and nursing note reviewed.   Constitutional:       General: He is in acute distress.      Appearance: He is well-developed. He is obese. He is ill-appearing and toxic-appearing.   HENT:      Head: Normocephalic and atraumatic.      Nose: Nose normal.   Eyes:      Extraocular Movements: Extraocular movements intact.   Cardiovascular:      Rate and Rhythm: Normal rate and regular rhythm.   Pulmonary:      Effort: Pulmonary effort is normal.      Breath sounds: No stridor. Wheezing present.   Abdominal:      General: There is no distension.   Musculoskeletal:         General: Swelling, tenderness, deformity and signs of injury present.      Cervical back: Normal range of motion and neck supple.   Skin:     General: Skin is warm and dry.      Findings: Erythema, lesion and rash present.   Neurological:      General: No focal deficit present.      Mental Status: He is alert and oriented to person, place, and time. Mental status is at baseline.   Psychiatric:         Mood and Affect: Mood normal.         Behavior: Behavior normal.         Thought Content: Thought content normal.       Vents:  Oxygen Concentration (%): 100 (09/20/22 1050)    Lines/Drains/Airways       Peripheral Intravenous Line  Duration                  Peripheral IV - Single Lumen 09/18/22 1026 22 G Anterior;Right Forearm 2 days         Peripheral IV - Single Lumen 09/20/22 1020 20 G Anterior;Distal;Left Antecubital <1 day                    Significant Labs:    CBC/Anemia Profile:  Recent Labs   Lab 09/19/22  1007 09/20/22  0422   WBC 7.42 7.70   HGB 13.5* 13.8*   HCT 45.6 45.2    207   * 102*   RDW 14.8* 14.6*          Chemistries:  Recent Labs   Lab 09/18/22  1309 09/19/22  1007 09/20/22  0830    141 141   K 3.3* 3.0* 3.5   CL 96 89* 88*   CO2 35* 40* 39*   BUN 19 22 23   CREATININE 1.5* 1.6* 1.5*   CALCIUM 8.0* 8.3* 8.4*    ALBUMIN  --  2.7* 2.7*   PROT  --  7.0 7.3   BILITOT  --  1.2* 1.1*   ALKPHOS  --  79 74   ALT  --  15 15   AST  --  13 15   MG  --  1.8  --         Latest Reference Range & Units 01/02/22 17:55 09/16/22 22:58   BNP 0 - 99 pg/mL 27 289 (H)   Troponin I 0.000 - 0.026 ng/mL  0.026   (H): Data is abnormally high    EKG9/16/2022     Vent. Rate : 063 BPM     Atrial Rate : 094 BPM      P-R Int : 000 ms          QRS Dur : 108 ms       QT Int : 488 ms       P-R-T Axes : 069 052 008 degrees      QTc Int : 499 ms     Sinus rhythm with 2nd degree A-V block (Mobitz I)   T wave abnormality, consider anterolateral ischemia   Prolonged QT   Abnormal ECG   When compared with ECG of 21-AUG-2022 08:37,   Premature atrial complexes are no longer Present   Sinus rhythm is now with 2nd degree A-V block (Mobitz I)   Nonspecific T wave abnormality now evident in Inferior leads   T wave inversion now evident in Anterior-lateral leads me in        2D echo January 20, 2022     The left ventricle is normal in size with concentric remodeling and normal systolic function.   The estimated ejection fraction is 55%.   Grade I left ventricular diastolic dysfunction.   Severe right ventricular enlargement with moderately to severely reduced right ventricular systolic function.   Right atrial enlargement.   Mild to moderate tricuspid regurgitation.   Normal central venous pressure (3 mmHg).   The estimated PA systolic pressure is 43 mmHg.   There is pulmonary hypertension.  Salvador 5           Significant Imaging:   Chest x-ray 09/16/2022      TECHNIQUE:  Single frontal view of the chest was performed.     COMPARISON:  Prior     FINDINGS:  Left upper lobe opacity.  Cardiomegaly.  Left costophrenic angle opacity may relate to associated pleural effusion.     Bones are intact.     Impression:     Predominant left upper lobe opacity consistent with left upper lobe pneumonia      Venous lower extremity ultrasound 09/19/2022     FINDINGS:  Right  thigh veins: The common femoral, femoral, popliteal, upper greater saphenous, and deep femoral veins are patent and free of thrombus. The veins are normally compressible and have normal phasic flow and augmentation response.     Right calf veins: The visualized calf veins are patent.     Left thigh veins: The left common femoral vein is patent.  There is partially compressible thrombus involving the left proximal, mid and distal femoral vein.  There is similar, partially compressible thrombus within the popliteal vein.     Left calf veins: The visualized calf veins are patent.     Miscellaneous: None     Impression:     Partially compressible, possibly subacute DVT noted throughout the left femoral vein and popliteal vein are new as compared to the prior lower extremity ultrasound 01/03/2022.           ABG  Recent Labs   Lab 09/17/22  0059   PH 7.332*   PO2 56*   PCO2 64.4*   HCO3 34.2*   BE 8     Assessment/Plan:     * Acute hypoxemic respiratory failure  Patient with Hypercapnic and Hypoxic Respiratory failure which is Acute on chronic.  he is on home oxygen at [facility]  [facility]. Supplemental oxygen was provided and noted- Oxygen Concentration (%):  [] 100.   Signs/symptoms of respiratory failure include- tachypnea, increased work of breathing and respiratory distress. Contributing diagnoses includes - CHF, COPD and Pneumonia Labs and images were reviewed. Patient Has recent ABG, which has been reviewed. Will treat underlying causes and adjust management of respiratory failure as follows- O2 albuterol Atrovent IV Rocephin completed azithromycin anticoagulation IV Lasix , intolerance to CPAP.  Encouraged patient to try to adapt to noninvasive ventilation during sleep  9/20 O2 target sat 92-94%.  Multifactorial hypoxemia pneumonia plus possible pulmonary embolism /pulmonary hypertension plus CHF  IV Rocephin azithromycin.  IV heparin.  IV Lasix. Monitor PTT on IV heparin    Venous thromboembolism   9/20  Partially  compressible, possibly subacute DVT noted throughout the left femoral vein and popliteal vein are new as compared to the prior lower extremity ultrasound 01/03/2022  Findings on lower extremity ultrasound showed new DVT with history of chronic DVT IV heparin monitor PTT.  Coumadin on DC.    Elevated brain natriuretic peptide (BNP) level  IV Lasix strict I&Os.  EF within normal.  Heart failure with preserved EF  9/20 strict I&Os IV Lasix    Pneumonia  Received 3 days of azithromycin.  Completing IV Rocephin  9/20 continue IV Rocephin    UTI (urinary tract infection)  Pansensitive E coli.  IV ceftriaxone    9/20 IV ceftriaxone    Morbid obesity with BMI of 60.0-69.9, adult   Intolerant to CPAP during sleep.           Helen Modi MD  Pulmonology  [facility] Telemetry ([facility])

## 2022-09-20 NOTE — ASSESSMENT & PLAN NOTE
IV Lasix strict I&Os.  EF within normal.  Heart failure with preserved EF  9/20 strict I&Os IV Lasix

## 2022-09-20 NOTE — ASSESSMENT & PLAN NOTE
Patient with Hypercapnic and Hypoxic Respiratory failure which is Acute on chronic.  he is on home oxygen at [facility]. Supplemental oxygen was provided and noted- Oxygen Concentration (%):  [] 100.   Signs/symptoms of respiratory failure include- tachypnea, increased work of breathing and respiratory distress. Contributing diagnoses includes - CHF, COPD and Pneumonia Labs and images were reviewed. Patient Has recent ABG, which has been reviewed. Will treat underlying causes and adjust management of respiratory failure as follows- O2 albuterol Atrovent IV Rocephin completed azithromycin anticoagulation IV Lasix , intolerance to CPAP.  Encouraged patient to try to adapt to noninvasive ventilation during sleep  9/20 O2 target sat 92-94%.  Multifactorial hypoxemia pneumonia plus possible pulmonary embolism /pulmonary hypertension plus CHF  IV Rocephin azithromycin.  IV heparin.  IV Lasix. Monitor PTT on IV heparin

## 2022-09-20 NOTE — ASSESSMENT & PLAN NOTE
9/20  Partially compressible, possibly subacute DVT noted throughout the left femoral vein and popliteal vein are new as compared to the prior lower extremity ultrasound 01/03/2022  Findings on lower extremity ultrasound showed new DVT with history of chronic DVT IV heparin monitor PTT.  Coumadin on DC.

## 2022-09-20 NOTE — ASSESSMENT & PLAN NOTE
U/a indicative of uti  Last urine culture reviewed  Cont on rocephin  Cultures pending     9/20  Uirne cx shows pansensitive E coli

## 2022-09-20 NOTE — PROGRESS NOTES
"[facility] Medicine  Progress Note    Patient Name: Gene Rothman  MRN: [id number]  Patient Class: IP- Inpatient   Admission Date: 9/16/2022  Length of Stay: 3 days  Attending Physician: Nati Williamson MD  Primary Care Provider: Ami Zarate DO        Subjective:     Principal Problem:Acute hypoxemic respiratory failure        HPI:  Patient is a 76 y.o.  male with a PMHx of CHF, CKD, CAD, HTN, hypothyroidism, peripheral vascular disease, and sleep apnea who presents to the Emergency Department for evaluation of SOB which onset PTA. Patient hard of hearing however wife is at bedside. Per report patient had worsening sob. No chest pain reported. He does report having "pain all over". Currently on home o2.     In the ED, patient was started on bipap for hypoxia and weaned down to venti mask. Chest xray showed MARY pna and he was started on zosyn. U/a indicative of uti. BNP: 238. Patient was admitted for acute hypoxic respiratory failure.       Overview/Hospital Course:  Mr Rothman is a 76 year old male who was admitted with acute on chronic hypoxic respiratory failure 2/2 MARY PNA and decompensated HF. Also found to have UTI. IV antibiotics and  IV furosemide initiated .  Patient with increased O2 needs overnight, currently on 15 L 50% FiO2 per venti mask. . He is on 2-3 L supplemental O2 at baseline. D- dimer pending, will check venous doppler and VQ scan r/o PE. Pulmonology consulted.     9/20 Venous doppler shows subacute DVT noted throughout the left femoral vein and popliteal vein are new as compared to the prior lower extremity ultrasound on 1/03/2022. VQ scan shows intermediate possibility of PE, CT deferred 2/2 body habitus. Heparin gtt initiated. O2 weaned down, sats currently  94% on 4 L O2 per simple face mask. Patient evaluated by pulmonology. Ventilatory deficit is irreversible with recurrent venous thromboembolism, pulmonary hypertension acute on chronic respiratory " failure and morbid obesity with limited mobility. Will consult palliative medicine, patient agreeable to hospice. Case management consulted- per chart review patient previously under care of [facility].       Interval History: see hospital course     Review of Systems   Constitutional:  Negative for appetite change.   HENT:  Negative for congestion.    Respiratory:  Positive for shortness of breath. Negative for cough.    Cardiovascular:  Positive for leg swelling. Negative for chest pain.   Gastrointestinal:  Negative for abdominal pain, diarrhea, nausea and vomiting.   Objective:     Vital Signs (Most Recent):  Temp: 97.8 °F (36.6 °C) (09/20/22 1119)  Pulse: 64 (09/20/22 1322)  Resp: 20 (09/20/22 1119)  BP: 134/66 (09/20/22 1121)  SpO2: 100 % (09/20/22 1119)   Vital Signs (24h Range):  Temp:  [97.5 °F (36.4 °C)-98.4 °F (36.9 °C)] 97.8 °F (36.6 °C)  Pulse:  [60-82] 64  Resp:  [20-48] 20  SpO2:  [94 %-100 %] 100 %  BP: (121-134)/(58-75) 134/66     Weight: (!) 188.2 kg (415 lb)  Body mass index is 57.88 kg/m².    Intake/Output Summary (Last 24 hours) at 9/20/2022 1424  Last data filed at 9/20/2022 1237  Gross per 24 hour   Intake 1370 ml   Output 3900 ml   Net -2530 ml        Physical Exam  Constitutional:       General: He is not in acute distress.     Appearance: He is well-developed. He is obese.   HENT:      Head: Normocephalic and atraumatic.      Mouth/Throat:      Mouth: Mucous membranes are dry.   Eyes:      Conjunctiva/sclera: Conjunctivae normal.      Pupils: Pupils are equal, round, and reactive to light.   Neck:      Vascular: No JVD.   Cardiovascular:      Rate and Rhythm: Normal rate and regular rhythm.      Heart sounds: Normal heart sounds.   Pulmonary:      Effort: Pulmonary effort is normal. No respiratory distress.      Breath sounds: Normal breath sounds. No wheezing.      Comments: On 15 L O2, no resp distress   Abdominal:      General: Bowel sounds are normal. There is no distension.       Palpations: Abdomen is soft.      Tenderness: There is no abdominal tenderness. There is no guarding.   Musculoskeletal:         General: Swelling present. No tenderness. Normal range of motion.      Cervical back: Normal range of motion and neck supple.      Right lower leg: Edema present.      Left lower leg: Edema present.   Skin:     General: Skin is warm and dry.      Capillary Refill: Capillary refill takes less than 2 seconds.      Findings: Bruising present. No erythema.   Neurological:      Mental Status: He is alert and oriented to person, place, and time.   Psychiatric:         Behavior: Behavior normal.       Significant Labs: All pertinent labs within the past 24 hours have been reviewed.  CBC:   Recent Labs   Lab 09/19/22  1007 09/20/22  0422   WBC 7.42 7.70   HGB 13.5* 13.8*   HCT 45.6 45.2    207       CMP:   Recent Labs   Lab 09/19/22  1007 09/20/22  0830    141   K 3.0* 3.5   CL 89* 88*   CO2 40* 39*   * 95   BUN 22 23   CREATININE 1.6* 1.5*   CALCIUM 8.3* 8.4*   PROT 7.0 7.3   ALBUMIN 2.7* 2.7*   BILITOT 1.2* 1.1*   ALKPHOS 79 74   AST 13 15   ALT 15 15   ANIONGAP 12 14           Significant Imaging: I have reviewed all pertinent imaging results/findings within the past 24 hours.      Assessment/Plan:      * Acute hypoxemic respiratory failure  MARY pna on chest xray  procal normal  Attempted to obtain CT chest   However due to body habitus pt can't get CT   Will cont rocephin and azithromycin   Wean O2 as tolerated  Hx of PE in the past  Will obtain D-dimer  May need vq scan if positive     9/19   O2 needs increased   Check VQ scan and LE venous doppler   D-dimer pending   Consult pulmonology     9/20   VQ scan shows intermediate possibility of PE   On heparin gtt       Venous thromboembolism  Recurrent   Venous doppler shows partially compressible, possibly subacute DVT noted throughout the left femoral vein and popliteal vein are new as compared to the prior lower extremity  ultrasound 01/03/2022.\  -patient on heparin gtt  - will continue apixaban or coumadin at discharge       Pneumonia  Rocephin and azithro  -Zithromax completed 9/19, continue ceftriaxone       UTI (urinary tract infection)  U/a indicative of uti  Last urine culture reviewed  Cont on rocephin  Cultures pending     9/20  Uirne cx shows pansensitive E coli     Sleep apnea  Pt unable to tolerate CPAP       Morbid obesity with BMI of 60.0-69.9, adult  Body mass index is 61.65 kg/m². Morbid obesity complicates all aspects of disease management from diagnostic modalities to treatment. Weight loss encouraged and health benefits explained to patient.         Acquired hypothyroidism  Continue synthroid         VTE Risk Mitigation (From admission, onward)         Ordered     heparin 25,000 units in dextrose 5% (100 units/ml) IV bolus from bag - ADDITIONAL PRN BOLUS - 60 units/kg  As needed (PRN)        Question:  Heparin Infusion Adjustment (DO NOT MODIFY ANSWER)  Answer:  \[email or web address]\[email or web address]\Images\Pharmacy\HeparinInfusions\heparin HIGH INTENSITY nomogram for [facility] [id number]    09/19/22 1658     heparin 25,000 units in dextrose 5% (100 units/ml) IV bolus from bag - ADDITIONAL PRN BOLUS - 30 units/kg  As needed (PRN)        Question:  Heparin Infusion Adjustment (DO NOT MODIFY ANSWER)  Answer:  \[email or web address]\[email or web address]\Images\Pharmacy\HeparinInfusions\heparin HIGH INTENSITY nomogram for [facility] [id number]    09/19/22 1658     heparin 25,000 units in dextrose 5% 250 mL (100 units/mL) infusion HIGH INTENSITY nomogram - [facility]  Continuous        Question Answer Comment   Heparin Infusion Adjustment (DO NOT MODIFY ANSWER) \\[employer] [email or web address]\epic\Images\Pharmacy\HeparinInfusions\heparin HIGH INTENSITY nomogram for [facility] [id number].pdf    Begin at (in units/kg/hr) 18        09/19/22 1658                Discharge Planning   JAMES: 9/21/22    Code Status: Prior   Is the patient medically ready for discharge?:     Reason for patient still in hospital (select all  that apply): Patient trending condition, Treatment, Consult recommendations and Pending disposition  Discharge Plan A: Hospice/home                  Lissette Berry NP  Department of Hospital Medicine   [facility] ([facility])

## 2022-09-20 NOTE — SUBJECTIVE & OBJECTIVE
Interval History: Patient continues to require increase oxygen to maintain O 2 sats    Review of Systems   Constitutional:  Negative for chills, diaphoresis, fatigue and fever.   HENT:  Positive for hearing loss. Negative for congestion, ear discharge, rhinorrhea, sinus pressure, sore throat and trouble swallowing.    Eyes:  Negative for discharge and visual disturbance.   Respiratory:  Positive for cough and shortness of breath. Negative for apnea, choking, chest tightness, wheezing and stridor.    Cardiovascular:  Positive for leg swelling. Negative for chest pain and palpitations.   Gastrointestinal:  Negative for abdominal distention, abdominal pain, diarrhea, nausea and vomiting.   Endocrine: Negative for cold intolerance and heat intolerance.   Genitourinary:  Negative for difficulty urinating, dysuria, frequency and hematuria.   Musculoskeletal:  Negative for arthralgias, back pain, myalgias and neck pain.   Skin:  Negative for pallor and rash.   Neurological:  Negative for dizziness, seizures, syncope, weakness and headaches.   Psychiatric/Behavioral:  Negative for agitation, confusion and sleep disturbance.    Objective:     Vital Signs (Most Recent):  Temp: 98.1 °F (36.7 °C) (09/20/22 0805)  Pulse: 75 (09/20/22 0805)  Resp: 20 (09/20/22 0805)  BP: 121/70 (09/20/22 0805)  SpO2: (!) 94 % (09/20/22 0805)   Vital Signs (24h Range):  Temp:  [97.5 °F (36.4 °C)-98.4 °F (36.9 °C)] 98.1 °F (36.7 °C)  Pulse:  [60-80] 75  Resp:  [18-48] 20  SpO2:  [92 %-98 %] 94 %  BP: (121-135)/(65-75) 121/70     Weight: (!) 188.2 kg (415 lb)  Body mass index is 57.88 kg/m².    Intake/Output Summary (Last 24 hours) at 9/20/2022 1044  Last data filed at 9/20/2022 0836  Gross per 24 hour   Intake 1130 ml   Output 3100 ml   Net -1970 ml      Physical Exam  Vitals and nursing note reviewed.   Constitutional:       General: He is not in acute distress.     Appearance: He is well-developed. He is morbidly obese. He is not diaphoretic.    HENT:      Head: Normocephalic and atraumatic.   Eyes:      General:         Right eye: No discharge.         Left eye: No discharge.      Conjunctiva/sclera: Conjunctivae normal.      Pupils: Pupils are equal, round, and reactive to light.   Neck:      Vascular: No JVD.   Cardiovascular:      Rate and Rhythm: Normal rate and regular rhythm.      Heart sounds: Normal heart sounds. No murmur heard.    No friction rub. No gallop.   Pulmonary:      Effort: Pulmonary effort is normal. No respiratory distress.      Breath sounds: No stridor. Examination of the right-middle field reveals decreased breath sounds. Examination of the left-middle field reveals decreased breath sounds. Examination of the right-lower field reveals decreased breath sounds. Examination of the left-lower field reveals decreased breath sounds. Decreased breath sounds present. No wheezing or rales.   Chest:      Chest wall: No tenderness.   Abdominal:      General: Bowel sounds are normal. There is no distension.      Palpations: Abdomen is soft. There is no mass.      Tenderness: There is no abdominal tenderness. There is no guarding.   Musculoskeletal:         General: No tenderness. Normal range of motion.      Cervical back: Normal range of motion.      Right lower leg: Edema present.      Left lower leg: Edema present.   Skin:     General: Skin is warm and dry.      Findings: No erythema.      Comments: Hyperpigmentation noted on bilateral lower extremity    Neurological:      Mental Status: He is alert and oriented to person, place, and time.   Psychiatric:         Judgment: Judgment normal.

## 2022-09-20 NOTE — ASSESSMENT & PLAN NOTE
MARY pna on chest xray  procal normal  Attempted to obtain CT chest   However due to body habitus pt can't get CT   Will cont rocephin and azithromycin   Wean O2 as tolerated  Hx of PE in the past  Will obtain D-dimer  May need vq scan if positive     9/19   O2 needs increased   Check VQ scan and LE venous doppler   D-dimer pending   Consult pulmonology     9/20   VQ scan shows intermediate possibility of PE   On heparin gtt

## 2022-09-20 NOTE — SUBJECTIVE & OBJECTIVE
Interval History: see hospital course     Review of Systems   Constitutional:  Negative for appetite change.   HENT:  Negative for congestion.    Respiratory:  Positive for shortness of breath. Negative for cough.    Cardiovascular:  Positive for leg swelling. Negative for chest pain.   Gastrointestinal:  Negative for abdominal pain, diarrhea, nausea and vomiting.   Objective:     Vital Signs (Most Recent):  Temp: 97.8 °F (36.6 °C) (09/20/22 1119)  Pulse: 64 (09/20/22 1322)  Resp: 20 (09/20/22 1119)  BP: 134/66 (09/20/22 1121)  SpO2: 100 % (09/20/22 1119)   Vital Signs (24h Range):  Temp:  [97.5 °F (36.4 °C)-98.4 °F (36.9 °C)] 97.8 °F (36.6 °C)  Pulse:  [60-82] 64  Resp:  [20-48] 20  SpO2:  [94 %-100 %] 100 %  BP: (121-134)/(58-75) 134/66     Weight: (!) 188.2 kg (415 lb)  Body mass index is 57.88 kg/m².    Intake/Output Summary (Last 24 hours) at 9/20/2022 1424  Last data filed at 9/20/2022 1237  Gross per 24 hour   Intake 1370 ml   Output 3900 ml   Net -2530 ml        Physical Exam  Constitutional:       General: He is not in acute distress.     Appearance: He is well-developed. He is obese.   HENT:      Head: Normocephalic and atraumatic.      Mouth/Throat:      Mouth: Mucous membranes are dry.   Eyes:      Conjunctiva/sclera: Conjunctivae normal.      Pupils: Pupils are equal, round, and reactive to light.   Neck:      Vascular: No JVD.   Cardiovascular:      Rate and Rhythm: Normal rate and regular rhythm.      Heart sounds: Normal heart sounds.   Pulmonary:      Effort: Pulmonary effort is normal. No respiratory distress.      Breath sounds: Normal breath sounds. No wheezing.      Comments: On 15 L O2, no resp distress   Abdominal:      General: Bowel sounds are normal. There is no distension.      Palpations: Abdomen is soft.      Tenderness: There is no abdominal tenderness. There is no guarding.   Musculoskeletal:         General: Swelling present. No tenderness. Normal range of motion.      Cervical back:  Normal range of motion and neck supple.      Right lower leg: Edema present.      Left lower leg: Edema present.   Skin:     General: Skin is warm and dry.      Capillary Refill: Capillary refill takes less than 2 seconds.      Findings: Bruising present. No erythema.   Neurological:      Mental Status: He is alert and oriented to person, place, and time.   Psychiatric:         Behavior: Behavior normal.       Significant Labs: All pertinent labs within the past 24 hours have been reviewed.  CBC:   Recent Labs   Lab 09/19/22  1007 09/20/22  0422   WBC 7.42 7.70   HGB 13.5* 13.8*   HCT 45.6 45.2    207       CMP:   Recent Labs   Lab 09/19/22  1007 09/20/22  0830    141   K 3.0* 3.5   CL 89* 88*   CO2 40* 39*   * 95   BUN 22 23   CREATININE 1.6* 1.5*   CALCIUM 8.3* 8.4*   PROT 7.0 7.3   ALBUMIN 2.7* 2.7*   BILITOT 1.2* 1.1*   ALKPHOS 79 74   AST 13 15   ALT 15 15   ANIONGAP 12 14           Significant Imaging: I have reviewed all pertinent imaging results/findings within the past 24 hours.

## 2022-09-20 NOTE — ASSESSMENT & PLAN NOTE
Recurrent   Venous doppler shows partially compressible, possibly subacute DVT noted throughout the left femoral vein and popliteal vein are new as compared to the prior lower extremity ultrasound 01/03/2022.\  -patient on heparin gtt  - will continue apixaban or coumadin at discharge

## 2022-09-20 NOTE — PROGRESS NOTES
"[facility] Medicine  Progress Note    Patient Name: Gene Rothman  MRN: [id number]  Patient Class: IP- Inpatient   Admission Date: 9/16/2022  Length of Stay: 3 days  Attending Physician: Nati Williamson MD  Primary Care Provider: Ami Zarate DO        Subjective:     Principal Problem:Acute hypoxemic respiratory failure        HPI:  Patient is a 76 y.o.  male with a PMHx of CHF, CKD, CAD, HTN, hypothyroidism, peripheral vascular disease, and sleep apnea who presents to the Emergency Department for evaluation of SOB which onset PTA. Patient hard of hearing however wife is at bedside. Per report patient had worsening sob. No chest pain reported. He does report having "pain all over". Currently on home o2.     In the ED, patient was started on bipap for hypoxia and weaned down to venti mask. Chest xray showed MARY pna and he was started on zosyn. U/a indicative of uti. BNP: 238. Patient was admitted for acute hypoxic respiratory failure.       Overview/Hospital Course:  Patient  is a 76 year old male who was admitted with acute on chronic hypoxic respiratory failure, MARY PNA and decompensated HF. Patient was started on IV antibiotics and Lasix IV. Currently need Venti mask to maintain O 2 sats. He dose wear home O 2.     Interval History: Patient continues to require increase oxygen to maintain O 2 sats    Review of Systems   Constitutional:  Negative for chills, diaphoresis, fatigue and fever.   HENT:  Positive for hearing loss. Negative for congestion, ear discharge, rhinorrhea, sinus pressure, sore throat and trouble swallowing.    Eyes:  Negative for discharge and visual disturbance.   Respiratory:  Positive for cough and shortness of breath. Negative for apnea, choking, chest tightness, wheezing and stridor.    Cardiovascular:  Positive for leg swelling. Negative for chest pain and palpitations.   Gastrointestinal:  Negative for abdominal distention, abdominal pain, diarrhea, " nausea and vomiting.   Endocrine: Negative for cold intolerance and heat intolerance.   Genitourinary:  Negative for difficulty urinating, dysuria, frequency and hematuria.   Musculoskeletal:  Negative for arthralgias, back pain, myalgias and neck pain.   Skin:  Negative for pallor and rash.   Neurological:  Negative for dizziness, seizures, syncope, weakness and headaches.   Psychiatric/Behavioral:  Negative for agitation, confusion and sleep disturbance.    Objective:     Vital Signs (Most Recent):  Temp: 98.1 °F (36.7 °C) (09/20/22 0805)  Pulse: 75 (09/20/22 0805)  Resp: 20 (09/20/22 0805)  BP: 121/70 (09/20/22 0805)  SpO2: (!) 94 % (09/20/22 0805)   Vital Signs (24h Range):  Temp:  [97.5 °F (36.4 °C)-98.4 °F (36.9 °C)] 98.1 °F (36.7 °C)  Pulse:  [60-80] 75  Resp:  [18-48] 20  SpO2:  [92 %-98 %] 94 %  BP: (121-135)/(65-75) 121/70     Weight: (!) 188.2 kg (415 lb)  Body mass index is 57.88 kg/m².    Intake/Output Summary (Last 24 hours) at 9/20/2022 1044  Last data filed at 9/20/2022 0836  Gross per 24 hour   Intake 1130 ml   Output 3100 ml   Net -1970 ml      Physical Exam  Vitals and nursing note reviewed.   Constitutional:       General: He is not in acute distress.     Appearance: He is well-developed. He is morbidly obese. He is not diaphoretic.   HENT:      Head: Normocephalic and atraumatic.   Eyes:      General:         Right eye: No discharge.         Left eye: No discharge.      Conjunctiva/sclera: Conjunctivae normal.      Pupils: Pupils are equal, round, and reactive to light.   Neck:      Vascular: No JVD.   Cardiovascular:      Rate and Rhythm: Normal rate and regular rhythm.      Heart sounds: Normal heart sounds. No murmur heard.    No friction rub. No gallop.   Pulmonary:      Effort: Pulmonary effort is normal. No respiratory distress.      Breath sounds: No stridor. Examination of the right-middle field reveals decreased breath sounds. Examination of the left-middle field reveals decreased  breath sounds. Examination of the right-lower field reveals decreased breath sounds. Examination of the left-lower field reveals decreased breath sounds. Decreased breath sounds present. No wheezing or rales.   Chest:      Chest wall: No tenderness.   Abdominal:      General: Bowel sounds are normal. There is no distension.      Palpations: Abdomen is soft. There is no mass.      Tenderness: There is no abdominal tenderness. There is no guarding.   Musculoskeletal:         General: No tenderness. Normal range of motion.      Cervical back: Normal range of motion.      Right lower leg: Edema present.      Left lower leg: Edema present.   Skin:     General: Skin is warm and dry.      Findings: No erythema.      Comments: Hyperpigmentation noted on bilateral lower extremity    Neurological:      Mental Status: He is alert and oriented to person, place, and time.   Psychiatric:         Judgment: Judgment normal.             Assessment/Plan:      * Acute hypoxemic respiratory failure  MARY pna on chest xray  procal normal  Attempted to obtain CT chest   However due to body habitus pt can't get CT   Will cont rocephin and azithromycin   Wean O2 as tolerated  Hx of PE in the past  Will obtain D-dimer  May need vq scan if positive       9/18  O2 needs increased   Continue IV antibiotics and IV Lasix   D-Dimer           Pneumonia  Rocephin and azithro      UTI (urinary tract infection)  U/a indicative of uti  Last urine culture reviewed  Cont on rocephin  Cultures pending       Sleep apnea  cpap qhs       Morbid obesity with BMI of 60.0-69.9, adult  Body mass index is 61.65 kg/m². Morbid obesity complicates all aspects of disease management from diagnostic modalities to treatment. Weight loss encouraged and health benefits explained to patient.         Acquired hypothyroidism  Continue synthroid         VTE Risk Mitigation (From admission, onward)                 Discharge Planning   JAMES:      Code Status: Prior   Is the  patient medically ready for discharge?:     Reason for patient still in hospital (select all that apply): Patient trending condition and Treatment  Discharge Plan A: (P) Hospice/home                  Altaf Bowie NP  Department of Hospital Medicine   [facility] [facility] ([facility])

## 2022-09-20 NOTE — NURSING
Pt. Continues on high intensity Heparin @ 18 units/kg/hr, APTT ordered for Timed 0607, drawn and resulted at 0422 -63.5. Repeat order entered for Stat collection as next APTT due 6 hours after last APTT completed. Awaiting results.

## 2022-09-20 NOTE — CONSULTS
Per HM, pt agreeable to hospice and CM is assisting. PM not needed at this time.     ISHAN Diaz, LCSW-BACS  Palliative Medicine  [phone]

## 2022-09-20 NOTE — PLAN OF CARE
Per Lissette Holley, HERON, Dr. Modi states patient should be on hospice.  Patient states he was discharged from [facility] hosp[facility] x 1 month ago and was told he did not meet criteria.  Spoke with Suri worthy for [facility].  Referral sent via [facility].  She will review and see if patient can be re-admitted.       09/20/22 1056   Post-Acute Status   Post-Acute Authorization Hospice   Hospice Status Referrals Sent   Discharge Plan   Discharge Plan A Hospice/home

## 2022-09-20 NOTE — SUBJECTIVE & OBJECTIVE
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 Negative for chills and fever.   HENT:  Negative for drooling, ear discharge and nosebleeds.    Eyes:  Negative for pain, discharge and itching.   Respiratory:  Positive for cough and shortness of breath. Negative for choking.    Cardiovascular:  Negative for chest pain.   Gastrointestinal:  Negative for anal bleeding.   Endocrine: Negative for cold intolerance.   Genitourinary:  Negative for hematuria.   Musculoskeletal:  Positive for arthralgias. Negative for neck stiffness.   Skin:  Negative for rash.   Allergic/Immunologic: Negative for immunocompromised state.   Neurological:  Positive for weakness. Negative for seizures and facial asymmetry.   Hematological:  Negative for adenopathy.   Psychiatric/Behavioral:  Negative for behavioral problems, self-injury and suicidal ideas.    Objective:     Vital Signs (Most Recent):  Temp: 97.8 °F (36.6 °C) (09/20/22 1119)  Pulse: 61 (09/20/22 1119)  Resp: 20 (09/20/22 1119)  BP: 134/66 (09/20/22 1121)  SpO2: 100 % (09/20/22 1119) Vital Signs (24h Range):  Temp:  [97.5 °F (36.4 °C)-98.4 °F (36.9 °C)] 97.8 °F (36.6 °C)  Pulse:  [60-82] 61  Resp:  [20-48] 20  SpO2:  [94 %-100 %] 100 %  BP: (121-134)/(58-75) 134/66     Weight: (!) 188.2 kg (415 lb)  Body mass index is 57.88 kg/m².      Intake/Output Summary (Last 24 hours) at 9/20/2022 1305  Last data filed at 9/20/2022 1237  Gross per 24 hour   Intake 1370 ml   Output 3900 ml   Net -2530 ml         Physical Exam  Vitals and nursing note reviewed.   Constitutional:       General: He is in acute distress.      Appearance: He is well-developed. He is obese. He is ill-appearing and toxic-appearing.   HENT:      Head: Normocephalic and atraumatic.      Nose: Nose  normal.   Eyes:      Extraocular Movements: Extraocular movements intact.   Cardiovascular:      Rate and Rhythm: Normal rate and regular rhythm.   Pulmonary:      Effort: Pulmonary effort is normal.      Breath sounds: No stridor. Wheezing present.   Abdominal:      General: There is no distension.   Musculoskeletal:         General: Swelling, tenderness, deformity and signs of injury present.      Cervical back: Normal range of motion and neck supple.   Skin:     General: Skin is warm and dry.      Findings: Erythema, lesion and rash present.   Neurological:      General: No focal deficit present.      Mental Status: He is alert and oriented to person, place, and time. Mental status is at baseline.   Psychiatric:         Mood and Affect: Mood normal.         Behavior: Behavior normal.         Thought Content: Thought content normal.       Vents:  Oxygen Concentration (%): 100 (09/20/22 1050)    Lines/Drains/Airways       Peripheral Intravenous Line  Duration                  Peripheral IV - Single Lumen 09/18/22 1026 22 G Anterior;Right Forearm 2 days         Peripheral IV - Single Lumen 09/20/22 1020 20 G Anterior;Distal;Left Antecubital <1 day                    Significant Labs:    CBC/Anemia Profile:  Recent Labs   Lab 09/19/22  1007 09/20/22  0422   WBC 7.42 7.70   HGB 13.5* 13.8*   HCT 45.6 45.2    207   * 102*   RDW 14.8* 14.6*          Chemistries:  Recent Labs   Lab 09/18/22  1309 09/19/22  1007 09/20/22  0830    141 141   K 3.3* 3.0* 3.5   CL 96 89* 88*   CO2 35* 40* 39*   BUN 19 22 23   CREATININE 1.5* 1.6* 1.5*   CALCIUM 8.0* 8.3* 8.4*   ALBUMIN  --  2.7* 2.7*   PROT  --  7.0 7.3   BILITOT  --  1.2* 1.1*   ALKPHOS  --  79 74   ALT  --  15 15   AST  --  13 15   MG  --  1.8  --         Latest Reference Range & Units 01/02/22 17:55 09/16/22 22:58   BNP 0 - 99 pg/mL 27 289 (H)   Troponin I 0.000 - 0.026 ng/mL  0.026   (H): Data is abnormally high    EKG9/16/2022     Vent. Rate : 063 BPM      Atrial Rate : 094 BPM      P-R Int : 000 ms          QRS Dur : 108 ms       QT Int : 488 ms       P-R-T Axes : 069 052 008 degrees      QTc Int : 499 ms     Sinus rhythm with 2nd degree A-V block (Mobitz I)   T wave abnormality, consider anterolateral ischemia   Prolonged QT   Abnormal ECG   When compared with ECG of 21-AUG-2022 08:37,   Premature atrial complexes are no longer Present   Sinus rhythm is now with 2nd degree A-V block (Mobitz I)   Nonspecific T wave abnormality now evident in Inferior leads   T wave inversion now evident in Anterior-lateral leads me in        2D echo January 20, 2022    The left ventricle is normal in size with concentric remodeling and normal systolic function.  The estimated ejection fraction is 55%.  Grade I left ventricular diastolic dysfunction.  Severe right ventricular enlargement with moderately to severely reduced right ventricular systolic function.  Right atrial enlargement.  Mild to moderate tricuspid regurgitation.  Normal central venous pressure (3 mmHg).  The estimated PA systolic pressure is 43 mmHg.  There is pulmonary hypertension.  Salvador 5           Significant Imaging:   Chest x-ray 09/16/2022      TECHNIQUE:  Single frontal view of the chest was performed.     COMPARISON:  Prior     FINDINGS:  Left upper lobe opacity.  Cardiomegaly.  Left costophrenic angle opacity may relate to associated pleural effusion.     Bones are intact.     Impression:     Predominant left upper lobe opacity consistent with left upper lobe pneumonia      Venous lower extremity ultrasound 09/19/2022     FINDINGS:  Right thigh veins: The common femoral, femoral, popliteal, upper greater saphenous, and deep femoral veins are patent and free of thrombus. The veins are normally compressible and have normal phasic flow and augmentation response.     Right calf veins: The visualized calf veins are patent.     Left thigh veins: The left common femoral vein is patent.  There is partially  compressible thrombus involving the left proximal, mid and distal femoral vein.  There is similar, partially compressible thrombus within the popliteal vein.     Left calf veins: The visualized calf veins are patent.     Miscellaneous: None     Impression:     Partially compressible, possibly subacute DVT noted throughout the left femoral vein and popliteal vein are new as compared to the prior lower extremity ultrasound 01/03/2022.

## 2022-09-21 VITALS
RESPIRATION RATE: 20 BRPM | SYSTOLIC BLOOD PRESSURE: 117 MMHG | WEIGHT: 315 LBS | OXYGEN SATURATION: 97 % | DIASTOLIC BLOOD PRESSURE: 57 MMHG | TEMPERATURE: 98 F | HEIGHT: 71 IN | BODY MASS INDEX: 44.1 KG/M2 | HEART RATE: 74 BPM

## 2022-09-21 LAB
APTT BLDCRRT: 31.3 SEC (ref 21–32)
APTT BLDCRRT: 49 SEC (ref 21–32)
APTT BLDCRRT: 71 SEC (ref 21–32)
BASOPHILS # BLD AUTO: 0.04 K/UL (ref 0–0.2)
BASOPHILS NFR BLD: 0.5 % (ref 0–1.9)
DIFFERENTIAL METHOD: ABNORMAL
EOSINOPHIL # BLD AUTO: 0.3 K/UL (ref 0–0.5)
EOSINOPHIL NFR BLD: 3.8 % (ref 0–8)
ERYTHROCYTE [DISTWIDTH] IN BLOOD BY AUTOMATED COUNT: 14.4 % (ref 11.5–14.5)
HCT VFR BLD AUTO: 45.4 % (ref 40–54)
HGB BLD-MCNC: 13.7 G/DL (ref 14–18)
IMM GRANULOCYTES # BLD AUTO: 0.01 K/UL (ref 0–0.04)
IMM GRANULOCYTES NFR BLD AUTO: 0.1 % (ref 0–0.5)
LYMPHOCYTES # BLD AUTO: 1.6 K/UL (ref 1–4.8)
LYMPHOCYTES NFR BLD: 21 % (ref 18–48)
MCH RBC QN AUTO: 30.9 PG (ref 27–31)
MCHC RBC AUTO-ENTMCNC: 30.2 G/DL (ref 32–36)
MCV RBC AUTO: 102 FL (ref 82–98)
MONOCYTES # BLD AUTO: 1.1 K/UL (ref 0.3–1)
MONOCYTES NFR BLD: 14.8 % (ref 4–15)
NEUTROPHILS # BLD AUTO: 4.6 K/UL (ref 1.8–7.7)
NEUTROPHILS NFR BLD: 59.8 % (ref 38–73)
NRBC BLD-RTO: 0 /100 WBC
PLATELET # BLD AUTO: 226 K/UL (ref 150–450)
PMV BLD AUTO: 9.4 FL (ref 9.2–12.9)
RBC # BLD AUTO: 4.44 M/UL (ref 4.6–6.2)
WBC # BLD AUTO: 7.62 K/UL (ref 3.9–12.7)

## 2022-09-21 PROCEDURE — 27000221 HC OXYGEN, UP TO 24 HOURS

## 2022-09-21 PROCEDURE — 36415 COLL VENOUS BLD VENIPUNCTURE: CPT | Performed by: STUDENT IN AN ORGANIZED HEALTH CARE EDUCATION/TRAINING PROGRAM

## 2022-09-21 PROCEDURE — 63600175 PHARM REV CODE 636 W HCPCS: Performed by: STUDENT IN AN ORGANIZED HEALTH CARE EDUCATION/TRAINING PROGRAM

## 2022-09-21 PROCEDURE — 99900035 HC TECH TIME PER 15 MIN (STAT)

## 2022-09-21 PROCEDURE — 25000003 PHARM REV CODE 250: Performed by: FAMILY MEDICINE

## 2022-09-21 PROCEDURE — 85730 THROMBOPLASTIN TIME PARTIAL: CPT | Performed by: STUDENT IN AN ORGANIZED HEALTH CARE EDUCATION/TRAINING PROGRAM

## 2022-09-21 PROCEDURE — 94761 N-INVAS EAR/PLS OXIMETRY MLT: CPT

## 2022-09-21 PROCEDURE — 63600175 PHARM REV CODE 636 W HCPCS: Performed by: FAMILY MEDICINE

## 2022-09-21 PROCEDURE — 99233 SBSQ HOSP IP/OBS HIGH 50: CPT | Mod: ,,, | Performed by: INTERNAL MEDICINE

## 2022-09-21 PROCEDURE — 99233 PR SUBSEQUENT HOSPITAL CARE,LEVL III: ICD-10-PCS | Mod: ,,, | Performed by: INTERNAL MEDICINE

## 2022-09-21 PROCEDURE — 63600175 PHARM REV CODE 636 W HCPCS: Performed by: NURSE PRACTITIONER

## 2022-09-21 PROCEDURE — 85025 COMPLETE CBC W/AUTO DIFF WBC: CPT | Performed by: NURSE PRACTITIONER

## 2022-09-21 RX ORDER — FUROSEMIDE 40 MG/1
40 TABLET ORAL DAILY
Qty: 30 TABLET | Refills: 2 | Status: ON HOLD
Start: 2022-09-21 | End: 2023-01-01

## 2022-09-21 RX ORDER — AMOXICILLIN AND CLAVULANATE POTASSIUM 875; 125 MG/1; MG/1
1 TABLET, FILM COATED ORAL 2 TIMES DAILY
Qty: 14 TABLET | Refills: 0
Start: 2022-09-21 | End: 2022-09-28

## 2022-09-21 RX ADMIN — LEVOTHYROXINE SODIUM 50 MCG: 50 TABLET ORAL at 05:09

## 2022-09-21 RX ADMIN — FUROSEMIDE 40 MG: 10 INJECTION, SOLUTION INTRAMUSCULAR; INTRAVENOUS at 08:09

## 2022-09-21 RX ADMIN — ATORVASTATIN CALCIUM 40 MG: 40 TABLET, FILM COATED ORAL at 09:09

## 2022-09-21 RX ADMIN — HEPARIN SODIUM 18 UNITS/KG/HR: 10000 INJECTION, SOLUTION INTRAVENOUS at 02:09

## 2022-09-21 RX ADMIN — CEFTRIAXONE 1 G: 1 INJECTION, SOLUTION INTRAVENOUS at 09:09

## 2022-09-21 RX ADMIN — HEPARIN SODIUM 16 UNITS/KG/HR: 10000 INJECTION, SOLUTION INTRAVENOUS at 02:09

## 2022-09-21 NOTE — ASSESSMENT & PLAN NOTE
Received 3 days of azithromycin.  Completing IV Rocephin  9/20 continue IV Rocephin  9/29 complete IV Rocephin

## 2022-09-21 NOTE — PLAN OF CARE
Problem: Adult Inpatient Plan of Care  Goal: Plan of Care Review  Outcome: Ongoing, Progressing  Goal: Patient-Specific Goal (Individualized)  Outcome: Ongoing, Progressing  Goal: Absence of Hospital-Acquired Illness or Injury  Outcome: Ongoing, Progressing  Goal: Optimal Comfort and Wellbeing  Outcome: Ongoing, Progressing  Goal: Readiness for Transition of Care  Outcome: Ongoing, Progressing     Problem: Bariatric Environmental Safety  Goal: Safety Maintained with Care  Outcome: Ongoing, Progressing     Problem: Fluid Imbalance (Pneumonia)  Goal: Fluid Balance  Outcome: Ongoing, Progressing     Problem: Infection (Pneumonia)  Goal: Resolution of Infection Signs and Symptoms  Outcome: Ongoing, Progressing     Problem: Respiratory Compromise (Pneumonia)  Goal: Effective Oxygenation and Ventilation  Outcome: Ongoing, Progressing     Problem: Impaired Wound Healing  Goal: Optimal Wound Healing  Outcome: Ongoing, Progressing     Problem: Skin Injury Risk Increased  Goal: Skin Health and Integrity  Outcome: Ongoing, Progressing     Problem: Fall Injury Risk  Goal: Absence of Fall and Fall-Related Injury  Outcome: Ongoing, Progressing     Problem: Coping Ineffective  Goal: Effective Coping  Outcome: Ongoing, Progressing

## 2022-09-21 NOTE — ASSESSMENT & PLAN NOTE
IV Lasix strict I&Os.  EF within normal.  Heart failure with preserved EF  9/20 strict I&Os IV Lasix  9/21 -5 L since admission.  P.o. Lasix 40 mg on discharge.

## 2022-09-21 NOTE — ASSESSMENT & PLAN NOTE
Patient with Hypercapnic and Hypoxic Respiratory failure which is Acute on chronic.  he is on home oxygen at [facility]. Supplemental oxygen was provided and noted-  .   Signs/symptoms of respiratory failure include- tachypnea, increased work of breathing and respiratory distress. Contributing diagnoses includes - CHF, COPD and Pneumonia Labs and images were reviewed. Patient Has recent ABG, which has been reviewed. Will treat underlying causes and adjust management of respiratory failure as follows- O2 albuterol Atrovent IV Rocephin completed azithromycin anticoagulation IV Lasix , intolerance to CPAP.  Encouraged patient to try to adapt to noninvasive ventilation during sleep  9/20 O2 target sat 92-94%.  Multifactorial hypoxemia pneumonia plus possible pulmonary embolism /pulmonary hypertension plus CHF  IV Rocephin azithromycin.  IV heparin.  IV Lasix. Monitor PTT on IV heparin  9/21 IV heparin monitor PTT.  O2 target sat 92-94%.  Switch to oral anticoagulation on discharge.

## 2022-09-21 NOTE — ASSESSMENT & PLAN NOTE
9/20  Partially compressible, possibly subacute DVT noted throughout the left femoral vein and popliteal vein are new as compared to the prior lower extremity ultrasound 01/03/2022  Findings on lower extremity ultrasound showed new DVT with history of chronic DVT IV heparin monitor PTT.  Coumadin on DC.  9/21 IV heparin monitor PTT oral anticoagulation on discharge

## 2022-09-21 NOTE — SUBJECTIVE & OBJECTIVE
9/21 seen and examined.  O2 sat 93% on 4 L. On IV heparin.  -5.2 L urine output.  No distress.  Afebrile.  On Rocephin for UTI.  Review of Systems   Constitutional:  Positive for activity change and fatigue. Negative for chills and fever.   HENT:  Negative for drooling, ear discharge and nosebleeds.    Eyes:  Negative for pain, discharge and itching.   Respiratory:  Positive for cough and shortness of breath. Negative for choking.    Cardiovascular:  Negative for chest pain.   Gastrointestinal:  Negative for anal bleeding.   Endocrine: Negative for cold intolerance.   Genitourinary:  Negative for hematuria.   Musculoskeletal:  Positive for arthralgias. Negative for neck stiffness.   Skin:  Negative for rash.   Allergic/Immunologic: Negative for immunocompromised state.   Neurological:  Positive for weakness. Negative for seizures and facial asymmetry.   Hematological:  Negative for adenopathy.   Psychiatric/Behavioral:  Negative for behavioral problems, self-injury and suicidal ideas.    Objective:     Vital Signs (Most Recent):  Temp: 97.7 °F (36.5 °C) (09/21/22 1155)  Pulse: 60 (09/21/22 1155)  Resp: 20 (09/21/22 1155)  BP: (!) 118/55 (09/21/22 1155)  SpO2: (!) 93 % (09/21/22 1155) Vital Signs (24h Range):  Temp:  [97.5 °F (36.4 °C)-99 °F (37.2 °C)] 97.7 °F (36.5 °C)  Pulse:  [58-81] 60  Resp:  [17-20] 20  SpO2:  [93 %-99 %] 93 %  BP: (114-135)/(55-64) 118/55     Weight: (!) 188.2 kg (415 lb)  Body mass index is 57.88 kg/m².      Intake/Output Summary (Last 24 hours) at 9/21/2022 1242  Last data filed at 9/21/2022 1000  Gross per 24 hour   Intake 960 ml   Output 600 ml   Net 360 ml         Physical Exam  Vitals and nursing note reviewed.   Constitutional:       General: He is in acute distress.      Appearance: He is well-developed. He is obese. He is ill-appearing and toxic-appearing.   HENT:      Head: Normocephalic and atraumatic.      Nose: Nose normal.   Eyes:      Extraocular Movements: Extraocular movements  intact.   Cardiovascular:      Rate and Rhythm: Normal rate and regular rhythm.   Pulmonary:      Effort: Pulmonary effort is normal.      Breath sounds: No stridor. Wheezing present.   Abdominal:      General: There is no distension.   Musculoskeletal:         General: Swelling, tenderness, deformity and signs of injury present.      Cervical back: Normal range of motion and neck supple.   Skin:     General: Skin is warm and dry.      Findings: Erythema, lesion and rash present.   Neurological:      General: No focal deficit present.      Mental Status: He is alert and oriented to person, place, and time. Mental status is at baseline.   Psychiatric:         Mood and Affect: Mood normal.         Behavior: Behavior normal.         Thought Content: Thought content normal.       Vents:  Oxygen Concentration (%): 100 (09/20/22 1050)    Lines/Drains/Airways       Peripheral Intravenous Line  Duration                  Peripheral IV - Single Lumen 09/18/22 1026 22 G Anterior;Right Forearm 3 days         Peripheral IV - Single Lumen 09/20/22 1020 20 G Anterior;Distal;Left Antecubital 1 day                    Significant Labs:    CBC/Anemia Profile:  Recent Labs   Lab 09/20/22  0422 09/21/22  0509   WBC 7.70 7.62   HGB 13.8* 13.7*   HCT 45.2 45.4    226   * 102*   RDW 14.6* 14.4          Chemistries:  Recent Labs   Lab 09/20/22  0830      K 3.5   CL 88*   CO2 39*   BUN 23   CREATININE 1.5*   CALCIUM 8.4*   ALBUMIN 2.7*   PROT 7.3   BILITOT 1.1*   ALKPHOS 74   ALT 15   AST 15        Latest Reference Range & Units 01/02/22 17:55 09/16/22 22:58   BNP 0 - 99 pg/mL 27 289 (H)   Troponin I 0.000 - 0.026 ng/mL  0.026   (H): Data is abnormally high    EKG9/16/2022     Vent. Rate : 063 BPM     Atrial Rate : 094 BPM      P-R Int : 000 ms          QRS Dur : 108 ms       QT Int : 488 ms       P-R-T Axes : 069 052 008 degrees      QTc Int : 499 ms     Sinus rhythm with 2nd degree A-V block (Mobitz I)   T wave  abnormality, consider anterolateral ischemia   Prolonged QT   Abnormal ECG   When compared with ECG of 21-AUG-2022 08:37,   Premature atrial complexes are no longer Present   Sinus rhythm is now with 2nd degree A-V block (Mobitz I)   Nonspecific T wave abnormality now evident in Inferior leads   T wave inversion now evident in Anterior-lateral leads me in        2D echo January 20, 2022    The left ventricle is normal in size with concentric remodeling and normal systolic function.  The estimated ejection fraction is 55%.  Grade I left ventricular diastolic dysfunction.  Severe right ventricular enlargement with moderately to severely reduced right ventricular systolic function.  Right atrial enlargement.  Mild to moderate tricuspid regurgitation.  Normal central venous pressure (3 mmHg).  The estimated PA systolic pressure is 43 mmHg.  There is pulmonary hypertension.  Salvador 5           Significant Imaging:   Chest x-ray 09/16/2022      TECHNIQUE:  Single frontal view of the chest was performed.     COMPARISON:  Prior     FINDINGS:  Left upper lobe opacity.  Cardiomegaly.  Left costophrenic angle opacity may relate to associated pleural effusion.     Bones are intact.     Impression:     Predominant left upper lobe opacity consistent with left upper lobe pneumonia      Venous lower extremity ultrasound 09/19/2022     FINDINGS:  Right thigh veins: The common femoral, femoral, popliteal, upper greater saphenous, and deep femoral veins are patent and free of thrombus. The veins are normally compressible and have normal phasic flow and augmentation response.     Right calf veins: The visualized calf veins are patent.     Left thigh veins: The left common femoral vein is patent.  There is partially compressible thrombus involving the left proximal, mid and distal femoral vein.  There is similar, partially compressible thrombus within the popliteal vein.     Left calf veins: The visualized calf veins are patent.      Miscellaneous: None     Impression:     Partially compressible, possibly subacute DVT noted throughout the left femoral vein and popliteal vein are new as compared to the prior lower extremity ultrasound 01/03/2022.

## 2022-09-21 NOTE — ASSESSMENT & PLAN NOTE
Patient with Hypercapnic Respiratory failure which is Acute on chronic.  he is on home oxygen at [facility]. Supplemental oxygen was provided and noted-  .   Signs/symptoms of respiratory failure include- increased work of breathing. Contributing diagnoses includes - CHF, COPD, Obesity Hypoventilation and Pneumonia Labs and images were reviewed. Patient Has recent ABG, which has been reviewed. Will treat underlying causes and adjust management of respiratory failure as follows- O2 nebs BiPAP  9/21 O2 on discharge.  Albuterol Atrovent.  Did not tolerate CPAP/BiPAP

## 2022-09-21 NOTE — ASSESSMENT & PLAN NOTE
Multifactorial- copd, MARY PNA, morbid obesity, recurrent venous thromboembolism with concern for pulmonary embolism on VQ scan  MARY pna on chest xray  procal normal  Attempted to obtain CT chest   However due to body habitus pt can't get CT   Will cont rocephin and azithromycin   Wean O2 as tolerated  Hx of PE in the past  Will obtain D-dimer  May need vq scan if positive     9/19   O2 needs increased   Check VQ scan and LE venous doppler   D-dimer pending   Consult pulmonology     9/20   VQ scan shows intermediate possibility of PE   On heparin gtt     9/21  Patient on 4 L O2 per simple face mask- consistent with baseline   Continue augmentin at discharge to complete tx of MARY PNA   Transition to apixaban

## 2022-09-21 NOTE — PROGRESS NOTES
[facility] ([facility])  Pulmonology  Progress Note    Patient Name: Gene Rothman  MRN: [id number]  Admission Date: 9/16/2022  Hospital Length of Stay: 4 days  Code Status: Prior  Attending Provider: Nati Williamson MD  Primary Care Provider: Ami Zarate DO   Principal Problem: Acute hypoxemic respiratory failure    Subjective:     9/21 seen and examined.  O2 sat 93% on 4 L. On IV heparin.  -5.2 L urine output.  No distress.  Afebrile.  On Rocephin for UTI.  Review of Systems   Constitutional:  Positive for activity change and fatigue. Negative for chills and fever.   HENT:  Negative for drooling, ear discharge and nosebleeds.    Eyes:  Negative for pain, discharge and itching.   Respiratory:  Positive for cough and shortness of breath. Negative for choking.    Cardiovascular:  Negative for chest pain.   Gastrointestinal:  Negative for anal bleeding.   Endocrine: Negative for cold intolerance.   Genitourinary:  Negative for hematuria.   Musculoskeletal:  Positive for arthralgias. Negative for neck stiffness.   Skin:  Negative for rash.   Allergic/Immunologic: Negative for immunocompromised state.   Neurological:  Positive for weakness. Negative for seizures and facial asymmetry.   Hematological:  Negative for adenopathy.   Psychiatric/Behavioral:  Negative for behavioral problems, self-injury and suicidal ideas.    Objective:     Vital Signs (Most Recent):  Temp: 97.7 °F (36.5 °C) (09/21/22 1155)  Pulse: 60 (09/21/22 1155)  Resp: 20 (09/21/22 1155)  BP: (!) 118/55 (09/21/22 1155)  SpO2: (!) 93 % (09/21/22 1155) Vital Signs (24h Range):  Temp:  [97.5 °F (36.4 °C)-99 °F (37.2 °C)] 97.7 °F (36.5 °C)  Pulse:  [58-81] 60  Resp:  [17-20] 20  SpO2:  [93 %-99 %] 93 %  BP: (114-135)/(55-64) 118/55     Weight: (!) 188.2 kg (415 lb)  Body mass index is 57.88 kg/m².      Intake/Output Summary (Last 24 hours) at 9/21/2022 1242  Last data filed at 9/21/2022 1000  Gross per 24 hour   Intake 960 ml   Output 600 ml   Net 360           Physical Exam  Vitals and nursing note reviewed.   Constitutional:       General: He is in acute distress.      Appearance: He is well-developed. He is obese. He is ill-appearing and toxic-appearing.   HENT:      Head: Normocephalic and atraumatic.      Nose: Nose normal.   Eyes:      Extraocular Movements: Extraocular movements intact.   Cardiovascular:      Rate and Rhythm: Normal rate and regular rhythm.   Pulmonary:      Effort: Pulmonary effort is normal.      Breath sounds: No stridor. Wheezing present.   Abdominal:      General: There is no distension.   Musculoskeletal:         General: Swelling, tenderness, deformity and signs of injury present.      Cervical back: Normal range of motion and neck supple.   Skin:     General: Skin is warm and dry.      Findings: Erythema, lesion and rash present.   Neurological:      General: No focal deficit present.      Mental Status: He is alert and oriented to person, place, and time. Mental status is at baseline.   Psychiatric:         Mood and Affect: Mood normal.         Behavior: Behavior normal.         Thought Content: Thought content normal.       Vents:  Oxygen Concentration (%): 100 (09/20/22 1050)    Lines/Drains/Airways       Peripheral Intravenous Line  Duration                  Peripheral IV - Single Lumen 09/18/22 1026 22 G Anterior;Right Forearm 3 days         Peripheral IV - Single Lumen 09/20/22 1020 20 G Anterior;Distal;Left Antecubital 1 day                    Significant Labs:    CBC/Anemia Profile:  Recent Labs   Lab 09/20/22  0422 09/21/22  0509   WBC 7.70 7.62   HGB 13.8* 13.7*   HCT 45.2 45.4    226   * 102*   RDW 14.6* 14.4          Chemistries:  Recent Labs   Lab 09/20/22  0830      K 3.5   CL 88*   CO2 39*   BUN 23   CREATININE 1.5*   CALCIUM 8.4*   ALBUMIN 2.7*   PROT 7.3   BILITOT 1.1*   ALKPHOS 74   ALT 15   AST 15        Latest Reference Range & Units 01/02/22 17:55 09/16/22 22:58   BNP 0 - 99 pg/mL 27 289  (H)   Troponin I 0.000 - 0.026 ng/mL  0.026   (H): Data is abnormally high    EKG9/16/2022     Vent. Rate : 063 BPM     Atrial Rate : 094 BPM      P-R Int : 000 ms          QRS Dur : 108 ms       QT Int : 488 ms       P-R-T Axes : 069 052 008 degrees      QTc Int : 499 ms     Sinus rhythm with 2nd degree A-V block (Mobitz I)   T wave abnormality, consider anterolateral ischemia   Prolonged QT   Abnormal ECG   When compared with ECG of 21-AUG-2022 08:37,   Premature atrial complexes are no longer Present   Sinus rhythm is now with 2nd degree A-V block (Mobitz I)   Nonspecific T wave abnormality now evident in Inferior leads   T wave inversion now evident in Anterior-lateral leads me in        2D echo January 20, 2022     The left ventricle is normal in size with concentric remodeling and normal systolic function.   The estimated ejection fraction is 55%.   Grade I left ventricular diastolic dysfunction.   Severe right ventricular enlargement with moderately to severely reduced right ventricular systolic function.   Right atrial enlargement.   Mild to moderate tricuspid regurgitation.   Normal central venous pressure (3 mmHg).   The estimated PA systolic pressure is 43 mmHg.   There is pulmonary hypertension.  Salvador 5           Significant Imaging:   Chest x-ray 09/16/2022      TECHNIQUE:  Single frontal view of the chest was performed.     COMPARISON:  Prior     FINDINGS:  Left upper lobe opacity.  Cardiomegaly.  Left costophrenic angle opacity may relate to associated pleural effusion.     Bones are intact.     Impression:     Predominant left upper lobe opacity consistent with left upper lobe pneumonia      Venous lower extremity ultrasound 09/19/2022     FINDINGS:  Right thigh veins: The common femoral, femoral, popliteal, upper greater saphenous, and deep femoral veins are patent and free of thrombus. The veins are normally compressible and have normal phasic flow and augmentation response.     Right  calf veins: The visualized calf veins are patent.     Left thigh veins: The left common femoral vein is patent.  There is partially compressible thrombus involving the left proximal, mid and distal femoral vein.  There is similar, partially compressible thrombus within the popliteal vein.     Left calf veins: The visualized calf veins are patent.     Miscellaneous: None     Impression:     Partially compressible, possibly subacute DVT noted throughout the left femoral vein and popliteal vein are new as compared to the prior lower extremity ultrasound 01/03/2022.           ABG  Recent Labs   Lab 09/17/22  0059   PH 7.332*   PO2 56*   PCO2 64.4*   HCO3 34.2*   BE 8     Assessment/Plan:     * Acute hypoxemic respiratory failure  Patient with Hypercapnic and Hypoxic Respiratory failure which is Acute on chronic.  he is on home oxygen at [facility]. Supplemental oxygen was provided and noted-  .   Signs/symptoms of respiratory failure include- tachypnea, increased work of breathing and respiratory distress. Contributing diagnoses includes - CHF, COPD and Pneumonia Labs and images were reviewed. Patient Has recent ABG, which has been reviewed. Will treat underlying causes and adjust management of respiratory failure as follows- O2 albuterol Atrovent IV Rocephin completed azithromycin anticoagulation IV Lasix , intolerance to CPAP.  Encouraged patient to try to adapt to noninvasive ventilation during sleep  9/20 O2 target sat 92-94%.  Multifactorial hypoxemia pneumonia plus possible pulmonary embolism /pulmonary hypertension plus CHF  IV Rocephin azithromycin.  IV heparin.  IV Lasix. Monitor PTT on IV heparin  9/21 IV heparin monitor PTT.  O2 target sat 92-94%.  Switch to oral anticoagulation on discharge.    Venous thromboembolism   9/20  Partially compressible, possibly subacute DVT noted throughout the left femoral vein and popliteal vein are new as compared to the prior lower extremity ultrasound 01/03/2022  Findings on  lower extremity ultrasound showed new DVT with history of chronic DVT IV heparin monitor PTT.  Coumadin on DC.  9/21 IV heparin monitor PTT oral anticoagulation on discharge    Elevated brain natriuretic peptide (BNP) level  IV Lasix strict I&Os.  EF within normal.  Heart failure with preserved EF  9/20 strict I&Os IV Lasix  9/21 -5 L since admission.  P.o. Lasix 40 mg on discharge.    Acute on chronic respiratory failure with hypercapnia  Patient with Hypercapnic Respiratory failure which is Acute on chronic.  he is on home oxygen at [facility]. Supplemental oxygen was provided and noted-  .   Signs/symptoms of respiratory failure include- increased work of breathing. Contributing diagnoses includes - CHF, COPD, Obesity Hypoventilation and Pneumonia Labs and images were reviewed. Patient Has recent ABG, which has been reviewed. Will treat underlying causes and adjust management of respiratory failure as follows- O2 nebs BiPAP  9/21 O2 on discharge.  Albuterol Atrovent.  Did not tolerate CPAP/BiPAP    Pneumonia  Received 3 days of azithromycin.  Completing IV Rocephin  9/20 continue IV Rocephin  9/29 complete IV Rocephin    UTI (urinary tract infection)  Pansensitive E coli.  IV ceftriaxone    9/20 IV ceftriaxone  9/21 complete IV Rocephin.       Discussed with hospital medicine nurse practitioner     Helen Modi MD  Pulmonology  [facility] ([facility])

## 2022-09-21 NOTE — ASSESSMENT & PLAN NOTE
Rocephin and azithro  -Zithromax completed 9/19, continue ceftriaxone     9/21  Complete augmentin at discharge

## 2022-09-21 NOTE — ASSESSMENT & PLAN NOTE
Recurrent   Venous doppler shows partially compressible, possibly subacute DVT noted throughout the left femoral vein and popliteal vein are new as compared to the prior lower extremity ultrasound 01/03/2022.\  -patient on heparin gtt  - will continue apixaban or coumadin at discharge     9/21  apixaban at discharge

## 2022-09-21 NOTE — PLAN OF CARE
[facility] (Hospital)  Discharge Final Note    Primary Care Provider: Ami Zarate DO    Expected Discharge Date: 9/21/2022    Final Discharge Note (most recent)       Final Note - 09/21/22 1543          Final Note    Assessment Type Final Discharge Note     Anticipated Discharge Disposition Hospice/Home     Hospital Resources/Appts/Education Provided Post-Acute resouces added to AVS        Post-Acute Status    Post-Acute Authorization Hospice     Hospice Status Set-up Complete/Auth obtained                     Important Message from Medicare  Important Message from Medicare regarding Discharge Appeal Rights: Given to patient/caregiver, Explained to patient/caregiver, Signed/date by patient/caregiver     Date IMM was signed: 09/21/22  Time IMM was signed: 1057    Contact Info       [facility]    Address: [address] [place] [address]    Phone: [phone]       Next Steps: Follow up    Instructions: HOSPICE          DC Dispo: [facility]  Transport: [facility]    Nurse and charge nurse notified. Wife at bedside with liaison, Galileo

## 2022-09-21 NOTE — DISCHARGE SUMMARY
"[facility] Tele[facility] ([facility])  [facility] Medicine  Discharge Summary      Patient Name: Gene Rothman  MRN: [id number]  Patient Class: IP- Inpatient  Admission Date: 9/16/2022  Hospital Length of Stay: 4 days  Discharge Date and Time:  09/21/2022 4:08 PM  Attending Physician: Nati Williamson MD   Discharging Provider: Lissette Berry NP  Primary Care Provider: Ami Zarate DO      HPI:   Patient is a 76 y.o.  male with a PMHx of CHF, CKD, CAD, HTN, hypothyroidism, peripheral vascular disease, and sleep apnea who presents to the Emergency Department for evaluation of SOB which onset PTA. Patient hard of hearing however wife is at bedside. Per report patient had worsening sob. No chest pain reported. He does report having "pain all over". Currently on home o2.     In the ED, patient was started on bipap for hypoxia and weaned down to venti mask. Chest xray showed MARY pna and he was started on zosyn. U/a indicative of uti. BNP: 238. Patient was admitted for acute hypoxic respiratory failure.       * No surgery found *      Hospital Course:   Mr Rothman is a 76 year old male who was admitted with acute on chronic hypoxic respiratory failure 2/2 MARY PNA and decompensated HF. Also found to have UTI. IV antibiotics and  IV furosemide initiated .  Patient with increased O2 needs overnight, currently on 15 L 50% FiO2 per venti mask. . He is on 2-3 L supplemental O2 at baseline. D- dimer pending, will check venous doppler and VQ scan r/o PE. Pulmonology consulted.     9/20 Venous doppler shows subacute DVT noted throughout the left femoral vein and popliteal vein are new as compared to the prior lower extremity ultrasound on 1/03/2022. VQ scan shows intermediate possibility of PE, CT deferred 2/2 body habitus. Heparin gtt initiated. O2 weaned down, sats currently  94% on 4 L O2 per simple face mask. Patient evaluated by pulmonology. Ventilatory deficit is irreversible with recurrent venous thromboembolism, " pulmonary hypertension acute on chronic respiratory failure and morbid obesity with limited mobility. Will consult palliative medicine, patient agreeable to hospice. Case management consulted- per chart review patient previously under care of [facility] hospice.    9/21 No acute changes overnight. Patient on 4 L O2 per simple face mask. Plan to discharge home with hospice. At length discussion regarding goals of care and code status held with patient and family. Patient wish to remain full code at this time. Will transition to Apixaban at discharge, recommend continue augmentin for treatment of UTI and PNA. Plan dc home with hospice today.         Goals of Care Treatment Preferences:  Code Status: DNR      Consults:   Consults (From admission, onward)        Status Ordering Provider     Inpatient consult to Palliative Care  Once        Provider:  [facility]    Completed SANJUANA GARCIA     Inpatient consult to Palliative Care  Once        Provider:  [facility]    Completed ANGEL CARTER     Inpatient consult to Pulmonology  Once        Provider:  Helen Modi MD    Completed SANJUANA GARCIA          * Acute hypoxemic respiratory failure  Multifactorial- copd, MARY PNA, morbid obesity, recurrent venous thromboembolism with concern for pulmonary embolism on VQ scan  MARY pna on chest xray  procal normal  Attempted to obtain CT chest   However due to body habitus pt can't get CT   Will cont rocephin and azithromycin   Wean O2 as tolerated  Hx of PE in the past  Will obtain D-dimer  May need vq scan if positive     9/19   O2 needs increased   Check VQ scan and LE venous doppler   D-dimer pending   Consult pulmonology     9/20   VQ scan shows intermediate possibility of PE   On heparin gtt     9/21  Patient on 4 L O2 per simple face mask- consistent with baseline   Continue augmentin at discharge to complete tx of MARY PNA   Transition to apixaban         Venous  thromboembolism  Recurrent   Venous doppler shows partially compressible, possibly subacute DVT noted throughout the left femoral vein and popliteal vein are new as compared to the prior lower extremity ultrasound 01/03/2022.\  -patient on heparin gtt  - will continue apixaban or coumadin at discharge     9/21  apixaban at discharge       Pneumonia  Rocephin and azithro  -Zithromax completed 9/19, continue ceftriaxone     9/21  Complete augmentin at discharge      UTI (urinary tract infection)  U/a indicative of uti  Last urine culture reviewed  Cont on rocephin  Cultures pending     9/20  Uirne cx shows pansensitive E coli       Final Active Diagnoses:    Diagnosis Date Noted POA    PRINCIPAL PROBLEM:  Acute hypoxemic respiratory failure [J96.01] 09/17/2022 Yes    Venous thromboembolism [I82.90] 09/20/2022 Unknown    Acute on chronic respiratory failure with hypercapnia [J96.22] 09/19/2022 Unknown    Elevated brain natriuretic peptide (BNP) level [R79.89] 09/19/2022 Unknown    UTI (urinary tract infection) [N39.0] 09/17/2022 Yes    Pneumonia [J18.9] 09/17/2022 Yes    Sleep apnea [G47.30] 04/08/2016 Yes     Chronic    Acquired hypothyroidism [E03.9] 12/19/2014 Yes     Chronic    Morbid obesity with BMI of 60.0-69.9, adult [E66.01, Z68.44] 12/19/2014 Not Applicable     Chronic      Problems Resolved During this Admission:       Discharged Condition: poor    Disposition: Hospice/Home    Follow Up:   Follow-up Information     [facility] Follow up.    Why: HOSPICE  Contact information:  Address: [address] [place] [id number]    Phone: [phone]                     Patient Instructions:      Diet Cardiac     Activity as tolerated       Significant Diagnostic Studies: Labs:   CMP   Recent Labs   Lab 09/20/22  0830      K 3.5   CL 88*   CO2 39*   GLU 95   BUN 23   CREATININE 1.5*   CALCIUM 8.4*   PROT 7.3   ALBUMIN 2.7*   BILITOT 1.1*   ALKPHOS 74   AST 15   ALT 15   ANIONGAP 14   ,  CBC   Recent Labs   Lab 09/20/22  0422 09/21/22  0509   WBC 7.70 7.62   HGB 13.8* 13.7*   HCT 45.2 45.4    226    and Troponin   Recent Labs   Lab 09/16/22  2258   TROPONINI 0.026       Pending Diagnostic Studies:     Procedure Component Value Units Date/Time    APTT [[id number]]     Order Status: Sent Lab Status: No result     Specimen: Blood          Medications:  Reconciled Home Medications:      Medication List      START taking these medications    amoxicillin-clavulanate 875-125mg 875-125 mg per tablet  Commonly known as: AUGMENTIN  Take 1 tablet by mouth 2 (two) times daily. for 7 days     * apixaban 5 mg Tab  Commonly known as: ELIQUIS  Take 2 tablets (10 mg total) by mouth 2 (two) times daily. for 7 days     * apixaban 5 mg Tab  Commonly known as: ELIQUIS  Take 1 tablet (5 mg total) by mouth 2 (two) times daily.  Start taking on: September 29, 2022         * This list has 2 medication(s) that are the same as other medications prescribed for you. Read the directions carefully, and ask your doctor or other care provider to review them with you.            CHANGE how you take these medications    furosemide 40 MG tablet  Commonly known as: LASIX  Take 1 tablet (40 mg total) by mouth once daily.  What changed:   · how much to take  · how to take this  · when to take this        CONTINUE taking these medications    acetaminophen 500 MG tablet  Commonly known as: TYLENOL  Take 500 mg by mouth every 6 (six) hours as needed for Pain.     atorvastatin 40 MG tablet  Commonly known as: LIPITOR  Take 1 tablet (40 mg total) by mouth once daily.     levothyroxine 50 MCG tablet  Commonly known as: SYNTHROID  Take 1 tablet (50 mcg total) by mouth before breakfast.     metronidazole 1% 1 % Gel  Commonly known as: METROGEL  Apply topically 2 (two) times a day.     * miconazole nitrate 2% 2 % Oint  Commonly known as: MICOTIN  Apply topically 2 (two) times daily. Intertrigo to lower abdomen, groin, periarea: please apply  Antifungal bid     * miconazole NITRATE 2 % 2 % top powder  Commonly known as: MICOTIN  Apply topically 2 (two) times daily.     polyethylene glycol 17 gram Pwpk  Commonly known as: GLYCOLAX  Take 17 g by mouth 2 (two) times daily.     senna-docusate 8.6-50 mg 8.6-50 mg per tablet  Commonly known as: PERICOLACE  Take 1 tablet by mouth once daily.         * This list has 2 medication(s) that are the same as other medications prescribed for you. Read the directions carefully, and ask your doctor or other care provider to review them with you.            STOP taking these medications    nitrofurantoin (macrocrystal-monohydrate) 100 MG capsule  Commonly known as: MACROBID            Indwelling Lines/Drains at time of discharge:   Lines/Drains/Airways     None                 Time spent on the discharge of patient: 30 minutes         Lissette Berry NP  Department of Hospital Medicine  [facility] Tele[facility] ([facility])

## 2022-09-22 LAB
BACTERIA BLD CULT: NORMAL
BACTERIA BLD CULT: NORMAL

## 2022-10-20 NOTE — PROGRESS NOTES
[facility]  Transition of Care Home Visit    Visit Date: 10/20/2022  Encounter Provider: Kyara Stephens   PCP:  Ami Zarate DO    PRESENTING HISTORY      Patient ID: Gene Rothman is a 76 y.o. male.    Consult Requested By:  No ref. provider found  Reason for Consult:  Hospital Follow Up.    Gene is being seen at home due to being seen at home due to physical debility that presents a taxing effort to leave the home, to mitigate high risk of hospital readmission and/or due to the limited availability of reliable or safe options for transportation to the point of access to the provider. Prior to treatment on this visit the chart was reviewed and patient verbal consent was obtained.      Chief Complaint: No chief complaint on file.        History of Present Illness: Mr. Gene Rothman is a 76 y.o. male who was recently admitted to the hospital.    Admitted 9/17 and discharged 9/21.  Patient with a diagnosis of CHF, CKD, CAD, HTN, hypothyroidism, peripheral vascular disease, and sleep apnea who presents to the Emergency Department for evaluation of SOB which onset PTA. Symptoms are constant and moderate in severity. No mitigating or exacerbating factors reported. No associated sxs reported. Patient denies any fever, CP, abdominal pain, n/v/d, cough, myalgias, and all other sxs at this time. Pt reports he is currently on oxygen at home and has a mask for oxygen administration; pt does not know how much oxygen he is receiving at home. No further complaints or concerns at this time.   ___________________________________________________________________    Today:    HPI:  Patient is being seen today to reestablish care. Patient with recent hospital visit on 9/17-9/21 for respiratory failure.  Patient is on oxygen at 3.5L per face mask. Reports no shortness of breath.  Patient is alert on visit with VSS lying in bed. Patient reports compliance to all medications.  Patient with moist reddened area in left abdominal  fold.  No other complaints at this time.  Questions elicited and answered.          Review of Systems   Constitutional: Negative.    HENT: Negative.     Eyes: Negative.    Respiratory:  Positive for shortness of breath (wears 3.5L).    Cardiovascular:  Positive for leg swelling.   Gastrointestinal:  Positive for abdominal distention and diarrhea.   Endocrine: Negative.    Genitourinary: Negative.    Musculoskeletal:  Positive for gait problem (sits up on side of hospital bed twice a day).   Skin:  Positive for color change and wound (bilateral lower extremity).   Allergic/Immunologic: Negative.    Hematological: Negative.    Psychiatric/Behavioral: Negative.       Assessments:  Environmental: Patient lives in a single story home with no steps at entrance.  House is in poor condition, dim lighting, and comfortable temperature.   Functional Status: Patient is bed bound and needs assistance with ADL's.  Has homemade trapeze on bed.  Safety: Fall precautions.   Nutritional: Adequate food in the home.   Home Health/DME/Supplies: No home health.  DME: Hospital bed and wheelchair.     PAST HISTORY:     Past Medical History:   Diagnosis Date    Acute hypoxemic respiratory failure 9/17/2022    Arthritis     CHF (congestive heart failure)     Chronic kidney disease     Coronary artery disease     Depression     Hypertension     Hypertensive heart disease with heart failure 9/16/2022    Hypothyroidism     Lymphedema of lower extremity     Nephrolithiasis     Obesity     Peripheral vascular disease     Pneumonia 9/17/2022    Recurrent cellulitis of lower leg     Sleep apnea     can't stand the CPAP , sleeps elevated in hospital bed or chair    Tobacco dependence     resolved       Past Surgical History:   Procedure Laterality Date    ADENOIDECTOMY  1961    BACK SURGERY  1975;  1976    x2 for disc; scar tissue removed    debridement of bilateral leg ulcers Bilateral 01/01/2017    Dr Hernandez    INNER EAR SURGERY Bilateral 1961     separate - pinnaplasty , new eardrms constructed    KIDNEY STONE SURGERY Left  approx    open    TONSILLECTOMY  89       Family History   Problem Relation Age of Onset    Asthma Daughter          26 w/ asthma    Cancer Brother         skin-part of ext ear removed    Cancer Mother         ? abd also    Diabetes Mother     Hypertension Mother     Cancer Father         ? abdonimal origin stomach/liver or pancreas    Heart disease Father         pacer    Heart disease Brother         CABG3    Alcohol abuse Maternal Grandfather     Stroke Neg Hx     COPD Neg Hx     Mental illness Neg Hx     Mental retardation Neg Hx     Kidney disease Neg Hx        Social History     Socioeconomic History    Marital status:    Tobacco Use    Smoking status: Never    Smokeless tobacco: Never    Tobacco comments:     chain smoker heavy x 10 years, lighter x 10 before    Substance and Sexual Activity    Alcohol use: Not Currently    Drug use: Never    Sexual activity: Never   Social History Narrative    ** Merged History Encounter **          Lives with wife and 2 sons and a daughter. No longer drives. Quit in  because couldn't hold foot on pedal. 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 Uses a Rolator at wo    rk and wheelchair at home. No caffeine. Does not have a Living Will or Advanced Directive.         MEDICATIONS & ALLERGIES:     Current Outpatient Medications on File Prior to Visit   Medication Sig Dispense Refill    aspirin 81 MG Chew Take 81 mg by mouth once daily.      oxyCODONE-acetaminophen (PERCOCET)  mg per tablet Take 1 tablet by mouth every 8 (eight) hours as needed.      acetaminophen (TYLENOL) 500 MG tablet Take 500 mg by mouth every 6 (six) hours as needed for Pain.      apixaban (ELIQUIS) 5 mg Tab Take 1 tablet (5 mg total) by mouth 2 (two) times daily. 60 tablet 0    atorvastatin (LIPITOR) 40 MG tablet Take 1  tablet (40 mg total) by mouth once daily. 90 tablet 3    furosemide (LASIX) 40 MG tablet Take 1 tablet (40 mg total) by mouth once daily. 30 tablet 2    levothyroxine (SYNTHROID) 50 MCG tablet Take 1 tablet (50 mcg total) by mouth before breakfast. 30 tablet 0    metronidazole 1% (METROGEL) 1 % Gel Apply topically 2 (two) times a day.      miconazole NITRATE 2 % (MICOTIN) 2 % top powder Apply topically 2 (two) times daily.      miconazole nitrate 2% (MICOTIN) 2 % Oint Apply topically 2 (two) times daily. Intertrigo to lower abdomen, groin, periarea: please apply Antifungal bid  0    polyethylene glycol (GLYCOLAX) 17 gram PwPk Take 17 g by mouth 2 (two) times daily.      senna-docusate 8.6-50 mg (PERICOLACE) 8.6-50 mg per tablet Take 1 tablet by mouth once daily.       No current facility-administered medications on file prior to visit.        Review of patient's allergies indicates:   Allergen Reactions    Bactrim [sulfamethoxazole-trimethoprim] Itching       OBJECTIVE:     Vital Signs:  There were no vitals filed for this visit.  There is no height or weight on file to calculate BMI.     Physical Exam:  Physical Exam  Constitutional:       Appearance: He is obese.   HENT:      Head: Normocephalic and atraumatic.      Nose: Nose normal.      Mouth/Throat:      Mouth: Mucous membranes are moist.      Pharynx: Oropharynx is clear.   Eyes:      Pupils: Pupils are equal, round, and reactive to light.   Cardiovascular:      Rate and Rhythm: Normal rate and regular rhythm.      Pulses: Normal pulses.      Heart sounds: Normal heart sounds.   Pulmonary:      Effort: Pulmonary effort is normal.      Breath sounds: Normal breath sounds.   Abdominal:      General: Bowel sounds are normal.      Palpations: Abdomen is soft.   Musculoskeletal:      Cervical back: Normal range of motion.      Right lower leg: Edema present.      Left lower leg: Edema present.   Skin:     General: Skin is dry.      Findings: Rash (left side  abdominal fold) present.   Neurological:      General: No focal deficit present.      Mental Status: He is alert. Mental status is at baseline.      Gait: Gait abnormal.   Psychiatric:         Mood and Affect: Mood normal.         Behavior: Behavior normal.         Thought Content: Thought content normal.         Judgment: Judgment normal.       Laboratory  Lab Results   Component Value Date    WBC 7.62 09/21/2022    HGB 13.7 (L) 09/21/2022    HCT 45.4 09/21/2022     (H) 09/21/2022     09/21/2022     Lab Results   Component Value Date    INR 1.0 09/19/2022    INR 1.0 01/03/2022    INR 1.1 12/30/2016     Lab Results   Component Value Date    HGBA1C 5.6 01/03/2022     No results for input(s): POCTGLUCOSE in the last 72 hours.    Diagnostic Results:      TRANSITION OF CARE:     [facility] Contact Note:    Family and/or Caretaker present at visit?  Yes.  Diagnostic tests reviewed/disposition: No diagnosic tests pending after this hospitalization.  Disease/illness education:   Home health/community services discussion/referrals: Patient does not have home health established from hospital visit.  They do need home health.  If needed, we will set up home health for the patient.   Establishment or re-establishment of referral orders for community resources: No other necessary community resources.   Discussion with other health care providers: No discussion with other health care providers necessary.     Transition of Care Visit:  I have reviewed and updated the history and problem list.  I have reconciled the medication list.  I have discussed the hospitalization and current medical issues, prognosis and plans with the patient/family.  I  spent more than 50% of time discussing the care with the patient/family.  Total Face-to-Face Encounter: 60 minutes.    Medications Reconciliation:   I have reconciled the patient's home medications and discharge medications with the patient/family. I have updated all  changes.  Refer to After-Visit Medication List.    ASSESSMENT & PLAN:     HIGH RISK CONDITION(S):      1. Acute respiratory failure with hypoxia  Comments:  Recent hospital visit 9/17-9/21  On 3.5L O2 via mask  Monitor    2. Hypertensive heart disease with heart failure  Comments:  Mild edema on visit  On furosemide.   Monitor    3. Hypothyroidism (acquired)  Comments:  On levothyroxine  No recent labs.  Will order home health and obtain labs  Monitor    4. Morbid obesity with BMI of 50.0-59.9, adult  Comments:  Reduce calorie intake and increase calorie deficit.   Monitor    5. Venous stasis dermatitis of both lower extremities  Comments:  Chronic  Elevate legs  Notify for any worsening  Will order home health to assist  Monitor       Were controlled substances prescribed?  No    Instructions for the patient:    Encounter for Medical Follow-Up and Medication Review  - [facility] to schedule follow-up visit with patient in 4 weeks or PRN     Patient Instructions Given:  - Continue all medications, treatments and therapies as ordered.   - Follow all instructions, recommendations as discussed.  - Maintain Safety Precautions at all times.  - Attend all medical appointments as scheduled.  - For worsening symptoms: call Primary Care Physician or Nurse Practitioner.  - For emergencies, call 911 or immediately report to the nearest emergency room  Scheduled Follow-up :  Future Appointments   Date Time Provider Department Center   11/17/2022  8:00 AM Kyara Cassidy NP [facility]       After Visit Medication List :     Medication List            Accurate as of October 20, 2022  3:35 PM. If you have any questions, ask your nurse or doctor.                CONTINUE taking these medications      acetaminophen 500 MG tablet  Commonly known as: TYLENOL     apixaban 5 mg Tab  Commonly known as: ELIQUIS  Take 1 tablet (5 mg total) by mouth 2 (two) times daily.     aspirin 81 MG Chew     atorvastatin 40 MG  tablet  Commonly known as: LIPITOR  Take 1 tablet (40 mg total) by mouth once daily.     furosemide 40 MG tablet  Commonly known as: LASIX  Take 1 tablet (40 mg total) by mouth once daily.     levothyroxine 50 MCG tablet  Commonly known as: SYNTHROID  Take 1 tablet (50 mcg total) by mouth before breakfast.     metronidazole 1% 1 % Gel  Commonly known as: METROGEL  Apply topically 2 (two) times a day.     * miconazole nitrate 2% 2 % Oint  Commonly known as: MICOTIN  Apply topically 2 (two) times daily. Intertrigo to lower abdomen, groin, periarea: please apply Antifungal bid     * miconazole NITRATE 2 % 2 % top powder  Commonly known as: MICOTIN     oxyCODONE-acetaminophen  mg per tablet  Commonly known as: PERCOCET     polyethylene glycol 17 gram Pwpk  Commonly known as: GLYCOLAX     senna-docusate 8.6-50 mg 8.6-50 mg per tablet  Commonly known as: PERICOLACE           * This list has 2 medication(s) that are the same as other medications prescribed for you. Read the directions carefully, and ask your doctor or other care provider to review them with you.                  Signature: Kyara Stephens NP

## 2022-11-17 NOTE — PROGRESS NOTES
[facility]  Medical Home Visit    Visit Date: 11/17/2022  Encounter Provider: Kyara Stephens  PCP:  Ami Zarate DO    Subjective:      Patient ID: Gene Rothman is a 77 y.o. male.    Consult Requested By:  No ref. provider found  Reason for Consult:  Follow up medication    Gene is being seen at home due to being seen at home due to physical debility that presents a taxing effort to leave the home, to mitigate high risk of hospital readmission and/or due to the limited availability of reliable or safe options for transportation to the point of access to the provider. Prior to treatment on this visit the chart was reviewed and patient verbal consent was obtained.    Chief Complaint: Follow-up      Patient is being seen today for a follow up visit. Patient is on oxygen at 3.5L per face mask. Reports no shortness of breath.  Upon visit patient is alert and in no acute distress while lying in bed. Patient reports compliance to all medications.  Patient reports some episodes of bradycardia with readings as low as 39 with no symptoms.  Pulse 39 on visit.  Educated patient on importance of cardiology referral.  Will send referral.  No other complaints at this time.  Questions elicited and answered.          Review of Systems   Constitutional: Negative.    HENT: Negative.     Eyes: Negative.    Respiratory:  Positive for shortness of breath (3L oxygen via face mask).    Cardiovascular: Negative.    Gastrointestinal:  Positive for abdominal distention.   Endocrine: Negative.    Genitourinary: Negative.    Musculoskeletal:  Positive for gait problem (able to sit on the side of the bed).   Skin:  Positive for color change (bilateral lower extremity).   Allergic/Immunologic: Negative.    Neurological:  Positive for weakness.   Hematological: Negative.    Psychiatric/Behavioral: Negative.       Assessments:  Environmental: Patient lives in a single story home with no steps at the entrance.  House is in poor living  condition.  Adequate lighting and comfortable temperature.   Functional Status: Patient is bed bound and needs assistance with ADL's.  Has a hospital bed wit ha homemade trapeze device at the end.    Safety: Fall and general safety precautions.   Nutritional: Adequate food in the home.   Home Health/DME/Supplies: No home health.  DME: Hospital bed and wheelchair.     Objective:   Physical Exam  Constitutional:       Appearance: He is obese.   HENT:      Nose: Nose normal.      Mouth/Throat:      Mouth: Mucous membranes are moist.      Pharynx: Oropharynx is clear.   Eyes:      Pupils: Pupils are equal, round, and reactive to light.   Cardiovascular:      Rate and Rhythm: Regular rhythm. Bradycardia present.      Pulses: Normal pulses.      Heart sounds: Normal heart sounds.   Pulmonary:      Breath sounds: Normal breath sounds.   Abdominal:      General: Bowel sounds are normal.      Palpations: Abdomen is soft.   Musculoskeletal:      Cervical back: Normal range of motion.      Right lower leg: Edema present.      Left lower leg: Edema present.   Skin:     General: Skin is warm and dry.   Neurological:      General: No focal deficit present.      Mental Status: He is alert and oriented to person, place, and time. Mental status is at baseline.      Motor: Weakness present.      Gait: Gait abnormal.   Psychiatric:         Mood and Affect: Mood normal.         Behavior: Behavior normal.         Thought Content: Thought content normal.         Judgment: Judgment normal.       Vitals:    11/17/22 1524   BP: (!) 122/58   Pulse: (!) 39   Resp: 18   Temp: 98.3 °F (36.8 °C)   SpO2: 95%     There is no height or weight on file to calculate BMI.    Assessment:     1. Bradycardia    2. Hypertensive heart disease with heart failure    3. Hypothyroidism (acquired)        Plan:     Ethical / Legal: Advance Care Planning   Surrogate decision maker:  Ion Carrero, Relationship: Daughter  Code Status:  Full  [place]:  [place]  Other  advance directive:  None, Capacity to make medical decisions:  Yes, Conflict No       1. Bradycardia  Comments:  Heart rate 39 on visit.   Patient asymptomatic  Will put in cardiology referral  Monitor    2. Hypertensive heart disease with heart failure  Comments:  Some mild lower extremity edema on visit  Continue lasix  Monitor    3. Hypothyroidism (acquired)  Comments:  No recent labs  Will order thyroid labs  Continue levothyroxine  Monitor     Encounter for Medical Follow-Up and Medication Review  - [facility] to schedule follow-up visit with patient in 4 weeks or PRN     Patient Instructions Given:  - Continue all medications, treatments and therapies as ordered.   - Follow all instructions, recommendations as discussed.  - Maintain Safety Precautions at all times.  - Attend all medical appointments as scheduled.  - For worsening symptoms: call Primary Care Physician or Nurse Practitioner.  - For emergencies, call 911 or immediately report to the nearest emergency room     Were controlled substances prescribed?  No    Follow Up Appointments:   No future appointments.    Signature: Kyara Stephens NP

## 2022-11-23 NOTE — TELEPHONE ENCOUNTER
----- Message from Elinor Aaron sent at 8/22/2022  8:40 AM CDT -----  Contact: [phone]  Patient is unable to do a virtual ,Please call patient back at [phone].Thanks     Office : [phone]     Fax :[phone]       Nephrology  progress Note      Patient's Name: Ghanshyam Middleton  12:05 PM  11/23/2022    Reason for Consult:  ESRD       Requesting Physician:  Jia Simms MD      Chief Complaint:    Chief Complaint   Patient presents with    Altered Mental Status     Pt from assisted living, was seen yesterday for back pain, now altered, did not finish dialysis yesterday and skipped Thursday. Pt alert to verbal but will not answer questions. History of Present iIlness:      Ghanshyam Middleton is a 66 y.o. female with prior history of diabetic nephropathy on dialysis who was admitted with main c/o back pain. She was also looking drowsy when admitted. Had urgent HD yesterday for 2 hrs. She was positive for COVID 19 infection . Interval hx      NG tube  removed   Doing much better today       HD today         I/O last 3 completed shifts: In: 0   Out: 600 [Urine:400; Emesis/NG output:200]    Past Medical History:   Diagnosis Date    Arthritis     CAD (coronary artery disease)     Diabetes ([address]     Hemodialysis patient ([address]     Hyperlipidemia     Hypertension     Kidney disease     Neuropathy     Pneumonia     IN February, 2021, with COVID    Thyroid disease        Past Surgical History:   Procedure Laterality Date    APPENDECTOMY      CHOLECYSTECTOMY      COLONOSCOPY  11/24/2020    COLONOSCOPY POLYPECTOMY SNARE/COLD BIOPSY performed by David Trinidad MD at [place] [address] 5/17/2021    PERITONEAL DIALYSIS CATHETER REMOVAL.  [address]. performed by Marcos Alex DO at [address] [facility] (CERVIX STATUS UNKNOWN)      FULL    LAPAROSCOPY INSERTION PERITONEAL CATHETER N/A 4/3/2020 LAPAROSCOPIC LYSIS OF ADHESIONS, LAPAROSCOPIC PERITONEAL DIALYSIS CATHETER PLACEMENT, LAPAROSCOPIC OMENTOPEXY performed by Obinna Walter DO at [address] BILATERAL      preventive    ROTATOR CUFF REPAIR Left     SHUNT REVISION Left 8/19/2021    LEFT FOREARM ARTERIO VENOUS GRAFT performed by Nura Montana MD at [address] [facility] N/A 11/24/2020    EGD BIOPSY performed by Gaylia Goodpasture, MD at [address]       Family History   Problem Relation Age of Onset    Cancer Mother         lung, breast, liver    Stroke Father     Stomach Cancer Brother     Cancer Maternal Grandmother     No Known Problems Paternal Grandmother     No Known Problems Paternal Grandfather         reports that she quit smoking about 44 years ago. Her smoking use included cigarettes. She has a 5.00 pack-year smoking history. She has never used smokeless tobacco. She reports that she does not currently use alcohol. She reports that she does not use drugs.         Allergies:  Amoxicillin, Demeclocycline, Penicillins, Tetracyclines & related, Ozempic (0.25 or 0.5 mg-dose) [semaglutide(0.25 or 0.5mg-dos)], and Codeine    Current Medications:    sodium chloride flush 0.9 % injection 5-40 mL, 2 times per day  sodium chloride flush 0.9 % injection 5-40 mL, PRN  0.9 % sodium chloride infusion, PRN  pantoprazole (PROTONIX) injection 40 mg, BID  insulin lispro (HUMALOG) injection vial 0-4 Units, TID WC  insulin lispro (HUMALOG) injection vial 0-4 Units, Nightly  0.9 % sodium chloride infusion, PRN  ondansetron (ZOFRAN-ODT) disintegrating tablet 4 mg, Q8H PRN   Or  ondansetron (ZOFRAN) injection 4 mg, Q6H PRN  acetaminophen (TYLENOL) tablet 650 mg, Q6H PRN   Or  acetaminophen (TYLENOL) suppository 650 mg, Q6H PRN  [Held by provider] clopidogrel (PLAVIX) tablet 75 mg, Daily  allopurinol (ZYLOPRIM) tablet 100 mg, Daily  metoprolol succinate (TOPROL XL) extended release tablet 25 mg, Daily  spironolactone (ALDACTONE) tablet 25 mg, Daily  rosuvastatin (CRESTOR) tablet 20 mg, Nightly  aspirin EC tablet 81 mg, Once  nortriptyline (PAMELOR) capsule 10 mg, Nightly  Vitamin D (CHOLECALCIFEROL) tablet 1,000 Units, Daily  lidocaine 4 % external patch 1 patch, Daily  gabapentin (NEURONTIN) capsule 200 mg, Nightly  rOPINIRole (REQUIP) tablet 0.25 mg, Nightly  levothyroxine (SYNTHROID) tablet 150 mcg, QAM AC  isosorbide mononitrate (IMDUR) extended release tablet 30 mg, Daily  baclofen (LIORESAL) tablet 10 mg, TID PRN  torsemide (DEMADEX) tablet 120 mg, BID  morphine (PF) injection 1 mg, Q4H PRN  hydroCHLOROthiazide (HYDRODIURIL) tablet 12.5 mg, Daily   And  losartan (COZAAR) tablet 50 mg, Daily  hydrALAZINE (APRESOLINE) injection 10 mg, Q6H PRN  0.9 % sodium chloride bolus, PRN  dexamethasone (PF) (DECADRON) injection 6 mg, Q24H  glucose-vitamin C chewable tablet 4 tablet, PRN  dextrose bolus 10% 125 mL, PRN   Or  dextrose bolus 10% 250 mL, PRN  glucagon (rDNA) injection 1 mg, PRN  dextrose 10 % infusion, Continuous PRN        Physical exam:     Vitals:  BP (!) 166/71   Pulse 85   Temp 97.4 °F (36.3 °C) (Temporal)   Resp 14   Ht 5' 5\" (1.651 m)   Wt 184 lb 15.5 oz (83.9 kg)   LMP  (LMP Unknown)   SpO2 97%   BMI 30.78 kg/m²   Constitutional:  awake but very weak   Skin: no rash, turgor wnl  Heent:  eomi, mmm  Neck: no bruits or jvd noted  Cardiovascular:  S1, S2 without m/r/g  Respiratory: CTA B without w/r/r  Abdomen:  +bs, soft, nt, nd  Ext: no  lower extremity edema      Labs:  CBC:   Recent Labs     11/21/22 0415 11/22/22 0420 11/23/22  0501   WBC 14.2* 11.8* 11.9*   HGB 11.2* 10.4* 10.7*    375 441     BMP:    Recent Labs     11/21/22 0415 11/22/22  0420 11/23/22  0501    139 138   K 4.7 4.7 5.1   CL 94* 95* 92*   CO2 22 26 23   BUN 50* 56* 92*   CREATININE 7.5* 6.8* 8.5*   GLUCOSE 169* 153* 144*     Ca/Mg/Phos:   Recent Labs     11/20/22  1209 11/21/22  0415 11/22/22  0420 11/23/22  0501   CALCIUM 9.8 9.8 9.8 9.4   MG 2.70*  --   --   --      Hepatic:   Recent Labs     11/21/22  0415   AST 23   ALT 18   BILITOT 0.6   ALKPHOS 182*     Troponin:   Recent Labs     11/20/22  1209   TROPONINI 0.04*     BNP: No results for input(s): BNP in the last 72 hours. Lipids: No results for input(s): CHOL, TRIG, HDL, LDLCALC, LABVLDL in the last 72 hours. ABGs: No results for input(s): PHART, PO2ART, IKJ8GFR in the last 72 hours. INR: No results for input(s): INR in the last 72 hours. UA:No results for input(s): Gena Loan, GLUCOSEU, BILIRUBINUR, Hermenia Kourtney, BLOODU, PHUR, PROTEINU, UROBILINOGEN, NITRU, LEUKOCYTESUR, LABMICR, URINETYPE in the last 72 hours. Urine Microscopic: No results for input(s): LABCAST, BACTERIA, COMU, HYALCAST, WBCUA, RBCUA, EPIU in the last 72 hours. Urine Culture: No results for input(s): LABURIN in the last 72 hours. Urine Chemistry: No results for input(s): [place], PROTEINUR, NAUR in the last 72 hours. IMAGING:  MRI BRAIN WO CONTRAST   Preliminary Result   Cerebral atrophy. Moderate to severe chronic small vessel ischemic changes. No acute brain parenchymal abnormality. XR ABDOMEN (2 VIEWS)   Final Result   Nonspecific nonobstructive bowel gas pattern         XR ABDOMEN (KUB) (SINGLE AP VIEW)   Final Result   Status post gastric tube placement along the fundus of the stomach with a   non-specific gas pattern. CT HEAD WO CONTRAST   Final Result   No acute intracranial abnormality. Assessment/Plan :      1. ESRD   Had HD on 11/21 /2022  Will get HD  today       2. HTN  Meds   UF on hd   BP meds     3. COVID 19   Clinically improved   Infection  Mgmt per primary attending     4. Acid- base disorder. Monitor   Correct with HD     5. Electrolytes. Monitor       Stable from nephrology standpoint to discharge home tomorrow if clinically stable.          D/w primary team      Thank you for allowing us to participate in care of [facility]         Electronically signed by: Willy Donovan MD, 11/23/2022, 12:05 PM      [facility] [address]  Office : [phone]  Fax :[phone]

## 2022-11-25 NOTE — TELEPHONE ENCOUNTER
You would need to check to see if patient  has home health or Care at Home. If he has someone coming to his home (home health or [facility]) this would need to be coordinated with them and they would need to let me know if they need an order.

## 2022-11-25 NOTE — TELEPHONE ENCOUNTER
Patient wife would like to know if someone can come to home and give vaccine. Wife states her daughter Alise had someone come to her at her home to give vaccine before. I asked wife if patient sees home health? She states she believes he does. Can this be something that home health can do if orders are placed?

## 2022-11-25 NOTE — TELEPHONE ENCOUNTER
----- Message from Maritza Cox sent at 11/25/2022  8:40 AM CST -----  Regarding: Vaccine  Name of Who is Calling: SAUD HERRERA [[id number]]      What is the request in detail: Pt is not mobile to go out and get the latest vaccine. Pt would like to know if there is any way someone can come out and administer it. Would like to be given any resources that can help if someone from the clinic can not come out. Please advise.       Can the clinic reply by [facility]: no       What Number to Call Back if not [facility]: [phone]

## 2022-11-25 NOTE — TELEPHONE ENCOUNTER
Informed patient that this is something that home health or care at home would coordinated. They can reach out to office if orders is needed.

## 2022-12-12 NOTE — ED PROVIDER NOTES
SCRIBE #1 NOTE: I, Shirley Ventura, am scribing for, and in the presence of, Rachael Regalado MD. I have scribed the HPI, ROS, and PEx.    SCRIBE #2 NOTE: I, Selina Webber, am scribing for, and in the presence of,  Katelin Fofana MD. I have scribed the remaining portions of the note not scribed by Scribe #1.    History     Chief Complaint   Patient presents with    Urinary Retention     Pt arrives by EMS from home c/o unable to urinate for past few hours     Review of patient's allergies indicates:   Allergen Reactions    Bactrim [sulfamethoxazole-trimethoprim] Itching         History of Present Illness     HPI    12/11/2022, 6:18 PM  History obtained from the patient      History of Present Illness: Gene Rothman is a 77 y.o. male patient with a PMHx of HTN, obesity, hypothyroidism, CKD, CHF, CAD, and nephrolithiasis who presents to the Emergency Department for evaluation of urinary retention which onset today. Pt reports he urinated only once today at 530 AM. He states he does not feel like he has to urinate, but he does not feel comfortable. Pt has not eaten today, but has had a small amount of fluids. Pt is bedridden and on 3.5 O2 constantly. Pt lives with his wife, who cannot help much, and his son. Symptoms are constant and moderate in severity. No mitigating or exacerbating factors reported. Associated sxs include nausea. Pt is not currently in pain, but earlier this morning he had lower back pain and abd pain. Patient denies any vomiting, diarrhea, constipation, fever, chills, and all other sxs at this time. No further complaints or concerns at this time.       Arrival mode: EMS      PCP: Ami Zarate DO        Past Medical History:  Past Medical History:   Diagnosis Date    Acute hypoxemic respiratory failure 9/17/2022    Arthritis     CHF (congestive heart failure)     Chronic kidney disease     Coronary artery disease     Depression     Hypertension     Hypertensive heart disease with heart failure  2022    Hypothyroidism     Lymphedema of lower extremity     Nephrolithiasis     Obesity     Peripheral vascular disease     Pneumonia 2022    Recurrent cellulitis of lower leg     Sleep apnea     can't stand the CPAP , sleeps elevated in hospital bed or chair    Tobacco dependence     resolved       Past Surgical History:  Past Surgical History:   Procedure Laterality Date    ADENOIDECTOMY      BACK SURGERY  ;  1976    x2 for disc; scar tissue removed    debridement of bilateral leg ulcers Bilateral 2017    Dr Hernandez    INNER EAR SURGERY Bilateral     separate - pinnaplasty , new eardrms constructed    KIDNEY STONE SURGERY Left  approx    open    TONSILLECTOMY           Family History:  Family History   Problem Relation Age of Onset    Asthma Daughter          26 w/ asthma    Cancer Brother         skin-part of ext ear removed    Cancer Mother         ? abd also    Diabetes Mother     Hypertension Mother     Cancer Father         ? abdonimal origin stomach/liver or pancreas    Heart disease Father         pacer    Heart disease Brother         CABG3    Alcohol abuse Maternal Grandfather     Stroke Neg Hx     COPD Neg Hx     Mental illness Neg Hx     Mental retardation Neg Hx     Kidney disease Neg Hx        Social History:  Social History     Tobacco Use    Smoking status: Never    Smokeless tobacco: Never    Tobacco comments:     chain smoker heavy x 10 years, lighter x 10 before    Substance and Sexual Activity    Alcohol use: Not Currently    Drug use: Never    Sexual activity: Never        Review of Systems     Review of Systems   Constitutional:  Negative for chills and fever.   HENT:  Negative for sore throat.    Respiratory:  Negative for shortness of breath.    Cardiovascular:  Negative for chest pain.   Gastrointestinal:  Positive for nausea. Negative for constipation, diarrhea and vomiting.   Genitourinary:  Negative for dysuria.        (+) urinary retention    Musculoskeletal:  Negative for back pain.   Skin:  Negative for rash.   Neurological:  Negative for weakness.   Hematological:  Does not bruise/bleed easily.   All other systems reviewed and are negative.     Physical Exam     Initial Vitals [12/11/22 1803]   BP Pulse Resp Temp SpO2   (!) 103/50 93 18 98.3 °F (36.8 °C) 97 %      MAP       --          Physical Exam  Nursing Notes and Vital Signs Reviewed.  Constitutional: Patient is in no acute distress. Morbidly obese. Chronically debilitated. Chronically ill appearing. Disheveled.   Head: Atraumatic. Normocephalic.  Eyes: PERRL. EOM intact. Conjunctivae are not pale. No scleral icterus.  ENT: Mucous membranes are moist. Oropharynx is clear and symmetric.    Neck: Supple. Full ROM. No lymphadenopathy.  Cardiovascular: Regular rate. Regular rhythm. No murmurs, rubs, or gallops. Distal pulses are 2+ and symmetric.  Pulmonary/Chest: No respiratory distress. Clear to auscultation bilaterally. No wheezing or rales.  Abdominal: Soft and non-distended.  There is no tenderness.  No rebound, guarding, or rigidity. Good bowel sounds.  Genitourinary: No CVA tenderness  Musculoskeletal: Moves all extremities. No obvious deformities. No edema. No calf tenderness. No suprapubic tenderness.  Skin: Warm and dry.  Neurological:  Alert, awake, and appropriate.  Normal speech.  No acute focal neurological deficits are appreciated.  Psychiatric: Normal affect. Good eye contact. Appropriate in content.     ED Course   Critical Care    Date/Time: 12/11/2022 10:18 PM  Performed by: Katelin Kumar Do, MD  Authorized by: Katelin Kumar Do, MD   Direct patient critical care time: 6 minutes  Additional history critical care time: 5 minutes  Ordering / reviewing critical care time: 6 minutes  Documentation critical care time: 6 minutes  Consulting other physicians critical care time: 5 minutes  Consult with family critical care time: 5 minutes  Total critical care time (exclusive of  "procedural time) : 33 minutes  Critical care time was exclusive of separately billable procedures and treating other patients.  Critical care was necessary to treat or prevent imminent or life-threatening deterioration of the following conditions: sepsis and dehydration.  Critical care was time spent personally by me on the following activities: development of treatment plan with patient or surrogate, discussions with consultants, interpretation of cardiac output measurements, evaluation of patient's response to treatment, examination of patient, obtaining history from patient or surrogate, ordering and performing treatments and interventions, ordering and review of laboratory studies, ordering and review of radiographic studies, pulse oximetry, re-evaluation of patient's condition and review of old charts.      ED Vital Signs:  Vitals:    12/11/22 1803 12/11/22 1938 12/11/22 1957 12/11/22 2003   BP: (!) 103/50 (!) 109/55  (!) 103/52   Pulse: 93 85 85 80   Resp: 18 18  (!) 33   Temp: 98.3 °F (36.8 °C)      TempSrc: Oral      SpO2: 97% 97%  95%   Weight:  (!) 192.5 kg (424 lb 6.4 oz)     Height: 5' 11" (1.803 m)       12/11/22 2032 12/11/22 2302   BP: (!) 137/58 (!) 146/64   Pulse: 86 82   Resp:     Temp:     TempSrc:     SpO2: (!) 94% (!) 94%   Weight:     Height:         Abnormal Lab Results:  Labs Reviewed   CBC W/ AUTO DIFFERENTIAL - Abnormal; Notable for the following components:       Result Value    WBC 20.29 (*)     RBC 3.76 (*)     Hemoglobin 12.0 (*)     Hematocrit 38.7 (*)      (*)     MCH 31.9 (*)     MCHC 31.0 (*)     RDW 15.4 (*)     Platelets 124 (*)     Immature Granulocytes 1.8 (*)     Gran # (ANC) 17.8 (*)     Immature Grans (Abs) 0.36 (*)     Lymph # 0.5 (*)     Mono # 1.6 (*)     Gran % 87.5 (*)     Lymph % 2.3 (*)     All other components within normal limits    Narrative:     Release to patient->Immediate   COMPREHENSIVE METABOLIC PANEL - Abnormal; Notable for the following components:    " CO2 32 (*)     Glucose 111 (*)     Creatinine 1.9 (*)     Calcium 8.6 (*)     Albumin 2.5 (*)     Total Bilirubin 1.9 (*)     eGFR 35 (*)     All other components within normal limits    Narrative:     Release to patient->Immediate   URINALYSIS, REFLEX TO URINE CULTURE - Abnormal; Notable for the following components:    Color, UA Orange (*)     Appearance, UA Cloudy (*)     Protein, UA 2+ (*)     Ketones, UA Trace (*)     Occult Blood UA 3+ (*)     Leukocytes, UA 3+ (*)     All other components within normal limits    Narrative:     Specimen Source->Urine   PROCALCITONIN - Abnormal; Notable for the following components:    Procalcitonin 43.04 (*)     All other components within normal limits   LACTIC ACID, PLASMA - Abnormal; Notable for the following components:    Lactate (Lactic Acid) 2.4 (*)     All other components within normal limits   URINALYSIS MICROSCOPIC - Abnormal; Notable for the following components:    RBC, UA >100 (*)     WBC, UA >100 (*)     WBC Clumps, UA Many (*)     Bacteria Many (*)     Yeast, UA Many (*)     All other components within normal limits    Narrative:     Specimen Source->Urine   CULTURE, BLOOD   CULTURE, BLOOD   CULTURE, URINE   HIV 1 / 2 ANTIBODY    Narrative:     Release to patient->Immediate   HEPATITIS C ANTIBODY    Narrative:     Release to patient->Immediate   HEP C VIRUS HOLD SPECIMEN    Narrative:     Release to patient->Immediate        All Lab Results:      Imaging Results:  Imaging Results              CT Renal Stone Study ABD Pelvis WO (Final result)  Result time 12/11/22 21:49:30      Final result by Reyna South MD (12/11/22 21:49:30)                   Impression:      Staghorn calculus involving the right kidney measuring at least 4.6 cm. Left-sided hydronephrosis and hydroureter with an obstructing distal left ureteral calculi measuring 6 mm.    Suggestion of passed stone in the bladder measuring 12 mm.    All CT scans   are performed using dose optimization  techniques including the following: automated exposure control; adjustment of the mA and/or kV; use of iterative reconstruction technique.  Dose modulation was employed for ALARA by means of: Automated exposure control; adjustment of the mA and/or kV according to patient size (this includes techniques or standardized protocols for targeted exams where dose is matched to indication/reason for exam; i.e. extremities or head); and/or use of iterative reconstructive technique.      Electronically signed by: Brannon Orellana  Date:    12/11/2022  Time:    21:49               Narrative:    EXAMINATION:  CT RENAL STONE STUDY ABD PELVIS WO    CLINICAL HISTORY:  Flank pain, kidney stone suspected;    TECHNIQUE:  Low dose axial images, sagittal and coronal reformations were obtained from the lung bases to the pubic symphysis.  Contrast was not administered.    COMPARISON:  None    FINDINGS:  Staghorn calculus involving the right kidney measuring at least 4.6 cm.  Left-sided hydronephrosis and hydroureter with an obstructing distal left ureteral calculi measuring 6 mm.  Colonic diverticulosis.  May catheter place.  Suggestion of passed stone in the bladder measuring 12 mm.  Colonic diverticulosis.  Trace right-sided pleural effusion with subsegmental atelectasis.  Splenic granuloma.  Senescent changes identified.                                       X-Ray Chest AP Portable (Final result)  Result time 12/11/22 21:11:36      Final result by Reyna South MD (12/11/22 21:11:36)                   Impression:      Cardiomegaly with perihilar edema.  Recommend clinical correlation for CHF.  Interval resolution of left upper lobe opacity      Electronically signed by: Brannon Orellana  Date:    12/11/2022  Time:    21:11               Narrative:    EXAMINATION:  XR CHEST AP PORTABLE    CLINICAL HISTORY:  cough;    TECHNIQUE:  Single frontal view of the chest was performed.    COMPARISON:  Prior    FINDINGS:  Cardiomegaly.  Mild  perihilar edema.  Hilar subcarinal calcification consistent with granulomatous disease.  Left upper lobe opacity has resolved compared to prior examination.  Blunting of the right costophrenic angle.    Bones are intact.                                              Results for orders placed or performed during the hospital encounter of 12/11/22   HIV 1/2 Ag/Ab (4th Gen)   Result Value Ref Range    HIV 1/2 Ag/Ab Negative Negative   Hepatitis C Antibody   Result Value Ref Range    Hepatitis C Ab Negative Negative   HCV Virus Hold Specimen   Result Value Ref Range    HEP C Virus Hold Specimen Hold for HCV sendout    CBC auto differential   Result Value Ref Range    WBC 20.29 (H) 3.90 - 12.70 K/uL    RBC 3.76 (L) 4.60 - 6.20 M/uL    Hemoglobin 12.0 (L) 14.0 - 18.0 g/dL    Hematocrit 38.7 (L) 40.0 - 54.0 %     (H) 82 - 98 fL    MCH 31.9 (H) 27.0 - 31.0 pg    MCHC 31.0 (L) 32.0 - 36.0 g/dL    RDW 15.4 (H) 11.5 - 14.5 %    Platelets 124 (L) 150 - 450 K/uL    MPV 9.9 9.2 - 12.9 fL    Immature Granulocytes 1.8 (H) 0.0 - 0.5 %    Gran # (ANC) 17.8 (H) 1.8 - 7.7 K/uL    Immature Grans (Abs) 0.36 (H) 0.00 - 0.04 K/uL    Lymph # 0.5 (L) 1.0 - 4.8 K/uL    Mono # 1.6 (H) 0.3 - 1.0 K/uL    Eos # 0.0 0.0 - 0.5 K/uL    Baso # 0.06 0.00 - 0.20 K/uL    nRBC 0 0 /100 WBC    Gran % 87.5 (H) 38.0 - 73.0 %    Lymph % 2.3 (L) 18.0 - 48.0 %    Mono % 8.1 4.0 - 15.0 %    Eosinophil % 0.0 0.0 - 8.0 %    Basophil % 0.3 0.0 - 1.9 %    Poik Slight     Stomatocytes Present     Differential Method Automated    Comprehensive metabolic panel   Result Value Ref Range    Sodium 140 136 - 145 mmol/L    Potassium 3.9 3.5 - 5.1 mmol/L    Chloride 99 95 - 110 mmol/L    CO2 32 (H) 23 - 29 mmol/L    Glucose 111 (H) 70 - 110 mg/dL    BUN 23 8 - 23 mg/dL    Creatinine 1.9 (H) 0.5 - 1.4 mg/dL    Calcium 8.6 (L) 8.7 - 10.5 mg/dL    Total Protein 6.8 6.0 - 8.4 g/dL    Albumin 2.5 (L) 3.5 - 5.2 g/dL    Total Bilirubin 1.9 (H) 0.1 - 1.0 mg/dL    Alkaline  Phosphatase 55 55 - 135 U/L    AST 25 10 - 40 U/L    ALT 18 10 - 44 U/L    Anion Gap 9 8 - 16 mmol/L    eGFR 35 (A) >60 mL/min/1.73 m^2   Urinalysis, Reflex to Urine Culture Urine, Catheterized    Specimen: Urine   Result Value Ref Range    Specimen UA Urine, Catheterized     Color, UA Orange (A) Yellow, Straw, Cindy    Appearance, UA Cloudy (A) Clear    pH, UA 7.0 5.0 - 8.0    Specific Gravity, UA 1.010 1.005 - 1.030    Protein, UA 2+ (A) Negative    Glucose, UA Negative Negative    Ketones, UA Trace (A) Negative    Bilirubin (UA) Negative Negative    Occult Blood UA 3+ (A) Negative    Nitrite, UA Negative Negative    Urobilinogen, UA Negative <2.0 EU/dL    Leukocytes, UA 3+ (A) Negative   Procalcitonin   Result Value Ref Range    Procalcitonin 43.04 (H) <0.25 ng/mL   Lactic acid, plasma   Result Value Ref Range    Lactate (Lactic Acid) 2.4 (H) 0.5 - 2.2 mmol/L   Urinalysis Microscopic   Result Value Ref Range    RBC, UA >100 (H) 0 - 4 /hpf    WBC, UA >100 (H) 0 - 5 /hpf    WBC Clumps, UA Many (A) None-Rare    Bacteria Many (A) None-Occ /hpf    Yeast, UA Many (A) None    Squam Epithel, UA 3 /hpf    Hyaline Casts, UA 0 0-1/lpf /lpf    Unclass Gali UA Occasional None-Moderate    Microscopic Comment SEE COMMENT         The Emergency Provider reviewed the vital signs and test results, which are outlined above.     ED Discussion     7:55 PM: Dr. Regalado transfers care of patient to Dr. Fofana pending lab results.    10:17 PM  Patient with UTI hematuria he has a very large kidney stone at the left UPJ causing hydronephrosis.  We do not have urology services.  I did contact hospital medicine services and they felt that he needs to be transferred to a facility with urology services.  [facility] has been contacted to arrange transfer to a facility with Urology    11:17 PM: [facility] Re[facility] (ER Physician[facility]. There are no urology services, which the patient requires, offered at [facility]  Detail Level: Detailed Fish at this time. Dr. Reyes expresses understanding and will accept transfer for urology evaluation and treatment.  Accepting Facility: [facility]  Accepting Physician: Dr. Reyes     11:21 PM: Re-evaluated pt. Informed pt and family that there are no urology services available at this time. I have discussed test results, shared treatment plan, and the need for transfer with patient and family at bedside. All historical, clinical, radiographic, and laboratory findings were reviewed with the patient/family in detail. Patient will be transferred by [facility] services with care required en route. Patient understands that there could be unforeseen motor vehicle accidents or loss of vital signs that could result in potential death or permanent disability. Pt and family express understanding at this time and agree with all information. All questions answered. Pt and family have no further questions or concerns at this time. Pt is ready for transfer.           Medical Decision Making:   Clinical Tests:   Lab Tests: Ordered and Reviewed  Radiological Study: Ordered and Reviewed         ED Medication(s):  Medications   sodium chloride 0.9% bolus 500 mL (0 mLs Intravenous Stopped 12/11/22 2216)   cefTRIAXone (ROCEPHIN) 1 g/50 mL D5W IVPB (0 g Intravenous Stopped 12/11/22 2216)   0.9%  NaCl infusion (1,000 mLs Intravenous New Bag 12/11/22 2215)       New Prescriptions    No medications on file               Scribe Attestation:   Scribe #1: I performed the above scribed service and the documentation accurately describes the services I performed. I attest to the accuracy of the note.     Attending:   Physician Attestation Statement for Scribe #1: I, Rachael Regalado MD, personally performed the services described in this documentation, as scribed by Shirley Ventura, in my presence, and it is both accurate and complete.       Scribe Attestation:   Scribe #2: I performed the above scribed service and the documentation  Quality 130: Documentation Of Current Medications In The Medical Record: Current Medications Documented accurately describes the services I performed. I attest to the accuracy of the note.    Attending Attestation:           Physician Attestation for Scribe:    Physician Attestation Statement for Scribe #2: I, Katelin Fofana MD, reviewed documentation, as scribed by Selina Webber in my presence, and it is both accurate and complete. I also acknowledge and confirm the content of the note done by Scribe #1.         Clinical Impression       ICD-10-CM ICD-9-CM   1. Urinary tract infection with hematuria, site unspecified  N39.0 599.0    R31.9 599.70   2. Left renal stone  N20.0 592.0   3. Sepsis, due to unspecified organism, unspecified whether acute organ dysfunction present  A41.9 038.9     995.91   4. Hydronephrosis, unspecified hydronephrosis type  N13.30 591       Disposition:   Disposition: Transferred  Condition: Fair       Katelin Kumar Do, MD  12/11/22 5642

## 2022-12-26 PROBLEM — J18.9 PNEUMONIA: Status: RESOLVED | Noted: 2022-09-17 | Resolved: 2022-01-01

## 2022-12-26 PROBLEM — N39.0 UTI (URINARY TRACT INFECTION): Status: RESOLVED | Noted: 2022-09-17 | Resolved: 2022-01-01

## 2022-12-26 PROBLEM — J96.22 ACUTE ON CHRONIC RESPIRATORY FAILURE WITH HYPERCAPNIA: Status: RESOLVED | Noted: 2022-09-19 | Resolved: 2022-01-01

## 2023-01-01 ENCOUNTER — EXTERNAL HOME HEALTH (OUTPATIENT)
Dept: HOME HEALTH SERVICES | Facility: HOSPITAL | Age: 78
End: 2023-01-01
Payer: MEDICARE

## 2023-01-01 ENCOUNTER — TELEPHONE (OUTPATIENT)
Dept: INTERNAL MEDICINE | Facility: CLINIC | Age: 78
End: 2023-01-01
Payer: MEDICARE

## 2023-01-01 ENCOUNTER — TELEPHONE (OUTPATIENT)
Dept: PULMONOLOGY | Facility: CLINIC | Age: 78
End: 2023-01-01
Payer: MEDICARE

## 2023-01-01 ENCOUNTER — OFFICE VISIT (OUTPATIENT)
Dept: INTERNAL MEDICINE | Facility: CLINIC | Age: 78
End: 2023-01-01
Payer: MEDICARE

## 2023-01-01 ENCOUNTER — DOCUMENT SCAN (OUTPATIENT)
Dept: HOME HEALTH SERVICES | Facility: HOSPITAL | Age: 78
End: 2023-01-01
Payer: MEDICARE

## 2023-01-01 ENCOUNTER — HOSPITAL ENCOUNTER (INPATIENT)
Facility: HOSPITAL | Age: 78
LOS: 12 days | Discharge: HOME HEALTH | DRG: 871 | End: 2023-09-14
Attending: EMERGENCY MEDICINE | Admitting: INTERNAL MEDICINE
Payer: MEDICARE

## 2023-01-01 ENCOUNTER — PATIENT OUTREACH (OUTPATIENT)
Dept: ADMINISTRATIVE | Facility: CLINIC | Age: 78
End: 2023-01-01
Payer: MEDICARE

## 2023-01-01 ENCOUNTER — PATIENT OUTREACH (OUTPATIENT)
Dept: EMERGENCY MEDICINE | Facility: HOSPITAL | Age: 78
End: 2023-01-01
Payer: MEDICARE

## 2023-01-01 ENCOUNTER — LAB VISIT (OUTPATIENT)
Dept: LAB | Facility: HOSPITAL | Age: 78
End: 2023-01-01
Attending: INTERNAL MEDICINE
Payer: MEDICARE

## 2023-01-01 ENCOUNTER — CLINICAL SUPPORT (OUTPATIENT)
Dept: PULMONOLOGY | Facility: CLINIC | Age: 78
End: 2023-01-01
Payer: MEDICARE

## 2023-01-01 ENCOUNTER — OUTPATIENT CASE MANAGEMENT (OUTPATIENT)
Dept: ADMINISTRATIVE | Facility: OTHER | Age: 78
End: 2023-01-01
Payer: MEDICARE

## 2023-01-01 ENCOUNTER — TELEPHONE (OUTPATIENT)
Dept: INTERNAL MEDICINE | Facility: CLINIC | Age: 78
End: 2023-01-01

## 2023-01-01 ENCOUNTER — HOSPITAL ENCOUNTER (EMERGENCY)
Facility: HOSPITAL | Age: 78
Discharge: HOME | End: 2023-08-01
Attending: FAMILY MEDICINE
Payer: MEDICARE

## 2023-01-01 ENCOUNTER — HOSPITAL ENCOUNTER (EMERGENCY)
Facility: HOSPITAL | Age: 78
Discharge: HOME | End: 2023-02-04
Attending: EMERGENCY MEDICINE
Payer: MEDICARE

## 2023-01-01 ENCOUNTER — OFFICE VISIT (OUTPATIENT)
Dept: CARDIOLOGY | Facility: CLINIC | Age: 78
End: 2023-01-01
Payer: MEDICARE

## 2023-01-01 ENCOUNTER — LAB VISIT (OUTPATIENT)
Dept: LAB | Facility: HOSPITAL | Age: 78
End: 2023-01-01
Payer: MEDICARE

## 2023-01-01 ENCOUNTER — PATIENT MESSAGE (OUTPATIENT)
Dept: INFECTIOUS DISEASES | Facility: CLINIC | Age: 78
End: 2023-01-01
Payer: MEDICARE

## 2023-01-01 ENCOUNTER — TELEPHONE (OUTPATIENT)
Dept: PHARMACY | Facility: CLINIC | Age: 78
End: 2023-01-01
Payer: MEDICARE

## 2023-01-01 ENCOUNTER — CARE AT HOME (OUTPATIENT)
Dept: HOME HEALTH SERVICES | Facility: CLINIC | Age: 78
End: 2023-01-01
Payer: MEDICARE

## 2023-01-01 ENCOUNTER — TELEPHONE (OUTPATIENT)
Dept: ADMINISTRATIVE | Facility: CLINIC | Age: 78
End: 2023-01-01
Payer: MEDICARE

## 2023-01-01 ENCOUNTER — TELEPHONE (OUTPATIENT)
Dept: CARDIOLOGY | Facility: CLINIC | Age: 78
End: 2023-01-01
Payer: MEDICARE

## 2023-01-01 ENCOUNTER — PATIENT MESSAGE (OUTPATIENT)
Dept: INTERNAL MEDICINE | Facility: CLINIC | Age: 78
End: 2023-01-01
Payer: MEDICARE

## 2023-01-01 ENCOUNTER — TELEPHONE (OUTPATIENT)
Dept: ADMINISTRATIVE | Facility: HOSPITAL | Age: 78
End: 2023-01-01
Payer: MEDICARE

## 2023-01-01 ENCOUNTER — OFFICE VISIT (OUTPATIENT)
Dept: HOME HEALTH SERVICES | Facility: CLINIC | Age: 78
End: 2023-01-01
Payer: MEDICARE

## 2023-01-01 ENCOUNTER — HOSPITAL ENCOUNTER (EMERGENCY)
Facility: HOSPITAL | Age: 78
Discharge: HOME | End: 2023-09-15
Attending: FAMILY MEDICINE
Payer: MEDICARE

## 2023-01-01 ENCOUNTER — TELEMEDICINE (OUTPATIENT)
Dept: INTERNAL MEDICINE | Facility: CLINIC | Age: 78
End: 2023-01-01
Payer: MEDICARE

## 2023-01-01 ENCOUNTER — HOSPITAL ENCOUNTER (INPATIENT)
Facility: HOSPITAL | Age: 78
LOS: 13 days | DRG: 189 | End: 2023-10-03
Attending: EMERGENCY MEDICINE | Admitting: SPECIALIST
Payer: MEDICARE

## 2023-01-01 ENCOUNTER — NURSE TRIAGE (OUTPATIENT)
Dept: ADMINISTRATIVE | Facility: CLINIC | Age: 78
End: 2023-01-01
Payer: MEDICARE

## 2023-01-01 ENCOUNTER — HOSPITAL ENCOUNTER (INPATIENT)
Facility: HOSPITAL | Age: 78
LOS: 7 days | Discharge: HOME HEALTH | DRG: 291 | End: 2023-03-08
Attending: EMERGENCY MEDICINE | Admitting: INTERNAL MEDICINE
Payer: MEDICARE

## 2023-01-01 ENCOUNTER — HOSPITAL ENCOUNTER (INPATIENT)
Facility: HOSPITAL | Age: 78
LOS: 4 days | Discharge: HOME HEALTH | DRG: 190 | End: 2023-07-18
Attending: EMERGENCY MEDICINE | Admitting: INTERNAL MEDICINE
Payer: MEDICARE

## 2023-01-01 VITALS
TEMPERATURE: 98 F | RESPIRATION RATE: 20 BRPM | OXYGEN SATURATION: 90 % | HEART RATE: 60 BPM | DIASTOLIC BLOOD PRESSURE: 50 MMHG | SYSTOLIC BLOOD PRESSURE: 110 MMHG

## 2023-01-01 VITALS
BODY MASS INDEX: 41.75 KG/M2 | SYSTOLIC BLOOD PRESSURE: 76 MMHG | HEART RATE: 57 BPM | WEIGHT: 315 LBS | HEIGHT: 73 IN | TEMPERATURE: 97 F | DIASTOLIC BLOOD PRESSURE: 38 MMHG | OXYGEN SATURATION: 79 %

## 2023-01-01 VITALS
HEIGHT: 65 IN | BODY MASS INDEX: 52.48 KG/M2 | OXYGEN SATURATION: 94 % | DIASTOLIC BLOOD PRESSURE: 64 MMHG | RESPIRATION RATE: 26 BRPM | HEART RATE: 58 BPM | SYSTOLIC BLOOD PRESSURE: 139 MMHG | WEIGHT: 315 LBS | TEMPERATURE: 97 F

## 2023-01-01 VITALS — HEART RATE: 48 BPM | TEMPERATURE: 98 F | OXYGEN SATURATION: 93 % | RESPIRATION RATE: 18 BRPM

## 2023-01-01 VITALS
HEART RATE: 56 BPM | OXYGEN SATURATION: 91 % | SYSTOLIC BLOOD PRESSURE: 128 MMHG | BODY MASS INDEX: 52.48 KG/M2 | TEMPERATURE: 97 F | RESPIRATION RATE: 18 BRPM | HEIGHT: 65 IN | WEIGHT: 315 LBS | DIASTOLIC BLOOD PRESSURE: 55 MMHG

## 2023-01-01 VITALS
DIASTOLIC BLOOD PRESSURE: 60 MMHG | OXYGEN SATURATION: 90 % | TEMPERATURE: 98 F | SYSTOLIC BLOOD PRESSURE: 112 MMHG | HEART RATE: 52 BPM | RESPIRATION RATE: 20 BRPM

## 2023-01-01 VITALS
DIASTOLIC BLOOD PRESSURE: 62 MMHG | TEMPERATURE: 98 F | OXYGEN SATURATION: 94 % | WEIGHT: 315 LBS | BODY MASS INDEX: 41.75 KG/M2 | HEIGHT: 73 IN | SYSTOLIC BLOOD PRESSURE: 131 MMHG | HEART RATE: 71 BPM | RESPIRATION RATE: 17 BRPM

## 2023-01-01 VITALS
OXYGEN SATURATION: 98 % | SYSTOLIC BLOOD PRESSURE: 134 MMHG | HEART RATE: 61 BPM | BODY MASS INDEX: 57.18 KG/M2 | TEMPERATURE: 98 F | WEIGHT: 315 LBS | RESPIRATION RATE: 18 BRPM | DIASTOLIC BLOOD PRESSURE: 54 MMHG

## 2023-01-01 VITALS
TEMPERATURE: 98 F | RESPIRATION RATE: 18 BRPM | HEART RATE: 56 BPM | SYSTOLIC BLOOD PRESSURE: 96 MMHG | OXYGEN SATURATION: 94 % | DIASTOLIC BLOOD PRESSURE: 50 MMHG

## 2023-01-01 VITALS
TEMPERATURE: 97 F | HEART RATE: 62 BPM | DIASTOLIC BLOOD PRESSURE: 74 MMHG | RESPIRATION RATE: 18 BRPM | OXYGEN SATURATION: 96 % | SYSTOLIC BLOOD PRESSURE: 114 MMHG

## 2023-01-01 VITALS
HEIGHT: 71 IN | BODY MASS INDEX: 44.1 KG/M2 | SYSTOLIC BLOOD PRESSURE: 131 MMHG | WEIGHT: 315 LBS | OXYGEN SATURATION: 99 % | DIASTOLIC BLOOD PRESSURE: 62 MMHG | TEMPERATURE: 98 F | HEART RATE: 58 BPM

## 2023-01-01 VITALS
OXYGEN SATURATION: 97 % | TEMPERATURE: 98 F | RESPIRATION RATE: 18 BRPM | SYSTOLIC BLOOD PRESSURE: 130 MMHG | DIASTOLIC BLOOD PRESSURE: 74 MMHG | HEART RATE: 58 BPM

## 2023-01-01 VITALS
HEIGHT: 71 IN | OXYGEN SATURATION: 96 % | WEIGHT: 315 LBS | HEART RATE: 49 BPM | RESPIRATION RATE: 18 BRPM | SYSTOLIC BLOOD PRESSURE: 93 MMHG | TEMPERATURE: 98 F | DIASTOLIC BLOOD PRESSURE: 52 MMHG | BODY MASS INDEX: 44.1 KG/M2

## 2023-01-01 VITALS
TEMPERATURE: 98 F | DIASTOLIC BLOOD PRESSURE: 60 MMHG | SYSTOLIC BLOOD PRESSURE: 122 MMHG | HEART RATE: 56 BPM | RESPIRATION RATE: 20 BRPM | OXYGEN SATURATION: 99 %

## 2023-01-01 VITALS
OXYGEN SATURATION: 96 % | TEMPERATURE: 98 F | SYSTOLIC BLOOD PRESSURE: 102 MMHG | DIASTOLIC BLOOD PRESSURE: 60 MMHG | HEART RATE: 50 BPM

## 2023-01-01 VITALS — DIASTOLIC BLOOD PRESSURE: 60 MMHG | SYSTOLIC BLOOD PRESSURE: 133 MMHG

## 2023-01-01 VITALS
RESPIRATION RATE: 18 BRPM | HEART RATE: 53 BPM | SYSTOLIC BLOOD PRESSURE: 133 MMHG | DIASTOLIC BLOOD PRESSURE: 60 MMHG | BODY MASS INDEX: 52.48 KG/M2 | WEIGHT: 315 LBS | HEIGHT: 65 IN | OXYGEN SATURATION: 96 % | TEMPERATURE: 99 F

## 2023-01-01 DIAGNOSIS — E03.9 HYPOTHYROIDISM, UNSPECIFIED TYPE: ICD-10-CM

## 2023-01-01 DIAGNOSIS — R06.89 HYPERCAPNIA: Primary | ICD-10-CM

## 2023-01-01 DIAGNOSIS — I48.91 ATRIAL FIBRILLATION: ICD-10-CM

## 2023-01-01 DIAGNOSIS — J96.22 ACUTE ON CHRONIC RESPIRATORY FAILURE WITH HYPOXIA AND HYPERCAPNIA: Primary | ICD-10-CM

## 2023-01-01 DIAGNOSIS — B37.2 CANDIDAL DERMATITIS: ICD-10-CM

## 2023-01-01 DIAGNOSIS — R06.02 SOB (SHORTNESS OF BREATH): ICD-10-CM

## 2023-01-01 DIAGNOSIS — Z86.19 HX OF SEPSIS: ICD-10-CM

## 2023-01-01 DIAGNOSIS — G47.33 OBSTRUCTIVE SLEEP APNEA SYNDROME: Chronic | ICD-10-CM

## 2023-01-01 DIAGNOSIS — R00.1 BRADYCARDIA: ICD-10-CM

## 2023-01-01 DIAGNOSIS — Z45.2 ENCOUNTER FOR CENTRAL LINE PLACEMENT: ICD-10-CM

## 2023-01-01 DIAGNOSIS — K21.9 GASTROESOPHAGEAL REFLUX DISEASE, UNSPECIFIED WHETHER ESOPHAGITIS PRESENT: ICD-10-CM

## 2023-01-01 DIAGNOSIS — Z09 HOSPITAL DISCHARGE FOLLOW-UP: Primary | ICD-10-CM

## 2023-01-01 DIAGNOSIS — I11.0 HYPERTENSIVE HEART DISEASE WITH HEART FAILURE: ICD-10-CM

## 2023-01-01 DIAGNOSIS — M19.90 OTHER TYPE OF OSTEOARTHRITIS, UNSPECIFIED SITE: Chronic | ICD-10-CM

## 2023-01-01 DIAGNOSIS — I73.9 PERIPHERAL VASCULAR DISEASE, UNSPECIFIED: ICD-10-CM

## 2023-01-01 DIAGNOSIS — K04.7 DENTAL INFECTION: Primary | ICD-10-CM

## 2023-01-01 DIAGNOSIS — M25.561 RIGHT KNEE PAIN: ICD-10-CM

## 2023-01-01 DIAGNOSIS — J96.21 ACUTE ON CHRONIC RESPIRATORY FAILURE WITH HYPOXIA AND HYPERCAPNIA: ICD-10-CM

## 2023-01-01 DIAGNOSIS — R41.82 ALTERED MENTAL STATUS: ICD-10-CM

## 2023-01-01 DIAGNOSIS — D69.6 THROMBOCYTOPENIA: Primary | ICD-10-CM

## 2023-01-01 DIAGNOSIS — I48.91 ATRIAL FIBRILLATION, UNSPECIFIED TYPE: ICD-10-CM

## 2023-01-01 DIAGNOSIS — J96.22 ACUTE ON CHRONIC RESPIRATORY FAILURE WITH HYPOXIA AND HYPERCAPNIA: ICD-10-CM

## 2023-01-01 DIAGNOSIS — J44.9 CHRONIC OBSTRUCTIVE PULMONARY DISEASE, UNSPECIFIED COPD TYPE: ICD-10-CM

## 2023-01-01 DIAGNOSIS — H91.93 BILATERAL HEARING LOSS, UNSPECIFIED HEARING LOSS TYPE: Chronic | ICD-10-CM

## 2023-01-01 DIAGNOSIS — J96.10 CHRONIC RESPIRATORY FAILURE, UNSPECIFIED WHETHER WITH HYPOXIA OR HYPERCAPNIA: ICD-10-CM

## 2023-01-01 DIAGNOSIS — L97.229 VENOUS STASIS ULCER OF LEFT CALF, UNSPECIFIED ULCER STAGE, UNSPECIFIED WHETHER VARICOSE VEINS PRESENT: ICD-10-CM

## 2023-01-01 DIAGNOSIS — K92.1 MELENA: ICD-10-CM

## 2023-01-01 DIAGNOSIS — E66.01 MORBID OBESITY WITH BMI OF 60.0-69.9, ADULT: Chronic | ICD-10-CM

## 2023-01-01 DIAGNOSIS — E78.5 HYPERLIPIDEMIA, UNSPECIFIED HYPERLIPIDEMIA TYPE: ICD-10-CM

## 2023-01-01 DIAGNOSIS — J81.1 CHRONIC PULMONARY EDEMA: ICD-10-CM

## 2023-01-01 DIAGNOSIS — R65.21 SEPTIC SHOCK: Primary | ICD-10-CM

## 2023-01-01 DIAGNOSIS — J44.1 COPD EXACERBATION: ICD-10-CM

## 2023-01-01 DIAGNOSIS — J96.10 CHRONIC RESPIRATORY FAILURE, UNSPECIFIED WHETHER WITH HYPOXIA OR HYPERCAPNIA: Primary | ICD-10-CM

## 2023-01-01 DIAGNOSIS — N18.30 STAGE 3 CHRONIC KIDNEY DISEASE, UNSPECIFIED WHETHER STAGE 3A OR 3B CKD: Chronic | ICD-10-CM

## 2023-01-01 DIAGNOSIS — D64.9 ANEMIA, UNSPECIFIED TYPE: ICD-10-CM

## 2023-01-01 DIAGNOSIS — E87.6 HYPOKALEMIA: ICD-10-CM

## 2023-01-01 DIAGNOSIS — G47.34 NOCTURNAL HYPOXIA: ICD-10-CM

## 2023-01-01 DIAGNOSIS — J96.12 CHRONIC RESPIRATORY FAILURE WITH HYPOXIA AND HYPERCAPNIA: ICD-10-CM

## 2023-01-01 DIAGNOSIS — J96.11 CHRONIC RESPIRATORY FAILURE WITH HYPOXIA: ICD-10-CM

## 2023-01-01 DIAGNOSIS — J96.02 ACUTE RESPIRATORY FAILURE WITH HYPERCAPNIA: Primary | ICD-10-CM

## 2023-01-01 DIAGNOSIS — R19.5 DARK STOOLS: Primary | ICD-10-CM

## 2023-01-01 DIAGNOSIS — K57.92 ACUTE DIVERTICULITIS: ICD-10-CM

## 2023-01-01 DIAGNOSIS — R53.2 FUNCTIONAL QUADRIPLEGIA: ICD-10-CM

## 2023-01-01 DIAGNOSIS — I95.9 HYPOTENSION: ICD-10-CM

## 2023-01-01 DIAGNOSIS — R06.02 SOB (SHORTNESS OF BREATH): Primary | ICD-10-CM

## 2023-01-01 DIAGNOSIS — E66.01 SEVERE OBESITY (BMI >= 40): ICD-10-CM

## 2023-01-01 DIAGNOSIS — J44.1 CHRONIC OBSTRUCTIVE PULMONARY DISEASE WITH ACUTE EXACERBATION: Primary | ICD-10-CM

## 2023-01-01 DIAGNOSIS — N39.0 URINARY TRACT INFECTION WITHOUT HEMATURIA, SITE UNSPECIFIED: ICD-10-CM

## 2023-01-01 DIAGNOSIS — Z00.00 ENCOUNTER FOR PREVENTIVE HEALTH EXAMINATION: Primary | ICD-10-CM

## 2023-01-01 DIAGNOSIS — Z00.00 ENCOUNTER FOR MEDICARE ANNUAL WELLNESS EXAM: ICD-10-CM

## 2023-01-01 DIAGNOSIS — K59.00 CONSTIPATION, UNSPECIFIED CONSTIPATION TYPE: Primary | ICD-10-CM

## 2023-01-01 DIAGNOSIS — E03.9 HYPOTHYROIDISM (ACQUIRED): Primary | ICD-10-CM

## 2023-01-01 DIAGNOSIS — L03.311 CELLULITIS OF ABDOMINAL WALL: Primary | ICD-10-CM

## 2023-01-01 DIAGNOSIS — N17.9 AKI (ACUTE KIDNEY INJURY): ICD-10-CM

## 2023-01-01 DIAGNOSIS — J96.02 ACUTE HYPERCAPNIC RESPIRATORY FAILURE: Primary | ICD-10-CM

## 2023-01-01 DIAGNOSIS — N30.01 ACUTE CYSTITIS WITH HEMATURIA: ICD-10-CM

## 2023-01-01 DIAGNOSIS — N17.9 ACUTE RENAL FAILURE, UNSPECIFIED ACUTE RENAL FAILURE TYPE: ICD-10-CM

## 2023-01-01 DIAGNOSIS — G93.41 METABOLIC ENCEPHALOPATHY: Primary | ICD-10-CM

## 2023-01-01 DIAGNOSIS — G93.1 ANOXIC BRAIN DAMAGE: Primary | ICD-10-CM

## 2023-01-01 DIAGNOSIS — E03.9 PRIMARY HYPOTHYROIDISM: Primary | ICD-10-CM

## 2023-01-01 DIAGNOSIS — I48.91 A-FIB: ICD-10-CM

## 2023-01-01 DIAGNOSIS — J96.12 CHRONIC RESPIRATORY FAILURE WITH HYPERCAPNIA: ICD-10-CM

## 2023-01-01 DIAGNOSIS — J96.21 ACUTE ON CHRONIC RESPIRATORY FAILURE WITH HYPOXIA AND HYPERCAPNIA: Primary | ICD-10-CM

## 2023-01-01 DIAGNOSIS — R06.02 SHORTNESS OF BREATH: Primary | ICD-10-CM

## 2023-01-01 DIAGNOSIS — R79.89 TROPONIN I ABOVE REFERENCE RANGE: ICD-10-CM

## 2023-01-01 DIAGNOSIS — I50.9 HEART FAILURE: ICD-10-CM

## 2023-01-01 DIAGNOSIS — I83.022 VENOUS STASIS ULCER OF LEFT CALF, UNSPECIFIED ULCER STAGE, UNSPECIFIED WHETHER VARICOSE VEINS PRESENT: ICD-10-CM

## 2023-01-01 DIAGNOSIS — I87.2 VENOUS STASIS DERMATITIS OF BOTH LOWER EXTREMITIES: Chronic | ICD-10-CM

## 2023-01-01 DIAGNOSIS — G93.41 ENCEPHALOPATHY, METABOLIC: ICD-10-CM

## 2023-01-01 DIAGNOSIS — J44.1 OBSTRUCTIVE CHRONIC BRONCHITIS WITH EXACERBATION: ICD-10-CM

## 2023-01-01 DIAGNOSIS — J44.1 CHRONIC OBSTRUCTIVE PULMONARY DISEASE WITH ACUTE EXACERBATION: ICD-10-CM

## 2023-01-01 DIAGNOSIS — R53.1 WEAKNESS: ICD-10-CM

## 2023-01-01 DIAGNOSIS — L89.311 DECUBITUS ULCER OF RIGHT BUTTOCK, STAGE 1: ICD-10-CM

## 2023-01-01 DIAGNOSIS — J96.11 CHRONIC RESPIRATORY FAILURE WITH HYPOXIA AND HYPERCAPNIA: ICD-10-CM

## 2023-01-01 DIAGNOSIS — I50.32 CHRONIC DIASTOLIC HEART FAILURE: ICD-10-CM

## 2023-01-01 DIAGNOSIS — N30.01 ACUTE HEMORRHAGIC CYSTITIS: ICD-10-CM

## 2023-01-01 DIAGNOSIS — A41.9 SEPTIC SHOCK: Primary | ICD-10-CM

## 2023-01-01 DIAGNOSIS — R06.2 WHEEZING: Primary | ICD-10-CM

## 2023-01-01 DIAGNOSIS — I26.99 ACUTE PULMONARY EMBOLISM, UNSPECIFIED PULMONARY EMBOLISM TYPE, UNSPECIFIED WHETHER ACUTE COR PULMONALE PRESENT: Primary | ICD-10-CM

## 2023-01-01 DIAGNOSIS — B36.9 FUNGAL DERMATITIS: ICD-10-CM

## 2023-01-01 DIAGNOSIS — R53.81 PHYSICAL DEBILITY: ICD-10-CM

## 2023-01-01 DIAGNOSIS — R06.03 RESPIRATORY DISTRESS: ICD-10-CM

## 2023-01-01 DIAGNOSIS — F32.A DEPRESSION, UNSPECIFIED DEPRESSION TYPE: ICD-10-CM

## 2023-01-01 LAB
25(OH)D3+25(OH)D2 SERPL-MCNC: 8 NG/ML (ref 30–96)
ABO + RH BLD: NORMAL
ABO GROUP BLD: NORMAL
ACINETOBACTER CALCOACETICUS/BAUMANNII COMPLEX: NOT DETECTED
ALBUMIN SERPL BCP-MCNC: 0.7 G/DL (ref 3.5–5.2)
ALBUMIN SERPL BCP-MCNC: 0.8 G/DL (ref 3.5–5.2)
ALBUMIN SERPL BCP-MCNC: 1.1 G/DL (ref 3.5–5.2)
ALBUMIN SERPL BCP-MCNC: 1.1 G/DL (ref 3.5–5.2)
ALBUMIN SERPL BCP-MCNC: 1.2 G/DL (ref 3.5–5.2)
ALBUMIN SERPL BCP-MCNC: 1.2 G/DL (ref 3.5–5.2)
ALBUMIN SERPL BCP-MCNC: 1.3 G/DL (ref 3.5–5.2)
ALBUMIN SERPL BCP-MCNC: 1.4 G/DL (ref 3.5–5.2)
ALBUMIN SERPL BCP-MCNC: 1.4 G/DL (ref 3.5–5.2)
ALBUMIN SERPL BCP-MCNC: 1.5 G/DL (ref 3.5–5.2)
ALBUMIN SERPL BCP-MCNC: 1.6 G/DL (ref 3.5–5.2)
ALBUMIN SERPL BCP-MCNC: 1.6 G/DL (ref 3.5–5.2)
ALBUMIN SERPL BCP-MCNC: 1.7 G/DL (ref 3.5–5.2)
ALBUMIN SERPL BCP-MCNC: 1.7 G/DL (ref 3.5–5.2)
ALBUMIN SERPL BCP-MCNC: 1.8 G/DL (ref 3.5–5.2)
ALBUMIN SERPL BCP-MCNC: 1.9 G/DL (ref 3.5–5.2)
ALBUMIN SERPL BCP-MCNC: 2 G/DL (ref 3.5–5.2)
ALBUMIN SERPL BCP-MCNC: 2.2 G/DL (ref 3.5–5.2)
ALBUMIN SERPL BCP-MCNC: 2.3 G/DL (ref 3.5–5.2)
ALBUMIN SERPL BCP-MCNC: 2.4 G/DL (ref 3.5–5.2)
ALBUMIN SERPL BCP-MCNC: 2.4 G/DL (ref 3.5–5.2)
ALBUMIN SERPL BCP-MCNC: 2.6 G/DL (ref 3.5–5.2)
ALBUMIN SERPL BCP-MCNC: 2.8 G/DL (ref 3.5–5.2)
ALBUMIN SERPL BCP-MCNC: 2.9 G/DL (ref 3.5–5.2)
ALLENS TEST: ABNORMAL
ALP SERPL-CCNC: 43 U/L (ref 55–135)
ALP SERPL-CCNC: 49 U/L (ref 55–135)
ALP SERPL-CCNC: 50 U/L (ref 55–135)
ALP SERPL-CCNC: 51 U/L (ref 55–135)
ALP SERPL-CCNC: 51 U/L (ref 55–135)
ALP SERPL-CCNC: 52 U/L (ref 55–135)
ALP SERPL-CCNC: 54 U/L (ref 55–135)
ALP SERPL-CCNC: 56 U/L (ref 55–135)
ALP SERPL-CCNC: 58 U/L (ref 55–135)
ALP SERPL-CCNC: 60 U/L (ref 55–135)
ALP SERPL-CCNC: 60 U/L (ref 55–135)
ALP SERPL-CCNC: 62 U/L (ref 55–135)
ALP SERPL-CCNC: 65 U/L (ref 55–135)
ALP SERPL-CCNC: 69 U/L (ref 55–135)
ALP SERPL-CCNC: 69 U/L (ref 55–135)
ALP SERPL-CCNC: 70 U/L (ref 55–135)
ALP SERPL-CCNC: 75 U/L (ref 55–135)
ALP SERPL-CCNC: 85 U/L (ref 55–135)
ALP SERPL-CCNC: 85 U/L (ref 55–135)
ALP SERPL-CCNC: 92 U/L (ref 55–135)
ALT SERPL W/O P-5'-P-CCNC: 10 U/L (ref 10–44)
ALT SERPL W/O P-5'-P-CCNC: 11 U/L (ref 10–44)
ALT SERPL W/O P-5'-P-CCNC: 12 U/L (ref 10–44)
ALT SERPL W/O P-5'-P-CCNC: 12 U/L (ref 10–44)
ALT SERPL W/O P-5'-P-CCNC: 13 U/L (ref 10–44)
ALT SERPL W/O P-5'-P-CCNC: 13 U/L (ref 10–44)
ALT SERPL W/O P-5'-P-CCNC: 15 U/L (ref 10–44)
ALT SERPL W/O P-5'-P-CCNC: 16 U/L (ref 10–44)
ALT SERPL W/O P-5'-P-CCNC: 18 U/L (ref 10–44)
ALT SERPL W/O P-5'-P-CCNC: 336 U/L (ref 10–44)
ALT SERPL W/O P-5'-P-CCNC: 7 U/L (ref 10–44)
ALT SERPL W/O P-5'-P-CCNC: 8 U/L (ref 10–44)
ALT SERPL W/O P-5'-P-CCNC: 9 U/L (ref 10–44)
ANION GAP SERPL CALC-SCNC: 10 MMOL/L (ref 8–16)
ANION GAP SERPL CALC-SCNC: 11 MMOL/L (ref 8–16)
ANION GAP SERPL CALC-SCNC: 12 MMOL/L (ref 8–16)
ANION GAP SERPL CALC-SCNC: 12 MMOL/L (ref 8–16)
ANION GAP SERPL CALC-SCNC: 13 MMOL/L (ref 8–16)
ANION GAP SERPL CALC-SCNC: 13 MMOL/L (ref 8–16)
ANION GAP SERPL CALC-SCNC: 14 MMOL/L (ref 8–16)
ANION GAP SERPL CALC-SCNC: 15 MMOL/L (ref 8–16)
ANION GAP SERPL CALC-SCNC: 15 MMOL/L (ref 8–16)
ANION GAP SERPL CALC-SCNC: 18 MMOL/L (ref 8–16)
ANION GAP SERPL CALC-SCNC: 21 MMOL/L (ref 8–16)
ANION GAP SERPL CALC-SCNC: 22 MMOL/L (ref 8–16)
ANION GAP SERPL CALC-SCNC: 23 MMOL/L (ref 8–16)
ANION GAP SERPL CALC-SCNC: 27 MMOL/L (ref 8–16)
ANION GAP SERPL CALC-SCNC: 7 MMOL/L (ref 8–16)
ANION GAP SERPL CALC-SCNC: 8 MMOL/L (ref 8–16)
ANION GAP SERPL CALC-SCNC: 8 MMOL/L (ref 8–16)
ANION GAP SERPL CALC-SCNC: 9 MMOL/L (ref 8–16)
ANISOCYTOSIS BLD QL SMEAR: SLIGHT
AORTIC ROOT ANNULUS: 3.78 CM
AORTIC ROOT ANNULUS: 3.93 CM
APTT BLDCRRT: 22.3 SEC (ref 21–32)
APTT PPP: 100.8 SEC (ref 21–32)
ASCENDING AORTA: 2.92 CM
ASCENDING AORTA: 3.24 CM
AST SERPL-CCNC: 1196 U/L (ref 10–40)
AST SERPL-CCNC: 13 U/L (ref 10–40)
AST SERPL-CCNC: 15 U/L (ref 10–40)
AST SERPL-CCNC: 16 U/L (ref 10–40)
AST SERPL-CCNC: 17 U/L (ref 10–40)
AST SERPL-CCNC: 17 U/L (ref 10–40)
AST SERPL-CCNC: 20 U/L (ref 10–40)
AST SERPL-CCNC: 21 U/L (ref 10–40)
AST SERPL-CCNC: 22 U/L (ref 10–40)
AST SERPL-CCNC: 23 U/L (ref 10–40)
AST SERPL-CCNC: 26 U/L (ref 10–40)
AST SERPL-CCNC: 27 U/L (ref 10–40)
AV INDEX (PROSTH): 0.8
AV INDEX (PROSTH): 0.92
AV MEAN GRADIENT: 3 MMHG
AV MEAN GRADIENT: 7 MMHG
AV PEAK GRADIENT: 13 MMHG
AV PEAK GRADIENT: 7 MMHG
AV VALVE AREA BY VELOCITY RATIO: 2.28 CM²
AV VALVE AREA: 2.6 CM2
AV VALVE AREA: 2.65 CM²
AV VELOCITY RATIO: 0.78
AV VELOCITY RATIO: 0.79
BACTERIA #/AREA URNS HPF: ABNORMAL /HPF
BACTERIA BLD CULT: ABNORMAL
BACTERIA BLD CULT: NORMAL
BACTERIA UR CULT: ABNORMAL
BACTERIA UR CULT: NORMAL
BACTEROIDES FRAGILIS: NOT DETECTED
BASE EXCESS ARTERIAL: -5 MMOL/L (ref -2–2)
BASOPHILS # BLD AUTO: 0 K/UL (ref 0–0.2)
BASOPHILS # BLD AUTO: 0.01 K/UL (ref 0–0.2)
BASOPHILS # BLD AUTO: 0.02 K/UL (ref 0–0.2)
BASOPHILS # BLD AUTO: 0.03 K/UL (ref 0–0.2)
BASOPHILS # BLD AUTO: 0.04 K/UL (ref 0–0.2)
BASOPHILS # BLD AUTO: 0.05 K/UL (ref 0–0.2)
BASOPHILS # BLD AUTO: 0.05 K/UL (ref 0–0.2)
BASOPHILS # BLD AUTO: 0.06 K/UL (ref 0–0.2)
BASOPHILS # BLD AUTO: 0.07 K/UL (ref 0–0.2)
BASOPHILS # BLD AUTO: 0.08 K/UL (ref 0–0.2)
BASOPHILS # BLD AUTO: 0.11 K/UL (ref 0–0.2)
BASOPHILS # BLD AUTO: 0.12 K/UL (ref 0–0.2)
BASOPHILS # BLD AUTO: 0.12 K/UL (ref 0–0.2)
BASOPHILS NFR BLD: 0 % (ref 0–1.9)
BASOPHILS NFR BLD: 0.1 % (ref 0–1.9)
BASOPHILS NFR BLD: 0.2 % (ref 0–1.9)
BASOPHILS NFR BLD: 0.3 % (ref 0–1.9)
BASOPHILS NFR BLD: 0.4 % (ref 0–1.9)
BASOPHILS NFR BLD: 0.5 % (ref 0–1.9)
BASOPHILS NFR BLD: 0.6 % (ref 0–1.9)
BASOPHILS NFR BLD: 0.7 % (ref 0–1.9)
BASOPHILS NFR BLD: 0.8 % (ref 0–1.9)
BASOPHILS NFR BLD: 0.8 % (ref 0–1.9)
BASOPHILS NFR BLD: 0.9 % (ref 0–1.9)
BILIRUB DIRECT SERPL-MCNC: 0.2 MG/DL (ref 0.1–0.3)
BILIRUB DIRECT SERPL-MCNC: 0.2 MG/DL (ref 0.1–0.3)
BILIRUB DIRECT SERPL-MCNC: 0.3 MG/DL (ref 0.1–0.3)
BILIRUB DIRECT SERPL-MCNC: 0.3 MG/DL (ref 0.1–0.3)
BILIRUB DIRECT SERPL-MCNC: 0.4 MG/DL (ref 0.1–0.3)
BILIRUB DIRECT SERPL-MCNC: <0.1 MG/DL (ref 0.1–0.3)
BILIRUB SERPL-MCNC: 0.3 MG/DL (ref 0.1–1)
BILIRUB SERPL-MCNC: 0.4 MG/DL (ref 0.1–1)
BILIRUB SERPL-MCNC: 0.5 MG/DL (ref 0.1–1)
BILIRUB SERPL-MCNC: 0.6 MG/DL (ref 0.1–1)
BILIRUB SERPL-MCNC: 0.7 MG/DL (ref 0.1–1)
BILIRUB SERPL-MCNC: 0.7 MG/DL (ref 0.1–1)
BILIRUB SERPL-MCNC: 0.8 MG/DL (ref 0.1–1)
BILIRUB SERPL-MCNC: 1 MG/DL (ref 0.1–1)
BILIRUB SERPL-MCNC: 1.1 MG/DL (ref 0.1–1)
BILIRUB UR QL STRIP: ABNORMAL
BILIRUB UR QL STRIP: NEGATIVE
BLD GP AB SCN CELLS X3 SERPL QL: NORMAL
BLD PROD TYP BPU: NORMAL
BLOOD UNIT EXPIRATION DATE: NORMAL
BLOOD UNIT TYPE CODE: 600
BLOOD UNIT TYPE CODE: 6200
BLOOD UNIT TYPE: NORMAL
BNP SERPL-MCNC: 115 PG/ML (ref 0–99)
BNP SERPL-MCNC: 183 PG/ML (ref 0–99)
BNP SERPL-MCNC: 189 PG/ML (ref 0–99)
BNP SERPL-MCNC: 211 PG/ML (ref 0–99)
BNP SERPL-MCNC: 215 PG/ML (ref 0–99)
BNP SERPL-MCNC: 256 PG/ML (ref 0–99)
BNP SERPL-MCNC: 67 PG/ML (ref 0–99)
BNP SERPL-MCNC: 83 PG/ML (ref 0–99)
BSA FOR ECHO PROCEDURE: 2.84 M2
BSA FOR ECHO PROCEDURE: 3.07 M2
BUN SERPL-MCNC: 18 MG/DL (ref 8–23)
BUN SERPL-MCNC: 20 MG/DL (ref 8–23)
BUN SERPL-MCNC: 21 MG/DL (ref 8–23)
BUN SERPL-MCNC: 22 MG/DL (ref 8–23)
BUN SERPL-MCNC: 22 MG/DL (ref 8–23)
BUN SERPL-MCNC: 23 MG/DL (ref 8–23)
BUN SERPL-MCNC: 24 MG/DL (ref 8–23)
BUN SERPL-MCNC: 25 MG/DL (ref 8–23)
BUN SERPL-MCNC: 26 MG/DL (ref 8–23)
BUN SERPL-MCNC: 27 MG/DL (ref 8–23)
BUN SERPL-MCNC: 28 MG/DL (ref 8–23)
BUN SERPL-MCNC: 28 MG/DL (ref 8–23)
BUN SERPL-MCNC: 32 MG/DL (ref 8–23)
BUN SERPL-MCNC: 38 MG/DL (ref 8–23)
BUN SERPL-MCNC: 40 MG/DL (ref 8–23)
BUN SERPL-MCNC: 45 MG/DL (ref 8–23)
BUN SERPL-MCNC: 50 MG/DL (ref 8–23)
BUN SERPL-MCNC: 50 MG/DL (ref 8–23)
BUN SERPL-MCNC: 51 MG/DL (ref 8–23)
BUN SERPL-MCNC: 52 MG/DL (ref 8–23)
BUN SERPL-MCNC: 55 MG/DL (ref 8–23)
BUN SERPL-MCNC: 56 MG/DL (ref 8–23)
BUN SERPL-MCNC: 57 MG/DL (ref 8–23)
BUN SERPL-MCNC: 58 MG/DL (ref 8–23)
BUN SERPL-MCNC: 59 MG/DL (ref 8–23)
BUN SERPL-MCNC: 60 MG/DL (ref 8–23)
BUN SERPL-MCNC: 60 MG/DL (ref 8–23)
BUN SERPL-MCNC: 61 MG/DL (ref 8–23)
BUN SERPL-MCNC: 61 MG/DL (ref 8–23)
BUN SERPL-MCNC: 62 MG/DL (ref 8–23)
BUN SERPL-MCNC: 63 MG/DL (ref 8–23)
BUN SERPL-MCNC: 63 MG/DL (ref 8–23)
BUN SERPL-MCNC: 64 MG/DL (ref 8–23)
BUN SERPL-MCNC: 65 MG/DL (ref 8–23)
BUN SERPL-MCNC: 66 MG/DL (ref 8–23)
BUN SERPL-MCNC: 68 MG/DL (ref 8–23)
BUN SERPL-MCNC: 73 MG/DL (ref 8–23)
BUN SERPL-MCNC: 78 MG/DL (ref 8–23)
BUN SERPL-MCNC: 79 MG/DL (ref 8–23)
BUN SERPL-MCNC: 86 MG/DL (ref 8–23)
BUN SERPL-MCNC: 88 MG/DL (ref 8–23)
BUN SERPL-MCNC: 89 MG/DL (ref 8–23)
BUN SERPL-MCNC: 91 MG/DL (ref 8–23)
BURR CELLS BLD QL SMEAR: ABNORMAL
CALCIUM SERPL-MCNC: 5.9 MG/DL (ref 8.7–10.5)
CALCIUM SERPL-MCNC: 6 MG/DL (ref 8.7–10.5)
CALCIUM SERPL-MCNC: 6.1 MG/DL (ref 8.7–10.5)
CALCIUM SERPL-MCNC: 6.7 MG/DL (ref 8.7–10.5)
CALCIUM SERPL-MCNC: 6.8 MG/DL (ref 8.7–10.5)
CALCIUM SERPL-MCNC: 6.8 MG/DL (ref 8.7–10.5)
CALCIUM SERPL-MCNC: 6.9 MG/DL (ref 8.7–10.5)
CALCIUM SERPL-MCNC: 7 MG/DL (ref 8.7–10.5)
CALCIUM SERPL-MCNC: 7.1 MG/DL (ref 8.7–10.5)
CALCIUM SERPL-MCNC: 7.2 MG/DL (ref 8.7–10.5)
CALCIUM SERPL-MCNC: 7.3 MG/DL (ref 8.7–10.5)
CALCIUM SERPL-MCNC: 7.3 MG/DL (ref 8.7–10.5)
CALCIUM SERPL-MCNC: 7.4 MG/DL (ref 8.7–10.5)
CALCIUM SERPL-MCNC: 7.5 MG/DL (ref 8.7–10.5)
CALCIUM SERPL-MCNC: 7.6 MG/DL (ref 8.7–10.5)
CALCIUM SERPL-MCNC: 7.7 MG/DL (ref 8.7–10.5)
CALCIUM SERPL-MCNC: 7.9 MG/DL (ref 8.7–10.5)
CALCIUM SERPL-MCNC: 8 MG/DL (ref 8.7–10.5)
CALCIUM SERPL-MCNC: 8.2 MG/DL (ref 8.7–10.5)
CALCIUM SERPL-MCNC: 8.5 MG/DL (ref 8.7–10.5)
CALCIUM SERPL-MCNC: 8.6 MG/DL (ref 8.7–10.5)
CALCIUM SERPL-MCNC: 8.6 MG/DL (ref 8.7–10.5)
CALCIUM SERPL-MCNC: 8.8 MG/DL (ref 8.7–10.5)
CALCIUM SERPL-MCNC: 9.3 MG/DL (ref 8.7–10.5)
CALCIUM SERPL-MCNC: 9.5 MG/DL (ref 8.7–10.5)
CALCIUM SERPL-MCNC: 9.5 MG/DL (ref 8.7–10.5)
CANDIDA ALBICANS: NOT DETECTED
CANDIDA AURIS: NOT DETECTED
CANDIDA GLABRATA: NOT DETECTED
CANDIDA KRUSEI: NOT DETECTED
CANDIDA PARAPSILOSIS: NOT DETECTED
CANDIDA TROPICALIS: NOT DETECTED
CHLORIDE SERPL-SCNC: 102 MMOL/L (ref 95–110)
CHLORIDE SERPL-SCNC: 104 MMOL/L (ref 95–110)
CHLORIDE SERPL-SCNC: 108 MMOL/L (ref 95–110)
CHLORIDE SERPL-SCNC: 108 MMOL/L (ref 95–110)
CHLORIDE SERPL-SCNC: 110 MMOL/L (ref 95–110)
CHLORIDE SERPL-SCNC: 110 MMOL/L (ref 95–110)
CHLORIDE SERPL-SCNC: 111 MMOL/L (ref 95–110)
CHLORIDE SERPL-SCNC: 112 MMOL/L (ref 95–110)
CHLORIDE SERPL-SCNC: 113 MMOL/L (ref 95–110)
CHLORIDE SERPL-SCNC: 113 MMOL/L (ref 95–110)
CHLORIDE SERPL-SCNC: 115 MMOL/L (ref 95–110)
CHLORIDE SERPL-SCNC: 115 MMOL/L (ref 95–110)
CHLORIDE SERPL-SCNC: 116 MMOL/L (ref 95–110)
CHLORIDE SERPL-SCNC: 88 MMOL/L (ref 95–110)
CHLORIDE SERPL-SCNC: 88 MMOL/L (ref 95–110)
CHLORIDE SERPL-SCNC: 89 MMOL/L (ref 95–110)
CHLORIDE SERPL-SCNC: 90 MMOL/L (ref 95–110)
CHLORIDE SERPL-SCNC: 92 MMOL/L (ref 95–110)
CHLORIDE SERPL-SCNC: 93 MMOL/L (ref 95–110)
CHLORIDE SERPL-SCNC: 93 MMOL/L (ref 95–110)
CHLORIDE SERPL-SCNC: 94 MMOL/L (ref 95–110)
CHLORIDE SERPL-SCNC: 94 MMOL/L (ref 95–110)
CHLORIDE SERPL-SCNC: 95 MMOL/L (ref 95–110)
CHLORIDE SERPL-SCNC: 95 MMOL/L (ref 95–110)
CHLORIDE SERPL-SCNC: 96 MMOL/L (ref 95–110)
CHLORIDE SERPL-SCNC: 96 MMOL/L (ref 95–110)
CHLORIDE SERPL-SCNC: 97 MMOL/L (ref 95–110)
CHLORIDE SERPL-SCNC: 99 MMOL/L (ref 95–110)
CHLORIDE UR-SCNC: 25 MMOL/L (ref 25–200)
CLARITY UR: ABNORMAL
CO2 SERPL-SCNC: 12 MMOL/L (ref 23–29)
CO2 SERPL-SCNC: 14 MMOL/L (ref 23–29)
CO2 SERPL-SCNC: 17 MMOL/L (ref 23–29)
CO2 SERPL-SCNC: 18 MMOL/L (ref 23–29)
CO2 SERPL-SCNC: 18 MMOL/L (ref 23–29)
CO2 SERPL-SCNC: 19 MMOL/L (ref 23–29)
CO2 SERPL-SCNC: 19 MMOL/L (ref 23–29)
CO2 SERPL-SCNC: 20 MMOL/L (ref 23–29)
CO2 SERPL-SCNC: 21 MMOL/L (ref 23–29)
CO2 SERPL-SCNC: 21 MMOL/L (ref 23–29)
CO2 SERPL-SCNC: 22 MMOL/L (ref 23–29)
CO2 SERPL-SCNC: 23 MMOL/L (ref 23–29)
CO2 SERPL-SCNC: 28 MMOL/L (ref 23–29)
CO2 SERPL-SCNC: 29 MMOL/L (ref 23–29)
CO2 SERPL-SCNC: 29 MMOL/L (ref 23–29)
CO2 SERPL-SCNC: 32 MMOL/L (ref 23–29)
CO2 SERPL-SCNC: 33 MMOL/L (ref 23–29)
CO2 SERPL-SCNC: 33 MMOL/L (ref 23–29)
CO2 SERPL-SCNC: 35 MMOL/L (ref 23–29)
CO2 SERPL-SCNC: 36 MMOL/L (ref 23–29)
CO2 SERPL-SCNC: 38 MMOL/L (ref 23–29)
CO2 SERPL-SCNC: 39 MMOL/L (ref 23–29)
CO2 SERPL-SCNC: 39 MMOL/L (ref 23–29)
CO2 SERPL-SCNC: 40 MMOL/L (ref 23–29)
CO2 SERPL-SCNC: 42 MMOL/L (ref 23–29)
CO2 SERPL-SCNC: 43 MMOL/L (ref 23–29)
CO2 SERPL-SCNC: 47 MMOL/L (ref 23–29)
CO2 SERPL-SCNC: 5 MMOL/L (ref 23–29)
CO2 SERPL-SCNC: 6 MMOL/L (ref 23–29)
CO2 SERPL-SCNC: 6 MMOL/L (ref 23–29)
CO2 SERPL-SCNC: 8 MMOL/L (ref 23–29)
CODING SYSTEM: NORMAL
COLOR UR: YELLOW
CREAT SERPL-MCNC: 0.8 MG/DL (ref 0.5–1.4)
CREAT SERPL-MCNC: 0.8 MG/DL (ref 0.5–1.4)
CREAT SERPL-MCNC: 1 MG/DL (ref 0.5–1.4)
CREAT SERPL-MCNC: 1.1 MG/DL (ref 0.5–1.4)
CREAT SERPL-MCNC: 1.1 MG/DL (ref 0.5–1.4)
CREAT SERPL-MCNC: 1.3 MG/DL (ref 0.5–1.4)
CREAT SERPL-MCNC: 1.7 MG/DL (ref 0.5–1.4)
CREAT SERPL-MCNC: 1.8 MG/DL (ref 0.5–1.4)
CREAT SERPL-MCNC: 1.9 MG/DL (ref 0.5–1.4)
CREAT SERPL-MCNC: 1.9 MG/DL (ref 0.5–1.4)
CREAT SERPL-MCNC: 2 MG/DL (ref 0.5–1.4)
CREAT SERPL-MCNC: 2.1 MG/DL (ref 0.5–1.4)
CREAT SERPL-MCNC: 2.2 MG/DL (ref 0.5–1.4)
CREAT SERPL-MCNC: 2.2 MG/DL (ref 0.5–1.4)
CREAT SERPL-MCNC: 2.3 MG/DL (ref 0.5–1.4)
CREAT SERPL-MCNC: 2.3 MG/DL (ref 0.5–1.4)
CREAT SERPL-MCNC: 2.5 MG/DL (ref 0.5–1.4)
CREAT SERPL-MCNC: 2.7 MG/DL (ref 0.5–1.4)
CREAT SERPL-MCNC: 2.7 MG/DL (ref 0.5–1.4)
CREAT SERPL-MCNC: 2.8 MG/DL (ref 0.5–1.4)
CREAT SERPL-MCNC: 2.9 MG/DL (ref 0.5–1.4)
CREAT SERPL-MCNC: 3.3 MG/DL (ref 0.5–1.4)
CREAT SERPL-MCNC: 3.7 MG/DL (ref 0.5–1.4)
CREAT SERPL-MCNC: 3.9 MG/DL (ref 0.5–1.4)
CREAT SERPL-MCNC: 4 MG/DL (ref 0.5–1.4)
CREAT SERPL-MCNC: 4.1 MG/DL (ref 0.5–1.4)
CREAT SERPL-MCNC: 4.1 MG/DL (ref 0.5–1.4)
CREAT SERPL-MCNC: 4.3 MG/DL (ref 0.5–1.4)
CREAT UR-MCNC: 119.4 MG/DL (ref 23–375)
CREAT UR-MCNC: 44.6 MG/DL (ref 23–375)
CREAT UR-MCNC: 93.1 MG/DL (ref 23–375)
CROSSMATCH INTERPRETATION: NORMAL
CRYPTOCOCCUS NEOFORMANS/GATTII: NOT DETECTED
CTX-M GENE: NOT DETECTED
CV ECHO LV RWT: 0.33 CM
CV ECHO LV RWT: 0.46 CM
DELSYS: ABNORMAL
DIFFERENTIAL METHOD: ABNORMAL
DISPENSE STATUS: NORMAL
DOP CALC AO PEAK VEL: 1.3 M/S
DOP CALC AO PEAK VEL: 1.78 M/S
DOP CALC AO VTI: 23.4 CM
DOP CALC AO VTI: 33.7 CM
DOP CALC LVOT AREA: 2.9 CM2
DOP CALC LVOT AREA: 3.2 CM2
DOP CALC LVOT DIAMETER: 1.92 CM
DOP CALC LVOT DIAMETER: 2.03 CM
DOP CALC LVOT PEAK VEL: 1.02 M/S
DOP CALC LVOT PEAK VEL: 1.4 M/S
DOP CALC LVOT STROKE VOLUME: 60.82 CM3
DOP CALC LVOT STROKE VOLUME: 89.42 CM3
DOP CALC RVOT PEAK VEL: 1.37 M/S
DOP CALC RVOT VTI: 24.9 CM
DOP CALCLVOT PEAK VEL VTI: 18.8 CM
DOP CALCLVOT PEAK VEL VTI: 30.9 CM
ECHO LV POSTERIOR WALL: 0.89 CM (ref 0.6–1.1)
ECHO LV POSTERIOR WALL: 1.29 CM (ref 0.6–1.1)
EJECTION FRACTION: 55 %
ENTEROBACTER CLOACAE COMPLEX: NOT DETECTED
ENTEROBACTERALES: NOT DETECTED
ENTEROCOCCUS FAECALIS: NOT DETECTED
ENTEROCOCCUS FAECIUM: NOT DETECTED
EOSINOPHIL # BLD AUTO: 0 K/UL (ref 0–0.5)
EOSINOPHIL # BLD AUTO: 0.1 K/UL (ref 0–0.5)
EOSINOPHIL # BLD AUTO: 0.2 K/UL (ref 0–0.5)
EOSINOPHIL # BLD AUTO: 0.3 K/UL (ref 0–0.5)
EOSINOPHIL # BLD AUTO: 0.5 K/UL (ref 0–0.5)
EOSINOPHIL # BLD AUTO: 0.5 K/UL (ref 0–0.5)
EOSINOPHIL # BLD AUTO: 0.7 K/UL (ref 0–0.5)
EOSINOPHIL NFR BLD: 0 % (ref 0–8)
EOSINOPHIL NFR BLD: 0.2 % (ref 0–8)
EOSINOPHIL NFR BLD: 0.3 % (ref 0–8)
EOSINOPHIL NFR BLD: 0.4 % (ref 0–8)
EOSINOPHIL NFR BLD: 1 % (ref 0–8)
EOSINOPHIL NFR BLD: 1 % (ref 0–8)
EOSINOPHIL NFR BLD: 1.5 % (ref 0–8)
EOSINOPHIL NFR BLD: 1.5 % (ref 0–8)
EOSINOPHIL NFR BLD: 1.7 % (ref 0–8)
EOSINOPHIL NFR BLD: 1.7 % (ref 0–8)
EOSINOPHIL NFR BLD: 1.8 % (ref 0–8)
EOSINOPHIL NFR BLD: 1.8 % (ref 0–8)
EOSINOPHIL NFR BLD: 1.9 % (ref 0–8)
EOSINOPHIL NFR BLD: 2 % (ref 0–8)
EOSINOPHIL NFR BLD: 2 % (ref 0–8)
EOSINOPHIL NFR BLD: 2.1 % (ref 0–8)
EOSINOPHIL NFR BLD: 2.2 % (ref 0–8)
EOSINOPHIL NFR BLD: 2.3 % (ref 0–8)
EOSINOPHIL NFR BLD: 2.6 % (ref 0–8)
EOSINOPHIL NFR BLD: 2.7 % (ref 0–8)
EOSINOPHIL NFR BLD: 2.8 % (ref 0–8)
EOSINOPHIL NFR BLD: 2.8 % (ref 0–8)
EOSINOPHIL NFR BLD: 3 % (ref 0–8)
EOSINOPHIL NFR BLD: 3 % (ref 0–8)
EOSINOPHIL NFR BLD: 3.2 % (ref 0–8)
EOSINOPHIL NFR BLD: 3.7 % (ref 0–8)
EOSINOPHIL NFR BLD: 3.7 % (ref 0–8)
EOSINOPHIL NFR BLD: 4 % (ref 0–8)
EOSINOPHIL NFR BLD: 4.3 % (ref 0–8)
EOSINOPHIL NFR BLD: 5.7 % (ref 0–8)
EOSINOPHIL NFR BLD: 6.3 % (ref 0–8)
EOSINOPHIL NFR BLD: 9.6 % (ref 0–8)
EP: 5
EP: 6
EP: 7
EP: 7
EP: 8
ERYTHROCYTE [DISTWIDTH] IN BLOOD BY AUTOMATED COUNT: 14.2 % (ref 11.5–14.5)
ERYTHROCYTE [DISTWIDTH] IN BLOOD BY AUTOMATED COUNT: 14.4 % (ref 11.5–14.5)
ERYTHROCYTE [DISTWIDTH] IN BLOOD BY AUTOMATED COUNT: 14.5 % (ref 11.5–14.5)
ERYTHROCYTE [DISTWIDTH] IN BLOOD BY AUTOMATED COUNT: 14.6 % (ref 11.5–14.5)
ERYTHROCYTE [DISTWIDTH] IN BLOOD BY AUTOMATED COUNT: 14.7 % (ref 11.5–14.5)
ERYTHROCYTE [DISTWIDTH] IN BLOOD BY AUTOMATED COUNT: 14.9 % (ref 11.5–14.5)
ERYTHROCYTE [DISTWIDTH] IN BLOOD BY AUTOMATED COUNT: 15 % (ref 11.5–14.5)
ERYTHROCYTE [DISTWIDTH] IN BLOOD BY AUTOMATED COUNT: 15.6 % (ref 11.5–14.5)
ERYTHROCYTE [DISTWIDTH] IN BLOOD BY AUTOMATED COUNT: 16 % (ref 11.5–14.5)
ERYTHROCYTE [DISTWIDTH] IN BLOOD BY AUTOMATED COUNT: 16.3 % (ref 11.5–14.5)
ERYTHROCYTE [DISTWIDTH] IN BLOOD BY AUTOMATED COUNT: 16.4 % (ref 11.5–14.5)
ERYTHROCYTE [DISTWIDTH] IN BLOOD BY AUTOMATED COUNT: 16.5 % (ref 11.5–14.5)
ERYTHROCYTE [DISTWIDTH] IN BLOOD BY AUTOMATED COUNT: 16.5 % (ref 11.5–14.5)
ERYTHROCYTE [DISTWIDTH] IN BLOOD BY AUTOMATED COUNT: 16.6 % (ref 11.5–14.5)
ERYTHROCYTE [DISTWIDTH] IN BLOOD BY AUTOMATED COUNT: 16.8 % (ref 11.5–14.5)
ERYTHROCYTE [DISTWIDTH] IN BLOOD BY AUTOMATED COUNT: 17 % (ref 11.5–14.5)
ERYTHROCYTE [DISTWIDTH] IN BLOOD BY AUTOMATED COUNT: 17 % (ref 11.5–14.5)
ERYTHROCYTE [DISTWIDTH] IN BLOOD BY AUTOMATED COUNT: 17.2 % (ref 11.5–14.5)
ERYTHROCYTE [DISTWIDTH] IN BLOOD BY AUTOMATED COUNT: 17.3 % (ref 11.5–14.5)
ERYTHROCYTE [DISTWIDTH] IN BLOOD BY AUTOMATED COUNT: 17.4 % (ref 11.5–14.5)
ERYTHROCYTE [DISTWIDTH] IN BLOOD BY AUTOMATED COUNT: 17.4 % (ref 11.5–14.5)
ERYTHROCYTE [DISTWIDTH] IN BLOOD BY AUTOMATED COUNT: 17.5 % (ref 11.5–14.5)
ERYTHROCYTE [DISTWIDTH] IN BLOOD BY AUTOMATED COUNT: 17.7 % (ref 11.5–14.5)
ERYTHROCYTE [DISTWIDTH] IN BLOOD BY AUTOMATED COUNT: 17.8 % (ref 11.5–14.5)
ERYTHROCYTE [DISTWIDTH] IN BLOOD BY AUTOMATED COUNT: 18 % (ref 11.5–14.5)
ERYTHROCYTE [DISTWIDTH] IN BLOOD BY AUTOMATED COUNT: 18 % (ref 11.5–14.5)
ERYTHROCYTE [DISTWIDTH] IN BLOOD BY AUTOMATED COUNT: 18.3 % (ref 11.5–14.5)
ERYTHROCYTE [DISTWIDTH] IN BLOOD BY AUTOMATED COUNT: 18.4 % (ref 11.5–14.5)
ERYTHROCYTE [DISTWIDTH] IN BLOOD BY AUTOMATED COUNT: 18.4 % (ref 11.5–14.5)
ERYTHROCYTE [DISTWIDTH] IN BLOOD BY AUTOMATED COUNT: 18.6 % (ref 11.5–14.5)
ERYTHROCYTE [DISTWIDTH] IN BLOOD BY AUTOMATED COUNT: 18.8 % (ref 11.5–14.5)
ERYTHROCYTE [DISTWIDTH] IN BLOOD BY AUTOMATED COUNT: 18.8 % (ref 11.5–14.5)
ERYTHROCYTE [DISTWIDTH] IN BLOOD BY AUTOMATED COUNT: 18.9 % (ref 11.5–14.5)
ERYTHROCYTE [SEDIMENTATION RATE] IN BLOOD BY WESTERGREN METHOD: 10 MM/H
ERYTHROCYTE [SEDIMENTATION RATE] IN BLOOD BY WESTERGREN METHOD: 12 MM/H
ERYTHROCYTE [SEDIMENTATION RATE] IN BLOOD BY WESTERGREN METHOD: 15 MM/H
ERYTHROCYTE [SEDIMENTATION RATE] IN BLOOD BY WESTERGREN METHOD: 16 MM/H
ERYTHROCYTE [SEDIMENTATION RATE] IN BLOOD BY WESTERGREN METHOD: 26 MM/H
ERYTHROCYTE [SEDIMENTATION RATE] IN BLOOD BY WESTERGREN METHOD: 26 MM/H
ESCHERICHIA COLI: NOT DETECTED
EST. GFR  (NO RACE VARIABLE): 13 ML/MIN/1.73 M^2
EST. GFR  (NO RACE VARIABLE): 14 ML/MIN/1.73 M^2
EST. GFR  (NO RACE VARIABLE): 14 ML/MIN/1.73 M^2
EST. GFR  (NO RACE VARIABLE): 15 ML/MIN/1.73 M^2
EST. GFR  (NO RACE VARIABLE): 15 ML/MIN/1.73 M^2
EST. GFR  (NO RACE VARIABLE): 16 ML/MIN/1.73 M^2
EST. GFR  (NO RACE VARIABLE): 18 ML/MIN/1.73 M^2
EST. GFR  (NO RACE VARIABLE): 21 ML/MIN/1.73 M^2
EST. GFR  (NO RACE VARIABLE): 22 ML/MIN/1.73 M^2
EST. GFR  (NO RACE VARIABLE): 23 ML/MIN/1.73 M^2
EST. GFR  (NO RACE VARIABLE): 23 ML/MIN/1.73 M^2
EST. GFR  (NO RACE VARIABLE): 25 ML/MIN/1.73 M^2
EST. GFR  (NO RACE VARIABLE): 28 ML/MIN/1.73 M^2
EST. GFR  (NO RACE VARIABLE): 28 ML/MIN/1.73 M^2
EST. GFR  (NO RACE VARIABLE): 30 ML/MIN/1.73 M^2
EST. GFR  (NO RACE VARIABLE): 30 ML/MIN/1.73 M^2
EST. GFR  (NO RACE VARIABLE): 31 ML/MIN/1.73 M^2
EST. GFR  (NO RACE VARIABLE): 33 ML/MIN/1.73 M^2
EST. GFR  (NO RACE VARIABLE): 35 ML/MIN/1.73 M^2
EST. GFR  (NO RACE VARIABLE): 35 ML/MIN/1.73 M^2
EST. GFR  (NO RACE VARIABLE): 38 ML/MIN/1.73 M^2
EST. GFR  (NO RACE VARIABLE): 41 ML/MIN/1.73 M^2
EST. GFR  (NO RACE VARIABLE): 56 ML/MIN/1.73 M^2
EST. GFR  (NO RACE VARIABLE): >60 ML/MIN/1.73 M^2
FIO2: 30
FIO2: 30
FIO2: 35
FIO2: 36
FIO2: 40
FIO2: 50
FIO2: 50
FLOW: 2
FLOW: 4
FLOW: 4
FLOW: 5
FRACTIONAL SHORTENING: 32 % (ref 28–44)
FRACTIONAL SHORTENING: 33 % (ref 28–44)
GLUCOSE SERPL-MCNC: 100 MG/DL (ref 70–110)
GLUCOSE SERPL-MCNC: 100 MG/DL (ref 70–110)
GLUCOSE SERPL-MCNC: 104 MG/DL (ref 70–110)
GLUCOSE SERPL-MCNC: 109 MG/DL (ref 70–110)
GLUCOSE SERPL-MCNC: 109 MG/DL (ref 70–110)
GLUCOSE SERPL-MCNC: 110 MG/DL (ref 70–110)
GLUCOSE SERPL-MCNC: 111 MG/DL (ref 70–110)
GLUCOSE SERPL-MCNC: 112 MG/DL (ref 70–110)
GLUCOSE SERPL-MCNC: 134 MG/DL (ref 70–110)
GLUCOSE SERPL-MCNC: 134 MG/DL (ref 70–110)
GLUCOSE SERPL-MCNC: 135 MG/DL (ref 70–110)
GLUCOSE SERPL-MCNC: 153 MG/DL (ref 70–110)
GLUCOSE SERPL-MCNC: 159 MG/DL (ref 70–110)
GLUCOSE SERPL-MCNC: 173 MG/DL (ref 70–110)
GLUCOSE SERPL-MCNC: 184 MG/DL (ref 70–110)
GLUCOSE SERPL-MCNC: 193 MG/DL (ref 70–110)
GLUCOSE SERPL-MCNC: 195 MG/DL (ref 70–110)
GLUCOSE SERPL-MCNC: 228 MG/DL (ref 70–110)
GLUCOSE SERPL-MCNC: 230 MG/DL (ref 70–110)
GLUCOSE SERPL-MCNC: 235 MG/DL (ref 70–110)
GLUCOSE SERPL-MCNC: 63 MG/DL (ref 70–110)
GLUCOSE SERPL-MCNC: 63 MG/DL (ref 70–110)
GLUCOSE SERPL-MCNC: 66 MG/DL (ref 70–110)
GLUCOSE SERPL-MCNC: 69 MG/DL (ref 70–110)
GLUCOSE SERPL-MCNC: 76 MG/DL (ref 70–110)
GLUCOSE SERPL-MCNC: 76 MG/DL (ref 70–110)
GLUCOSE SERPL-MCNC: 79 MG/DL (ref 70–110)
GLUCOSE SERPL-MCNC: 81 MG/DL (ref 70–110)
GLUCOSE SERPL-MCNC: 82 MG/DL (ref 70–110)
GLUCOSE SERPL-MCNC: 83 MG/DL (ref 70–110)
GLUCOSE SERPL-MCNC: 86 MG/DL (ref 70–110)
GLUCOSE SERPL-MCNC: 89 MG/DL (ref 70–110)
GLUCOSE SERPL-MCNC: 89 MG/DL (ref 70–110)
GLUCOSE SERPL-MCNC: 92 MG/DL (ref 70–110)
GLUCOSE SERPL-MCNC: 93 MG/DL (ref 70–110)
GLUCOSE SERPL-MCNC: 94 MG/DL (ref 70–110)
GLUCOSE SERPL-MCNC: 95 MG/DL (ref 70–110)
GLUCOSE UR QL STRIP: NEGATIVE
HAEMOPHILUS INFLUENZAE: NOT DETECTED
HCO3 ARTERIAL: 22 MMOL/L (ref 18–23)
HCO3 UR-SCNC: 20 MMOL/L (ref 24–28)
HCO3 UR-SCNC: 21.2 MMOL/L (ref 24–28)
HCO3 UR-SCNC: 21.9 MMOL/L
HCO3 UR-SCNC: 22.8 MMOL/L
HCO3 UR-SCNC: 23.7 MMOL/L (ref 24–28)
HCO3 UR-SCNC: 35 MMOL/L (ref 24–28)
HCO3 UR-SCNC: 47.6 MMOL/L (ref 24–28)
HCO3 UR-SCNC: 47.8 MMOL/L (ref 24–28)
HCO3 UR-SCNC: 48.3 MMOL/L (ref 24–28)
HCO3 UR-SCNC: 49 MMOL/L (ref 24–28)
HCO3 UR-SCNC: 49.1 MMOL/L (ref 24–28)
HCO3 UR-SCNC: 51.5 MMOL/L (ref 24–28)
HCT VFR BLD AUTO: 16.3 % (ref 40–54)
HCT VFR BLD AUTO: 18 % (ref 40–54)
HCT VFR BLD AUTO: 20.8 % (ref 40–54)
HCT VFR BLD AUTO: 22.4 % (ref 40–54)
HCT VFR BLD AUTO: 22.8 % (ref 40–54)
HCT VFR BLD AUTO: 22.9 % (ref 40–54)
HCT VFR BLD AUTO: 22.9 % (ref 40–54)
HCT VFR BLD AUTO: 23.3 % (ref 40–54)
HCT VFR BLD AUTO: 23.4 % (ref 40–54)
HCT VFR BLD AUTO: 23.5 % (ref 40–54)
HCT VFR BLD AUTO: 23.7 % (ref 40–54)
HCT VFR BLD AUTO: 23.8 % (ref 40–54)
HCT VFR BLD AUTO: 23.9 % (ref 40–54)
HCT VFR BLD AUTO: 24.2 % (ref 40–54)
HCT VFR BLD AUTO: 24.4 % (ref 40–54)
HCT VFR BLD AUTO: 24.9 % (ref 40–54)
HCT VFR BLD AUTO: 25.1 % (ref 40–54)
HCT VFR BLD AUTO: 25.2 % (ref 40–54)
HCT VFR BLD AUTO: 25.6 % (ref 40–54)
HCT VFR BLD AUTO: 25.7 % (ref 40–54)
HCT VFR BLD AUTO: 25.8 % (ref 40–54)
HCT VFR BLD AUTO: 25.9 % (ref 40–54)
HCT VFR BLD AUTO: 26.1 % (ref 40–54)
HCT VFR BLD AUTO: 26.3 % (ref 40–54)
HCT VFR BLD AUTO: 26.3 % (ref 40–54)
HCT VFR BLD AUTO: 26.4 % (ref 40–54)
HCT VFR BLD AUTO: 26.4 % (ref 40–54)
HCT VFR BLD AUTO: 26.5 % (ref 40–54)
HCT VFR BLD AUTO: 26.7 % (ref 40–54)
HCT VFR BLD AUTO: 27.5 % (ref 40–54)
HCT VFR BLD AUTO: 28.7 % (ref 40–54)
HCT VFR BLD AUTO: 28.7 % (ref 40–54)
HCT VFR BLD AUTO: 28.8 % (ref 40–54)
HCT VFR BLD AUTO: 30.3 % (ref 40–54)
HCT VFR BLD AUTO: 30.9 % (ref 40–54)
HCT VFR BLD AUTO: 31 % (ref 40–54)
HCT VFR BLD AUTO: 31.6 % (ref 40–54)
HCT VFR BLD AUTO: 31.7 % (ref 40–54)
HCT VFR BLD AUTO: 32.2 % (ref 40–54)
HCT VFR BLD AUTO: 32.2 % (ref 40–54)
HCT VFR BLD AUTO: 33 % (ref 40–54)
HCT VFR BLD AUTO: 33.7 % (ref 40–54)
HCT VFR BLD AUTO: 34 % (ref 40–54)
HCT VFR BLD AUTO: 34 % (ref 40–54)
HCT VFR BLD AUTO: 34.3 % (ref 40–54)
HCT VFR BLD AUTO: 34.8 % (ref 40–54)
HCT VFR BLD AUTO: 35.2 % (ref 40–54)
HCT VFR BLD AUTO: 35.3 % (ref 40–54)
HCT VFR BLD AUTO: 35.6 % (ref 40–54)
HCT VFR BLD AUTO: 38.3 % (ref 40–54)
HCT VFR BLD AUTO: 38.9 % (ref 40–54)
HCT VFR BLD AUTO: 43.7 % (ref 40–54)
HCV AB SERPL QL IA: NEGATIVE
HEP C VIRUS HOLD SPECIMEN: NORMAL
HGB BLD-MCNC: 10 G/DL (ref 14–18)
HGB BLD-MCNC: 10.2 G/DL (ref 14–18)
HGB BLD-MCNC: 10.2 G/DL (ref 14–18)
HGB BLD-MCNC: 10.3 G/DL (ref 14–18)
HGB BLD-MCNC: 10.3 G/DL (ref 14–18)
HGB BLD-MCNC: 10.4 G/DL (ref 14–18)
HGB BLD-MCNC: 10.4 G/DL (ref 14–18)
HGB BLD-MCNC: 10.6 G/DL (ref 14–18)
HGB BLD-MCNC: 10.8 G/DL (ref 14–18)
HGB BLD-MCNC: 11.4 G/DL (ref 14–18)
HGB BLD-MCNC: 12.1 G/DL (ref 14–18)
HGB BLD-MCNC: 13.1 G/DL (ref 14–18)
HGB BLD-MCNC: 5.3 G/DL (ref 14–18)
HGB BLD-MCNC: 5.6 G/DL (ref 14–18)
HGB BLD-MCNC: 6.4 G/DL (ref 14–18)
HGB BLD-MCNC: 7 G/DL (ref 14–18)
HGB BLD-MCNC: 7.3 G/DL (ref 14–18)
HGB BLD-MCNC: 7.3 G/DL (ref 14–18)
HGB BLD-MCNC: 7.4 G/DL (ref 14–18)
HGB BLD-MCNC: 7.4 G/DL (ref 14–18)
HGB BLD-MCNC: 7.5 G/DL (ref 14–18)
HGB BLD-MCNC: 7.6 G/DL (ref 14–18)
HGB BLD-MCNC: 7.7 G/DL (ref 14–18)
HGB BLD-MCNC: 7.8 G/DL (ref 14–18)
HGB BLD-MCNC: 7.9 G/DL (ref 14–18)
HGB BLD-MCNC: 7.9 G/DL (ref 14–18)
HGB BLD-MCNC: 8 G/DL (ref 14–18)
HGB BLD-MCNC: 8 G/DL (ref 14–18)
HGB BLD-MCNC: 8.1 G/DL (ref 14–18)
HGB BLD-MCNC: 8.3 G/DL (ref 14–18)
HGB BLD-MCNC: 8.4 G/DL (ref 14–18)
HGB BLD-MCNC: 8.5 G/DL (ref 14–18)
HGB BLD-MCNC: 8.6 G/DL (ref 14–18)
HGB BLD-MCNC: 8.6 G/DL (ref 14–18)
HGB BLD-MCNC: 8.7 G/DL (ref 14–18)
HGB BLD-MCNC: 8.8 G/DL (ref 14–18)
HGB BLD-MCNC: 8.9 G/DL (ref 14–18)
HGB BLD-MCNC: 9.5 G/DL (ref 14–18)
HGB BLD-MCNC: 9.6 G/DL (ref 14–18)
HGB BLD-MCNC: 9.6 G/DL (ref 14–18)
HGB BLD-MCNC: 9.7 G/DL (ref 14–18)
HGB BLD-MCNC: 9.8 G/DL (ref 14–18)
HGB BLD-MCNC: 9.9 G/DL (ref 14–18)
HGB BLD-MCNC: 9.9 G/DL (ref 14–18)
HGB BLD-MCNC: ABNORMAL G/DL
HGB UR QL STRIP: ABNORMAL
HIV 1+2 AB+HIV1 P24 AG SERPL QL IA: NEGATIVE
HYALINE CASTS #/AREA URNS LPF: 0 /LPF
HYALINE CASTS #/AREA URNS LPF: 1 /LPF
HYALINE CASTS #/AREA URNS LPF: 1 /LPF
HYALINE CASTS #/AREA URNS LPF: 16 /LPF
HYALINE CASTS #/AREA URNS LPF: 8 /LPF
HYPOCHROMIA BLD QL SMEAR: ABNORMAL
IMM GRANULOCYTES # BLD AUTO: 0.01 K/UL (ref 0–0.04)
IMM GRANULOCYTES # BLD AUTO: 0.02 K/UL (ref 0–0.04)
IMM GRANULOCYTES # BLD AUTO: 0.03 K/UL (ref 0–0.04)
IMM GRANULOCYTES # BLD AUTO: 0.04 K/UL (ref 0–0.04)
IMM GRANULOCYTES # BLD AUTO: 0.04 K/UL (ref 0–0.04)
IMM GRANULOCYTES # BLD AUTO: 0.05 K/UL (ref 0–0.04)
IMM GRANULOCYTES # BLD AUTO: 0.06 K/UL (ref 0–0.04)
IMM GRANULOCYTES # BLD AUTO: 0.08 K/UL (ref 0–0.04)
IMM GRANULOCYTES # BLD AUTO: 0.09 K/UL (ref 0–0.04)
IMM GRANULOCYTES # BLD AUTO: 0.09 K/UL (ref 0–0.04)
IMM GRANULOCYTES # BLD AUTO: 0.12 K/UL (ref 0–0.04)
IMM GRANULOCYTES # BLD AUTO: 0.13 K/UL (ref 0–0.04)
IMM GRANULOCYTES # BLD AUTO: 0.27 K/UL (ref 0–0.04)
IMM GRANULOCYTES # BLD AUTO: 0.31 K/UL (ref 0–0.04)
IMM GRANULOCYTES # BLD AUTO: 0.37 K/UL (ref 0–0.04)
IMM GRANULOCYTES # BLD AUTO: ABNORMAL K/UL (ref 0–0.04)
IMM GRANULOCYTES NFR BLD AUTO: 0.2 % (ref 0–0.5)
IMM GRANULOCYTES NFR BLD AUTO: 0.3 % (ref 0–0.5)
IMM GRANULOCYTES NFR BLD AUTO: 0.4 % (ref 0–0.5)
IMM GRANULOCYTES NFR BLD AUTO: 0.5 % (ref 0–0.5)
IMM GRANULOCYTES NFR BLD AUTO: 0.6 % (ref 0–0.5)
IMM GRANULOCYTES NFR BLD AUTO: 0.7 % (ref 0–0.5)
IMM GRANULOCYTES NFR BLD AUTO: 0.8 % (ref 0–0.5)
IMM GRANULOCYTES NFR BLD AUTO: 0.8 % (ref 0–0.5)
IMM GRANULOCYTES NFR BLD AUTO: 1 % (ref 0–0.5)
IMM GRANULOCYTES NFR BLD AUTO: 1.1 % (ref 0–0.5)
IMM GRANULOCYTES NFR BLD AUTO: 1.3 % (ref 0–0.5)
IMM GRANULOCYTES NFR BLD AUTO: 1.4 % (ref 0–0.5)
IMM GRANULOCYTES NFR BLD AUTO: 1.4 % (ref 0–0.5)
IMM GRANULOCYTES NFR BLD AUTO: 1.5 % (ref 0–0.5)
IMM GRANULOCYTES NFR BLD AUTO: 2.1 % (ref 0–0.5)
IMM GRANULOCYTES NFR BLD AUTO: ABNORMAL % (ref 0–0.5)
IMP GENE: NOT DETECTED
INFLUENZA A, MOLECULAR: NEGATIVE
INFLUENZA A, MOLECULAR: NEGATIVE
INFLUENZA B, MOLECULAR: NEGATIVE
INFLUENZA B, MOLECULAR: NEGATIVE
INR PPP: 1 (ref 0.8–1.2)
INR PPP: 1.5 (ref 0.8–1.2)
INR PPP: 1.8 (ref 0.8–1.2)
INR PPP: 5.7 (ref 0.8–1.2)
INTERVENTRICULAR SEPTUM: 0.82 CM (ref 0.6–1.1)
INTERVENTRICULAR SEPTUM: 1.27 CM (ref 0.6–1.1)
IP: 10
IP: 12
IP: 14
IP: 14
IP: 16
IVC DIAMETER: 1.87 CM
IVC DIAMETER: 1.9 CM
IVRT: 110.37 MSEC
IVRT: 68.51 MSEC
KETONES UR QL STRIP: ABNORMAL
KETONES UR QL STRIP: NEGATIVE
KLEBSIELLA AEROGENES: NOT DETECTED
KLEBSIELLA OXYTOCA: NOT DETECTED
KLEBSIELLA PNEUMONIAE GROUP: NOT DETECTED
KPC: NOT DETECTED
LA MAJOR: 5.53 CM
LA MAJOR: 5.65 CM
LA MINOR: 5.51 CM
LA MINOR: 5.88 CM
LA WIDTH: 5 CM
LA WIDTH: 5.5 CM
LACTATE SERPL-SCNC: 0.6 MMOL/L (ref 0.5–2.2)
LACTATE SERPL-SCNC: 0.8 MMOL/L (ref 0.5–2.2)
LACTATE SERPL-SCNC: 0.9 MMOL/L (ref 0.5–2.2)
LACTATE SERPL-SCNC: 0.9 MMOL/L (ref 0.5–2.2)
LACTATE SERPL-SCNC: 1 MMOL/L (ref 0.5–2.2)
LACTATE SERPL-SCNC: 1.2 MMOL/L (ref 0.5–2.2)
LACTATE SERPL-SCNC: 1.4 MMOL/L (ref 0.5–2.2)
LACTATE SERPL-SCNC: 1.5 MMOL/L (ref 0.5–2.2)
LACTATE SERPL-SCNC: 2.2 MMOL/L (ref 0.5–2.2)
LACTATE SERPL-SCNC: 2.6 MMOL/L (ref 0.5–2.2)
LACTATE SERPL-SCNC: 2.6 MMOL/L (ref 0.5–2.2)
LACTATE SERPL-SCNC: 2.7 MMOL/L (ref 0.5–2.2)
LACTATE SERPL-SCNC: 2.8 MMOL/L (ref 0.5–2.2)
LACTATE SERPL-SCNC: 2.8 MMOL/L (ref 0.5–2.2)
LACTATE SERPL-SCNC: 3.2 MMOL/L (ref 0.5–2.2)
LACTATE SERPL-SCNC: 6.1 MMOL/L (ref 0.5–2.2)
LACTATE SERPL-SCNC: 6.5 MMOL/L (ref 0.5–2.2)
LEFT ATRIUM SIZE: 4.29 CM
LEFT ATRIUM SIZE: 4.41 CM
LEFT ATRIUM VOLUME INDEX MOD: 30.1 ML/M2
LEFT ATRIUM VOLUME INDEX: 35.4 ML/M2
LEFT ATRIUM VOLUME INDEX: 44.9 ML/M2
LEFT ATRIUM VOLUME MOD: 78.78 CM3
LEFT ATRIUM VOLUME: 101.72 CM3
LEFT ATRIUM VOLUME: 117.51 CM3
LEFT INTERNAL DIMENSION IN SYSTOLE: 3.62 CM (ref 2.1–4)
LEFT INTERNAL DIMENSION IN SYSTOLE: 3.81 CM (ref 2.1–4)
LEFT VENTRICLE DIASTOLIC VOLUME INDEX: 52.54 ML/M2
LEFT VENTRICLE DIASTOLIC VOLUME INDEX: 55.2 ML/M2
LEFT VENTRICLE DIASTOLIC VOLUME: 137.65 ML
LEFT VENTRICLE DIASTOLIC VOLUME: 158.42 ML
LEFT VENTRICLE MASS INDEX: 109 G/M2
LEFT VENTRICLE MASS INDEX: 63 G/M2
LEFT VENTRICLE SYSTOLIC VOLUME INDEX: 21 ML/M2
LEFT VENTRICLE SYSTOLIC VOLUME INDEX: 21.7 ML/M2
LEFT VENTRICLE SYSTOLIC VOLUME: 55.01 ML
LEFT VENTRICLE SYSTOLIC VOLUME: 62.34 ML
LEFT VENTRICULAR INTERNAL DIMENSION IN DIASTOLE: 5.34 CM (ref 3.5–6)
LEFT VENTRICULAR INTERNAL DIMENSION IN DIASTOLE: 5.67 CM (ref 3.5–6)
LEFT VENTRICULAR MASS: 165.44 G
LEFT VENTRICULAR MASS: 312.73 G
LEUKOCYTE ESTERASE UR QL STRIP: ABNORMAL
LIPASE SERPL-CCNC: 92 U/L (ref 4–60)
LISTERIA MONOCYTOGENES: NOT DETECTED
LVOT MG: 2.31 MMHG
LVOT MG: 4.56 MMHG
LVOT MV: 0.7 CM/S
LVOT MV: 1.02 CM/S
LYMPHOCYTES # BLD AUTO: 0.8 K/UL (ref 1–4.8)
LYMPHOCYTES # BLD AUTO: 0.8 K/UL (ref 1–4.8)
LYMPHOCYTES # BLD AUTO: 0.9 K/UL (ref 1–4.8)
LYMPHOCYTES # BLD AUTO: 1 K/UL (ref 1–4.8)
LYMPHOCYTES # BLD AUTO: 1.1 K/UL (ref 1–4.8)
LYMPHOCYTES # BLD AUTO: 1.2 K/UL (ref 1–4.8)
LYMPHOCYTES # BLD AUTO: 1.3 K/UL (ref 1–4.8)
LYMPHOCYTES # BLD AUTO: 1.4 K/UL (ref 1–4.8)
LYMPHOCYTES # BLD AUTO: 1.4 K/UL (ref 1–4.8)
LYMPHOCYTES # BLD AUTO: 1.5 K/UL (ref 1–4.8)
LYMPHOCYTES # BLD AUTO: 1.6 K/UL (ref 1–4.8)
LYMPHOCYTES # BLD AUTO: 1.7 K/UL (ref 1–4.8)
LYMPHOCYTES # BLD AUTO: 1.8 K/UL (ref 1–4.8)
LYMPHOCYTES # BLD AUTO: 1.9 K/UL (ref 1–4.8)
LYMPHOCYTES # BLD AUTO: 2 K/UL (ref 1–4.8)
LYMPHOCYTES # BLD AUTO: 2.1 K/UL (ref 1–4.8)
LYMPHOCYTES # BLD AUTO: 2.2 K/UL (ref 1–4.8)
LYMPHOCYTES # BLD AUTO: 2.4 K/UL (ref 1–4.8)
LYMPHOCYTES # BLD AUTO: 2.5 K/UL (ref 1–4.8)
LYMPHOCYTES # BLD AUTO: 2.5 K/UL (ref 1–4.8)
LYMPHOCYTES # BLD AUTO: 2.6 K/UL (ref 1–4.8)
LYMPHOCYTES # BLD AUTO: 2.8 K/UL (ref 1–4.8)
LYMPHOCYTES NFR BLD: 11.3 % (ref 18–48)
LYMPHOCYTES NFR BLD: 12.9 % (ref 18–48)
LYMPHOCYTES NFR BLD: 16.6 % (ref 18–48)
LYMPHOCYTES NFR BLD: 16.6 % (ref 18–48)
LYMPHOCYTES NFR BLD: 17.6 % (ref 18–48)
LYMPHOCYTES NFR BLD: 17.7 % (ref 18–48)
LYMPHOCYTES NFR BLD: 17.8 % (ref 18–48)
LYMPHOCYTES NFR BLD: 18 % (ref 18–48)
LYMPHOCYTES NFR BLD: 18.1 % (ref 18–48)
LYMPHOCYTES NFR BLD: 19.1 % (ref 18–48)
LYMPHOCYTES NFR BLD: 20.8 % (ref 18–48)
LYMPHOCYTES NFR BLD: 21.9 % (ref 18–48)
LYMPHOCYTES NFR BLD: 22.4 % (ref 18–48)
LYMPHOCYTES NFR BLD: 23.9 % (ref 18–48)
LYMPHOCYTES NFR BLD: 25.1 % (ref 18–48)
LYMPHOCYTES NFR BLD: 25.1 % (ref 18–48)
LYMPHOCYTES NFR BLD: 25.7 % (ref 18–48)
LYMPHOCYTES NFR BLD: 26 % (ref 18–48)
LYMPHOCYTES NFR BLD: 26.8 % (ref 18–48)
LYMPHOCYTES NFR BLD: 28.4 % (ref 18–48)
LYMPHOCYTES NFR BLD: 28.6 % (ref 18–48)
LYMPHOCYTES NFR BLD: 28.6 % (ref 18–48)
LYMPHOCYTES NFR BLD: 3.3 % (ref 18–48)
LYMPHOCYTES NFR BLD: 30.8 % (ref 18–48)
LYMPHOCYTES NFR BLD: 31.3 % (ref 18–48)
LYMPHOCYTES NFR BLD: 31.3 % (ref 18–48)
LYMPHOCYTES NFR BLD: 31.9 % (ref 18–48)
LYMPHOCYTES NFR BLD: 32 % (ref 18–48)
LYMPHOCYTES NFR BLD: 33.4 % (ref 18–48)
LYMPHOCYTES NFR BLD: 33.4 % (ref 18–48)
LYMPHOCYTES NFR BLD: 34.2 % (ref 18–48)
LYMPHOCYTES NFR BLD: 35 % (ref 18–48)
LYMPHOCYTES NFR BLD: 35.3 % (ref 18–48)
LYMPHOCYTES NFR BLD: 36.6 % (ref 18–48)
LYMPHOCYTES NFR BLD: 5 % (ref 18–48)
LYMPHOCYTES NFR BLD: 5.6 % (ref 18–48)
LYMPHOCYTES NFR BLD: 6 % (ref 18–48)
LYMPHOCYTES NFR BLD: 6.2 % (ref 18–48)
LYMPHOCYTES NFR BLD: 8 % (ref 18–48)
LYMPHOCYTES NFR BLD: 9.3 % (ref 18–48)
MAGNESIUM SERPL-MCNC: 1.6 MG/DL (ref 1.6–2.6)
MAGNESIUM SERPL-MCNC: 1.7 MG/DL (ref 1.6–2.6)
MAGNESIUM SERPL-MCNC: 1.8 MG/DL (ref 1.6–2.6)
MAGNESIUM SERPL-MCNC: 1.9 MG/DL (ref 1.6–2.6)
MAGNESIUM SERPL-MCNC: 2 MG/DL (ref 1.6–2.6)
MAGNESIUM SERPL-MCNC: 2 MG/DL (ref 1.6–2.6)
MAGNESIUM SERPL-MCNC: 2.1 MG/DL (ref 1.6–2.6)
MAGNESIUM SERPL-MCNC: 2.1 MG/DL (ref 1.6–2.6)
MAGNESIUM SERPL-MCNC: 2.2 MG/DL (ref 1.6–2.6)
MAGNESIUM SERPL-MCNC: 2.3 MG/DL (ref 1.6–2.6)
MAGNESIUM SERPL-MCNC: 2.5 MG/DL (ref 1.6–2.6)
MAGNESIUM SERPL-MCNC: 2.5 MG/DL (ref 1.6–2.6)
MAGNESIUM SERPL-MCNC: 2.6 MG/DL (ref 1.6–2.6)
MAGNESIUM SERPL-MCNC: 2.6 MG/DL (ref 1.6–2.6)
MAGNESIUM SERPL-MCNC: 2.7 MG/DL (ref 1.6–2.6)
MCH RBC QN AUTO: 29.3 PG (ref 27–31)
MCH RBC QN AUTO: 29.3 PG (ref 27–31)
MCH RBC QN AUTO: 29.4 PG (ref 27–31)
MCH RBC QN AUTO: 29.4 PG (ref 27–31)
MCH RBC QN AUTO: 29.5 PG (ref 27–31)
MCH RBC QN AUTO: 29.6 PG (ref 27–31)
MCH RBC QN AUTO: 29.8 PG (ref 27–31)
MCH RBC QN AUTO: 29.9 PG (ref 27–31)
MCH RBC QN AUTO: 30 PG (ref 27–31)
MCH RBC QN AUTO: 30 PG (ref 27–31)
MCH RBC QN AUTO: 30.1 PG (ref 27–31)
MCH RBC QN AUTO: 30.1 PG (ref 27–31)
MCH RBC QN AUTO: 30.3 PG (ref 27–31)
MCH RBC QN AUTO: 30.4 PG (ref 27–31)
MCH RBC QN AUTO: 30.5 PG (ref 27–31)
MCH RBC QN AUTO: 30.5 PG (ref 27–31)
MCH RBC QN AUTO: 30.6 PG (ref 27–31)
MCH RBC QN AUTO: 30.6 PG (ref 27–31)
MCH RBC QN AUTO: 30.7 PG (ref 27–31)
MCH RBC QN AUTO: 30.8 PG (ref 27–31)
MCH RBC QN AUTO: 30.9 PG (ref 27–31)
MCH RBC QN AUTO: 30.9 PG (ref 27–31)
MCH RBC QN AUTO: 31 PG (ref 27–31)
MCH RBC QN AUTO: 31.1 PG (ref 27–31)
MCH RBC QN AUTO: 31.1 PG (ref 27–31)
MCH RBC QN AUTO: 31.2 PG (ref 27–31)
MCH RBC QN AUTO: 31.3 PG (ref 27–31)
MCH RBC QN AUTO: 31.3 PG (ref 27–31)
MCH RBC QN AUTO: 31.4 PG (ref 27–31)
MCH RBC QN AUTO: 31.8 PG (ref 27–31)
MCH RBC QN AUTO: 32 PG (ref 27–31)
MCH RBC QN AUTO: 32.3 PG (ref 27–31)
MCHC RBC AUTO-ENTMCNC: 28.7 G/DL (ref 32–36)
MCHC RBC AUTO-ENTMCNC: 29.2 G/DL (ref 32–36)
MCHC RBC AUTO-ENTMCNC: 29.2 G/DL (ref 32–36)
MCHC RBC AUTO-ENTMCNC: 29.6 G/DL (ref 32–36)
MCHC RBC AUTO-ENTMCNC: 29.8 G/DL (ref 32–36)
MCHC RBC AUTO-ENTMCNC: 29.8 G/DL (ref 32–36)
MCHC RBC AUTO-ENTMCNC: 30 G/DL (ref 32–36)
MCHC RBC AUTO-ENTMCNC: 30 G/DL (ref 32–36)
MCHC RBC AUTO-ENTMCNC: 30.1 G/DL (ref 32–36)
MCHC RBC AUTO-ENTMCNC: 30.2 G/DL (ref 32–36)
MCHC RBC AUTO-ENTMCNC: 30.3 G/DL (ref 32–36)
MCHC RBC AUTO-ENTMCNC: 30.3 G/DL (ref 32–36)
MCHC RBC AUTO-ENTMCNC: 30.4 G/DL (ref 32–36)
MCHC RBC AUTO-ENTMCNC: 30.5 G/DL (ref 32–36)
MCHC RBC AUTO-ENTMCNC: 30.6 G/DL (ref 32–36)
MCHC RBC AUTO-ENTMCNC: 30.6 G/DL (ref 32–36)
MCHC RBC AUTO-ENTMCNC: 30.7 G/DL (ref 32–36)
MCHC RBC AUTO-ENTMCNC: 30.8 G/DL (ref 32–36)
MCHC RBC AUTO-ENTMCNC: 30.9 G/DL (ref 32–36)
MCHC RBC AUTO-ENTMCNC: 31 G/DL (ref 32–36)
MCHC RBC AUTO-ENTMCNC: 31.1 G/DL (ref 32–36)
MCHC RBC AUTO-ENTMCNC: 31.2 G/DL (ref 32–36)
MCHC RBC AUTO-ENTMCNC: 31.3 G/DL (ref 32–36)
MCHC RBC AUTO-ENTMCNC: 31.6 G/DL (ref 32–36)
MCHC RBC AUTO-ENTMCNC: 31.7 G/DL (ref 32–36)
MCHC RBC AUTO-ENTMCNC: 31.9 G/DL (ref 32–36)
MCHC RBC AUTO-ENTMCNC: 31.9 G/DL (ref 32–36)
MCHC RBC AUTO-ENTMCNC: 32.3 G/DL (ref 32–36)
MCHC RBC AUTO-ENTMCNC: 32.5 G/DL (ref 32–36)
MCHC RBC AUTO-ENTMCNC: 33 G/DL (ref 32–36)
MCHC RBC AUTO-ENTMCNC: 33.8 G/DL (ref 32–36)
MCR-1: NOT DETECTED
MCV RBC AUTO: 100 FL (ref 82–98)
MCV RBC AUTO: 101 FL (ref 82–98)
MCV RBC AUTO: 102 FL (ref 82–98)
MCV RBC AUTO: 103 FL (ref 82–98)
MCV RBC AUTO: 103 FL (ref 82–98)
MCV RBC AUTO: 108 FL (ref 82–98)
MCV RBC AUTO: 109 FL (ref 82–98)
MCV RBC AUTO: 110 FL (ref 82–98)
MCV RBC AUTO: 89 FL (ref 82–98)
MCV RBC AUTO: 91 FL (ref 82–98)
MCV RBC AUTO: 92 FL (ref 82–98)
MCV RBC AUTO: 93 FL (ref 82–98)
MCV RBC AUTO: 95 FL (ref 82–98)
MCV RBC AUTO: 96 FL (ref 82–98)
MCV RBC AUTO: 97 FL (ref 82–98)
MCV RBC AUTO: 97 FL (ref 82–98)
MCV RBC AUTO: 98 FL (ref 82–98)
MCV RBC AUTO: 98 FL (ref 82–98)
MCV RBC AUTO: 99 FL (ref 82–98)
MEC A/C AND MREJ (MRSA): NOT DETECTED
MEC A/C: NOT DETECTED
METAMYELOCYTES NFR BLD MANUAL: 2 %
METAMYELOCYTES NFR BLD MANUAL: 3 %
METAMYELOCYTES NFR BLD MANUAL: 4 %
METAMYELOCYTES NFR BLD MANUAL: 6 %
MICROSCOPIC COMMENT: ABNORMAL
MODE: ABNORMAL
MONOCYTES # BLD AUTO: 0.3 K/UL (ref 0.3–1)
MONOCYTES # BLD AUTO: 0.6 K/UL (ref 0.3–1)
MONOCYTES # BLD AUTO: 0.7 K/UL (ref 0.3–1)
MONOCYTES # BLD AUTO: 0.8 K/UL (ref 0.3–1)
MONOCYTES # BLD AUTO: 0.9 K/UL (ref 0.3–1)
MONOCYTES # BLD AUTO: 1 K/UL (ref 0.3–1)
MONOCYTES # BLD AUTO: 1.2 K/UL (ref 0.3–1)
MONOCYTES # BLD AUTO: 1.2 K/UL (ref 0.3–1)
MONOCYTES # BLD AUTO: 1.3 K/UL (ref 0.3–1)
MONOCYTES # BLD AUTO: 1.3 K/UL (ref 0.3–1)
MONOCYTES # BLD AUTO: 1.4 K/UL (ref 0.3–1)
MONOCYTES # BLD AUTO: 1.5 K/UL (ref 0.3–1)
MONOCYTES # BLD AUTO: 1.7 K/UL (ref 0.3–1)
MONOCYTES # BLD AUTO: 2.8 K/UL (ref 0.3–1)
MONOCYTES NFR BLD: 10 % (ref 4–15)
MONOCYTES NFR BLD: 10.4 % (ref 4–15)
MONOCYTES NFR BLD: 10.9 % (ref 4–15)
MONOCYTES NFR BLD: 11 % (ref 4–15)
MONOCYTES NFR BLD: 11.1 % (ref 4–15)
MONOCYTES NFR BLD: 11.1 % (ref 4–15)
MONOCYTES NFR BLD: 11.3 % (ref 4–15)
MONOCYTES NFR BLD: 11.4 % (ref 4–15)
MONOCYTES NFR BLD: 11.5 % (ref 4–15)
MONOCYTES NFR BLD: 11.7 % (ref 4–15)
MONOCYTES NFR BLD: 11.8 % (ref 4–15)
MONOCYTES NFR BLD: 11.8 % (ref 4–15)
MONOCYTES NFR BLD: 12.5 % (ref 4–15)
MONOCYTES NFR BLD: 12.9 % (ref 4–15)
MONOCYTES NFR BLD: 13.4 % (ref 4–15)
MONOCYTES NFR BLD: 13.4 % (ref 4–15)
MONOCYTES NFR BLD: 13.5 % (ref 4–15)
MONOCYTES NFR BLD: 14 % (ref 4–15)
MONOCYTES NFR BLD: 14 % (ref 4–15)
MONOCYTES NFR BLD: 14.7 % (ref 4–15)
MONOCYTES NFR BLD: 16.4 % (ref 4–15)
MONOCYTES NFR BLD: 16.8 % (ref 4–15)
MONOCYTES NFR BLD: 17.7 % (ref 4–15)
MONOCYTES NFR BLD: 17.9 % (ref 4–15)
MONOCYTES NFR BLD: 18.5 % (ref 4–15)
MONOCYTES NFR BLD: 2.5 % (ref 4–15)
MONOCYTES NFR BLD: 3 % (ref 4–15)
MONOCYTES NFR BLD: 4.5 % (ref 4–15)
MONOCYTES NFR BLD: 6 % (ref 4–15)
MONOCYTES NFR BLD: 6.6 % (ref 4–15)
MONOCYTES NFR BLD: 7.7 % (ref 4–15)
MONOCYTES NFR BLD: 8 % (ref 4–15)
MONOCYTES NFR BLD: 8.2 % (ref 4–15)
MONOCYTES NFR BLD: 8.8 % (ref 4–15)
MONOCYTES NFR BLD: 9 % (ref 4–15)
MONOCYTES NFR BLD: 9.1 % (ref 4–15)
MONOCYTES NFR BLD: 9.4 % (ref 4–15)
MONOCYTES NFR BLD: 9.9 % (ref 4–15)
MRSA SPEC QL CULT: NORMAL
MYELOCYTES NFR BLD MANUAL: 1 %
NDM GENE: NOT DETECTED
NEISSERIA MENINGITIDIS: NOT DETECTED
NEUTROPHILS # BLD AUTO: 10.9 K/UL (ref 1.8–7.7)
NEUTROPHILS # BLD AUTO: 13.2 K/UL (ref 1.8–7.7)
NEUTROPHILS # BLD AUTO: 2.5 K/UL (ref 1.8–7.7)
NEUTROPHILS # BLD AUTO: 22.6 K/UL (ref 1.8–7.7)
NEUTROPHILS # BLD AUTO: 22.7 K/UL (ref 1.8–7.7)
NEUTROPHILS # BLD AUTO: 3 K/UL (ref 1.8–7.7)
NEUTROPHILS # BLD AUTO: 3 K/UL (ref 1.8–7.7)
NEUTROPHILS # BLD AUTO: 3.1 K/UL (ref 1.8–7.7)
NEUTROPHILS # BLD AUTO: 3.2 K/UL (ref 1.8–7.7)
NEUTROPHILS # BLD AUTO: 3.3 K/UL (ref 1.8–7.7)
NEUTROPHILS # BLD AUTO: 3.5 K/UL (ref 1.8–7.7)
NEUTROPHILS # BLD AUTO: 3.6 K/UL (ref 1.8–7.7)
NEUTROPHILS # BLD AUTO: 3.8 K/UL (ref 1.8–7.7)
NEUTROPHILS # BLD AUTO: 4.1 K/UL (ref 1.8–7.7)
NEUTROPHILS # BLD AUTO: 4.1 K/UL (ref 1.8–7.7)
NEUTROPHILS # BLD AUTO: 4.2 K/UL (ref 1.8–7.7)
NEUTROPHILS # BLD AUTO: 4.4 K/UL (ref 1.8–7.7)
NEUTROPHILS # BLD AUTO: 4.6 K/UL (ref 1.8–7.7)
NEUTROPHILS # BLD AUTO: 4.6 K/UL (ref 1.8–7.7)
NEUTROPHILS # BLD AUTO: 5.3 K/UL (ref 1.8–7.7)
NEUTROPHILS # BLD AUTO: 5.6 K/UL (ref 1.8–7.7)
NEUTROPHILS # BLD AUTO: 5.6 K/UL (ref 1.8–7.7)
NEUTROPHILS # BLD AUTO: 5.7 K/UL (ref 1.8–7.7)
NEUTROPHILS # BLD AUTO: 5.8 K/UL (ref 1.8–7.7)
NEUTROPHILS # BLD AUTO: 6.5 K/UL (ref 1.8–7.7)
NEUTROPHILS # BLD AUTO: 6.7 K/UL (ref 1.8–7.7)
NEUTROPHILS # BLD AUTO: 7.5 K/UL (ref 1.8–7.7)
NEUTROPHILS # BLD AUTO: 7.7 K/UL (ref 1.8–7.7)
NEUTROPHILS # BLD AUTO: 8 K/UL (ref 1.8–7.7)
NEUTROPHILS # BLD AUTO: 9.8 K/UL (ref 1.8–7.7)
NEUTROPHILS NFR BLD: 44 % (ref 38–73)
NEUTROPHILS NFR BLD: 47.8 % (ref 38–73)
NEUTROPHILS NFR BLD: 49 % (ref 38–73)
NEUTROPHILS NFR BLD: 49 % (ref 38–73)
NEUTROPHILS NFR BLD: 49.7 % (ref 38–73)
NEUTROPHILS NFR BLD: 49.9 % (ref 38–73)
NEUTROPHILS NFR BLD: 51.2 % (ref 38–73)
NEUTROPHILS NFR BLD: 51.2 % (ref 38–73)
NEUTROPHILS NFR BLD: 52 % (ref 38–73)
NEUTROPHILS NFR BLD: 52.5 % (ref 38–73)
NEUTROPHILS NFR BLD: 52.6 % (ref 38–73)
NEUTROPHILS NFR BLD: 53.3 % (ref 38–73)
NEUTROPHILS NFR BLD: 53.6 % (ref 38–73)
NEUTROPHILS NFR BLD: 54.2 % (ref 38–73)
NEUTROPHILS NFR BLD: 55.7 % (ref 38–73)
NEUTROPHILS NFR BLD: 57.5 % (ref 38–73)
NEUTROPHILS NFR BLD: 58.4 % (ref 38–73)
NEUTROPHILS NFR BLD: 59 % (ref 38–73)
NEUTROPHILS NFR BLD: 59 % (ref 38–73)
NEUTROPHILS NFR BLD: 60 % (ref 38–73)
NEUTROPHILS NFR BLD: 60.8 % (ref 38–73)
NEUTROPHILS NFR BLD: 62 % (ref 38–73)
NEUTROPHILS NFR BLD: 62.6 % (ref 38–73)
NEUTROPHILS NFR BLD: 64.7 % (ref 38–73)
NEUTROPHILS NFR BLD: 66.1 % (ref 38–73)
NEUTROPHILS NFR BLD: 66.8 % (ref 38–73)
NEUTROPHILS NFR BLD: 67.2 % (ref 38–73)
NEUTROPHILS NFR BLD: 67.5 % (ref 38–73)
NEUTROPHILS NFR BLD: 68.4 % (ref 38–73)
NEUTROPHILS NFR BLD: 69.5 % (ref 38–73)
NEUTROPHILS NFR BLD: 70.1 % (ref 38–73)
NEUTROPHILS NFR BLD: 75.8 % (ref 38–73)
NEUTROPHILS NFR BLD: 79.5 % (ref 38–73)
NEUTROPHILS NFR BLD: 80.6 % (ref 38–73)
NEUTROPHILS NFR BLD: 83.8 % (ref 38–73)
NEUTROPHILS NFR BLD: 84 % (ref 38–73)
NEUTROPHILS NFR BLD: 84.9 % (ref 38–73)
NEUTROPHILS NFR BLD: 89.5 % (ref 38–73)
NEUTS BAND NFR BLD MANUAL: 12 %
NEUTS BAND NFR BLD MANUAL: 14 %
NEUTS BAND NFR BLD MANUAL: 3 %
NEUTS BAND NFR BLD MANUAL: 34 %
NITRITE UR QL STRIP: NEGATIVE
NITRITE UR QL STRIP: POSITIVE
NRBC BLD-RTO: 0 /100 WBC
NRBC BLD-RTO: 1 /100 WBC
NRBC BLD-RTO: 1 /100 WBC
NRBC BLD-RTO: 2 /100 WBC
NRBC BLD-RTO: 7 /100 WBC
NUM UNITS TRANS FFP: NORMAL
NUM UNITS TRANS PACKED RBC: NORMAL
OB PNL STL: NEGATIVE
OVALOCYTES BLD QL SMEAR: ABNORMAL
OXA-48-LIKE: NOT DETECTED
PCO2 BLDA: 123.5 MMHG (ref 35–45)
PCO2 BLDA: 47.3 MMHG (ref 35–45)
PCO2 BLDA: 48.4 MMHG (ref 35–45)
PCO2 BLDA: 49.9 MMHG (ref 35–45)
PCO2 BLDA: 50 MMHG (ref 35–45)
PCO2 BLDA: 50.1 MM[HG]
PCO2 BLDA: 54.6 MMHG (ref 35–45)
PCO2 BLDA: 55 MMHG (ref 35–45)
PCO2 BLDA: 73.1 MMHG (ref 35–45)
PCO2 BLDA: 75.3 MMHG (ref 35–45)
PCO2 BLDA: 77.5 MMHG (ref 35–45)
PCO2 BLDA: 79.1 MMHG (ref 35–45)
PCO2 BLDA: 91.5 MMHG (ref 35–45)
PCO2 BLDA: ABNORMAL MM[HG]
PH SMN: 7.17 [PH] (ref 7.35–7.45)
PH SMN: 7.23 [PH] (ref 7.35–7.45)
PH SMN: 7.24 [PH] (ref 7.35–7.45)
PH SMN: 7.25 [PH]
PH SMN: 7.25 [PH] (ref 7.35–7.45)
PH SMN: 7.27 [PH] (ref 7.35–7.45)
PH SMN: 7.27 [PH] (ref 7.35–7.45)
PH SMN: 7.28 [PH] (ref 7.35–7.45)
PH SMN: 7.33 [PH] (ref 7.35–7.45)
PH SMN: 7.39 [PH] (ref 7.35–7.45)
PH SMN: 7.4 [PH] (ref 7.35–7.45)
PH SMN: 7.43 [PH] (ref 7.35–7.45)
PH SMN: 7.6 [PH] (ref 7.35–7.45)
PH UR STRIP: 5 [PH] (ref 5–8)
PH UR STRIP: 6 [PH] (ref 5–8)
PH UR STRIP: 6 [PH] (ref 5–8)
PH UR STRIP: 7 [PH] (ref 5–8)
PH UR STRIP: 8 [PH] (ref 5–8)
PH UR STRIP: 8 [PH] (ref 5–8)
PHOSPHATE SERPL-MCNC: 4.1 MG/DL (ref 2.7–4.5)
PHOSPHATE SERPL-MCNC: 4.1 MG/DL (ref 2.7–4.5)
PHOSPHATE SERPL-MCNC: 4.6 MG/DL (ref 2.7–4.5)
PHOSPHATE SERPL-MCNC: 4.6 MG/DL (ref 2.7–4.5)
PHOSPHATE SERPL-MCNC: 4.7 MG/DL (ref 2.7–4.5)
PHOSPHATE SERPL-MCNC: 4.8 MG/DL (ref 2.7–4.5)
PHOSPHATE SERPL-MCNC: 5 MG/DL (ref 2.7–4.5)
PHOSPHATE SERPL-MCNC: 5.1 MG/DL (ref 2.7–4.5)
PHOSPHATE SERPL-MCNC: 5.2 MG/DL (ref 2.7–4.5)
PHOSPHATE SERPL-MCNC: 5.4 MG/DL (ref 2.7–4.5)
PHOSPHATE SERPL-MCNC: 6 MG/DL (ref 2.7–4.5)
PHOSPHATE SERPL-MCNC: 6.3 MG/DL (ref 2.7–4.5)
PHOSPHATE SERPL-MCNC: 8.3 MG/DL (ref 2.7–4.5)
PISA TR MAX VEL: 3.26 M/S
PISA TR MAX VEL: 3.67 M/S
PLATELET # BLD AUTO: 102 K/UL (ref 150–450)
PLATELET # BLD AUTO: 106 K/UL (ref 150–450)
PLATELET # BLD AUTO: 109 K/UL (ref 150–450)
PLATELET # BLD AUTO: 110 K/UL (ref 150–450)
PLATELET # BLD AUTO: 114 K/UL (ref 150–450)
PLATELET # BLD AUTO: 118 K/UL (ref 150–450)
PLATELET # BLD AUTO: 133 K/UL (ref 150–450)
PLATELET # BLD AUTO: 14 K/UL (ref 150–450)
PLATELET # BLD AUTO: 153 K/UL (ref 150–450)
PLATELET # BLD AUTO: 154 K/UL (ref 150–450)
PLATELET # BLD AUTO: 158 K/UL (ref 150–450)
PLATELET # BLD AUTO: 158 K/UL (ref 150–450)
PLATELET # BLD AUTO: 161 K/UL (ref 150–450)
PLATELET # BLD AUTO: 162 K/UL (ref 150–450)
PLATELET # BLD AUTO: 168 K/UL (ref 150–450)
PLATELET # BLD AUTO: 169 K/UL (ref 150–450)
PLATELET # BLD AUTO: 171 K/UL (ref 150–450)
PLATELET # BLD AUTO: 173 K/UL (ref 150–450)
PLATELET # BLD AUTO: 178 K/UL (ref 150–450)
PLATELET # BLD AUTO: 178 K/UL (ref 150–450)
PLATELET # BLD AUTO: 181 K/UL (ref 150–450)
PLATELET # BLD AUTO: 190 K/UL (ref 150–450)
PLATELET # BLD AUTO: 195 K/UL (ref 150–450)
PLATELET # BLD AUTO: 196 K/UL (ref 150–450)
PLATELET # BLD AUTO: 201 K/UL (ref 150–450)
PLATELET # BLD AUTO: 206 K/UL (ref 150–450)
PLATELET # BLD AUTO: 213 K/UL (ref 150–450)
PLATELET # BLD AUTO: 225 K/UL (ref 150–450)
PLATELET # BLD AUTO: 228 K/UL (ref 150–450)
PLATELET # BLD AUTO: 237 K/UL (ref 150–450)
PLATELET # BLD AUTO: 245 K/UL (ref 150–450)
PLATELET # BLD AUTO: 249 K/UL (ref 150–450)
PLATELET # BLD AUTO: 249 K/UL (ref 150–450)
PLATELET # BLD AUTO: 250 K/UL (ref 150–450)
PLATELET # BLD AUTO: 259 K/UL (ref 150–450)
PLATELET # BLD AUTO: 261 K/UL (ref 150–450)
PLATELET # BLD AUTO: 273 K/UL (ref 150–450)
PLATELET # BLD AUTO: 287 K/UL (ref 150–450)
PLATELET # BLD AUTO: 59 K/UL (ref 150–450)
PLATELET # BLD AUTO: 82 K/UL (ref 150–450)
PLATELET BLD QL SMEAR: ABNORMAL
PMV BLD AUTO: 10.1 FL (ref 9.2–12.9)
PMV BLD AUTO: 10.2 FL (ref 9.2–12.9)
PMV BLD AUTO: 10.4 FL (ref 9.2–12.9)
PMV BLD AUTO: 10.7 FL (ref 9.2–12.9)
PMV BLD AUTO: 12.1 FL (ref 9.2–12.9)
PMV BLD AUTO: 9.1 FL (ref 9.2–12.9)
PMV BLD AUTO: 9.2 FL (ref 9.2–12.9)
PMV BLD AUTO: 9.3 FL (ref 9.2–12.9)
PMV BLD AUTO: 9.4 FL (ref 9.2–12.9)
PMV BLD AUTO: 9.5 FL (ref 9.2–12.9)
PMV BLD AUTO: 9.6 FL (ref 9.2–12.9)
PMV BLD AUTO: 9.7 FL (ref 9.2–12.9)
PMV BLD AUTO: 9.8 FL (ref 9.2–12.9)
PMV BLD AUTO: 9.9 FL (ref 9.2–12.9)
PMV BLD AUTO: 9.9 FL (ref 9.2–12.9)
PO2 BLDA: 103 MMHG (ref 80–100)
PO2 BLDA: 111 MMHG (ref 80–100)
PO2 BLDA: 123 MMHG (ref 80–100)
PO2 BLDA: 125 MMHG (ref 80–100)
PO2 BLDA: 162 MMHG (ref 80–100)
PO2 BLDA: 58 MM[HG]
PO2 BLDA: 63 MMHG (ref 80–100)
PO2 BLDA: 65 MMHG (ref 80–100)
PO2 BLDA: 68 MMHG (ref 80–100)
PO2 BLDA: 71 MMHG (ref 80–100)
PO2 BLDA: 74 MMHG (ref 80–100)
PO2 BLDA: 77 MMHG (ref 80–100)
PO2 BLDA: 85 MMHG (ref 80–100)
POC BE: -4 MMOL/L
POC BE: -4 MMOL/L
POC BE: -5 MMOL/L
POC BE: -6 MMOL/L
POC BE: -8 MMOL/L
POC BE: 22 MMOL/L
POC BE: 23 MMOL/L
POC BE: 24 MMOL/L
POC BE: 24 MMOL/L
POC BE: 25 MMOL/L
POC BE: 27 MMOL/L
POC BE: 8 MMOL/L
POC COHB: ABNORMAL
POC FIO2: 21
POC IONIZED CALCIUM: ABNORMAL
POC METHB: ABNORMAL
POC O2HB: ABNORMAL
POC SATURATED O2: 88 % (ref 95–100)
POC SATURATED O2: 90 % (ref 95–100)
POC SATURATED O2: 91 % (ref 95–100)
POC SATURATED O2: 94 % (ref 95–100)
POC SATURATED O2: 96 % (ref 95–100)
POC SATURATED O2: 97 % (ref 95–100)
POC SATURATED O2: 98 % (ref 95–100)
POC SATURATED O2: 99 % (ref 95–100)
POCT GLUCOSE: 101 MG/DL (ref 70–110)
POCT GLUCOSE: 109 MG/DL (ref 70–110)
POCT GLUCOSE: 115 MG/DL (ref 70–110)
POCT GLUCOSE: 124 MG/DL (ref 70–110)
POCT GLUCOSE: 126 MG/DL (ref 70–110)
POCT GLUCOSE: 127 MG/DL (ref 70–110)
POCT GLUCOSE: 133 MG/DL (ref 70–110)
POCT GLUCOSE: 150 MG/DL (ref 70–110)
POCT GLUCOSE: 153 MG/DL (ref 70–110)
POCT GLUCOSE: 60 MG/DL (ref 70–110)
POCT GLUCOSE: 63 MG/DL (ref 70–110)
POCT GLUCOSE: 67 MG/DL (ref 70–110)
POCT GLUCOSE: 67 MG/DL (ref 70–110)
POCT GLUCOSE: 72 MG/DL (ref 70–110)
POCT GLUCOSE: 74 MG/DL (ref 70–110)
POCT GLUCOSE: 75 MG/DL (ref 70–110)
POCT GLUCOSE: 78 MG/DL (ref 70–110)
POCT GLUCOSE: 79 MG/DL (ref 70–110)
POCT GLUCOSE: 81 MG/DL (ref 70–110)
POCT GLUCOSE: 82 MG/DL (ref 70–110)
POCT GLUCOSE: 82 MG/DL (ref 70–110)
POCT GLUCOSE: 83 MG/DL (ref 70–110)
POCT GLUCOSE: 85 MG/DL (ref 70–110)
POCT GLUCOSE: 86 MG/DL (ref 70–110)
POCT GLUCOSE: 87 MG/DL (ref 70–110)
POCT GLUCOSE: 87 MG/DL (ref 70–110)
POCT GLUCOSE: 89 MG/DL (ref 70–110)
POCT GLUCOSE: 90 MG/DL (ref 70–110)
POCT GLUCOSE: 91 MG/DL (ref 70–110)
POIKILOCYTOSIS BLD QL SMEAR: SLIGHT
POLYCHROMASIA BLD QL SMEAR: ABNORMAL
POTASSIUM BLD-SCNC: ABNORMAL MMOL/L
POTASSIUM SERPL-SCNC: 2.6 MMOL/L (ref 3.5–5.1)
POTASSIUM SERPL-SCNC: 2.6 MMOL/L (ref 3.5–5.1)
POTASSIUM SERPL-SCNC: 3 MMOL/L (ref 3.5–5.1)
POTASSIUM SERPL-SCNC: 3 MMOL/L (ref 3.5–5.1)
POTASSIUM SERPL-SCNC: 3.1 MMOL/L (ref 3.5–5.1)
POTASSIUM SERPL-SCNC: 3.1 MMOL/L (ref 3.5–5.1)
POTASSIUM SERPL-SCNC: 3.2 MMOL/L (ref 3.5–5.1)
POTASSIUM SERPL-SCNC: 3.3 MMOL/L (ref 3.5–5.1)
POTASSIUM SERPL-SCNC: 3.4 MMOL/L (ref 3.5–5.1)
POTASSIUM SERPL-SCNC: 3.5 MMOL/L (ref 3.5–5.1)
POTASSIUM SERPL-SCNC: 3.5 MMOL/L (ref 3.5–5.1)
POTASSIUM SERPL-SCNC: 3.6 MMOL/L (ref 3.5–5.1)
POTASSIUM SERPL-SCNC: 3.6 MMOL/L (ref 3.5–5.1)
POTASSIUM SERPL-SCNC: 3.7 MMOL/L (ref 3.5–5.1)
POTASSIUM SERPL-SCNC: 3.8 MMOL/L (ref 3.5–5.1)
POTASSIUM SERPL-SCNC: 3.9 MMOL/L (ref 3.5–5.1)
POTASSIUM SERPL-SCNC: 4 MMOL/L (ref 3.5–5.1)
POTASSIUM SERPL-SCNC: 4 MMOL/L (ref 3.5–5.1)
POTASSIUM SERPL-SCNC: 4.1 MMOL/L (ref 3.5–5.1)
POTASSIUM SERPL-SCNC: 4.2 MMOL/L (ref 3.5–5.1)
POTASSIUM SERPL-SCNC: 4.3 MMOL/L (ref 3.5–5.1)
POTASSIUM SERPL-SCNC: 4.4 MMOL/L (ref 3.5–5.1)
POTASSIUM SERPL-SCNC: 4.5 MMOL/L (ref 3.5–5.1)
POTASSIUM SERPL-SCNC: 4.7 MMOL/L (ref 3.5–5.1)
POTASSIUM SERPL-SCNC: 5.9 MMOL/L (ref 3.5–5.1)
POTASSIUM SERPL-SCNC: 5.9 MMOL/L (ref 3.5–5.1)
POTASSIUM SERPL-SCNC: 6.1 MMOL/L (ref 3.5–5.1)
PROCALCITONIN SERPL IA-MCNC: 0.15 NG/ML
PROCALCITONIN SERPL IA-MCNC: 0.18 NG/ML
PROCALCITONIN SERPL IA-MCNC: 0.21 NG/ML
PROCALCITONIN SERPL IA-MCNC: 0.21 NG/ML
PROCALCITONIN SERPL IA-MCNC: 0.41 NG/ML
PROCALCITONIN SERPL IA-MCNC: 0.43 NG/ML
PROMYELOCYTES NFR BLD MANUAL: 1 %
PROT SERPL-MCNC: 2.5 G/DL (ref 6–8.4)
PROT SERPL-MCNC: 4.7 G/DL (ref 6–8.4)
PROT SERPL-MCNC: 4.8 G/DL (ref 6–8.4)
PROT SERPL-MCNC: 5.5 G/DL (ref 6–8.4)
PROT SERPL-MCNC: 5.6 G/DL (ref 6–8.4)
PROT SERPL-MCNC: 5.7 G/DL (ref 6–8.4)
PROT SERPL-MCNC: 5.8 G/DL (ref 6–8.4)
PROT SERPL-MCNC: 5.9 G/DL (ref 6–8.4)
PROT SERPL-MCNC: 6 G/DL (ref 6–8.4)
PROT SERPL-MCNC: 6.2 G/DL (ref 6–8.4)
PROT SERPL-MCNC: 6.4 G/DL (ref 6–8.4)
PROT SERPL-MCNC: 6.7 G/DL (ref 6–8.4)
PROT SERPL-MCNC: 6.8 G/DL (ref 6–8.4)
PROT SERPL-MCNC: 6.8 G/DL (ref 6–8.4)
PROT SERPL-MCNC: 6.9 G/DL (ref 6–8.4)
PROT SERPL-MCNC: 7.2 G/DL (ref 6–8.4)
PROT SERPL-MCNC: 7.7 G/DL (ref 6–8.4)
PROT SERPL-MCNC: 7.9 G/DL (ref 6–8.4)
PROT SERPL-MCNC: 8.1 G/DL (ref 6–8.4)
PROT SERPL-MCNC: 8.4 G/DL (ref 6–8.4)
PROT UR QL STRIP: ABNORMAL
PROT UR-MCNC: 122 MG/DL (ref 0–15)
PROT UR-MCNC: 71 MG/DL (ref 0–15)
PROT/CREAT UR: 1.02 MG/G{CREAT} (ref 0–0.2)
PROT/CREAT UR: 1.59 MG/G{CREAT} (ref 0–0.2)
PROTEUS SPECIES: NOT DETECTED
PROTHROMBIN TIME: 10.9 SEC (ref 9–12.5)
PROTHROMBIN TIME: 15.2 SEC (ref 9–12.5)
PROTHROMBIN TIME: 18.2 SEC (ref 9–12.5)
PROTHROMBIN TIME: 54.3 SEC (ref 9–12.5)
PSEUDOMONAS AERUGINOSA: NOT DETECTED
PTH-INTACT SERPL-MCNC: 253.8 PG/ML (ref 9–77)
PV MEAN GRADIENT: 4 MMHG
PV MV: 0.93 M/S
PV MV: 1 M/S
PV PEAK GRADIENT: 10 MMHG
PV PEAK VELOCITY: 1.49 CM/S
PV PEAK VELOCITY: 1.56 M/S
RA MAJOR: 5.42 CM
RA MAJOR: 6.13 CM
RA PRESSURE ESTIMATED: 3 MMHG
RA PRESSURE: 3 MMHG
RA VOL SYS: 97.02 ML
RA WIDTH: 3.8 CM
RA WIDTH: 4.5 CM
RBC # BLD AUTO: 1.75 M/UL (ref 4.6–6.2)
RBC # BLD AUTO: 1.89 M/UL (ref 4.6–6.2)
RBC # BLD AUTO: 2.16 M/UL (ref 4.6–6.2)
RBC # BLD AUTO: 2.33 M/UL (ref 4.6–6.2)
RBC # BLD AUTO: 2.46 M/UL (ref 4.6–6.2)
RBC # BLD AUTO: 2.47 M/UL (ref 4.6–6.2)
RBC # BLD AUTO: 2.52 M/UL (ref 4.6–6.2)
RBC # BLD AUTO: 2.54 M/UL (ref 4.6–6.2)
RBC # BLD AUTO: 2.55 M/UL (ref 4.6–6.2)
RBC # BLD AUTO: 2.56 M/UL (ref 4.6–6.2)
RBC # BLD AUTO: 2.56 M/UL (ref 4.6–6.2)
RBC # BLD AUTO: 2.59 M/UL (ref 4.6–6.2)
RBC # BLD AUTO: 2.6 M/UL (ref 4.6–6.2)
RBC # BLD AUTO: 2.63 M/UL (ref 4.6–6.2)
RBC # BLD AUTO: 2.66 M/UL (ref 4.6–6.2)
RBC # BLD AUTO: 2.78 M/UL (ref 4.6–6.2)
RBC # BLD AUTO: 2.82 M/UL (ref 4.6–6.2)
RBC # BLD AUTO: 2.86 M/UL (ref 4.6–6.2)
RBC # BLD AUTO: 2.89 M/UL (ref 4.6–6.2)
RBC # BLD AUTO: 3.06 M/UL (ref 4.6–6.2)
RBC # BLD AUTO: 3.06 M/UL (ref 4.6–6.2)
RBC # BLD AUTO: 3.14 M/UL (ref 4.6–6.2)
RBC # BLD AUTO: 3.15 M/UL (ref 4.6–6.2)
RBC # BLD AUTO: 3.17 M/UL (ref 4.6–6.2)
RBC # BLD AUTO: 3.18 M/UL (ref 4.6–6.2)
RBC # BLD AUTO: 3.22 M/UL (ref 4.6–6.2)
RBC # BLD AUTO: 3.26 M/UL (ref 4.6–6.2)
RBC # BLD AUTO: 3.26 M/UL (ref 4.6–6.2)
RBC # BLD AUTO: 3.27 M/UL (ref 4.6–6.2)
RBC # BLD AUTO: 3.31 M/UL (ref 4.6–6.2)
RBC # BLD AUTO: 3.33 M/UL (ref 4.6–6.2)
RBC # BLD AUTO: 3.37 M/UL (ref 4.6–6.2)
RBC # BLD AUTO: 3.45 M/UL (ref 4.6–6.2)
RBC # BLD AUTO: 3.47 M/UL (ref 4.6–6.2)
RBC # BLD AUTO: 3.5 M/UL (ref 4.6–6.2)
RBC # BLD AUTO: 3.53 M/UL (ref 4.6–6.2)
RBC # BLD AUTO: 3.93 M/UL (ref 4.6–6.2)
RBC # BLD AUTO: 4.27 M/UL (ref 4.6–6.2)
RBC #/AREA URNS HPF: 18 /HPF (ref 0–4)
RBC #/AREA URNS HPF: 21 /HPF (ref 0–4)
RBC #/AREA URNS HPF: 24 /HPF (ref 0–4)
RBC #/AREA URNS HPF: >100 /HPF (ref 0–4)
RH BLD: NORMAL
RIGHT ATRIAL AREA: 28 CM2
RIGHT VENTRICULAR END-DIASTOLIC DIMENSION: 3.51 CM
RIGHT VENTRICULAR END-DIASTOLIC DIMENSION: 4.37 CM
RIGHT VENTRICULAR LENGTH IN DIASTOLE (APICAL 4-CHAMBER VIEW): 7.18 CM
RV MID DIAMA: 3.76 CM
RV TB RVSP: 7 MMHG
RV TISSUE DOPPLER FREE WALL SYSTOLIC VELOCITY 1 (APICAL 4 CHAMBER VIEW): 10.51 CM/S
SALMONELLA SP: NOT DETECTED
SAMPLE: ABNORMAL
SARS-COV-2 RDRP RESP QL NAA+PROBE: NEGATIVE
SATURATED O2 ARTERIAL, I-STAT: 85
SERRATIA MARCESCENS: NOT DETECTED
SINUS: 4 CM
SITE: ABNORMAL
SMUDGE CELLS BLD QL SMEAR: PRESENT
SODIUM BLD-SCNC: ABNORMAL MMOL/L
SODIUM SERPL-SCNC: 115 MMOL/L (ref 136–145)
SODIUM SERPL-SCNC: 122 MMOL/L (ref 136–145)
SODIUM SERPL-SCNC: 128 MMOL/L (ref 136–145)
SODIUM SERPL-SCNC: 132 MMOL/L (ref 136–145)
SODIUM SERPL-SCNC: 135 MMOL/L (ref 136–145)
SODIUM SERPL-SCNC: 136 MMOL/L (ref 136–145)
SODIUM SERPL-SCNC: 137 MMOL/L (ref 136–145)
SODIUM SERPL-SCNC: 137 MMOL/L (ref 136–145)
SODIUM SERPL-SCNC: 138 MMOL/L (ref 136–145)
SODIUM SERPL-SCNC: 139 MMOL/L (ref 136–145)
SODIUM SERPL-SCNC: 140 MMOL/L (ref 136–145)
SODIUM SERPL-SCNC: 141 MMOL/L (ref 136–145)
SODIUM SERPL-SCNC: 142 MMOL/L (ref 136–145)
SODIUM SERPL-SCNC: 143 MMOL/L (ref 136–145)
SODIUM SERPL-SCNC: 144 MMOL/L (ref 136–145)
SODIUM SERPL-SCNC: 145 MMOL/L (ref 136–145)
SODIUM SERPL-SCNC: 146 MMOL/L (ref 136–145)
SODIUM UR-SCNC: 32 MMOL/L (ref 20–250)
SODIUM UR-SCNC: <20 MMOL/L (ref 20–250)
SP GR UR STRIP: 1.01 (ref 1–1.03)
SP GR UR STRIP: 1.02 (ref 1–1.03)
SP GR UR STRIP: 1.02 (ref 1–1.03)
SP02: 95
SP02: 97
SPECIMEN OUTDATE: NORMAL
SPECIMEN SOURCE: NORMAL
SPECIMEN SOURCE: NORMAL
SPONT RATE: 18
SQUAMOUS #/AREA URNS HPF: 2 /HPF
SQUAMOUS #/AREA URNS HPF: 5 /HPF
SQUAMOUS #/AREA URNS HPF: 7 /HPF
STAPHYLOCOCCUS AUREUS: NOT DETECTED
STAPHYLOCOCCUS EPIDERMIDIS: NOT DETECTED
STAPHYLOCOCCUS LUGDUNESIS: NOT DETECTED
STAPHYLOCOCCUS SPECIES: DETECTED
STENOTROPHOMONAS MALTOPHILIA: NOT DETECTED
STJ: 2.63 CM
STJ: 2.8 CM
STOMATOCYTES BLD QL SMEAR: PRESENT
STREPTOCOCCUS AGALACTIAE: NOT DETECTED
STREPTOCOCCUS PNEUMONIAE: NOT DETECTED
STREPTOCOCCUS PYOGENES: NOT DETECTED
STREPTOCOCCUS SPECIES: NOT DETECTED
T4 FREE SERPL-MCNC: 0.84 NG/DL (ref 0.71–1.51)
TARGETS BLD QL SMEAR: ABNORMAL
TARGETS BLD QL SMEAR: ABNORMAL
TDI LATERAL: 0.14 M/S
TDI SEPTAL: 0.1 M/S
TDI: 0.12 M/S
TR MAX PG: 43 MMHG
TR MAX PG: 54 MMHG
TRICUSPID ANNULAR PLANE SYSTOLIC EXCURSION: 1.4 CM
TRICUSPID ANNULAR PLANE SYSTOLIC EXCURSION: 1.8 CM
TROPONIN I SERPL DL<=0.01 NG/ML-MCNC: 0.02 NG/ML (ref 0–0.03)
TROPONIN I SERPL DL<=0.01 NG/ML-MCNC: 0.02 NG/ML (ref 0–0.03)
TROPONIN I SERPL DL<=0.01 NG/ML-MCNC: 0.03 NG/ML (ref 0–0.03)
TROPONIN I SERPL DL<=0.01 NG/ML-MCNC: <0.006 NG/ML (ref 0–0.03)
TSH SERPL DL<=0.005 MIU/L-ACNC: 2.25 UIU/ML (ref 0.4–4)
TSH SERPL DL<=0.005 MIU/L-ACNC: 3 UIU/ML (ref 0.4–4)
TSH SERPL DL<=0.005 MIU/L-ACNC: 3.35 UIU/ML (ref 0.4–4)
TSH SERPL DL<=0.005 MIU/L-ACNC: 7.14 UIU/ML (ref 0.4–4)
TV REST PULMONARY ARTERY PRESSURE: 46 MMHG
TV REST PULMONARY ARTERY PRESSURE: 57 MMHG
UNIDENT CRYS URNS QL MICRO: ABNORMAL
UNIT NUMBER: NORMAL
URN SPEC COLLECT METH UR: ABNORMAL
UROBILINOGEN UR STRIP-ACNC: ABNORMAL EU/DL
UROBILINOGEN UR STRIP-ACNC: NEGATIVE EU/DL
VAN A/B: NOT DETECTED
VANCOMYCIN SERPL-MCNC: 15.6 UG/ML
VANCOMYCIN SERPL-MCNC: 17.1 UG/ML
VANCOMYCIN SERPL-MCNC: 18.5 UG/ML
VANCOMYCIN SERPL-MCNC: 20.7 UG/ML
VANCOMYCIN SERPL-MCNC: 23.1 UG/ML
VIM GENE: NOT DETECTED
WBC # BLD AUTO: 10.69 K/UL (ref 3.9–12.7)
WBC # BLD AUTO: 11.4 K/UL (ref 3.9–12.7)
WBC # BLD AUTO: 11.66 K/UL (ref 3.9–12.7)
WBC # BLD AUTO: 11.71 K/UL (ref 3.9–12.7)
WBC # BLD AUTO: 12.81 K/UL (ref 3.9–12.7)
WBC # BLD AUTO: 13.72 K/UL (ref 3.9–12.7)
WBC # BLD AUTO: 14.84 K/UL (ref 3.9–12.7)
WBC # BLD AUTO: 16.33 K/UL (ref 3.9–12.7)
WBC # BLD AUTO: 17.86 K/UL (ref 3.9–12.7)
WBC # BLD AUTO: 19.5 K/UL (ref 3.9–12.7)
WBC # BLD AUTO: 25.39 K/UL (ref 3.9–12.7)
WBC # BLD AUTO: 26.7 K/UL (ref 3.9–12.7)
WBC # BLD AUTO: 5.05 K/UL (ref 3.9–12.7)
WBC # BLD AUTO: 5.49 K/UL (ref 3.9–12.7)
WBC # BLD AUTO: 5.8 K/UL (ref 3.9–12.7)
WBC # BLD AUTO: 5.8 K/UL (ref 3.9–12.7)
WBC # BLD AUTO: 6.04 K/UL (ref 3.9–12.7)
WBC # BLD AUTO: 6.07 K/UL (ref 3.9–12.7)
WBC # BLD AUTO: 6.14 K/UL (ref 3.9–12.7)
WBC # BLD AUTO: 6.26 K/UL (ref 3.9–12.7)
WBC # BLD AUTO: 6.31 K/UL (ref 3.9–12.7)
WBC # BLD AUTO: 6.32 K/UL (ref 3.9–12.7)
WBC # BLD AUTO: 6.32 K/UL (ref 3.9–12.7)
WBC # BLD AUTO: 6.38 K/UL (ref 3.9–12.7)
WBC # BLD AUTO: 6.73 K/UL (ref 3.9–12.7)
WBC # BLD AUTO: 6.77 K/UL (ref 3.9–12.7)
WBC # BLD AUTO: 6.99 K/UL (ref 3.9–12.7)
WBC # BLD AUTO: 7.02 K/UL (ref 3.9–12.7)
WBC # BLD AUTO: 7.19 K/UL (ref 3.9–12.7)
WBC # BLD AUTO: 7.36 K/UL (ref 3.9–12.7)
WBC # BLD AUTO: 7.57 K/UL (ref 3.9–12.7)
WBC # BLD AUTO: 7.59 K/UL (ref 3.9–12.7)
WBC # BLD AUTO: 7.76 K/UL (ref 3.9–12.7)
WBC # BLD AUTO: 7.95 K/UL (ref 3.9–12.7)
WBC # BLD AUTO: 8.45 K/UL (ref 3.9–12.7)
WBC # BLD AUTO: 8.58 K/UL (ref 3.9–12.7)
WBC # BLD AUTO: 8.94 K/UL (ref 3.9–12.7)
WBC # BLD AUTO: 9.13 K/UL (ref 3.9–12.7)
WBC # BLD AUTO: 9.57 K/UL (ref 3.9–12.7)
WBC # BLD AUTO: 9.67 K/UL (ref 3.9–12.7)
WBC #/AREA URNS HPF: 16 /HPF (ref 0–5)
WBC #/AREA URNS HPF: 79 /HPF (ref 0–5)
WBC #/AREA URNS HPF: >100 /HPF (ref 0–5)
WBC CLUMPS URNS QL MICRO: ABNORMAL
YEAST URNS QL MICRO: ABNORMAL
Z-SCORE OF LEFT VENTRICULAR DIMENSION IN END DIASTOLE: -21.7
Z-SCORE OF LEFT VENTRICULAR DIMENSION IN END SYSTOLE: -15.81

## 2023-01-01 PROCEDURE — 94799 UNLISTED PULMONARY SVC/PX: CPT | Mod: HCNC

## 2023-01-01 PROCEDURE — 25000003 PHARM REV CODE 250: Mod: HCNC | Performed by: NURSE PRACTITIONER

## 2023-01-01 PROCEDURE — 21400001 HC TELEMETRY ROOM: Mod: HCNC

## 2023-01-01 PROCEDURE — 94761 N-INVAS EAR/PLS OXIMETRY MLT: CPT | Mod: HCNC

## 2023-01-01 PROCEDURE — 99900035 HC TECH TIME PER 15 MIN (STAT): Mod: HCNC

## 2023-01-01 PROCEDURE — 84484 ASSAY OF TROPONIN QUANT: CPT | Mod: HCNC | Performed by: EMERGENCY MEDICINE

## 2023-01-01 PROCEDURE — 99223 1ST HOSP IP/OBS HIGH 75: CPT | Mod: HCNC,,, | Performed by: INTERNAL MEDICINE

## 2023-01-01 PROCEDURE — 94640 AIRWAY INHALATION TREATMENT: CPT | Mod: HCNC

## 2023-01-01 PROCEDURE — 27100171 HC OXYGEN HIGH FLOW UP TO 24 HOURS: Mod: HCNC

## 2023-01-01 PROCEDURE — 25000003 PHARM REV CODE 250: Mod: HCNC | Performed by: SPECIALIST

## 2023-01-01 PROCEDURE — 85730 THROMBOPLASTIN TIME PARTIAL: CPT | Mod: HCNC | Performed by: NURSE PRACTITIONER

## 2023-01-01 PROCEDURE — 25000003 PHARM REV CODE 250: Mod: HCNC | Performed by: INTERNAL MEDICINE

## 2023-01-01 PROCEDURE — 87086 URINE CULTURE/COLONY COUNT: CPT | Mod: HCNC | Performed by: EMERGENCY MEDICINE

## 2023-01-01 PROCEDURE — 85027 COMPLETE CBC AUTOMATED: CPT | Mod: HCNC | Performed by: INTERNAL MEDICINE

## 2023-01-01 PROCEDURE — A4217 STERILE WATER/SALINE, 500 ML: HCPCS | Mod: HCNC | Performed by: INTERNAL MEDICINE

## 2023-01-01 PROCEDURE — 63600175 PHARM REV CODE 636 W HCPCS: Mod: HCNC | Performed by: INTERNAL MEDICINE

## 2023-01-01 PROCEDURE — 1160F RVW MEDS BY RX/DR IN RCRD: CPT | Mod: HCNC,CPTII,95, | Performed by: NURSE PRACTITIONER

## 2023-01-01 PROCEDURE — 99213 OFFICE O/P EST LOW 20 MIN: CPT | Mod: S$GLB,,, | Performed by: NURSE PRACTITIONER

## 2023-01-01 PROCEDURE — 63600175 PHARM REV CODE 636 W HCPCS: Mod: HCNC | Performed by: NURSE PRACTITIONER

## 2023-01-01 PROCEDURE — 86900 BLOOD TYPING SEROLOGIC ABO: CPT | Mod: HCNC | Performed by: NURSE PRACTITIONER

## 2023-01-01 PROCEDURE — 27000221 HC OXYGEN, UP TO 24 HOURS: Mod: HCNC

## 2023-01-01 PROCEDURE — 83970 ASSAY OF PARATHORMONE: CPT | Mod: HCNC | Performed by: INTERNAL MEDICINE

## 2023-01-01 PROCEDURE — 63600175 PHARM REV CODE 636 W HCPCS: Mod: TB,JG,HCNC | Performed by: INTERNAL MEDICINE

## 2023-01-01 PROCEDURE — 93010 ELECTROCARDIOGRAM REPORT: CPT | Mod: HCNC,76,, | Performed by: INTERNAL MEDICINE

## 2023-01-01 PROCEDURE — 80069 RENAL FUNCTION PANEL: CPT | Mod: HCNC | Performed by: NURSE PRACTITIONER

## 2023-01-01 PROCEDURE — 25000242 PHARM REV CODE 250 ALT 637 W/ HCPCS: Mod: HCNC | Performed by: NURSE PRACTITIONER

## 2023-01-01 PROCEDURE — 99222 PR INITIAL HOSPITAL CARE,LEVL II: ICD-10-PCS | Mod: HCNC,,, | Performed by: STUDENT IN AN ORGANIZED HEALTH CARE EDUCATION/TRAINING PROGRAM

## 2023-01-01 PROCEDURE — 82803 BLOOD GASES ANY COMBINATION: CPT | Mod: HCNC

## 2023-01-01 PROCEDURE — 99495 TCM SERVICES (MODERATE COMPLEXITY): ICD-10-PCS | Mod: S$GLB,,, | Performed by: NURSE PRACTITIONER

## 2023-01-01 PROCEDURE — 85025 COMPLETE CBC W/AUTO DIFF WBC: CPT | Mod: HCNC | Performed by: STUDENT IN AN ORGANIZED HEALTH CARE EDUCATION/TRAINING PROGRAM

## 2023-01-01 PROCEDURE — C9113 INJ PANTOPRAZOLE SODIUM, VIA: HCPCS | Mod: HCNC | Performed by: INTERNAL MEDICINE

## 2023-01-01 PROCEDURE — 93005 ELECTROCARDIOGRAM TRACING: CPT | Mod: HCNC

## 2023-01-01 PROCEDURE — 11000001 HC ACUTE MED/SURG PRIVATE ROOM: Mod: HCNC

## 2023-01-01 PROCEDURE — 83605 ASSAY OF LACTIC ACID: CPT | Mod: 91,HCNC | Performed by: INTERNAL MEDICINE

## 2023-01-01 PROCEDURE — 36415 COLL VENOUS BLD VENIPUNCTURE: CPT | Mod: HCNC | Performed by: SPECIALIST

## 2023-01-01 PROCEDURE — 80048 BASIC METABOLIC PNL TOTAL CA: CPT | Mod: HCNC | Performed by: STUDENT IN AN ORGANIZED HEALTH CARE EDUCATION/TRAINING PROGRAM

## 2023-01-01 PROCEDURE — 25000003 PHARM REV CODE 250: Mod: HCNC | Performed by: STUDENT IN AN ORGANIZED HEALTH CARE EDUCATION/TRAINING PROGRAM

## 2023-01-01 PROCEDURE — 80048 BASIC METABOLIC PNL TOTAL CA: CPT | Mod: HCNC | Performed by: FAMILY MEDICINE

## 2023-01-01 PROCEDURE — 97166 OT EVAL MOD COMPLEX 45 MIN: CPT | Mod: HCNC

## 2023-01-01 PROCEDURE — 94660 CPAP INITIATION&MGMT: CPT | Mod: HCNC

## 2023-01-01 PROCEDURE — 63600175 PHARM REV CODE 636 W HCPCS: Mod: HCNC | Performed by: EMERGENCY MEDICINE

## 2023-01-01 PROCEDURE — 85025 COMPLETE CBC W/AUTO DIFF WBC: CPT | Mod: HCNC | Performed by: NURSE PRACTITIONER

## 2023-01-01 PROCEDURE — 99222 PR INITIAL HOSPITAL CARE,LEVL II: ICD-10-PCS | Mod: HCNC,,, | Performed by: INTERNAL MEDICINE

## 2023-01-01 PROCEDURE — 82800 BLOOD PH: CPT | Mod: HCNC

## 2023-01-01 PROCEDURE — 99233 PR SUBSEQUENT HOSPITAL CARE,LEVL III: ICD-10-PCS | Mod: HCNC,,, | Performed by: INTERNAL MEDICINE

## 2023-01-01 PROCEDURE — 87154 CUL TYP ID BLD PTHGN 6+ TRGT: CPT | Mod: HCNC | Performed by: EMERGENCY MEDICINE

## 2023-01-01 PROCEDURE — 99213 PR OFFICE/OUTPT VISIT, EST, LEVL III, 20-29 MIN: ICD-10-PCS | Mod: S$GLB,,, | Performed by: NURSE PRACTITIONER

## 2023-01-01 PROCEDURE — 99222 1ST HOSP IP/OBS MODERATE 55: CPT | Mod: HCNC,,, | Performed by: PODIATRIST

## 2023-01-01 PROCEDURE — 86901 BLOOD TYPING SEROLOGIC RH(D): CPT | Mod: HCNC | Performed by: INTERNAL MEDICINE

## 2023-01-01 PROCEDURE — 36430 TRANSFUSION BLD/BLD COMPNT: CPT | Mod: HCNC

## 2023-01-01 PROCEDURE — 80202 ASSAY OF VANCOMYCIN: CPT | Mod: HCNC | Performed by: INTERNAL MEDICINE

## 2023-01-01 PROCEDURE — 93010 ELECTROCARDIOGRAM REPORT: CPT | Mod: HCNC,,, | Performed by: INTERNAL MEDICINE

## 2023-01-01 PROCEDURE — 36415 COLL VENOUS BLD VENIPUNCTURE: CPT | Mod: HCNC | Performed by: HOSPITALIST

## 2023-01-01 PROCEDURE — 1160F PR REVIEW ALL MEDS BY PRESCRIBER/CLIN PHARMACIST DOCUMENTED: ICD-10-PCS | Mod: HCNC,CPTII,95, | Performed by: NURSE PRACTITIONER

## 2023-01-01 PROCEDURE — 80053 COMPREHEN METABOLIC PANEL: CPT | Mod: HCNC | Performed by: INTERNAL MEDICINE

## 2023-01-01 PROCEDURE — 80076 HEPATIC FUNCTION PANEL: CPT | Mod: HCNC | Performed by: NURSE PRACTITIONER

## 2023-01-01 PROCEDURE — 80048 BASIC METABOLIC PNL TOTAL CA: CPT | Mod: HCNC | Performed by: NURSE PRACTITIONER

## 2023-01-01 PROCEDURE — 84484 ASSAY OF TROPONIN QUANT: CPT | Mod: HCNC | Performed by: FAMILY MEDICINE

## 2023-01-01 PROCEDURE — 80053 COMPREHEN METABOLIC PANEL: CPT | Mod: HCNC | Performed by: NURSE PRACTITIONER

## 2023-01-01 PROCEDURE — 25000242 PHARM REV CODE 250 ALT 637 W/ HCPCS: Mod: HCNC | Performed by: SPECIALIST

## 2023-01-01 PROCEDURE — 85018 HEMOGLOBIN: CPT | Mod: HCNC | Performed by: NURSE PRACTITIONER

## 2023-01-01 PROCEDURE — 36415 COLL VENOUS BLD VENIPUNCTURE: CPT | Mod: HCNC | Performed by: NURSE PRACTITIONER

## 2023-01-01 PROCEDURE — 80069 RENAL FUNCTION PANEL: CPT | Mod: HCNC | Performed by: INTERNAL MEDICINE

## 2023-01-01 PROCEDURE — 83735 ASSAY OF MAGNESIUM: CPT | Mod: HCNC | Performed by: EMERGENCY MEDICINE

## 2023-01-01 PROCEDURE — 87040 BLOOD CULTURE FOR BACTERIA: CPT | Mod: HCNC | Performed by: EMERGENCY MEDICINE

## 2023-01-01 PROCEDURE — 96360 HYDRATION IV INFUSION INIT: CPT | Mod: 59,HCNC

## 2023-01-01 PROCEDURE — 85014 HEMATOCRIT: CPT | Mod: 91,HCNC | Performed by: INTERNAL MEDICINE

## 2023-01-01 PROCEDURE — 87077 CULTURE AEROBIC IDENTIFY: CPT | Mod: HCNC | Performed by: EMERGENCY MEDICINE

## 2023-01-01 PROCEDURE — 83605 ASSAY OF LACTIC ACID: CPT | Mod: HCNC | Performed by: INTERNAL MEDICINE

## 2023-01-01 PROCEDURE — 84145 PROCALCITONIN (PCT): CPT | Mod: HCNC | Performed by: FAMILY MEDICINE

## 2023-01-01 PROCEDURE — 83735 ASSAY OF MAGNESIUM: CPT | Mod: HCNC | Performed by: NURSE PRACTITIONER

## 2023-01-01 PROCEDURE — 84145 PROCALCITONIN (PCT): CPT | Mod: HCNC | Performed by: STUDENT IN AN ORGANIZED HEALTH CARE EDUCATION/TRAINING PROGRAM

## 2023-01-01 PROCEDURE — 87186 SC STD MICRODIL/AGAR DIL: CPT | Mod: HCNC | Performed by: EMERGENCY MEDICINE

## 2023-01-01 PROCEDURE — P9016 RBC LEUKOCYTES REDUCED: HCPCS | Mod: HCNC | Performed by: NURSE PRACTITIONER

## 2023-01-01 PROCEDURE — 1101F PR PT FALLS ASSESS DOC 0-1 FALLS W/OUT INJ PAST YR: ICD-10-PCS | Mod: HCNC,CPTII,95, | Performed by: NURSE PRACTITIONER

## 2023-01-01 PROCEDURE — 82570 ASSAY OF URINE CREATININE: CPT | Mod: HCNC | Performed by: INTERNAL MEDICINE

## 2023-01-01 PROCEDURE — 84132 ASSAY OF SERUM POTASSIUM: CPT | Mod: HCNC | Performed by: NURSE PRACTITIONER

## 2023-01-01 PROCEDURE — 83605 ASSAY OF LACTIC ACID: CPT | Mod: 91,HCNC | Performed by: EMERGENCY MEDICINE

## 2023-01-01 PROCEDURE — 36415 COLL VENOUS BLD VENIPUNCTURE: CPT | Mod: HCNC | Performed by: STUDENT IN AN ORGANIZED HEALTH CARE EDUCATION/TRAINING PROGRAM

## 2023-01-01 PROCEDURE — 20000000 HC ICU ROOM: Mod: HCNC

## 2023-01-01 PROCEDURE — 36415 COLL VENOUS BLD VENIPUNCTURE: CPT | Mod: HCNC | Performed by: EMERGENCY MEDICINE

## 2023-01-01 PROCEDURE — 87077 CULTURE AEROBIC IDENTIFY: CPT | Mod: 59,HCNC | Performed by: EMERGENCY MEDICINE

## 2023-01-01 PROCEDURE — 86901 BLOOD TYPING SEROLOGIC RH(D): CPT | Mod: HCNC | Performed by: NURSE PRACTITIONER

## 2023-01-01 PROCEDURE — P9016 RBC LEUKOCYTES REDUCED: HCPCS | Mod: HCNC | Performed by: INTERNAL MEDICINE

## 2023-01-01 PROCEDURE — 25000003 PHARM REV CODE 250: Mod: HCNC | Performed by: EMERGENCY MEDICINE

## 2023-01-01 PROCEDURE — 3078F DIAST BP <80 MM HG: CPT | Mod: HCNC,CPTII,95, | Performed by: NURSE PRACTITIONER

## 2023-01-01 PROCEDURE — 85014 HEMATOCRIT: CPT | Mod: HCNC | Performed by: INTERNAL MEDICINE

## 2023-01-01 PROCEDURE — 3075F SYST BP GE 130 - 139MM HG: CPT | Mod: HCNC,CPTII,95, | Performed by: NURSE PRACTITIONER

## 2023-01-01 PROCEDURE — 84075 ASSAY ALKALINE PHOSPHATASE: CPT | Mod: HCNC | Performed by: STUDENT IN AN ORGANIZED HEALTH CARE EDUCATION/TRAINING PROGRAM

## 2023-01-01 PROCEDURE — 96375 TX/PRO/DX INJ NEW DRUG ADDON: CPT | Mod: HCNC

## 2023-01-01 PROCEDURE — 85007 BL SMEAR W/DIFF WBC COUNT: CPT | Mod: HCNC | Performed by: INTERNAL MEDICINE

## 2023-01-01 PROCEDURE — 1101F PR PT FALLS ASSESS DOC 0-1 FALLS W/OUT INJ PAST YR: ICD-10-PCS | Mod: CPTII,S$GLB,, | Performed by: NURSE PRACTITIONER

## 2023-01-01 PROCEDURE — 84439 ASSAY OF FREE THYROXINE: CPT | Mod: HCNC | Performed by: EMERGENCY MEDICINE

## 2023-01-01 PROCEDURE — 85025 COMPLETE CBC W/AUTO DIFF WBC: CPT | Mod: HCNC | Performed by: FAMILY MEDICINE

## 2023-01-01 PROCEDURE — 3288F PR FALLS RISK ASSESSMENT DOCUMENTED: ICD-10-PCS | Mod: HCNC,CPTII,95, | Performed by: NURSE PRACTITIONER

## 2023-01-01 PROCEDURE — 1159F PR MEDICATION LIST DOCUMENTED IN MEDICAL RECORD: ICD-10-PCS | Mod: HCNC,CPTII,95, | Performed by: NURSE PRACTITIONER

## 2023-01-01 PROCEDURE — 94760 N-INVAS EAR/PLS OXIMETRY 1: CPT | Mod: HCNC

## 2023-01-01 PROCEDURE — 99232 PR SUBSEQUENT HOSPITAL CARE,LEVL II: ICD-10-PCS | Mod: HCNC,,, | Performed by: INTERNAL MEDICINE

## 2023-01-01 PROCEDURE — U0002 COVID-19 LAB TEST NON-CDC: HCPCS | Mod: HCNC | Performed by: EMERGENCY MEDICINE

## 2023-01-01 PROCEDURE — 1101F PT FALLS ASSESS-DOCD LE1/YR: CPT | Mod: HCNC,CPTII,95, | Performed by: NURSE PRACTITIONER

## 2023-01-01 PROCEDURE — 82247 BILIRUBIN TOTAL: CPT | Mod: HCNC | Performed by: INTERNAL MEDICINE

## 2023-01-01 PROCEDURE — 97530 THERAPEUTIC ACTIVITIES: CPT | Mod: HCNC

## 2023-01-01 PROCEDURE — 81000 URINALYSIS NONAUTO W/SCOPE: CPT | Mod: HCNC | Performed by: EMERGENCY MEDICINE

## 2023-01-01 PROCEDURE — 36573 INSJ PICC RS&I 5 YR+: CPT | Mod: HCNC

## 2023-01-01 PROCEDURE — 85018 HEMOGLOBIN: CPT | Mod: HCNC | Performed by: INTERNAL MEDICINE

## 2023-01-01 PROCEDURE — 85025 COMPLETE CBC W/AUTO DIFF WBC: CPT | Mod: HCNC | Performed by: EMERGENCY MEDICINE

## 2023-01-01 PROCEDURE — G0439 PR MEDICARE ANNUAL WELLNESS SUBSEQUENT VISIT: ICD-10-PCS | Mod: S$GLB,,, | Performed by: NURSE PRACTITIONER

## 2023-01-01 PROCEDURE — 99495 TRANSJ CARE MGMT MOD F2F 14D: CPT | Mod: S$GLB,,, | Performed by: NURSE PRACTITIONER

## 2023-01-01 PROCEDURE — 87186 SC STD MICRODIL/AGAR DIL: CPT | Mod: 59,HCNC | Performed by: EMERGENCY MEDICINE

## 2023-01-01 PROCEDURE — 87088 URINE BACTERIA CULTURE: CPT | Mod: HCNC | Performed by: EMERGENCY MEDICINE

## 2023-01-01 PROCEDURE — 85025 COMPLETE CBC W/AUTO DIFF WBC: CPT | Mod: HCNC | Performed by: HOSPITALIST

## 2023-01-01 PROCEDURE — 83735 ASSAY OF MAGNESIUM: CPT | Mod: HCNC | Performed by: STUDENT IN AN ORGANIZED HEALTH CARE EDUCATION/TRAINING PROGRAM

## 2023-01-01 PROCEDURE — 93010 EKG 12-LEAD: ICD-10-PCS | Mod: HCNC,,, | Performed by: INTERNAL MEDICINE

## 2023-01-01 PROCEDURE — 99232 SBSQ HOSP IP/OBS MODERATE 35: CPT | Mod: HCNC,,, | Performed by: INTERNAL MEDICINE

## 2023-01-01 PROCEDURE — 1111F DSCHRG MED/CURRENT MED MERGE: CPT | Mod: HCNC,CPTII,95, | Performed by: PHYSICIAN ASSISTANT

## 2023-01-01 PROCEDURE — 1111F PR DISCHARGE MEDS RECONCILED W/ CURRENT OUTPATIENT MED LIST: ICD-10-PCS | Mod: HCNC,CPTII,95, | Performed by: PHYSICIAN ASSISTANT

## 2023-01-01 PROCEDURE — 36415 COLL VENOUS BLD VENIPUNCTURE: CPT | Mod: HCNC | Performed by: FAMILY MEDICINE

## 2023-01-01 PROCEDURE — 93010 EKG 12-LEAD: ICD-10-PCS | Mod: HCNC,,, | Performed by: STUDENT IN AN ORGANIZED HEALTH CARE EDUCATION/TRAINING PROGRAM

## 2023-01-01 PROCEDURE — 27000190 HC CPAP FULL FACE MASK W/VALVE: Mod: HCNC

## 2023-01-01 PROCEDURE — 87040 BLOOD CULTURE FOR BACTERIA: CPT | Mod: 59,HCNC | Performed by: FAMILY MEDICINE

## 2023-01-01 PROCEDURE — C1751 CATH, INF, PER/CENT/MIDLINE: HCPCS | Mod: HCNC

## 2023-01-01 PROCEDURE — 99233 SBSQ HOSP IP/OBS HIGH 50: CPT | Mod: HCNC,,, | Performed by: INTERNAL MEDICINE

## 2023-01-01 PROCEDURE — 87040 BLOOD CULTURE FOR BACTERIA: CPT | Mod: HCNC | Performed by: STUDENT IN AN ORGANIZED HEALTH CARE EDUCATION/TRAINING PROGRAM

## 2023-01-01 PROCEDURE — 83880 ASSAY OF NATRIURETIC PEPTIDE: CPT | Mod: HCNC | Performed by: FAMILY MEDICINE

## 2023-01-01 PROCEDURE — 86920 COMPATIBILITY TEST SPIN: CPT | Mod: HCNC | Performed by: INTERNAL MEDICINE

## 2023-01-01 PROCEDURE — 97162 PT EVAL MOD COMPLEX 30 MIN: CPT | Mod: HCNC

## 2023-01-01 PROCEDURE — 82040 ASSAY OF SERUM ALBUMIN: CPT | Mod: HCNC | Performed by: STUDENT IN AN ORGANIZED HEALTH CARE EDUCATION/TRAINING PROGRAM

## 2023-01-01 PROCEDURE — 99291 PR CRITICAL CARE, E/M 30-74 MINUTES: ICD-10-PCS | Mod: HCNC,,, | Performed by: NURSE PRACTITIONER

## 2023-01-01 PROCEDURE — 87186 SC STD MICRODIL/AGAR DIL: CPT | Mod: HCNC | Performed by: HOSPITALIST

## 2023-01-01 PROCEDURE — 3078F PR MOST RECENT DIASTOLIC BLOOD PRESSURE < 80 MM HG: ICD-10-PCS | Mod: CPTII,S$GLB,, | Performed by: NURSE PRACTITIONER

## 2023-01-01 PROCEDURE — 84460 ALANINE AMINO (ALT) (SGPT): CPT | Mod: HCNC | Performed by: INTERNAL MEDICINE

## 2023-01-01 PROCEDURE — 27000207 HC ISOLATION: Mod: HCNC

## 2023-01-01 PROCEDURE — 63600175 PHARM REV CODE 636 W HCPCS: Mod: HCNC | Performed by: SPECIALIST

## 2023-01-01 PROCEDURE — 99223 PR INITIAL HOSPITAL CARE,LEVL III: ICD-10-PCS | Mod: HCNC,,, | Performed by: INTERNAL MEDICINE

## 2023-01-01 PROCEDURE — 83880 ASSAY OF NATRIURETIC PEPTIDE: CPT | Mod: HCNC | Performed by: NURSE PRACTITIONER

## 2023-01-01 PROCEDURE — 83605 ASSAY OF LACTIC ACID: CPT | Mod: HCNC | Performed by: FAMILY MEDICINE

## 2023-01-01 PROCEDURE — 83880 ASSAY OF NATRIURETIC PEPTIDE: CPT | Mod: HCNC | Performed by: EMERGENCY MEDICINE

## 2023-01-01 PROCEDURE — 63600175 PHARM REV CODE 636 W HCPCS: Mod: HCNC | Performed by: STUDENT IN AN ORGANIZED HEALTH CARE EDUCATION/TRAINING PROGRAM

## 2023-01-01 PROCEDURE — 1111F PR DISCHARGE MEDS RECONCILED W/ CURRENT OUTPATIENT MED LIST: ICD-10-PCS | Mod: CPTII,S$GLB,, | Performed by: NURSE PRACTITIONER

## 2023-01-01 PROCEDURE — 25000003 PHARM REV CODE 250: Mod: HCNC

## 2023-01-01 PROCEDURE — 85025 COMPLETE CBC W/AUTO DIFF WBC: CPT | Mod: HCNC | Performed by: INTERNAL MEDICINE

## 2023-01-01 PROCEDURE — 99232 PR SUBSEQUENT HOSPITAL CARE,LEVL II: ICD-10-PCS | Mod: HCNC,,,

## 2023-01-01 PROCEDURE — 86920 COMPATIBILITY TEST SPIN: CPT | Mod: HCNC | Performed by: SPECIALIST

## 2023-01-01 PROCEDURE — 25000003 PHARM REV CODE 250: Mod: HCNC | Performed by: HOSPITALIST

## 2023-01-01 PROCEDURE — 1111F PR DISCHARGE MEDS RECONCILED W/ CURRENT OUTPATIENT MED LIST: ICD-10-PCS | Mod: HCNC,CPTII,95, | Performed by: NURSE PRACTITIONER

## 2023-01-01 PROCEDURE — P9612 CATHETERIZE FOR URINE SPEC: HCPCS | Mod: HCNC

## 2023-01-01 PROCEDURE — 99231 SBSQ HOSP IP/OBS SF/LOW 25: CPT | Mod: HCNC,,, | Performed by: INTERNAL MEDICINE

## 2023-01-01 PROCEDURE — 99284 EMERGENCY DEPT VISIT MOD MDM: CPT | Mod: 25,HCNC

## 2023-01-01 PROCEDURE — 80053 COMPREHEN METABOLIC PANEL: CPT | Mod: HCNC | Performed by: EMERGENCY MEDICINE

## 2023-01-01 PROCEDURE — 99222 1ST HOSP IP/OBS MODERATE 55: CPT | Mod: HCNC,,, | Performed by: STUDENT IN AN ORGANIZED HEALTH CARE EDUCATION/TRAINING PROGRAM

## 2023-01-01 PROCEDURE — 85007 BL SMEAR W/DIFF WBC COUNT: CPT | Mod: HCNC | Performed by: NURSE PRACTITIONER

## 2023-01-01 PROCEDURE — 85610 PROTHROMBIN TIME: CPT | Mod: HCNC | Performed by: EMERGENCY MEDICINE

## 2023-01-01 PROCEDURE — 99285 EMERGENCY DEPT VISIT HI MDM: CPT | Mod: 25,HCNC

## 2023-01-01 PROCEDURE — C9399 UNCLASSIFIED DRUGS OR BIOLOG: HCPCS | Mod: HCNC | Performed by: NURSE PRACTITIONER

## 2023-01-01 PROCEDURE — 1159F PR MEDICATION LIST DOCUMENTED IN MEDICAL RECORD: ICD-10-PCS | Mod: CPTII,S$GLB,, | Performed by: NURSE PRACTITIONER

## 2023-01-01 PROCEDURE — 85025 COMPLETE CBC W/AUTO DIFF WBC: CPT | Mod: 91,HCNC | Performed by: INTERNAL MEDICINE

## 2023-01-01 PROCEDURE — 36600 WITHDRAWAL OF ARTERIAL BLOOD: CPT | Mod: HCNC

## 2023-01-01 PROCEDURE — 36569 INSJ PICC 5 YR+ W/O IMAGING: CPT | Mod: HCNC

## 2023-01-01 PROCEDURE — 96374 THER/PROPH/DIAG INJ IV PUSH: CPT | Mod: HCNC

## 2023-01-01 PROCEDURE — 83605 ASSAY OF LACTIC ACID: CPT | Mod: HCNC | Performed by: NURSE PRACTITIONER

## 2023-01-01 PROCEDURE — 63600175 PHARM REV CODE 636 W HCPCS: Mod: TB,JG,HCNC | Performed by: NURSE PRACTITIONER

## 2023-01-01 PROCEDURE — 86920 COMPATIBILITY TEST SPIN: CPT | Mod: HCNC | Performed by: NURSE PRACTITIONER

## 2023-01-01 PROCEDURE — 80048 BASIC METABOLIC PNL TOTAL CA: CPT | Mod: HCNC | Performed by: HOSPITALIST

## 2023-01-01 PROCEDURE — 51702 INSERT TEMP BLADDER CATH: CPT | Mod: HCNC

## 2023-01-01 PROCEDURE — 87040 BLOOD CULTURE FOR BACTERIA: CPT | Mod: HCNC | Performed by: INTERNAL MEDICINE

## 2023-01-01 PROCEDURE — 80053 COMPREHEN METABOLIC PANEL: CPT | Mod: HCNC | Performed by: FAMILY MEDICINE

## 2023-01-01 PROCEDURE — 84300 ASSAY OF URINE SODIUM: CPT | Mod: HCNC | Performed by: INTERNAL MEDICINE

## 2023-01-01 PROCEDURE — 87086 URINE CULTURE/COLONY COUNT: CPT | Mod: HCNC | Performed by: HOSPITALIST

## 2023-01-01 PROCEDURE — 84450 TRANSFERASE (AST) (SGOT): CPT | Mod: HCNC | Performed by: INTERNAL MEDICINE

## 2023-01-01 PROCEDURE — 83880 ASSAY OF NATRIURETIC PEPTIDE: CPT | Mod: HCNC | Performed by: STUDENT IN AN ORGANIZED HEALTH CARE EDUCATION/TRAINING PROGRAM

## 2023-01-01 PROCEDURE — 80069 RENAL FUNCTION PANEL: CPT | Mod: HCNC | Performed by: STUDENT IN AN ORGANIZED HEALTH CARE EDUCATION/TRAINING PROGRAM

## 2023-01-01 PROCEDURE — P9016 RBC LEUKOCYTES REDUCED: HCPCS | Mod: HCNC | Performed by: SPECIALIST

## 2023-01-01 PROCEDURE — 99291 CRITICAL CARE FIRST HOUR: CPT | Mod: HCNC

## 2023-01-01 PROCEDURE — 37799 UNLISTED PX VASCULAR SURGERY: CPT | Mod: HCNC

## 2023-01-01 PROCEDURE — 84145 PROCALCITONIN (PCT): CPT | Mod: HCNC | Performed by: EMERGENCY MEDICINE

## 2023-01-01 PROCEDURE — 82248 BILIRUBIN DIRECT: CPT | Mod: HCNC | Performed by: INTERNAL MEDICINE

## 2023-01-01 PROCEDURE — 82436 ASSAY OF URINE CHLORIDE: CPT | Mod: HCNC | Performed by: INTERNAL MEDICINE

## 2023-01-01 PROCEDURE — 93010 PR ELECTROCARDIOGRAM REPORT: ICD-10-PCS | Mod: HCNC,,, | Performed by: INTERNAL MEDICINE

## 2023-01-01 PROCEDURE — P9035 PLATELET PHERES LEUKOREDUCED: HCPCS | Mod: HCNC | Performed by: NURSE PRACTITIONER

## 2023-01-01 PROCEDURE — 1111F DSCHRG MED/CURRENT MED MERGE: CPT | Mod: HCNC,CPTII,95, | Performed by: NURSE PRACTITIONER

## 2023-01-01 PROCEDURE — 3288F FALL RISK ASSESSMENT DOCD: CPT | Mod: HCNC,CPTII,95, | Performed by: NURSE PRACTITIONER

## 2023-01-01 PROCEDURE — 86803 HEPATITIS C AB TEST: CPT | Mod: HCNC | Performed by: EMERGENCY MEDICINE

## 2023-01-01 PROCEDURE — 85018 HEMOGLOBIN: CPT | Mod: 91,HCNC | Performed by: INTERNAL MEDICINE

## 2023-01-01 PROCEDURE — 99232 SBSQ HOSP IP/OBS MODERATE 35: CPT | Mod: HCNC,,,

## 2023-01-01 PROCEDURE — 84075 ASSAY ALKALINE PHOSPHATASE: CPT | Mod: HCNC | Performed by: INTERNAL MEDICINE

## 2023-01-01 PROCEDURE — 94640 AIRWAY INHALATION TREATMENT: CPT | Mod: HCNC,XB

## 2023-01-01 PROCEDURE — 80048 BASIC METABOLIC PNL TOTAL CA: CPT | Mod: HCNC,XB | Performed by: INTERNAL MEDICINE

## 2023-01-01 PROCEDURE — 25000242 PHARM REV CODE 250 ALT 637 W/ HCPCS: Mod: HCNC | Performed by: INTERNAL MEDICINE

## 2023-01-01 PROCEDURE — A4217 STERILE WATER/SALINE, 500 ML: HCPCS | Mod: HCNC | Performed by: NURSE PRACTITIONER

## 2023-01-01 PROCEDURE — 85610 PROTHROMBIN TIME: CPT | Mod: HCNC | Performed by: NURSE PRACTITIONER

## 2023-01-01 PROCEDURE — 87040 BLOOD CULTURE FOR BACTERIA: CPT | Mod: 59,HCNC | Performed by: INTERNAL MEDICINE

## 2023-01-01 PROCEDURE — 96361 HYDRATE IV INFUSION ADD-ON: CPT | Mod: HCNC

## 2023-01-01 PROCEDURE — 87088 URINE BACTERIA CULTURE: CPT | Mod: HCNC | Performed by: HOSPITALIST

## 2023-01-01 PROCEDURE — 1160F PR REVIEW ALL MEDS BY PRESCRIBER/CLIN PHARMACIST DOCUMENTED: ICD-10-PCS | Mod: HCNC,CPTII,95, | Performed by: PHYSICIAN ASSISTANT

## 2023-01-01 PROCEDURE — 99497 PR ADVNCD CARE PLAN 30 MIN: ICD-10-PCS | Mod: S$GLB,,, | Performed by: NURSE PRACTITIONER

## 2023-01-01 PROCEDURE — P9045 ALBUMIN (HUMAN), 5%, 250 ML: HCPCS | Mod: JZ,JG,HCNC | Performed by: NURSE PRACTITIONER

## 2023-01-01 PROCEDURE — 84484 ASSAY OF TROPONIN QUANT: CPT | Mod: 91,HCNC | Performed by: NURSE PRACTITIONER

## 2023-01-01 PROCEDURE — 99214 PR OFFICE/OUTPT VISIT, EST, LEVL IV, 30-39 MIN: ICD-10-PCS | Mod: HCNC,95,, | Performed by: NURSE PRACTITIONER

## 2023-01-01 PROCEDURE — 99214 OFFICE O/P EST MOD 30 MIN: CPT | Mod: HCNC,95,, | Performed by: PHYSICIAN ASSISTANT

## 2023-01-01 PROCEDURE — 1101F PT FALLS ASSESS-DOCD LE1/YR: CPT | Mod: CPTII,S$GLB,, | Performed by: NURSE PRACTITIONER

## 2023-01-01 PROCEDURE — 99203 PR OFFICE/OUTPT VISIT, NEW, LEVL III, 30-44 MIN: ICD-10-PCS | Mod: HCNC,95,, | Performed by: PHYSICIAN ASSISTANT

## 2023-01-01 PROCEDURE — 84443 ASSAY THYROID STIM HORMONE: CPT | Mod: HCNC | Performed by: EMERGENCY MEDICINE

## 2023-01-01 PROCEDURE — 36415 COLL VENOUS BLD VENIPUNCTURE: CPT | Mod: HCNC | Performed by: INTERNAL MEDICINE

## 2023-01-01 PROCEDURE — 93010 ELECTROCARDIOGRAM REPORT: CPT | Mod: HCNC,,, | Performed by: STUDENT IN AN ORGANIZED HEALTH CARE EDUCATION/TRAINING PROGRAM

## 2023-01-01 PROCEDURE — 80048 BASIC METABOLIC PNL TOTAL CA: CPT | Mod: HCNC,XB | Performed by: NURSE PRACTITIONER

## 2023-01-01 PROCEDURE — 3075F SYST BP GE 130 - 139MM HG: CPT | Mod: CPTII,S$GLB,, | Performed by: NURSE PRACTITIONER

## 2023-01-01 PROCEDURE — 99350 PR HOME VISIT,ESTAB PATIENT,LEVEL IV: ICD-10-PCS | Mod: ,,, | Performed by: NURSE PRACTITIONER

## 2023-01-01 PROCEDURE — 96365 THER/PROPH/DIAG IV INF INIT: CPT | Mod: 59,HCNC

## 2023-01-01 PROCEDURE — 1160F RVW MEDS BY RX/DR IN RCRD: CPT | Mod: CPTII,S$GLB,, | Performed by: NURSE PRACTITIONER

## 2023-01-01 PROCEDURE — 1126F AMNT PAIN NOTED NONE PRSNT: CPT | Mod: CPTII,S$GLB,, | Performed by: NURSE PRACTITIONER

## 2023-01-01 PROCEDURE — 99214 OFFICE O/P EST MOD 30 MIN: CPT | Mod: HCNC,95,, | Performed by: NURSE PRACTITIONER

## 2023-01-01 PROCEDURE — 85610 PROTHROMBIN TIME: CPT | Mod: HCNC | Performed by: INTERNAL MEDICINE

## 2023-01-01 PROCEDURE — 99291 PR CRITICAL CARE, E/M 30-74 MINUTES: ICD-10-PCS | Mod: HCNC,,, | Performed by: INTERNAL MEDICINE

## 2023-01-01 PROCEDURE — P9017 PLASMA 1 DONOR FRZ W/IN 8 HR: HCPCS | Mod: HCNC | Performed by: NURSE PRACTITIONER

## 2023-01-01 PROCEDURE — 83735 ASSAY OF MAGNESIUM: CPT | Mod: HCNC | Performed by: INTERNAL MEDICINE

## 2023-01-01 PROCEDURE — 99203 OFFICE O/P NEW LOW 30 MIN: CPT | Mod: HCNC,95,, | Performed by: PHYSICIAN ASSISTANT

## 2023-01-01 PROCEDURE — 83690 ASSAY OF LIPASE: CPT | Mod: HCNC | Performed by: EMERGENCY MEDICINE

## 2023-01-01 PROCEDURE — 99499 RISK ADDL DX/OHS AUDIT: ICD-10-PCS | Mod: S$GLB,,, | Performed by: NURSE PRACTITIONER

## 2023-01-01 PROCEDURE — 99350 HOME/RES VST EST HIGH MDM 60: CPT | Mod: ,,, | Performed by: NURSE PRACTITIONER

## 2023-01-01 PROCEDURE — 1160F PR REVIEW ALL MEDS BY PRESCRIBER/CLIN PHARMACIST DOCUMENTED: ICD-10-PCS | Mod: CPTII,S$GLB,, | Performed by: NURSE PRACTITIONER

## 2023-01-01 PROCEDURE — 87077 CULTURE AEROBIC IDENTIFY: CPT | Mod: HCNC | Performed by: HOSPITALIST

## 2023-01-01 PROCEDURE — 99222 1ST HOSP IP/OBS MODERATE 55: CPT | Mod: HCNC,,, | Performed by: INTERNAL MEDICINE

## 2023-01-01 PROCEDURE — 99223 1ST HOSP IP/OBS HIGH 75: CPT | Mod: HCNC,,,

## 2023-01-01 PROCEDURE — 63600175 PHARM REV CODE 636 W HCPCS: Mod: JZ,EC,JG,HCNC | Performed by: INTERNAL MEDICINE

## 2023-01-01 PROCEDURE — 87081 CULTURE SCREEN ONLY: CPT | Mod: HCNC | Performed by: INTERNAL MEDICINE

## 2023-01-01 PROCEDURE — 36620 INSERTION CATHETER ARTERY: CPT | Mod: HCNC

## 2023-01-01 PROCEDURE — 3078F DIAST BP <80 MM HG: CPT | Mod: CPTII,S$GLB,, | Performed by: NURSE PRACTITIONER

## 2023-01-01 PROCEDURE — 1126F PR PAIN SEVERITY QUANTIFIED, NO PAIN PRESENT: ICD-10-PCS | Mod: CPTII,S$GLB,, | Performed by: NURSE PRACTITIONER

## 2023-01-01 PROCEDURE — 1160F RVW MEDS BY RX/DR IN RCRD: CPT | Mod: HCNC,CPTII,95, | Performed by: PHYSICIAN ASSISTANT

## 2023-01-01 PROCEDURE — 87086 URINE CULTURE/COLONY COUNT: CPT | Mod: HCNC | Performed by: INTERNAL MEDICINE

## 2023-01-01 PROCEDURE — 99231 PR SUBSEQUENT HOSPITAL CARE,LEVL I: ICD-10-PCS | Mod: HCNC,,, | Performed by: INTERNAL MEDICINE

## 2023-01-01 PROCEDURE — 84156 ASSAY OF PROTEIN URINE: CPT | Mod: HCNC | Performed by: INTERNAL MEDICINE

## 2023-01-01 PROCEDURE — 99222 PR INITIAL HOSPITAL CARE,LEVL II: ICD-10-PCS | Mod: HCNC,,, | Performed by: PODIATRIST

## 2023-01-01 PROCEDURE — 99291 CRITICAL CARE FIRST HOUR: CPT | Mod: HCNC,,, | Performed by: INTERNAL MEDICINE

## 2023-01-01 PROCEDURE — 84155 ASSAY OF PROTEIN SERUM: CPT | Mod: HCNC | Performed by: INTERNAL MEDICINE

## 2023-01-01 PROCEDURE — 3288F FALL RISK ASSESSMENT DOCD: CPT | Mod: CPTII,S$GLB,, | Performed by: NURSE PRACTITIONER

## 2023-01-01 PROCEDURE — 87502 INFLUENZA DNA AMP PROBE: CPT | Mod: HCNC | Performed by: FAMILY MEDICINE

## 2023-01-01 PROCEDURE — 3075F PR MOST RECENT SYSTOLIC BLOOD PRESS GE 130-139MM HG: ICD-10-PCS | Mod: CPTII,S$GLB,, | Performed by: NURSE PRACTITIONER

## 2023-01-01 PROCEDURE — 99214 PR OFFICE/OUTPT VISIT, EST, LEVL IV, 30-39 MIN: ICD-10-PCS | Mod: HCNC,95,, | Performed by: PHYSICIAN ASSISTANT

## 2023-01-01 PROCEDURE — 1159F MED LIST DOCD IN RCRD: CPT | Mod: CPTII,S$GLB,, | Performed by: NURSE PRACTITIONER

## 2023-01-01 PROCEDURE — 1159F PR MEDICATION LIST DOCUMENTED IN MEDICAL RECORD: ICD-10-PCS | Mod: HCNC,CPTII,95, | Performed by: PHYSICIAN ASSISTANT

## 2023-01-01 PROCEDURE — 99497 ADVNCD CARE PLAN 30 MIN: CPT | Mod: S$GLB,,, | Performed by: NURSE PRACTITIONER

## 2023-01-01 PROCEDURE — 85027 COMPLETE CBC AUTOMATED: CPT | Mod: HCNC | Performed by: NURSE PRACTITIONER

## 2023-01-01 PROCEDURE — 85014 HEMATOCRIT: CPT | Mod: HCNC | Performed by: NURSE PRACTITIONER

## 2023-01-01 PROCEDURE — 99499 UNLISTED E&M SERVICE: CPT | Mod: S$GLB,,, | Performed by: NURSE PRACTITIONER

## 2023-01-01 PROCEDURE — 81000 URINALYSIS NONAUTO W/SCOPE: CPT | Mod: HCNC | Performed by: INTERNAL MEDICINE

## 2023-01-01 PROCEDURE — 84443 ASSAY THYROID STIM HORMONE: CPT | Mod: HCNC | Performed by: INTERNAL MEDICINE

## 2023-01-01 PROCEDURE — 82272 OCCULT BLD FECES 1-3 TESTS: CPT | Mod: HCNC | Performed by: EMERGENCY MEDICINE

## 2023-01-01 PROCEDURE — 1111F DSCHRG MED/CURRENT MED MERGE: CPT | Mod: CPTII,S$GLB,, | Performed by: NURSE PRACTITIONER

## 2023-01-01 PROCEDURE — 1159F MED LIST DOCD IN RCRD: CPT | Mod: HCNC,CPTII,95, | Performed by: NURSE PRACTITIONER

## 2023-01-01 PROCEDURE — 87389 HIV-1 AG W/HIV-1&-2 AB AG IA: CPT | Mod: HCNC | Performed by: EMERGENCY MEDICINE

## 2023-01-01 PROCEDURE — 84443 ASSAY THYROID STIM HORMONE: CPT | Mod: HCNC | Performed by: NURSE PRACTITIONER

## 2023-01-01 PROCEDURE — 96365 THER/PROPH/DIAG IV INF INIT: CPT | Mod: HCNC

## 2023-01-01 PROCEDURE — 87502 INFLUENZA DNA AMP PROBE: CPT | Mod: HCNC | Performed by: EMERGENCY MEDICINE

## 2023-01-01 PROCEDURE — 27201109 HC SYSTEM FECAL MANAGEMENT: Mod: HCNC

## 2023-01-01 PROCEDURE — 96366 THER/PROPH/DIAG IV INF ADDON: CPT | Mod: HCNC

## 2023-01-01 PROCEDURE — 3075F PR MOST RECENT SYSTOLIC BLOOD PRESS GE 130-139MM HG: ICD-10-PCS | Mod: HCNC,CPTII,95, | Performed by: NURSE PRACTITIONER

## 2023-01-01 PROCEDURE — 86900 BLOOD TYPING SEROLOGIC ABO: CPT | Mod: HCNC | Performed by: SPECIALIST

## 2023-01-01 PROCEDURE — 1159F MED LIST DOCD IN RCRD: CPT | Mod: HCNC,CPTII,95, | Performed by: PHYSICIAN ASSISTANT

## 2023-01-01 PROCEDURE — 25000242 PHARM REV CODE 250 ALT 637 W/ HCPCS: Mod: HCNC | Performed by: EMERGENCY MEDICINE

## 2023-01-01 PROCEDURE — 82306 VITAMIN D 25 HYDROXY: CPT | Mod: HCNC | Performed by: INTERNAL MEDICINE

## 2023-01-01 PROCEDURE — 63600175 PHARM REV CODE 636 W HCPCS: Mod: HCNC | Performed by: FAMILY MEDICINE

## 2023-01-01 PROCEDURE — 86850 RBC ANTIBODY SCREEN: CPT | Mod: HCNC | Performed by: NURSE PRACTITIONER

## 2023-01-01 PROCEDURE — 85730 THROMBOPLASTIN TIME PARTIAL: CPT | Mod: HCNC | Performed by: EMERGENCY MEDICINE

## 2023-01-01 PROCEDURE — G0439 PPPS, SUBSEQ VISIT: HCPCS | Mod: S$GLB,,, | Performed by: NURSE PRACTITIONER

## 2023-01-01 PROCEDURE — 83605 ASSAY OF LACTIC ACID: CPT | Mod: 91,HCNC | Performed by: NURSE PRACTITIONER

## 2023-01-01 PROCEDURE — 83605 ASSAY OF LACTIC ACID: CPT | Mod: HCNC | Performed by: EMERGENCY MEDICINE

## 2023-01-01 PROCEDURE — 99291 CRITICAL CARE FIRST HOUR: CPT | Mod: HCNC,,, | Performed by: NURSE PRACTITIONER

## 2023-01-01 PROCEDURE — 99223 PR INITIAL HOSPITAL CARE,LEVL III: ICD-10-PCS | Mod: HCNC,,,

## 2023-01-01 PROCEDURE — 87040 BLOOD CULTURE FOR BACTERIA: CPT | Mod: 59,HCNC | Performed by: EMERGENCY MEDICINE

## 2023-01-01 PROCEDURE — 3288F PR FALLS RISK ASSESSMENT DOCUMENTED: ICD-10-PCS | Mod: CPTII,S$GLB,, | Performed by: NURSE PRACTITIONER

## 2023-01-01 PROCEDURE — 3078F PR MOST RECENT DIASTOLIC BLOOD PRESSURE < 80 MM HG: ICD-10-PCS | Mod: HCNC,CPTII,95, | Performed by: NURSE PRACTITIONER

## 2023-01-01 RX ORDER — FAMOTIDINE 10 MG/ML
20 INJECTION INTRAVENOUS 2 TIMES DAILY
Status: DISCONTINUED | OUTPATIENT
Start: 2023-01-01 | End: 2023-01-01

## 2023-01-01 RX ORDER — POTASSIUM CHLORIDE 20 MEQ/1
40 TABLET, EXTENDED RELEASE ORAL ONCE
Status: COMPLETED | OUTPATIENT
Start: 2023-01-01 | End: 2023-01-01

## 2023-01-01 RX ORDER — ATORVASTATIN CALCIUM 40 MG/1
40 TABLET, FILM COATED ORAL DAILY
Status: DISCONTINUED | OUTPATIENT
Start: 2023-01-01 | End: 2023-01-01

## 2023-01-01 RX ORDER — LEVALBUTEROL INHALATION SOLUTION 0.63 MG/3ML
0.31 SOLUTION RESPIRATORY (INHALATION) EVERY 6 HOURS PRN
Status: DISCONTINUED | OUTPATIENT
Start: 2023-01-01 | End: 2023-01-01 | Stop reason: HOSPADM

## 2023-01-01 RX ORDER — ARFORMOTEROL TARTRATE 15 UG/2ML
15 SOLUTION RESPIRATORY (INHALATION) 2 TIMES DAILY
Status: DISCONTINUED | OUTPATIENT
Start: 2023-01-01 | End: 2023-01-01 | Stop reason: HOSPADM

## 2023-01-01 RX ORDER — ONDANSETRON 2 MG/ML
4 INJECTION INTRAMUSCULAR; INTRAVENOUS EVERY 6 HOURS PRN
Status: DISCONTINUED | OUTPATIENT
Start: 2023-01-01 | End: 2023-01-01 | Stop reason: HOSPADM

## 2023-01-01 RX ORDER — POTASSIUM CHLORIDE 29.8 MG/ML
40 INJECTION INTRAVENOUS ONCE
Status: COMPLETED | OUTPATIENT
Start: 2023-01-01 | End: 2023-01-01

## 2023-01-01 RX ORDER — POTASSIUM CHLORIDE 29.8 MG/ML
40 INJECTION INTRAVENOUS
Status: DISCONTINUED | OUTPATIENT
Start: 2023-01-01 | End: 2023-01-01

## 2023-01-01 RX ORDER — CEPHALEXIN 500 MG/1
500 CAPSULE ORAL
COMMUNITY
Start: 2022-01-01 | End: 2023-01-01

## 2023-01-01 RX ORDER — NAPROXEN SODIUM 220 MG/1
81 TABLET, FILM COATED ORAL DAILY
Status: DISCONTINUED | OUTPATIENT
Start: 2023-01-01 | End: 2023-01-01 | Stop reason: HOSPADM

## 2023-01-01 RX ORDER — GLUCAGON 1 MG
1 KIT INJECTION
Status: DISCONTINUED | OUTPATIENT
Start: 2023-01-01 | End: 2023-01-01 | Stop reason: HOSPADM

## 2023-01-01 RX ORDER — BUDESONIDE 0.5 MG/2ML
0.5 INHALANT ORAL EVERY 12 HOURS
Status: DISCONTINUED | OUTPATIENT
Start: 2023-01-01 | End: 2023-01-01 | Stop reason: HOSPADM

## 2023-01-01 RX ORDER — POLYETHYLENE GLYCOL 3350 17 G/17G
17 POWDER, FOR SOLUTION ORAL DAILY
Status: DISCONTINUED | OUTPATIENT
Start: 2023-01-01 | End: 2023-01-01

## 2023-01-01 RX ORDER — LEVOTHYROXINE SODIUM 50 UG/1
50 TABLET ORAL
Qty: 30 TABLET | Refills: 0 | Status: SHIPPED | OUTPATIENT
Start: 2023-01-01 | End: 2023-01-01

## 2023-01-01 RX ORDER — MIDODRINE HYDROCHLORIDE 5 MG/1
15 TABLET ORAL EVERY 8 HOURS
Status: DISCONTINUED | OUTPATIENT
Start: 2023-01-01 | End: 2023-01-01 | Stop reason: HOSPADM

## 2023-01-01 RX ORDER — PHYTONADIONE 10 MG/ML
5 INJECTION, EMULSION INTRAMUSCULAR; INTRAVENOUS; SUBCUTANEOUS ONCE
Status: COMPLETED | OUTPATIENT
Start: 2023-01-01 | End: 2023-01-01

## 2023-01-01 RX ORDER — POLYETHYLENE GLYCOL 3350 17 G/17G
17 POWDER, FOR SOLUTION ORAL DAILY
Status: DISCONTINUED | OUTPATIENT
Start: 2023-01-01 | End: 2023-01-01 | Stop reason: HOSPADM

## 2023-01-01 RX ORDER — NOREPINEPHRINE BITARTRATE/D5W 4MG/250ML
0-3 PLASTIC BAG, INJECTION (ML) INTRAVENOUS CONTINUOUS
Status: DISCONTINUED | OUTPATIENT
Start: 2023-01-01 | End: 2023-01-01

## 2023-01-01 RX ORDER — LIDOCAINE HYDROCHLORIDE 10 MG/ML
6 INJECTION, SOLUTION EPIDURAL; INFILTRATION; INTRACAUDAL; PERINEURAL ONCE
Status: DISCONTINUED | OUTPATIENT
Start: 2023-01-01 | End: 2023-01-01

## 2023-01-01 RX ORDER — PANTOPRAZOLE SODIUM 40 MG/10ML
80 INJECTION, POWDER, LYOPHILIZED, FOR SOLUTION INTRAVENOUS ONCE
Status: COMPLETED | OUTPATIENT
Start: 2023-01-01 | End: 2023-01-01

## 2023-01-01 RX ORDER — SODIUM CHLORIDE 450 MG/100ML
INJECTION, SOLUTION INTRAVENOUS CONTINUOUS
Status: ACTIVE | OUTPATIENT
Start: 2023-01-01 | End: 2023-01-01

## 2023-01-01 RX ORDER — INSULIN ASPART 100 [IU]/ML
0-5 INJECTION, SOLUTION INTRAVENOUS; SUBCUTANEOUS
Status: DISCONTINUED | OUTPATIENT
Start: 2023-01-01 | End: 2023-01-01 | Stop reason: HOSPADM

## 2023-01-01 RX ORDER — LORAZEPAM 2 MG/ML
2 INJECTION INTRAMUSCULAR ONCE
Status: COMPLETED | OUTPATIENT
Start: 2023-01-01 | End: 2023-01-01

## 2023-01-01 RX ORDER — FUROSEMIDE 10 MG/ML
40 INJECTION INTRAMUSCULAR; INTRAVENOUS
Status: COMPLETED | OUTPATIENT
Start: 2023-01-01 | End: 2023-01-01

## 2023-01-01 RX ORDER — LEVOTHYROXINE SODIUM 50 UG/1
50 TABLET ORAL
Status: DISCONTINUED | OUTPATIENT
Start: 2023-01-01 | End: 2023-01-01 | Stop reason: HOSPADM

## 2023-01-01 RX ORDER — IPRATROPIUM BROMIDE 0.5 MG/2.5ML
0.5 SOLUTION RESPIRATORY (INHALATION) EVERY 12 HOURS
Status: DISCONTINUED | OUTPATIENT
Start: 2023-01-01 | End: 2023-01-01 | Stop reason: HOSPADM

## 2023-01-01 RX ORDER — INDOMETHACIN 25 MG/1
50 CAPSULE ORAL ONCE
Status: COMPLETED | OUTPATIENT
Start: 2023-01-01 | End: 2023-01-01

## 2023-01-01 RX ORDER — LEVOFLOXACIN 5 MG/ML
750 INJECTION, SOLUTION INTRAVENOUS
Status: DISCONTINUED | OUTPATIENT
Start: 2023-01-01 | End: 2023-01-01 | Stop reason: ALTCHOICE

## 2023-01-01 RX ORDER — INDOMETHACIN 25 MG/1
100 CAPSULE ORAL ONCE
Status: COMPLETED | OUTPATIENT
Start: 2023-01-01 | End: 2023-01-01

## 2023-01-01 RX ORDER — IPRATROPIUM BROMIDE AND ALBUTEROL SULFATE 2.5; .5 MG/3ML; MG/3ML
3 SOLUTION RESPIRATORY (INHALATION) EVERY 6 HOURS PRN
Qty: 90 ML | Refills: 0 | Status: SHIPPED | OUTPATIENT
Start: 2023-01-01 | End: 2023-01-01 | Stop reason: HOSPADM

## 2023-01-01 RX ORDER — HYDROCODONE BITARTRATE AND ACETAMINOPHEN 500; 5 MG/1; MG/1
TABLET ORAL
Status: DISCONTINUED | OUTPATIENT
Start: 2023-01-01 | End: 2023-01-01 | Stop reason: HOSPADM

## 2023-01-01 RX ORDER — IBUPROFEN 200 MG
24 TABLET ORAL
Status: DISCONTINUED | OUTPATIENT
Start: 2023-01-01 | End: 2023-01-01 | Stop reason: HOSPADM

## 2023-01-01 RX ORDER — FUROSEMIDE 40 MG/1
40 TABLET ORAL EVERY OTHER DAY
Qty: 30 TABLET | Refills: 1 | Status: SHIPPED | OUTPATIENT
Start: 2023-01-01 | End: 2023-01-01 | Stop reason: SDUPTHER

## 2023-01-01 RX ORDER — CEFADROXIL 500 MG/1
500 CAPSULE ORAL EVERY 12 HOURS
Qty: 14 CAPSULE | Refills: 0 | Status: ON HOLD | OUTPATIENT
Start: 2023-01-01 | End: 2023-01-01 | Stop reason: HOSPADM

## 2023-01-01 RX ORDER — SODIUM BICARBONATE 650 MG/1
1300 TABLET ORAL 3 TIMES DAILY
Status: DISCONTINUED | OUTPATIENT
Start: 2023-01-01 | End: 2023-01-01

## 2023-01-01 RX ORDER — HYDROCODONE BITARTRATE AND ACETAMINOPHEN 500; 5 MG/1; MG/1
TABLET ORAL
Status: DISCONTINUED | OUTPATIENT
Start: 2023-01-01 | End: 2023-01-01

## 2023-01-01 RX ORDER — ALBUMIN HUMAN 50 G/1000ML
12.5 SOLUTION INTRAVENOUS ONCE
Status: COMPLETED | OUTPATIENT
Start: 2023-01-01 | End: 2023-01-01

## 2023-01-01 RX ORDER — IPRATROPIUM BROMIDE AND ALBUTEROL SULFATE 2.5; .5 MG/3ML; MG/3ML
3 SOLUTION RESPIRATORY (INHALATION) EVERY 6 HOURS PRN
Qty: 75 ML | Refills: 5 | Status: SHIPPED | OUTPATIENT
Start: 2023-01-01 | End: 2023-01-01 | Stop reason: RX

## 2023-01-01 RX ORDER — AMOXICILLIN AND CLAVULANATE POTASSIUM 875; 125 MG/1; MG/1
1 TABLET, FILM COATED ORAL 2 TIMES DAILY
Qty: 14 TABLET | Refills: 0 | Status: SHIPPED | OUTPATIENT
Start: 2023-01-01 | End: 2023-01-01

## 2023-01-01 RX ORDER — DOCUSATE SODIUM 100 MG/1
100 CAPSULE, LIQUID FILLED ORAL DAILY
Status: DISCONTINUED | OUTPATIENT
Start: 2023-01-01 | End: 2023-01-01

## 2023-01-01 RX ORDER — ALBUTEROL SULFATE 2.5 MG/.5ML
2.5 SOLUTION RESPIRATORY (INHALATION) EVERY 6 HOURS PRN
Qty: 90 EACH | Refills: 11 | Status: SHIPPED | OUTPATIENT
Start: 2023-01-01 | End: 2023-01-01 | Stop reason: RX

## 2023-01-01 RX ORDER — METHYLPREDNISOLONE SOD SUCC 125 MG
125 VIAL (EA) INJECTION
Status: COMPLETED | OUTPATIENT
Start: 2023-01-01 | End: 2023-01-01

## 2023-01-01 RX ORDER — CEFEPIME HYDROCHLORIDE 1 G/50ML
1 INJECTION, SOLUTION INTRAVENOUS
Status: DISCONTINUED | OUTPATIENT
Start: 2023-01-01 | End: 2023-01-01

## 2023-01-01 RX ORDER — TIOTROPIUM BROMIDE 18 UG/1
18 CAPSULE ORAL; RESPIRATORY (INHALATION) DAILY
Qty: 30 CAPSULE | Refills: 11 | Status: ON HOLD | OUTPATIENT
Start: 2023-01-01 | End: 2023-01-01

## 2023-01-01 RX ORDER — MIDODRINE HYDROCHLORIDE 5 MG/1
15 TABLET ORAL 3 TIMES DAILY
Status: DISCONTINUED | OUTPATIENT
Start: 2023-01-01 | End: 2023-01-01

## 2023-01-01 RX ORDER — FUROSEMIDE 40 MG/1
40 TABLET ORAL DAILY
Qty: 90 TABLET | Refills: 3 | Status: ON HOLD | OUTPATIENT
Start: 2023-01-01 | End: 2023-01-01

## 2023-01-01 RX ORDER — VASOPRESSIN IN DEXTROSE 5 % 25/250 ML
0.04 PLASTIC BAG, INJECTION (ML) INTRAVENOUS CONTINUOUS
Status: DISCONTINUED | OUTPATIENT
Start: 2023-01-01 | End: 2023-01-01

## 2023-01-01 RX ORDER — MUPIROCIN 20 MG/G
OINTMENT TOPICAL 3 TIMES DAILY
Qty: 1 EACH | Refills: 0 | Status: ON HOLD | OUTPATIENT
Start: 2023-01-01 | End: 2023-01-01 | Stop reason: HOSPADM

## 2023-01-01 RX ORDER — HYDROCODONE BITARTRATE AND ACETAMINOPHEN 5; 325 MG/1; MG/1
1 TABLET ORAL EVERY 6 HOURS PRN
Status: DISCONTINUED | OUTPATIENT
Start: 2023-01-01 | End: 2023-01-01

## 2023-01-01 RX ORDER — NOREPINEPHRINE BITARTRATE/D5W 4MG/250ML
PLASTIC BAG, INJECTION (ML) INTRAVENOUS
Status: COMPLETED
Start: 2023-01-01 | End: 2023-01-01

## 2023-01-01 RX ORDER — MUPIROCIN 20 MG/G
OINTMENT TOPICAL 2 TIMES DAILY
Status: DISCONTINUED | OUTPATIENT
Start: 2023-01-01 | End: 2023-01-01

## 2023-01-01 RX ORDER — POTASSIUM CHLORIDE 20 MEQ/1
40 TABLET, EXTENDED RELEASE ORAL ONCE
Status: DISCONTINUED | OUTPATIENT
Start: 2023-01-01 | End: 2023-01-01

## 2023-01-01 RX ORDER — MUPIROCIN 20 MG/G
OINTMENT TOPICAL 2 TIMES DAILY
Status: COMPLETED | OUTPATIENT
Start: 2023-01-01 | End: 2023-01-01

## 2023-01-01 RX ORDER — POTASSIUM CHLORIDE 20 MEQ/1
20 TABLET, EXTENDED RELEASE ORAL ONCE
Status: COMPLETED | OUTPATIENT
Start: 2023-01-01 | End: 2023-01-01

## 2023-01-01 RX ORDER — NAPROXEN SODIUM 220 MG/1
81 TABLET, FILM COATED ORAL DAILY
Qty: 30 TABLET | Refills: 0 | Status: ON HOLD | OUTPATIENT
Start: 2023-01-01 | End: 2023-01-01

## 2023-01-01 RX ORDER — IPRATROPIUM BROMIDE AND ALBUTEROL SULFATE 2.5; .5 MG/3ML; MG/3ML
3 SOLUTION RESPIRATORY (INHALATION) EVERY 6 HOURS
Status: CANCELLED | OUTPATIENT
Start: 2023-01-01

## 2023-01-01 RX ORDER — ATORVASTATIN CALCIUM 40 MG/1
40 TABLET, FILM COATED ORAL DAILY
Qty: 90 TABLET | Refills: 3 | Status: SHIPPED | OUTPATIENT
Start: 2023-01-01 | End: 2023-01-01 | Stop reason: SDUPTHER

## 2023-01-01 RX ORDER — HYDRALAZINE HYDROCHLORIDE 20 MG/ML
10 INJECTION INTRAMUSCULAR; INTRAVENOUS EVERY 6 HOURS PRN
Status: DISCONTINUED | OUTPATIENT
Start: 2023-01-01 | End: 2023-01-01

## 2023-01-01 RX ORDER — CALCIUM GLUCONATE 20 MG/ML
1 INJECTION, SOLUTION INTRAVENOUS ONCE
Status: COMPLETED | OUTPATIENT
Start: 2023-01-01 | End: 2023-01-01

## 2023-01-01 RX ORDER — POLYETHYLENE GLYCOL 3350 17 G/17G
17 POWDER, FOR SOLUTION ORAL 2 TIMES DAILY
Qty: 72 PACKET | Refills: 3 | Status: ON HOLD | OUTPATIENT
Start: 2023-01-01 | End: 2023-01-01 | Stop reason: HOSPADM

## 2023-01-01 RX ORDER — IPRATROPIUM BROMIDE AND ALBUTEROL SULFATE 2.5; .5 MG/3ML; MG/3ML
3 SOLUTION RESPIRATORY (INHALATION) EVERY 6 HOURS PRN
Status: DISCONTINUED | OUTPATIENT
Start: 2023-01-01 | End: 2023-01-01 | Stop reason: HOSPADM

## 2023-01-01 RX ORDER — BUMETANIDE 0.25 MG/ML
1 INJECTION INTRAMUSCULAR; INTRAVENOUS ONCE
Status: COMPLETED | OUTPATIENT
Start: 2023-01-01 | End: 2023-01-01

## 2023-01-01 RX ORDER — SUCRALFATE 1 G/10ML
1 SUSPENSION ORAL EVERY 6 HOURS
Status: DISCONTINUED | OUTPATIENT
Start: 2023-01-01 | End: 2023-01-01 | Stop reason: HOSPADM

## 2023-01-01 RX ORDER — METHYLPREDNISOLONE SOD SUCC 125 MG
60 VIAL (EA) INJECTION
Status: DISCONTINUED | OUTPATIENT
Start: 2023-01-01 | End: 2023-01-01

## 2023-01-01 RX ORDER — CEFDINIR 300 MG/1
300 CAPSULE ORAL 2 TIMES DAILY
Qty: 20 CAPSULE | Refills: 0 | Status: SHIPPED | OUTPATIENT
Start: 2023-01-01 | End: 2023-01-01

## 2023-01-01 RX ORDER — ATORVASTATIN CALCIUM 40 MG/1
40 TABLET, FILM COATED ORAL DAILY
Status: DISCONTINUED | OUTPATIENT
Start: 2023-01-01 | End: 2023-01-01 | Stop reason: HOSPADM

## 2023-01-01 RX ORDER — FUROSEMIDE 10 MG/ML
60 INJECTION INTRAMUSCULAR; INTRAVENOUS
Status: COMPLETED | OUTPATIENT
Start: 2023-01-01 | End: 2023-01-01

## 2023-01-01 RX ORDER — MAGNESIUM SULFATE HEPTAHYDRATE 40 MG/ML
2 INJECTION, SOLUTION INTRAVENOUS ONCE
Status: COMPLETED | OUTPATIENT
Start: 2023-01-01 | End: 2023-01-01

## 2023-01-01 RX ORDER — SODIUM CHLORIDE 9 MG/ML
INJECTION, SOLUTION INTRAVENOUS CONTINUOUS
Status: DISCONTINUED | OUTPATIENT
Start: 2023-01-01 | End: 2023-01-01

## 2023-01-01 RX ORDER — ALBUTEROL SULFATE 0.83 MG/ML
5 SOLUTION RESPIRATORY (INHALATION) ONCE
Status: COMPLETED | OUTPATIENT
Start: 2023-01-01 | End: 2023-01-01

## 2023-01-01 RX ORDER — MAGNESIUM HYDROXIDE 1200 MG/15ML
1 LIQUID ORAL ONCE
COMMUNITY
End: 2023-01-01 | Stop reason: ALTCHOICE

## 2023-01-01 RX ORDER — FLUDROCORTISONE ACETATE 0.1 MG/1
100 TABLET ORAL 2 TIMES DAILY
Qty: 60 TABLET | Refills: 0 | Status: ON HOLD | OUTPATIENT
Start: 2023-01-01 | End: 2023-01-01

## 2023-01-01 RX ORDER — LINEZOLID 2 MG/ML
600 INJECTION, SOLUTION INTRAVENOUS
Status: DISCONTINUED | OUTPATIENT
Start: 2023-01-01 | End: 2023-01-01

## 2023-01-01 RX ORDER — NAPROXEN SODIUM 220 MG/1
81 TABLET, FILM COATED ORAL DAILY
Status: DISCONTINUED | OUTPATIENT
Start: 2023-01-01 | End: 2023-01-01

## 2023-01-01 RX ORDER — ENOXAPARIN SODIUM 100 MG/ML
40 INJECTION SUBCUTANEOUS EVERY 24 HOURS
Status: DISCONTINUED | OUTPATIENT
Start: 2023-01-01 | End: 2023-01-01

## 2023-01-01 RX ORDER — TIOTROPIUM BROMIDE 18 UG/1
18 CAPSULE ORAL; RESPIRATORY (INHALATION) DAILY
Qty: 30 CAPSULE | Refills: 11 | Status: SHIPPED | OUTPATIENT
Start: 2023-01-01 | End: 2023-01-01 | Stop reason: SDUPTHER

## 2023-01-01 RX ORDER — PANTOPRAZOLE SODIUM 40 MG/1
40 TABLET, DELAYED RELEASE ORAL 2 TIMES DAILY
Status: DISCONTINUED | OUTPATIENT
Start: 2023-01-01 | End: 2023-01-01

## 2023-01-01 RX ORDER — MIDODRINE HYDROCHLORIDE 5 MG/1
10 TABLET ORAL 3 TIMES DAILY
Status: DISCONTINUED | OUTPATIENT
Start: 2023-01-01 | End: 2023-01-01

## 2023-01-01 RX ORDER — METRONIDAZOLE 10 MG/G
GEL TOPICAL 2 TIMES DAILY
Status: DISCONTINUED | OUTPATIENT
Start: 2023-01-01 | End: 2023-01-01 | Stop reason: HOSPADM

## 2023-01-01 RX ORDER — HEPARIN SODIUM 5000 [USP'U]/ML
7500 INJECTION, SOLUTION INTRAVENOUS; SUBCUTANEOUS EVERY 8 HOURS
Status: DISCONTINUED | OUTPATIENT
Start: 2023-01-01 | End: 2023-01-01

## 2023-01-01 RX ORDER — METHYLPREDNISOLONE SOD SUCC 125 MG
40 VIAL (EA) INJECTION
Status: COMPLETED | OUTPATIENT
Start: 2023-01-01 | End: 2023-01-01

## 2023-01-01 RX ORDER — PREDNISONE 20 MG/1
40 TABLET ORAL DAILY
Qty: 4 TABLET | Refills: 0 | Status: SHIPPED | OUTPATIENT
Start: 2023-01-01 | End: 2023-01-01

## 2023-01-01 RX ORDER — ENOXAPARIN SODIUM 100 MG/ML
60 INJECTION SUBCUTANEOUS EVERY 12 HOURS
Status: DISCONTINUED | OUTPATIENT
Start: 2023-01-01 | End: 2023-01-01 | Stop reason: HOSPADM

## 2023-01-01 RX ORDER — IBUPROFEN 200 MG
16 TABLET ORAL
Status: DISCONTINUED | OUTPATIENT
Start: 2023-01-01 | End: 2023-01-01 | Stop reason: HOSPADM

## 2023-01-01 RX ORDER — AMOXICILLIN 250 MG
1 CAPSULE ORAL DAILY
Status: DISCONTINUED | OUTPATIENT
Start: 2023-01-01 | End: 2023-01-01 | Stop reason: HOSPADM

## 2023-01-01 RX ORDER — MIDODRINE HYDROCHLORIDE 5 MG/1
15 TABLET ORAL 3 TIMES DAILY
Qty: 270 TABLET | Refills: 0 | Status: ON HOLD | OUTPATIENT
Start: 2023-01-01 | End: 2023-01-01

## 2023-01-01 RX ORDER — VASOPRESSIN IN DEXTROSE 5 % 25/250 ML
0.04 PLASTIC BAG, INJECTION (ML) INTRAVENOUS CONTINUOUS
Status: DISCONTINUED | OUTPATIENT
Start: 2023-01-01 | End: 2023-01-01 | Stop reason: HOSPADM

## 2023-01-01 RX ORDER — TALC
6 POWDER (GRAM) TOPICAL NIGHTLY PRN
Status: DISCONTINUED | OUTPATIENT
Start: 2023-01-01 | End: 2023-01-01 | Stop reason: HOSPADM

## 2023-01-01 RX ORDER — SODIUM CHLORIDE 0.9 % (FLUSH) 0.9 %
10 SYRINGE (ML) INJECTION
Status: DISCONTINUED | OUTPATIENT
Start: 2023-01-01 | End: 2023-01-01 | Stop reason: HOSPADM

## 2023-01-01 RX ORDER — PANTOPRAZOLE SODIUM 40 MG/10ML
80 INJECTION, POWDER, LYOPHILIZED, FOR SOLUTION INTRAVENOUS ONCE
Status: DISCONTINUED | OUTPATIENT
Start: 2023-01-01 | End: 2023-01-01

## 2023-01-01 RX ORDER — SODIUM CHLORIDE, SODIUM LACTATE, POTASSIUM CHLORIDE, CALCIUM CHLORIDE 600; 310; 30; 20 MG/100ML; MG/100ML; MG/100ML; MG/100ML
INJECTION, SOLUTION INTRAVENOUS CONTINUOUS
Status: DISCONTINUED | OUTPATIENT
Start: 2023-01-01 | End: 2023-01-01 | Stop reason: HOSPADM

## 2023-01-01 RX ORDER — PANTOPRAZOLE SODIUM 40 MG/10ML
40 INJECTION, POWDER, LYOPHILIZED, FOR SOLUTION INTRAVENOUS 2 TIMES DAILY
Status: DISCONTINUED | OUTPATIENT
Start: 2023-01-01 | End: 2023-01-01

## 2023-01-01 RX ORDER — AMOXICILLIN 250 MG
1 CAPSULE ORAL DAILY PRN
Qty: 30 TABLET | Refills: 0 | Status: ON HOLD | OUTPATIENT
Start: 2023-01-01 | End: 2023-01-01

## 2023-01-01 RX ORDER — FLUDROCORTISONE ACETATE 0.1 MG/1
100 TABLET ORAL 2 TIMES DAILY
Status: DISCONTINUED | OUTPATIENT
Start: 2023-01-01 | End: 2023-01-01 | Stop reason: HOSPADM

## 2023-01-01 RX ORDER — MIDODRINE HYDROCHLORIDE 5 MG/1
15 TABLET ORAL 3 TIMES DAILY
Status: DISCONTINUED | OUTPATIENT
Start: 2023-01-01 | End: 2023-01-01 | Stop reason: HOSPADM

## 2023-01-01 RX ORDER — POTASSIUM CHLORIDE 750 MG/1
10 TABLET, EXTENDED RELEASE ORAL DAILY
Status: DISCONTINUED | OUTPATIENT
Start: 2023-01-01 | End: 2023-01-01 | Stop reason: HOSPADM

## 2023-01-01 RX ORDER — FAMOTIDINE 20 MG/1
20 TABLET, FILM COATED ORAL 2 TIMES DAILY
Status: DISCONTINUED | OUTPATIENT
Start: 2023-01-01 | End: 2023-01-01

## 2023-01-01 RX ORDER — MIDODRINE HYDROCHLORIDE 5 MG/1
5 TABLET ORAL 3 TIMES DAILY
Status: DISCONTINUED | OUTPATIENT
Start: 2023-01-01 | End: 2023-01-01

## 2023-01-01 RX ORDER — FAMOTIDINE 20 MG/1
20 TABLET, FILM COATED ORAL DAILY
Status: DISCONTINUED | OUTPATIENT
Start: 2023-01-01 | End: 2023-01-01 | Stop reason: HOSPADM

## 2023-01-01 RX ORDER — FUROSEMIDE 10 MG/ML
80 INJECTION INTRAMUSCULAR; INTRAVENOUS ONCE
Status: COMPLETED | OUTPATIENT
Start: 2023-01-01 | End: 2023-01-01

## 2023-01-01 RX ORDER — POTASSIUM CHLORIDE 20 MEQ/1
40 TABLET, EXTENDED RELEASE ORAL 2 TIMES DAILY
Status: COMPLETED | OUTPATIENT
Start: 2023-01-01 | End: 2023-01-01

## 2023-01-01 RX ORDER — FUROSEMIDE 40 MG/1
40 TABLET ORAL DAILY
Status: DISCONTINUED | OUTPATIENT
Start: 2023-01-01 | End: 2023-01-01 | Stop reason: HOSPADM

## 2023-01-01 RX ORDER — LEVALBUTEROL INHALATION SOLUTION 1.25 MG/3ML
1 SOLUTION RESPIRATORY (INHALATION) EVERY 4 HOURS PRN
Qty: 50 EACH | Refills: 3 | Status: ON HOLD | OUTPATIENT
Start: 2023-01-01 | End: 2023-01-01

## 2023-01-01 RX ORDER — CALCIUM GLUCONATE 20 MG/ML
1 INJECTION, SOLUTION INTRAVENOUS
Status: COMPLETED | OUTPATIENT
Start: 2023-01-01 | End: 2023-01-01

## 2023-01-01 RX ORDER — SODIUM CHLORIDE 9 MG/ML
INJECTION, SOLUTION INTRAVENOUS
Status: DISCONTINUED | OUTPATIENT
Start: 2023-01-01 | End: 2023-01-01 | Stop reason: HOSPADM

## 2023-01-01 RX ORDER — POTASSIUM CHLORIDE 20 MEQ/1
40 TABLET, EXTENDED RELEASE ORAL
Status: COMPLETED | OUTPATIENT
Start: 2023-01-01 | End: 2023-01-01

## 2023-01-01 RX ORDER — METRONIDAZOLE 10 MG/G
GEL TOPICAL DAILY
Qty: 60 G | Refills: 0 | Status: ON HOLD | OUTPATIENT
Start: 2023-01-01 | End: 2023-01-01

## 2023-01-01 RX ORDER — IPRATROPIUM BROMIDE AND ALBUTEROL SULFATE 2.5; .5 MG/3ML; MG/3ML
3 SOLUTION RESPIRATORY (INHALATION) EVERY 6 HOURS
Status: DISCONTINUED | OUTPATIENT
Start: 2023-01-01 | End: 2023-01-01

## 2023-01-01 RX ORDER — LINEZOLID 600 MG/1
600 TABLET, FILM COATED ORAL EVERY 12 HOURS
Qty: 10 TABLET | Refills: 0 | Status: SHIPPED | OUTPATIENT
Start: 2023-01-01 | End: 2023-01-01

## 2023-01-01 RX ORDER — MIDODRINE HYDROCHLORIDE 5 MG/1
5 TABLET ORAL
Status: DISCONTINUED | OUTPATIENT
Start: 2023-01-01 | End: 2023-01-01

## 2023-01-01 RX ORDER — LINEZOLID 600 MG/1
600 TABLET, FILM COATED ORAL EVERY 12 HOURS
Status: DISCONTINUED | OUTPATIENT
Start: 2023-01-01 | End: 2023-01-01 | Stop reason: HOSPADM

## 2023-01-01 RX ORDER — ACETAMINOPHEN 325 MG/1
650 TABLET ORAL EVERY 6 HOURS PRN
Status: DISCONTINUED | OUTPATIENT
Start: 2023-01-01 | End: 2023-01-01 | Stop reason: HOSPADM

## 2023-01-01 RX ORDER — POLYETHYLENE GLYCOL 3350 17 G/17G
17 POWDER, FOR SOLUTION ORAL 2 TIMES DAILY
Status: DISCONTINUED | OUTPATIENT
Start: 2023-01-01 | End: 2023-01-01 | Stop reason: HOSPADM

## 2023-01-01 RX ORDER — AMOXICILLIN AND CLAVULANATE POTASSIUM 875; 125 MG/1; MG/1
1 TABLET, FILM COATED ORAL 2 TIMES DAILY
Qty: 10 TABLET | Refills: 0 | Status: SHIPPED | OUTPATIENT
Start: 2023-01-01 | End: 2023-01-01

## 2023-01-01 RX ORDER — MUPIROCIN 20 MG/G
OINTMENT TOPICAL 2 TIMES DAILY
Status: DISPENSED | OUTPATIENT
Start: 2023-01-01 | End: 2023-01-01

## 2023-01-01 RX ORDER — MIDODRINE HYDROCHLORIDE 5 MG/1
15 TABLET ORAL
Status: COMPLETED | OUTPATIENT
Start: 2023-01-01 | End: 2023-01-01

## 2023-01-01 RX ORDER — FUROSEMIDE 10 MG/ML
60 INJECTION INTRAMUSCULAR; INTRAVENOUS DAILY
Status: DISCONTINUED | OUTPATIENT
Start: 2023-01-01 | End: 2023-01-01

## 2023-01-01 RX ORDER — POTASSIUM CHLORIDE 14.9 MG/ML
20 INJECTION INTRAVENOUS ONCE
Status: COMPLETED | OUTPATIENT
Start: 2023-01-01 | End: 2023-01-01

## 2023-01-01 RX ORDER — IPRATROPIUM BROMIDE 0.5 MG/2.5ML
0.5 SOLUTION RESPIRATORY (INHALATION) EVERY 6 HOURS
Status: DISCONTINUED | OUTPATIENT
Start: 2023-01-01 | End: 2023-01-01

## 2023-01-01 RX ORDER — DEXMEDETOMIDINE HYDROCHLORIDE 4 UG/ML
0-1.4 INJECTION INTRAVENOUS CONTINUOUS
Status: DISCONTINUED | OUTPATIENT
Start: 2023-01-01 | End: 2023-01-01

## 2023-01-01 RX ORDER — IPRATROPIUM BROMIDE AND ALBUTEROL SULFATE 2.5; .5 MG/3ML; MG/3ML
3 SOLUTION RESPIRATORY (INHALATION)
Status: ACTIVE | OUTPATIENT
Start: 2023-01-01 | End: 2023-01-01

## 2023-01-01 RX ORDER — INDOMETHACIN 25 MG/1
CAPSULE ORAL
Status: COMPLETED
Start: 2023-01-01 | End: 2023-01-01

## 2023-01-01 RX ORDER — MEROPENEM AND SODIUM CHLORIDE 1 G/50ML
1 INJECTION, SOLUTION INTRAVENOUS
Status: DISCONTINUED | OUTPATIENT
Start: 2023-01-01 | End: 2023-01-01

## 2023-01-01 RX ORDER — AMOXICILLIN 250 MG
1 CAPSULE ORAL DAILY PRN
Status: DISCONTINUED | OUTPATIENT
Start: 2023-01-01 | End: 2023-01-01 | Stop reason: HOSPADM

## 2023-01-01 RX ORDER — ATORVASTATIN CALCIUM 40 MG/1
40 TABLET, FILM COATED ORAL DAILY
Qty: 30 TABLET | Refills: 0 | Status: ON HOLD | OUTPATIENT
Start: 2023-01-01 | End: 2023-01-01 | Stop reason: HOSPADM

## 2023-01-01 RX ORDER — AMOXICILLIN 250 MG
1 CAPSULE ORAL 2 TIMES DAILY
Status: DISCONTINUED | OUTPATIENT
Start: 2023-01-01 | End: 2023-01-01 | Stop reason: HOSPADM

## 2023-01-01 RX ORDER — FUROSEMIDE 10 MG/ML
60 INJECTION INTRAMUSCULAR; INTRAVENOUS
Status: DISCONTINUED | OUTPATIENT
Start: 2023-01-01 | End: 2023-01-01 | Stop reason: HOSPADM

## 2023-01-01 RX ORDER — PANTOPRAZOLE SODIUM 40 MG/10ML
40 INJECTION, POWDER, LYOPHILIZED, FOR SOLUTION INTRAVENOUS 2 TIMES DAILY
Status: DISCONTINUED | OUTPATIENT
Start: 2023-01-01 | End: 2023-01-01 | Stop reason: HOSPADM

## 2023-01-01 RX ORDER — POTASSIUM CHLORIDE 14.9 MG/ML
60 INJECTION INTRAVENOUS
Status: DISCONTINUED | OUTPATIENT
Start: 2023-01-01 | End: 2023-01-01

## 2023-01-01 RX ORDER — NEBULIZER AND COMPRESSOR
EACH MISCELLANEOUS
Qty: 1 EACH | Refills: 0 | Status: ON HOLD | OUTPATIENT
Start: 2023-01-01 | End: 2023-01-01

## 2023-01-01 RX ORDER — ACETAMINOPHEN 325 MG/1
650 TABLET ORAL 3 TIMES DAILY
Status: DISCONTINUED | OUTPATIENT
Start: 2023-01-01 | End: 2023-01-01 | Stop reason: HOSPADM

## 2023-01-01 RX ORDER — TORSEMIDE 10 MG/1
20 TABLET ORAL DAILY
Status: DISCONTINUED | OUTPATIENT
Start: 2023-01-01 | End: 2023-01-01

## 2023-01-01 RX ORDER — MAGNESIUM SULFATE 1 G/100ML
1 INJECTION INTRAVENOUS ONCE
Status: COMPLETED | OUTPATIENT
Start: 2023-01-01 | End: 2023-01-01

## 2023-01-01 RX ORDER — LEVALBUTEROL INHALATION SOLUTION 0.63 MG/3ML
1.25 SOLUTION RESPIRATORY (INHALATION) EVERY 6 HOURS PRN
Status: DISCONTINUED | OUTPATIENT
Start: 2023-01-01 | End: 2023-01-01 | Stop reason: HOSPADM

## 2023-01-01 RX ORDER — PREDNISONE 20 MG/1
40 TABLET ORAL DAILY
Status: DISCONTINUED | OUTPATIENT
Start: 2023-01-01 | End: 2023-01-01 | Stop reason: HOSPADM

## 2023-01-01 RX ORDER — FAMOTIDINE 10 MG/ML
20 INJECTION INTRAVENOUS DAILY
Status: DISCONTINUED | OUTPATIENT
Start: 2023-01-01 | End: 2023-01-01

## 2023-01-01 RX ADMIN — CEFTRIAXONE 1 G: 1 INJECTION, POWDER, FOR SOLUTION INTRAMUSCULAR; INTRAVENOUS at 06:09

## 2023-01-01 RX ADMIN — MIDODRINE HYDROCHLORIDE 15 MG: 5 TABLET ORAL at 08:09

## 2023-01-01 RX ADMIN — MUPIROCIN: 20 OINTMENT TOPICAL at 09:09

## 2023-01-01 RX ADMIN — ENOXAPARIN SODIUM 60 MG: 40 INJECTION SUBCUTANEOUS at 09:03

## 2023-01-01 RX ADMIN — ENOXAPARIN SODIUM 60 MG: 40 INJECTION SUBCUTANEOUS at 08:03

## 2023-01-01 RX ADMIN — MIDODRINE HYDROCHLORIDE 5 MG: 5 TABLET ORAL at 02:09

## 2023-01-01 RX ADMIN — CALCIUM GLUCONATE 1 G: 20 INJECTION, SOLUTION INTRAVENOUS at 06:09

## 2023-01-01 RX ADMIN — MIDODRINE HYDROCHLORIDE 15 MG: 5 TABLET ORAL at 03:09

## 2023-01-01 RX ADMIN — ATORVASTATIN CALCIUM 40 MG: 40 TABLET, FILM COATED ORAL at 09:09

## 2023-01-01 RX ADMIN — ARFORMOTEROL TARTRATE 15 MCG: 15 SOLUTION RESPIRATORY (INHALATION) at 07:03

## 2023-01-01 RX ADMIN — ACETAMINOPHEN 650 MG: 325 TABLET ORAL at 09:09

## 2023-01-01 RX ADMIN — POLYETHYLENE GLYCOL 3350 17 G: 17 POWDER, FOR SOLUTION ORAL at 08:03

## 2023-01-01 RX ADMIN — FAMOTIDINE 20 MG: 20 TABLET, FILM COATED ORAL at 08:09

## 2023-01-01 RX ADMIN — CALCIUM GLUCONATE 1 G: 20 INJECTION, SOLUTION INTRAVENOUS at 07:09

## 2023-01-01 RX ADMIN — NOREPINEPHRINE BITARTRATE 0.1 MCG/KG/MIN: 4 INJECTION, SOLUTION INTRAVENOUS at 04:09

## 2023-01-01 RX ADMIN — APIXABAN 5 MG: 2.5 TABLET, FILM COATED ORAL at 09:09

## 2023-01-01 RX ADMIN — ASPIRIN 81 MG CHEWABLE TABLET 81 MG: 81 TABLET CHEWABLE at 08:09

## 2023-01-01 RX ADMIN — ATORVASTATIN CALCIUM 40 MG: 40 TABLET, FILM COATED ORAL at 08:09

## 2023-01-01 RX ADMIN — MIDODRINE HYDROCHLORIDE 15 MG: 5 TABLET ORAL at 05:09

## 2023-01-01 RX ADMIN — PIPERACILLIN AND TAZOBACTAM 4.5 G: 4; .5 INJECTION, POWDER, LYOPHILIZED, FOR SOLUTION INTRAVENOUS; PARENTERAL at 09:09

## 2023-01-01 RX ADMIN — SENNOSIDES AND DOCUSATE SODIUM 1 TABLET: 50; 8.6 TABLET ORAL at 08:03

## 2023-01-01 RX ADMIN — CEFTRIAXONE 1 G: 1 INJECTION, POWDER, FOR SOLUTION INTRAMUSCULAR; INTRAVENOUS at 10:07

## 2023-01-01 RX ADMIN — VASOPRESSIN 0.04 UNITS/MIN: 0.2 INJECTION INTRAVENOUS at 09:10

## 2023-01-01 RX ADMIN — APIXABAN 5 MG: 2.5 TABLET, FILM COATED ORAL at 08:09

## 2023-01-01 RX ADMIN — NOREPINEPHRINE BITARTRATE 1.66 MCG/KG/MIN: 1 INJECTION, SOLUTION, CONCENTRATE INTRAVENOUS at 12:10

## 2023-01-01 RX ADMIN — POLYETHYLENE GLYCOL 3350 17 G: 17 POWDER, FOR SOLUTION ORAL at 10:07

## 2023-01-01 RX ADMIN — SUCRALFATE ORAL 1 G: 1 SUSPENSION ORAL at 06:09

## 2023-01-01 RX ADMIN — LEVOTHYROXINE SODIUM 50 MCG: 50 TABLET ORAL at 05:09

## 2023-01-01 RX ADMIN — CEFTRIAXONE 1 G: 1 INJECTION, POWDER, FOR SOLUTION INTRAMUSCULAR; INTRAVENOUS at 05:09

## 2023-01-01 RX ADMIN — BUDESONIDE 0.5 MG: 0.5 INHALANT ORAL at 07:07

## 2023-01-01 RX ADMIN — POTASSIUM CHLORIDE 20 MEQ: 1500 TABLET, EXTENDED RELEASE ORAL at 08:09

## 2023-01-01 RX ADMIN — MIDODRINE HYDROCHLORIDE 15 MG: 5 TABLET ORAL at 09:09

## 2023-01-01 RX ADMIN — NOREPINEPHRINE BITARTRATE 1.3 MCG/KG/MIN: 1 INJECTION, SOLUTION, CONCENTRATE INTRAVENOUS at 08:09

## 2023-01-01 RX ADMIN — ACETAMINOPHEN 650 MG: 325 TABLET ORAL at 08:09

## 2023-01-01 RX ADMIN — NOREPINEPHRINE BITARTRATE 0.12 MCG/KG/MIN: 1 INJECTION, SOLUTION, CONCENTRATE INTRAVENOUS at 01:09

## 2023-01-01 RX ADMIN — PANTOPRAZOLE SODIUM 40 MG: 40 TABLET, DELAYED RELEASE ORAL at 08:09

## 2023-01-01 RX ADMIN — ACETAMINOPHEN 650 MG: 325 TABLET ORAL at 08:10

## 2023-01-01 RX ADMIN — IPRATROPIUM BROMIDE 0.5 MG: 0.5 SOLUTION RESPIRATORY (INHALATION) at 07:10

## 2023-01-01 RX ADMIN — IPRATROPIUM BROMIDE 0.5 MG: 0.5 SOLUTION RESPIRATORY (INHALATION) at 12:09

## 2023-01-01 RX ADMIN — PIPERACILLIN AND TAZOBACTAM 4.5 G: 4; .5 INJECTION, POWDER, LYOPHILIZED, FOR SOLUTION INTRAVENOUS; PARENTERAL at 04:10

## 2023-01-01 RX ADMIN — MUPIROCIN: 20 OINTMENT TOPICAL at 10:09

## 2023-01-01 RX ADMIN — SODIUM BICARBONATE: 84 INJECTION, SOLUTION INTRAVENOUS at 01:09

## 2023-01-01 RX ADMIN — IPRATROPIUM BROMIDE 0.5 MG: 0.5 SOLUTION RESPIRATORY (INHALATION) at 01:09

## 2023-01-01 RX ADMIN — IPRATROPIUM BROMIDE 0.5 MG: 0.5 SOLUTION RESPIRATORY (INHALATION) at 08:09

## 2023-01-01 RX ADMIN — NOREPINEPHRINE BITARTRATE 3 MCG/KG/MIN: 1 INJECTION, SOLUTION, CONCENTRATE INTRAVENOUS at 06:10

## 2023-01-01 RX ADMIN — LINEZOLID 600 MG: 600 INJECTION, SOLUTION INTRAVENOUS at 12:07

## 2023-01-01 RX ADMIN — FAMOTIDINE 20 MG: 20 TABLET, FILM COATED ORAL at 09:09

## 2023-01-01 RX ADMIN — SUCRALFATE ORAL 1 G: 1 SUSPENSION ORAL at 11:09

## 2023-01-01 RX ADMIN — NOREPINEPHRINE BITARTRATE 1.8 MCG/KG/MIN: 1 INJECTION, SOLUTION, CONCENTRATE INTRAVENOUS at 09:10

## 2023-01-01 RX ADMIN — SUCRALFATE ORAL 1 G: 1 SUSPENSION ORAL at 03:09

## 2023-01-01 RX ADMIN — FLUDROCORTISONE ACETATE 100 MCG: 0.1 TABLET ORAL at 09:09

## 2023-01-01 RX ADMIN — ARFORMOTEROL TARTRATE 15 MCG: 15 SOLUTION RESPIRATORY (INHALATION) at 08:03

## 2023-01-01 RX ADMIN — IPRATROPIUM BROMIDE 0.5 MG: 0.5 SOLUTION RESPIRATORY (INHALATION) at 07:09

## 2023-01-01 RX ADMIN — MUPIROCIN: 20 OINTMENT TOPICAL at 08:09

## 2023-01-01 RX ADMIN — FLUDROCORTISONE ACETATE 100 MCG: 0.1 TABLET ORAL at 08:09

## 2023-01-01 RX ADMIN — VANCOMYCIN HYDROCHLORIDE 1250 MG: 1.25 INJECTION, POWDER, LYOPHILIZED, FOR SOLUTION INTRAVENOUS at 06:10

## 2023-01-01 RX ADMIN — NOREPINEPHRINE BITARTRATE 3 MCG/KG/MIN: 1 INJECTION, SOLUTION, CONCENTRATE INTRAVENOUS at 07:10

## 2023-01-01 RX ADMIN — APIXABAN 5 MG: 2.5 TABLET, FILM COATED ORAL at 09:07

## 2023-01-01 RX ADMIN — ALBUMIN (HUMAN) 12.5 G: 2.5 SOLUTION INTRAVENOUS at 01:09

## 2023-01-01 RX ADMIN — FLUDROCORTISONE ACETATE 100 MCG: 0.1 TABLET ORAL at 11:09

## 2023-01-01 RX ADMIN — NOREPINEPHRINE BITARTRATE 1.4 MCG/KG/MIN: 1 INJECTION, SOLUTION, CONCENTRATE INTRAVENOUS at 06:10

## 2023-01-01 RX ADMIN — IPRATROPIUM BROMIDE 0.5 MG: 0.5 SOLUTION RESPIRATORY (INHALATION) at 02:09

## 2023-01-01 RX ADMIN — MUPIROCIN: 20 OINTMENT TOPICAL at 08:03

## 2023-01-01 RX ADMIN — PIPERACILLIN SODIUM AND TAZOBACTAM SODIUM 4.5 G: 4; .5 INJECTION, POWDER, FOR SOLUTION INTRAVENOUS at 12:09

## 2023-01-01 RX ADMIN — NOREPINEPHRINE BITARTRATE 3 MCG/KG/MIN: 1 INJECTION, SOLUTION, CONCENTRATE INTRAVENOUS at 12:10

## 2023-01-01 RX ADMIN — MICONAZOLE NITRATE: 20 OINTMENT TOPICAL at 08:03

## 2023-01-01 RX ADMIN — FLUDROCORTISONE ACETATE 100 MCG: 0.1 TABLET ORAL at 10:09

## 2023-01-01 RX ADMIN — CEFEPIME HYDROCHLORIDE 1 G: 1 INJECTION, SOLUTION INTRAVENOUS at 04:03

## 2023-01-01 RX ADMIN — CEFTRIAXONE 1 G: 1 INJECTION, POWDER, FOR SOLUTION INTRAMUSCULAR; INTRAVENOUS at 11:02

## 2023-01-01 RX ADMIN — IPRATROPIUM BROMIDE 0.5 MG: 0.5 SOLUTION RESPIRATORY (INHALATION) at 06:09

## 2023-01-01 RX ADMIN — NOREPINEPHRINE BITARTRATE 1.8 MCG/KG/MIN: 1 INJECTION, SOLUTION, CONCENTRATE INTRAVENOUS at 07:10

## 2023-01-01 RX ADMIN — CEFTRIAXONE 1 G: 1 INJECTION, POWDER, FOR SOLUTION INTRAMUSCULAR; INTRAVENOUS at 12:07

## 2023-01-01 RX ADMIN — POTASSIUM CHLORIDE 40 MEQ: 1500 TABLET, EXTENDED RELEASE ORAL at 09:09

## 2023-01-01 RX ADMIN — SODIUM CHLORIDE: 9 INJECTION, SOLUTION INTRAVENOUS at 09:09

## 2023-01-01 RX ADMIN — SODIUM BICARBONATE: 84 INJECTION, SOLUTION INTRAVENOUS at 11:09

## 2023-01-01 RX ADMIN — SODIUM CHLORIDE: 9 INJECTION, SOLUTION INTRAVENOUS at 01:09

## 2023-01-01 RX ADMIN — APIXABAN 5 MG: 2.5 TABLET, FILM COATED ORAL at 08:07

## 2023-01-01 RX ADMIN — CEFTRIAXONE SODIUM 2 G: 2 INJECTION, POWDER, FOR SOLUTION INTRAMUSCULAR; INTRAVENOUS at 05:09

## 2023-01-01 RX ADMIN — SODIUM CHLORIDE: 9 INJECTION, SOLUTION INTRAVENOUS at 05:09

## 2023-01-01 RX ADMIN — PIPERACILLIN AND TAZOBACTAM 4.5 G: 4; .5 INJECTION, POWDER, LYOPHILIZED, FOR SOLUTION INTRAVENOUS; PARENTERAL at 01:10

## 2023-01-01 RX ADMIN — ATORVASTATIN CALCIUM 40 MG: 40 TABLET, FILM COATED ORAL at 12:09

## 2023-01-01 RX ADMIN — POLYETHYLENE GLYCOL 3350 17 G: 17 POWDER, FOR SOLUTION ORAL at 11:09

## 2023-01-01 RX ADMIN — ENOXAPARIN SODIUM 60 MG: 40 INJECTION SUBCUTANEOUS at 10:03

## 2023-01-01 RX ADMIN — NOREPINEPHRINE BITARTRATE 2.4 MCG/KG/MIN: 1 INJECTION, SOLUTION, CONCENTRATE INTRAVENOUS at 02:10

## 2023-01-01 RX ADMIN — MUPIROCIN: 20 OINTMENT TOPICAL at 11:09

## 2023-01-01 RX ADMIN — PIPERACILLIN SODIUM AND TAZOBACTAM SODIUM 4.5 G: 4; .5 INJECTION, POWDER, FOR SOLUTION INTRAVENOUS at 08:09

## 2023-01-01 RX ADMIN — MIDODRINE HYDROCHLORIDE 10 MG: 5 TABLET ORAL at 08:09

## 2023-01-01 RX ADMIN — SENNOSIDES AND DOCUSATE SODIUM 1 TABLET: 50; 8.6 TABLET ORAL at 08:09

## 2023-01-01 RX ADMIN — MIDODRINE HYDROCHLORIDE 15 MG: 5 TABLET ORAL at 04:09

## 2023-01-01 RX ADMIN — NOREPINEPHRINE BITARTRATE 0.05 MCG/KG/MIN: 1 INJECTION, SOLUTION, CONCENTRATE INTRAVENOUS at 12:09

## 2023-01-01 RX ADMIN — SODIUM BICARBONATE 100 MEQ: 84 INJECTION, SOLUTION INTRAVENOUS at 02:10

## 2023-01-01 RX ADMIN — MIDODRINE HYDROCHLORIDE 15 MG: 5 TABLET ORAL at 11:09

## 2023-01-01 RX ADMIN — POTASSIUM BICARBONATE 20 MEQ: 391 TABLET, EFFERVESCENT ORAL at 11:09

## 2023-01-01 RX ADMIN — NOREPINEPHRINE BITARTRATE 0.1 MCG/KG/MIN: 4 INJECTION, SOLUTION INTRAVENOUS at 07:09

## 2023-01-01 RX ADMIN — MIDODRINE HYDROCHLORIDE 15 MG: 5 TABLET ORAL at 06:09

## 2023-01-01 RX ADMIN — FUROSEMIDE 60 MG: 10 INJECTION, SOLUTION INTRAMUSCULAR; INTRAVENOUS at 09:07

## 2023-01-01 RX ADMIN — ASPIRIN 81 MG CHEWABLE TABLET 81 MG: 81 TABLET CHEWABLE at 09:09

## 2023-01-01 RX ADMIN — HYDROCODONE BITARTRATE AND ACETAMINOPHEN 1 TABLET: 5; 325 TABLET ORAL at 04:09

## 2023-01-01 RX ADMIN — ASPIRIN 81 MG CHEWABLE TABLET 81 MG: 81 TABLET CHEWABLE at 10:09

## 2023-01-01 RX ADMIN — ACETAMINOPHEN 650 MG: 325 TABLET ORAL at 09:10

## 2023-01-01 RX ADMIN — NOREPINEPHRINE BITARTRATE 0.3 MCG/KG/MIN: 1 INJECTION, SOLUTION, CONCENTRATE INTRAVENOUS at 01:09

## 2023-01-01 RX ADMIN — INDOMETHACIN 100 MEQ: 25 CAPSULE ORAL at 02:10

## 2023-01-01 RX ADMIN — MIDODRINE HYDROCHLORIDE 15 MG: 5 TABLET ORAL at 02:09

## 2023-01-01 RX ADMIN — POTASSIUM CHLORIDE 40 MEQ: 1500 TABLET, EXTENDED RELEASE ORAL at 07:09

## 2023-01-01 RX ADMIN — POTASSIUM CHLORIDE 20 MEQ: 1500 TABLET, EXTENDED RELEASE ORAL at 09:09

## 2023-01-01 RX ADMIN — MIDODRINE HYDROCHLORIDE 5 MG: 5 TABLET ORAL at 09:09

## 2023-01-01 RX ADMIN — BUDESONIDE 0.5 MG: 0.5 INHALANT ORAL at 08:07

## 2023-01-01 RX ADMIN — ACETAMINOPHEN 650 MG: 325 TABLET ORAL at 03:09

## 2023-01-01 RX ADMIN — MUPIROCIN: 20 OINTMENT TOPICAL at 09:03

## 2023-01-01 RX ADMIN — ALTEPLASE 2 MG: 2.2 INJECTION, POWDER, LYOPHILIZED, FOR SOLUTION INTRAVENOUS at 09:09

## 2023-01-01 RX ADMIN — NOREPINEPHRINE BITARTRATE 3 MCG/KG/MIN: 1 INJECTION, SOLUTION, CONCENTRATE INTRAVENOUS at 10:10

## 2023-01-01 RX ADMIN — LEVOTHYROXINE SODIUM 50 MCG: 50 TABLET ORAL at 05:03

## 2023-01-01 RX ADMIN — NOREPINEPHRINE BITARTRATE 3 MCG/KG/MIN: 1 INJECTION, SOLUTION, CONCENTRATE INTRAVENOUS at 04:10

## 2023-01-01 RX ADMIN — ASPIRIN 81 MG CHEWABLE TABLET 81 MG: 81 TABLET CHEWABLE at 09:03

## 2023-01-01 RX ADMIN — PANTOPRAZOLE SODIUM 40 MG: 40 INJECTION, POWDER, FOR SOLUTION INTRAVENOUS at 09:09

## 2023-01-01 RX ADMIN — APIXABAN 5 MG: 2.5 TABLET, FILM COATED ORAL at 10:09

## 2023-01-01 RX ADMIN — ARFORMOTEROL TARTRATE 15 MCG: 15 SOLUTION RESPIRATORY (INHALATION) at 08:07

## 2023-01-01 RX ADMIN — ONDANSETRON 4 MG: 2 INJECTION INTRAMUSCULAR; INTRAVENOUS at 09:09

## 2023-01-01 RX ADMIN — POLYETHYLENE GLYCOL 3350 17 G: 17 POWDER, FOR SOLUTION ORAL at 09:03

## 2023-01-01 RX ADMIN — FAMOTIDINE 20 MG: 10 INJECTION, SOLUTION INTRAVENOUS at 08:09

## 2023-01-01 RX ADMIN — SODIUM BICARBONATE 100 MEQ: 84 INJECTION, SOLUTION INTRAVENOUS at 07:09

## 2023-01-01 RX ADMIN — ASPIRIN 81 MG CHEWABLE TABLET 81 MG: 81 TABLET CHEWABLE at 08:03

## 2023-01-01 RX ADMIN — METHYLPREDNISOLONE SODIUM SUCCINATE 40 MG: 125 INJECTION, POWDER, FOR SOLUTION INTRAMUSCULAR; INTRAVENOUS at 07:07

## 2023-01-01 RX ADMIN — POLYETHYLENE GLYCOL 3350 17 G: 17 POWDER, FOR SOLUTION ORAL at 08:09

## 2023-01-01 RX ADMIN — CEFEPIME HYDROCHLORIDE 1 G: 1 INJECTION, SOLUTION INTRAVENOUS at 08:03

## 2023-01-01 RX ADMIN — POTASSIUM CHLORIDE 40 MEQ: 1500 TABLET, EXTENDED RELEASE ORAL at 05:09

## 2023-01-01 RX ADMIN — CEFEPIME HYDROCHLORIDE 1 G: 1 INJECTION, SOLUTION INTRAVENOUS at 05:03

## 2023-01-01 RX ADMIN — LINEZOLID 600 MG: 600 INJECTION, SOLUTION INTRAVENOUS at 11:07

## 2023-01-01 RX ADMIN — POTASSIUM CHLORIDE 10 MEQ: 750 TABLET, EXTENDED RELEASE ORAL at 10:03

## 2023-01-01 RX ADMIN — ACETAMINOPHEN 650 MG: 325 TABLET ORAL at 05:09

## 2023-01-01 RX ADMIN — PIPERACILLIN AND TAZOBACTAM 4.5 G: 4; .5 INJECTION, POWDER, LYOPHILIZED, FOR SOLUTION INTRAVENOUS; PARENTERAL at 08:10

## 2023-01-01 RX ADMIN — ARFORMOTEROL TARTRATE 15 MCG: 15 SOLUTION RESPIRATORY (INHALATION) at 07:07

## 2023-01-01 RX ADMIN — NOREPINEPHRINE BITARTRATE 1.38 MCG/KG/MIN: 1 INJECTION, SOLUTION, CONCENTRATE INTRAVENOUS at 08:10

## 2023-01-01 RX ADMIN — MAGNESIUM SULFATE HEPTAHYDRATE 1 G: 1 INJECTION, SOLUTION INTRAVENOUS at 02:09

## 2023-01-01 RX ADMIN — SODIUM BICARBONATE: 84 INJECTION, SOLUTION INTRAVENOUS at 07:09

## 2023-01-01 RX ADMIN — SODIUM BICARBONATE 1300 MG: 650 TABLET ORAL at 10:09

## 2023-01-01 RX ADMIN — MIDODRINE HYDROCHLORIDE 15 MG: 5 TABLET ORAL at 01:09

## 2023-01-01 RX ADMIN — PIPERACILLIN AND TAZOBACTAM 4.5 G: 4; .5 INJECTION, POWDER, LYOPHILIZED, FOR SOLUTION INTRAVENOUS; PARENTERAL at 09:10

## 2023-01-01 RX ADMIN — ALBUTEROL SULFATE 5 MG: 2.5 SOLUTION RESPIRATORY (INHALATION) at 05:10

## 2023-01-01 RX ADMIN — ONDANSETRON 4 MG: 2 INJECTION INTRAMUSCULAR; INTRAVENOUS at 11:09

## 2023-01-01 RX ADMIN — CEFEPIME HYDROCHLORIDE 1 G: 1 INJECTION, SOLUTION INTRAVENOUS at 11:03

## 2023-01-01 RX ADMIN — VANCOMYCIN HYDROCHLORIDE 1000 MG: 1 INJECTION, POWDER, LYOPHILIZED, FOR SOLUTION INTRAVENOUS at 06:10

## 2023-01-01 RX ADMIN — SODIUM BICARBONATE 50 MEQ: 84 INJECTION, SOLUTION INTRAVENOUS at 12:10

## 2023-01-01 RX ADMIN — NOREPINEPHRINE BITARTRATE 2.3 MCG/KG/MIN: 1 INJECTION, SOLUTION, CONCENTRATE INTRAVENOUS at 02:10

## 2023-01-01 RX ADMIN — IPRATROPIUM BROMIDE AND ALBUTEROL SULFATE 3 ML: .5; 3 SOLUTION RESPIRATORY (INHALATION) at 12:07

## 2023-01-01 RX ADMIN — FLUDROCORTISONE ACETATE 100 MCG: 0.1 TABLET ORAL at 08:10

## 2023-01-01 RX ADMIN — LEVOTHYROXINE SODIUM 50 MCG: 50 TABLET ORAL at 06:09

## 2023-01-01 RX ADMIN — NOREPINEPHRINE BITARTRATE 2.8 MCG/KG/MIN: 1 INJECTION, SOLUTION, CONCENTRATE INTRAVENOUS at 06:10

## 2023-01-01 RX ADMIN — PANTOPRAZOLE SODIUM 40 MG: 40 TABLET, DELAYED RELEASE ORAL at 09:09

## 2023-01-01 RX ADMIN — LINEZOLID 600 MG: 600 TABLET, FILM COATED ORAL at 08:03

## 2023-01-01 RX ADMIN — CEFEPIME HYDROCHLORIDE 1 G: 1 INJECTION, SOLUTION INTRAVENOUS at 09:03

## 2023-01-01 RX ADMIN — ASPIRIN 81 MG CHEWABLE TABLET 81 MG: 81 TABLET CHEWABLE at 10:03

## 2023-01-01 RX ADMIN — MAGNESIUM SULFATE HEPTAHYDRATE 2 G: 40 INJECTION, SOLUTION INTRAVENOUS at 06:09

## 2023-01-01 RX ADMIN — LEVOTHYROXINE SODIUM 50 MCG: 50 TABLET ORAL at 06:07

## 2023-01-01 RX ADMIN — POTASSIUM CHLORIDE 10 MEQ: 750 TABLET, EXTENDED RELEASE ORAL at 09:03

## 2023-01-01 RX ADMIN — SODIUM CHLORIDE, POTASSIUM CHLORIDE, SODIUM LACTATE AND CALCIUM CHLORIDE 500 ML: 600; 310; 30; 20 INJECTION, SOLUTION INTRAVENOUS at 06:09

## 2023-01-01 RX ADMIN — VASOPRESSIN 0.04 UNITS/MIN: 0.2 INJECTION INTRAVENOUS at 12:09

## 2023-01-01 RX ADMIN — NOREPINEPHRINE BITARTRATE 3 MCG/KG/MIN: 1 INJECTION, SOLUTION, CONCENTRATE INTRAVENOUS at 09:10

## 2023-01-01 RX ADMIN — CEFTRIAXONE SODIUM 2 G: 2 INJECTION, POWDER, FOR SOLUTION INTRAMUSCULAR; INTRAVENOUS at 06:09

## 2023-01-01 RX ADMIN — ATORVASTATIN CALCIUM 40 MG: 40 TABLET, FILM COATED ORAL at 08:03

## 2023-01-01 RX ADMIN — VASOPRESSIN 0.04 UNITS/MIN: 0.2 INJECTION INTRAVENOUS at 08:09

## 2023-01-01 RX ADMIN — SODIUM CHLORIDE: 9 INJECTION, SOLUTION INTRAVENOUS at 03:09

## 2023-01-01 RX ADMIN — NOREPINEPHRINE BITARTRATE 1.5 MCG/KG/MIN: 1 INJECTION, SOLUTION, CONCENTRATE INTRAVENOUS at 02:10

## 2023-01-01 RX ADMIN — VASOPRESSIN 0.04 UNITS/MIN: 0.2 INJECTION INTRAVENOUS at 01:09

## 2023-01-01 RX ADMIN — NOREPINEPHRINE BITARTRATE 1.5 MCG/KG/MIN: 1 INJECTION, SOLUTION, CONCENTRATE INTRAVENOUS at 06:09

## 2023-01-01 RX ADMIN — NOREPINEPHRINE BITARTRATE 1.64 MCG/KG/MIN: 1 INJECTION, SOLUTION, CONCENTRATE INTRAVENOUS at 11:10

## 2023-01-01 RX ADMIN — MICONAZOLE NITRATE: 20 OINTMENT TOPICAL at 09:03

## 2023-01-01 RX ADMIN — POTASSIUM CHLORIDE 40 MEQ: 1500 TABLET, EXTENDED RELEASE ORAL at 08:09

## 2023-01-01 RX ADMIN — NOREPINEPHRINE BITARTRATE 1.2 MCG/KG/MIN: 1 INJECTION, SOLUTION, CONCENTRATE INTRAVENOUS at 11:09

## 2023-01-01 RX ADMIN — SENNOSIDES AND DOCUSATE SODIUM 1 TABLET: 50; 8.6 TABLET ORAL at 09:03

## 2023-01-01 RX ADMIN — PANTOPRAZOLE SODIUM 8 MG/HR: 40 INJECTION, POWDER, FOR SOLUTION INTRAVENOUS at 05:09

## 2023-01-01 RX ADMIN — NOREPINEPHRINE BITARTRATE 3 MCG/KG/MIN: 1 INJECTION, SOLUTION, CONCENTRATE INTRAVENOUS at 11:10

## 2023-01-01 RX ADMIN — NOREPINEPHRINE BITARTRATE 1.5 MCG/KG/MIN: 1 INJECTION, SOLUTION, CONCENTRATE INTRAVENOUS at 03:10

## 2023-01-01 RX ADMIN — ATORVASTATIN CALCIUM 40 MG: 40 TABLET, FILM COATED ORAL at 10:03

## 2023-01-01 RX ADMIN — METHYLPREDNISOLONE SODIUM SUCCINATE 125 MG: 125 INJECTION, POWDER, FOR SOLUTION INTRAMUSCULAR; INTRAVENOUS at 09:07

## 2023-01-01 RX ADMIN — SUCRALFATE ORAL 1 G: 1 SUSPENSION ORAL at 05:09

## 2023-01-01 RX ADMIN — ENOXAPARIN SODIUM 40 MG: 40 INJECTION SUBCUTANEOUS at 09:03

## 2023-01-01 RX ADMIN — NOREPINEPHRINE BITARTRATE 3 MCG/KG/MIN: 1 INJECTION, SOLUTION, CONCENTRATE INTRAVENOUS at 08:10

## 2023-01-01 RX ADMIN — VASOPRESSIN 0.04 UNITS/MIN: 0.2 INJECTION INTRAVENOUS at 02:10

## 2023-01-01 RX ADMIN — NOREPINEPHRINE BITARTRATE 3 MCG/KG/MIN: 1 INJECTION, SOLUTION, CONCENTRATE INTRAVENOUS at 03:10

## 2023-01-01 RX ADMIN — NOREPINEPHRINE BITARTRATE 2.3 MCG/KG/MIN: 1 INJECTION, SOLUTION, CONCENTRATE INTRAVENOUS at 01:10

## 2023-01-01 RX ADMIN — VANCOMYCIN HYDROCHLORIDE 500 MG: 500 INJECTION, POWDER, LYOPHILIZED, FOR SOLUTION INTRAVENOUS at 06:10

## 2023-01-01 RX ADMIN — SODIUM CHLORIDE: 9 INJECTION, SOLUTION INTRAVENOUS at 07:09

## 2023-01-01 RX ADMIN — APIXABAN 5 MG: 2.5 TABLET, FILM COATED ORAL at 10:07

## 2023-01-01 RX ADMIN — PANTOPRAZOLE SODIUM 40 MG: 40 INJECTION, POWDER, FOR SOLUTION INTRAVENOUS at 10:10

## 2023-01-01 RX ADMIN — NOREPINEPHRINE BITARTRATE 3 MCG/KG/MIN: 1 INJECTION, SOLUTION, CONCENTRATE INTRAVENOUS at 05:10

## 2023-01-01 RX ADMIN — MIDODRINE HYDROCHLORIDE 5 MG: 5 TABLET ORAL at 08:09

## 2023-01-01 RX ADMIN — ATORVASTATIN CALCIUM 40 MG: 40 TABLET, FILM COATED ORAL at 10:09

## 2023-01-01 RX ADMIN — FUROSEMIDE 60 MG: 10 INJECTION, SOLUTION INTRAMUSCULAR; INTRAVENOUS at 10:07

## 2023-01-01 RX ADMIN — NOREPINEPHRINE BITARTRATE 0.18 MCG/KG/MIN: 4 INJECTION, SOLUTION INTRAVENOUS at 09:09

## 2023-01-01 RX ADMIN — PREDNISONE 40 MG: 20 TABLET ORAL at 09:07

## 2023-01-01 RX ADMIN — CEFEPIME HYDROCHLORIDE 1 G: 1 INJECTION, SOLUTION INTRAVENOUS at 02:03

## 2023-01-01 RX ADMIN — LEVOTHYROXINE SODIUM 50 MCG: 50 TABLET ORAL at 06:03

## 2023-01-01 RX ADMIN — FUROSEMIDE 60 MG: 10 INJECTION, SOLUTION INTRAMUSCULAR; INTRAVENOUS at 08:07

## 2023-01-01 RX ADMIN — SODIUM BICARBONATE 1300 MG: 650 TABLET ORAL at 02:09

## 2023-01-01 RX ADMIN — SODIUM CHLORIDE: 4.5 INJECTION, SOLUTION INTRAVENOUS at 06:09

## 2023-01-01 RX ADMIN — POTASSIUM CHLORIDE 60 MEQ: 14.9 INJECTION, SOLUTION INTRAVENOUS at 08:09

## 2023-01-01 RX ADMIN — NOREPINEPHRINE BITARTRATE 1.2 MCG/KG/MIN: 1 INJECTION, SOLUTION, CONCENTRATE INTRAVENOUS at 04:09

## 2023-01-01 RX ADMIN — VANCOMYCIN HYDROCHLORIDE 2000 MG: 500 INJECTION, POWDER, LYOPHILIZED, FOR SOLUTION INTRAVENOUS at 09:09

## 2023-01-01 RX ADMIN — MELATONIN TAB 3 MG 6 MG: 3 TAB at 12:09

## 2023-01-01 RX ADMIN — MAGNESIUM SULFATE HEPTAHYDRATE 2 G: 40 INJECTION, SOLUTION INTRAVENOUS at 05:09

## 2023-01-01 RX ADMIN — NOREPINEPHRINE BITARTRATE 0.18 MCG/KG/MIN: 1 INJECTION, SOLUTION, CONCENTRATE INTRAVENOUS at 07:09

## 2023-01-01 RX ADMIN — ACETAMINOPHEN 650 MG: 325 TABLET ORAL at 02:09

## 2023-01-01 RX ADMIN — FAMOTIDINE 20 MG: 20 TABLET, FILM COATED ORAL at 10:09

## 2023-01-01 RX ADMIN — ONDANSETRON 4 MG: 2 INJECTION INTRAMUSCULAR; INTRAVENOUS at 02:09

## 2023-01-01 RX ADMIN — MUPIROCIN: 20 OINTMENT TOPICAL at 08:07

## 2023-01-01 RX ADMIN — SODIUM BICARBONATE 100 MEQ: 84 INJECTION, SOLUTION INTRAVENOUS at 06:09

## 2023-01-01 RX ADMIN — SUCRALFATE ORAL 1 G: 1 SUSPENSION ORAL at 06:10

## 2023-01-01 RX ADMIN — NOREPINEPHRINE BITARTRATE 1.5 MCG/KG/MIN: 1 INJECTION, SOLUTION, CONCENTRATE INTRAVENOUS at 09:09

## 2023-01-01 RX ADMIN — CEFTRIAXONE SODIUM 1 G: 1 INJECTION, POWDER, FOR SOLUTION INTRAMUSCULAR; INTRAVENOUS at 07:09

## 2023-01-01 RX ADMIN — SODIUM CHLORIDE 1845 ML: 0.9 INJECTION, SOLUTION INTRAVENOUS at 06:09

## 2023-01-01 RX ADMIN — NOREPINEPHRINE BITARTRATE 1.54 MCG/KG/MIN: 1 INJECTION, SOLUTION, CONCENTRATE INTRAVENOUS at 08:10

## 2023-01-01 RX ADMIN — NOREPINEPHRINE BITARTRATE 1.6 MCG/KG/MIN: 1 INJECTION, SOLUTION, CONCENTRATE INTRAVENOUS at 09:10

## 2023-01-01 RX ADMIN — POTASSIUM CHLORIDE 40 MEQ: 29.8 INJECTION, SOLUTION INTRAVENOUS at 11:09

## 2023-01-01 RX ADMIN — NOREPINEPHRINE BITARTRATE 1 MCG/KG/MIN: 1 INJECTION, SOLUTION, CONCENTRATE INTRAVENOUS at 08:09

## 2023-01-01 RX ADMIN — NOREPINEPHRINE BITARTRATE 0.13 MCG/KG/MIN: 1 INJECTION, SOLUTION, CONCENTRATE INTRAVENOUS at 04:09

## 2023-01-01 RX ADMIN — NOREPINEPHRINE BITARTRATE 0.14 MCG/KG/MIN: 4 INJECTION, SOLUTION INTRAVENOUS at 10:09

## 2023-01-01 RX ADMIN — DEXMEDETOMIDINE HYDROCHLORIDE 0.02 MCG/KG/HR: 4 INJECTION INTRAVENOUS at 09:03

## 2023-01-01 RX ADMIN — NOREPINEPHRINE BITARTRATE 1.46 MCG/KG/MIN: 1 INJECTION, SOLUTION, CONCENTRATE INTRAVENOUS at 04:10

## 2023-01-01 RX ADMIN — APIXABAN 5 MG: 2.5 TABLET, FILM COATED ORAL at 11:09

## 2023-01-01 RX ADMIN — POTASSIUM CHLORIDE 20 MEQ: 14.9 INJECTION, SOLUTION INTRAVENOUS at 09:09

## 2023-01-01 RX ADMIN — ATORVASTATIN CALCIUM 40 MG: 40 TABLET, FILM COATED ORAL at 09:03

## 2023-01-01 RX ADMIN — PIPERACILLIN AND TAZOBACTAM 4.5 G: 4; .5 INJECTION, POWDER, LYOPHILIZED, FOR SOLUTION INTRAVENOUS; PARENTERAL at 10:10

## 2023-01-01 RX ADMIN — SODIUM CHLORIDE: 9 INJECTION, SOLUTION INTRAVENOUS at 10:09

## 2023-01-01 RX ADMIN — LEVOTHYROXINE SODIUM 50 MCG: 50 TABLET ORAL at 06:10

## 2023-01-01 RX ADMIN — NOREPINEPHRINE BITARTRATE 1.6 MCG/KG/MIN: 1 INJECTION, SOLUTION, CONCENTRATE INTRAVENOUS at 12:09

## 2023-01-01 RX ADMIN — SODIUM CHLORIDE, POTASSIUM CHLORIDE, SODIUM LACTATE AND CALCIUM CHLORIDE 1000 ML: 600; 310; 30; 20 INJECTION, SOLUTION INTRAVENOUS at 09:09

## 2023-01-01 RX ADMIN — NOREPINEPHRINE BITARTRATE 0.75 MCG/KG/MIN: 1 INJECTION, SOLUTION, CONCENTRATE INTRAVENOUS at 05:09

## 2023-01-01 RX ADMIN — PANTOPRAZOLE SODIUM 40 MG: 40 INJECTION, POWDER, FOR SOLUTION INTRAVENOUS at 09:10

## 2023-01-01 RX ADMIN — POTASSIUM CHLORIDE 20 MEQ: 200 INJECTION, SOLUTION INTRAVENOUS at 06:09

## 2023-01-01 RX ADMIN — NOREPINEPHRINE BITARTRATE 1.6 MCG/KG/MIN: 1 INJECTION, SOLUTION, CONCENTRATE INTRAVENOUS at 02:09

## 2023-01-01 RX ADMIN — NOREPINEPHRINE BITARTRATE 0.04 MCG/KG/MIN: 4 INJECTION, SOLUTION INTRAVENOUS at 05:09

## 2023-01-01 RX ADMIN — PIPERACILLIN SODIUM AND TAZOBACTAM SODIUM 4.5 G: 4; .5 INJECTION, POWDER, FOR SOLUTION INTRAVENOUS at 04:09

## 2023-01-01 RX ADMIN — CEFEPIME HYDROCHLORIDE 1 G: 1 INJECTION, SOLUTION INTRAVENOUS at 12:03

## 2023-01-01 RX ADMIN — POTASSIUM CHLORIDE 40 MEQ: 1500 TABLET, EXTENDED RELEASE ORAL at 02:09

## 2023-01-01 RX ADMIN — ATORVASTATIN CALCIUM 40 MG: 40 TABLET, FILM COATED ORAL at 08:10

## 2023-01-01 RX ADMIN — HYDROCODONE BITARTRATE AND ACETAMINOPHEN 1 TABLET: 5; 325 TABLET ORAL at 03:09

## 2023-01-01 RX ADMIN — PREDNISONE 40 MG: 20 TABLET ORAL at 08:07

## 2023-01-01 RX ADMIN — NOREPINEPHRINE BITARTRATE 3 MCG/KG/MIN: 1 INJECTION, SOLUTION, CONCENTRATE INTRAVENOUS at 02:10

## 2023-01-01 RX ADMIN — SENNOSIDES AND DOCUSATE SODIUM 1 TABLET: 50; 8.6 TABLET ORAL at 10:03

## 2023-01-01 RX ADMIN — NOREPINEPHRINE BITARTRATE 0.16 MCG/KG/MIN: 1 INJECTION, SOLUTION, CONCENTRATE INTRAVENOUS at 02:09

## 2023-01-01 RX ADMIN — SODIUM CHLORIDE, POTASSIUM CHLORIDE, SODIUM LACTATE AND CALCIUM CHLORIDE 1000 ML: 600; 310; 30; 20 INJECTION, SOLUTION INTRAVENOUS at 05:09

## 2023-01-01 RX ADMIN — CEFTRIAXONE 1 G: 1 INJECTION, POWDER, FOR SOLUTION INTRAMUSCULAR; INTRAVENOUS at 09:07

## 2023-01-01 RX ADMIN — NOREPINEPHRINE BITARTRATE 2 MCG/KG/MIN: 1 INJECTION, SOLUTION, CONCENTRATE INTRAVENOUS at 06:10

## 2023-01-01 RX ADMIN — APIXABAN 5 MG: 2.5 TABLET, FILM COATED ORAL at 12:09

## 2023-01-01 RX ADMIN — BUMETANIDE 1 MG: 0.25 INJECTION INTRAMUSCULAR; INTRAVENOUS at 01:09

## 2023-01-01 RX ADMIN — IPRATROPIUM BROMIDE AND ALBUTEROL SULFATE 3 ML: .5; 3 SOLUTION RESPIRATORY (INHALATION) at 08:07

## 2023-01-01 RX ADMIN — ONDANSETRON 4 MG: 2 INJECTION INTRAMUSCULAR; INTRAVENOUS at 07:09

## 2023-01-01 RX ADMIN — POTASSIUM CHLORIDE 40 MEQ: 1500 TABLET, EXTENDED RELEASE ORAL at 09:03

## 2023-01-01 RX ADMIN — MIDODRINE HYDROCHLORIDE 15 MG: 5 TABLET ORAL at 10:09

## 2023-01-01 RX ADMIN — MIDODRINE HYDROCHLORIDE 15 MG: 5 TABLET ORAL at 09:10

## 2023-01-01 RX ADMIN — PANTOPRAZOLE SODIUM 8 MG/HR: 40 INJECTION, POWDER, FOR SOLUTION INTRAVENOUS at 11:09

## 2023-01-01 RX ADMIN — NOREPINEPHRINE BITARTRATE 2.3 MCG/KG/MIN: 1 INJECTION, SOLUTION, CONCENTRATE INTRAVENOUS at 04:10

## 2023-01-01 RX ADMIN — SODIUM CHLORIDE: 9 INJECTION, SOLUTION INTRAVENOUS at 08:09

## 2023-01-01 RX ADMIN — METHYLPREDNISOLONE SODIUM SUCCINATE 60 MG: 125 INJECTION, POWDER, FOR SOLUTION INTRAMUSCULAR; INTRAVENOUS at 05:07

## 2023-01-01 RX ADMIN — VANCOMYCIN HYDROCHLORIDE 1750 MG: 500 INJECTION, POWDER, LYOPHILIZED, FOR SOLUTION INTRAVENOUS at 06:09

## 2023-01-01 RX ADMIN — SODIUM BICARBONATE 1300 MG: 650 TABLET ORAL at 08:09

## 2023-01-01 RX ADMIN — POLYETHYLENE GLYCOL 3350 17 G: 17 POWDER, FOR SOLUTION ORAL at 10:03

## 2023-01-01 RX ADMIN — IPRATROPIUM BROMIDE 0.5 MG: 0.5 SOLUTION RESPIRATORY (INHALATION) at 08:10

## 2023-01-01 RX ADMIN — SODIUM CHLORIDE: 9 INJECTION, SOLUTION INTRAVENOUS at 12:09

## 2023-01-01 RX ADMIN — NOREPINEPHRINE BITARTRATE 1.6 MCG/KG/MIN: 1 INJECTION, SOLUTION, CONCENTRATE INTRAVENOUS at 04:09

## 2023-01-01 RX ADMIN — ASPIRIN 81 MG CHEWABLE TABLET 81 MG: 81 TABLET CHEWABLE at 12:09

## 2023-01-01 RX ADMIN — NOREPINEPHRINE BITARTRATE 1.46 MCG/KG/MIN: 1 INJECTION, SOLUTION, CONCENTRATE INTRAVENOUS at 12:10

## 2023-01-01 RX ADMIN — POTASSIUM CHLORIDE 10 MEQ: 750 TABLET, EXTENDED RELEASE ORAL at 08:03

## 2023-01-01 RX ADMIN — NOREPINEPHRINE BITARTRATE 1.5 MCG/KG/MIN: 1 INJECTION, SOLUTION, CONCENTRATE INTRAVENOUS at 08:09

## 2023-01-01 RX ADMIN — FUROSEMIDE 40 MG: 40 TABLET ORAL at 09:03

## 2023-01-01 RX ADMIN — NOREPINEPHRINE BITARTRATE 1.1 MCG/KG/MIN: 1 INJECTION, SOLUTION, CONCENTRATE INTRAVENOUS at 01:09

## 2023-01-01 RX ADMIN — PHYTONADIONE 5 MG: 10 INJECTION, EMULSION INTRAMUSCULAR; INTRAVENOUS; SUBCUTANEOUS at 06:10

## 2023-01-01 RX ADMIN — DEXTROSE 250 MG: 50 INJECTION, SOLUTION INTRAVENOUS at 10:03

## 2023-01-01 RX ADMIN — SODIUM CHLORIDE: 9 INJECTION, SOLUTION INTRAVENOUS at 11:09

## 2023-01-01 RX ADMIN — CEFEPIME HYDROCHLORIDE 1 G: 1 INJECTION, SOLUTION INTRAVENOUS at 10:03

## 2023-01-01 RX ADMIN — PIPERACILLIN SODIUM AND TAZOBACTAM SODIUM 4.5 G: 4; .5 INJECTION, POWDER, FOR SOLUTION INTRAVENOUS at 09:09

## 2023-01-01 RX ADMIN — NOREPINEPHRINE BITARTRATE 1.5 MCG/KG/MIN: 1 INJECTION, SOLUTION, CONCENTRATE INTRAVENOUS at 06:10

## 2023-01-01 RX ADMIN — MELATONIN TAB 3 MG 6 MG: 3 TAB at 09:10

## 2023-01-01 RX ADMIN — SODIUM ZIRCONIUM CYCLOSILICATE 5 G: 5 POWDER, FOR SUSPENSION ORAL at 10:09

## 2023-01-01 RX ADMIN — SODIUM BICARBONATE: 84 INJECTION, SOLUTION INTRAVENOUS at 10:10

## 2023-01-01 RX ADMIN — LORAZEPAM 2 MG: 2 INJECTION INTRAMUSCULAR; INTRAVENOUS at 08:03

## 2023-01-01 RX ADMIN — SUCRALFATE ORAL 1 G: 1 SUSPENSION ORAL at 12:09

## 2023-01-01 RX ADMIN — VASOPRESSIN 0.04 UNITS/MIN: 0.2 INJECTION INTRAVENOUS at 08:10

## 2023-01-01 RX ADMIN — ONDANSETRON 4 MG: 2 INJECTION INTRAMUSCULAR; INTRAVENOUS at 05:09

## 2023-01-01 RX ADMIN — SODIUM CHLORIDE, POTASSIUM CHLORIDE, SODIUM LACTATE AND CALCIUM CHLORIDE 500 ML: 600; 310; 30; 20 INJECTION, SOLUTION INTRAVENOUS at 11:09

## 2023-01-01 RX ADMIN — NOREPINEPHRINE BITARTRATE 0.22 MCG/KG/MIN: 4 INJECTION, SOLUTION INTRAVENOUS at 12:09

## 2023-01-01 RX ADMIN — FUROSEMIDE 40 MG: 10 INJECTION, SOLUTION INTRAMUSCULAR; INTRAVENOUS at 06:09

## 2023-01-01 RX ADMIN — CEFEPIME HYDROCHLORIDE 1 G: 1 INJECTION, SOLUTION INTRAVENOUS at 03:03

## 2023-01-01 RX ADMIN — MIDODRINE HYDROCHLORIDE 10 MG: 5 TABLET ORAL at 02:09

## 2023-01-01 RX ADMIN — CEFTRIAXONE SODIUM 1 G: 1 INJECTION, POWDER, FOR SOLUTION INTRAMUSCULAR; INTRAVENOUS at 06:09

## 2023-01-01 RX ADMIN — SENNOSIDES AND DOCUSATE SODIUM 1 TABLET: 50; 8.6 TABLET ORAL at 09:10

## 2023-01-01 RX ADMIN — PANTOPRAZOLE SODIUM 40 MG: 40 TABLET, DELAYED RELEASE ORAL at 10:09

## 2023-01-01 RX ADMIN — SODIUM CHLORIDE 250 ML: 9 INJECTION, SOLUTION INTRAVENOUS at 01:09

## 2023-01-01 RX ADMIN — PANTOPRAZOLE SODIUM 8 MG/HR: 40 INJECTION, POWDER, FOR SOLUTION INTRAVENOUS at 03:09

## 2023-01-01 RX ADMIN — FUROSEMIDE 40 MG: 40 TABLET ORAL at 10:03

## 2023-01-01 RX ADMIN — PANTOPRAZOLE SODIUM 8 MG/HR: 40 INJECTION, POWDER, FOR SOLUTION INTRAVENOUS at 10:09

## 2023-01-01 RX ADMIN — SODIUM BICARBONATE: 84 INJECTION, SOLUTION INTRAVENOUS at 04:10

## 2023-01-01 RX ADMIN — LINEZOLID 600 MG: 600 TABLET, FILM COATED ORAL at 03:03

## 2023-01-01 RX ADMIN — SODIUM CHLORIDE: 9 INJECTION, SOLUTION INTRAVENOUS at 06:09

## 2023-01-01 RX ADMIN — LEVOTHYROXINE SODIUM 50 MCG: 50 TABLET ORAL at 09:09

## 2023-01-01 RX ADMIN — SODIUM ZIRCONIUM CYCLOSILICATE 5 G: 5 POWDER, FOR SUSPENSION ORAL at 08:09

## 2023-01-01 RX ADMIN — LINEZOLID 600 MG: 600 TABLET, FILM COATED ORAL at 09:03

## 2023-01-01 RX ADMIN — NOREPINEPHRINE BITARTRATE 1.6 MCG/KG/MIN: 1 INJECTION, SOLUTION, CONCENTRATE INTRAVENOUS at 06:09

## 2023-01-01 RX ADMIN — VASOPRESSIN 0.04 UNITS/MIN: 0.2 INJECTION INTRAVENOUS at 05:10

## 2023-01-01 RX ADMIN — VASOPRESSIN 0.04 UNITS/MIN: 0.2 INJECTION INTRAVENOUS at 04:09

## 2023-01-01 RX ADMIN — NOREPINEPHRINE BITARTRATE 0.15 MCG/KG/MIN: 1 INJECTION, SOLUTION, CONCENTRATE INTRAVENOUS at 12:09

## 2023-01-01 RX ADMIN — FLUDROCORTISONE ACETATE 100 MCG: 0.1 TABLET ORAL at 12:09

## 2023-01-01 RX ADMIN — MUPIROCIN: 20 OINTMENT TOPICAL at 10:03

## 2023-01-01 RX ADMIN — EPOETIN ALFA-EPBX 10000 UNITS: 10000 INJECTION, SOLUTION INTRAVENOUS; SUBCUTANEOUS at 12:10

## 2023-01-01 RX ADMIN — PANTOPRAZOLE SODIUM 40 MG: 40 INJECTION, POWDER, FOR SOLUTION INTRAVENOUS at 08:10

## 2023-01-01 RX ADMIN — CEFEPIME HYDROCHLORIDE 1 G: 1 INJECTION, SOLUTION INTRAVENOUS at 07:03

## 2023-01-01 RX ADMIN — POTASSIUM CHLORIDE 20 MEQ: 1500 TABLET, EXTENDED RELEASE ORAL at 05:09

## 2023-01-01 RX ADMIN — VASOPRESSIN 0.04 UNITS/MIN: 0.2 INJECTION INTRAVENOUS at 06:10

## 2023-01-01 RX ADMIN — FUROSEMIDE 80 MG: 10 INJECTION, SOLUTION INTRAMUSCULAR; INTRAVENOUS at 09:09

## 2023-01-01 RX ADMIN — MIDODRINE HYDROCHLORIDE 10 MG: 5 TABLET ORAL at 09:09

## 2023-01-01 RX ADMIN — PANTOPRAZOLE SODIUM 8 MG/HR: 40 INJECTION, POWDER, FOR SOLUTION INTRAVENOUS at 09:09

## 2023-01-01 RX ADMIN — INSULIN HUMAN 10 UNITS: 100 INJECTION, SOLUTION PARENTERAL at 05:10

## 2023-01-01 RX ADMIN — PANTOPRAZOLE SODIUM 80 MG: 40 INJECTION, POWDER, FOR SOLUTION INTRAVENOUS at 09:09

## 2023-01-01 RX ADMIN — CALCIUM GLUCONATE 1 G: 20 INJECTION, SOLUTION INTRAVENOUS at 05:10

## 2023-01-01 RX ADMIN — PIPERACILLIN SODIUM AND TAZOBACTAM SODIUM 4.5 G: 4; .5 INJECTION, POWDER, FOR SOLUTION INTRAVENOUS at 03:09

## 2023-01-01 RX ADMIN — SODIUM BICARBONATE: 84 INJECTION, SOLUTION INTRAVENOUS at 04:09

## 2023-01-01 RX ADMIN — VANCOMYCIN HYDROCHLORIDE 2000 MG: 500 INJECTION, POWDER, LYOPHILIZED, FOR SOLUTION INTRAVENOUS at 10:09

## 2023-01-01 RX ADMIN — HYDROCODONE BITARTRATE AND ACETAMINOPHEN 1 TABLET: 5; 325 TABLET ORAL at 10:09

## 2023-01-01 RX ADMIN — LINEZOLID 600 MG: 600 INJECTION, SOLUTION INTRAVENOUS at 01:07

## 2023-01-01 RX ADMIN — PANTOPRAZOLE SODIUM 40 MG: 40 TABLET, DELAYED RELEASE ORAL at 01:09

## 2023-01-01 RX ADMIN — NOREPINEPHRINE BITARTRATE 0.26 MCG/KG/MIN: 1 INJECTION, SOLUTION, CONCENTRATE INTRAVENOUS at 09:09

## 2023-01-01 RX ADMIN — NOREPINEPHRINE BITARTRATE 1.6 MCG/KG/MIN: 1 INJECTION, SOLUTION, CONCENTRATE INTRAVENOUS at 11:09

## 2023-01-01 RX ADMIN — POTASSIUM BICARBONATE 25 MEQ: 978 TABLET, EFFERVESCENT ORAL at 03:03

## 2023-01-01 RX ADMIN — ACETAMINOPHEN 650 MG: 325 TABLET ORAL at 10:09

## 2023-01-01 RX ADMIN — DEXTROSE MONOHYDRATE 125 ML: 100 INJECTION, SOLUTION INTRAVENOUS at 05:09

## 2023-01-01 RX ADMIN — NOREPINEPHRINE BITARTRATE 1.66 MCG/KG/MIN: 1 INJECTION, SOLUTION, CONCENTRATE INTRAVENOUS at 02:10

## 2023-01-01 RX ADMIN — FLUDROCORTISONE ACETATE 100 MCG: 0.1 TABLET ORAL at 09:10

## 2023-01-01 RX ADMIN — SODIUM ZIRCONIUM CYCLOSILICATE 5 G: 5 POWDER, FOR SUSPENSION ORAL at 09:09

## 2023-01-01 RX ADMIN — NOREPINEPHRINE BITARTRATE 1.5 MCG/KG/MIN: 1 INJECTION, SOLUTION, CONCENTRATE INTRAVENOUS at 12:10

## 2023-01-01 RX ADMIN — NOREPINEPHRINE BITARTRATE 3 MCG/KG/MIN: 1 INJECTION, SOLUTION, CONCENTRATE INTRAVENOUS at 01:10

## 2023-01-01 RX ADMIN — SENNOSIDES AND DOCUSATE SODIUM 1 TABLET: 50; 8.6 TABLET ORAL at 09:09

## 2023-01-01 RX ADMIN — PREDNISONE 40 MG: 20 TABLET ORAL at 10:07

## 2023-01-01 RX ADMIN — VASOPRESSIN 0.04 UNITS/MIN: 0.2 INJECTION INTRAVENOUS at 03:09

## 2023-01-01 RX ADMIN — IPRATROPIUM BROMIDE AND ALBUTEROL SULFATE 3 ML: .5; 3 SOLUTION RESPIRATORY (INHALATION) at 07:07

## 2023-01-01 RX ADMIN — NOREPINEPHRINE BITARTRATE 0.18 MCG/KG/MIN: 4 INJECTION, SOLUTION INTRAVENOUS at 07:09

## 2023-01-01 RX ADMIN — SODIUM CHLORIDE, POTASSIUM CHLORIDE, SODIUM LACTATE AND CALCIUM CHLORIDE: 600; 310; 30; 20 INJECTION, SOLUTION INTRAVENOUS at 09:09

## 2023-01-01 RX ADMIN — SODIUM CHLORIDE 1000 ML: 9 INJECTION, SOLUTION INTRAVENOUS at 03:09

## 2023-01-01 RX ADMIN — FUROSEMIDE 40 MG: 40 TABLET ORAL at 08:03

## 2023-01-01 RX ADMIN — ACETAMINOPHEN 650 MG: 325 TABLET ORAL at 01:09

## 2023-01-01 RX ADMIN — PANTOPRAZOLE SODIUM 8 MG/HR: 40 INJECTION, POWDER, FOR SOLUTION INTRAVENOUS at 06:09

## 2023-01-01 RX ADMIN — NOREPINEPHRINE BITARTRATE 1.44 MCG/KG/MIN: 1 INJECTION, SOLUTION, CONCENTRATE INTRAVENOUS at 10:10

## 2023-01-01 RX ADMIN — POTASSIUM CHLORIDE 40 MEQ: 1500 TABLET, EXTENDED RELEASE ORAL at 01:09

## 2023-01-01 RX ADMIN — LEVOTHYROXINE SODIUM 50 MCG: 50 TABLET ORAL at 05:07

## 2023-01-01 RX ADMIN — NOREPINEPHRINE BITARTRATE 2.4 MCG/KG/MIN: 1 INJECTION, SOLUTION, CONCENTRATE INTRAVENOUS at 05:10

## 2023-01-01 RX ADMIN — NOREPINEPHRINE BITARTRATE 0.02 MCG/KG/MIN: 1 INJECTION, SOLUTION, CONCENTRATE INTRAVENOUS at 09:09

## 2023-01-01 RX ADMIN — NOREPINEPHRINE BITARTRATE 0.12 MCG/KG/MIN: 1 INJECTION, SOLUTION, CONCENTRATE INTRAVENOUS at 11:09

## 2023-01-01 RX ADMIN — SENNOSIDES AND DOCUSATE SODIUM 1 TABLET: 50; 8.6 TABLET ORAL at 11:09

## 2023-01-01 RX ADMIN — POTASSIUM CHLORIDE 20 MEQ: 1500 TABLET, EXTENDED RELEASE ORAL at 10:09

## 2023-01-01 RX ADMIN — MIDODRINE HYDROCHLORIDE 15 MG: 5 TABLET ORAL at 06:10

## 2023-01-01 RX ADMIN — MELATONIN TAB 3 MG 6 MG: 3 TAB at 09:09

## 2023-01-01 RX ADMIN — DEXTROSE MONOHYDRATE 250 ML: 100 INJECTION, SOLUTION INTRAVENOUS at 05:10

## 2023-01-01 RX ADMIN — SODIUM BICARBONATE 1300 MG: 650 TABLET ORAL at 11:09

## 2023-01-01 RX ADMIN — HYDROCODONE BITARTRATE AND ACETAMINOPHEN 1 TABLET: 5; 325 TABLET ORAL at 05:09

## 2023-01-09 NOTE — TELEPHONE ENCOUNTER
No new care gaps identified.  [facility] Embedded Care Gaps. Reference number: [id number]. 1/09/2023   3:38:59 PM CST

## 2023-01-09 NOTE — PROGRESS NOTES
[facility]  Transition of Care Home Visit    Visit Date: 1/10/2023  Encounter Provider: Kyara Stephens   PCP:  Ami Zarate DO    PRESENTING HISTORY      Patient ID: Gene Rothman is a 77 y.o. male.    Consult Requested By:  No ref. provider found  Reason for Consult:  Hospital Follow Up.    Gene is being seen at home due to being seen at home due to physical debility that presents a taxing effort to leave the home, to mitigate high risk of hospital readmission and/or due to the limited availability of reliable or safe options for transportation to the point of access to the provider. Prior to treatment on this visit the chart was reviewed and patient verbal consent was obtained.      Chief Complaint: Transitional Care    Patient was admitted to hospital on 12/12 and discharged to home on  12/23    History of Present Illness: Mr. Gene Rothman is a 77 y.o. male who was recently admitted to the hospital after presenting to the emergency department as a transfer from an outside facility for urologic abilities. Vital signs stable on arrival to the emergency department, see above for physical exam. 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 Stone in the bladder was noted as well. Clinically, patient is well-appearing at this time and appears to be at his baseline. we [place] [facility] [place] and management with urology consulted. Will administer Zosyn, sepsis pathway initiated. Dr Waller performed a Cystoscopy Insertion Stent Ureter (L), Ureteroscopy, Left stone treatment and retrieval (L), Modified Circumcision Procedure       ___________________________________________________________________    Today:    HPI:  Patient is being seen today as a transitional care visit following hospitalization. See hospital course for details. Upon arrival patient was laying in the bed awake alert and  "oriented to self and place. Patient has [facility]. Patient is bed bound and is incontinent of bowel and bladder. Wife is present for the visit. She reports that he had an episode in the hospital where he got violent and was told it was the anesthesia. At this time patient is at his baseline. She states that when he first got home he was disoriented and having hallucinations but that has improved dramatically.  Called placed to Mahendra at [facility] to order barrier cream for the patient.          Review of Systems   Constitutional:  Positive for appetite change (eats less than usual).   HENT:  Positive for hearing loss.    Eyes: Negative.    Respiratory:  Positive for shortness of breath (patient is on oxygen at 4L via face mask).    Cardiovascular: Negative.    Gastrointestinal:  Positive for constipation (relieved with enema x 2.). Negative for vomiting.   Endocrine: Negative.    Genitourinary: Negative.    Musculoskeletal:  Positive for arthralgias.   Skin:  Positive for wound.   Allergic/Immunologic: Negative.    Neurological:  Positive for weakness.   Hematological: Negative.    Psychiatric/Behavioral:  Positive for agitation (reports that he gets agitated "every now and then") and sleep disturbance (sleeps during the day and stays awake at night.).      Assessments:  Environmental: Patient lives in a single story home without steps at the entrance. Lighting is bright and temperature is comfortable.   Functional Status: Patient is a functional quad and is currently bed bound.   Safety: Fall Precaution  Nutritional: Adequate food in the home  Home Health/DME/Supplies: [facility], DMEs: hospital bed, bedside commode, wheelchair, oxygen concentrator    PAST HISTORY:     Past Medical History:   Diagnosis Date    Acute hypoxemic respiratory failure 9/17/2022    Arthritis     CHF (congestive heart failure)     Chronic kidney disease     Coronary artery disease     Depression     " Hypertension     Hypertensive heart disease with heart failure 2022    Hypothyroidism     Lymphedema of lower extremity     Nephrolithiasis     Obesity     Peripheral vascular disease     Pneumonia 2022    Recurrent cellulitis of lower leg     Sleep apnea     can't stand the CPAP , sleeps elevated in hospital bed or chair    Tobacco dependence     resolved       Past Surgical History:   Procedure Laterality Date    ADENOIDECTOMY      BACK SURGERY  ;  1976    x2 for disc; scar tissue removed    debridement of bilateral leg ulcers Bilateral 2017    Dr Hernandez    INNER EAR SURGERY Bilateral     separate - pinnaplasty , new eardrms constructed    KIDNEY STONE SURGERY Left  approx    open    TONSILLECTOMY         Family History   Problem Relation Age of Onset    Asthma Daughter          26 w/ asthma    Cancer Brother         skin-part of ext ear removed    Cancer Mother         ? abd also    Diabetes Mother     Hypertension Mother     Cancer Father         ? abdonimal origin stomach/liver or pancreas    Heart disease Father         pacer    Heart disease Brother         CABG3    Alcohol abuse Maternal Grandfather     Stroke Neg Hx     COPD Neg Hx     Mental illness Neg Hx     Mental retardation Neg Hx     Kidney disease Neg Hx        Social History     Socioeconomic History    Marital status:    Tobacco Use    Smoking status: Never    Smokeless tobacco: Never    Tobacco comments:     chain smoker heavy x 10 years, lighter x 10 before    Substance and Sexual Activity    Alcohol use: Not Currently    Drug use: Never    Sexual activity: Never   Social History Narrative    ** Merged History Encounter **          Lives with wife and 2 sons and a daughter. No longer drives. Quit in  because couldn't hold foot on pedal. 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 Uses a Rolator at wo    rk and  wheelchair at home. No caffeine. Does not have a Living Will or Advanced Directive.         MEDICATIONS & ALLERGIES:     Current Outpatient Medications on File Prior to Visit   Medication Sig Dispense Refill    cephALEXin (KEFLEX) 500 MG capsule Take 500 mg by mouth.      acetaminophen (TYLENOL) 500 MG tablet Take 500 mg by mouth every 6 (six) hours as needed for Pain.      aspirin 81 MG Chew Take 81 mg by mouth once daily.      atorvastatin (LIPITOR) 40 MG tablet Take 1 tablet (40 mg total) by mouth once daily. 90 tablet 3    furosemide (LASIX) 40 MG tablet Take 1 tablet (40 mg total) by mouth once daily. 30 tablet 2    levothyroxine (SYNTHROID) 50 MCG tablet Take 1 tablet (50 mcg total) by mouth before breakfast. 30 tablet 0    metronidazole 1% (METROGEL) 1 % Gel Apply topically 2 (two) times a day.      miconazole NITRATE 2 % (MICOTIN) 2 % top powder Apply topically 2 (two) times daily.      miconazole nitrate 2% (MICOTIN) 2 % Oint Apply topically 2 (two) times daily. Intertrigo to lower abdomen, groin, periarea: please apply Antifungal bid  0    polyethylene glycol (GLYCOLAX) 17 gram PwPk Take 17 g by mouth 2 (two) times daily.      senna-docusate 8.6-50 mg (PERICOLACE) 8.6-50 mg per tablet Take 1 tablet by mouth once daily.      [DISCONTINUED] oxyCODONE-acetaminophen (PERCOCET)  mg per tablet Take 1 tablet by mouth every 8 (eight) hours as needed.       Current Facility-Administered Medications on File Prior to Visit   Medication Dose Route Frequency Provider Last Rate Last Admin    [DISCONTINUED] furosemide injection  40 mg Intravenous  Generic External Data Provider        [DISCONTINUED] GENERIC EXTERNAL MEDICATION     Generic External Data Provider        [DISCONTINUED] GENERIC EXTERNAL MEDICATION     Generic External Data Provider        [DISCONTINUED] GENERIC EXTERNAL MEDICATION     Generic External Data Provider        [DISCONTINUED] GENERIC EXTERNAL MEDICATION     Generic External Data Provider         [DISCONTINUED] LORazepam injection  0.5 mg Intravenous Q2H PRN Generic External Data Provider            Review of patient's allergies indicates:   Allergen Reactions    Bactrim [sulfamethoxazole-trimethoprim] Itching       OBJECTIVE:     Vital Signs:  Vitals:    01/10/23 1241   BP: 102/60   Pulse: (!) 50   Temp: 98 °F (36.7 °C)     There is no height or weight on file to calculate BMI.     Physical Exam:  Physical Exam  Vitals reviewed.   Constitutional:       General: He is not in acute distress.     Appearance: He is obese.   HENT:      Head: Normocephalic and atraumatic.      Nose: Nose normal.      Mouth/Throat:      Mouth: Mucous membranes are moist.      Pharynx: Oropharynx is clear.   Cardiovascular:      Rate and Rhythm: Regular rhythm. Bradycardia present.      Pulses: Normal pulses.      Heart sounds: Normal heart sounds.   Pulmonary:      Effort: Pulmonary effort is normal.      Breath sounds: Examination of the right-lower field reveals decreased breath sounds. Examination of the left-lower field reveals decreased breath sounds. Decreased breath sounds present.   Abdominal:      General: Bowel sounds are normal.      Palpations: Abdomen is soft.   Musculoskeletal:      Right lower leg: Edema (1+ non pitting edema) present.      Left lower leg: Edema (1+ non pitting edema) present.   Skin:     General: Skin is warm and dry.      Capillary Refill: Capillary refill takes 2 to 3 seconds.      Findings: Erythema (blanchable areas to right buttock, no broken skin) present.      Comments: Dark discoloration to the lower portion of the lower extremities.    Neurological:      Mental Status: Mental status is at baseline.      Motor: Weakness present.   Psychiatric:         Mood and Affect: Mood normal.         Behavior: Behavior normal.       Laboratory  Lab Results   Component Value Date    WBC 20.29 (H) 12/11/2022    HGB 12.0 (L) 12/11/2022    HCT 38.7 (L) 12/11/2022     (H) 12/11/2022     (L)  12/11/2022     Lab Results   Component Value Date    INR 1.0 09/19/2022    INR 1.0 01/03/2022    INR 1.1 12/30/2016     Lab Results   Component Value Date    HGBA1C 5.6 01/03/2022     No results for input(s): POCTGLUCOSE in the last 72 hours.    Diagnostic Results:      TRANSITION OF CARE:     [facility] Contact Note:     Family and/or Caretaker present at visit?  Yes.  Diagnostic tests reviewed/disposition: No diagnosic tests pending after this hospitalization.  Disease/illness education: sepsis, UTI, kidney stones  Home health/community services discussion/referrals: Patient has home health established at [facility] .   Establishment or re-establishment of referral orders for community resources: No other necessary community resources.   Discussion with other health care providers: No discussion with other health care providers necessary.     Transition of Care Visit:  I have reviewed and updated the history and problem list.  I have reconciled the medication list.  I have discussed the hospitalization and current medical issues, prognosis and plans with the patient/family.  I  spent more than 50% of time discussing the care with the patient/family.  Total Face-to-Face Encounter: 60 minutes.    Medications Reconciliation:   I have reconciled the patient's home medications and discharge medications with the patient/family. I have updated all changes.  Refer to After-Visit Medication List.    ASSESSMENT & PLAN:     HIGH RISK CONDITION(S):  Functional Quadriplegia, Sepsis (Hx of), HLD, Hypertensive Heart Disease, CKD Stage 3    1. Chronic respiratory failure, unspecified whether with hypoxia or hypercapnia  Overview:  Pulse ox spot check off of oxygen is 84-85%  With 4L face Mask o2 sat is 96%  Continue with O2 at 4L face mask continuously      2. Hx of sepsis  Assessment & Plan:  Patient discharged from hospital on cephalexin 500mg PO BID   Continue medication until all are gone        3. Decubitus  ulcer of right buttock, stage 1  Assessment & Plan:  Continue using barrier cream to area, reapply as often as necessary   Keep area clean and dry   Ordered barrier cream for patient through [facility]         Encounter for Medical Follow-Up and Medication Review  - [facility] at [facility] to schedule follow-up visit with patient in 4 weeks or PRN     Patient Instructions Given:  - Continue all medications, treatments and therapies as ordered.   - Follow all instructions, recommendations as discussed.  - Maintain Safety Precautions at all times.  - Attend all medical appointments as scheduled.  - For worsening symptoms: call Primary Care Physician or Nurse Practitioner.  - For emergencies, call 911 or immediately report to the nearest emergency room     Were controlled substances prescribed?  No    Instructions for the patient:    Scheduled Follow-up :  Future Appointments   Date Time Provider Department Center   2/13/2023  8:00 AM Kyara Cassidy NP [id number]         After Visit Medication List :     Medication List            Accurate as of January 10, 2023  3:54 PM. If you have any questions, ask your nurse or doctor.                CONTINUE taking these medications      acetaminophen 500 MG tablet  Commonly known as: TYLENOL     aspirin 81 MG Chew     atorvastatin 40 MG tablet  Commonly known as: LIPITOR  Take 1 tablet (40 mg total) by mouth once daily.     cephALEXin 500 MG capsule  Commonly known as: KEFLEX     furosemide 40 MG tablet  Commonly known as: LASIX  Take 1 tablet (40 mg total) by mouth once daily.     levothyroxine 50 MCG tablet  Commonly known as: SYNTHROID  Take 1 tablet (50 mcg total) by mouth before breakfast.     metronidazole 1% 1 % Gel  Commonly known as: METROGEL  Apply topically 2 (two) times a day.     * miconazole nitrate 2% 2 % Oint  Commonly known as: MICOTIN  Apply topically 2 (two) times daily. Intertrigo to lower abdomen, groin, periarea: please apply Antifungal  bid     * miconazole NITRATE 2 % 2 % top powder  Commonly known as: MICOTIN     polyethylene glycol 17 gram Pwpk  Commonly known as: GLYCOLAX     senna-docusate 8.6-50 mg 8.6-50 mg per tablet  Commonly known as: PERICOLACE           * This list has 2 medication(s) that are the same as other medications prescribed for you. Read the directions carefully, and ask your doctor or other care provider to review them with you.                  Signature: Kyara Stephens NP

## 2023-01-10 PROBLEM — L89.311 DECUBITUS ULCER OF RIGHT BUTTOCK, STAGE 1: Status: ACTIVE | Noted: 2023-01-01

## 2023-01-10 PROBLEM — Z86.19 HX OF SEPSIS: Status: ACTIVE | Noted: 2023-01-01

## 2023-01-10 PROBLEM — J96.10 CHRONIC RESPIRATORY FAILURE: Status: ACTIVE | Noted: 2023-01-01

## 2023-01-10 NOTE — ASSESSMENT & PLAN NOTE
Patient discharged from hospital on cephalexin 500mg PO BID   Continue medication until all are gone

## 2023-01-10 NOTE — ASSESSMENT & PLAN NOTE
Pulse ox spot check off of oxygen is 84-85%  With 4L face Mask o2 sat is 96%  Continue with O2 at 4L face mask continuously

## 2023-01-10 NOTE — ASSESSMENT & PLAN NOTE
Continue using barrier cream to area, reapply as often as necessary   Keep area clean and dry   Ordered barrier cream for patient through [facility]   Rendering Text In Billing: The slide was read, and reported in an attached document.

## 2023-01-19 NOTE — TELEPHONE ENCOUNTER
----- Message from Caden Thomas sent at 1/19/2023 11:22 AM CST -----  Contact: Emy (wife)  Emy would like a call back at [phone], in regards to the medication the pt was prescribed at his last hospital visit. She states she doesn't know the name of the medications, but she would like to know if he needs to get the medication refilled. (She would like a call back on today if possible)

## 2023-01-23 PROBLEM — J96.01 ACUTE RESPIRATORY FAILURE WITH HYPOXIA: Status: RESOLVED | Noted: 2022-09-17 | Resolved: 2023-01-01

## 2023-02-04 NOTE — ED PROVIDER NOTES
SCRIBE #1 NOTE: I, Yenifer Thomas, am scribing for, and in the presence of, Santos Thurman MD. I have scribed the entire note.       History     Chief Complaint   Patient presents with    GI Bleeding     Pt complaining of dark red stool since December     Review of patient's allergies indicates:   Allergen Reactions    Bactrim [sulfamethoxazole-trimethoprim] Itching         History of Present Illness     HPI    2/3/2023, 10:06 PM  History obtained from the patient and wife.      History of Present Illness: Gene Rothman is a 77 y.o. male patient with a PMHx of CHF, HTN, lymphedema,obesity, CAD, and tobacco dependence who presents to the Emergency Department for evaluation of dark black stool which onset 1 month PTA. Symptoms are episodic and moderate in severity. Pt reports coming to this ED in December with sepsis stemming from kidney stones, and has had bloody stools since. No mitigating or exacerbating factors reported. Associated sxs include constipation. Patient denies any HA, cough, SOB, abdominal pain, fatigue, and all other sxs at this time. Pior Tx includes a fleet enema with no improvement. No further complaints or concerns at this time.       Arrival mode: EMS    PCP: Ami Zarate DO        Past Medical History:  Past Medical History:   Diagnosis Date    Acute hypoxemic respiratory failure 9/17/2022    Arthritis     CHF (congestive heart failure)     Chronic kidney disease     Coronary artery disease     Depression     Hypertension     Hypertensive heart disease with heart failure 9/16/2022    Hypothyroidism     Lymphedema of lower extremity     Nephrolithiasis     Obesity     Peripheral vascular disease     Pneumonia 9/17/2022    Recurrent cellulitis of lower leg     Sleep apnea     can't stand the CPAP , sleeps elevated in hospital bed or chair    Tobacco dependence     resolved       Past Surgical History:  Past Surgical History:   Procedure Laterality Date    ADENOIDECTOMY  1961    BACK SURGERY   1975;  1976    x2 for disc; scar tissue removed    debridement of bilateral leg ulcers Bilateral 2017    Dr Hernandez    INNER EAR SURGERY Bilateral     separate - pinnaplasty , new eardrms constructed    KIDNEY STONE SURGERY Left  approx    open    TONSILLECTOMY           Family History:  Family History   Problem Relation Age of Onset    Asthma Daughter          26 w/ asthma    Cancer Brother         skin-part of ext ear removed    Cancer Mother         ? abd also    Diabetes Mother     Hypertension Mother     Cancer Father         ? abdonimal origin stomach/liver or pancreas    Heart disease Father         pacer    Heart disease Brother         CABG3    Alcohol abuse Maternal Grandfather     Stroke Neg Hx     COPD Neg Hx     Mental illness Neg Hx     Mental retardation Neg Hx     Kidney disease Neg Hx        Social History:  Social History     Tobacco Use    Smoking status: Never    Smokeless tobacco: Never    Tobacco comments:     chain smoker heavy x 10 years, lighter x 10 before    Substance and Sexual Activity    Alcohol use: Not Currently    Drug use: Never    Sexual activity: Never        Review of Systems     Review of Systems   Constitutional:  Negative for fatigue and fever.   HENT:  Negative for sore throat.    Respiratory:  Negative for choking and shortness of breath.    Cardiovascular:  Negative for chest pain.   Gastrointestinal:  Positive for blood in stool and constipation. Negative for abdominal pain and nausea.   Genitourinary:  Negative for dysuria.   Musculoskeletal:  Negative for back pain.   Skin:  Negative for rash.   Neurological:  Negative for weakness and headaches.   Hematological:  Does not bruise/bleed easily.   All other systems reviewed and are negative.     Physical Exam     Initial Vitals [23 1836]   BP Pulse Resp Temp SpO2   (!) 111/58 60 18 99.1 °F (37.3 °C) 95 %      MAP       --          Physical Exam  Nursing Notes and Vital Signs  Reviewed.  Constitutional: Patient is in no acute distress. Morbidly obese.   Head: Atraumatic. Normocephalic.  Eyes: PERRL. EOM intact. Conjunctivae are not pale. No scleral icterus.  ENT: Mucous membranes are moist. Oropharynx is clear and symmetric.    Neck: Supple. Full ROM. No lymphadenopathy.  Cardiovascular: Regular rate. Regular rhythm. No murmurs, rubs, or gallops. Distal pulses are 2+ and symmetric.  Pulmonary/Chest: No respiratory distress. Clear to auscultation bilaterally. No wheezing or rales.  Abdominal: Soft and non-distended.  There is no tenderness.  No rebound, guarding, or rigidity. Good bowel sounds.  Genitourinary: No CVA tenderness  Musculoskeletal: Moves all extremities. No obvious deformities. No edema. No calf tenderness.  Skin: Warm and dry. Bilateral LE Venous stasis dermatitis.  Neurological:  Alert, awake, and appropriate.  Normal speech.  No acute focal neurological deficits are appreciated.  Psychiatric: Normal affect. Good eye contact. Appropriate in content.  Rectal: Male chaperone present for the duration of the rectal exam.There is no tenderness. External hemorrhoid, non-thrombotic, non-bleeding. No melena. No hematochezia. Dark green stool on rectal wall..      ED Course   Procedures  ED Vital Signs:  Vitals:    02/03/23 1836 02/03/23 2309 02/04/23 0016 02/04/23 0104   BP: (!) 111/58 (!) 118/55  (!) 130/58   Pulse: 60   (!) 58   Resp: 18 18  18   Temp: 99.1 °F (37.3 °C)      TempSrc: Oral      SpO2: 95% 98%  96%   Weight:   (!) 186 kg (410 lb)        Abnormal Lab Results:  Labs Reviewed   CBC W/ AUTO DIFFERENTIAL - Abnormal; Notable for the following components:       Result Value    RBC 3.53 (*)     Hemoglobin 11.4 (*)     Hematocrit 38.3 (*)      (*)     MCH 32.3 (*)     MCHC 29.8 (*)     Mono # 1.2 (*)     Mono % 18.5 (*)     All other components within normal limits   COMPREHENSIVE METABOLIC PANEL - Abnormal; Notable for the following components:    Chloride 92 (*)      CO2 38 (*)     Glucose 112 (*)     Albumin 2.3 (*)     All other components within normal limits   URINALYSIS, REFLEX TO URINE CULTURE - Abnormal; Notable for the following components:    Appearance, UA Hazy (*)     Protein, UA 1+ (*)     Occult Blood UA 2+ (*)     Nitrite, UA Positive (*)     Leukocytes, UA 3+ (*)     All other components within normal limits    Narrative:     Specimen Source->Urine   URINALYSIS MICROSCOPIC - Abnormal; Notable for the following components:    RBC, UA 18 (*)     WBC, UA >100 (*)     WBC Clumps, UA Many (*)     Bacteria Many (*)     Yeast, UA Occasional (*)     All other components within normal limits    Narrative:     Specimen Source->Urine   CULTURE, URINE   APTT   PROTIME-INR   OCCULT BLOOD X 1, STOOL        All Lab Results:  Results for orders placed or performed during the hospital encounter of 02/03/23   CBC auto differential   Result Value Ref Range    WBC 6.32 3.90 - 12.70 K/uL    RBC 3.53 (L) 4.60 - 6.20 M/uL    Hemoglobin 11.4 (L) 14.0 - 18.0 g/dL    Hematocrit 38.3 (L) 40.0 - 54.0 %     (H) 82 - 98 fL    MCH 32.3 (H) 27.0 - 31.0 pg    MCHC 29.8 (L) 32.0 - 36.0 g/dL    RDW 14.5 11.5 - 14.5 %    Platelets 154 150 - 450 K/uL    MPV 10.1 9.2 - 12.9 fL    Immature Granulocytes 0.5 0.0 - 0.5 %    Gran # (ANC) 3.8 1.8 - 7.7 K/uL    Immature Grans (Abs) 0.03 0.00 - 0.04 K/uL    Lymph # 1.1 1.0 - 4.8 K/uL    Mono # 1.2 (H) 0.3 - 1.0 K/uL    Eos # 0.1 0.0 - 0.5 K/uL    Baso # 0.02 0.00 - 0.20 K/uL    nRBC 0 0 /100 WBC    Gran % 60.8 38.0 - 73.0 %    Lymph % 18.0 18.0 - 48.0 %    Mono % 18.5 (H) 4.0 - 15.0 %    Eosinophil % 1.9 0.0 - 8.0 %    Basophil % 0.3 0.0 - 1.9 %    Differential Method Automated    Comprehensive metabolic panel   Result Value Ref Range    Sodium 139 136 - 145 mmol/L    Potassium 4.4 3.5 - 5.1 mmol/L    Chloride 92 (L) 95 - 110 mmol/L    CO2 38 (H) 23 - 29 mmol/L    Glucose 112 (H) 70 - 110 mg/dL    BUN 20 8 - 23 mg/dL    Creatinine 1.0 0.5 - 1.4 mg/dL     Calcium 8.8 8.7 - 10.5 mg/dL    Total Protein 7.9 6.0 - 8.4 g/dL    Albumin 2.3 (L) 3.5 - 5.2 g/dL    Total Bilirubin 0.5 0.1 - 1.0 mg/dL    Alkaline Phosphatase 70 55 - 135 U/L    AST 15 10 - 40 U/L    ALT 10 10 - 44 U/L    Anion Gap 9 8 - 16 mmol/L    eGFR >60 >60 mL/min/1.73 m^2   APTT   Result Value Ref Range    aPTT 22.3 21.0 - 32.0 sec   Protime-INR   Result Value Ref Range    Prothrombin Time 10.9 9.0 - 12.5 sec    INR 1.0 0.8 - 1.2   Urinalysis, Reflex to Urine Culture Urine, Catheterized    Specimen: Urine   Result Value Ref Range    Specimen UA Urine, Catheterized     Color, UA Yellow Yellow, Straw, Cindy    Appearance, UA Hazy (A) Clear    pH, UA 7.0 5.0 - 8.0    Specific Gravity, UA 1.010 1.005 - 1.030    Protein, UA 1+ (A) Negative    Glucose, UA Negative Negative    Ketones, UA Negative Negative    Bilirubin (UA) Negative Negative    Occult Blood UA 2+ (A) Negative    Nitrite, UA Positive (A) Negative    Urobilinogen, UA Negative <2.0 EU/dL    Leukocytes, UA 3+ (A) Negative   Urinalysis Microscopic   Result Value Ref Range    RBC, UA 18 (H) 0 - 4 /hpf    WBC, UA >100 (H) 0 - 5 /hpf    WBC Clumps, UA Many (A) None-Rare    Bacteria Many (A) None-Occ /hpf    Yeast, UA Occasional (A) None    Squam Epithel, UA 2 /hpf    Hyaline Casts, UA 1 0-1/lpf /lpf    Unclass Gali UA Occasional None-Moderate    Microscopic Comment SEE COMMENT    Occult blood x 1, stool    Specimen: Stool   Result Value Ref Range    Occult Blood Negative Negative         Imaging Results:  Imaging Results    None          The EKG was ordered, reviewed, and independently interpreted by the ED provider.  Interpretation time: 19:12  Rate: 64 BPM  Rhythm: atrial fibrillation  Interpretation: No acute ST changes. No STEMI.             The Emergency Provider reviewed the vital signs and test results, which are outlined above.     ED Discussion     10:25 PM: Reassessed pt at this time.  Hgb is stable over the last month. No BUN elevation,  melena, hematochezia, or occult blood on testing to suggest GI bleed. He does have external, non thrombosed hemorrhoids however these are non bleeding and non painful to the patient as well. He does have a UTI today which we will begin tx for. Discussed with pt all pertinent ED information and results. Discussed pt dx and plan of tx. Gave pt all f/u and return to the ED instructions. All questions and concerns were addressed at this time. Pt expresses understanding of information and instructions, and is comfortable with plan to discharge. Pt is stable for discharge.    I discussed with patient and/or family/caretaker that evaluation in the ED does not suggest any emergent or life threatening medical conditions requiring immediate intervention beyond what was provided in the ED, and I believe patient is safe for discharge.  Regardless, an unremarkable evaluation in the ED does not preclude the development or presence of a serious of life threatening condition. As such, patient was instructed to return immediately for any worsening or change in current symptoms.           Medical Decision Making:   Clinical Tests:   Lab Tests: Ordered and Reviewed  Medical Tests: Ordered and Reviewed         ED Medication(s):  Medications   cefTRIAXone (ROCEPHIN) 1 g in dextrose 5 % in water (D5W) 5 % 50 mL IVPB (MB+) (0 g Intravenous Stopped 2/3/23 3312)       New Prescriptions    CEFDINIR (OMNICEF) 300 MG CAPSULE    Take 1 capsule (300 mg total) by mouth 2 (two) times daily. for 10 days        Follow-up Information       Ami Zarate DO. Schedule an appointment as soon as possible for a visit in 2 days.    Specialty: Internal Medicine  Why: For re-evaluation and further treatment  Contact information:  [address] [place] [address] DR Reanna [place]AY [address]  [phone]               [facility] - Emergency Dept.. Go today.    Specialty: Emergency Medicine  Why: If symptoms worsen, For re-evaluation and further treatment, As needed  Contact  information:  [address] [facility] [place] [address] [phone]                               Scribe Attestation:   Scribe #1: I performed the above scribed service and the documentation accurately describes the services I performed. I attest to the accuracy of the note.     Attending:   Physician Attestation Statement for Scribe #1: I, Santos Thurman MD, personally performed the services described in this documentation, as scribed by Yenifer Thomas, in my presence, and it is both accurate and complete.           Clinical Impression       ICD-10-CM ICD-9-CM   1. Dark stools  R19.5 792.1   2. Urinary tract infection without hematuria, site unspecified  N39.0 599.0       Disposition:   Disposition: Discharged  Condition: Stable       Santos Thurman MD  02/04/23 5320

## 2023-02-13 NOTE — PROGRESS NOTES
[facility]  Medical Home Visit    Visit Date: 2/13/2023  Encounter Provider: Kyara Stephens  PCP:  Ami Zarate DO    Subjective:      Patient ID: Gene Rothman is a 77 y.o. male.    Consult Requested By:  No ref. provider found  Reason for Consult:  Follow up    Gene is being seen at home due to being seen at home due to physical debility that presents a taxing effort to leave the home, to mitigate high risk of hospital readmission and/or due to the limited availability of reliable or safe options for transportation to the point of access to the provider. Prior to treatment on this visit the chart was reviewed and patient verbal consent was obtained.    Chief Complaint: Follow-up      Patient is being seen today for a follow up visit. Patient was seen in the ER for dark stools on 02/03. Patient was found to have a UTI and sent home. Patient also complains of constipation. He had a small bowel movement today but was not much and had not had one in about 3-4 days. Will reorder miralax for constipation. Medications reviewed with patient's wife and she reports compliance to all medications. Patient is on cefdinir for his UTI and is still taking it.          Review of Systems   Constitutional: Negative.    HENT: Negative.     Eyes: Negative.    Respiratory:  Positive for shortness of breath (patient is on 4L via face mask.).    Cardiovascular: Negative.    Gastrointestinal:  Positive for constipation.   Endocrine: Negative.    Genitourinary: Negative.    Musculoskeletal: Negative.    Skin:  Positive for color change (darken discoloration to BLE).   Allergic/Immunologic: Negative.    Neurological:  Positive for weakness.   Hematological: Negative.    Psychiatric/Behavioral:  Positive for dysphoric mood.      Assessments:  Environmental: Patient lives in a garden home with his family. There are no steps at the entrance. Lighting is dim and temperature is comfortable.   Functional Status: Patient is bed bound and  needs assitance with ADLs  Safety: Fall Precautions  Nutritional: Adequate food in the home  Home Health/DME/Supplies: [facility], DMEs: hosptial bed    Objective:   Physical Exam  Constitutional:       General: He is not in acute distress.     Appearance: He is obese.   HENT:      Head: Normocephalic and atraumatic.      Nose: Nose normal.   Cardiovascular:      Rate and Rhythm: Normal rate and regular rhythm.      Pulses: Normal pulses.      Heart sounds: Normal heart sounds.   Pulmonary:      Breath sounds: Normal breath sounds.   Abdominal:      General: Bowel sounds are decreased.      Palpations: Abdomen is soft.   Musculoskeletal:      Right lower leg: No edema.      Left lower leg: No edema.   Skin:     General: Skin is warm and dry.      Capillary Refill: Capillary refill takes less than 2 seconds.   Neurological:      Mental Status: He is alert and oriented to person, place, and time.   Psychiatric:         Mood and Affect: Mood normal.         Behavior: Behavior normal.       Vitals:    02/13/23 1502   Pulse: (!) 48   Resp: 18   Temp: 98.2 °F (36.8 °C)   SpO2: (!) 93%   PainSc: 0-No pain     There is no height or weight on file to calculate BMI.    Assessment:     1. Constipation, unspecified constipation type    2. Hyperlipidemia, unspecified hyperlipidemia type        Plan:     Ethical / Legal: Advance Care Planning   Surrogate decision maker:  Name Alise Rothman, Relationship: Daughter  Code Status:  Full Code   [place]:  None  Other advance directive:  None, Capacity to make medical decisions:  yes, Conflict None          Encounter for Medical Follow-Up and Medication Review  - [facility] to schedule follow-up visit with patient in 4 weeks or PRN     Patient Instructions Given:  - Continue all medications, treatments and therapies as ordered.   - Follow all instructions, recommendations as discussed.  - Maintain Safety Precautions at all times.  - Attend all medical appointments as  scheduled.  - For worsening symptoms: call Primary Care Physician or Nurse Practitioner.  - For emergencies, call 911 or immediately report to the nearest emergency room     Were controlled substances prescribed?  No    Follow Up Appointments:   Future Appointments   Date Time Provider Department Center   2/20/2023 12:00 PM KIKO Reyna [facility]   3/13/2023  8:00 AM Kyara Cassidy NP [address]       Signature: Kyara Cassidy NP

## 2023-02-20 NOTE — PROGRESS NOTES
"Gene Rothman presented for Medicare AW today. The following components were reviewed and updated:    Medical history  Family History  Social history  Allergies and Current Medications  Health Risk Assessment  Health Maintenance  Care Team    **See Completed Assessments for Annual Wellness visit with in the encounter summary    The following assessments were completed:  Depression Screening  Cognitive function Screening      Timed Get Up Test  Whisper Test    Vitals:    02/20/23 1136   BP: 131/62   Pulse: (!) 58   Temp: 98.4 °F (36.9 °C)   TempSrc: Temporal   SpO2: 99%   Weight: (!) 154.7 kg (341 lb)   Height: 5' 11" (1.803 m)     Body mass index is 47.56 kg/m².   ]    Physical Exam  Vitals reviewed.   Constitutional:       Appearance: Normal appearance. He is obese.   HENT:      Head: Normocephalic and atraumatic.   Cardiovascular:      Rate and Rhythm: Normal rate and regular rhythm.      Pulses: Normal pulses.      Heart sounds: Normal heart sounds.   Pulmonary:      Effort: Pulmonary effort is normal.      Breath sounds: Normal breath sounds.   Musculoskeletal:         General: Normal range of motion.      Cervical back: Normal range of motion and neck supple.   Skin:     General: Skin is warm and dry.      Findings: Rash (erythema to left axilla) present.      Comments: Discoloration to lower extremities. Redness with small skin tears to skin of buttocks especially right buttocks- stage 1.    Neurological:      Mental Status: He is alert and oriented to person, place, and time.      Motor: Weakness (generalized especially to legs) present.   Psychiatric:         Mood and Affect: Mood normal.         Behavior: Behavior normal.        Outpatient Medications Marked as Taking for the 2/20/23 encounter (Office Visit) with KIKO Reyna   Medication Sig Dispense Refill    acetaminophen (TYLENOL) 500 MG tablet Take 500 mg by mouth every 6 (six) hours as needed for Pain.      levothyroxine (SYNTHROID) 50 MCG " "tablet Take 1 tablet (50 mcg total) by mouth before breakfast. 30 tablet 11    sodium phosphates (ENEMA) 19-7 gram/118 mL [address] Place 1 enema rectally once.          Diagnoses and health risks identified today and associated recommendations/orders:  1. Encounter for preventive health examination  Medicare awv complete. Health maintenance:  shingles vaccine and covid 19 5th vaccine due-encouraged patient to obtain.   Complex patient with multiple issues: difficulty affording medical supplies, food, the basics. ordered bedside commode. Case management consulted to assist.  Patient's son states he needs home health nurse, aide to bath him, and physical therapy. Informed Kyara who states patient has home health.   Patient has not been taking his medications except levothyroxine "due to [facility] saying that they would send it but never did." Informed Aliyah Cassidy  NP. One month supply of medications sent to local [facility] pharmacy and son states he will pick them up.   Patient's family is concerned about patient's confusion since dec hospitalization. 2 days out of the week confused doesn't know that he took his o2 mask off and does not know who took his mask off. Sleep pattern off. Has nightmares. Informed Kyara.     2. Hypertensive heart disease with heart failure  Chronic and stable. No acute issues. Continue current management. See med list above. Follow up with cardiology.    - aspirin 81 MG Chew; Take 1 tablet (81 mg total) by mouth once daily.  Dispense: 30 tablet; Refill: 0    3. Stage 3 chronic kidney disease, unspecified whether stage 3a or 3b CKD   Latest Reference Range & Units 08/21/22 08:55 09/16/22 22:58 09/18/22 13:09 09/19/22 10:07 09/20/22 08:30 12/11/22 19:17 02/03/23 19:18   eGFR >60 mL/min/1.73 m^2 38 ! 51 ! 47 ! 44 ! 47 ! 35 ! >60   !: Data is abnormal  stable. No acute issues. Continue current management. Recommend avoid nsaids and other nephrotoxic medications. Follow up with " PCP/nephrologist.      4. Peripheral vascular disease, unspecified  Chronic and stable on current management. See med list above. Follow up with PCP.    - aspirin 81 MG Chew; Take 1 tablet (81 mg total) by mouth once daily.  Dispense: 30 tablet; Refill: 0    5. Venous stasis dermatitis of both lower extremities  Chronic and stable on current management. F/u with HERON Sparrow.     6. Venous stasis ulcer of left calf, unspecified ulcer stage, unspecified whether varicose veins present  Stable and improving. F/u with Kyara.     7. Decubitus ulcer of right buttock, stage 1  Patient has stage 1 pressure ulcer to buttocks and supposed to be using barrier cream but instead is using neosporin. Informed them to use Charly's butt paste/zinc based barrier cream recommended. Also recommended to use mattress to prevent pressure ulcers and to turn q 2 hours and float heels.     8. Chronic respiratory failure, unspecified whether with hypoxia or hypercapnia  Chronic and stable on current management. On mask o2 4.5L NC. Follow up with PCP.      9. Severe obesity (BMI >= 40)  Recommend diet and exercise to lose weight. Follow up with your PCP as planned to discuss adjustments to your treatment plan.      10. Depression, unspecified depression type  Chronic and stable on current management. Patient states he feels depressed several days in the past 2 weeks. Denies any SI. See med list above. Follow up with PCP.      11. Functional quadriplegia  Chronic. Bedbound. Recommend to turn q 2 hours in bed. Recommend home health PT.  Follow up with PCP.      12. Hyperlipidemia, unspecified hyperlipidemia type  Lab Results   Component Value Date    CHOL 72 (L) 01/03/2022    CHOL 151 05/19/2021    CHOL 135 04/06/2015     Lab Results   Component Value Date    HDL 28 (L) 01/03/2022    HDL 37 (L) 05/19/2021    HDL 29 (L) 04/06/2015     Lab Results   Component Value Date    LDLCALC 27.0 (L) 01/03/2022    LDLCALC 93.0 05/19/2021    LDLCALC  82.6 04/06/2015     Lab Results   Component Value Date    TRIG 85 01/03/2022    TRIG 105 05/19/2021    TRIG 117 04/06/2015     Lab Results   Component Value Date    CHOLHDL 38.9 01/03/2022    CHOLHDL 24.5 05/19/2021    CHOLHDL 21.5 04/06/2015    Chronic and stable on statin medication. See #1. F/u with pcp.    - atorvastatin (LIPITOR) 40 MG tablet; Take 1 tablet (40 mg total) by mouth once daily.  Dispense: 30 tablet; Refill: 0    13. Hypothyroidism, unspecified type  Lab Results   Component Value Date    TSH 3.002 01/11/2023   Chronic and stable on current management. See med list above. Follow up with PCP.       14. Bilateral hearing loss, unspecified hearing loss type  Chronic and stable on current management. Wears hearing aid.  Follow up with PCP.      15. Candidal dermatitis  16. Fungal dermatitis  Left axilla.  Reordered miconazole.   - miconazole nitrate 2% (MICOTIN) 2 % Oint; Apply topically 2 (two) times daily. Intertrigo to lower abdomen, groin, periarea: please apply Antifungal bid  Dispense: 71 g; Refill: 1    17. Gastroesophageal reflux disease, unspecified whether esophagitis present  Chronic and stable on current management. See med list above. Follow up with PCP.      18. Other type of osteoarthritis, unspecified site  Chronic and stable on current management. See med list above. Follow up with PCP.      19. Nocturnal hypoxia  Wears O2 mask at 4.5 L continuous.     20. Physical debility  Chronic. Fall precautions recommended. Follow up with PCP.    - Ambulatory referral/consult to Outpatient Case Management  - [facility] FOR HOME USE            Provided Gene with a 5-10 year written screening schedule and personal prevention plan. Recommendations were developed using the USPSTF age appropriate recommendations. Education, counseling, and referrals were provided as needed.  After Visit Summary printed and given to patient which includes a list of additional screenings\tests needed.    Follow up in about 1  year (around 2/20/2024) for annual wellness visit.      Zhane Vázquez, LETHAP    I offered to discuss advanced care planning, including how to pick a person who would make decisions for you if you were unable to make them for yourself, called a health care power of , and what kind of decisions you might make such as use of life sustaining treatments such as ventilators and tube feeding when faced with a life limiting illness recorded on a living will that they will need to know. (How you want to be cared for as you near the end of your natural life)     X Patient is interested in learning more about how to make advanced directives.  I provided them paperwork and offered to discuss this with them.

## 2023-02-20 NOTE — PATIENT INSTRUCTIONS
Counseling and Referral of Other Preventative  (Italic type indicates deductible and co-insurance are waived)    Patient Name: Gene Rothman  Today's Date: 2/20/2023    Health Maintenance       Date Due Completion Date    Shingles Vaccine (1 of 2) Never done ---    COVID-19 Vaccine (5 - Booster for Pfizer series) 02/17/2023 12/23/2022    Lipid Panel 01/03/2027 1/3/2022    TETANUS VACCINE 10/13/2027 10/13/2017 (ClinicallyNA)    Override on 10/13/2017: Not Clinically Appropriate        Orders Placed This Encounter   Procedures    COMMODE FOR HOME USE    Ambulatory referral/consult to Outpatient Case Management       The following information is provided to all patients.  This information is to help you find resources for any of the problems found today that may be affecting your health:                Living healthy guide: www.[facility].[place].gov      Understanding Diabetes: www.diabetes.org      Eating healthy: www.cdc.gov/healthyweight      [facility] home safety checklist: www.cdc.gov/steadi/patient.html      Agency on Aging: www.goea.[place].gov      Alcoholics anonymous (AA): www.aa.org      Physical Activity: www.fernanda.nih.gov/qy8yfhs      Tobacco use: www.quitwithusla.org

## 2023-02-20 NOTE — Clinical Note
"Medicare awv complete. Health maintenance:  shingles vaccine and covid 19 5th vaccine due-encouraged patient to obtain.  Complex patient with multiple issues: difficulty affording medical supplies, food, the basics. ordered bedside commode. Case management consulted to assist.  Patient's son states he needs home health nurse, aide to bath him, and physical therapy. Informed Kyara who states patient has home health.  Patient has not been taking his medications except levothyroxine "due to [facility] saying that they would send it but never did." Informed Aliyah Cassidy,  NP. One month supply of medications sent to local [facility] pharmacy and son states he will pick them up.  Patient's family is concerned about patient's confusion since dec hospitalization. 2 days out of the week confused doesn't know that he took his o2 mask off and does not know who took his mask off. Sleep pattern off. Has nightmares. Informed Kyara. "

## 2023-03-01 PROBLEM — D69.6 THROMBOCYTOPENIA: Status: ACTIVE | Noted: 2023-01-01

## 2023-03-01 PROBLEM — R06.03 RESPIRATORY DISTRESS: Status: ACTIVE | Noted: 2023-01-01

## 2023-03-01 PROBLEM — I48.91 ATRIAL FIBRILLATION: Status: ACTIVE | Noted: 2023-01-01

## 2023-03-01 PROBLEM — J96.21 ACUTE ON CHRONIC RESPIRATORY FAILURE WITH HYPOXIA AND HYPERCAPNIA: Status: ACTIVE | Noted: 2023-01-01

## 2023-03-01 PROBLEM — J96.22 ACUTE ON CHRONIC RESPIRATORY FAILURE WITH HYPOXIA AND HYPERCAPNIA: Status: ACTIVE | Noted: 2023-01-01

## 2023-03-01 NOTE — SUBJECTIVE & OBJECTIVE
Past Medical History:   Diagnosis Date    Acute hypoxemic respiratory failure 2022    Arthritis     CHF (congestive heart failure)     Chronic kidney disease     Coronary artery disease     Depression     Hypertension     Hypertensive heart disease with heart failure 2022    Hypothyroidism     Lymphedema of lower extremity     Nephrolithiasis     Obesity     Peripheral vascular disease     Pneumonia 2022    Recurrent cellulitis of lower leg     Sleep apnea     can't stand the CPAP , sleeps elevated in hospital bed or chair    Tobacco dependence     resolved       Past Surgical History:   Procedure Laterality Date    ADENOIDECTOMY      BACK SURGERY  ;  1976    x2 for disc; scar tissue removed    debridement of bilateral leg ulcers Bilateral 2017    Dr Hernandez    INNER EAR SURGERY Bilateral     separate - pinnaplasty , new eardrms constructed    KIDNEY STONE SURGERY Left  approx    open    TONSILLECTOMY         Review of patient's allergies indicates:   Allergen Reactions    Bactrim [sulfamethoxazole-trimethoprim] Itching       Family History       Problem Relation (Age of Onset)    Alcohol abuse Maternal Grandfather    Alzheimer's disease Brother    Asthma Daughter    Cancer Mother, Father, Brother    Diabetes Mother    Heart disease Father, Brother    Hypertension Mother          Tobacco Use    Smoking status: Former     Packs/day: 1.50     Years: 1.00     Pack years: 1.50     Types: Cigarettes     Quit date:      Years since quittin.1    Smokeless tobacco: Never   Substance and Sexual Activity    Alcohol use: Not Currently    Drug use: Never    Sexual activity: Never         Review of Systems   Unable to perform ROS: Acuity of condition   Objective:     Vital Signs (Most Recent):  Temp: 98.1 °F (36.7 °C) (23 1309)  Resp: (!) 22 (23 1608) Vital Signs (24h Range):  Temp:  [98.1 °F (36.7 °C)] 98.1 °F (36.7 °C)  Resp:  [22] 22        Body mass index is 56.75  kg/m².    No intake or output data in the 24 hours ending 03/01/23 1709    Physical Exam  Vitals and nursing note reviewed.   Constitutional:       General: He is in acute distress.      Appearance: He is well-developed. He is obese. He is ill-appearing and toxic-appearing.   HENT:      Head: Normocephalic and atraumatic.      Nose: Nose normal.   Eyes:      Extraocular Movements: Extraocular movements intact.   Cardiovascular:      Rate and Rhythm: Normal rate. Rhythm irregular.   Pulmonary:      Effort: Respiratory distress present.      Breath sounds: No stridor.   Abdominal:      General: There is no distension.   Musculoskeletal:         General: Swelling present. No signs of injury.      Cervical back: Normal range of motion and neck supple.   Skin:     General: Skin is warm and dry.   Neurological:      General: No focal deficit present.      Mental Status: He is alert.       Vents:  Oxygen Concentration (%): 40 (03/01/23 1608)    Lines/Drains/Airways       None                   Significant Labs:    CBC/Anemia Profile:  Recent Labs   Lab 03/01/23  1500   WBC 5.49   HGB 10.3*   HCT 35.3*      *   RDW 14.2        Chemistries:  Recent Labs   Lab 03/01/23  1500      K 4.4   CL 89*   CO2 43*   BUN 22   CREATININE 1.0   CALCIUM 9.3   ALBUMIN 2.4*   PROT 8.1   BILITOT 0.6   ALKPHOS 60   ALT 12   AST 20      Latest Reference Range & Units Most Recent   BNP 0 - 99 pg/mL 183 (H)  3/1/23 15:00   CPK 20 - 200 U/L 44  1/2/22 16:28    - 260 U/L 370 (H)  1/2/22 16:28   Troponin I 0.000 - 0.026 ng/mL 0.020  3/1/23 15:00   (H): Data is abnormally high   Latest Reference Range & Units 02/03/23 20:54   Color, UA Yellow, Straw, Cindy  Yellow   Appearance, UA Clear  Hazy !   Specific Gravity, UA 1.005 - 1.030  1.010   pH, UA 5.0 - 8.0  7.0   Protein, UA Negative  1+ !   Glucose, UA Negative  Negative   Ketones, UA Negative  Negative   Occult Blood UA Negative  2+ !   NITRITE UA Negative  Positive !    UROBILINOGEN UA <2.0 EU/dL Negative   Bilirubin (UA) Negative  Negative   Leukocytes, UA Negative  3+ !   RBC, UA 0 - 4 /hpf 18 (H)   WBC, UA 0 - 5 /hpf >100 (H)   Bacteria, UA None-Occ /hpf Many !   Squam Epithel, UA /hpf 2   WBC Clumps, UA None-Rare  Many !   Hyaline Casts, UA 0-1/lpf /lpf 1   Yeast, UA None  Occasional !   Unclass Gali UA None-Moderate  Occasional   !: Data is abnormal  (H): Data is abnormally high   Latest Reference Range & Units 03/01/23 16:06   Sample  ARTERIAL   POC PH 7.35 - 7.45  7.228 (LL)   POC PCO2 35 - 45 mmHg 123.5 (HH)   POC PO2 80 - 100 mmHg 123 (H)   POC HCO3 24 - 28 mmol/L 51.5 (H)   POC SATURATED O2 95 - 100 % 97   Allens Test  Pass   POC BE -2 to 2 mmol/L 24   Flow  2   DelSys  Nasal Can   Site  RR   Mode  SPONT     EKG 03/01/2020    2D echo 2022     The left ventricle is normal in size with concentric remodeling and normal systolic function.  The estimated ejection fraction is 55%.  Grade I left ventricular diastolic dysfunction.  Severe right ventricular enlargement with moderately to severely reduced right ventricular systolic function.  Right atrial enlargement.  Mild to moderate tricuspid regurgitation.  Normal central venous pressure (3 mmHg).  The estimated PA systolic pressure is 43 mmHg.  There is pulmonary hypertension.    Significant Imaging:     Chest x-ray 3/1 personally reviewed cardiomegaly worsening interstitial densities

## 2023-03-01 NOTE — ED PROVIDER NOTES
"SCRIBE #1 NOTE: I, Teddy Ochoa, am scribing for, and in the presence of, Claritza Meyer DO. I have scribed the entire note.      History      Chief Complaint   Patient presents with    Weakness     Pt c/o weakness x 4 weeks. EMS reports intermitted AMS. Pt c/o of right knee, chronic 4/10 pain       Review of patient's allergies indicates:   Allergen Reactions    Bactrim [sulfamethoxazole-trimethoprim] Itching        HPI   HPI    3/1/2023, 2:08 PM   History obtained from the {adult relatives:12896::"patient"}      History of Present Illness: Gene Rothman is a 77 y.o. male patient who presents to the Emergency Department for ***      Arrival mode: ***Personal vehicle  EMS  [facility]***    PCP: Ami Zarate DO          Past Medical History:  Past Medical History:   Diagnosis Date    Acute hypoxemic respiratory failure 9/17/2022    Arthritis     CHF (congestive heart failure)     Chronic kidney disease     Coronary artery disease     Depression     Hypertension     Hypertensive heart disease with heart failure 9/16/2022    Hypothyroidism     Lymphedema of lower extremity     Nephrolithiasis     Obesity     Peripheral vascular disease     Pneumonia 9/17/2022    Recurrent cellulitis of lower leg     Sleep apnea     can't stand the CPAP , sleeps elevated in hospital bed or chair    Tobacco dependence     resolved       Past Surgical History:  Past Surgical History:   Procedure Laterality Date    ADENOIDECTOMY  1961    BACK SURGERY  1975;  1976    x2 for disc; scar tissue removed    debridement of bilateral leg ulcers Bilateral 01/01/2017    Dr Hernandez    INNER EAR SURGERY Bilateral 1961    separate - pinnaplasty , new eardrms constructed    KIDNEY STONE SURGERY Left 1976 approx    open    TONSILLECTOMY  1961         Family History:  Family History   Problem Relation Age of Onset    Cancer Mother         ? abd also    Diabetes Mother     Hypertension Mother     Cancer Father         ? abdonimal origin stomach/liver or " pancreas    Heart disease Father         pacer    Cancer Brother         skin-part of ext ear removed    Heart disease Brother         CABG3    Alzheimer's disease Brother     Asthma Daughter          26 w/ asthma    Alcohol abuse Maternal Grandfather     Stroke Neg Hx     COPD Neg Hx     Mental illness Neg Hx     Mental retardation Neg Hx     Kidney disease Neg Hx        Social History:  Social History     Tobacco Use    Smoking status: Former     Packs/day: 1.50     Years: 1.00     Pack years: 1.50     Types: Cigarettes     Quit date:      Years since quittin.1    Smokeless tobacco: Never   Substance and Sexual Activity    Alcohol use: Not Currently    Drug use: Never    Sexual activity: Never       ROS   Review of Systems  ***  Physical Exam      Initial Vitals [23 1309]   BP Pulse Resp Temp SpO2   -- -- -- 98.1 °F (36.7 °C) --      MAP       --          Physical Exam  Nursing Notes and Vital Signs Reviewed.  Constitutional: Patient is in {DISTRESS LEVEL:35006}. Well-developed and well-nourished.  Head: Atraumatic. Normocephalic.  Eyes: PERRL. EOM intact. Conjunctivae are not pale. No scleral icterus.  ENT: Mucous membranes are moist. Oropharynx is clear and symmetric***.    Neck: Supple. Full ROM. No lymphadenopathy.  Cardiovascular: Regular rate. Regular rhythm***. No murmurs, rubs, or gallops. Distal pulses are 2+ and symmetric***.  Pulmonary/Chest: No respiratory distress. Clear to auscultation bilaterally***. No wheezing or rales.  Abdominal: Soft and non-distended.  There is no tenderness.  No rebound, guarding, or rigidity. Good bowel sounds***.  Genitourinary: No*** CVA tenderness  Musculoskeletal: Moves all extremities. No obvious deformities. No edema. No calf tenderness***.  Skin: Warm and dry.  Neurological:  Alert, awake, and appropriate.  Normal speech.  No acute focal neurological deficits are appreciated.  Psychiatric: Normal affect. Good eye contact. Appropriate in  "content.    ED Course    Procedures  ED Vital Signs:  Vitals:    03/01/23 1309   Temp: 98.1 °F (36.7 °C)   TempSrc: Oral   Height: 5' 5" (1.651 m)       Abnormal Lab Results:  Labs Reviewed   HIV 1 / 2 ANTIBODY   HEPATITIS C ANTIBODY   HEP C VIRUS HOLD SPECIMEN        All Lab Results:  ***    Imaging Results:  Imaging Results    None                 The Emergency Provider reviewed the vital signs and test results, which are outlined above.    ED Discussion     ***         ED Medication(s):  Medications - No data to display          Medical Decision Making              Scribe Attestation:   Scribe #1: I performed the above scribed service and the documentation accurately describes the services I performed. I attest to the accuracy of the note.    Attending:   Physician Attestation Statement for Scribe #1: I, Claritza Meyer DO, personally performed the services described in this documentation, as scribed by Teddy Ochoa, in my presence, and it is both accurate and complete.          Clinical Impression       ICD-10-CM ICD-9-CM   1. Weakness  R53.1 780.79            "

## 2023-03-01 NOTE — ASSESSMENT & PLAN NOTE
History of DVT of lower extremity.  Treated previously with anticoagulation as per records discharged on apixaban September 2022.  Not aware why not on anticoagulation currently.  History of recent admission for dark stool

## 2023-03-01 NOTE — PROGRESS NOTES
Gene CHIRNIOS Stephan  03/01/2023  [id number]      The patient location is: Home   The chief complaint leading to consultation is: Multiple complaints   Visit type: Virtual visit with synchronous audio and video  Total time spent with patient: 30 mins  Each patient to whom he or she provides medical services by telemedicine is:  (1) informed of the relationship between the physician and patient and the respective role of any other health care provider with respect to management of the patient; and (2) notified that he or she may decline to receive medical services by telemedicine and may withdraw from such care at any time.        Chief Complaint:      History of Present Illness:    Pts wife states she is unclear what is going on with her  and wants to know:   Why is he on oxygen  Can't get out of bed and needs a trapeze\ to help him move  Why is he talking out of his head  Is he still septic   What's wrong with him   Did he have a stroke  Why is he on hospice   Does he have a UTI    Pt is alert but wife is his historian     Review of Systems   Constitutional:  Positive for malaise/fatigue.   HENT:  Positive for hearing loss.    Eyes:  Negative for discharge.   Respiratory:  Positive for shortness of breath. Negative for wheezing.    Cardiovascular:  Negative for chest pain and palpitations.   Gastrointestinal:  Negative for blood in stool, constipation, diarrhea and vomiting.   Genitourinary:  Negative for hematuria and urgency.   Musculoskeletal:  Negative for neck pain.   Neurological:  Positive for weakness. Negative for headaches.   Endo/Heme/Allergies:  Negative for polydipsia.       History:  Past Medical History:   Diagnosis Date    Acute hypoxemic respiratory failure 9/17/2022    Arthritis     CHF (congestive heart failure)     Chronic kidney disease     Coronary artery disease     Depression     Hypertension     Hypertensive heart disease with heart failure 9/16/2022    Hypothyroidism     Lymphedema of lower  extremity     Nephrolithiasis     Obesity     Peripheral vascular disease     Pneumonia 2022    Recurrent cellulitis of lower leg     Sleep apnea     can't stand the CPAP , sleeps elevated in hospital bed or chair    Tobacco dependence     resolved     Past Surgical History:   Procedure Laterality Date    ADENOIDECTOMY      BACK SURGERY  ;  1976    x2 for disc; scar tissue removed    debridement of bilateral leg ulcers Bilateral 2017    Dr Hernandez    INNER EAR SURGERY Bilateral     separate - pinnaplasty , new eardrms constructed    KIDNEY STONE SURGERY Left  approx    open    TONSILLECTOMY         Family History   Problem Relation Age of Onset    Cancer Mother         ? abd also    Diabetes Mother     Hypertension Mother     Cancer Father         ? abdonimal origin stomach/liver or pancreas    Heart disease Father         pacer    Cancer Brother         skin-part of ext ear removed    Heart disease Brother         CABG3    Alzheimer's disease Brother     Asthma Daughter          26 w/ asthma    Alcohol abuse Maternal Grandfather     Stroke Neg Hx     COPD Neg Hx     Mental illness Neg Hx     Mental retardation Neg Hx     Kidney disease Neg Hx      Social History     Socioeconomic History    Marital status:    Tobacco Use    Smoking status: Former     Packs/day: 1.50     Years: 1.00     Pack years: 1.50     Types: Cigarettes     Quit date:      Years since quittin.1    Smokeless tobacco: Never   Substance and Sexual Activity    Alcohol use: Not Currently    Drug use: Never    Sexual activity: Never   Social History Narrative    ** Merged History Encounter **          Lives with wife and 2 sons and a daughter. No longer drives. Quit in  because couldn't hold foot on pedal. 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 Uses a Rolator at wo    rk and wheelchair at home. No caffeine. Does not  have a Living Will or Advanced Directive.       Social Determinants of Health     Financial Resource Strain: High Risk    Difficulty of Paying Living Expenses: Hard   Food Insecurity: Food Insecurity Present    Worried About Running Out of Food in the Last Year: Often true    Ran Out of Food in the Last Year: Often true   Transportation Needs: Unmet Transportation Needs    Lack of Transportation (Medical): Yes    Lack of Transportation (Non-Medical): Yes   Physical Activity: Insufficiently Active    Days of Exercise per Week: 7 days    Minutes of Exercise per Session: 10 min   Stress: Stress Concern Present    Feeling of Stress : To some extent   Social Connections: Socially Isolated    Frequency of Communication with Friends and Family: Never    Frequency of Social Gatherings with Friends and Family: Once a week    Attends Yazdanism Services: Never    Active Member of Clubs or Organizations: No    Attends Club or Organization Meetings: Never    Marital Status:    Housing Stability: Low Risk     Unable to Pay for Housing in the Last Year: No    Number of Places Lived in the Last Year: 1    Unstable Housing in the Last Year: No     Patient Active Problem List   Diagnosis    Osteoarthritis    Hypothyroidism (acquired)    Morbid obesity with BMI of 60.0-69.9, adult    Acquired lymphedema of leg -severe    Nephrolithiasis    Nocturnal hypoxia    Bilateral hearing loss    CKD (chronic kidney disease) stage 3, GFR 30-59 ml/min    Sleep apnea    Ventral hernia without obstruction or gangrene    Fungal infection of toenails    Chronic acquired lymphedema    Venous stasis dermatitis of both lower extremities    Peripheral vascular disease, unspecified    Candidal dermatitis    Venous stasis ulcer of left calf    Hand pain, right    Depression    Pulmonary edema    Elevated d-dimer    Bilateral lower leg cellulitis    Functional quadriplegia    Hypothyroidism    Hyperlipidemia    GERD (gastroesophageal reflux disease)     CKD (chronic kidney disease) stage 3, GFR 30-59 ml/min    Severe obesity (BMI >= 40)    Acute pulmonary embolism    Medication management    Physical debility    Hypertensive heart disease with heart failure    Elevated brain natriuretic peptide (BNP) level    Venous thromboembolism    Chronic respiratory failure    Hx of sepsis    Decubitus ulcer of right buttock, stage 1    Thrombocytopenia     Review of patient's allergies indicates:   Allergen Reactions    Bactrim [sulfamethoxazole-trimethoprim] Itching       The following were reviewed at this visit: active problem list, medication list, allergies, family history, social history, and health maintenance.    Medications:  Current Outpatient Medications on File Prior to Visit   Medication Sig Dispense Refill    acetaminophen (TYLENOL) 500 MG tablet Take 500 mg by mouth every 6 (six) hours as needed for Pain.      aspirin 81 MG Chew Take 1 tablet (81 mg total) by mouth once daily. 30 tablet 0    atorvastatin (LIPITOR) 40 MG tablet Take 1 tablet (40 mg total) by mouth once daily. 30 tablet 0    furosemide (LASIX) 40 MG tablet Take 1 tablet (40 mg total) by mouth once daily. (Patient not taking: Reported on 2/20/2023) 30 tablet 2    levothyroxine (SYNTHROID) 50 MCG tablet Take 1 tablet (50 mcg total) by mouth before breakfast. 30 tablet 11    miconazole nitrate 2% (MICOTIN) 2 % Oint Apply topically 2 (two) times daily. Intertrigo to lower abdomen, groin, periarea: please apply Antifungal bid 71 g 1    polyethylene glycol (GLYCOLAX) 17 gram PwPk Take 17 g by mouth 2 (two) times daily. (Patient not taking: Reported on 2/20/2023) 72 packet 3    senna-docusate 8.6-50 mg (PERICOLACE) 8.6-50 mg per tablet Take 1 tablet by mouth once daily.      sodium phosphates (ENEMA) 19-7 gram/118 mL Enem Place 1 enema rectally once.       No current facility-administered medications on file prior to visit.       Exam:  Wt Readings from Last 3 Encounters:   02/20/23 (!) 154.7 kg (341  lb)   02/04/23 (!) 186 kg (410 lb)   12/11/22 (!) 192.5 kg (424 lb 6.4 oz)     Temp Readings from Last 3 Encounters:   02/20/23 98.4 °F (36.9 °C) (Temporal)   02/13/23 98.2 °F (36.8 °C)   02/04/23 97.9 °F (36.6 °C) (Oral)     BP Readings from Last 3 Encounters:   02/20/23 131/62   02/04/23 (!) 134/54   01/10/23 102/60     Pulse Readings from Last 3 Encounters:   02/20/23 (!) 58   02/13/23 (!) 48   02/04/23 61     There is no height or weight on file to calculate BMI.      @ROS@    Physical Exam  Vitals and nursing note reviewed.   Constitutional:       Appearance: Normal appearance. He is obese.   Pulmonary:      Effort: Pulmonary effort is normal.   Neurological:      General: No focal deficit present.      Mental Status: He is alert. He is disoriented.   Psychiatric:         Mood and Affect: Mood normal.         Behavior: Behavior normal.         Thought Content: Thought content normal.         Judgment: Judgment normal.       Laboratory Reviewed ({Yes)  Lab Results   Component Value Date    WBC 6.32 02/03/2023    HGB 11.4 (L) 02/03/2023    HCT 38.3 (L) 02/03/2023     02/03/2023    CHOL 72 (L) 01/03/2022    TRIG 85 01/03/2022    HDL 28 (L) 01/03/2022    ALT 10 02/03/2023    AST 15 02/03/2023     02/03/2023    K 4.4 02/03/2023    CL 92 (L) 02/03/2023    CREATININE 1.0 02/03/2023    BUN 20 02/03/2023    CO2 38 (H) 02/03/2023    TSH 3.002 01/11/2023    PSA 0.65 05/19/2021    INR 1.0 02/03/2023    HGBA1C 5.6 01/03/2022     Diagnoses and all orders for this visit:    Thrombocytopenia    Morbid obesity with BMI of 60.0-69.9, adult    Physical debility    Functional quadriplegia    Hx of sepsis    Stage 3 chronic kidney disease, unspecified whether stage 3a or 3b CKD    Hypertensive heart disease with heart failure    Hyperlipidemia, unspecified hyperlipidemia type    Peripheral vascular disease, unspecified    Chronic respiratory failure, unspecified whether with hypoxia or hypercapnia        Pt was advised  to go to the ER for further evaluation

## 2023-03-01 NOTE — Clinical Note
Diagnosis: Weakness [[id number]]   Admitting Provider:: KOLTON GRANT [[id number]]   Future Attending Provider: KOLTON GRANT [[id number]]   Reason for IP Medical Treatment  (Clinical interventions that can only be accomplished in the IP setting? ) :: on Bipap   I certify that Inpatient services for greater than or equal to 2 midnights are medically necessary:: Yes   Plans for Post-Acute care--if anticipated (pick the single best option):: A. No post acute care anticipated at this time

## 2023-03-01 NOTE — HPI
Gene Rothman is a 77-year-old male patient with past medical history of chronic diastolic heart failure, chronic kidney disease, coronary artery disease, hypertension, hypothyroidism, peripheral vascular disease, and obesity hypoventilation on oxygen at home, prior intermediate V/Q scan, LLE DVT, and recurrent UTI. Patient has had multiple hospital admissions in the last six months for sepsis due to UTI. Patient was brought to the emergency room for evaluation of altered mental status. He was again noted to have a UTI and ABGs revealed the patient to have hypercapnic respiratory failure with a pH 7.228, pCO2 123.5, paO2 123, and HCO3 51.5. Patient was admitted to the ICU and initiated on BiPAP.     Interval history:  3/3: Unable to wear BiPAP overnight. Patient reports he is a mouth breather and did not tolerate wearing the BiPAP mask last night. Tmax: 99.4F. Mentation appears stable with no evidence of encephalopathy noted.  3/4: Patient reports wearing BiPAP for several hours last night; however, reports he does not want PAP therapy for home. No acute issues overnight. 24 hour temp max: 98.8F

## 2023-03-01 NOTE — ED PROVIDER NOTES
SCRIBE #1 NOTE: I, Teddy Ochoa, am scribing for, and in the presence of, Claritza Meyer DO. I have scribed the entire note.      History      Chief Complaint   Patient presents with    Weakness     Pt c/o weakness x 4 weeks. EMS reports intermitted AMS. Pt c/o of right knee, chronic 4/10 pain       Review of patient's allergies indicates:   Allergen Reactions    Bactrim [sulfamethoxazole-trimethoprim] Itching        HPI   HPI    3/1/2023, 2:24 PM   History obtained from the spouse and patient      History of Present Illness: Gene Rothman is a 77 y.o. male patient who presents to the Emergency Department for AMS which onset of 1 month PTA. Pt is on 4.5L O2 at home, but spouse states that the pt keeps removing his O2 during the night. Pt often does not know where he is. Symptoms are constant and moderate in severity. No mitigating or exacerbating factors reported. Associated sxs include generalized weakness and confusion. Pt also reports chronic R knee pain. Patient's spouse denies any fever, chills, n/v/d/, headache, chest pain, SOB, and all other sxs at this time. Pt was admitted for sepsis at [facility] December 2022. No prior TX reported. No further complaints or concerns at this time.       Reviewed virtual visit in chart that was performed today:  History of Present Illness:     Pts wife states she is unclear what is going on with her  and wants to know:   Why is he on oxygen  Can't get out of bed and needs a trapeze\ to help him move  Why is he talking out of his head  Is he still septic   What's wrong with him   Did he have a stroke  Why is he on hospice   Does he have a UTI    Arrival mode:  EMS     PCP: Ami Zarate DO       Past Medical History:  Past Medical History:   Diagnosis Date    Acute hypoxemic respiratory failure 9/17/2022    Arthritis     Atrial fibrillation 3/1/2023    CHF (congestive heart failure)     Chronic kidney disease     Coronary artery disease     Depression     Hypertension      Hypertensive heart disease with heart failure 2022    Hypothyroidism     Lymphedema of lower extremity     Nephrolithiasis     Obesity     Peripheral vascular disease     Pneumonia 2022    Recurrent cellulitis of lower leg     Sleep apnea     can't stand the CPAP , sleeps elevated in hospital bed or chair    Tobacco dependence     resolved       Past Surgical History:  Past Surgical History:   Procedure Laterality Date    ADENOIDECTOMY      BACK SURGERY  ;  1976    x2 for disc; scar tissue removed    debridement of bilateral leg ulcers Bilateral 2017    Dr Hernandez    INNER EAR SURGERY Bilateral     separate - pinnaplasty , new eardrms constructed    KIDNEY STONE SURGERY Left  approx    open    TONSILLECTOMY           Family History:  Family History   Problem Relation Age of Onset    Cancer Mother         ? abd also    Diabetes Mother     Hypertension Mother     Cancer Father         ? abdonimal origin stomach/liver or pancreas    Heart disease Father         pacer    Cancer Brother         skin-part of ext ear removed    Heart disease Brother         CABG3    Alzheimer's disease Brother     Asthma Daughter          26 w/ asthma    Alcohol abuse Maternal Grandfather     Stroke Neg Hx     COPD Neg Hx     Mental illness Neg Hx     Mental retardation Neg Hx     Kidney disease Neg Hx        Social History:  Social History     Tobacco Use    Smoking status: Former     Packs/day: 1.50     Years: 1.00     Pack years: 1.50     Types: Cigarettes     Quit date:      Years since quittin.1    Smokeless tobacco: Never   Substance and Sexual Activity    Alcohol use: Not Currently    Drug use: Never    Sexual activity: Never       ROS   Review of Systems   Constitutional:  Negative for chills and fever.   HENT:  Negative for sore throat.    Respiratory:  Negative for shortness of breath.    Cardiovascular:  Negative for chest pain.   Gastrointestinal:  Negative for diarrhea, nausea and  vomiting.   Genitourinary:  Negative for dysuria.   Musculoskeletal:  Positive for arthralgias (R knee, chronic). Negative for back pain.   Skin:  Negative for rash.   Neurological:  Positive for weakness (generalized). Negative for headaches.   Hematological:  Does not bruise/bleed easily.   Psychiatric/Behavioral:  Positive for confusion.    All other systems reviewed and are negative.    Physical Exam      Initial Vitals   BP Pulse Resp Temp SpO2   03/01/23 1531 03/01/23 1531 03/01/23 1531 03/01/23 1309 03/01/23 1531   (!) 158/68 (!) 59 (!) 22 98.1 °F (36.7 °C) 100 %      MAP       --                 Physical Exam  Nursing Notes and Vital Signs Reviewed.  Constitutional: Patient is in no acute distress. Obese. Hard of hearing.  Head: Atraumatic. Normocephalic.  Eyes: PERRL. EOM intact. Conjunctivae are not pale. No scleral icterus.  ENT: Mucous membranes are moist. Oropharynx is clear and symmetric.    Neck: Supple. Full ROM. No lymphadenopathy.  Cardiovascular: Regular rate. Regular rhythm. No murmurs, rubs, or gallops. Distal pulses are 2+ and symmetric.  Pulmonary/Chest: No respiratory distress. Clear to auscultation bilaterally. No wheezing or rales.  Abdominal: Soft and non-distended.  There is no tenderness.  No rebound, guarding, or rigidity. Good bowel sounds.  Genitourinary: No CVA tenderness  Musculoskeletal: Moves all extremities. No obvious deformities. Brawny BLE edema.  Skin: Warm and dry.  Neurological:  Awake, able to follow simple commands and answer questions. Normal speech.  No acute focal neurological deficits are appreciated.    ED Course    Procedures  ED Vital Signs:  Vitals:    03/01/23 1531 03/01/23 1608 03/01/23 1732 03/01/23 1800   BP: (!) 158/68  134/62 130/63   Pulse: (!) 59   (!) 53   Resp: (!) 22 (!) 22  (!) 30   Temp:       TempSrc:       SpO2: 100%   95%   Weight:       Height:        03/01/23 1910 03/01/23 1917 03/01/23 1919 03/01/23 1930   BP: 135/69   (!) 143/76   Pulse: (!) 53  (!) 58 61 67   Resp: (!) 31  (!) 27 (!) 35   Temp:       TempSrc:       SpO2: 96%  95% 98%   Weight:       Height:        03/01/23 2005 03/01/23 2030 03/01/23 2100 03/01/23 2120   BP:  (!) 157/95 (!) 164/62    Pulse:  (!) 59 (!) 59 96   Resp:  20 20 (!) 26   Temp:       TempSrc:       SpO2:  96% 100% 95%   Weight: (!) 162.5 kg (358 lb 4.8 oz)      Height:        03/01/23 2130 03/01/23 2245 03/01/23 2300   BP: (!) 167/104 137/86 123/66   Pulse: 64 62 60   Resp: 20 20 (!) 22   Temp:      TempSrc:      SpO2: 100% 100% 100%   Weight:      Height:          Abnormal Lab Results:  Labs Reviewed   CBC W/ AUTO DIFFERENTIAL - Abnormal; Notable for the following components:       Result Value    RBC 3.22 (*)     Hemoglobin 10.3 (*)     Hematocrit 35.3 (*)      (*)     MCH 32.0 (*)     MCHC 29.2 (*)     Mono % 17.7 (*)     All other components within normal limits   COMPREHENSIVE METABOLIC PANEL - Abnormal; Notable for the following components:    Chloride 89 (*)     CO2 43 (*)     Albumin 2.4 (*)     All other components within normal limits    Narrative:     CO2 critical result(s) called and verbal readback obtained from   AMANDA TYSON RN by [email or web address] 03/01/2023 15:49   URINALYSIS, REFLEX TO URINE CULTURE - Abnormal; Notable for the following components:    Appearance, UA Cloudy (*)     Protein, UA 1+ (*)     Ketones, UA Trace (*)     Occult Blood UA 3+ (*)     Nitrite, UA Positive (*)     Leukocytes, UA 3+ (*)     All other components within normal limits    Narrative:     Specimen Source->Urine   B-TYPE NATRIURETIC PEPTIDE - Abnormal; Notable for the following components:     (*)     All other components within normal limits   URINALYSIS MICROSCOPIC - Abnormal; Notable for the following components:    RBC, UA >100 (*)     WBC, UA >100 (*)     WBC Clumps, UA Many (*)     Yeast, UA Many (*)     All other components within normal limits    Narrative:     Specimen Source->Urine   ISTAT PROCEDURE - Abnormal;  Notable for the following components:    POC PH 7.228 (*)     POC PCO2 123.5 (*)     POC PO2 123 (*)     POC HCO3 51.5 (*)     All other components within normal limits   ISTAT PROCEDURE - Abnormal; Notable for the following components:    POC PCO2 73.1 (*)     POC PO2 74 (*)     POC HCO3 49.1 (*)     POC SATURATED O2 94 (*)     All other components within normal limits   CULTURE, BLOOD   CULTURE, BLOOD   CULTURE, URINE   HIV 1 / 2 ANTIBODY    Narrative:     Release to patient->Immediate   HEPATITIS C ANTIBODY    Narrative:     Release to patient->Immediate   HEP C VIRUS HOLD SPECIMEN    Narrative:     Release to patient->Immediate   LACTIC ACID, PLASMA   TROPONIN I   PROCALCITONIN   LACTIC ACID, PLASMA        All Lab Results:  Results for orders placed or performed during the hospital encounter of 03/01/23   HIV 1/2 Ag/Ab (4th Gen)   Result Value Ref Range    HIV 1/2 Ag/Ab Negative Negative   Hepatitis C Antibody   Result Value Ref Range    Hepatitis C Ab Negative Negative   HCV Virus Hold Specimen   Result Value Ref Range    HEP C Virus Hold Specimen Hold for HCV sendout    CBC auto differential   Result Value Ref Range    WBC 5.49 3.90 - 12.70 K/uL    RBC 3.22 (L) 4.60 - 6.20 M/uL    Hemoglobin 10.3 (L) 14.0 - 18.0 g/dL    Hematocrit 35.3 (L) 40.0 - 54.0 %     (H) 82 - 98 fL    MCH 32.0 (H) 27.0 - 31.0 pg    MCHC 29.2 (L) 32.0 - 36.0 g/dL    RDW 14.2 11.5 - 14.5 %    Platelets 181 150 - 450 K/uL    MPV 9.8 9.2 - 12.9 fL    Immature Granulocytes 0.2 0.0 - 0.5 %    Gran # (ANC) 3.2 1.8 - 7.7 K/uL    Immature Grans (Abs) 0.01 0.00 - 0.04 K/uL    Lymph # 1.2 1.0 - 4.8 K/uL    Mono # 1.0 0.3 - 1.0 K/uL    Eos # 0.1 0.0 - 0.5 K/uL    Baso # 0.02 0.00 - 0.20 K/uL    nRBC 0 0 /100 WBC    Gran % 57.5 38.0 - 73.0 %    Lymph % 22.4 18.0 - 48.0 %    Mono % 17.7 (H) 4.0 - 15.0 %    Eosinophil % 1.8 0.0 - 8.0 %    Basophil % 0.4 0.0 - 1.9 %    Differential Method Automated    Comprehensive metabolic panel   Result Value  Ref Range    Sodium 141 136 - 145 mmol/L    Potassium 4.4 3.5 - 5.1 mmol/L    Chloride 89 (L) 95 - 110 mmol/L    CO2 43 (HH) 23 - 29 mmol/L    Glucose 95 70 - 110 mg/dL    BUN 22 8 - 23 mg/dL    Creatinine 1.0 0.5 - 1.4 mg/dL    Calcium 9.3 8.7 - 10.5 mg/dL    Total Protein 8.1 6.0 - 8.4 g/dL    Albumin 2.4 (L) 3.5 - 5.2 g/dL    Total Bilirubin 0.6 0.1 - 1.0 mg/dL    Alkaline Phosphatase 60 55 - 135 U/L    AST 20 10 - 40 U/L    ALT 12 10 - 44 U/L    Anion Gap 9 8 - 16 mmol/L    eGFR >60 >60 mL/min/1.73 m^2   Lactic acid, plasma #1   Result Value Ref Range    Lactate (Lactic Acid) 0.6 0.5 - 2.2 mmol/L   Urinalysis, Reflex to Urine Culture Urine, Clean Catch    Specimen: Urine   Result Value Ref Range    Specimen UA Urine, Clean Catch     Color, UA Yellow Yellow, Straw, Cindy    Appearance, UA Cloudy (A) Clear    pH, UA 8.0 5.0 - 8.0    Specific Gravity, UA 1.010 1.005 - 1.030    Protein, UA 1+ (A) Negative    Glucose, UA Negative Negative    Ketones, UA Trace (A) Negative    Bilirubin (UA) Negative Negative    Occult Blood UA 3+ (A) Negative    Nitrite, UA Positive (A) Negative    Urobilinogen, UA Negative <2.0 EU/dL    Leukocytes, UA 3+ (A) Negative   Troponin I   Result Value Ref Range    Troponin I 0.020 0.000 - 0.026 ng/mL   Procalcitonin   Result Value Ref Range    Procalcitonin 0.15 <0.25 ng/mL   Brain natriuretic peptide   Result Value Ref Range     (H) 0 - 99 pg/mL   Lactic acid, plasma #2   Result Value Ref Range    Lactate (Lactic Acid) 1.2 0.5 - 2.2 mmol/L   Urinalysis Microscopic   Result Value Ref Range    RBC, UA >100 (H) 0 - 4 /hpf    WBC, UA >100 (H) 0 - 5 /hpf    WBC Clumps, UA Many (A) None-Rare    Bacteria Rare None-Occ /hpf    Yeast, UA Many (A) None    Hyaline Casts, UA 0 0-1/lpf /lpf    Microscopic Comment SEE COMMENT    ISTAT PROCEDURE   Result Value Ref Range    POC PH 7.228 (LL) 7.35 - 7.45    POC PCO2 123.5 (HH) 35 - 45 mmHg    POC PO2 123 (H) 80 - 100 mmHg    POC HCO3 51.5 (H) 24 -  28 mmol/L    POC BE 24 -2 to 2 mmol/L    POC SATURATED O2 97 95 - 100 %    Sample ARTERIAL     Site RR     Allens Test Pass     DelSys Nasal Can     Mode SPONT     Flow 2    ISTAT PROCEDURE   Result Value Ref Range    POC PH 7.435 7.35 - 7.45    POC PCO2 73.1 (HH) 35 - 45 mmHg    POC PO2 74 (L) 80 - 100 mmHg    POC HCO3 49.1 (H) 24 - 28 mmol/L    POC BE 25 -2 to 2 mmol/L    POC SATURATED O2 94 (L) 95 - 100 %    Rate 26     Sample ARTERIAL     Site LR     Allens Test Pass     DelSys CPAP/BiPAP     Mode BiPAP     FiO2 40     IP 16     EP 8      Imaging Results:  Imaging Results              CT Head Without Contrast (Final result)  Result time 03/01/23 20:13:54      Final result by Brannon Orellana MD (03/01/23 20:13:54)                   Impression:      No acute abnormality.    Atrophy and chronic white matter changes    All CT scans   are performed using dose optimization techniques including the following: automated exposure control; adjustment of the mA and/or kV; use of iterative reconstruction technique.  Dose modulation was employed for ALARA by means of: Automated exposure control; adjustment of the mA and/or kV according to patient size (this includes techniques or standardized protocols for targeted exams where dose is matched to indication/reason for exam; i.e. extremities or head); and/or use of iterative reconstructive technique.      Electronically signed by: Brannon Orellana  Date:    03/01/2023  Time:    20:13               Narrative:    EXAMINATION:  CT HEAD WITHOUT CONTRAST    CLINICAL HISTORY:  altered mental status; Altered mental status, unspecified    TECHNIQUE:  Low dose axial CT images obtained throughout the head without intravenous contrast. Sagittal and coronal reconstructions were performed.    COMPARISON:  None.    FINDINGS:  Atrophy and chronic white matter changes.    No parenchymal mass, hemorrhage, edema or major vascular distribution infarct.    Skull/extracranial contents (limited  evaluation): No fracture. Mastoid air cells and paranasal sinuses are essentially clear.                                       X-Ray Chest AP Portable (In process)                      X-Ray Knee 3 View Right (In process)                          The EKG was ordered, reviewed, and independently interpreted by the ED provider.  Interpretation time: 13:22  Rate: 61 BPM  Rhythm: atrial fibrillation  Interpretation: Nonspecific ST abnormality. No STEMI.           The Emergency Provider reviewed the vital signs and test results, which are outlined above.    ED Discussion     4:55 PM: Discussed case with Dr. Modi (Critical Care Medicine). Dr. Modi agrees with current care and management of pt and accepts admission.   Admitting Service: Critical Care Medicine  Admitting Physician: Dr. Modi  Admit to: ICU    4:56 PM: Re-evaluated pt. Pt is awake, alert, and oriented, repeat neuro exam is benign. I have discussed test results, shared treatment plan, and the need for admission with patient and family at bedside. Pt and family express understanding at this time and agree with all information. All questions answered. Pt and family have no further questions or concerns at this time. Pt is ready for admit.    ED Course as of 03/01/23 2330   Wed Mar 01, 2023   1615 Sample: ARTERIAL [LB]   1647 Component 3 wk ago  Urine Culture, Routine  Abnormal   PSEUDOMONAS AERUGINOSA   >100,000 cfu/ml    Resulting Agency [facility]      Susceptibility       Pseudomonas aeruginosa    CULTURE, URINE    Amikacin <=16 mcg/mL Sensitive    Cefepime 8 mcg/mL Sensitive    Ciprofloxacin <=1 mcg/mL Sensitive    Gentamicin <=4 mcg/mL Sensitive    Levofloxacin <=2 mcg/mL Sensitive    Meropenem <=1 mcg/mL Sensitive    Piperacillin/Tazo 64 mcg/mL Sensitive    Tobramycin <=4 mcg/mL Sensitive               Linear View    Narrative  Performed by: [facility]    Specimen Source->Urine    Specimen Collected: 02/03/23 20:54 Last Resulted: 02/06/23 10:03          [LB]   1718 ED interpretation of chest x-ray:  There is cardiomegaly present.  Low lung volume. [LB]   1719 ED interpretation of right knee:  There is arthritis present no fracture. [LB]   1736 Dr. Modi at bedside evaluating patient [LB]   2327 RBC, UA(!): >100 [LB]   2327 WBC, UA(!): >100 [LB]   2327 WBC Clumps, UA(!): Many [LB]   2327 NITRITE UA(!): Positive [LB]      ED Course User Index  [LB] Claritza Meyer, DO       ED Medication(s):  Medications   aspirin chewable tablet 81 mg (has no administration in time range)   atorvastatin tablet 40 mg (has no administration in time range)   levothyroxine tablet 50 mcg (has no administration in time range)   miconazole nitrate 2% ointment ( Topical (Top) Given 3/1/23 2156)   polyethylene glycol packet 17 g (17 g Oral Not Given 3/1/23 2100)   senna-docusate 8.6-50 mg per tablet 1 tablet (has no administration in time range)   enoxaparin injection 60 mg (60 mg Subcutaneous Given 3/1/23 2127)   dexmedetomidine (PRECEDEX) 400mcg/100mL dextrose 5% infusion (0.4 mcg/kg/hr × 162.5 kg Intravenous Rate/Dose Change 3/1/23 2314)   mupirocin 2 % ointment ( Nasal Given 3/1/23 2156)   cefepime in dextrose 5 % 1 gram/50 mL IVPB 1 g (has no administration in time range)   LORazepam injection 2 mg (2 mg Intravenous Given 3/1/23 2029)         New Prescriptions    No medications on file         Medical Decision Making    Medical Decision Making:   Clinical Tests:   Lab Tests: Ordered and Reviewed  Radiological Study: Ordered and Reviewed  Medical Tests: Ordered and Reviewed          Medical Decision Making     Discussed with Independent Historian/Old Records: [] No.   [x] Yes -  see prior documentation.    Comorbid Medical Conditions Considered:  O2 dependent      Differential Diagnoses: Based on the available information and the initial assessment, the working DIFFERENTIAL DIAGNOSIS considered during this evaluation included, but not limited to:   Urinary tract infection,  pneumonia, hypercapnia                            Xrays:  INDEPENDENT ORDERED and REVIEWED: [x] Yes       [] No     [] N/A                                INDEPENDENTLY INTERPRETED:  Yes, see prior documentation     Imaging (CT, Ultrasound, MRI etc):   INDEPENDENT ORDERED and REVIEWED: [x] Yes      [] No       [] N/A    EKG:    INDEPENDENT ORDERED, REVIEWED and INDEPENDENTLY INTERPRETED:  [x] Yes, see prior documentation      [] No       [] N/A    Notable Abnormal Lab:  Notable abnormal findings from INDEPENDENT REVIEW of ORDERED LABS include:  urinalysis, ABG,    Social Determinates: Factored in the Treatment and Disposition of patient included:      ED Course:   History came mostly from patient's spouse and old chart.  Patient O2 dependent.  Worsening confusion.  ABG showed respiratory acidosis.  Patient placed on BiPAP.  Case discussed with critical Care Medicine, Dr. Modi.  Patient admitted to ICU.    Discussed case with:Critical Care      Scribe Attestation:   Scribe #1: I performed the above scribed service and the documentation accurately describes the services I performed. I attest to the accuracy of the note.    Attending:   Physician Attestation Statement for Scribe #1: I, Claritza Meyer DO, personally performed the services described in this documentation, as scribed by Teddy Ochoa, in my presence, and it is both accurate and complete.          Clinical Impression       ICD-10-CM ICD-9-CM   1. Acute respiratory failure with hypercapnia  J96.02 518.81   2. Weakness  R53.1 780.79   3. Altered mental status  R41.82 780.97   4. Right knee pain  M25.561 719.46   5. Respiratory distress  R06.03 786.09   6. Acute cystitis with hematuria  N30.01 595.0       Disposition:   Disposition: Admitted  Condition: Serious       Claritza Meyer DO  03/01/23 2331

## 2023-03-01 NOTE — H&P
[facility] - Emergency Dept.  Critical Care Medicine  History & Physical    Patient Name: Gene Rothman  MRN: [id number]  Admission Date: 3/1/2023  Hospital Length of Stay: 0 days  Code Status: Prior  Attending Physician: Helen Modi MD   Primary Care Provider: Ami Zarate DO   Principal Problem: Respiratory distress    Subjective:     HPI:  77-year-old male patient with past medical history of CHF, CKD, CAD, HTN, hypothyroidism, peripheral vascular disease, and obesity hypoventilation on oxygen at home, received oxygen via face mask in the past, at 4.5 L, admitted in September with intermediate V/Q scan and left lower extremity DVT , has had multiple hospital admissions over the last six-months for sepsis and UTI brought to the emergency room today for altered mental status.      Hospital/ICU Course:  Chest x-ray and ABG reviewed.  Started on BiPAP.  EKG reviewed.        Past Medical History:   Diagnosis Date    Acute hypoxemic respiratory failure 9/17/2022    Arthritis     CHF (congestive heart failure)     Chronic kidney disease     Coronary artery disease     Depression     Hypertension     Hypertensive heart disease with heart failure 9/16/2022    Hypothyroidism     Lymphedema of lower extremity     Nephrolithiasis     Obesity     Peripheral vascular disease     Pneumonia 9/17/2022    Recurrent cellulitis of lower leg     Sleep apnea     can't stand the CPAP , sleeps elevated in hospital bed or chair    Tobacco dependence     resolved       Past Surgical History:   Procedure Laterality Date    ADENOIDECTOMY  1961    BACK SURGERY  1975;  1976    x2 for disc; scar tissue removed    debridement of bilateral leg ulcers Bilateral 01/01/2017    Dr Hernandez    INNER EAR SURGERY Bilateral 1961    separate - pinnaplasty , new eardrms constructed    KIDNEY STONE SURGERY Left 1976 approx    open    TONSILLECTOMY  1961       Review of patient's allergies indicates:   Allergen Reactions    Bactrim  [sulfamethoxazole-trimethoprim] Itching       Family History       Problem Relation (Age of Onset)    Alcohol abuse Maternal Grandfather    Alzheimer's disease Brother    Asthma Daughter    Cancer Mother, Father, Brother    Diabetes Mother    Heart disease Father, Brother    Hypertension Mother          Tobacco Use    Smoking status: Former     Packs/day: 1.50     Years: 1.00     Pack years: 1.50     Types: Cigarettes     Quit date:      Years since quittin.1    Smokeless tobacco: Never   Substance and Sexual Activity    Alcohol use: Not Currently    Drug use: Never    Sexual activity: Never         Review of Systems   Unable to perform ROS: Acuity of condition   Objective:     Vital Signs (Most Recent):  Temp: 98.1 °F (36.7 °C) (23 1309)  Resp: (!) 22 (23 1608) Vital Signs (24h Range):  Temp:  [98.1 °F (36.7 °C)] 98.1 °F (36.7 °C)  Resp:  [22] 22        Body mass index is 56.75 kg/m².    No intake or output data in the 24 hours ending 23 1709    Physical Exam  Vitals and nursing note reviewed.   Constitutional:       General: He is in acute distress.      Appearance: He is well-developed. He is obese. He is ill-appearing and toxic-appearing.   HENT:      Head: Normocephalic and atraumatic.      Nose: Nose normal.   Eyes:      Extraocular Movements: Extraocular movements intact.   Cardiovascular:      Rate and Rhythm: Normal rate. Rhythm irregular.   Pulmonary:      Effort: Respiratory distress present.      Breath sounds: No stridor.   Abdominal:      General: There is no distension.   Musculoskeletal:         General: Swelling present. No signs of injury.      Cervical back: Normal range of motion and neck supple.   Skin:     General: Skin is warm and dry.   Neurological:      General: No focal deficit present.      Mental Status: He is alert.       Vents:  Oxygen Concentration (%): 40 (23 1608)    Lines/Drains/Airways       None                   Significant Labs:    CBC/Anemia  Profile:  Recent Labs   Lab 03/01/23  1500   WBC 5.49   HGB 10.3*   HCT 35.3*      *   RDW 14.2        Chemistries:  Recent Labs   Lab 03/01/23  1500      K 4.4   CL 89*   CO2 43*   BUN 22   CREATININE 1.0   CALCIUM 9.3   ALBUMIN 2.4*   PROT 8.1   BILITOT 0.6   ALKPHOS 60   ALT 12   AST 20      Latest Reference Range & Units Most Recent   BNP 0 - 99 pg/mL 183 (H)  3/1/23 15:00   CPK 20 - 200 U/L 44  1/2/22 16:28    - 260 U/L 370 (H)  1/2/22 16:28   Troponin I 0.000 - 0.026 ng/mL 0.020  3/1/23 15:00   (H): Data is abnormally high   Latest Reference Range & Units 02/03/23 20:54   Color, UA Yellow, Straw, Cindy  Yellow   Appearance, UA Clear  Hazy !   Specific Gravity, UA 1.005 - 1.030  1.010   pH, UA 5.0 - 8.0  7.0   Protein, UA Negative  1+ !   Glucose, UA Negative  Negative   Ketones, UA Negative  Negative   Occult Blood UA Negative  2+ !   NITRITE UA Negative  Positive !   UROBILINOGEN UA <2.0 EU/dL Negative   Bilirubin (UA) Negative  Negative   Leukocytes, UA Negative  3+ !   RBC, UA 0 - 4 /hpf 18 (H)   WBC, UA 0 - 5 /hpf >100 (H)   Bacteria, UA None-Occ /hpf Many !   Squam Epithel, UA /hpf 2   WBC Clumps, UA None-Rare  Many !   Hyaline Casts, UA 0-1/lpf /lpf 1   Yeast, UA None  Occasional !   Unclass Gali UA None-Moderate  Occasional   !: Data is abnormal  (H): Data is abnormally high   Latest Reference Range & Units 03/01/23 16:06   Sample  ARTERIAL   POC PH 7.35 - 7.45  7.228 (LL)   POC PCO2 35 - 45 mmHg 123.5 (HH)   POC PO2 80 - 100 mmHg 123 (H)   POC HCO3 24 - 28 mmol/L 51.5 (H)   POC SATURATED O2 95 - 100 % 97   Allens Test  Pass   POC BE -2 to 2 mmol/L 24   Flow  2   DelSys  Nasal Can   Site  RR   Mode  SPONT     EKG 03/01/2020    2D echo 2022     The left ventricle is normal in size with concentric remodeling and normal systolic function.  The estimated ejection fraction is 55%.  Grade I left ventricular diastolic dysfunction.  Severe right ventricular enlargement with moderately  to severely reduced right ventricular systolic function.  Right atrial enlargement.  Mild to moderate tricuspid regurgitation.  Normal central venous pressure (3 mmHg).  The estimated PA systolic pressure is 43 mmHg.  There is pulmonary hypertension.    Significant Imaging:     Chest x-ray 3/1 personally reviewed cardiomegaly worsening interstitial densities        Assessment/Plan:     Pulmonary  * Respiratory distress  Continues BiPAP repeat ABG in a.m..  Nebs.    Acute on chronic respiratory failure with hypoxia and hypercapnia  Patient with Hypercapnic and Hypoxic Respiratory failure which is Acute on chronic.  he is on home oxygen at 5 LPM. Supplemental oxygen was provided and noted- Oxygen Concentration (%):  [40] 40.   Signs/symptoms of respiratory failure include- increased work of breathing and respiratory distress. Contributing diagnoses includes - CHF, COPD and Obesity Hypoventilation Labs and images were reviewed. Patient Has recent ABG, which has been reviewed. Will treat underlying causes and adjust management of respiratory failure as follows- O2 target sat above 89%.  Nebs.  BiPAP continuous.  Repeat ABG in a.m..    Cardiac/Vascular  Atrial fibrillation  Cardiac monitoring.  Heart rate 60 on EKG.  Not on anticoagulation.  In September was on apixaban currently not aware why he is not on blood thinners neither his wife.  Previous admission for melena?    Elevated brain natriuretic peptide (BNP) level  Strict I&Os.  Diuresis.  BiPAP    Hematology  Venous thromboembolism  History of DVT of lower extremity.  Treated previously with anticoagulation as per records discharged on apixaban September 2022.  Not aware why not on anticoagulation currently.  History of recent admission for dark stool        Critical Care Daily Checklist:    A: Awake: RASS Goal/Actual Goal:    Actual:     B: Spontaneous Breathing Trial Performed?     C: SAT & SBT Coordinated?  Continuous BiPAP                      D: Delirium:  CAM-ICU     E: Early Mobility Performed? Yes   F: Feeding Goal:    Status:     Current Diet Order   Procedures    Diet Cardiac      AS: Analgesia/Sedation Not Sedated   T: Thromboembolic Prophylaxis Y   H: HOB > 300 Yes   U: Stress Ulcer Prophylaxis (if needed) Y   G: Glucose Control Fingerstick p.r.n.   B: Bowel Function     I: Indwelling Catheter (Lines & May) Necessity May   D: De-escalation of Antimicrobials/Pharmacotherapies Check UA monitor temp WBC    Plan for the day/ETD Continuous BiPAP    Code Status:  Family/Goals of Care: Prior  Y     Critical Care Time: 35 minutes  Critical secondary to Patient has a condition that poses threat to life and bodily function: Severe Respiratory Distress     Critical care was time spent personally by me on the following activities: development of treatment plan with patient or surrogate and bedside caregivers, discussions with consultants, evaluation of patient's response to treatment, examination of patient, ordering and performing treatments and interventions, ordering and review of laboratory studies, ordering and review of radiographic studies, pulse oximetry, re-evaluation of patient's condition. This critical care time did not overlap with that of any other provider or involve time for any procedures.     Helen Modi MD  Critical Care Medicine  [facility] - Emergency Dept.

## 2023-03-01 NOTE — ASSESSMENT & PLAN NOTE
Patient with Hypercapnic and Hypoxic Respiratory failure which is Acute on chronic.  he is on home oxygen at 5 LPM. Supplemental oxygen was provided and noted- Oxygen Concentration (%):  [40] 40.   Signs/symptoms of respiratory failure include- increased work of breathing and respiratory distress. Contributing diagnoses includes - CHF, COPD and Obesity Hypoventilation Labs and images were reviewed. Patient Has recent ABG, which has been reviewed. Will treat underlying causes and adjust management of respiratory failure as follows- O2 target sat above 89%.  Nebs.  BiPAP continuous.  Repeat ABG in a.m..

## 2023-03-02 PROBLEM — N30.00 ACUTE CYSTITIS: Status: ACTIVE | Noted: 2023-01-01

## 2023-03-02 NOTE — SUBJECTIVE & OBJECTIVE
Past Medical History:   Diagnosis Date    Acute hypoxemic respiratory failure 9/17/2022    Arthritis     Atrial fibrillation 3/1/2023    CHF (congestive heart failure)     Chronic kidney disease     Coronary artery disease     Depression     Hypertension     Hypertensive heart disease with heart failure 9/16/2022    Hypothyroidism     Lymphedema of lower extremity     Nephrolithiasis     Obesity     Peripheral vascular disease     Pneumonia 9/17/2022    Recurrent cellulitis of lower leg     Sleep apnea     can't stand the CPAP , sleeps elevated in hospital bed or chair    Tobacco dependence     resolved       Past Surgical History:   Procedure Laterality Date    ADENOIDECTOMY  1961    BACK SURGERY  1975;  1976    x2 for disc; scar tissue removed    debridement of bilateral leg ulcers Bilateral 01/01/2017    Dr Hernandez    INNER EAR SURGERY Bilateral 1961    separate - pinnaplasty , new eardrms constructed    KIDNEY STONE SURGERY Left 1976 approx    open    TONSILLECTOMY  1961       Review of patient's allergies indicates:   Allergen Reactions    Bactrim [sulfamethoxazole-trimethoprim] Itching       No current facility-administered medications on file prior to encounter.     Current Outpatient Medications on File Prior to Encounter   Medication Sig    acetaminophen (TYLENOL) 500 MG tablet Take 500 mg by mouth every 6 (six) hours as needed for Pain.    aspirin 81 MG Chew Take 1 tablet (81 mg total) by mouth once daily.    atorvastatin (LIPITOR) 40 MG tablet Take 1 tablet (40 mg total) by mouth once daily.    furosemide (LASIX) 40 MG tablet Take 1 tablet (40 mg total) by mouth once daily. (Patient not taking: Reported on 2/20/2023)    levothyroxine (SYNTHROID) 50 MCG tablet Take 1 tablet (50 mcg total) by mouth before breakfast.    miconazole nitrate 2% (MICOTIN) 2 % Oint Apply topically 2 (two) times daily. Intertrigo to lower abdomen, groin, periarea: please apply Antifungal bid    polyethylene glycol (GLYCOLAX) 17 gram  PwPk Take 17 g by mouth 2 (two) times daily. (Patient not taking: Reported on 2023)    senna-docusate 8.6-50 mg (PERICOLACE) 8.6-50 mg per tablet Take 1 tablet by mouth once daily.     Family History       Problem Relation (Age of Onset)    Alcohol abuse Maternal Grandfather    Alzheimer's disease Brother    Asthma Daughter    Cancer Mother, Father, Brother    Diabetes Mother    Heart disease Father, Brother    Hypertension Mother          Tobacco Use    Smoking status: Former     Packs/day: 1.50     Years: 1.00     Pack years: 1.50     Types: Cigarettes     Quit date:      Years since quittin.2    Smokeless tobacco: Never   Substance and Sexual Activity    Alcohol use: Not Currently    Drug use: Never    Sexual activity: Never     Review of Systems   Unable to perform ROS: Mental status change   Objective:     Vital Signs (Most Recent):  Temp: 99.4 °F (37.4 °C) (23 1503)  Pulse: 64 (23 1503)  Resp: 18 (23 1503)  BP: (!) 142/65 (23 1503)  SpO2: (!) 92 % (23 1503)   Vital Signs (24h Range):  Temp:  [98.9 °F (37.2 °C)-99.4 °F (37.4 °C)] 99.4 °F (37.4 °C)  Pulse:  [46-96] 64  Resp:  [18-35] 18  SpO2:  [92 %-100 %] 92 %  BP: (116-176)/() 142/65     Weight: (!) 162.5 kg (358 lb 4.8 oz)  Body mass index is 59.62 kg/m².    Physical Exam  Vitals and nursing note reviewed. Exam conducted with a chaperone present.   Constitutional:       Comments: Elderly  male, morbidly obese, awake, able to follow simple commands, in NAD    HENT:      Head: Normocephalic and atraumatic.      Mouth/Throat:      Mouth: Mucous membranes are moist.      Pharynx: Oropharynx is clear.   Eyes:      General: No scleral icterus.     Extraocular Movements: Extraocular movements intact.      Conjunctiva/sclera: Conjunctivae normal.   Cardiovascular:      Rate and Rhythm: Normal rate. Rhythm irregular.      Heart sounds: Murmur heard.     No friction rub. No gallop.   Pulmonary:      Comments:  Diminished breath sounds b/l, no WRR appreciated, on 4L NC O2   Abdominal:      Tenderness: There is no abdominal tenderness. There is no guarding or rebound.      Comments: Soft obese, moisture noted under his pannus, nontender    Genitourinary:     Comments: May in place with cloudy blood tinged urine noted   Musculoskeletal:      Comments: BLE with 2+ nonpitting edema noted, changes c/w chronic venous stasis    Skin:     General: Skin is warm and dry.      Comments: Scattered ecchymosis, venous stasis changes to BLE    Neurological:      Comments: Hard of hearing, oriented to self, time, answers some questions appropriately, able to follow simple commands, 4/5 strength grossly to all extremities        Significant Labs: All pertinent labs within the past 24 hours have been reviewed.    Significant Imaging: I have reviewed all pertinent imaging results/findings within the past 24 hours.

## 2023-03-02 NOTE — ASSESSMENT & PLAN NOTE
Cardiac monitoring.  Heart rate 60 on EKG.  Not on anticoagulation.  In September was on apixaban currently not aware why he is not on blood thinners neither his wife.  Previous admission for melena?     3/2 cardiac monitoring.  rate controlled.  Not on anticoagulation.  Cardiology consult.

## 2023-03-02 NOTE — ED NOTES
Pt placed on inpatient bed. Pt had large BM, was cleaned. New linen/chucks applied. pt turned to left side with pillows under R hip. Skin breakdown noted to R buttock, barrier cream applied. Bipap was removed. Pt tolerating 4 L NC. Pt now more alert and oriented. Restraints removed. Able to tell me name, , and year it is. Pt unsure of situation, which I oriented him on place and situation. Pt making sense and having appropriate conversation at this time. MD aware of status change.

## 2023-03-02 NOTE — ASSESSMENT & PLAN NOTE
Patient with Hypercapnic and Hypoxic Respiratory failure which is Acute on chronic.  he is on home oxygen at 4 LPM. Supplemental oxygen was provided and noted- Oxygen Concentration (%):  [] 30.   Signs/symptoms of respiratory failure include- respiratory distress and lethargy. Contributing diagnoses includes - CHF, Obesity Hypoventilation and DENISHA Labs and images were reviewed. Patient Has recent ABG, which has been reviewed. Will treat underlying causes and adjust management of respiratory failure as follows-   - Per chart review, does not appear to use CPAP at home which is contributing to hypercapnia and encephalopathy   - continue BIPAP qhs and while sleeping/resting   - s/p diamox today per ICU team, will resume home PO Lasix in AM   - TTE with EF 55%, severe RV enlargement with mod to severe reduced RV systolic function, mild to mod TR, pHTN   - supplemental O2 to maintain sats > 90%

## 2023-03-02 NOTE — CONSULTS
'[place] - Telemetry ([facility])  Hospital Medicine  Consult Note    Patient Name: Gene Rothman  MRN: [id number]  Admission Date: 3/1/2023  Hospital Length of Stay: 1 days  Attending Physician: Helen Modi MD   Primary Care Provider: Ami Zarate DO           Patient information was obtained from past medical records, ER records and primary team.     Inpatient consult to Hospitalist  Consult performed by: Inna Hammond MD  Consult ordered by: Helen Modi MD        Subjective:     Principal Problem: Acute on chronic respiratory failure with hypoxia and hypercapnia    Chief Complaint:   Chief Complaint   Patient presents with    Weakness     Pt c/o weakness x 4 weeks. EMS reports intermitted AMS. Pt c/o of right knee, chronic 4/10 pain        HPI: Pt is a 76 YO male with PMH notable for CAD, CKD, CHF, Afib, PVD, chronic hypoxia (on 4-5L NC), DENISHA, morbid obesity, LLE DVT (not on AC) who initially presented to the ED via EMS on 03/01 for evaluation of altered mental status. On presentation, pt was on 3 L nasal cannula, in resp distress. Initial ABG revealed respiratory acidosis 7.22/123/123 on nasal cannula, patient was placed on BiPAP.  Remaining workup notable for UA consistent with UTI, lactate, procalcitonin within normal limits, CT head negative for acute changes. CXR with R sided consolidation. Patient was started on IV cefepime for UTI.  Patient was admitted to the ICU for further management.  On 03/01, patient was weaned off BiPAP to nasal cannula, with some improvement in mental status and acidosis. Given diamox for diuresis, cardiology consulted per ICU for Afib management.  He was deemed stable for step-down from ICU to floor.  Hospital Medicine was consulted for continuation of care.  PT seen with RN at bedside. On my evaluation patient is lying in bed, is extremely hard of hearing is oriented to self, place but not situation.  Denies any chest pain, shortness of breath.  No family at bedside  during my evaluation.  Discussed with bedside RN, patient had May placed in ER, noted to have skin breakdown over his bottom and bilateral lower extremities that was present on admission.       Past Medical History:   Diagnosis Date    Acute hypoxemic respiratory failure 9/17/2022    Arthritis     Atrial fibrillation 3/1/2023    CHF (congestive heart failure)     Chronic kidney disease     Coronary artery disease     Depression     Hypertension     Hypertensive heart disease with heart failure 9/16/2022    Hypothyroidism     Lymphedema of lower extremity     Nephrolithiasis     Obesity     Peripheral vascular disease     Pneumonia 9/17/2022    Recurrent cellulitis of lower leg     Sleep apnea     can't stand the CPAP , sleeps elevated in hospital bed or chair    Tobacco dependence     resolved       Past Surgical History:   Procedure Laterality Date    ADENOIDECTOMY  1961    BACK SURGERY  1975;  1976    x2 for disc; scar tissue removed    debridement of bilateral leg ulcers Bilateral 01/01/2017    Dr Hernandez    INNER EAR SURGERY Bilateral 1961    separate - pinnaplasty , new eardrms constructed    KIDNEY STONE SURGERY Left 1976 approx    open    TONSILLECTOMY  1961       Review of patient's allergies indicates:   Allergen Reactions    Bactrim [sulfamethoxazole-trimethoprim] Itching       No current facility-administered medications on file prior to encounter.     Current Outpatient Medications on File Prior to Encounter   Medication Sig    acetaminophen (TYLENOL) 500 MG tablet Take 500 mg by mouth every 6 (six) hours as needed for Pain.    aspirin 81 MG Chew Take 1 tablet (81 mg total) by mouth once daily.    atorvastatin (LIPITOR) 40 MG tablet Take 1 tablet (40 mg total) by mouth once daily.    furosemide (LASIX) 40 MG tablet Take 1 tablet (40 mg total) by mouth once daily. (Patient not taking: Reported on 2/20/2023)    levothyroxine (SYNTHROID) 50 MCG tablet Take 1 tablet (50 mcg  total) by mouth before breakfast.    miconazole nitrate 2% (MICOTIN) 2 % Oint Apply topically 2 (two) times daily. Intertrigo to lower abdomen, groin, periarea: please apply Antifungal bid    polyethylene glycol (GLYCOLAX) 17 gram PwPk Take 17 g by mouth 2 (two) times daily. (Patient not taking: Reported on 2023)    senna-docusate 8.6-50 mg (PERICOLACE) 8.6-50 mg per tablet Take 1 tablet by mouth once daily.     Family History       Problem Relation (Age of Onset)    Alcohol abuse Maternal Grandfather    Alzheimer's disease Brother    Asthma Daughter    Cancer Mother, Father, Brother    Diabetes Mother    Heart disease Father, Brother    Hypertension Mother          Tobacco Use    Smoking status: Former     Packs/day: 1.50     Years: 1.00     Pack years: 1.50     Types: Cigarettes     Quit date:      Years since quittin.2    Smokeless tobacco: Never   Substance and Sexual Activity    Alcohol use: Not Currently    Drug use: Never    Sexual activity: Never     Review of Systems   Unable to perform ROS: Mental status change   Objective:     Vital Signs (Most Recent):  Temp: 99.4 °F (37.4 °C) (23 1503)  Pulse: 64 (23 1503)  Resp: 18 (23 1503)  BP: (!) 142/65 (23 1503)  SpO2: (!) 92 % (23 1503)   Vital Signs (24h Range):  Temp:  [98.9 °F (37.2 °C)-99.4 °F (37.4 °C)] 99.4 °F (37.4 °C)  Pulse:  [46-96] 64  Resp:  [18-35] 18  SpO2:  [92 %-100 %] 92 %  BP: (116-176)/() 142/65     Weight: (!) 162.5 kg (358 lb 4.8 oz)  Body mass index is 59.62 kg/m².    Physical Exam  Vitals and nursing note reviewed. Exam conducted with a chaperone present.   Constitutional:       Comments: Elderly  male, morbidly obese, awake, able to follow simple commands, in NAD    HENT:      Head: Normocephalic and atraumatic.      Mouth/Throat:      Mouth: Mucous membranes are moist.      Pharynx: Oropharynx is clear.   Eyes:      General: No scleral icterus.     Extraocular Movements:  Extraocular movements intact.      Conjunctiva/sclera: Conjunctivae normal.   Cardiovascular:      Rate and Rhythm: Normal rate. Rhythm irregular.      Heart sounds: Murmur heard.     No friction rub. No gallop.   Pulmonary:      Comments: Diminished breath sounds b/l, no WRR appreciated, on 4L NC O2   Abdominal:      Tenderness: There is no abdominal tenderness. There is no guarding or rebound.      Comments: Soft obese, moisture noted under his pannus, nontender    Genitourinary:     Comments: May in place with cloudy blood tinged urine noted   Musculoskeletal:      Comments: BLE with 2+ nonpitting edema noted, changes c/w chronic venous stasis    Skin:     General: Skin is warm and dry.      Comments: Scattered ecchymosis, venous stasis changes to BLE    Neurological:      Comments: Hard of hearing, oriented to self, time, answers some questions appropriately, able to follow simple commands, 4/5 strength grossly to all extremities        Significant Labs: All pertinent labs within the past 24 hours have been reviewed.    Significant Imaging: I have reviewed all pertinent imaging results/findings within the past 24 hours.    Assessment/Plan:     * Acute on chronic respiratory failure with hypoxia and hypercapnia  Patient with Hypercapnic and Hypoxic Respiratory failure which is Acute on chronic.  he is on home oxygen at 4 LPM. Supplemental oxygen was provided and noted- Oxygen Concentration (%):  [] 30.   Signs/symptoms of respiratory failure include- respiratory distress and lethargy. Contributing diagnoses includes - CHF, Obesity Hypoventilation and DENISHA Labs and images were reviewed. Patient Has recent ABG, which has been reviewed. Will treat underlying causes and adjust management of respiratory failure as follows-   - Per chart review, does not appear to use CPAP at home which is contributing to hypercapnia and encephalopathy   - continue BIPAP qhs and while sleeping/resting   - s/p diamox today per  ICU team, will resume home PO Lasix in AM   - TTE with EF 55%, severe RV enlargement with mod to severe reduced RV systolic function, mild to mod TR, pHTN   - supplemental O2 to maintain sats > 90%     Atrial fibrillation  - Discussed with ICU team- per Dr. Modi- per their discussion with outpatient pharmacy, pt did not fill recent script for anticoag, has not filled since 02/2022  - Cardiology consulted; appreciate recs  - continue B-blocker, therapeutic lovenox   - will need NOAC on discharge     Acute cystitis  - continue empiric cefepime pending urine culture  - urine and blood cultures pending       Decubitus ulcer of right buttock, stage 1  - present on admission   - wound care consult     Venous thromboembolism  Per chart review, hx of DVT/PE diagnosed in 09/2022, was prescribed Eliquis at the time but has not filled since 02/2022  Continue therapeutic lovenox as above  BLE duplex this admission shows chronic DVT in L superficial and popliteal veins       CKD (chronic kidney disease) stage 3, GFR 30-59 ml/min  -Cr has ranged 1-1.9 over last few months   - Appears to be at baseline at this time   - monitor BMP; renally dose meds   - strict Is and Os        VTE Risk Mitigation (From admission, onward)         Ordered     enoxaparin injection 60 mg  Every 12 hours         03/01/23 2008                    Thank you for your consult. I will follow-up with patient. Please contact us if you have any additional questions.    Inna Hammond MD  Department of Hospital Medicine   [place] - Telemetry ([facility])

## 2023-03-02 NOTE — PROGRESS NOTES
Pharmacist Renal Dose Adjustment Note    Gene Rothman is a 77 y.o. male being treated with the medication enoxaparin.    Patient Data:    Vital Signs (Most Recent):  Temp: 98.1 °F (36.7 °C) (03/01/23 1309)  Pulse: 67 (03/01/23 1930)  Resp: (!) 35 (03/01/23 1930)  BP: (!) 143/76 (03/01/23 1930)  SpO2: 98 % (03/01/23 1930)   Vital Signs (72h Range):  Temp:  [98.1 °F (36.7 °C)]   Pulse:  [53-67]   Resp:  [22-35]   BP: (130-158)/(62-76)   SpO2:  [95 %-100 %]      Recent Labs   Lab 03/01/23  1500   CREATININE 1.0     Serum creatinine: 1 mg/dL 03/01/23 1500  Estimated creatinine clearance: 86.3 mL/min     Enoxaparin 40 mg daily was increased to 60 mg twice daily according to [facility]'s renal dose adjustment policy          Pharmacist's Name: No Dennison, PharmD 3/1/2023 8:09 PM    Pharmacist's Extension: [phone]

## 2023-03-02 NOTE — ASSESSMENT & PLAN NOTE
Patient with Hypercapnic and Hypoxic Respiratory failure which is Acute on chronic.  he is on home oxygen at 5 LPM. Supplemental oxygen was provided and noted- Oxygen Concentration (%):  [] 30.   Signs/symptoms of respiratory failure include- increased work of breathing and respiratory distress. Contributing diagnoses includes - CHF, COPD and Obesity Hypoventilation Labs and images were reviewed. Patient Has recent ABG, which has been reviewed. Will treat underlying causes and adjust management of respiratory failure as follows- O2 target sat above 89%.  Nebs.  BiPAP continuous.  Repeat ABG in a.m.  03/02/2023 nonadherent to positive pressure noninvasive ventilation at home.  24 hours O2.  Need BiPAP during sleep and p.r.n. distress..

## 2023-03-02 NOTE — ASSESSMENT & PLAN NOTE
Continues BiPAP repeat ABG in a.m..  Nebs.  3/2 nebs.  Favorable trend of CO2 on ABG.  Use BiPAP during sleep and p.r.n. distress.  Case management evaluation.

## 2023-03-02 NOTE — SUBJECTIVE & OBJECTIVE
03/02/2023 events overnight noted.  Large BM.  Heart rate in the 60s.  Wore BiPAP all night.  ABG reviewed.  Favorable CO2 trend.  Oriented x3.  On O2 via nasal cannula.  O2 sat 98% on 4 liter/minute.  1.3 L urine output.  Blood culture unremarkable so far.  Afebrile  Review of Systems   Unable to perform ROS: Acuity of condition   Constitutional:  Positive for activity change and fatigue. Negative for chills and fever.   HENT:  Negative for drooling, ear discharge and nosebleeds.    Eyes:  Negative for pain, discharge and itching.   Respiratory:  Positive for cough and shortness of breath. Negative for choking.    Cardiovascular:  Positive for leg swelling. Negative for chest pain.   Gastrointestinal:  Negative for abdominal pain.   Endocrine: Negative for cold intolerance.   Genitourinary:  Negative for hematuria.   Musculoskeletal:  Positive for arthralgias, back pain and gait problem. Negative for neck stiffness.   Skin:  Negative for rash.   Allergic/Immunologic: Negative for immunocompromised state.   Neurological:  Positive for weakness. Negative for seizures and facial asymmetry.   Hematological:  Negative for adenopathy.   Psychiatric/Behavioral:  Negative for behavioral problems, self-injury and suicidal ideas.    Objective:     Vital Signs (Most Recent):  Temp: 98.9 °F (37.2 °C) (03/02/23 0710)  Pulse: 72 (03/02/23 0710)  Resp: (!) 27 (03/02/23 0710)  BP: (!) 176/77 (03/02/23 0710)  SpO2: 98 % (03/02/23 0710) Vital Signs (24h Range):  Temp:  [98.1 °F (36.7 °C)-99 °F (37.2 °C)] 98.9 °F (37.2 °C)  Pulse:  [46-96] 72  Resp:  [18-35] 27  SpO2:  [95 %-100 %] 98 %  BP: (116-176)/() 176/77     Weight: (!) 162.5 kg (358 lb 4.8 oz)  Body mass index is 59.62 kg/m².      Intake/Output Summary (Last 24 hours) at 3/2/2023 0888  Last data filed at 3/2/2023 0629  Gross per 24 hour   Intake --   Output 1351 ml   Net -1351 ml       Physical Exam  Vitals and nursing note reviewed.   Constitutional:       General:  He is in acute distress.      Appearance: He is well-developed. He is obese. He is ill-appearing and toxic-appearing.   HENT:      Head: Normocephalic and atraumatic.      Nose: Nose normal.   Eyes:      Extraocular Movements: Extraocular movements intact.   Cardiovascular:      Rate and Rhythm: Normal rate. Rhythm irregular.   Pulmonary:      Effort: No respiratory distress.      Breath sounds: No stridor. Rhonchi present.   Abdominal:      General: There is no distension.   Genitourinary:     Comments: May in place  Musculoskeletal:         General: Swelling present. No signs of injury.      Cervical back: Normal range of motion and neck supple.   Skin:     General: Skin is warm and dry.   Neurological:      General: No focal deficit present.      Mental Status: He is alert.       Vents:  Oxygen Concentration (%): 30 (03/02/23 0453)    Lines/Drains/Airways       Drain  Duration                  Urethral Catheter 03/01/23 1945 16 Fr. <1 day              Peripheral Intravenous Line  Duration                  Peripheral IV - Single Lumen 03/02/23 0000 20 G Left Antecubital <1 day         Peripheral IV - Single Lumen 03/02/23 0240 18 G Anterior;Left Upper Arm <1 day                    Significant Labs:    CBC/Anemia Profile:  Recent Labs   Lab 03/01/23  1500   WBC 5.49   HGB 10.3*   HCT 35.3*      *   RDW 14.2          Chemistries:  Recent Labs   Lab 03/01/23  1500      K 4.4   CL 89*   CO2 43*   BUN 22   CREATININE 1.0   CALCIUM 9.3   ALBUMIN 2.4*   PROT 8.1   BILITOT 0.6   ALKPHOS 60   ALT 12   AST 20        Latest Reference Range & Units Most Recent   BNP 0 - 99 pg/mL 183 (H)  3/1/23 15:00   CPK 20 - 200 U/L 44  1/2/22 16:28    - 260 U/L 370 (H)  1/2/22 16:28   Troponin I 0.000 - 0.026 ng/mL 0.020  3/1/23 15:00   (H): Data is abnormally high   Latest Reference Range & Units 02/03/23 20:54   Color, UA Yellow, Straw, Cindy  Yellow   Appearance, UA Clear  Hazy !   Specific [place], UA 1.005  - 1.030  1.010   pH, UA 5.0 - 8.0  7.0   Protein, UA Negative  1+ !   Glucose, UA Negative  Negative   Ketones, UA Negative  Negative   Occult Blood UA Negative  2+ !   NITRITE UA Negative  Positive !   UROBILINOGEN UA <2.0 EU/dL Negative   Bilirubin (UA) Negative  Negative   Leukocytes, UA Negative  3+ !   RBC, UA 0 - 4 /hpf 18 (H)   WBC, UA 0 - 5 /hpf >100 (H)   Bacteria, UA None-Occ /hpf Many !   Squam Epithel, UA /hpf 2   WBC Clumps, UA None-Rare  Many !   Hyaline Casts, UA 0-1/lpf /lpf 1   Yeast, UA None  Occasional !   Unclass Gali UA None-Moderate  Occasional      Latest Reference Range & Units 03/01/23 16:06 03/01/23 18:22   Sample  ARTERIAL ARTERIAL   POC PH 7.35 - 7.45  7.228 (LL) 7.435   POC PCO2 35 - 45 mmHg 123.5 (HH) 73.1 (HH)   POC PO2 80 - 100 mmHg 123 (H) 74 (L)   POC HCO3 24 - 28 mmol/L 51.5 (H) 49.1 (H)   POC SATURATED O2 95 - 100 % 97 94 (L)   Allens Test  Pass Pass   POC BE -2 to 2 mmol/L 24 25   FiO2   40   Flow  2    DelSys  Nasal Can CPAP/BiPAP   Site  RR LR   Mode  SPONT BiPAP   Rate   26   (LL): Data is critically low  (HH): Data is critically high  (H): Data is abnormally high  (L): Data is abnormally low    EKG 03/01/2020    2D echo 2022     The left ventricle is normal in size with concentric remodeling and normal systolic function.  The estimated ejection fraction is 55%.  Grade I left ventricular diastolic dysfunction.  Severe right ventricular enlargement with moderately to severely reduced right ventricular systolic function.  Right atrial enlargement.  Mild to moderate tricuspid regurgitation.  Normal central venous pressure (3 mmHg).  The estimated PA systolic pressure is 43 mmHg.  There is pulmonary hypertension.    Significant Imaging:     Chest x-ray 3/1 personally reviewed cardiomegaly worsening interstitial densities

## 2023-03-02 NOTE — ASSESSMENT & PLAN NOTE
Cardiac monitoring.  Heart rate 60 on EKG.  Not on anticoagulation.  In September was on apixaban currently not aware why he is not on blood thinners neither his wife.  Previous admission for melena?

## 2023-03-02 NOTE — ASSESSMENT & PLAN NOTE
Per chart review, hx of DVT/PE diagnosed in 09/2022, was prescribed Eliquis at the time but has not filled since 02/2022  Continue therapeutic lovenox as above  BLE duplex this admission shows chronic DVT in L superficial and popliteal veins

## 2023-03-02 NOTE — HPI
77-year-old male patient with past medical history of CHF, CKD, CAD, HTN, hypothyroidism, peripheral vascular disease, and obesity hypoventilation on oxygen at home, received oxygen via face mask in the past, at 4.5 L, admitted in September with intermediate V/Q scan and left lower extremity DVT , has had multiple hospital admissions over the last six-months for sepsis and UTI brought to the emergency room today for altered mental status.    Cardiology consulted to assist with management for afib. Pt seen and examined today in ED. Pt is poor historian with AMS, endorses severe discomfort and SOB. Labs reviewed, , troponin, negative, tele monitor afib with HR 60s, pt does not remember being on eliquis. Crt 1.0

## 2023-03-02 NOTE — ED NOTES
Pt cleaned of incont. Stool with max assist to turn. Pt repositioned from left side lying to back and pulled up in bed. ER tech to obtain second blood culture set

## 2023-03-02 NOTE — HPI
Pt is a 78 YO male with PMH notable for CAD, CKD, CHF, Afib, PVD, chronic hypoxia (on 4-5L NC), DENISHA, morbid obesity, LLE DVT (not on AC) who initially presented to the ED via EMS on 03/01 for evaluation of altered mental status. On presentation, pt was on 3 L nasal cannula, in resp distress. Initial ABG revealed respiratory acidosis 7.22/123/123 on nasal cannula, patient was placed on BiPAP.  Remaining workup notable for UA consistent with UTI, lactate, procalcitonin within normal limits, CT head negative for acute changes. CXR with R sided consolidation. Patient was started on IV cefepime for UTI.  Patient was admitted to the ICU for further management.  On 03/01, patient was weaned off BiPAP to nasal cannula, with some improvement in mental status and acidosis. Given diamox for diuresis, cardiology consulted per ICU for Afib management.  He was deemed stable for step-down from ICU to floor.  Hospital Medicine was consulted for continuation of care.  PT seen with RN at bedside. On my evaluation patient is lying in bed, is extremely hard of hearing is oriented to self, place but not situation.  Denies any chest pain, shortness of breath.  No family at bedside during my evaluation.  Discussed with bedside RN, patient had May placed in ER, noted to have skin breakdown over his bottom and bilateral lower extremities that was present on admission.

## 2023-03-02 NOTE — PLAN OF CARE
Problem: Adult Inpatient Plan of Care  Goal: Plan of Care Review  Outcome: Ongoing, Progressing  Goal: Patient-Specific Goal (Individualized)  Outcome: Ongoing, Progressing  Goal: Absence of Hospital-Acquired Illness or Injury  Outcome: Ongoing, Progressing  Goal: Optimal Comfort and Wellbeing  Outcome: Ongoing, Progressing  Goal: Readiness for Transition of Care  Outcome: Ongoing, Progressing     Problem: Bariatric Environmental Safety  Goal: Safety Maintained with Care  Outcome: Ongoing, Progressing     Problem: Infection  Goal: Absence of Infection Signs and Symptoms  Outcome: Ongoing, Progressing     Problem: Fall Injury Risk  Goal: Absence of Fall and Fall-Related Injury  Outcome: Ongoing, Progressing     Problem: Restraint, Nonbehavioral (Nonviolent)  Goal: Absence of Harm or Injury  Outcome: Ongoing, Progressing     Problem: Skin Injury Risk Increased  Goal: Skin Health and Integrity  Outcome: Ongoing, Progressing

## 2023-03-02 NOTE — ASSESSMENT & PLAN NOTE
Strict I&Os.  Diuresis.  BiPAP  3/2 Diamox 250 mg x1 today.  Post hypercapnic respiratory failure noted on ABG.

## 2023-03-02 NOTE — ASSESSMENT & PLAN NOTE
- Discussed with ICU team- per Dr. Modi- per their discussion with outpatient pharmacy, pt did not fill recent script for anticoag, has not filled since 02/2022  - Cardiology consulted; appreciate recs  - continue B-blocker, therapeutic lovenox   - will need NOAC on discharge

## 2023-03-02 NOTE — SUBJECTIVE & OBJECTIVE
Past Medical History:   Diagnosis Date    Acute hypoxemic respiratory failure 9/17/2022    Arthritis     Atrial fibrillation 3/1/2023    CHF (congestive heart failure)     Chronic kidney disease     Coronary artery disease     Depression     Hypertension     Hypertensive heart disease with heart failure 9/16/2022    Hypothyroidism     Lymphedema of lower extremity     Nephrolithiasis     Obesity     Peripheral vascular disease     Pneumonia 9/17/2022    Recurrent cellulitis of lower leg     Sleep apnea     can't stand the CPAP , sleeps elevated in hospital bed or chair    Tobacco dependence     resolved       Past Surgical History:   Procedure Laterality Date    ADENOIDECTOMY  1961    BACK SURGERY  1975;  1976    x2 for disc; scar tissue removed    debridement of bilateral leg ulcers Bilateral 01/01/2017    Dr Hernandez    INNER EAR SURGERY Bilateral 1961    separate - pinnaplasty , new eardrms constructed    KIDNEY STONE SURGERY Left 1976 approx    open    TONSILLECTOMY  1961       Review of patient's allergies indicates:   Allergen Reactions    Bactrim [sulfamethoxazole-trimethoprim] Itching       No current facility-administered medications on file prior to encounter.     Current Outpatient Medications on File Prior to Encounter   Medication Sig    acetaminophen (TYLENOL) 500 MG tablet Take 500 mg by mouth every 6 (six) hours as needed for Pain.    aspirin 81 MG Chew Take 1 tablet (81 mg total) by mouth once daily.    atorvastatin (LIPITOR) 40 MG tablet Take 1 tablet (40 mg total) by mouth once daily.    furosemide (LASIX) 40 MG tablet Take 1 tablet (40 mg total) by mouth once daily. (Patient not taking: Reported on 2/20/2023)    levothyroxine (SYNTHROID) 50 MCG tablet Take 1 tablet (50 mcg total) by mouth before breakfast.    miconazole nitrate 2% (MICOTIN) 2 % Oint Apply topically 2 (two) times daily. Intertrigo to lower abdomen, groin, periarea: please apply Antifungal bid    polyethylene glycol (GLYCOLAX) 17 gram  PwPk Take 17 g by mouth 2 (two) times daily. (Patient not taking: Reported on 2023)    senna-docusate 8.6-50 mg (PERICOLACE) 8.6-50 mg per tablet Take 1 tablet by mouth once daily.     Family History       Problem Relation (Age of Onset)    Alcohol abuse Maternal Grandfather    Alzheimer's disease Brother    Asthma Daughter    Cancer Mother, Father, Brother    Diabetes Mother    Heart disease Father, Brother    Hypertension Mother          Tobacco Use    Smoking status: Former     Packs/day: 1.50     Years: 1.00     Pack years: 1.50     Types: Cigarettes     Quit date:      Years since quittin.2    Smokeless tobacco: Never   Substance and Sexual Activity    Alcohol use: Not Currently    Drug use: Never    Sexual activity: Never     Review of Systems   Reason unable to perform ROS: AMS.   Objective:     Vital Signs (Most Recent):  Temp: 98.9 °F (37.2 °C) (23 0710)  Pulse: 69 (23)  Resp: (!) 28 (23)  BP: (!) 137/58 (23)  SpO2: 97 % (23)   Vital Signs (24h Range):  Temp:  [98.1 °F (36.7 °C)-99 °F (37.2 °C)] 98.9 °F (37.2 °C)  Pulse:  [46-96] 69  Resp:  [18-35] 28  SpO2:  [95 %-100 %] 97 %  BP: (116-176)/() 137/58     Weight: (!) 162.5 kg (358 lb 4.8 oz)  Body mass index is 59.62 kg/m².    SpO2: 97 %         Intake/Output Summary (Last 24 hours) at 3/2/2023 1017  Last data filed at 3/2/2023 0629  Gross per 24 hour   Intake --   Output 1351 ml   Net -1351 ml       Lines/Drains/Airways       Drain  Duration                  Urethral Catheter 23 1945 16 Fr. <1 day              Peripheral Intravenous Line  Duration                  Peripheral IV - Single Lumen 23 0000 20 G Left Antecubital <1 day         Peripheral IV - Single Lumen 23 0240 18 G Anterior;Left Upper Arm <1 day                    Physical Exam  Vitals and nursing note reviewed.   Constitutional:       General: He is in acute distress.      Appearance: He is obese. He is  ill-appearing.   HENT:      Head: Normocephalic and atraumatic.   Eyes:      General:         Right eye: No discharge.         Left eye: No discharge.      Pupils: Pupils are equal, round, and reactive to light.   Cardiovascular:      Rate and Rhythm: Normal rate. Rhythm irregular.      Heart sounds: S1 normal and S2 normal. No murmur heard.    No friction rub.   Pulmonary:      Effort: No respiratory distress.      Breath sounds: Rales present.      Comments: diminished  Abdominal:      Palpations: Abdomen is soft.      Tenderness: There is no abdominal tenderness.   Musculoskeletal:      Cervical back: Neck supple.      Right lower leg: No edema.      Left lower leg: No edema.   Skin:     General: Skin is warm and dry.   Neurological:      Mental Status: He is alert.      Comments: confusion       Significant Labs: BMP:   Recent Labs   Lab 03/01/23  1500   GLU 95      K 4.4   CL 89*   CO2 43*   BUN 22   CREATININE 1.0   CALCIUM 9.3   , CMP   Recent Labs   Lab 03/01/23  1500      K 4.4   CL 89*   CO2 43*   GLU 95   BUN 22   CREATININE 1.0   CALCIUM 9.3   PROT 8.1   ALBUMIN 2.4*   BILITOT 0.6   ALKPHOS 60   AST 20   ALT 12   ANIONGAP 9   , CBC   Recent Labs   Lab 03/01/23  1500   WBC 5.49   HGB 10.3*   HCT 35.3*      , INR No results for input(s): INR, PROTIME in the last 48 hours., Lipid Panel No results for input(s): CHOL, HDL, LDLCALC, TRIG, CHOLHDL in the last 48 hours., Troponin   Recent Labs   Lab 03/01/23  1500   TROPONINI 0.020   , and All pertinent lab results from the last 24 hours have been reviewed.    Significant Imaging: Cardiac Cath: reviewed, Echocardiogram: Transthoracic echo (TTE) complete (Cupid Only):   Results for orders placed or performed during the hospital encounter of 01/02/22   Echo   Result Value Ref Range    BSA 3.05 m2    TDI SEPTAL 0.05 m/s    LV LATERAL E/E' RATIO 5.78 m/s    LV SEPTAL E/E' RATIO 10.40 m/s    LA WIDTH 3.27 cm    IVC diameter 1.14 cm    Left  Ventricular Outflow Tract Mean Velocity 0.78126637543610 cm/s    Left Ventricular Outflow Tract Mean Gradient 1.46 mmHg    TDI LATERAL 0.09 m/s    LVIDd 3.84 3.5 - 6.0 cm    IVS 1.56 (A) 0.6 - 1.1 cm    Posterior Wall 1.88 (A) 0.6 - 1.1 cm    Ao root annulus 3.63 cm    LVIDs 2.90 2.1 - 4.0 cm    FS 24 28 - 44 %    LA volume 25.66 cm3    Sinus 3.14 cm    STJ 2.64 cm    Ascending aorta 2.98 cm    LV mass 274.50 g    LA size 2.29 cm    RVDD 5.32 cm    TAPSE 1.44 cm    Left Ventricle Relative Wall Thickness 0.98 cm    AV mean gradient 3 mmHg    AV valve area 2.65 cm2    AV Velocity Ratio 0.74     AV index (prosthetic) 0.70     MV valve area p 1/2 method 3.17 cm2    E/A ratio 0.60     Mean e' 0.07 m/s    E wave deceleration time 239.782999981519730 msec    IVRT 99.213972820371845 msec    LVOT diameter 2.20 cm    LVOT area 3.8 cm2    LVOT peak rodolfo 0.77 m/s    LVOT peak VTI 12.70 cm    Ao peak rodolfo 1.04 m/s    Ao VTI 18.2 cm    RVOT peak rodolfo 0.66 m/s    RVOT peak VTI 13.4 cm    LVOT stroke volume 48.25 cm3    AV peak gradient 4 mmHg    PV mean gradient 0.78 mmHg    E/E' ratio 7.43 m/s    MV Peak E Rodolfo 0.52 m/s    TR Max Rodolfo 3.18 m/s    MV stenosis pressure 1/2 time 69.279644318772059 ms    MV Peak A Rodolfo 0.87 m/s    LV Systolic Volume 32.09 mL    LV Systolic Volume Index 11.1 mL/m2    LV Diastolic Volume 63.39 mL    LV Diastolic Volume Index 21.93 mL/m2    LA Volume Index 8.9 mL/m2    LV Mass Index 95 g/m2    Echo EF Estimated 49 %    RA Major Axis 4.13 cm    Left Atrium Minor Axis 3.25 cm    Left Atrium Major Axis 5.31 cm    Triscuspid Valve Regurgitation Peak Gradient 40 mmHg    RA Width 2.81 cm    Right Atrial Pressure (from IVC) 3 mmHg    EF 55 %    TV rest pulmonary artery pressure 43 mmHg    Narrative    · The left ventricle is normal in size with concentric remodeling and   normal systolic function.  · The estimated ejection fraction is 55%.  · Grade I left ventricular diastolic dysfunction.  · Severe right  ventricular enlargement with moderately to severely reduced   right ventricular systolic function.  · Right atrial enlargement.  · Mild to moderate tricuspid regurgitation.  · Normal central venous pressure (3 mmHg).  · The estimated PA systolic pressure is 43 mmHg.  · There is pulmonary hypertension.      , EKG: reviewed, Stress Test: reviewed, and X-Ray: CXR: X-Ray Chest 1 View (CXR): No results found for this visit on 03/01/23.

## 2023-03-02 NOTE — NURSING
POC reviewed with patient. Pt verbalized understanding  AAO x4. VSS  Afib on tele monitor.   PIV saline locked, clean, dry, intact  IV antibiotics maintained  Pt on 4 L NC  No other c/o at this time.  Pt remains free of injuries and falls; fall precaution in place.   Bed low, side rails x2, call light in reach, personal belongings at bedside, bed alarm in use  Reminded to call for assistance.  Hourly rounding complete. 12 hr chart check complete. Will continue to monitor.

## 2023-03-02 NOTE — CONSULTS
[facility] - Emergency Dept.  Cardiology  Consult Note    Patient Name: Gene Rothman  MRN: [id number]  Admission Date: 3/1/2023  Hospital Length of Stay: 1 days  Code Status: Prior   Attending Provider: Helen Modi MD   Consulting Provider: Tiffany Melendez NP  Primary Care Physician: Ami Zarate DO  Principal Problem:Respiratory distress    Patient information was obtained from patient and ER records.     Inpatient consult to Cardiology  Consult performed by: Tiffany Melendez NP  Consult ordered by: Helen Modi MD        Subjective:     Chief Complaint:  AMS     HPI:   77-year-old male patient with past medical history of CHF, CKD, CAD, HTN, hypothyroidism, peripheral vascular disease, and obesity hypoventilation on oxygen at home, received oxygen via face mask in the past, at 4.5 L, admitted in September with intermediate V/Q scan and left lower extremity DVT , has had multiple hospital admissions over the last six-months for sepsis and UTI brought to the emergency room today for altered mental status.    Cardiology consulted to assist with management for afib. Pt seen and examined today in ED. Pt is poor historian with AMS, endorses severe discomfort and SOB. Labs reviewed, , troponin, negative, tele monitor afib with HR 60s, pt does not remember being on eliquis. Crt 1.0      Past Medical History:   Diagnosis Date    Acute hypoxemic respiratory failure 9/17/2022    Arthritis     Atrial fibrillation 3/1/2023    CHF (congestive heart failure)     Chronic kidney disease     Coronary artery disease     Depression     Hypertension     Hypertensive heart disease with heart failure 9/16/2022    Hypothyroidism     Lymphedema of lower extremity     Nephrolithiasis     Obesity     Peripheral vascular disease     Pneumonia 9/17/2022    Recurrent cellulitis of lower leg     Sleep apnea     can't stand the CPAP , sleeps elevated in hospital bed or chair    Tobacco dependence      resolved       Past Surgical History:   Procedure Laterality Date    ADENOIDECTOMY  1961    BACK SURGERY  1975;  1976    x2 for disc; scar tissue removed    debridement of bilateral leg ulcers Bilateral 01/01/2017    Dr Hernandez    INNER EAR SURGERY Bilateral 1961    separate - pinnaplasty , new eardrms constructed    KIDNEY STONE SURGERY Left 1976 approx    open    TONSILLECTOMY  1961       Review of patient's allergies indicates:   Allergen Reactions    Bactrim [sulfamethoxazole-trimethoprim] Itching       No current facility-administered medications on file prior to encounter.     Current Outpatient Medications on File Prior to Encounter   Medication Sig    acetaminophen (TYLENOL) 500 MG tablet Take 500 mg by mouth every 6 (six) hours as needed for Pain.    aspirin 81 MG Chew Take 1 tablet (81 mg total) by mouth once daily.    atorvastatin (LIPITOR) 40 MG tablet Take 1 tablet (40 mg total) by mouth once daily.    furosemide (LASIX) 40 MG tablet Take 1 tablet (40 mg total) by mouth once daily. (Patient not taking: Reported on 2/20/2023)    levothyroxine (SYNTHROID) 50 MCG tablet Take 1 tablet (50 mcg total) by mouth before breakfast.    miconazole nitrate 2% (MICOTIN) 2 % Oint Apply topically 2 (two) times daily. Intertrigo to lower abdomen, groin, periarea: please apply Antifungal bid    polyethylene glycol (GLYCOLAX) 17 gram PwPk Take 17 g by mouth 2 (two) times daily. (Patient not taking: Reported on 2/20/2023)    senna-docusate 8.6-50 mg (PERICOLACE) 8.6-50 mg per tablet Take 1 tablet by mouth once daily.     Family History       Problem Relation (Age of Onset)    Alcohol abuse Maternal Grandfather    Alzheimer's disease Brother    Asthma Daughter    Cancer Mother, Father, Brother    Diabetes Mother    Heart disease Father, Brother    Hypertension Mother          Tobacco Use    Smoking status: Former     Packs/day: 1.50     Years: 1.00     Pack years: 1.50     Types: Cigarettes     Quit date: 1969      Years since quittin.2    Smokeless tobacco: Never   Substance and Sexual Activity    Alcohol use: Not Currently    Drug use: Never    Sexual activity: Never     Review of Systems   Reason unable to perform ROS: AMS.   Objective:     Vital Signs (Most Recent):  Temp: 98.9 °F (37.2 °C) (23 0710)  Pulse: 69 (23 09)  Resp: (!) 28 (23)  BP: (!) 137/58 (23)  SpO2: 97 % (23)   Vital Signs (24h Range):  Temp:  [98.1 °F (36.7 °C)-99 °F (37.2 °C)] 98.9 °F (37.2 °C)  Pulse:  [46-96] 69  Resp:  [18-35] 28  SpO2:  [95 %-100 %] 97 %  BP: (116-176)/() 137/58     Weight: (!) 162.5 kg (358 lb 4.8 oz)  Body mass index is 59.62 kg/m².    SpO2: 97 %         Intake/Output Summary (Last 24 hours) at 3/2/2023 1017  Last data filed at 3/2/2023 0629  Gross per 24 hour   Intake --   Output 1351 ml   Net -1351 ml       Lines/Drains/Airways       Drain  Duration                  Urethral Catheter 23 1945 16 Fr. <1 day              Peripheral Intravenous Line  Duration                  Peripheral IV - Single Lumen 23 0000 20 G Left Antecubital <1 day         Peripheral IV - Single Lumen 23 0240 18 G Anterior;Left Upper Arm <1 day                    Physical Exam  Vitals and nursing note reviewed.   Constitutional:       General: He is in acute distress.      Appearance: He is obese. He is ill-appearing.   HENT:      Head: Normocephalic and atraumatic.   Eyes:      General:         Right eye: No discharge.         Left eye: No discharge.      Pupils: Pupils are equal, round, and reactive to light.   Cardiovascular:      Rate and Rhythm: Normal rate. Rhythm irregular.      Heart sounds: S1 normal and S2 normal. No murmur heard.    No friction rub.   Pulmonary:      Effort: No respiratory distress.      Breath sounds: Rales present.      Comments: diminished  Abdominal:      Palpations: Abdomen is soft.      Tenderness: There is no abdominal tenderness.    Musculoskeletal:      Cervical back: Neck supple.      Right lower leg: No edema.      Left lower leg: No edema.   Skin:     General: Skin is warm and dry.   Neurological:      Mental Status: He is alert.      Comments: confusion       Significant Labs: BMP:   Recent Labs   Lab 03/01/23  1500   GLU 95      K 4.4   CL 89*   CO2 43*   BUN 22   CREATININE 1.0   CALCIUM 9.3   , CMP   Recent Labs   Lab 03/01/23  1500      K 4.4   CL 89*   CO2 43*   GLU 95   BUN 22   CREATININE 1.0   CALCIUM 9.3   PROT 8.1   ALBUMIN 2.4*   BILITOT 0.6   ALKPHOS 60   AST 20   ALT 12   ANIONGAP 9   , CBC   Recent Labs   Lab 03/01/23  1500   WBC 5.49   HGB 10.3*   HCT 35.3*      , INR No results for input(s): INR, PROTIME in the last 48 hours., Lipid Panel No results for input(s): CHOL, HDL, LDLCALC, TRIG, CHOLHDL in the last 48 hours., Troponin   Recent Labs   Lab 03/01/23  1500   TROPONINI 0.020   , and All pertinent lab results from the last 24 hours have been reviewed.    Significant Imaging: Cardiac Cath: reviewed, Echocardiogram: Transthoracic echo (TTE) complete (Cupid Only):   Results for orders placed or performed during the hospital encounter of 01/02/22   Echo   Result Value Ref Range    BSA 3.05 m2    TDI SEPTAL 0.05 m/s    LV LATERAL E/E' RATIO 5.78 m/s    LV SEPTAL E/E' RATIO 10.40 m/s    LA WIDTH 3.27 cm    IVC diameter 1.14 cm    Left Ventricular Outflow Tract Mean Velocity 0.51765527024155 cm/s    Left Ventricular Outflow Tract Mean Gradient 1.46 mmHg    TDI LATERAL 0.09 m/s    LVIDd 3.84 3.5 - 6.0 cm    IVS 1.56 (A) 0.6 - 1.1 cm    Posterior Wall 1.88 (A) 0.6 - 1.1 cm    Ao root annulus 3.63 cm    LVIDs 2.90 2.1 - 4.0 cm    FS 24 28 - 44 %    LA volume 25.66 cm3    Sinus 3.14 cm    STJ 2.64 cm    Ascending aorta 2.98 cm    LV mass 274.50 g    LA size 2.29 cm    RVDD 5.32 cm    TAPSE 1.44 cm    Left Ventricle Relative Wall Thickness 0.98 cm    AV mean gradient 3 mmHg    AV valve area 2.65 cm2    AV  Velocity Ratio 0.74     AV index (prosthetic) 0.70     MV valve area p 1/2 method 3.17 cm2    E/A ratio 0.60     Mean e' 0.07 m/s    E wave deceleration time 239.405838120155400 msec    IVRT 99.950142253492665 msec    LVOT diameter 2.20 cm    LVOT area 3.8 cm2    LVOT peak rodolfo 0.77 m/s    LVOT peak VTI 12.70 cm    Ao peak rodolfo 1.04 m/s    Ao VTI 18.2 cm    RVOT peak rodolfo 0.66 m/s    RVOT peak VTI 13.4 cm    LVOT stroke volume 48.25 cm3    AV peak gradient 4 mmHg    PV mean gradient 0.78 mmHg    E/E' ratio 7.43 m/s    MV Peak E Rodolfo 0.52 m/s    TR Max Rodolfo 3.18 m/s    MV stenosis pressure 1/2 time 69.557740243197066 ms    MV Peak A Rodolfo 0.87 m/s    LV Systolic Volume 32.09 mL    LV Systolic Volume Index 11.1 mL/m2    LV Diastolic Volume 63.39 mL    LV Diastolic Volume Index 21.93 mL/m2    LA Volume Index 8.9 mL/m2    LV Mass Index 95 g/m2    Echo EF Estimated 49 %    RA Major Axis 4.13 cm    Left Atrium Minor Axis 3.25 cm    Left Atrium Major Axis 5.31 cm    Triscuspid Valve Regurgitation Peak Gradient 40 mmHg    RA Width 2.81 cm    Right Atrial Pressure (from IVC) 3 mmHg    EF 55 %    TV rest pulmonary artery pressure 43 mmHg    Narrative    · The left ventricle is normal in size with concentric remodeling and   normal systolic function.  · The estimated ejection fraction is 55%.  · Grade I left ventricular diastolic dysfunction.  · Severe right ventricular enlargement with moderately to severely reduced   right ventricular systolic function.  · Right atrial enlargement.  · Mild to moderate tricuspid regurgitation.  · Normal central venous pressure (3 mmHg).  · The estimated PA systolic pressure is 43 mmHg.  · There is pulmonary hypertension.      , EKG: reviewed, Stress Test: reviewed, and X-Ray: CXR: X-Ray Chest 1 View (CXR): No results found for this visit on 03/01/23.    Assessment and Plan:     * Respiratory distress  Management per primary team    Atrial fibrillation  Tele reviewed, HR 60s  Cont BB, and full dose  lovenox  Will need NOAC upon DC    Acute on chronic respiratory failure with hypoxia and hypercapnia  Management per primary team    Venous thromboembolism  VQ in Jan 2022 positive for PE, was on eliquis then    Elevated brain natriuretic peptide (BNP) level    Diuresis as needed        VTE Risk Mitigation (From admission, onward)         Ordered     enoxaparin injection 60 mg  Every 12 hours         03/01/23 2008                Thank you for your consult. I will follow-up with patient. Please contact us if you have any additional questions.    Tiffany Melendez NP  Cardiology   [facility] - Emergency Dept.

## 2023-03-02 NOTE — ASSESSMENT & PLAN NOTE
History of DVT of lower extremity.  Treated previously with anticoagulation as per records discharged on apixaban September 2022.  Not aware why not on anticoagulation currently.  History of recent admission for dark stool  3/2 September 2022 was on anticoagulation.  Currently patient and wife not aware if he is anticoagulated but on previous admission for dark stools no anticoagulation noted on DC.  Left femoral DVT.  Repeat ultrasound of lower extremity venous

## 2023-03-02 NOTE — PROGRESS NOTES
[facility] - Emergency Dept.  Critical Care Medicine  History & Physical    Patient Name: Gene Rothman  MRN: [id number]  Admission Date: 3/1/2023  Hospital Length of Stay: 1 days  Code Status: Prior  Attending Physician: Inna Hammond MD   Primary Care Provider: Ami Zarate DO   Principal Problem: Respiratory distress    Subjective:     HPI:  77-year-old male patient with past medical history of CHF, CKD, CAD, HTN, hypothyroidism, peripheral vascular disease, and obesity hypoventilation on oxygen at home, received oxygen via face mask in the past, at 4.5 L, admitted in September with intermediate V/Q scan and left lower extremity DVT , has had multiple hospital admissions over the last six-months for sepsis and UTI brought to the emergency room today for altered mental status.        Hospital/ICU Course:  Chest x-ray and ABG reviewed.  Started on BiPAP.  EKG reviewed.   03/02/2023 events overnight noted.  Large BM.  Heart rate in the 60s.  Wore BiPAP all night.  ABG reviewed.  Favorable CO2 trend.  Oriented x3.  On O2 via nasal cannula.  O2 sat 98% on 4 liter/minute.  1.3 L urine output.  Blood culture unremarkable so far.  Afebrile           03/02/2023 events overnight noted.  Large BM.  Heart rate in the 60s.  Wore BiPAP all night.  ABG reviewed.  Favorable CO2 trend.  Oriented x3.  On O2 via nasal cannula.  O2 sat 98% on 4 liter/minute.  1.3 L urine output.  Blood culture unremarkable so far.  Afebrile  Review of Systems   Unable to perform ROS: Acuity of condition   Constitutional:  Positive for activity change and fatigue. Negative for chills and fever.   HENT:  Negative for drooling, ear discharge and nosebleeds.    Eyes:  Negative for pain, discharge and itching.   Respiratory:  Positive for cough and shortness of breath. Negative for choking.    Cardiovascular:  Positive for leg swelling. Negative for chest pain.   Gastrointestinal:  Negative for abdominal pain.   Endocrine: Negative for cold intolerance.    Genitourinary:  Negative for hematuria.   Musculoskeletal:  Positive for arthralgias, back pain and gait problem. Negative for neck stiffness.   Skin:  Negative for rash.   Allergic/Immunologic: Negative for immunocompromised state.   Neurological:  Positive for weakness. Negative for seizures and facial asymmetry.   Hematological:  Negative for adenopathy.   Psychiatric/Behavioral:  Negative for behavioral problems, self-injury and suicidal ideas.    Objective:     Vital Signs (Most Recent):  Temp: 98.9 °F (37.2 °C) (03/02/23 0710)  Pulse: 72 (03/02/23 0710)  Resp: (!) 27 (03/02/23 0710)  BP: (!) 176/77 (03/02/23 0710)  SpO2: 98 % (03/02/23 0710) Vital Signs (24h Range):  Temp:  [98.1 °F (36.7 °C)-99 °F (37.2 °C)] 98.9 °F (37.2 °C)  Pulse:  [46-96] 72  Resp:  [18-35] 27  SpO2:  [95 %-100 %] 98 %  BP: (116-176)/() 176/77     Weight: (!) 162.5 kg (358 lb 4.8 oz)  Body mass index is 59.62 kg/m².      Intake/Output Summary (Last 24 hours) at 3/2/2023 0847  Last data filed at 3/2/2023 0629  Gross per 24 hour   Intake --   Output 1351 ml   Net -1351 ml       Physical Exam  Vitals and nursing note reviewed.   Constitutional:       General: He is in acute distress.      Appearance: He is well-developed. He is obese. He is ill-appearing and toxic-appearing.   HENT:      Head: Normocephalic and atraumatic.      Nose: Nose normal.   Eyes:      Extraocular Movements: Extraocular movements intact.   Cardiovascular:      Rate and Rhythm: Normal rate. Rhythm irregular.   Pulmonary:      Effort: No respiratory distress.      Breath sounds: No stridor. Rhonchi present.   Abdominal:      General: There is no distension.   Genitourinary:     Comments: May in place  Musculoskeletal:         General: Swelling present. No signs of injury.      Cervical back: Normal range of motion and neck supple.   Skin:     General: Skin is warm and dry.   Neurological:      General: No focal deficit present.      Mental Status: He is alert.        Vents:  Oxygen Concentration (%): 30 (03/02/23 0453)    Lines/Drains/Airways       Drain  Duration                  Urethral Catheter 03/01/23 1945 16 Fr. <1 day              Peripheral Intravenous Line  Duration                  Peripheral IV - Single Lumen 03/02/23 0000 20 G Left Antecubital <1 day         Peripheral IV - Single Lumen 03/02/23 0240 18 G Anterior;Left Upper Arm <1 day                    Significant Labs:    CBC/Anemia Profile:  Recent Labs   Lab 03/01/23  1500   WBC 5.49   HGB 10.3*   HCT 35.3*      *   RDW 14.2          Chemistries:  Recent Labs   Lab 03/01/23  1500      K 4.4   CL 89*   CO2 43*   BUN 22   CREATININE 1.0   CALCIUM 9.3   ALBUMIN 2.4*   PROT 8.1   BILITOT 0.6   ALKPHOS 60   ALT 12   AST 20        Latest Reference Range & Units Most Recent   BNP 0 - 99 pg/mL 183 (H)  3/1/23 15:00   CPK 20 - 200 U/L 44  1/2/22 16:28    - 260 U/L 370 (H)  1/2/22 16:28   Troponin I 0.000 - 0.026 ng/mL 0.020  3/1/23 15:00   (H): Data is abnormally high   Latest Reference Range & Units 02/03/23 20:54   Color, UA Yellow, Straw, Cindy  Yellow   Appearance, UA Clear  Hazy !   Specific Gravity, UA 1.005 - 1.030  1.010   pH, UA 5.0 - 8.0  7.0   Protein, UA Negative  1+ !   Glucose, UA Negative  Negative   Ketones, UA Negative  Negative   Occult Blood UA Negative  2+ !   NITRITE UA Negative  Positive !   UROBILINOGEN UA <2.0 EU/dL Negative   Bilirubin (UA) Negative  Negative   Leukocytes, UA Negative  3+ !   RBC, UA 0 - 4 /hpf 18 (H)   WBC, UA 0 - 5 /hpf >100 (H)   Bacteria, UA None-Occ /hpf Many !   Squam Epithel, UA /hpf 2   WBC Clumps, UA None-Rare  Many !   Hyaline Casts, UA 0-1/lpf /lpf 1   Yeast, UA None  Occasional !   Unclass Gali UA None-Moderate  Occasional      Latest Reference Range & Units 03/01/23 16:06 03/01/23 18:22   Sample  ARTERIAL ARTERIAL   POC PH 7.35 - 7.45  7.228 (LL) 7.435   POC PCO2 35 - 45 mmHg 123.5 (HH) 73.1 (HH)   POC PO2 80 - 100 mmHg 123 (H) 74 (L)    POC HCO3 24 - 28 mmol/L 51.5 (H) 49.1 (H)   POC SATURATED O2 95 - 100 % 97 94 (L)   Allens Test  Pass Pass   POC BE -2 to 2 mmol/L 24 25   FiO2   40   Flow  2    DelSys  Nasal Can CPAP/BiPAP   Site  RR LR   Mode  SPONT BiPAP   Rate   26   (LL): Data is critically low  (HH): Data is critically high  (H): Data is abnormally high  (L): Data is abnormally low    EKG 03/01/2020    2D echo 2022     The left ventricle is normal in size with concentric remodeling and normal systolic function.  The estimated ejection fraction is 55%.  Grade I left ventricular diastolic dysfunction.  Severe right ventricular enlargement with moderately to severely reduced right ventricular systolic function.  Right atrial enlargement.  Mild to moderate tricuspid regurgitation.  Normal central venous pressure (3 mmHg).  The estimated PA systolic pressure is 43 mmHg.  There is pulmonary hypertension.    Significant Imaging:     Chest x-ray 3/1 personally reviewed cardiomegaly worsening interstitial densities        Assessment/Plan:     Pulmonary  * Respiratory distress  Continues BiPAP repeat ABG in a.m..  Nebs.  3/2 nebs.  Favorable trend of CO2 on ABG.  Use BiPAP during sleep and p.r.n. distress.  Case management evaluation.    Acute on chronic respiratory failure with hypoxia and hypercapnia  Patient with Hypercapnic and Hypoxic Respiratory failure which is Acute on chronic.  he is on home oxygen at 5 LPM. Supplemental oxygen was provided and noted- Oxygen Concentration (%):  [] 30.   Signs/symptoms of respiratory failure include- increased work of breathing and respiratory distress. Contributing diagnoses includes - CHF, COPD and Obesity Hypoventilation Labs and images were reviewed. Patient Has recent ABG, which has been reviewed. Will treat underlying causes and adjust management of respiratory failure as follows- O2 target sat above 89%.  Nebs.  BiPAP continuous.  Repeat ABG in a.m.  03/02/2023 nonadherent to positive pressure  noninvasive ventilation at home.  24 hours O2.  Need BiPAP during sleep and p.r.n. distress..    Cardiac/Vascular  Atrial fibrillation  Cardiac monitoring.  Heart rate 60 on EKG.  Not on anticoagulation.  In September was on apixaban currently not aware why he is not on blood thinners neither his wife.  Previous admission for melena?     3/2 cardiac monitoring.  rate controlled.  Not on anticoagulation.  Cardiology consult.    Elevated brain natriuretic peptide (BNP) level  Strict I&Os.  Diuresis.  BiPAP  3/2 Diamox 250 mg x1 today.  Post hypercapnic respiratory failure noted on ABG.    Hematology  Venous thromboembolism  History of DVT of lower extremity.  Treated previously with anticoagulation as per records discharged on apixaban September 2022.  Not aware why not on anticoagulation currently.  History of recent admission for dark stool  3/2 September 2022 was on anticoagulation.  Currently patient and wife not aware if he is anticoagulated but on previous admission for dark stools no anticoagulation noted on DC.  Left femoral DVT.  Repeat ultrasound of lower extremity venous        Critical Care Daily Checklist:    A: Awake: RASS Goal/Actual Goal:    Actual:     B: Spontaneous Breathing Trial Performed?     C: SAT & SBT Coordinated?   BiPAP                      D: Delirium: CAM-ICU     E: Early Mobility Performed? Yes   F: Feeding Goal:    Status:     Current Diet Order   Procedures    Diet Cardiac      AS: Analgesia/Sedation Not Sedated   T: Thromboembolic Prophylaxis Y   H: HOB > 300 Yes   U: Stress Ulcer Prophylaxis (if needed) Y   G: Glucose Control Fingerstick p.r.n.   B: Bowel Function     I: Indwelling Catheter (Lines & May) Necessity May in place needed per nursing assessment   D: De-escalation of Antimicrobials/Pharmacotherapies Cefepime for UTI    Plan for the day/ETD BiPAP p.r.n. IV antibiotic    Code Status:  Family/Goals of Care: Prior  Y     Critical Care Time: 35 minutes  Critical secondary  to Patient has a condition that poses threat to life and bodily function: Severe Respiratory Distress     Critical care was time spent personally by me on the following activities: development of treatment plan with patient or surrogate and bedside caregivers, discussions with consultants, evaluation of patient's response to treatment, examination of patient, ordering and performing treatments and interventions, ordering and review of laboratory studies, ordering and review of radiographic studies, pulse oximetry, re-evaluation of patient's condition. This critical care time did not overlap with that of any other provider or involve time for any procedures.     Helen Modi MD  Critical Care Medicine  [facility] - Emergency Dept.

## 2023-03-03 NOTE — PLAN OF CARE
Problem: Adult Inpatient Plan of Care  Goal: Plan of Care Review  Outcome: Ongoing, Progressing  Goal: Patient-Specific Goal (Individualized)  Outcome: Ongoing, Progressing  Goal: Absence of Hospital-Acquired Illness or Injury  Outcome: Ongoing, Progressing  Goal: Optimal Comfort and Wellbeing  Outcome: Ongoing, Progressing  Goal: Readiness for Transition of Care  Outcome: Ongoing, Progressing     Problem: Bariatric Environmental Safety  Goal: Safety Maintained with Care  Outcome: Ongoing, Not Progressing     Problem: Infection  Goal: Absence of Infection Signs and Symptoms  Outcome: Ongoing, Progressing     Problem: Fall Injury Risk  Goal: Absence of Fall and Fall-Related Injury  Outcome: Ongoing, Progressing     Problem: Skin Injury Risk Increased  Goal: Skin Health and Integrity  Outcome: Ongoing, Progressing

## 2023-03-03 NOTE — CONSULTS
[facility] - Telemetry ([facility])  Wound Care    Patient Name:  Gene Rothman   MRN:  [id number]  Date: 3/3/2023  Diagnosis: Acute on chronic respiratory failure with hypoxia and hypercapnia    History:     Past Medical History:   Diagnosis Date    Acute hypoxemic respiratory failure 2022    Arthritis     Atrial fibrillation 3/1/2023    CHF (congestive heart failure)     Chronic kidney disease     Coronary artery disease     Depression     Hypertension     Hypertensive heart disease with heart failure 2022    Hypothyroidism     Lymphedema of lower extremity     Nephrolithiasis     Obesity     Peripheral vascular disease     Pneumonia 2022    Recurrent cellulitis of lower leg     Sleep apnea     can't stand the CPAP , sleeps elevated in hospital bed or chair    Tobacco dependence     resolved       Social History     Socioeconomic History    Marital status:    Tobacco Use    Smoking status: Former     Packs/day: 1.50     Years: 1.00     Pack years: 1.50     Types: Cigarettes     Quit date:      Years since quittin.2    Smokeless tobacco: Never   Substance and Sexual Activity    Alcohol use: Not Currently    Drug use: Never    Sexual activity: Never   Social History Narrative    ** Merged History Encounter **          Lives with wife and 2 sons and a daughter. No longer drives. Quit in  because couldn't hold foot on pedal. 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 Uses a Rolator at wo    rk and wheelchair at home. No caffeine. Does not have a Living Will or Advanced Directive.       Social Determinants of Health     Financial Resource Strain: High Risk    Difficulty of Paying Living Expenses: Hard   Food Insecurity: Food Insecurity Present    Worried About Running Out of Food in the Last Year: Often true    Ran Out of Food in the Last Year: Often true   Transportation Needs: Unmet Transportation Needs    Lack of  Transportation (Medical): Yes    Lack of Transportation (Non-Medical): Yes   Physical Activity: Insufficiently Active    Days of Exercise per Week: 7 days    Minutes of Exercise per Session: 10 min   Stress: Stress Concern Present    Feeling of Stress : To some extent   Social Connections: Socially Isolated    Frequency of Communication with Friends and Family: Never    Frequency of Social Gatherings with Friends and Family: Once a week    Attends Latter day Services: Never    Active Member of Clubs or Organizations: No    Attends Club or Organization Meetings: Never    Marital Status:    Housing Stability: Low Risk     Unable to Pay for Housing in the Last Year: No    Number of Places Lived in the Last Year: 1    Unstable Housing in the Last Year: No       Precautions:     Allergies as of 03/01/2023 - Reviewed 03/01/2023   Allergen Reaction Noted    Bactrim [sulfamethoxazole-trimethoprim] Itching 08/29/2014       [facility] Assessment Details/Treatment     Consulted to this 76 year old male patient for wound care.history of  super obesity, CHF, cardiomegaly, history of VTE (PE / DVT) on OAC, hearing loss, CAD, CKD, lymphedema. He is admitted with acute respiratory failure. He is awake and alert. BLE with hemosiderin staining, dry flaky skin. No current wounds.  Buttocks, ischium, tahir area with chronic discoloration. Gluteal cleft with intetrigo- partial thickness wound bed that is moist and pink with surrounding fungal rash. Same also noted to groin and lower abdominal fold. Antifungal barrier ordered for these areas. Recommend moisturizer to BLE. Will follow.      03/03/2023

## 2023-03-03 NOTE — ASSESSMENT & PLAN NOTE
Reportedly has not filled AC since 2/2022  Cardiology consulted; appreciate recs  Continue B-blocker, therapeutic lovenox   Will need NOAC on discharge

## 2023-03-03 NOTE — ASSESSMENT & PLAN NOTE
Cr has ranged 1-1.9 over last few months   Appears to be at baseline at this time   Monitor BMP; renally dose meds   Strict Is and Os

## 2023-03-03 NOTE — PLAN OF CARE
[facility] - Telemetry (Hospital)  Initial Discharge Assessment       Primary Care Provider: Ami Zarate DO    Admission Diagnosis: Altered mental status [R41.82]  Weakness [R53.1]  Respiratory distress [R06.03]  Right knee pain [M25.561]  Acute cystitis with hematuria [N30.01]  Acute respiratory failure with hypercapnia [J96.02]    Admission Date: 3/1/2023  Expected Discharge Date:     Discharge Barriers Identified: Bariatric    Payor: HU[employer] MANAGED MEDICARE / Plan: HU[employer] TOTAL CARE ADVANTAGE / Product Type: Medicare Advantage /     Extended Emergency Contact Information  Primary Emergency Contact: Solo Rothman  Address: [address] [address] [place]  Mobile Phone: [phone]  Relation: Son  Secondary Emergency Contact: Alise Rothman  Address: [address] [address] [facility]  Home Phone: [phone]  Relation: Daughter    Discharge Plan A: Home, Home Health         [facility] [address]  [address] [facility] [address]  Phone: [phone] Fax: [phone]    [facility] [place] [address]  Phone: [phone] Fax: [phone]    [facility] [address] - [address] [facility]  [address]   [place] [address]  Phone: [phone] Fax: [phone]      Initial Assessment (most recent)       Adult Discharge Assessment - 03/03/23 1449          Discharge Assessment    Assessment Type Discharge Planning Assessment     Confirmed/corrected address, phone number and insurance Yes     Confirmed Demographics Correct on Facesheet     Source of Information patient;family     Communicated JAMES with patient/caregiver Date not available/Unable to determine     Reason For Admission Acute on chronic respiratory failure with hypoxia and hypercapnia     People in Home spouse     Facility Arrived From: Home      Do you expect to return to your current living situation? Yes     Do you have help at home or someone to help you manage your care at home? Yes     Who are your caregiver(s) and their phone number(s)? Spouse and relatives (children, grandsons)     Prior to hospitilization cognitive status: Alert/Oriented     Current cognitive status: Alert/Oriented     Walking or Climbing Stairs ambulation difficulty, requires equipment     Mobility Management bedbound     Dressing/Bathing bathing difficulty, assistance 1 person     Dressing/Bathing Management wife and son assists     Home Accessibility wheelchair accessible     Equipment Currently Used at Home wheelchair;oxygen;hospital bed;CPAP     Readmission within 30 days? No     Patient currently being followed by outpatient case management? No     Do you currently have service(s) that help you manage your care at home? No     Do you take prescription medications? Yes     Do you have prescription coverage? Yes     Do you have any problems affording any of your prescribed medications? No     Is the patient taking medications as prescribed? yes     Who is going to help you get home at discharge? Pt's will likely need ambulance transportation     How do you get to doctors appointments? health plan transportation     Are you on dialysis? No     Do you take coumadin? No     Discharge Plan A Home;Home Health     DME Needed Upon Discharge  BIPAP     Discharge Plan discussed with: Patient;Spouse/sig other     Discharge Barriers Identified Bariatric                   Anticipated DC dispo: Home   Prior Level of Function: Lives at home with spouse, has sufficient social support from family, pt is primarily bed bound.   PCP: Ami Zarate DO     Comments: LEANNE met with patient and spouse at bedside. LEANNE discussed possible need for Bipap for Home Use. LEANNE sent secure chat to hospitalist and pulmonology regarding which provider will order Bipap; SW awaiting response and order. LEANNE explained to  providers and patient that [facility] will have to process order once placed d/t insurance: [employer] - Total Care Advantage.     Pt has had home health in the past but may benefit at discharge for transition home. Family is not interested in NH placement.     SW to remain available.     Pt's whiteboard updated to reflect discharge disposition and SW contact information. SW to continue following.

## 2023-03-03 NOTE — HOSPITAL COURSE
3/3/23  Patient asleep in bed, wearing NC. Unable to tolerate mask overnight. Updated family at bedside.    3/4/23  NAEON. Patient awake, wearing NC, worse BiPAP overnight. Denies any new complaints.    3/5/23  NAEON. Patient asleep, arouses to stimuli, denies any new issues or complaints. Stable on 4L NC. Blood culture (3/2/23) +staph, speciation pending, continue cefepime.    3/6 Repeated urine cx is (+) for enterococcus fecalis , IVAB changed to Levaquin .  He is aao x 3 in NAD .  He  revoke hospice care last year and is a full code .  He is refusing NIV .     3/7 He is aao x 4 in nad . The urine  cx is (+) for  enterococcus faecium   resistance to ampicillin and vanc . Started yesterday on zyvox . He already has cefepime x 5 days (previous pseudomonas  urine infection ). [facility] . Pt  declining  Hospice care .     3/8 Pt was seen and examined at bedside . He was determined to be suitable for d/c . He is aao x 4 in nad . Pt and family declining Hospice care . Prognosis poor . Plan to d/c with HH  , po zyvox and eliquis .
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
## 2023-03-03 NOTE — ASSESSMENT & PLAN NOTE
History of DVT of lower extremity. Treated previously with anticoagulation discharged on apixaban in  September 2022. Not aware why not on anticoagulation currently. History of recent admission for dark stool on 3/2. Repeat venous US performed on 3/2 and noted chronic appearing left superficial and popliteal DVT with no evidence of right lower extremity DVT.    · On Lovenox 60mg BID

## 2023-03-03 NOTE — SUBJECTIVE & OBJECTIVE
Review of Systems   All other systems reviewed and are negative.  Objective:     Vital Signs (Most Recent):  Temp: 98.9 °F (37.2 °C) (03/03/23 1119)  Pulse: 60 (03/03/23 1119)  Resp: 18 (03/03/23 1119)  BP: 131/62 (03/03/23 1119)  SpO2: (!) 93 % (03/03/23 1119)   Vital Signs (24h Range):  Temp:  [97.5 °F (36.4 °C)-99.4 °F (37.4 °C)] 98.9 °F (37.2 °C)  Pulse:  [56-75] 60  Resp:  [18-27] 18  SpO2:  [91 %-97 %] 93 %  BP: (120-179)/(56-73) 131/62     Weight: (!) 162.5 kg (358 lb 4.8 oz)  Body mass index is 59.62 kg/m².    Intake/Output Summary (Last 24 hours) at 3/3/2023 1123  Last data filed at 3/3/2023 0633  Gross per 24 hour   Intake 50 ml   Output 1500 ml   Net -1450 ml      Physical Exam  Vitals and nursing note reviewed.   Constitutional:       General: He is not in acute distress.     Appearance: He is obese. He is ill-appearing.      Comments: Wearing NC   Cardiovascular:      Rate and Rhythm: Normal rate and regular rhythm.      Heart sounds: No murmur heard.  Pulmonary:      Effort: Pulmonary effort is normal. No respiratory distress.      Breath sounds: Normal breath sounds. No wheezing.   Abdominal:      General: There is no distension.      Palpations: Abdomen is soft.      Tenderness: There is no abdominal tenderness.   Musculoskeletal:      Right lower leg: Edema present.      Left lower leg: Edema present.   Skin:     Findings: Lesion and rash (BLE) present.       Significant Labs: All pertinent labs within the past 24 hours have been reviewed.  CBC:   Recent Labs   Lab 03/01/23  1500 03/03/23  0540   WBC 5.49 8.58   HGB 10.3* 10.8*   HCT 35.3* 35.2*    161     CMP:   Recent Labs   Lab 03/01/23  1500 03/03/23  0540    143   K 4.4 3.2*   CL 89* 94*   CO2 43* 39*   GLU 95 83   BUN 22 25*   CREATININE 1.0 1.0   CALCIUM 9.3 8.8   PROT 8.1 6.8   ALBUMIN 2.4* 2.0*   BILITOT 0.6 0.8   ALKPHOS 60 52*   AST 20 15   ALT 12 7*   ANIONGAP 9 10       Significant Imaging: I have reviewed all pertinent  imaging results/findings within the past 24 hours.    US Lower Extremity Veins Bilateral   Final Result      1. There is persistent chronic appearing deep venous thrombosis in the left superficial and popliteal veins.  This is consistent with the patient's history.   2. The left peroneal vein was not visualized.   3. There is no deep venous thrombosis visualized in the right lower extremity.         Electronically signed by: Christopher Fregoso MD   Date:    03/02/2023   Time:    10:59      CT Head Without Contrast   Final Result      No acute abnormality.      Atrophy and chronic white matter changes      All CT scans   are performed using dose optimization techniques including the following: automated exposure control; adjustment of the mA and/or kV; use of iterative reconstruction technique.  Dose modulation was employed for ALARA by means of: Automated exposure control; adjustment of the mA and/or kV according to patient size (this includes techniques or standardized protocols for targeted exams where dose is matched to indication/reason for exam; i.e. extremities or head); and/or use of iterative reconstructive technique.         Electronically signed by: Brannon Orellana   Date:    03/01/2023   Time:    20:13      X-Ray Chest AP Portable   Final Result      1. There is a moderate amount of alveolar consolidation scattered throughout the right lung.  An infectious process cannot be excluded.   2. There is blunting of the right costophrenic angle.  This is characteristic of a tiny pleural effusion.   3. This is a limited examination secondary to patient rotation to the left.   4. The size of the heart is prominent.  This may be secondary to magnification.   5. There are moderate osteoarthritic changes in the glenohumeral joints bilaterally.   6. There is prominence of the width of the right acromioclavicular joint. It measures 13 mm in width.   .         Electronically signed by: Christopher Fregoso MD    Date:    03/02/2023   Time:    07:32      X-Ray Knee 3 View Right   Final Result      1. There is a 12 mm curvilinear calcification projected medial to the medial femoral condyle.  This is characteristic of a Mariusz-Stieda lesion.   2. There is a small joint effusion in the knee.   3. There is marked narrowing of the joint space of the medial compartment.   4. There is a mild amount of atherosclerosis.         Electronically signed by: Christopher Fregoso MD   Date:    03/02/2023   Time:    07:35
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
## 2023-03-03 NOTE — SUBJECTIVE & OBJECTIVE
Gene Rothman is a 77-year-old male patient with past medical history of chronic diastolic heart failure, chronic kidney disease, coronary artery disease, hypertension, hypothyroidism, peripheral vascular disease, and obesity hypoventilation on oxygen at home, prior intermediate V/Q scan, LLE DVT, and recurrent UTI. Patient has had multiple hospital admissions in the last six months for sepsis due to UTI. Patient was brought to the emergency room for evaluation of altered mental status. He was again noted to have a UTI and ABGs revealed the patient to have hypercapnic respiratory failure with a pH 7.228, pCO2 123.5, paO2 123, and HCO3 51.5. Patient was admitted to the ICU and initiated on BiPAP.     Interval history:  3/3: Unable to wear BiPAP overnight. Patient reports he is a mouth breather and did not tolerate wearing the BiPAP mask last night. Tmax: 99.4F. Mentation appears stable with no evidence of encephalopathy noted.    Objective:     Vital Signs (Most Recent):  Temp: 97.5 °F (36.4 °C) (03/03/23 0739)  Pulse: 66 (03/03/23 0739)  Resp: 18 (03/03/23 0739)  BP: (!) 152/63 (03/03/23 0739)  SpO2: (!) 93 % (03/03/23 0739)   Vital Signs (24h Range):  Temp:  [97.5 °F (36.4 °C)-99.4 °F (37.4 °C)] 97.5 °F (36.4 °C)  Pulse:  [56-75] 66  Resp:  [18-27] 18  SpO2:  [91 %-97 %] 93 %  BP: (120-179)/(56-73) 152/63   Weight: (!) 162.5 kg (358 lb 4.8 oz)  Body mass index is 59.62 kg/m².    Intake/Output Summary (Last 24 hours) at 3/3/2023 1012  Last data filed at 3/3/2023 0633  Gross per 24 hour   Intake 50 ml   Output 1500 ml   Net -1450 ml     Physical Exam  Vitals and nursing note reviewed.   Constitutional:       Appearance: He is obese.   HENT:      Head: Normocephalic.   Eyes:      Conjunctiva/sclera: Conjunctivae normal.   Cardiovascular:      Rate and Rhythm: Normal rate.   Pulmonary:      Effort: Pulmonary effort is normal.      Breath sounds: Normal breath sounds.   Abdominal:      Palpations: Abdomen is soft.    Musculoskeletal:         General: Normal range of motion.      Cervical back: Normal range of motion.   Skin:     General: Skin is warm and dry.   Neurological:      Mental Status: He is alert and oriented to person, place, and time.     Review of Systems 12 point ROS negative except as indicated in HPI  Significant Labs:  CBC/Anemia Profile:  Recent Labs   Lab 03/01/23  1500 03/03/23  0540   WBC 5.49 8.58   HGB 10.3* 10.8*   HCT 35.3* 35.2*    161   * 102*   RDW 14.2 14.6*   Chemistries:  Recent Labs   Lab 03/01/23  1500 03/03/23  0540    143   K 4.4 3.2*   CL 89* 94*   CO2 43* 39*   BUN 22 25*   CREATININE 1.0 1.0   CALCIUM 9.3 8.8   ALBUMIN 2.4* 2.0*   PROT 8.1 6.8   BILITOT 0.6 0.8   ALKPHOS 60 52*   ALT 12 7*   AST 20 15   MG  --  2.0
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
## 2023-03-03 NOTE — ASSESSMENT & PLAN NOTE
Patient with Hypercapnic and Hypoxic Respiratory failure which is Acute on chronic.  he is on home oxygen at 4 LPM. Supplemental oxygen was provided and noted- Oxygen Concentration (%):  [40] 40.   Signs/symptoms of respiratory failure include- respiratory distress and lethargy. Contributing diagnoses includes - CHF, Obesity Hypoventilation and DENISHA Labs and images were reviewed. Patient Has recent ABG, which has been reviewed. Will treat underlying causes and adjust management of respiratory failure as follows    Per chart review, does not appear to use CPAP at home which is contributing to hypercapnia and encephalopathy  Continue BIPAP qhs and while sleeping/resting   S/p diamox today per ICU team, will resume home PO Lasix in AM   TTE with EF 55%, severe RV enlargement with mod to severe reduced RV systolic function, mild to mod TR, pHTN   Supplemental O2 to maintain sats > 90%
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
## 2023-03-04 NOTE — SUBJECTIVE & OBJECTIVE
Review of Systems   All other systems reviewed and are negative.  Objective:     Vital Signs (Most Recent):  Temp: 96.5 °F (35.8 °C) (03/04/23 1557)  Pulse: 60 (03/04/23 1557)  Resp: 12 (03/04/23 1557)  BP: (!) 112/53 (03/04/23 1557)  SpO2: (!) 90 % (03/04/23 1557)   Vital Signs (24h Range):  Temp:  [96.5 °F (35.8 °C)-98.8 °F (37.1 °C)] 96.5 °F (35.8 °C)  Pulse:  [53-67] 60  Resp:  [12-20] 12  SpO2:  [90 %-98 %] 90 %  BP: (112-132)/(53-60) 112/53     Weight: (!) 185 kg (407 lb 13.6 oz)  Body mass index is 67.87 kg/m².    Intake/Output Summary (Last 24 hours) at 3/4/2023 1635  Last data filed at 3/4/2023 1408  Gross per 24 hour   Intake 480 ml   Output 1000 ml   Net -520 ml      Physical Exam  Vitals and nursing note reviewed.     Constitutional:       General: He is not in acute distress.     Appearance: He is obese. He is ill-appearing.      Comments: Wearing NC   Cardiovascular:      Rate and Rhythm: Normal rate and regular rhythm.      Heart sounds: No murmur heard.  Pulmonary:      Effort: Pulmonary effort is normal. No respiratory distress.      Breath sounds: Normal breath sounds. No wheezing.   Abdominal:      General: There is no distension.      Palpations: Abdomen is soft.      Tenderness: There is no abdominal tenderness.   Musculoskeletal:      Right lower leg: Edema present.      Left lower leg: Edema present.   Skin:     Findings: Lesion and rash (BLE) present.     Significant Labs: All pertinent labs within the past 24 hours have been reviewed.  CBC:   Recent Labs   Lab 03/03/23  0540 03/04/23 0459   WBC 8.58 7.76   HGB 10.8* 9.9*   HCT 35.2* 32.2*    178     CMP:   Recent Labs   Lab 03/03/23  0540 03/04/23  0459    141   K 3.2* 3.7   CL 94* 97   CO2 39* 33*   GLU 83 79   BUN 25* 28*   CREATININE 1.0 1.0   CALCIUM 8.8 8.6*   PROT 6.8 6.9   ALBUMIN 2.0* 1.9*   BILITOT 0.8 0.6   ALKPHOS 52* 51*   AST 15 17   ALT 7* 9*   ANIONGAP 10 11       Significant Imaging: I have reviewed all  pertinent imaging results/findings within the past 24 hours.

## 2023-03-04 NOTE — ASSESSMENT & PLAN NOTE
Patient with Hypercapnic and Hypoxic Respiratory failure which is Acute on chronic.  he is on home oxygen at 4 LPM. Supplemental oxygen was provided and noted- Oxygen Concentration (%):  [40] 40.   Signs/symptoms of respiratory failure include- respiratory distress and lethargy. Contributing diagnoses includes - CHF, Obesity Hypoventilation and DENISHA Labs and images were reviewed. Patient Has recent ABG, which has been reviewed. Will treat underlying causes and adjust management of respiratory failure:    TTE with EF 55%, severe RV enlargement with mod to severe reduced RV systolic function, mild to mod TR, pHTN   Supplemental O2 to maintain sats > 90%    Per chart review, does not appear to use CPAP at home which is contributing to hypercapnia and encephalopathy  Continue BIPAP qhs and while sleeping/resting   S/p diamox per ICU team, will resume home PO Lasix today  Pulmonary following, appreciate assistance

## 2023-03-04 NOTE — ASSESSMENT & PLAN NOTE
Cardiology following  Reportedly has not filled AC since 2/2022  Currently not on BB  Continue therapeutic Lovenox  Will need NOAC on discharge
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
## 2023-03-04 NOTE — ASSESSMENT & PLAN NOTE
Recent urine culture (2/3/23) +pseudomonas  Urine culture with multiple organisms; repeat urine culture high risk  Blood culture gram stain +GPC  Continue cefepime, day # 3  Remove May today, voiding trial, bladder scan PRN

## 2023-03-04 NOTE — PROGRESS NOTES
[place] - Telemetry ([facility])  [facility] Medicine  Progress Note    Patient Name: Gene Rothman  MRN: [id number]  Patient Class: IP- Inpatient   Admission Date: 3/1/2023  Length of Stay: 3 days  Attending Physician: Jerson Fowler MD  Primary Care Provider: Ami Zarate DO        Subjective:     Principal Problem:Acute on chronic respiratory failure with hypoxia and hypercapnia        HPI:  Pt is a 76 YO male with PMH notable for CAD, CKD, CHF, Afib, PVD, chronic hypoxia (on 4-5L NC), DENISHA, morbid obesity, LLE DVT (not on AC) who initially presented to the ED via EMS on 03/01 for evaluation of altered mental status. On presentation, pt was on 3 L nasal cannula, in resp distress. Initial ABG revealed respiratory acidosis 7.22/123/123 on nasal cannula, patient was placed on BiPAP.  Remaining workup notable for UA consistent with UTI, lactate, procalcitonin within normal limits, CT head negative for acute changes. CXR with R sided consolidation. Patient was started on IV cefepime for UTI.  Patient was admitted to the ICU for further management.  On 03/01, patient was weaned off BiPAP to nasal cannula, with some improvement in mental status and acidosis. Given diamox for diuresis, cardiology consulted per ICU for Afib management.  He was deemed stable for step-down from ICU to floor.  Hospital Medicine was consulted for continuation of care.  PT seen with RN at bedside. On my evaluation patient is lying in bed, is extremely hard of hearing is oriented to self, place but not situation.  Denies any chest pain, shortness of breath.  No family at bedside during my evaluation.  Discussed with bedside RN, patient had May placed in ER, noted to have skin breakdown over his bottom and bilateral lower extremities that was present on admission.       Overview/Hospital Course:  3/3/23  Patient asleep in bed, wearing NC. Unable to tolerate mask overnight. Updated family at bedside.    3/4/23  NAEON. Patient awake, wearing NC, worse  BiPAP overnight. Denies any new complaints.        Review of Systems   All other systems reviewed and are negative.  Objective:     Vital Signs (Most Recent):  Temp: 96.5 °F (35.8 °C) (03/04/23 1557)  Pulse: 60 (03/04/23 1557)  Resp: 12 (03/04/23 1557)  BP: (!) 112/53 (03/04/23 1557)  SpO2: (!) 90 % (03/04/23 1557)   Vital Signs (24h Range):  Temp:  [96.5 °F (35.8 °C)-98.8 °F (37.1 °C)] 96.5 °F (35.8 °C)  Pulse:  [53-67] 60  Resp:  [12-20] 12  SpO2:  [90 %-98 %] 90 %  BP: (112-132)/(53-60) 112/53     Weight: (!) 185 kg (407 lb 13.6 oz)  Body mass index is 67.87 kg/m².    Intake/Output Summary (Last 24 hours) at 3/4/2023 1635  Last data filed at 3/4/2023 1408  Gross per 24 hour   Intake 480 ml   Output 1000 ml   Net -520 ml      Physical Exam  Vitals and nursing note reviewed.     Constitutional:       General: He is not in acute distress.     Appearance: He is obese. He is ill-appearing.      Comments: Wearing NC   Cardiovascular:      Rate and Rhythm: Normal rate and regular rhythm.      Heart sounds: No murmur heard.  Pulmonary:      Effort: Pulmonary effort is normal. No respiratory distress.      Breath sounds: Normal breath sounds. No wheezing.   Abdominal:      General: There is no distension.      Palpations: Abdomen is soft.      Tenderness: There is no abdominal tenderness.   Musculoskeletal:      Right lower leg: Edema present.      Left lower leg: Edema present.   Skin:     Findings: Lesion and rash (BLE) present.     Significant Labs: All pertinent labs within the past 24 hours have been reviewed.  CBC:   Recent Labs   Lab 03/03/23  0540 03/04/23 0459   WBC 8.58 7.76   HGB 10.8* 9.9*   HCT 35.2* 32.2*    178     CMP:   Recent Labs   Lab 03/03/23  0540 03/04/23 0459    141   K 3.2* 3.7   CL 94* 97   CO2 39* 33*   GLU 83 79   BUN 25* 28*   CREATININE 1.0 1.0   CALCIUM 8.8 8.6*   PROT 6.8 6.9   ALBUMIN 2.0* 1.9*   BILITOT 0.8 0.6   ALKPHOS 52* 51*   AST 15 17   ALT 7* 9*   ANIONGAP 10 11        Significant Imaging: I have reviewed all pertinent imaging results/findings within the past 24 hours.      Assessment/Plan:      * Acute on chronic respiratory failure with hypoxia and hypercapnia  Patient with Hypercapnic and Hypoxic Respiratory failure which is Acute on chronic.  he is on home oxygen at 4 LPM. Supplemental oxygen was provided and noted- Oxygen Concentration (%):  [40] 40.   Signs/symptoms of respiratory failure include- respiratory distress and lethargy. Contributing diagnoses includes - CHF, Obesity Hypoventilation and DENISHA Labs and images were reviewed. Patient Has recent ABG, which has been reviewed. Will treat underlying causes and adjust management of respiratory failure:    TTE with EF 55%, severe RV enlargement with mod to severe reduced RV systolic function, mild to mod TR, pHTN   Supplemental O2 to maintain sats > 90%    Per chart review, does not appear to use CPAP at home which is contributing to hypercapnia and encephalopathy  Continue BIPAP qhs and while sleeping/resting   S/p diamox per ICU team, will resume home PO Lasix today  Pulmonary following, appreciate assistance    Acute cystitis  Recent urine culture (2/3/23) +pseudomonas  Urine culture with multiple organisms; repeat urine culture high risk  Blood culture gram stain +GPC  Continue cefepime, day # 3  Remove May today, voiding trial, bladder scan PRN    CKD (chronic kidney disease) stage 3, GFR 30-59 ml/min  Cr has ranged 1-1.9 over last few months   Appears to be at baseline at this time   Monitor BMP; renally dose meds   Strict Is and Os    Atrial fibrillation  Cardiology following  Reportedly has not filled AC since 2/2022  Currently not on BB  Continue therapeutic Lovenox  Will need NOAC on discharge    Venous thromboembolism  Per chart review, hx of DVT/PE diagnosed in 09/2022, was prescribed Eliquis at the time but has not filled since 02/2022  Continue therapeutic lovenox as above  BLE duplex this admission  shows chronic DVT in L superficial and popliteal veins    Decubitus ulcer of right buttock, stage 1  Present on admission   Wound care consult      VTE Risk Mitigation (From admission, onward)         Ordered     enoxaparin injection 60 mg  Every 12 hours         03/01/23 2008                Discharge Planning   JAMES:      Code Status: Full Code   Is the patient medically ready for discharge?:     Reason for patient still in hospital (select all that apply): Laboratory test, Treatment, Consult recommendations and Pending disposition  Discharge Plan A: Home, Home Health          Jerson Fowler MD  Department of Hospital Medicine   '[place] - Telemetry ([facility])

## 2023-03-05 PROBLEM — R78.81 BACTEREMIA: Status: ACTIVE | Noted: 2023-01-01

## 2023-03-05 NOTE — PROGRESS NOTES
[place] - Tele[facility] ([facility])  [facility] Medicine  Progress Note    Patient Name: Gene Rothman  MRN: [id number]  Patient Class: IP- Inpatient   Admission Date: 3/1/2023  Length of Stay: 4 days  Attending Physician: Jerson Fowler MD  Primary Care Provider: Ami Zarate DO        Subjective:     Principal Problem:Acute on chronic respiratory failure with hypoxia and hypercapnia        HPI:  Pt is a 76 YO male with PMH notable for CAD, CKD, CHF, Afib, PVD, chronic hypoxia (on 4-5L NC), DENISHA, morbid obesity, LLE DVT (not on AC) who initially presented to the ED via EMS on 03/01 for evaluation of altered mental status. On presentation, pt was on 3 L nasal cannula, in resp distress. Initial ABG revealed respiratory acidosis 7.22/123/123 on nasal cannula, patient was placed on BiPAP.  Remaining workup notable for UA consistent with UTI, lactate, procalcitonin within normal limits, CT head negative for acute changes. CXR with R sided consolidation. Patient was started on IV cefepime for UTI.  Patient was admitted to the ICU for further management.  On 03/01, patient was weaned off BiPAP to nasal cannula, with some improvement in mental status and acidosis. Given diamox for diuresis, cardiology consulted per ICU for Afib management.  He was deemed stable for step-down from ICU to floor.  Hospital Medicine was consulted for continuation of care.  PT seen with RN at bedside. On my evaluation patient is lying in bed, is extremely hard of hearing is oriented to self, place but not situation.  Denies any chest pain, shortness of breath.  No family at bedside during my evaluation.  Discussed with bedside RN, patient had May placed in ER, noted to have skin breakdown over his bottom and bilateral lower extremities that was present on admission.       Overview/Hospital Course:  3/3/23  Patient asleep in bed, wearing NC. Unable to tolerate mask overnight. Updated family at bedside.    3/4/23  NAEON. Patient awake, wearing NC, worse  BiPAP overnight. Denies any new complaints.    3/5/23  NAEON. Patient asleep, arouses to stimuli, denies any new issues or complaints. Stable on 4L NC. Blood culture (3/2/23) +staph, speciation pending, continue cefepime.        Review of Systems   All other systems reviewed and are negative.  Objective:     Vital Signs (Most Recent):  Temp: 98.6 °F (37 °C) (03/05/23 1111)  Pulse: (!) 51 (03/05/23 1111)  Resp: 18 (03/05/23 1111)  BP: 139/64 (03/05/23 1111)  SpO2: 95 % (03/05/23 1111)   Vital Signs (24h Range):  Temp:  [96.5 °F (35.8 °C)-98.8 °F (37.1 °C)] 98.6 °F (37 °C)  Pulse:  [49-60] 51  Resp:  [12-22] 18  SpO2:  [90 %-96 %] 95 %  BP: (102-139)/(53-64) 139/64     Weight: (!) 180.7 kg (398 lb 5.9 oz)  Body mass index is 66.29 kg/m².    Intake/Output Summary (Last 24 hours) at 3/5/2023 1207  Last data filed at 3/5/2023 1021  Gross per 24 hour   Intake 686.56 ml   Output 1100 ml   Net -413.44 ml      Physical Exam  Vitals and nursing note reviewed  Constitutional:       General: He is not in acute distress.     Appearance: He is obese. He is ill-appearing.      Comments: Wearing NC   Cardiovascular:      Rate and Rhythm: Normal rate and regular rhythm.      Heart sounds: No murmur heard.  Pulmonary:      Effort: Pulmonary effort is normal. No respiratory distress.      Breath sounds: Normal breath sounds. No wheezing.   Abdominal:      General: There is no distension.      Palpations: Abdomen is soft.      Tenderness: There is no abdominal tenderness.   Musculoskeletal:      Right lower leg: Edema present.      Left lower leg: Edema present.   Skin:     Findings: Lesion and rash (BLE) present.     Significant Labs: All pertinent labs within the past 24 hours have been reviewed.  CBC:   Recent Labs   Lab 03/04/23  0459 03/05/23  0541   WBC 7.76 6.04   HGB 9.9* 9.5*   HCT 32.2* 31.0*    168     CMP:   Recent Labs   Lab 03/04/23  0459 03/05/23  0706    142   K 3.7 3.2*   CL 97 97   CO2 33* 38*   GLU 79 89    BUN 28* 27*   CREATININE 1.0 1.0   CALCIUM 8.6* 8.2*   PROT 6.9  --    ALBUMIN 1.9*  --    BILITOT 0.6  --    ALKPHOS 51*  --    AST 17  --    ALT 9*  --    ANIONGAP 11 7*       Significant Imaging: I have reviewed all pertinent imaging results/findings within the past 24 hours.      Assessment/Plan:      * Acute on chronic respiratory failure with hypoxia and hypercapnia  Patient with Hypercapnic and Hypoxic Respiratory failure which is Acute on chronic.  he is on home oxygen at 4 LPM. Supplemental oxygen was provided and noted-  .   Signs/symptoms of respiratory failure include- respiratory distress and lethargy. Contributing diagnoses includes - CHF, Obesity Hypoventilation and DENISHA Labs and images were reviewed. Patient Has recent ABG, which has been reviewed. Will treat underlying causes and adjust management of respiratory failure:    TTE with EF 55%, severe RV enlargement with mod to severe reduced RV systolic function, mild to mod TR, pHTN   Supplemental O2 to maintain sats > 90%    Per chart review, does not appear to use CPAP at home which is contributing to hypercapnia and encephalopathy  Continue BIPAP qhs and while sleeping/resting   S/p diamox per ICU team, resume home PO Lasix  Pulmonary following, appreciate assistance    Acute cystitis  Recent urine culture (2/3/23) +pseudomonas  Urine culture with multiple organisms; repeat urine culture high risk  Blood culture +staph, speciation pending  Continue cefepime, day # 4    Removed May (3/4/23)  Boiding trial  Bladder scan PRN  Strict I/O's    Bacteremia  Blood culture (3/2/23) +staph, speciation pending  Source likely   Abx as above    CKD (chronic kidney disease) stage 3, GFR 30-59 ml/min  Cr has ranged 1-1.9 over last few months   Appears to be at baseline at this time   Monitor BMP; renally dose meds   Strict Is and Os    Atrial fibrillation  Cardiology following  Reportedly has not filled AC since 2/2022    Currently not on BB  Continue  therapeutic Lovenox  Will need NOAC on discharge    Venous thromboembolism  Per chart review, hx of DVT/PE diagnosed in 09/2022, was prescribed Eliquis at the time but has not filled since 02/2022  Continue therapeutic lovenox as above  BLE duplex this admission shows chronic DVT in L superficial and popliteal veins    Decubitus ulcer of right buttock, stage 1  Present on admission   Wound care consult      VTE Risk Mitigation (From admission, onward)         Ordered     enoxaparin injection 60 mg  Every 12 hours         03/01/23 2008                Discharge Planning   JAMES:      Code Status: Full Code   Is the patient medically ready for discharge?:     Reason for patient still in hospital (select all that apply): Patient trending condition, Laboratory test, Treatment and Consult recommendations  Discharge Plan A: Home, Home Health          Jerson Fowler MD  Department of Hospital Medicine   [facility] - Telemetry ([facility])

## 2023-03-05 NOTE — ASSESSMENT & PLAN NOTE
Patient with Hypercapnic and Hypoxic Respiratory failure which is Acute on chronic.  he is on home oxygen at 4 LPM. Supplemental oxygen was provided and noted-  .   Signs/symptoms of respiratory failure include- respiratory distress and lethargy. Contributing diagnoses includes - CHF, Obesity Hypoventilation and DENISHA Labs and images were reviewed. Patient Has recent ABG, which has been reviewed. Will treat underlying causes and adjust management of respiratory failure:    TTE with EF 55%, severe RV enlargement with mod to severe reduced RV systolic function, mild to mod TR, pHTN   Supplemental O2 to maintain sats > 90%    Per chart review, does not appear to use CPAP at home which is contributing to hypercapnia and encephalopathy  Continue BIPAP qhs and while sleeping/resting   S/p diamox per ICU team, resume home PO Lasix  Pulmonary following, appreciate assistance

## 2023-03-05 NOTE — PLAN OF CARE
Patient updated on plan of care. Instructed  patient to use call light, call light within reach. Hourly rounding performed. Fall precautions maintained; bed alarm on. Vitals [id number]. Chart check complete. Education provided, questions encouraged. Rhythm- A Flutter on tele box # [id number].     Problem: Adult Inpatient Plan of Care  Goal: Plan of Care Review  Outcome: Ongoing, Progressing

## 2023-03-05 NOTE — ASSESSMENT & PLAN NOTE
Recent urine culture (2/3/23) +pseudomonas  Urine culture with multiple organisms; repeat urine culture high risk  Blood culture +staph, speciation pending  Continue cefepime, day # 4    Removed May (3/4/23)  Boiding trial  Bladder scan PRN  Strict I/O's

## 2023-03-05 NOTE — SUBJECTIVE & OBJECTIVE
Review of Systems   All other systems reviewed and are negative.  Objective:     Vital Signs (Most Recent):  Temp: 98.6 °F (37 °C) (03/05/23 1111)  Pulse: (!) 51 (03/05/23 1111)  Resp: 18 (03/05/23 1111)  BP: 139/64 (03/05/23 1111)  SpO2: 95 % (03/05/23 1111)   Vital Signs (24h Range):  Temp:  [96.5 °F (35.8 °C)-98.8 °F (37.1 °C)] 98.6 °F (37 °C)  Pulse:  [49-60] 51  Resp:  [12-22] 18  SpO2:  [90 %-96 %] 95 %  BP: (102-139)/(53-64) 139/64     Weight: (!) 180.7 kg (398 lb 5.9 oz)  Body mass index is 66.29 kg/m².    Intake/Output Summary (Last 24 hours) at 3/5/2023 1207  Last data filed at 3/5/2023 1021  Gross per 24 hour   Intake 686.56 ml   Output 1100 ml   Net -413.44 ml      Physical Exam  Vitals and nursing note reviewed  Constitutional:       General: He is not in acute distress.     Appearance: He is obese. He is ill-appearing.      Comments: Wearing NC   Cardiovascular:      Rate and Rhythm: Normal rate and regular rhythm.      Heart sounds: No murmur heard.  Pulmonary:      Effort: Pulmonary effort is normal. No respiratory distress.      Breath sounds: Normal breath sounds. No wheezing.   Abdominal:      General: There is no distension.      Palpations: Abdomen is soft.      Tenderness: There is no abdominal tenderness.   Musculoskeletal:      Right lower leg: Edema present.      Left lower leg: Edema present.   Skin:     Findings: Lesion and rash (BLE) present.     Significant Labs: All pertinent labs within the past 24 hours have been reviewed.  CBC:   Recent Labs   Lab 03/04/23  0459 03/05/23  0541   WBC 7.76 6.04   HGB 9.9* 9.5*   HCT 32.2* 31.0*    168     CMP:   Recent Labs   Lab 03/04/23  0459 03/05/23  0706    142   K 3.7 3.2*   CL 97 97   CO2 33* 38*   GLU 79 89   BUN 28* 27*   CREATININE 1.0 1.0   CALCIUM 8.6* 8.2*   PROT 6.9  --    ALBUMIN 1.9*  --    BILITOT 0.6  --    ALKPHOS 51*  --    AST 17  --    ALT 9*  --    ANIONGAP 11 7*       Significant Imaging: I have reviewed all  pertinent imaging results/findings within the past 24 hours.

## 2023-03-06 NOTE — LETTER
March 14, 2023           Dear Gene,     Welcome to [facility]s Complex Care Management Program.  It was a pleasure talking with you today.  My name is Cassidy Herrera. I look forward to working with you as your .  My goal is to help you function at the healthiest and highest level possible.  You can contact me directly at [phone].    As an [facility] patient with [employer], some of the services we may be able to provide include:      Development of an individualized care plan with a Registered Nurse    Connection with a    Connection with available resources and services     Coordinate communication among your care team members    Provide coaching and education    Help you understand your doctors treatment plan   Help you obtain information about your insurance coverage.     All services provided by [facility]s Complex Care Managers and other care team members are coordinated with and communicated to your primary care team.    As part of your enrollment, you will be receiving education materials and more information about these services in your [facility] account, by phone or through the mail.  If you do not wish to participate or receive information, please contact our office at [phone].      Sincerely,        Cassidy Herrera RN CCM [facility]   Out-patient RN Complex Care Manager

## 2023-03-06 NOTE — LETTER
March 13, 2023    Gene Rothman  [address] [place] [address]             [facility] [address] [place] [address] Dear Gene,    I work with [facility] Outpatient Case Management Department. We received a referral to call you to discuss your medical history.These services are free of charge and are offered to [facility] patients who have recently been discharged from any of our facilities or who have complex medical conditions that may require the skill of a nurse to assist with management.             I am a Registered Nurse who specializes in connecting patients with available medical and financial resources as well as addressing any educational needs that may be indicated.      I attempted to reach you by telephone, but I was unsuccessful. Please call our department so that we can go over some questions with you regarding your health.    The Outpatient Case Management Department can be reached at [phone] from 8:00AM to 4:30 PM on Monday thru Friday. [facility] also has a program where a nurse is available 24/7 to answer questions or provide medical advice. Their number is [phone].      Thank you,      Cassidy Monk RN [facility]   Outpatient Case Management  [phone]

## 2023-03-06 NOTE — ASSESSMENT & PLAN NOTE
Cr has ranged 1-1.9 over last few months   Appears to be at baseline at this time   Monitor BMP; renally dose meds   Strict Is and Os   Rifampin Counseling: I discussed with the patient the risks of rifampin including but not limited to liver damage, kidney damage, red-orange body fluids, nausea/vomiting and severe allergy.

## 2023-03-06 NOTE — PLAN OF CARE
Problem: Adult Inpatient Plan of Care  Goal: Plan of Care Review  Outcome: Ongoing, Progressing  Goal: Patient-Specific Goal (Individualized)  Outcome: Ongoing, Progressing  Goal: Absence of Hospital-Acquired Illness or Injury  Outcome: Ongoing, Progressing  Goal: Optimal Comfort and Wellbeing  Outcome: Ongoing, Progressing  Goal: Readiness for Transition of Care  Outcome: Ongoing, Progressing     Problem: Bariatric Environmental Safety  Goal: Safety Maintained with Care  Outcome: Ongoing, Progressing     Problem: Infection  Goal: Absence of Infection Signs and Symptoms  Outcome: Ongoing, Progressing     Problem: Fall Injury Risk  Goal: Absence of Fall and Fall-Related Injury  Outcome: Ongoing, Progressing     Problem: Restraint, Nonbehavioral (Nonviolent)  Goal: Absence of Harm or Injury  Outcome: Ongoing, Progressing     Problem: Skin Injury Risk Increased  Goal: Skin Health and Integrity  Outcome: Ongoing, Progressing     Problem: Coping Ineffective  Goal: Effective Coping  Outcome: Ongoing, Progressing

## 2023-03-06 NOTE — ASSESSMENT & PLAN NOTE
Recent urine culture (2/3/23) +pseudomonas  Urine culture with multiple organisms; repeat urine culture high risk  Blood culture +staph, speciation pending  Continue cefepime, day # 4    Removed May (3/4/23)  Boiding trial  Bladder scan PRN  Strict I/O's    Change IVAB from cefepime to Levaquin

## 2023-03-06 NOTE — PROGRESS NOTES
[place] - Tele[facility] ([facility])  [facility] Medicine  Progress Note    Patient Name: Gene Rothman  MRN: [id number]  Patient Class: IP- Inpatient   Admission Date: 3/1/2023  Length of Stay: 5 days  Attending Physician: Russ Thomas, *  Primary Care Provider: Ami Zarate DO        Subjective:     Principal Problem:Acute on chronic respiratory failure with hypoxia and hypercapnia        HPI:  Pt is a 76 YO male with PMH notable for CAD, CKD, CHF, Afib, PVD, chronic hypoxia (on 4-5L NC), DENISHA, morbid obesity, LLE DVT (not on AC) who initially presented to the ED via EMS on 03/01 for evaluation of altered mental status. On presentation, pt was on 3 L nasal cannula, in resp distress. Initial ABG revealed respiratory acidosis 7.22/123/123 on nasal cannula, patient was placed on BiPAP.  Remaining workup notable for UA consistent with UTI, lactate, procalcitonin within normal limits, CT head negative for acute changes. CXR with R sided consolidation. Patient was started on IV cefepime for UTI.  Patient was admitted to the ICU for further management.  On 03/01, patient was weaned off BiPAP to nasal cannula, with some improvement in mental status and acidosis. Given diamox for diuresis, cardiology consulted per ICU for Afib management.  He was deemed stable for step-down from ICU to floor.  Hospital Medicine was consulted for continuation of care.  PT seen with RN at bedside. On my evaluation patient is lying in bed, is extremely hard of hearing is oriented to self, place but not situation.  Denies any chest pain, shortness of breath.  No family at bedside during my evaluation.  Discussed with bedside RN, patient had May placed in ER, noted to have skin breakdown over his bottom and bilateral lower extremities that was present on admission.       Overview/Hospital Course:  3/3/23  Patient asleep in bed, wearing NC. Unable to tolerate mask overnight. Updated family at bedside.    3/4/23  NAEON. Patient awake, wearing  NC, worse BiPAP overnight. Denies any new complaints.    3/5/23  NAEON. Patient asleep, arouses to stimuli, denies any new issues or complaints. Stable on 4L NC. Blood culture (3/2/23) +staph, speciation pending, continue cefepime.    3/6 Repeated urine cx is (+) for enterococcus fecalis , IVAB changed to Levaquin .  He is aao x 3 in NAD .  He  revoke hospice care last year and is a full code .  He is refusing NIV .       Interval History:     Review of Systems   Constitutional:  Positive for activity change and fatigue.   HENT: Negative.     Eyes: Negative.    Respiratory: Negative.     Cardiovascular: Negative.    Gastrointestinal: Negative.    Genitourinary: Negative.    Musculoskeletal: Negative.    Skin: Negative.    Allergic/Immunologic: Negative.    Neurological: Negative.    Psychiatric/Behavioral:  Positive for decreased concentration.    Objective:     Vital Signs (Most Recent):  Temp: 98.5 °F (36.9 °C) (03/06/23 1211)  Pulse: (!) 51 (03/06/23 1211)  Resp: 20 (03/06/23 1211)  BP: (!) 145/68 (03/06/23 1211)  SpO2: 95 % (03/06/23 1211)   Vital Signs (24h Range):  Temp:  [98.4 °F (36.9 °C)-98.8 °F (37.1 °C)] 98.5 °F (36.9 °C)  Pulse:  [42-62] 51  Resp:  [18-20] 20  SpO2:  [91 %-97 %] 95 %  BP: ()/(46-68) 145/68     Weight: (!) 180.7 kg (398 lb 5.9 oz)  Body mass index is 66.29 kg/m².    Intake/Output Summary (Last 24 hours) at 3/6/2023 1456  Last data filed at 3/6/2023 1441  Gross per 24 hour   Intake 960 ml   Output 675 ml   Net 285 ml      Physical Exam  Vitals and nursing note reviewed.   Constitutional:       Appearance: He is obese.   HENT:      Head: Normocephalic.   Eyes:      Conjunctiva/sclera: Conjunctivae normal.   Cardiovascular:      Rate and Rhythm: Normal rate.   Pulmonary:      Effort: Pulmonary effort is normal.      Breath sounds: Normal breath sounds.   Abdominal:      Palpations: Abdomen is soft.   Musculoskeletal:         General: Normal range of motion.      Cervical back: Normal  range of motion and neck supple.   Skin:     General: Skin is warm and dry.   Neurological:      General: No focal deficit present.      Mental Status: He is alert and oriented to person, place, and time.   Psychiatric:         Mood and Affect: Mood normal.       Significant Labs: All pertinent labs within the past 24 hours have been reviewed.      Significant Imaging: I have reviewed all pertinent imaging results/findings within the past 24 hours.        Assessment/Plan:      * Acute on chronic respiratory failure with hypoxia and hypercapnia  Patient with Hypercapnic and Hypoxic Respiratory failure which is Acute on chronic.  he is on home oxygen at 4 LPM. Supplemental oxygen was provided and noted- Oxygen Concentration (%):  [36] 36.   Signs/symptoms of respiratory failure include- respiratory distress and lethargy. Contributing diagnoses includes - CHF, Obesity Hypoventilation and DENISHA Labs and images were reviewed. Patient Has recent ABG, which has been reviewed. Will treat underlying causes and adjust management of respiratory failure:    TTE with EF 55%, severe RV enlargement with mod to severe reduced RV systolic function, mild to mod TR, pHTN   Supplemental O2 to maintain sats > 90%    Per chart review, does not appear to use CPAP at home which is contributing to hypercapnia and encephalopathy  Continue BIPAP qhs and while sleeping/resting   S/p diamox per ICU team, resume home PO Lasix  Pulmonary following, appreciate assistance    Bacteremia  Blood culture (3/2/23) staph epi most likely contaminated   Source likely   Abx as above    Acute cystitis  Recent urine culture (2/3/23) +pseudomonas  Urine culture with multiple organisms; repeat urine culture high risk  Blood culture +staph, speciation pending  Continue cefepime, day # 4    Removed May (3/4/23)  Boiding trial  Bladder scan PRN  Strict I/O's    Change IVAB from cefepime to Levaquin     Atrial fibrillation  Cardiology following  Reportedly has  not filled AC since 2/2022    Currently not on BB  Continue therapeutic Lovenox  Will need NOAC on discharge    Decubitus ulcer of right buttock, stage 1  Present on admission   Wound care consult    Venous thromboembolism  Per chart review, hx of DVT/PE diagnosed in 09/2022, was prescribed Eliquis at the time but has not filled since 02/2022  Continue therapeutic lovenox as above  BLE duplex this admission shows chronic DVT in L superficial and popliteal veins    CKD (chronic kidney disease) stage 3, GFR 30-59 ml/min  Cr has ranged 1-1.9 over last few months   Appears to be at baseline at this time   Monitor BMP; renally dose meds   Strict Is and Os      VTE Risk Mitigation (From admission, onward)         Ordered     enoxaparin injection 60 mg  Every 12 hours         03/01/23 2008                Discharge Planning   JAMES:      Code Status: Full Code   Is the patient medically ready for discharge?:     Reason for patient still in hospital (select all that apply): Tx   Discharge Plan A: Home, Home Health                  Russ Thomas MD  Department of Hospital Medicine   [place] - Telemetry ([facility])

## 2023-03-06 NOTE — SUBJECTIVE & OBJECTIVE
Interval History:     Review of Systems   Constitutional:  Positive for activity change and fatigue.   HENT: Negative.     Eyes: Negative.    Respiratory: Negative.     Cardiovascular: Negative.    Gastrointestinal: Negative.    Genitourinary: Negative.    Musculoskeletal: Negative.    Skin: Negative.    Allergic/Immunologic: Negative.    Neurological: Negative.    Psychiatric/Behavioral:  Positive for decreased concentration.    Objective:     Vital Signs (Most Recent):  Temp: 98.5 °F (36.9 °C) (03/06/23 1211)  Pulse: (!) 51 (03/06/23 1211)  Resp: 20 (03/06/23 1211)  BP: (!) 145/68 (03/06/23 1211)  SpO2: 95 % (03/06/23 1211)   Vital Signs (24h Range):  Temp:  [98.4 °F (36.9 °C)-98.8 °F (37.1 °C)] 98.5 °F (36.9 °C)  Pulse:  [42-62] 51  Resp:  [18-20] 20  SpO2:  [91 %-97 %] 95 %  BP: ()/(46-68) 145/68     Weight: (!) 180.7 kg (398 lb 5.9 oz)  Body mass index is 66.29 kg/m².    Intake/Output Summary (Last 24 hours) at 3/6/2023 1456  Last data filed at 3/6/2023 1441  Gross per 24 hour   Intake 960 ml   Output 675 ml   Net 285 ml      Physical Exam  Vitals and nursing note reviewed.   Constitutional:       Appearance: He is obese.   HENT:      Head: Normocephalic.   Eyes:      Conjunctiva/sclera: Conjunctivae normal.   Cardiovascular:      Rate and Rhythm: Normal rate.   Pulmonary:      Effort: Pulmonary effort is normal.      Breath sounds: Normal breath sounds.   Abdominal:      Palpations: Abdomen is soft.   Musculoskeletal:         General: Normal range of motion.      Cervical back: Normal range of motion and neck supple.   Skin:     General: Skin is warm and dry.   Neurological:      General: No focal deficit present.      Mental Status: He is alert and oriented to person, place, and time.   Psychiatric:         Mood and Affect: Mood normal.       Significant Labs: All pertinent labs within the past 24 hours have been reviewed.      Significant Imaging: I have reviewed all pertinent imaging results/findings  within the past 24 hours.

## 2023-03-06 NOTE — PROGRESS NOTES
Outpatient Care Management  Initial Patient Assessment    Patient: Gene Rothman  MRN: [id number]  Date of Service: 03/06/2023  Completed by: Cassidy Herrera RN  Referral Date: 02/20/2023  Program: High Risk  Status: Ongoing  Effective Dates: 3/14/2023 - present  Responsible Staff: Cassidy Herrera RN        Reason for Visit   Patient presents with    OPCM Chart Review    OPCM RN First Assessment Attempt     3/13/2023  1st attempt to complete Follow-Up for Outpatient Care Management, left message.  Will mail unable to assess letter.        Initial Assessment    PHQ-9    Plan Of Care    OPCM Enrollment Call     Medication Reconciliation        Brief Summary:  Gene Rothman was referred by SALOME Vázquez for physical debility. Patient qualifies for program based on risk score of 83%.   Active problem list, medical, surgical and social history reviewed. Active comorbidities include depression, miquel hearing loss, COPD, acute on chronic respiratory failure with hypoxia and hypercapnia, hypertensive heart disease with heart failure, a fib, PVD with venous stasis dermatitis of miquel lower extremities and acquired lymphedema, CKD3, morbid obesity and physical debility causing functional quadriplegia. Areas of need identified by patient include education about COPD and hypercapnia along with measures to help improve his functional status. He needs assistance with determining where his Xopenex script was sent as he has not been able to get it filled so has not started using it. He also needs a referral to the pharmacy assistance program to help with cost of Eliquis and needs to have a [employer] OTC booklet mailed to him to use for ordering available supplies like bed savers and incontinence products.   Complex care plan created with patient/caregiver input. By next encounter, patient agrees to read the educational literature being sent by this CM about COPD, hypercapnia and measures to help improve his functional status. He also agrees  to get his Xopenex script filled and begin using it per nebulizer and be contacted by the pharmacy assistance tech to apply for assistance with Eliquis. Advised this CM will also refer him to the LCSW related to his PHQ score of 15 with report of feeling depressed daily, but denial of SI.   Next steps: Mail out educational literature about COPD, hypercapnia and exercises to improve bed bound status and include a [employer] OTC booklet. Refer to the pharmacy assistance program and contact [facility] to confirm Xopenex script has been filled and notify pt/wife(done). Follow up in about 10 days and begin to educate pt and his wife about COPD, hypercapnia and exercises for improving functional quadriplegia status. Cassidy Herrera RN      3/6/23 - Pt has been referred to OPCM, but remains inpt at this time. Will monitor chart and attempt to contact if/when he d/c's home. Cassidy Herrera RN

## 2023-03-06 NOTE — ASSESSMENT & PLAN NOTE
Patient with Hypercapnic and Hypoxic Respiratory failure which is Acute on chronic.  he is on home oxygen at 4 LPM. Supplemental oxygen was provided and noted- Oxygen Concentration (%):  [36] 36.   Signs/symptoms of respiratory failure include- respiratory distress and lethargy. Contributing diagnoses includes - CHF, Obesity Hypoventilation and DENISHA Labs and images were reviewed. Patient Has recent ABG, which has been reviewed. Will treat underlying causes and adjust management of respiratory failure:    TTE with EF 55%, severe RV enlargement with mod to severe reduced RV systolic function, mild to mod TR, pHTN   Supplemental O2 to maintain sats > 90%    Per chart review, does not appear to use CPAP at home which is contributing to hypercapnia and encephalopathy  Continue BIPAP qhs and while sleeping/resting   S/p diamox per ICU team, resume home PO Lasix  Pulmonary following, appreciate assistance

## 2023-03-07 NOTE — PROGRESS NOTES
[facility] - Telemetry ([facility]  [facility] Medicine  Progress Note    Patient Name: Gene Rothman  MRN: [id number]  Patient Class: IP- Inpatient   Admission Date: 3/1/2023  Length of Stay: 6 days  Attending Physician: Russ Thomas, *  Primary Care Provider: Ami Zarate DO        Subjective:     Principal Problem:Acute on chronic respiratory failure with hypoxia and hypercapnia        HPI:  Pt is a 76 YO male with PMH notable for CAD, CKD, CHF, Afib, PVD, chronic hypoxia (on 4-5L NC), DENISHA, morbid obesity, LLE DVT (not on AC) who initially presented to the ED via EMS on 03/01 for evaluation of altered mental status. On presentation, pt was on 3 L nasal cannula, in resp distress. Initial ABG revealed respiratory acidosis 7.22/123/123 on nasal cannula, patient was placed on BiPAP.  Remaining workup notable for UA consistent with UTI, lactate, procalcitonin within normal limits, CT head negative for acute changes. CXR with R sided consolidation. Patient was started on IV cefepime for UTI.  Patient was admitted to the ICU for further management.  On 03/01, patient was weaned off BiPAP to nasal cannula, with some improvement in mental status and acidosis. Given diamox for diuresis, cardiology consulted per ICU for Afib management.  He was deemed stable for step-down from ICU to floor.  Hospital Medicine was consulted for continuation of care.  PT seen with RN at bedside. On my evaluation patient is lying in bed, is extremely hard of hearing is oriented to self, place but not situation.  Denies any chest pain, shortness of breath.  No family at bedside during my evaluation.  Discussed with bedside RN, patient had May placed in ER, noted to have skin breakdown over his bottom and bilateral lower extremities that was present on admission.       Overview/Hospital Course:  3/3/23  Patient asleep in bed, wearing NC. Unable to tolerate mask overnight. Updated family at bedside.    3/4/23  NAEON. Patient awake, wearing  NC, worse BiPAP overnight. Denies any new complaints.    3/5/23  NAEON. Patient asleep, arouses to stimuli, denies any new issues or complaints. Stable on 4L NC. Blood culture (3/2/23) +staph, speciation pending, continue cefepime.    3/6 Repeated urine cx is (+) for enterococcus fecalis , IVAB changed to Levaquin .  He is aao x 3 in NAD .  He  revoke hospice care last year and is a full code .  He is refusing NIV .     3/7 He is aao x 4 in nad . The urine  cx is (+) for  enterococcus faecium   resistance to ampicillin and vanc . Started yesterday on zyvox . He already has cefepime x 5 days (previous pseudomonas  urine infection ). Plan to d/c [facility] tomorrow . Pt  declining  Hospice care .       Interval History:     Review of Systems   Constitutional:  Positive for activity change and fatigue.   HENT: Negative.     Eyes: Negative.    Respiratory: Negative.     Cardiovascular: Negative.    Gastrointestinal: Negative.    Genitourinary: Negative.    Musculoskeletal: Negative.    Skin: Negative.    Allergic/Immunologic: Negative.    Neurological: Negative.    Psychiatric/Behavioral:  Negative for decreased concentration.    Objective:     Vital Signs (Most Recent):  Temp: 98.2 °F (36.8 °C) (03/07/23 1236)  Pulse: (!) 53 (03/07/23 1236)  Resp: 20 (03/07/23 1236)  BP: (!) 118/55 (03/07/23 1236)  SpO2: 97 % (03/07/23 1236)   Vital Signs (24h Range):  Temp:  [97.7 °F (36.5 °C)-98.2 °F (36.8 °C)] 98.2 °F (36.8 °C)  Pulse:  [48-62] 53  Resp:  [18-20] 20  SpO2:  [95 %-98 %] 97 %  BP: (107-138)/(55-78) 118/55     Weight: (!) 180.7 kg (398 lb 5.9 oz)  Body mass index is 66.29 kg/m².    Intake/Output Summary (Last 24 hours) at 3/7/2023 1433  Last data filed at 3/7/2023 1000  Gross per 24 hour   Intake 1395.19 ml   Output 1100 ml   Net 295.19 ml      Physical Exam  Vitals and nursing note reviewed.   Constitutional:       Appearance: He is obese.   HENT:      Head: Normocephalic.   Eyes:      Conjunctiva/sclera: Conjunctivae  normal.   Cardiovascular:      Rate and Rhythm: Normal rate.   Pulmonary:      Effort: Pulmonary effort is normal.      Breath sounds: Normal breath sounds.   Abdominal:      Palpations: Abdomen is soft.   Musculoskeletal:         General: Normal range of motion.      Cervical back: Normal range of motion and neck supple.   Skin:     General: Skin is warm and dry.   Neurological:      General: No focal deficit present.      Mental Status: He is alert and oriented to person, place, and time.   Psychiatric:         Mood and Affect: Mood normal.       Significant Labs: All pertinent labs within the past 24 hours have been reviewed.      Significant Imaging: I have reviewed all pertinent imaging results/findings within the past 24 hours.        Assessment/Plan:      * Acute on chronic respiratory failure with hypoxia and hypercapnia  Patient with Hypercapnic and Hypoxic Respiratory failure which is Acute on chronic.  he is on home oxygen at 4 LPM. Supplemental oxygen was provided and noted- Oxygen Concentration (%):  [36] 36.   Signs/symptoms of respiratory failure include- respiratory distress and lethargy. Contributing diagnoses includes - CHF, Obesity Hypoventilation and DENISHA Labs and images were reviewed. Patient Has recent ABG, which has been reviewed. Will treat underlying causes and adjust management of respiratory failure:    TTE with EF 55%, severe RV enlargement with mod to severe reduced RV systolic function, mild to mod TR, pHTN   Supplemental O2 to maintain sats > 90%    Per chart review, does not appear to use CPAP at home which is contributing to hypercapnia and encephalopathy  Continue BIPAP qhs and while sleeping/resting   S/p diamox per ICU team, resume home PO Lasix  Pulmonary following, appreciate assistance    Bacteremia  Blood culture (3/2/23) staph epi most likely contaminated   Source likely   Abx as above    Acute cystitis  Recent urine culture (2/3/23) +pseudomonas  Urine culture with multiple  organisms; repeat urine culture high risk  Blood culture +staph, speciation pending  Continue cefepime, day # 4    Removed May (3/4/23)  Boiding trial  Bladder scan PRN  Strict I/O's    S/P cefepime   cont po zyvox     Atrial fibrillation  Cardiology following  Reportedly has not filled AC since 2/2022    Currently not on BB  Continue therapeutic Lovenox  Will need NOAC on discharge    Decubitus ulcer of right buttock, stage 1  Present on admission   Wound care consult    Venous thromboembolism  Per chart review, hx of DVT/PE diagnosed in 09/2022, was prescribed Eliquis at the time but has not filled since 02/2022  Continue therapeutic lovenox as above  BLE duplex this admission shows chronic DVT in L superficial and popliteal veins    CKD (chronic kidney disease) stage 3, GFR 30-59 ml/min  Cr has ranged 1-1.9 over last few months   Appears to be at baseline at this time   Monitor BMP; renally dose meds   Strict Is and Os      VTE Risk Mitigation (From admission, onward)         Ordered     enoxaparin injection 60 mg  Every 12 hours         03/01/23 2008                Discharge Planning   JAMES:      Code Status: Full Code   Is the patient medically ready for discharge?:     Reason for patient still in hospital (select all that apply): Treatment  Discharge Plan A: Home, Home Health                  Russ Thomas MD  Department of Hospital Medicine   [facility] - Telemetry ([facility])

## 2023-03-07 NOTE — ASSESSMENT & PLAN NOTE
Recent urine culture (2/3/23) +pseudomonas  Urine culture with multiple organisms; repeat urine culture high risk  Blood culture +staph, speciation pending  Continue cefepime, day # 4    Removed May (3/4/23)  Boiding trial  Bladder scan PRN  Strict I/O's    S/P cefepime   cont po zyvox

## 2023-03-08 PROBLEM — J44.9 COPD (CHRONIC OBSTRUCTIVE PULMONARY DISEASE): Status: ACTIVE | Noted: 2023-01-01

## 2023-03-08 NOTE — PHYSICIAN QUERY
PT Name: Gene Rothman  MR #: [id number]     DOCUMENTATION CLARIFICATION     CDS/: CALLUM Monzon, RN, CDS               Contact information:[email or web address]   This form is a permanent document in the medical record.     Query Date: March 8, 2023    By submitting this query, we are merely seeking further clarification of documentation.  Please utilize your independent clinical judgment when addressing the question(s) below.    The Medical Record contains the following   Indicators Supporting Clinical Findings Location in Medical Record   X Heart Failure documented  Past medical history of CHF     Acute on chronic respiratory failure with hypoxia and hypercapnia-Signs/symptoms of respiratory failure include- increased work of breathing and respiratory distress. Contributing diagnoses includes - CHF     Contributing diagnoses included chronic diastolic heart failure, COPD, and obesity hypoventilation    H&P, Dr. Modi, 3/1               Pulm, MIRA Kaur NP/Dr. Altamirano, 3/4     X BNP  Elevated BNP level     Labs reviewed,       H&P, Dr. Modi, 3/1     Jaswinder, MIRA Manuel NP, 3/2   X EF/Echo  TTE with EF 55%, severe RV enlargement with mod to severe reduced RV systolic function, mild to mod TR, pHTN     , Dr. Fowler, 3/4   X Radiology findings  CXR Impression:   1. There is a moderate amount of alveolar consolidation scattered throughout the right lung.  An infectious process cannot be excluded.  2. There is blunting of the right costophrenic angle.  This is characteristic of a tiny pleural effusion.  3. This is a limited examination secondary to patient rotation to the left.  4. The size of the heart is prominent.  This may be secondary to magnification.  5. There are moderate osteoarthritic changes in the glenohumeral joints bilaterally.  6. There is prominence of the width of the right acromioclavicular joint. It measures 13 mm in width.    CXR, 3/1   X Subjective/Objective Respiratory  Conditions  Cough and shortness of breath     Endorses severe discomfort and SOB    Dr. Rian HERNANDEZ, 3/2     MIRA San NP, 3/2    Recent/Current MI      Heart Transplant, LVAD     X Edema, JVD  Leg swelling    Dr. Rian HERNANDEZ, 3/2    Ascites     X Diuretics/Meds  Diuresis     3/2 Diamox 250 mg x1 today.      S/p diamox per ICU team, will resume home PO Lasix today    H&P, Dr. Modi, 3/1     MARY, Dr. Modi, 3/2     MARY, Dr. Fowler, 3/4   X Other Treatment  O2 target sat above 89%.  Nebs.  BiPAP continuous   Strict I&O     Monitor for decompensated heart failure and diurese as needed    H&P, Dr. Modi, 3/1       PulmMIRA NP/Dr. Altamirano, 3/4   X Other  1.3 L urine output.   Dr. Rian HERNANDEZ, 3/2       Heart failure is a clinical diagnosis which includes symptomatic fluid retention, elevated intracardiac pressures, and/or the inability of the heart to deliver adequate blood flow.    Heart Failure with reduced Ejection Fraction (HFrEF) or Systolic Heart Failure (loses ability to contract normally, EF is <40%)    Heart Failure with preserved Ejection Fraction (HFpEF) or Diastolic Heart Failure (stiff ventricles, does not relax properly, EF is >50%)     Heart Failure with Combined Systolic and Diastolic Failure (stiff ventricles, does not relax properly and EF is <50%)    Mid-range or mildly reduced ejection fraction (HFmrEF) is classified as systolic heart failure.  Congestive heart failure with a recovered EF is classified as Diastolic Heart Failure.  Common clues to acute exacerbation:  Rapidly progressive symptoms (w/in 2 weeks of presentation), using IV diuretics, using supplemental O2, pulmonary edema on Xray, new or worsening pleural effusion, +JVD or other signs of volume overload, MI w/in 4 weeks, and/or BNP >500  The clinical guidelines noted are only system guidelines, and do not replace the providers clinical judgment.    Provider, please Clarify the Heart Failure diagnosis  associated with the above clinical findings.    [ x  ]  Acute on Chronic Diastolic Heart Failure (HFpEF) - worsening of CHF signs/symptoms in preexisting CHF   [   ]  Chronic Diastolic Heart Failure (HFpEF) - preexisting and stable   [   ]  Other (please specify): ___________________________________   [  ]  Clinically Undetermined         Please document in your progress notes daily for the duration of treatment until resolved and include in your discharge summary.    References:  American Heart Association editorial staff. (2017, May). Ejection Fraction Heart Failure Measurement. American Heart Association. https://www.heart.org/en/health-topics/heart-failure/diagnosing-heart-failure/ejection-fraction-heart-failure-measurement#:~:text=Ejection%20fraction%20(EF)%20is%20a,pushed%20out%20with%20each%20heartbeat  DC Burns (2020, December 15). Heart failure with preserved ejection fraction: Clinical manifestations and diagnosis. [employer]. https://www.[employer]/contents/heart-failure-with-preserved-ejection-fraction-clinical-manifestations-and-diagnosis.  ICD-10-CM/PCS Coding Clinic Third Quarter ICD-10, Effective with discharges: September 8, 2020 [facility] § Heart failure with mid-range or mildly reduced ejection fraction (2020).  ICD-10-CM/PCS Coding Clinic Third Quarter ICD-10, Effective with discharges: September 8, 2020 [facility] § Heart failure with recovered ejection fraction (2020).  Form No. [id number]

## 2023-03-08 NOTE — PLAN OF CARE
[facility] - Telemetry (Hospital)  Discharge Final Note    Primary Care Provider: Ami Zarate DO    Expected Discharge Date: 3/8/2023    Final Discharge Note (most recent)       Final Note - 03/08/23 1142          Final Note    Assessment Type Final Discharge Note     Anticipated Discharge Disposition Home-Health Care [facility]     Hospital Resources/Appts/Education Provided Appointments scheduled and added to AVS;Post-Acute resouces added to AVS        Post-Acute Status    Post-Acute Authorization Home Health     Home Health Status Set-up Complete/Auth obtained     Discharge Delays None known at this time                   Pt to discharge home with [facility]. Post acute provided added to AVS. MD ordered NP at Home d/t patient's challenges with transportation to appointments.   Family declined any additional CM needs.     Important Message from Medicare  Important Message from Medicare regarding Discharge Appeal Rights: Given to patient/caregiver, Explained to patient/caregiver, Signed/date by patient/caregiver     Date IMM was signed: 03/08/23  Time IMM was signed: 1111     Follow-up providers       Ami Zarate DO   Specialty: Internal Medicine   Relationship: PCP - General    [address] [place] [address] [address] [place] [id number]   Phone: [phone]       Next Steps: Follow up in 1 week(s)              After-discharge care                [facility] Medical Care       [facility]   Service: [facility] Health Services    [address] [facility] [address]  [place] [address]   Phone: [phone]

## 2023-03-08 NOTE — DISCHARGE SUMMARY
[facility] - Telemetry ([facility])  [facility] Medicine  Discharge Summary      Patient Name: Gene Rothman  MRN: [id number]  [facility]: [id number]  Patient Class: IP- Inpatient  Admission Date: 3/1/2023  Hospital Length of Stay: 7 days  Discharge Date and Time:  03/08/2023 2:32 PM  Attending Physician: Russ Thomas, *   Discharging Provider: Russ Thomas MD  Primary Care Provider: Ami Zarate DO    Primary Care Team: [facility] MEDICINE C    HPI:   Pt is a 76 YO male with PMH notable for CAD, CKD, CHF, Afib, PVD, chronic hypoxia (on 4-5L NC), DENISHA, morbid obesity, LLE DVT (not on AC) who initially presented to the ED via EMS on 03/01 for evaluation of altered mental status. On presentation, pt was on 3 L nasal cannula, in resp distress. Initial ABG revealed respiratory acidosis 7.22/123/123 on nasal cannula, patient was placed on BiPAP.  Remaining workup notable for UA consistent with UTI, lactate, procalcitonin within normal limits, CT head negative for acute changes. CXR with R sided consolidation. Patient was started on IV cefepime for UTI.  Patient was admitted to the ICU for further management.  On 03/01, patient was weaned off BiPAP to nasal cannula, with some improvement in mental status and acidosis. Given diamox for diuresis, cardiology consulted per ICU for Afib management.  He was deemed stable for step-down from ICU to floor.  Hospital Medicine was consulted for continuation of care.  PT seen with RN at bedside. On my evaluation patient is lying in bed, is extremely hard of hearing is oriented to self, place but not situation.  Denies any chest pain, shortness of breath.  No family at bedside during my evaluation.  Discussed with bedside RN, patient had May placed in ER, noted to have skin breakdown over his bottom and bilateral lower extremities that was present on admission.       * No surgery found *      Hospital Course:   3/3/23  Patient asleep in bed, wearing NC. Unable to tolerate mask  overnight. Updated family at bedside.    3/4/23  NAEON. Patient awake, wearing NC, worse BiPAP overnight. Denies any new complaints.    3/5/23  NAEON. Patient asleep, arouses to stimuli, denies any new issues or complaints. Stable on 4L NC. Blood culture (3/2/23) +staph, speciation pending, continue cefepime.    3/6 Repeated urine cx is (+) for enterococcus fecalis , IVAB changed to Levaquin .  He is aao x 3 in NAD .  He  revoke hospice care last year and is a full code .  He is refusing NIV .     3/7 He is aao x 4 in nad . The urine  cx is (+) for  enterococcus faecium   resistance to ampicillin and vanc . Started yesterday on zyvox . He already has cefepime x 5 days (previous pseudomonas  urine infection ). Plan to d/c jhome tomorrow . Pt  declining  Hospice care .     3/8 Pt was seen and examined at bedside . He was determined to be suitable for d/c . He is aao x 4 in nad . Pt and family declining Hospice care . Prognosis poor . Plan to d/c with HH  , po zyvox and eliquis .       Goals of Care Treatment Preferences:  Code Status: Full Code      Consults:   Consults (From admission, onward)        Status Ordering Provider     IP consult to case management  Once        Provider:  (Not yet assigned)    TJ Mo     Inpatient consult to Hospitalist  Once        Provider:  Jerson Fowler MD    Completed KOLTON GRANT     Inpatient consult to Cardiology  Once        Provider:  Kyle Sanders MD    Completed KOLTON GRANT          Pulmonary  * Acute on chronic respiratory failure with hypoxia and hypercapnia  Patient with Hypercapnic and Hypoxic Respiratory failure which is Acute on chronic.  he is on home oxygen at 4 LPM. Supplemental oxygen was provided and noted-  .   Signs/symptoms of respiratory failure include- respiratory distress and lethargy. Contributing diagnoses includes - CHF, Obesity Hypoventilation and DENISHA Labs and images were reviewed. Patient Has recent ABG, which has  been reviewed. Will treat underlying causes and adjust management of respiratory failure:    TTE with EF 55%, severe RV enlargement with mod to severe reduced RV systolic function, mild to mod TR, pHTN   Supplemental O2 to maintain sats > 90%    Per chart review, does not appear to use CPAP at home which is contributing to hypercapnia and encephalopathy  Continue BIPAP qhs and while sleeping/resting   S/p diamox per ICU team, resume home PO Lasix  Pulmonary following, appreciate assistance    COPD (chronic obstructive pulmonary disease)  Cont Breathing tx  F/u with pulmonology       Cardiac/Vascular  Atrial fibrillation  Cardiology following  Reportedly has not filled AC since 2/2022    Currently not on BB  Continue therapeutic Lovenox  Will need NOAC on discharge    Renal/  Acute cystitis  Recent urine culture (2/3/23) +pseudomonas  Urine culture with multiple organisms; repeat urine culture high risk  Blood culture +staph, speciation pending  Continue cefepime, day # 4    Removed May (3/4/23)  Boiding trial  Bladder scan PRN  Strict I/O's    S/P cefepime   cont po zyvox     CKD (chronic kidney disease) stage 3, GFR 30-59 ml/min  Cr has ranged 1-1.9 over last few months   Appears to be at baseline at this time   Monitor BMP; renally dose meds   Strict Is and Os    ID  Bacteremia  Most likely contaminated  Repeated blood cx NGTD    Orthopedic  Decubitus ulcer of right buttock, stage 1  Present on admission   Wound care consult    Other  Venous thromboembolism  Per chart review, hx of DVT/PE diagnosed in 09/2022, was prescribed Eliquis at the time but has not filled since 02/2022  Continue therapeutic lovenox as above  BLE duplex this admission shows chronic DVT in L superficial and popliteal veins      Final Active Diagnoses:    Diagnosis Date Noted POA    PRINCIPAL PROBLEM:  Acute on chronic respiratory failure with hypoxia and hypercapnia [J96.21, J96.22] 03/01/2023 Yes    COPD (chronic obstructive pulmonary  "disease) [J44.9] 03/08/2023 Yes    Bacteremia [R78.81] 03/05/2023 Yes    Acute cystitis [N30.00] 03/02/2023 Yes    Atrial fibrillation [I48.91] 03/01/2023 Yes    Decubitus ulcer of right buttock, stage 1 [L89.311] 01/10/2023 Yes    Venous thromboembolism [I82.90] 09/20/2022 Yes    CKD (chronic kidney disease) stage 3, GFR 30-59 ml/min [N18.30] 04/08/2016 Yes     Chronic      Problems Resolved During this Admission:       Discharged Condition: fair    Disposition: Home-Health Care [facility]    Follow Up:   Contact information for follow-up providers     Ami Zarate, DO Follow up in 1 week(s).    Specialty: Internal Medicine  Contact information:  [id number] [place] [id number] [phone]                   Contact information for after-discharge care     [facility]     [facility] .    Service: Home Health Services  Contact information:  [address] [place] [id number] [phone]                           Patient Instructions:      NEBULIZER KIT (SUPPLIES) FOR HOME USE     Order Specific Question Answer Comments   Height: 5' 5" (1.651 m)    Weight: 180.7 kg (398 lb 5.9 oz)    Does patient have medical equipment at home? wheelchair    Does patient have medical equipment at home? oxygen    Does patient have medical equipment at home? hospital bed    Does patient have medical equipment at home? CPAP    Length of need (1-99 months): 99    Mask or Mouthpiece? Mouthpiece      Ambulatory referral/consult to Home Health   Standing Status: Future   Referral Priority: Routine Referral Type: Home Health   Referral Reason: Specialty Services Required   Requested Specialty: Home Health Services   Number of Visits Requested: 1     Ambulatory referral/consult to [facility]   Standing Status: Future   Referral Priority: Routine Referral Type: Consultation   Referral Reason: Specialty Services Required   Number of Visits Requested: 1 "     Diet Cardiac     Activity as tolerated       Significant Diagnostic Studies: Labs:   BMP:   Recent Labs   Lab 03/07/23  0755   GLU 89      K 3.5   CL 96   CO2 35*   BUN 22   CREATININE 1.0   CALCIUM 8.0*   , CMP   Recent Labs   Lab 03/07/23  0755      K 3.5   CL 96   CO2 35*   GLU 89   BUN 22   CREATININE 1.0   CALCIUM 8.0*   PROT 6.7   ALBUMIN 1.9*   BILITOT 0.3   ALKPHOS 43*   AST 21   ALT 16   ANIONGAP 9    and CBC   Recent Labs   Lab 03/07/23  0755   WBC 6.07   HGB 10.2*   HCT 33.0*        Microbiology:   Blood Culture   Lab Results   Component Value Date    LABBLOO No Growth to date 03/06/2023    LABBLOO No Growth to date 03/06/2023    and Sputum Culture No results found for: GSRESP, RESPIRATORYC    Pending Diagnostic Studies:     None         Medications:  Reconciled Home Medications:      Medication List      START taking these medications    ELIQUIS 5 mg Tab  Generic drug: apixaban  Take 1 tablet (5 mg total) by mouth 2 (two) times daily.     levalbuterol 0.63 mg/3 mL nebulizer solution  Commonly known as: XOPENEX  Take 3 mLs (0.63 mg total) by nebulization every 8 (eight) hours as needed.     linezolid 600 mg Tab  Commonly known as: ZYVOX  Take 1 tablet (600 mg total) by mouth every 12 (twelve) hours. for 5 days     nebulizer and compressor Diana  Commonly known as: HOME NEBULIZER PLUS SIDESTREAM  use as directed        CHANGE how you take these medications    furosemide 40 MG tablet  Commonly known as: LASIX  Take 1 tablet (40 mg total) by mouth every other day.  What changed: when to take this        CONTINUE taking these medications    acetaminophen 500 MG tablet  Commonly known as: TYLENOL  Take 500 mg by mouth every 6 (six) hours as needed for Pain.     aspirin 81 MG Chew  Take 1 tablet (81 mg total) by mouth once daily.     atorvastatin 40 MG tablet  Commonly known as: LIPITOR  Take 1 tablet (40 mg total) by mouth once daily.     levothyroxine 50 MCG tablet  Commonly known as:  SYNTHROID  Take 1 tablet (50 mcg total) by mouth before breakfast.     miconazole nitrate 2% 2 % Oint  Commonly known as: MICOTIN  Apply topically 2 (two) times daily. Intertrigo to lower abdomen, groin, periarea: please apply Antifungal bid     senna-docusate 8.6-50 mg 8.6-50 mg per tablet  Commonly known as: PERICOLACE  Take 1 tablet by mouth once daily.        ASK your doctor about these medications    polyethylene glycol 17 gram Pwpk  Commonly known as: GLYCOLAX  Take 17 g by mouth 2 (two) times daily.            Indwelling Lines/Drains at time of discharge:   Lines/Drains/Airways     None                 Time spent on the discharge of patient: 30 minutes         Russ Thomas MD  Department of Hospital Medicine  [facility] - Telemetry ([facility])

## 2023-03-08 NOTE — PLAN OF CARE
03/08/23 1047   Post-Acute Status   Post-Acute Authorization Home Health   Home Health Status Referrals Sent   Discharge Delays None known at this time   Discharge Plan   Discharge Plan A Home;Home Health     SW spoke with pt's spouse, Austyn, regarding home health set up for discharge. Preference obtained for [facility]. Referral sent in [facility]; awaiting approval.  LEANNE to f/u.

## 2023-03-08 NOTE — DISCHARGE INSTRUCTIONS
Call EMS at [phone] regarding lost ID and insurance card.  [facility]: [phone] - they will be assisting with locating lost items.

## 2023-03-08 NOTE — PLAN OF CARE
Family inquired about lost ID and insurance card. SW confirmed with ED that items were not left there. SW attempted to call EMS; VM left for lost and found dept. SW contacted patient relations who will follow up with patient and family.

## 2023-03-08 NOTE — NURSING
Pt received d/c papers and verbalized understanding. IV and Tele monitor removed. Pt clear and ready for d/c

## 2023-03-09 NOTE — PROGRESS NOTES
C3 nurse attempted to contact Gene Rothman  for a TCC post hospital discharge follow up call. No answer. Left voicemail with callback information. The patient has a scheduled HOSFU appointment with ROBBIN Cassidy on 03/13/2023 @ D.

## 2023-03-10 NOTE — PROGRESS NOTES
C3 nurse attempted to contact patient. The following occurred:   C3 nurse attempted to contact Gene Rothman  for a TCC post hospital discharge follow up call. The patient is unable to conduct the call @ this time. The patient requested a callback.    The patient has a scheduled HOSFU appointment with Charline  on 03/13/2023 @ TBD. Message sent to Physician staff.

## 2023-03-10 NOTE — TELEPHONE ENCOUNTER
----- Message from Tabby Sterling sent at 3/10/2023  9:17 AM CST -----  Contact: SAUD HERRERA [[id number]] [phone]  Type:  Return Call    Who Called: SAUD HERRERA [[id number]]  Who Left Message for Patient: Unsure  Does the patient know what this is regarding?: Medication management  Would the patient rather a call back or a response via [facility]? Phone call   Best Call Back Number: [phone]  Additional Information:

## 2023-03-14 NOTE — TELEPHONE ENCOUNTER
----- Message from Cassidy Herrera RN sent at 3/14/2023  1:49 PM CDT -----  Tiffany,     I screened Mr Mills today for Outpatient Case Management and he advised he is not taking Lasix. He is bed bound and unable to sit up to use a urinal or even a bedpan so taking Lasix only increases the frequency his wife has to perform personal care to get him cleaned up. I told him I would message his ordering provider to advise, but it was most recently ordered by the hospitalist. I am sending this message to you because he is scheduled to see you tomorrow. If it needs to be sent to someone else please let me know.     Thank you,     Cassidy Herrera RN, [facility]  Outpatient Case Management  [phone]  Ext [phone]  [email or web address]

## 2023-03-15 NOTE — PROGRESS NOTES
Gene Rothman  03/15/2023  [id number]      The patient location is: Home   The chief complaint leading to consultation is: Hospital F/U  Visit type: Virtual visit with synchronous audio and video  Total time spent with patient: 15 min  Each patient to whom he or she provides medical services by telemedicine is:  (1) informed of the relationship between the physician and patient and the respective role of any other health care provider with respect to management of the patient; and (2) notified that he or she may decline to receive medical services by telemedicine and may withdraw from such care at any time.        Chief Complaint:      History of Present Illness:   Pts family states the pt passed two kidney stones since discharged home.   Home health evaluated pt Monday.  Pt is awake alert and oriented     HPI:  77-year-old male patient with past medical history of CHF, CKD, CAD, HTN, hypothyroidism, peripheral vascular disease, and obesity hypoventilation on oxygen at home, received oxygen via face mask in the past, at 4.5 L, admitted in September with intermediate V/Q scan and left lower extremity DVT , has had multiple hospital admissions over the last six-months for sepsis and UTI brought to the emergency room today for altered mental status.  Cardiology consulted to assist with management for afib. Pt seen and examined today in ED. Pt is poor historian with AMS, endorses severe discomfort and SOB. Labs reviewed, , troponin, negative, tele monitor afib with HR 60s, pt does not remember being on eliquis. Crt 1.0  Pulmonary  * Respiratory distress  Continues BiPAP repeat ABG in a.m..  Nebs.  3/2 nebs.  Favorable trend of CO2 on ABG.  Use BiPAP during sleep and p.r.n. distress.  Case management evaluation.     Acute on chronic respiratory failure with hypoxia and hypercapnia  Patient with Hypercapnic and Hypoxic Respiratory failure which is Acute on chronic.  he is on  home oxygen at 5 LPM. Supplemental oxygen was provided and noted- Oxygen Concentration (%):  [] 30.   Signs/symptoms of respiratory failure include- increased work of breathing and respiratory distress. Contributing diagnoses includes - CHF, COPD and Obesity Hypoventilation Labs and images were reviewed. Patient Has recent ABG, which has been reviewed. Will treat underlying causes and adjust management of respiratory failure as follows- O2 target sat above 89%.  Nebs.  BiPAP continuous.  Repeat ABG in a.m.  03/02/2023 nonadherent to positive pressure noninvasive ventilation at home.  24 hours O2.  Need BiPAP during sleep and p.r.n. distress..     Cardiac/Vascular  Atrial fibrillation  Cardiac monitoring.  Heart rate 60 on EKG.  Not on anticoagulation.  In September was on apixaban currently not aware why he is not on blood thinners neither his wife.  Previous admission for melena?      3/2 cardiac monitoring.  rate controlled.  Not on anticoagulation.  Cardiology consult.     Elevated brain natriuretic peptide (BNP) level  Strict I&Os.  Diuresis.  BiPAP  3/2 Diamox 250 mg x1 today.  Post hypercapnic respiratory failure noted on ABG.     Hematology  Venous thromboembolism  History of DVT of lower extremity.  Treated previously with anticoagulation as per records discharged on apixaban September 2022.  Not aware why not on anticoagulation currently.  History of recent admission for dark stool  3/2 September 2022 was on anticoagulation.  Currently patient and wife not aware if he is anticoagulated but on previous admission for dark stools no anticoagulation noted on DC.  Left femoral DVT.  Repeat ultrasound of lower extremity venous          Transitional Care Note    Family and/or Caretaker present at visit?  Yes.  Diagnostic tests reviewed/disposition: No diagnosic tests pending after this hospitalization.  Disease/illness education: yes  Home health/community services discussion/referrals: Patient has home  health established at [facility]  .   Establishment or re-establishment of referral orders for community resources: No other necessary community resources.   Discussion with other health care providers: No discussion with other health care providers necessary.          Review of Systems   Constitutional:  Negative for fever.   HENT:  Positive for hearing loss.    Eyes:  Negative for discharge.   Respiratory:  Negative for cough, shortness of breath and wheezing.    Cardiovascular:  Negative for chest pain and palpitations.   Gastrointestinal:  Negative for blood in stool, constipation, diarrhea, nausea and vomiting.   Genitourinary:  Negative for hematuria and urgency.   Musculoskeletal:  Negative for neck pain.   Neurological:  Negative for speech change, weakness and headaches.   Endo/Heme/Allergies:  Negative for polydipsia.   All other systems reviewed and are negative.      History:  Past Medical History:   Diagnosis Date    Acute hypoxemic respiratory failure 9/17/2022    Arthritis     Atrial fibrillation 3/1/2023    CHF (congestive heart failure)     Chronic kidney disease     Coronary artery disease     Depression     Hypertension     Hypertensive heart disease with heart failure 9/16/2022    Hypothyroidism     Lymphedema of lower extremity     Nephrolithiasis     Obesity     Peripheral vascular disease     Pneumonia 9/17/2022    Recurrent cellulitis of lower leg     Sleep apnea     can't stand the CPAP , sleeps elevated in hospital bed or chair    Tobacco dependence     resolved     Past Surgical History:   Procedure Laterality Date    ADENOIDECTOMY  1961    BACK SURGERY  1975;  1976    x2 for disc; scar tissue removed    debridement of bilateral leg ulcers Bilateral 01/01/2017    Dr Hernandez    INNER EAR SURGERY Bilateral 1961    separate - pinnaplasty , new eardrms constructed    KIDNEY STONE SURGERY Left 1976 approx    open    TONSILLECTOMY  1961       Family History   Problem Relation Age of Onset     Cancer Mother         ? abd also    Diabetes Mother     Hypertension Mother     Cancer Father         ? abdonimal origin stomach/liver or pancreas    Heart disease Father         pacer    Cancer Brother         skin-part of ext ear removed    Heart disease Brother         CABG3    Alzheimer's disease Brother     Asthma Daughter          26 w/ asthma    Alcohol abuse Maternal Grandfather     Stroke Neg Hx     COPD Neg Hx     Mental illness Neg Hx     Mental retardation Neg Hx     Kidney disease Neg Hx      Social History     Socioeconomic History    Marital status:    Tobacco Use    Smoking status: Former     Packs/day: 1.50     Years: 1.00     Pack years: 1.50     Types: Cigarettes     Quit date:      Years since quittin.2    Smokeless tobacco: Never   Substance and Sexual Activity    Alcohol use: Not Currently    Drug use: Never    Sexual activity: Never   Social History Narrative    ** Merged History Encounter **          Lives with wife and 2 sons and a daughter. No longer drives. Quit in  because couldn't hold foot on pedal. 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 Uses a Rolator at wo    rk and wheelchair at home. No caffeine. Does not have a Living Will or Advanced Directive.       Social Determinants of Health     Financial Resource Strain: High Risk    Difficulty of Paying Living Expenses: Hard   Food Insecurity: Food Insecurity Present    Worried About Running Out of Food in the Last Year: Often true    Ran Out of Food in the Last Year: Often true   Transportation Needs: Unmet Transportation Needs    Lack of Transportation (Medical): Yes    Lack of Transportation (Non-Medical): Yes   Physical Activity: Insufficiently Active    Days of Exercise per Week: 7 days    Minutes of Exercise per Session: 10 min   Stress: Stress Concern Present    Feeling of Stress : To some extent   Social Connections:  Socially Isolated    Frequency of Communication with Friends and Family: Never    Frequency of Social Gatherings with Friends and Family: Once a week    Attends Mandaeism Services: Never    Active Member of Clubs or Organizations: No    Attends Club or Organization Meetings: Never    Marital Status:    Housing Stability: Low Risk     Unable to Pay for Housing in the Last Year: No    Number of Places Lived in the Last Year: 1    Unstable Housing in the Last Year: No     Patient Active Problem List   Diagnosis    Osteoarthritis    Hypothyroidism (acquired)    Morbid obesity with BMI of 60.0-69.9, adult    Acquired lymphedema of leg -severe    Nephrolithiasis    Nocturnal hypoxia    Bilateral hearing loss    CKD (chronic kidney disease) stage 3, GFR 30-59 ml/min    Sleep apnea    Ventral hernia without obstruction or gangrene    Fungal infection of toenails    Chronic acquired lymphedema    Venous stasis dermatitis of both lower extremities    Peripheral vascular disease, unspecified    Candidal dermatitis    Venous stasis ulcer of left calf    Hand pain, right    Depression    Pulmonary edema    Elevated d-dimer    Bilateral lower leg cellulitis    Functional quadriplegia    Hypothyroidism    Hyperlipidemia    GERD (gastroesophageal reflux disease)    CKD (chronic kidney disease) stage 3, GFR 30-59 ml/min    Severe obesity (BMI >= 40)    Acute pulmonary embolism    Medication management    Physical debility    Hypertensive heart disease with heart failure    Elevated brain natriuretic peptide (BNP) level    Venous thromboembolism    Chronic respiratory failure    Hx of sepsis    Decubitus ulcer of right buttock, stage 1    Thrombocytopenia    Acute on chronic respiratory failure with hypoxia and hypercapnia    Respiratory distress    Atrial fibrillation    Acute cystitis    Bacteremia    COPD (chronic obstructive pulmonary disease)     Review of patient's allergies indicates:   Allergen Reactions    Bactrim  [sulfamethoxazole-trimethoprim] Itching       The following were reviewed at this visit: active problem list, medication list, allergies, family history, social history, and health maintenance.    Medications:  Current Outpatient Medications on File Prior to Visit   Medication Sig Dispense Refill    acetaminophen (TYLENOL) 500 MG tablet Take 500 mg by mouth every 6 (six) hours as needed for Pain.      apixaban (ELIQUIS) 5 mg Tab Take 1 tablet (5 mg total) by mouth 2 (two) times daily. 60 tablet 3    aspirin 81 MG Chew Take 1 tablet (81 mg total) by mouth once daily. 30 tablet 0    atorvastatin (LIPITOR) 40 MG tablet Take 1 tablet (40 mg total) by mouth once daily. 30 tablet 0    furosemide (LASIX) 40 MG tablet Take 1 tablet (40 mg total) by mouth every other day. (Patient not taking: Reported on 3/14/2023) 30 tablet 1    levalbuterol (XOPENEX) 0.63 mg/3 mL nebulizer solution Take 3 mLs (0.63 mg total) by nebulization every 8 (eight) hours as needed. (Patient not taking: Reported on 3/14/2023) 75 mL 0    levothyroxine (SYNTHROID) 50 MCG tablet Take 1 tablet (50 mcg total) by mouth before breakfast. 30 tablet 11    miconazole nitrate 2% (MICOTIN) 2 % Oint Apply topically 2 (two) times daily. Intertrigo to lower abdomen, groin, periarea: please apply Antifungal bid (Patient not taking: Reported on 3/14/2023) 71 g 1    nebulizer and compressor (HOME NEBULIZER PLUS SIDESTREAM) Diana use as directed 1 each 0    polyethylene glycol (GLYCOLAX) 17 gram PwPk Take 17 g by mouth 2 (two) times daily. (Patient not taking: Reported on 2/20/2023) 72 packet 3    senna-docusate 8.6-50 mg (PERICOLACE) 8.6-50 mg per tablet Take 1 tablet by mouth once daily.       No current facility-administered medications on file prior to visit.       Exam:  Wt Readings from Last 3 Encounters:   03/07/23 (!) 180.7 kg (398 lb 5.9 oz)   02/20/23 (!) 154.7 kg (341 lb)   02/04/23 (!) 186 kg (410 lb)     Temp Readings from Last 3 Encounters:   03/08/23  98.6 °F (37 °C) (Oral)   02/20/23 98.4 °F (36.9 °C) (Temporal)   02/13/23 98.2 °F (36.8 °C)     BP Readings from Last 3 Encounters:   03/08/23 133/60   02/20/23 131/62   02/04/23 (!) 134/54     Pulse Readings from Last 3 Encounters:   03/08/23 (!) 53   02/20/23 (!) 58   02/13/23 (!) 48     There is no height or weight on file to calculate BMI.      @ROS@    Physical Exam  Vitals and nursing note reviewed.   Constitutional:       Appearance: Normal appearance. He is obese.   Eyes:      Conjunctiva/sclera: Conjunctivae normal.   Pulmonary:      Effort: Pulmonary effort is normal.   Neurological:      General: No focal deficit present.      Mental Status: He is alert and oriented to person, place, and time. Mental status is at baseline.   Psychiatric:         Mood and Affect: Mood normal.         Behavior: Behavior normal.         Thought Content: Thought content normal.         Judgment: Judgment normal.       Laboratory Reviewed ({Yes)  Lab Results   Component Value Date    WBC 6.07 03/07/2023    HGB 10.2 (L) 03/07/2023    HCT 33.0 (L) 03/07/2023     03/07/2023    CHOL 72 (L) 01/03/2022    TRIG 85 01/03/2022    HDL 28 (L) 01/03/2022    ALT 16 03/07/2023    AST 21 03/07/2023     03/07/2023    K 3.5 03/07/2023    CL 96 03/07/2023    CREATININE 1.0 03/07/2023    BUN 22 03/07/2023    CO2 35 (H) 03/07/2023    TSH 3.002 01/11/2023    PSA 0.65 05/19/2021    INR 1.0 02/03/2023    HGBA1C 5.6 01/03/2022       There are no diagnoses linked to this encounter.

## 2023-03-16 NOTE — TELEPHONE ENCOUNTER
----- Message from Cassidy Bateman sent at 3/16/2023 10:52 AM CDT -----  Contact: [facility]  Type:  Pharmacy Calling to Clarify an RX    Name of Caller: Shoshana  Pharmacy Name: [facility]  Prescription Name:albuterol-ipratropium (DUO-NEB) 2.5 mg-0.5 mg/3 mL nebulizer solution 75 mL   What do they need to clarify?: med is on back order and pt is requesting an alternative for it.  Best Call Back Number: [phone]  Additional Information:  ask for anyone there

## 2023-03-20 NOTE — PROGRESS NOTES
[facility]  Transition of Care Home Visit    Visit Date: 3/20/2023  Encounter Provider: Kyara Stephens   PCP:  Ami Zarate DO    PRESENTING HISTORY      Patient ID: Gene Rothman is a 77 y.o. male.    Consult Requested By:  No ref. provider found  Reason for Consult:  Hospital Follow Up.    Gene is being seen at home due to being seen at home due to physical debility that presents a taxing effort to leave the home, to mitigate high risk of hospital readmission and/or due to the limited availability of reliable or safe options for transportation to the point of access to the provider. Prior to treatment on this visit the chart was reviewed and patient verbal consent was obtained.      Chief Complaint: Transitional Care    Patient was admitted to hospital on 03/02 and discharged to home on 03/08    History of Present Illness: Mr. Gene Rothman is a 77 y.o. male who was recently admitted to the hospital who presents to the Emergency Department for AMS which onset of 1 month PTA. Pt is on 4.5L O2 at home, but spouse states that the pt keeps removing his O2 during the night. Pt often does not know where he is. Symptoms are constant and moderate in severity. No mitigating or exacerbating factors reported. Associated sxs include generalized weakness and confusion. Pt also reports chronic R knee pain. Patient's spouse denies any fever, chills, n/v/d/, headache, chest pain, SOB, and all other sxs at this time. Pt was admitted for sepsis at [facility] December 2022. No prior TX reported. No further complaints or concerns at this time.       ___________________________________________________________________    Today:    HPI:  Patient is being seen today for a transitional care visit. See hospital course for details. Upon arrival patient was lying in his hospital bed alert and oriented to self and place. Upon discharge patient was prescribed xopenex and patient's wife reports that the medication was on back order.  Medications reviewed with his son and he reports compliance to all medications. VSS.         Review of Systems   Constitutional:  Positive for fatigue.   HENT:  Positive for hearing loss.    Eyes: Negative.    Respiratory:  Positive for shortness of breath (on 5L Face Mask).    Cardiovascular:  Positive for leg swelling.   Gastrointestinal: Negative.    Endocrine: Negative.    Genitourinary: Negative.    Musculoskeletal:  Positive for arthralgias and myalgias.   Skin:  Positive for color change.   Allergic/Immunologic: Negative.    Neurological:  Positive for weakness.   Psychiatric/Behavioral:  Positive for dysphoric mood.      Assessments:  Environmental: Patient lives in a single story home with his family. His daughter lives with them and she is bedbound in the living room and the house is in disarray, lighting is dim and temperature is comfortable.    Functional Status: Patient is bed bound and needs assistance with all ADLs  Safety: Fall Precautions and Oxygen Precautions  Nutritional: Adequate food in the home  Home Health/DME/Supplies: [facility], DMEs: oxygen concentrator, hospital bed, bedside commode    PAST HISTORY:     Past Medical History:   Diagnosis Date    Acute hypoxemic respiratory failure 9/17/2022    Arthritis     Atrial fibrillation 3/1/2023    CHF (congestive heart failure)     Chronic kidney disease     Coronary artery disease     Depression     Hypertension     Hypertensive heart disease with heart failure 9/16/2022    Hypothyroidism     Lymphedema of lower extremity     Nephrolithiasis     Obesity     Peripheral vascular disease     Pneumonia 9/17/2022    Recurrent cellulitis of lower leg     Sleep apnea     can't stand the CPAP , sleeps elevated in hospital bed or chair    Tobacco dependence     resolved       Past Surgical History:   Procedure Laterality Date    ADENOIDECTOMY  1961    BACK SURGERY  1975;  1976    x2 for disc; scar tissue removed    debridement of bilateral leg  ulcers Bilateral 2017    Dr Hernandez    INNER EAR SURGERY Bilateral     separate - pinnaplasty , new eardrms constructed    KIDNEY STONE SURGERY Left  approx    open    TONSILLECTOMY  89       Family History   Problem Relation Age of Onset    Cancer Mother         ? abd also    Diabetes Mother     Hypertension Mother     Cancer Father         ? abdonimal origin stomach/liver or pancreas    Heart disease Father         pacer    Cancer Brother         skin-part of ext ear removed    Heart disease Brother         CABG3    Alzheimer's disease Brother     Asthma Daughter          26 w/ asthma    Alcohol abuse Maternal Grandfather     Stroke Neg Hx     COPD Neg Hx     Mental illness Neg Hx     Mental retardation Neg Hx     Kidney disease Neg Hx        Social History     Socioeconomic History    Marital status:    Tobacco Use    Smoking status: Former     Packs/day: 1.50     Years: 1.00     Pack years: 1.50     Types: Cigarettes     Quit date:      Years since quittin.2    Smokeless tobacco: Never   Substance and Sexual Activity    Alcohol use: Not Currently    Drug use: Never    Sexual activity: Never   Social History Narrative    ** Merged History Encounter **          Lives with wife and 2 sons and a daughter. No longer drives. Quit in  because couldn't hold foot on pedal. 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 Uses a Rolator at wo    rk and wheelchair at home. No caffeine. Does not have a Living Will or Advanced Directive.       Social Determinants of Health     Financial Resource Strain: High Risk    Difficulty of Paying Living Expenses: Hard   Food Insecurity: Food Insecurity Present    Worried About Running Out of Food in the Last Year: Often true    Ran Out of Food in the Last Year: Often true   Transportation Needs: Unmet Transportation Needs    Lack of Transportation (Medical): Yes    Lack of  Transportation (Non-Medical): Yes   Physical Activity: Insufficiently Active    Days of Exercise per Week: 7 days    Minutes of Exercise per Session: 10 min   Stress: Stress Concern Present    Feeling of Stress : To some extent   Social Connections: Socially Isolated    Frequency of Communication with Friends and Family: Never    Frequency of Social Gatherings with Friends and Family: Once a week    Attends Buddhism Services: Never    Active Member of Clubs or Organizations: No    Attends Club or Organization Meetings: Never    Marital Status:    Housing Stability: Low Risk     Unable to Pay for Housing in the Last Year: No    Number of Places Lived in the Last Year: 1    Unstable Housing in the Last Year: No       MEDICATIONS & ALLERGIES:     Current Outpatient Medications on File Prior to Visit   Medication Sig Dispense Refill    acetaminophen (TYLENOL) 500 MG tablet Take 500 mg by mouth every 6 (six) hours as needed for Pain.      aspirin 81 MG Chew Take 1 tablet (81 mg total) by mouth once daily. 30 tablet 0    atorvastatin (LIPITOR) 40 MG tablet Take 1 tablet (40 mg total) by mouth once daily. 30 tablet 0    levothyroxine (SYNTHROID) 50 MCG tablet Take 1 tablet (50 mcg total) by mouth before breakfast. 30 tablet 11    miconazole nitrate 2% (MICOTIN) 2 % Oint Apply topically 2 (two) times daily. Intertrigo to lower abdomen, groin, periarea: please apply Antifungal bid (Patient not taking: Reported on 3/14/2023) 71 g 1    nebulizer and compressor (HOME NEBULIZER PLUS SIDESTREAM) Diana use as directed 1 each 0    polyethylene glycol (GLYCOLAX) 17 gram PwPk Take 17 g by mouth 2 (two) times daily. (Patient not taking: Reported on 2/20/2023) 72 packet 3    senna-docusate 8.6-50 mg (PERICOLACE) 8.6-50 mg per tablet Take 1 tablet by mouth once daily.      [DISCONTINUED] albuterol sulfate 2.5 mg/0.5 mL Nebu Take 2.5 mg by nebulization every 6 (six) hours as needed (sob). Rescue 90 each 11    [DISCONTINUED]  albuterol-ipratropium (DUO-NEB) 2.5 mg-0.5 mg/3 mL nebulizer solution Take 3 mLs by nebulization every 6 (six) hours as needed for Wheezing. Rescue 75 mL 5    [DISCONTINUED] apixaban (ELIQUIS) 5 mg Tab Take 1 tablet (5 mg total) by mouth 2 (two) times daily. 60 tablet 3    [DISCONTINUED] furosemide (LASIX) 40 MG tablet Take 1 tablet (40 mg total) by mouth every other day. (Patient not taking: Reported on 3/14/2023) 30 tablet 1    [DISCONTINUED] levalbuterol (XOPENEX) 0.63 mg/3 mL nebulizer solution Take 3 mLs (0.63 mg total) by nebulization every 8 (eight) hours as needed. (Patient not taking: Reported on 3/14/2023) 75 mL 0     No current facility-administered medications on file prior to visit.        Review of patient's allergies indicates:   Allergen Reactions    Bactrim [sulfamethoxazole-trimethoprim] Itching       OBJECTIVE:     Vital Signs:  Vitals:    03/20/23 1356   BP: (!) 110/50   Pulse: 60   Resp: 20   Temp: 97.6 °F (36.4 °C)     There is no height or weight on file to calculate BMI.     Physical Exam:  Physical Exam  Vitals reviewed.   Constitutional:       General: He is not in acute distress.     Appearance: He is obese.   HENT:      Head: Normocephalic and atraumatic.      Nose: Nose normal.      Mouth/Throat:      Mouth: Mucous membranes are moist.      Pharynx: Oropharynx is clear.   Eyes:      Pupils: Pupils are equal, round, and reactive to light.   Cardiovascular:      Rate and Rhythm: Bradycardia present. Rhythm irregular.      Heart sounds: Normal heart sounds.   Pulmonary:      Breath sounds: Examination of the right-lower field reveals decreased breath sounds. Examination of the left-lower field reveals decreased breath sounds. Decreased breath sounds present.   Abdominal:      General: Bowel sounds are normal.      Palpations: Abdomen is soft.   Musculoskeletal:      Right lower leg: Edema present.      Left lower leg: Edema (non pitting edema 3+) present.   Skin:     General: Skin is warm  and dry.      Comments: Darkening of the lower aspect of BLE   Neurological:      Mental Status: He is alert. Mental status is at baseline.      Motor: Weakness present.       Laboratory  Lab Results   Component Value Date    WBC 6.07 03/07/2023    HGB 10.2 (L) 03/07/2023    HCT 33.0 (L) 03/07/2023     (H) 03/07/2023     03/07/2023     Lab Results   Component Value Date    INR 1.0 02/03/2023    INR 1.0 09/19/2022    INR 1.0 01/03/2022     Lab Results   Component Value Date    HGBA1C 5.6 01/03/2022     No results for input(s): POCTGLUCOSE in the last 72 hours.    Diagnostic Results:      TRANSITION OF CARE:     [facility] Contact Note:     Family and/or Caretaker present at visit?  Yes.  Diagnostic tests reviewed/disposition: No diagnosic tests pending after this hospitalization.  Disease/illness education: Respiratory failure  Home health/community services discussion/referrals: Patient has home health established at [facility] .   Establishment or re-establishment of referral orders for community resources: No other necessary community resources.   Discussion with other health care providers: No discussion with other health care providers necessary.     Transition of Care Visit:  I have reviewed and updated the history and problem list.  I have reconciled the medication list.  I have discussed the hospitalization and current medical issues, prognosis and plans with the patient/family.  I  spent more than 50% of time discussing the care with the patient/family.  Total Face-to-Face Encounter: 60 minutes.    Medications Reconciliation:   I have reconciled the patient's home medications and discharge medications with the patient/family. I have updated all changes.  Refer to After-Visit Medication List.    ASSESSMENT & PLAN:     HIGH RISK CONDITION(S):  Chronic respiratory failure, COPD, HTN, CKD, A Fib, PVD, HLD, CHF    1. Chronic respiratory failure, unspecified whether with hypoxia or  hypercapnia  Overview:  Pulse ox spot check off of oxygen is 84-85%  With 4L face Mask o2 sat is 96%  Continue with O2 at 4L face mask continuously    Orders:  -     levalbuterol (XOPENEX) 1.25 mg/3 mL nebulizer solution; Take 3 mLs (1.25 mg total) by nebulization every 4 (four) hours as needed for Wheezing. Rescue  Dispense: 50 each; Refill: 3    2. Atrial fibrillation, unspecified type  -     apixaban (ELIQUIS) 5 mg Tab; Take 1 tablet (5 mg total) by mouth 2 (two) times daily.  Dispense: 180 tablet; Refill: 3    3. Chronic pulmonary edema  -     furosemide (LASIX) 40 MG tablet; Take 1 tablet (40 mg total) by mouth once daily.  Dispense: 90 tablet; Refill: 3       Encounter for Medical Follow-Up and Medication Review  - [facility] at NP to schedule follow-up visit with patient in 4 weeks or PRN     Patient Instructions Given:  - Continue all medications, treatments and therapies as ordered.   - Follow all instructions, recommendations as discussed.  - Maintain Safety Precautions at all times.  - Attend all medical appointments as scheduled.  - For worsening symptoms: call Primary Care Physician or Nurse Practitioner.  - For emergencies, call 911 or immediately report to the nearest emergency room     Were controlled substances prescribed?  No    Instructions for the patient:    Scheduled Follow-up :  Future Appointments   Date Time Provider Department Center   4/17/2023  8:00 AM Kyara Cassidy NP [facility]       After Visit Medication List :     Medication List            Accurate as of March 20, 2023  9:12 PM. If you have any questions, ask your nurse or doctor.                START taking these medications      levalbuterol 1.25 mg/3 mL nebulizer solution  Commonly known as: XOPENEX  Take 3 mLs (1.25 mg total) by nebulization every 4 (four) hours as needed for Wheezing. Rescue  Replaces: levalbuterol 0.63 mg/3 mL nebulizer solution  Started by: Kyara Cassidy NP            CHANGE how you take  these medications      furosemide 40 MG tablet  Commonly known as: LASIX  Take 1 tablet (40 mg total) by mouth once daily.  What changed: when to take this  Changed by: Kyara Stephens NP            CONTINUE taking these medications      acetaminophen 500 MG tablet  Commonly known as: TYLENOL     apixaban 5 mg Tab  Commonly known as: ELIQUIS  Take 1 tablet (5 mg total) by mouth 2 (two) times daily.     aspirin 81 MG Chew  Take 1 tablet (81 mg total) by mouth once daily.     atorvastatin 40 MG tablet  Commonly known as: LIPITOR  Take 1 tablet (40 mg total) by mouth once daily.     levothyroxine 50 MCG tablet  Commonly known as: SYNTHROID  Take 1 tablet (50 mcg total) by mouth before breakfast.     miconazole nitrate 2% 2 % Oint  Commonly known as: MICOTIN  Apply topically 2 (two) times daily. Intertrigo to lower abdomen, groin, periarea: please apply Antifungal bid     nebulizer and compressor Diana  Commonly known as: HOME NEBULIZER PLUS SIDESTREAM  use as directed     polyethylene glycol 17 gram Pwpk  Commonly known as: GLYCOLAX  Take 17 g by mouth 2 (two) times daily.     senna-docusate 8.6-50 mg 8.6-50 mg per tablet  Commonly known as: PERICOLACE            STOP taking these medications      albuterol sulfate 2.5 mg/0.5 mL Nebu  Stopped by: Kyara Stephens NP     albuterol-ipratropium 2.5 mg-0.5 mg/3 mL nebulizer solution  Commonly known as: DUO-NEB  Stopped by: Kyara Stephens NP     levalbuterol 0.63 mg/3 mL nebulizer solution  Commonly known as: XOPENEX  Replaced by: levalbuterol 1.25 mg/3 mL nebulizer solution  Stopped by: Kyara Stephens NP               Where to Get Your Medications        These medications were sent to [facility] - [place], [place] - [address]  [address], [facility] [address]      Phone: [phone]   apixaban 5 mg Tab  furosemide 40 MG tablet  levalbuterol 1.25 mg/3 mL nebulizer solution       Signature: Kyara Stephens NP

## 2023-03-20 NOTE — TELEPHONE ENCOUNTER
Spoke with patient concerning referral for medication Eliquis. Patient co-pay is currently 47.00 per month.  I explained the program to the patient and provided how much he has to spend to qualify for the program which is 1440.00 yearly for household.  Patient stated that he only has to pay for this one medication and his wife has one medication she pays for.  He understands he may not qualify for the program.  No assistance needed.

## 2023-03-22 NOTE — LETTER
March 22, 2023    Gene Rothman  [address] [place] [address]             [facility] [address] [facility] [place] [address] I am writing from the Outpatient Complex Care Management Department at [facility].  I received a referral from Cassidy Herrera RN to contact you regarding any needs you may have.  I was unable to reach you by phone.   Please contact me for assistance.     I can be reached at [phone] Monday thru Friday from 8:00am to 4:30pm.  [facility] also has a program with a nurse available 24/7 to answer questions or provide medical advice.  [facility] can be reached at [phone].    Sincerely,     Natasha Loco LMSW  [facility]

## 2023-03-22 NOTE — PROGRESS NOTES
Outpatient Care Management   - High Risk Patient Assessment    Patient: Gene Rothman  MRN:  [id number]  Date of Service:  3/22/2023  Completed by:  Natasha Loco LMSW  Referral Date: 02/20/2023    Reason for Visit   Patient presents with    OPCM Chart Review    OPCM SW First Assessment Attempt     3/22/2023  1st attempt to complete Initial Assessment  for Outpatient Care Management, left message.  Will mail unable to assess letter.           1st Attempt to complete Social Work Assessment for Outpatient Care Management; left message with contact information requesting a return call.  BEATA will reattempt on Friday  3/24/2023

## 2023-03-23 NOTE — PROGRESS NOTES
Outpatient Care Management   - High Risk Patient Assessment    Patient: Gene Rothman  MRN:  [id number]  Date of Service:  3/23/2023  Completed by:  Natasha Loco LMSW  Referral Date: 02/20/2023    Reason for Visit   Patient presents with    OPCM Chart Review    OPCM LEANNE First Assessment Attempt     3/22/2023  1st attempt to complete Initial Assessment  for Outpatient Care Management, left message.  Will mail unable to assess letter.        Social Work Assessment - High Risk     03/22/23      Case Closure       Brief Summary:  received a referral from O[facility] RN Cassidy Herrera for the following patient identified psycho-social needs: high depression score of 15 on PHQ. . Patient deferred to have his wife answer assessment questions due to difficulty hearing. Patient and wife declined  assistance after assessment was completed. Offered counseling resources to patient pt declined ( became angry when offered). Wife reported no SI. Wife expressed that pt could benefit from counseling but his adamant that he will not participate. Sw encouraged wife to express concerns to PCP. Explained patient is still able to make own decisions and can not be forced to take medication or complete counseling. Provided [employer] customer service number for card replacement. Patient received a nebulizer in the mail but is unsure of how to use it or why it's there. Collaborated with Cassidy MORENO RN. Wife concerned about getting prescriptions filled once they run out. Reports Kyara Cassidy NP visits patient monthly in the home. Message sent to NP to see if she is able to assist with medication refills. Wife and patient denied any needs from . Answered all questions. Provided contact information incase any needs or concerns arise.  Will close case. Notified Cassidy Herrera RN of case closure.

## 2023-03-23 NOTE — PROGRESS NOTES
Outpatient Care Management  Plan of Care Follow Up Visit    Patient: Gene Rothman  MRN: [id number]  Date of Service: 03/23/2023  Completed by: Cassidy Herrera RN  Referral Date: 02/20/2023    No chief complaint on file.      Brief Summary: 3/23/23 - Brief phone contact with pt today. His son answered and relayed this CM's questions briefly, then advised they are performing personal care for pt and ended the call.   Next Steps: Attempt to follow up next week. Cassidy Herrera RN  3/31/23 -  No answer. Voice mail left and letter mailed requesting return call. Cassidy Herrera RN  4/3/23 - No answer. Voice mail left requesting return call. Will await return call from letter mailed on 3/31/23 and will d/c from OPCM around 4/10/23 if no return call rec'd. Cassidy Herrera RN  4/10/23 - Will d/c from OPCM today related to no answer to calls X 3 attempts and no response to letter mailed on 3/31/23 requesting return call. Cassidy Herrera RN

## 2023-03-23 NOTE — LETTER
March 31, 2023    Gene Rothman  [address] [place] [address]             [facility] [address] [facility] [place] [address] Dear Gene,      I work with [facility] Outpatient [facility] Management Department. I have been unsuccessful at reaching you recently since we spoke on 3/14/23. If you require any future assistance or if any new concerns or problems arise, please call me.     The Outpatient Case Management Department can be reached at [phone] from 8 AM to 4:30 PM Monday thru Friday.    [facility] also has a program called [facility] with a nurse available 24/7 to answer questions or provide medical advice for any non-emergent symptoms you may have. That number is [phone].     Kindest Regards,        Cassidy Herrera RN  Outpatient Case Management  [phone]

## 2023-03-27 NOTE — TELEPHONE ENCOUNTER
Duo-Neb is on backorder they are asking if we can change it They have have albuterol but they dont have anything with Ipratropium in it

## 2023-03-27 NOTE — TELEPHONE ENCOUNTER
----- Message from Ana Mace MA sent at 3/27/2023  3:08 PM CDT -----  Regarding: Med change  Contact: [id number]    ----- Message -----  From: Kelsie Mancini  Sent: 3/27/2023  11:42 AM CDT  To: Barb Allen Staff    Calling regarding pt medication, pharmacy states they are unable to get me is asking if med can be changed . Please call back at [phone] . Thanks.dj               [id number]  [place] - [address]   [address]   [place] [address]  Phone: [phone] Fax: [phone]

## 2023-03-29 NOTE — TELEPHONE ENCOUNTER
Please have them reach out to the [facility] provider. I have not seen this patient personally in over 5 years and do not feel comfortable making any changes.

## 2023-03-30 NOTE — TELEPHONE ENCOUNTER
Informed pharmacy they will have to reach out to Care at home provider. Pharmacy stated they don't any following up they will reach out to patient.

## 2023-04-17 PROBLEM — J96.10 CHRONIC RESPIRATORY FAILURE: Status: RESOLVED | Noted: 2023-01-01 | Resolved: 2023-01-01

## 2023-04-17 NOTE — PROGRESS NOTES
"[facility]  Medical Home Visit    Visit Date: 4/17/2023  Encounter Provider: Kyara Stephens  PCP:  Ami Zarate DO    Subjective:      Patient ID: Gene Rothman is a 77 y.o. male.    Consult Requested By:  No ref. provider found  Reason for Consult:  follow up    Gene is being seen at home due to being seen at home due to physical debility that presents a taxing effort to leave the home, to mitigate high risk of hospital readmission and/or due to the limited availability of reliable or safe options for transportation to the point of access to the provider. Prior to treatment on this visit the chart was reviewed and patient verbal consent was obtained.    Chief Complaint: Follow-up      Patient is being seen today for a follow up visit. Upon arrival patient was awake in his hospital bed alert and oriented to self and place and others in the home. Patient is on 5L O2 with face mask continuous. O2 sats are 89-90% with oxygen. Patient is on albuterol nebulizer treatments BID, and using the IS multiple times a day. Patient has no complaints at this time. Wife and son reports that the patient has been doing "better" and seems to be more "alert" than he has been. Medications reviewed and patient is compliant with all medications. VSS.          Review of Systems   Constitutional: Negative.    HENT: Negative.     Eyes: Negative.    Respiratory:  Positive for shortness of breath.    Cardiovascular:  Positive for leg swelling.   Gastrointestinal:  Negative for constipation.   Endocrine: Negative.    Genitourinary: Negative.    Musculoskeletal: Negative.    Skin:  Positive for color change (BLE).   Allergic/Immunologic: Negative.    Neurological:  Positive for weakness.   Hematological: Negative.    Psychiatric/Behavioral:  Positive for dysphoric mood (improving some).      Assessments:  Environmental: Patient lives in a single story home with his family. There are no steps at the entrance, lighting is dim and " Warm temperature is comfortable.   Functional Status: Patient is bed bound and needs assistance with all ADLs  Safety: fall and oxygen precautions  Nutritional: Adequate food in the home  Home Health/DME/Supplies: [facility],         Objective:   Physical Exam  Vitals reviewed.   Constitutional:       General: He is not in acute distress.     Appearance: He is obese.   HENT:      Head: Normocephalic and atraumatic.      Nose: Nose normal.   Cardiovascular:      Rate and Rhythm: Normal rate and regular rhythm.      Pulses: Normal pulses.      Heart sounds: Normal heart sounds.   Pulmonary:      Effort: Pulmonary effort is normal.      Breath sounds: Examination of the right-lower field reveals decreased breath sounds. Examination of the left-lower field reveals decreased breath sounds. Decreased breath sounds present.   Abdominal:      General: Bowel sounds are normal.      Palpations: Abdomen is soft.   Musculoskeletal:      Right lower leg: Edema present.      Left lower leg: Edema present.   Skin:     General: Skin is warm and dry.      Capillary Refill: Capillary refill takes less than 2 seconds.      Findings: Erythema (BLE) present.   Neurological:      Mental Status: He is alert. Mental status is at baseline.   Psychiatric:         Mood and Affect: Affect is blunt.         Speech: Speech is delayed.         Behavior: Behavior is cooperative.         Thought Content: Thought content normal.         Cognition and Memory: Memory is impaired.       Vitals:    04/17/23 1533   BP: 112/60   Pulse: (!) 52   Resp: 20   Temp: 97.5 °F (36.4 °C)   SpO2: (!) 90%   PainSc: 0-No pain     There is no height or weight on file to calculate BMI.    Assessment:     1. Chronic obstructive pulmonary disease, unspecified COPD type        Plan:     Ethical / Legal: Advance Care Planning   Surrogate decision maker:  Name  Solo Rothman, Relationship: Son  Code Status:  Full Code  [place]POST:  None  Other advance directive:   None, Capacity to make medical decisions:  Yes, Conflict None       1. Chronic obstructive pulmonary disease, unspecified COPD type  Assessment & Plan:  Patient is using albuterol nebulizer treatments BID and using incentive spirometer daily    Orders:  -     tiotropium (SPIRIVA) 18 mcg inhalation capsule; Inhale 1 capsule (18 mcg total) into the lungs once daily. Controller  Dispense: 30 capsule; Refill: 11       Encounter for Medical Follow-Up and Medication Review  - [facility] to schedule follow-up visit with patient in 4 weeks or PRN     Patient Instructions Given:  - Continue all medications, treatments and therapies as ordered.   - Follow all instructions, recommendations as discussed.  - Maintain Safety Precautions at all times.  - Attend all medical appointments as scheduled.  - For worsening symptoms: call Primary Care Physician or Nurse Practitioner.  - For emergencies, call 911 or immediately report to the nearest emergency room     Were controlled substances prescribed?  No    Follow Up Appointments:   Future Appointments   Date Time Provider Department Center   5/26/2023  8:00 AM Kyara Cassidy NP [facility]       Signature: Kyara Cassidy NP      No masses; no nipple discharge

## 2023-05-26 NOTE — ASSESSMENT & PLAN NOTE
Redness noted to the perirectal area that blanches, no open area noted  Continue to apply barrier cream daily and as needed for soiling  Call if any worsening, skin breakdown

## 2023-05-26 NOTE — PROGRESS NOTES
[facility]  Medical Home Visit    Visit Date: 5/26/2023  Encounter Provider: Kyara Stephens  PCP:  Ami Zarate DO    Subjective:      Patient ID: Gene Rothman is a 77 y.o. male.    Consult Requested By:  No ref. provider found  Reason for Consult:  Follow Up    Gene is being seen at home due to being seen at home due to physical debility that presents a taxing effort to leave the home, to mitigate high risk of hospital readmission and/or due to the limited availability of reliable or safe options for transportation to the point of access to the provider. Prior to treatment on this visit the chart was reviewed and patient verbal consent was obtained.    Chief Complaint: Follow-up      Patient is being seen for a follow up visit. Upon arrival patient was being cleaned up. Patient's son reports that the patient has been much more alert and is doing regular nebulizer treatments, uses the incentive spirometer, and is on oxygen at 5L NC.  Stage 1 pressure ulcer noted to the perirectal area. No skin breakdown noted. Discoloration to the BLE noted without skin breakdown.  Medications reviewed with patient's son and he reports compliance to all medications. VSS.          Review of Systems   Constitutional: Negative.    HENT: Negative.     Eyes:  Positive for visual disturbance (wears glasses).   Respiratory:  Positive for shortness of breath.    Cardiovascular:  Positive for leg swelling.   Gastrointestinal: Negative.    Endocrine: Negative.    Genitourinary: Negative.    Musculoskeletal: Negative.    Skin:  Positive for color change (darkening to the lower legs).   Allergic/Immunologic: Negative.    Neurological:  Positive for weakness.   Hematological: Negative.      Assessments:  Environmental: Patient lives in a single story home with his family. There are no steps at the entrance. Lighting is dim and temperature is comfortable.  Functional Status: Patient is bed bound and needs assistance with all ALDs  Safety:  Oxygen Precautions  Nutritional: Adequate food in the home  Home Health/DME/Supplies: [facility], DMEs: hospital bed    Objective:   Physical Exam  Vitals reviewed.   Constitutional:       General: He is not in acute distress.     Appearance: He is obese.   HENT:      Head: Normocephalic and atraumatic.      Nose: Nose normal.      Mouth/Throat:      Mouth: Mucous membranes are moist.      Pharynx: Oropharynx is clear.   Cardiovascular:      Rate and Rhythm: Normal rate and regular rhythm.      Pulses: Normal pulses.      Heart sounds: Normal heart sounds.   Pulmonary:      Breath sounds: Examination of the right-lower field reveals decreased breath sounds. Examination of the left-lower field reveals decreased breath sounds. Decreased breath sounds present.      Comments: Upper lobes are clear bilaterally  Abdominal:      Palpations: Abdomen is soft.   Musculoskeletal:      Cervical back: Neck supple.      Right lower leg: Edema present.      Left lower leg: Edema present.   Skin:     General: Skin is warm and dry.      Comments: Darkening at lower extremities below the knees bilaterally   Neurological:      Mental Status: He is alert and oriented to person, place, and time.   Psychiatric:         Mood and Affect: Mood normal.         Behavior: Behavior normal.       Vitals:    05/26/23 1101   BP: (!) 96/50   Pulse: (!) 56   Resp: 18   Temp: 97.5 °F (36.4 °C)   SpO2: (!) 94%     There is no height or weight on file to calculate BMI.    Assessment:     1. Decubitus ulcer of right buttock, stage 1        Plan:     Ethical / Legal: Advance Care Planning   Surrogate decision maker:  Name Solo Rothman, Relationship: Son  Code Status:  Full Code  [place]:  None  Other advance directive:  None, Capacity to make medical decisions:  Yes, Conflict None       1. Decubitus ulcer of right buttock, stage 1  Assessment & Plan:  Redness noted to the perirectal area that blanches, no open area noted  Continue to apply  barrier cream daily and as needed for soiling  Call if any worsening, skin breakdown         Encounter for Medical Follow-Up and Medication Review  - [facility] to schedule follow-up visit with patient in 4 weeks or PRN     Patient Instructions Given:  - Continue all medications, treatments and therapies as ordered.   - Follow all instructions, recommendations as discussed.  - Maintain Safety Precautions at all times.  - Attend all medical appointments as scheduled.  - For worsening symptoms: call Primary Care Physician or Nurse Practitioner.  - For emergencies, call 911 or immediately report to the nearest emergency room     Were controlled substances prescribed?  No    Follow Up Appointments:   Future Appointments   Date Time Provider Department Center   6/30/2023  8:00 AM Kyara Cassidy NP [facility]         Signature: Kyara Cassidy NP

## 2023-06-05 PROBLEM — J96.21 ACUTE ON CHRONIC RESPIRATORY FAILURE WITH HYPOXIA AND HYPERCAPNIA: Status: RESOLVED | Noted: 2023-01-01 | Resolved: 2023-01-01

## 2023-06-05 PROBLEM — J96.22 ACUTE ON CHRONIC RESPIRATORY FAILURE WITH HYPOXIA AND HYPERCAPNIA: Status: RESOLVED | Noted: 2023-01-01 | Resolved: 2023-01-01

## 2023-06-13 NOTE — TELEPHONE ENCOUNTER
----- Message from Richard Jean sent at 6/13/2023  2:51 PM CDT -----      Name of Who is Calling:PT/wife          What is the request in detail:PT wife is requesting a call back to discuss PT having a really bad toothache, PT has been taking tylenol for the past 2 nights and would sleep for an hour and wake up in very bad pain. PT is also bed ridden and can not make it to the dentist.          Can the clinic reply by [facility]:no          What Number to Call Back if not in [phone] /e cell

## 2023-06-16 NOTE — Clinical Note
Patient seen today for mandibular R (premolar) tooth pain.  Tooth broke at gumline ~5 days ago.  On and off tooth ache since that time. Has tried Tylenol, oxycodone, and anbesol, without relief Patient is bed ridden and can not make it to dentist   Patient with [facility] currently  Also has [facility]   I prescribed antibiotics to hopefully settle infection.  However, unsure what can be come if symptoms worsen or persist other than ER.  Wife states he can not leave the home.  Only option I can think would be ambulance transfer to ER if worsening, though I'm not sure that they could do anything more than antibiotics.  I don't know of a medical speciality would remove a tooth, if persistent I would think he would ultimately have to follow up with dentist (or a dentist that performs procedures at a hospital).  Have you every run into this issue?  Any suggestions?

## 2023-06-16 NOTE — PROGRESS NOTES
Subjective:      Patient ID: Gene Rothman is a 77 y.o. male.    Chief Complaint: No chief complaint on file.    The patient location is: [place]   The chief complaint leading to consultation is: tooth pain     Visit type: audiovisual     Face to Face time with patient: 2:15-2:27  15 minutes of total time spent on the encounter, which includes face to face time and non-face to face time preparing to see the patient (eg, review of tests), Obtaining and/or reviewing separately obtained history, Documenting clinical information in the electronic or other health record, Independently interpreting results (not separately reported) and communicating results to the patient/family/caregiver, or Care coordination (not separately reported).     Each patient to whom he or she provides medical services by telemedicine is:  (1) informed of the relationship between the physician and patient and the respective role of any other health care provider with respect to management of the patient; and (2) notified that he or she may decline to receive medical services by telemedicine and may withdraw from such care at any time.    Notes: Patient is known to me, being seen today for mandibular R (premolar) tooth pain.  Tooth brok at gumline ~5 days ago.  On and off tooth ache since that time.  Has tried Tylenol, oxycodone, and anbesol, without relief  Patient is bed ridden and can not make it dentist     [facility] with [facility]  currently   Also has visits with [facility]     Last visit March 2023 Tiffany Day NP.    Review of Systems   Constitutional:  Positive for activity change and unexpected weight change.   HENT:  Positive for dental problem. Negative for hearing loss, rhinorrhea and trouble swallowing.    Eyes:  Negative for discharge and visual disturbance.   Respiratory:  Negative for chest tightness and wheezing.    Cardiovascular:  Negative for chest pain and palpitations.   Gastrointestinal:  Negative for blood in  stool, constipation, diarrhea and vomiting.   Endocrine: Negative for polydipsia and polyuria.   Genitourinary:  Negative for difficulty urinating, hematuria and urgency.   Musculoskeletal:  Negative for arthralgias, joint swelling and neck pain.   Neurological:  Negative for weakness and headaches.   Psychiatric/Behavioral:  Negative for confusion and dysphoric mood.      Objective:   There were no vitals taken for this visit.  Physical Exam  Constitutional:       General: He is not in acute distress.     Appearance: He is well-developed. He is not ill-appearing or diaphoretic.      Comments: Patient bed bound with oxygen mask   HENT:      Head: Normocephalic and atraumatic.      Right Ear: External ear normal.      Left Ear: External ear normal.   Eyes:      General: Lids are normal.         Right eye: No discharge.         Left eye: No discharge.      Conjunctiva/sclera: Conjunctivae normal.      Right eye: Right conjunctiva is not injected.      Left eye: Left conjunctiva is not injected.   Pulmonary:      Effort: Pulmonary effort is normal. No respiratory distress.   Skin:     General: Skin is warm and dry.      Findings: No rash.   Neurological:      Mental Status: He is alert and oriented to person, place, and time.   Psychiatric:         Speech: Speech normal.         Behavior: Behavior normal.         Thought Content: Thought content normal.         Judgment: Judgment normal.     Assessment:      1. Dental infection    2. Hx of sepsis    3. Stage 3 chronic kidney disease, unspecified whether stage 3a or 3b CKD       Plan:   Dental infection  -     amoxicillin-clavulanate 875-125mg (AUGMENTIN) 875-125 mg per tablet; Take 1 tablet by mouth 2 (two) times daily. for 7 days  Dispense: 14 tablet; Refill: 0    Hx of sepsis    Stage 3 chronic kidney disease, unspecified whether stage 3a or 3b CKD      Discussed worsening signs/symptoms and when to return to clinic or go to ED.   Patient expresses understanding and  agrees with treatment plan.

## 2023-06-19 NOTE — TELEPHONE ENCOUNTER
----- Message from Cassidy Dickerson PA-C sent at 6/19/2023  7:24 AM CDT -----  Please advise patient I discussed situation with Dr. Zarate.  She recommends referral to oral maxillofacial surgeon that may be able to perform tooth extraction in hospital setting.  I have placed external referral.  She mentioned Dr. Prosper Simms and Dr. Johnie Ham, his insurance may be able to give additional providers within his network.  He would still need to be evaluated prior to any procedure being completed.  I recommend patient/wife contact above providers to discuss if a virtual visit is possible to get things started.      ----- Message -----  From: Ami Zarate DO  Sent: 6/16/2023   7:54 PM CDT  To: Cassidy Dickerson PA-C    An Oral and Maxillofacial surgeon would be able to do it in the hospital setting. You can refer to one of them. I have (and Dr. Morejon) have used Dr. Prosper Simms. I am also familiar Dr. Johnie Ham and a group near [facility].   I imagine they'll need to evaluate him some kind of way though, but at least this would be a start. They may have a way to do it virtually, x-rays at home and then get him scheduled for surgery.     ----- Message -----  From: Cassidy Dickerson PA-C  Sent: 6/16/2023   4:31 PM CDT  To: Ami Zarate DO    Patient seen today for mandibular R (premolar) tooth pain.  Tooth broke at gumline ~5 days ago.  On and off tooth ache since that time.  Has tried Tylenol, oxycodone, and anbesol, without relief  Patient is bed ridden and can not make it to dentist     [facility] with [facility]  currently   Also has visits [facility]     I prescribed antibiotics to hopefully settle infection.  However, unsure what can be come if symptoms worsen or persist other than ER.  Wife states he can not leave the home.  Only option I can think would be ambulance transfer to ER if worsening, though I'm not sure that they could do anything more than antibiotics.  I don't know of a medical speciality  would remove a tooth, if persistent I would think he would ultimately have to follow up with dentist (or a dentist that performs procedures at a hospital).  Have you every run into this issue?  Any suggestions?

## 2023-06-19 NOTE — TELEPHONE ENCOUNTER
Called Doctors office to get fax numbers to send the referral/ Dr.Leo Richey office gave me the fax number to send referral to/ called pt and left a message on his recording to contact the office for more information.

## 2023-06-19 NOTE — TELEPHONE ENCOUNTER
----- Message from Cesilia Marquez LPN sent at 6/19/2023  1:19 PM CDT -----  Contact: Gene    ----- Message -----  From: Rosalind Rothman  Sent: 6/19/2023  11:55 AM CDT  To: [facility] Staff    Type:  Patient Returning Call    Who Called:Gene  Who Left Message for Patient:Maggie  Does the patient know what this is regarding?:unknown  Would the patient rather a call back or a response via [facility]? Call back  Best Call Back Number:[phone]  Additional Information: Gene missed a call and would like a call back    Thanks  PROSPER

## 2023-06-30 NOTE — PROGRESS NOTES
[facility]  Medical Home Visit    Visit Date: 6/30/2023  Encounter Provider: Kyara Stephens  PCP:  Ami Zarate DO    Subjective:      Patient ID: Gene Rothman is a 77 y.o. male.    Consult Requested By:  No ref. provider found  Reason for Consult:  Routine Follow Up    Gene is being seen at home due to being seen at home due to physical debility that presents a taxing effort to leave the home, to mitigate high risk of hospital readmission and/or due to the limited availability of reliable or safe options for transportation to the point of access to the provider. Prior to treatment on this visit the chart was reviewed and patient verbal consent was obtained.    Chief Complaint: Follow-up      Patient is being seen for a follow up visit. Upon arrival patient lying in bed. Patient's son reports that the patient's perirectal break down is significantly better, respiratory effort  is unchanged- remains on oxygen at 5L NC. Discoloration to the BLE noted without skin breakdown.  Medications reviewed with patient's son and he reports that he has not received the Spiriva previously prescribed, reordered. Patient states that Eliquis was over $100 for 3 months so he does not plan to refill it unless he can get the cost down. Spoke with daughter about applying for Eliquis patient assistance. Information given. Reports compliance with all other medications. VSS.          Review of Systems   Constitutional: Negative.    HENT:  Positive for hearing loss.    Respiratory:  Positive for shortness of breath (Patient on 5L NC).    Cardiovascular:  Positive for leg swelling.   Gastrointestinal: Negative.    Endocrine: Negative.    Genitourinary: Negative.    Skin:  Positive for color change (BLE).     Assessments:  Environmental: Patient lives in a single family dwelling with his family. There are no steps at the entrance. Lighting is dim and temperature is comfortable.  Functional Status: Patient is bed bound  Safety: Fall and  Oxygen Precautions  Nutritional: Adequate food in the home  Home Health/DME/Supplies: No home health, DMEs: hospital bed, wheelchair, oxygen concentrator    Objective:   Physical Exam  Vitals reviewed.   Constitutional:       General: He is not in acute distress.     Appearance: He is obese.   HENT:      Head: Normocephalic and atraumatic.   Cardiovascular:      Rate and Rhythm: Normal rate and regular rhythm.      Pulses: Normal pulses.      Heart sounds: Normal heart sounds.   Pulmonary:      Breath sounds: Decreased air movement present. Examination of the right-upper field reveals decreased breath sounds. Examination of the left-upper field reveals decreased breath sounds. Examination of the right-lower field reveals decreased breath sounds. Examination of the left-lower field reveals decreased breath sounds. Decreased breath sounds present.   Musculoskeletal:      Right lower leg: Edema present.      Left lower leg: Edema present.   Skin:     Comments: Discoloration to BLE   Neurological:      Mental Status: He is alert and oriented to person, place, and time. Mental status is at baseline.       Vitals:    06/30/23 1142   BP: 130/74   Pulse: (!) 58   Resp: 18   Temp: 97.6 °F (36.4 °C)   SpO2: 97%   PainSc: 0-No pain     There is no height or weight on file to calculate BMI.    Assessment:     1. Chronic obstructive pulmonary disease, unspecified COPD type        Plan:     Ethical / Legal: Advance Care Planning   Surrogate decision maker:  Name Solo Rothman, Relationship: Son  Code Status:  Full Code  [place]:  None  Other advance directive:  None, Capacity to make medical decisions:  Yes, Conflict None       1. Chronic obstructive pulmonary disease, unspecified COPD type  -     tiotropium (SPIRIVA) 18 mcg inhalation capsule; Inhale 1 capsule (18 mcg total) into the lungs once daily. Controller  Dispense: 30 capsule; Refill: 11       Encounter for Medical Follow-Up and Medication Review  - [facility] to schedule follow-up visit with patient in 4 weeks or PRN     Patient Instructions Given:  - Continue all medications, treatments and therapies as ordered.   - Follow all instructions, recommendations as discussed.  - Maintain Safety Precautions at all times.  - Attend all medical appointments as scheduled.  - For worsening symptoms: call Primary Care Physician or Nurse Practitioner.  - For emergencies, call 911 or immediately report to the nearest emergency room      Were controlled substances prescribed?  No    Follow Up Appointments:   Future Appointments   Date Time Provider Department Center   7/28/2023  8:00 AM Kyara Cassidy NP [facility]       Signature: Kyara Cassidy NP

## 2023-07-10 NOTE — TELEPHONE ENCOUNTER
Patient has not had his Eliquis for 2 days they are waiting on mail order they want a 5 day supply sent to [facility] [address]

## 2023-07-10 NOTE — TELEPHONE ENCOUNTER
Refill Routing Note   Medication(s) are not appropriate for processing by [facility] for the following reason(s):      Medication outside of protocol  No active prescription written by PCP    [facility] action(s):  Route Care Due:  None identified          Appointments  past 12m or future 3m with PCP    Date Provider   Last Visit   4/5/2019 Ami Zarate DO   Next Visit   Visit date not found Ami Zarate DO   ED visits in past 90 days: 0        Note composed:1:14 PM 07/10/2023

## 2023-07-12 NOTE — TELEPHONE ENCOUNTER
Caller states patient is almost completely out of eliquis. She says the bottle says to take 2 a day but he states he's only suppose to take 1 a day. Caller would also like to go over all current medications and correct dosages. Please contact caller directly to clarify eliquis order and go over current meds.  Reason for Disposition   [1] Caller has NON-URGENT medicine question about med that PCP prescribed AND [2] triager unable to answer question    Protocols used: Medication Question Call-A-AH

## 2023-07-15 PROBLEM — N30.01 ACUTE CYSTITIS WITH HEMATURIA: Status: ACTIVE | Noted: 2023-01-01

## 2023-07-15 PROBLEM — J96.11 CHRONIC RESPIRATORY FAILURE WITH HYPOXIA AND HYPERCAPNIA: Status: ACTIVE | Noted: 2023-01-01

## 2023-07-15 PROBLEM — J44.1 CHRONIC OBSTRUCTIVE PULMONARY DISEASE WITH ACUTE EXACERBATION: Status: ACTIVE | Noted: 2023-01-01

## 2023-07-15 PROBLEM — J96.12 CHRONIC RESPIRATORY FAILURE WITH HYPOXIA AND HYPERCAPNIA: Status: ACTIVE | Noted: 2023-01-01

## 2023-07-15 PROBLEM — G93.40 ENCEPHALOPATHY: Status: ACTIVE | Noted: 2023-01-01

## 2023-07-15 NOTE — H&P
[facility] - Emergency Dept.  Critical Care Medicine  History & Physical    Patient Name: Gene Rothman  MRN: [id number]  Admission Date: 7/14/2023  Hospital Length of Stay: 1 days  Code Status: Full Code  Attending Physician: Aidan Hinkle MD   Primary Care Provider: Ami Zarate DO   Principal Problem: Encephalopathy    Subjective:     HPI:  77 year old morbidly obese and bed bound male with known medical issues including COPD with h/o CO2 narcosis and DENISHA that declines use of CPAP/BiPap support, he does where home oxygen via face mask; atrial fib on eliquis; HFpEF with pulm HTN, mod to severe RV systolic function; chronic lymphedema    Presented to ED on evening of 7/14 with hypoxia, confusion, reported hallucinations  Placed on minimal NIPPV 10/5 on arrival  Initial abg 7.32/61.5/63/47.8  Treated with duoneb, solumedrol, and IV lasix  Repeat abg 7.40/77.5/65/48.3    Critical care team called for ICU admission  Seen and examined in ED. Removed NIPPV during exam and remained at bedside >30 min to assess response  Pt awake and alert; able to have extensive conversation despite confusion to situation and recent events, he is oriented to self, time, and place. Family reports this is not his baseline mental status.  RR and oxygenation stable on home face mask 02  Will admit to ICU for neuro/respiratory monitoring overnight      Hospital/ICU Course:  No notes on file     Past Medical History:   Diagnosis Date    Acute hypoxemic respiratory failure 9/17/2022    Arthritis     Atrial fibrillation 3/1/2023    CHF (congestive heart failure)     Chronic kidney disease     Coronary artery disease     Depression     Hypertension     Hypertensive heart disease with heart failure 9/16/2022    Hypothyroidism     Lymphedema of lower extremity     Nephrolithiasis     Obesity     Peripheral vascular disease     Pneumonia 9/17/2022    Recurrent cellulitis of lower leg     Sleep apnea     can't stand the CPAP ,  sleeps elevated in hospital bed or chair    Tobacco dependence     resolved       Past Surgical History:   Procedure Laterality Date    ADENOIDECTOMY      BACK SURGERY  ;  1976    x2 for disc; scar tissue removed    debridement of bilateral leg ulcers Bilateral 2017    Dr Hernandez    INNER EAR SURGERY Bilateral     separate - pinnaplasty , new eardrms constructed    KIDNEY STONE SURGERY Left  approx    open    TONSILLECTOMY         Review of patient's allergies indicates:   Allergen Reactions    Sulfamethoxazole-trimethoprim Itching and Shortness Of Breath       Family History       Problem Relation (Age of Onset)    Alcohol abuse Maternal Grandfather    Alzheimer's disease Brother    Asthma Daughter    Cancer Mother, Father, Brother    Diabetes Mother    Heart disease Father, Brother    Hypertension Mother          Tobacco Use    Smoking status: Former     Packs/day: 1.50     Years: 1.00     Pack years: 1.50     Types: Cigarettes     Quit date:      Years since quittin.5    Smokeless tobacco: Never   Substance and Sexual Activity    Alcohol use: Not Currently    Drug use: Never    Sexual activity: Never         Review of Systems   Reason unable to perform ROS: limited due to inattention.   Constitutional:  Negative for fever.   HENT:  Negative for trouble swallowing.    Respiratory:  Negative for shortness of breath.    Cardiovascular:  Positive for leg swelling. Negative for chest pain.   Gastrointestinal:  Negative for abdominal pain, nausea and vomiting.   Endocrine: Positive for cold intolerance.   Neurological:  Negative for speech difficulty and headaches.   Psychiatric/Behavioral:  Positive for confusion.    Objective:     Vital Signs (Most Recent):  Temp: 98.1 °F (36.7 °C) (23)  Pulse: (!) 59 (23)  Resp: (!) 33 (23)  BP: (!) 128/56 (230)  SpO2: (!) 92 % (23) Vital Signs (24h Range):  Temp:  [98.1 °F (36.7 °C)]  98.1 °F (36.7 °C)  Pulse:  [50-61] 59  Resp:  [24-48] 33  SpO2:  [92 %-100 %] 92 %  BP: (124-159)/(54-76) 128/56     Weight: (!) 183.1 kg (403 lb 10.6 oz)  Body mass index is 67.17 kg/m².    No intake or output data in the 24 hours ending 07/15/23 0009     Physical Exam  Vitals and nursing note reviewed.   Constitutional:       General: He is awake. He is not in acute distress.     Appearance: He is morbidly obese.      Interventions: Face mask in place.   HENT:      Head: Atraumatic.   Eyes:      Conjunctiva/sclera: Conjunctivae normal.   Neck:      Comments: Extensive unkempt beard  Cardiovascular:      Rate and Rhythm: Bradycardia present. Rhythm irregular.   Pulmonary:      Effort: Tachypnea present. No accessory muscle usage or respiratory distress.      Breath sounds: Decreased breath sounds present. No wheezing.   Abdominal:      General: Abdomen is protuberant. Bowel sounds are normal.      Palpations: Abdomen is soft.   Skin:     General: Skin is warm and dry.      Capillary Refill: Capillary refill takes less than 2 seconds.          Neurological:      Mental Status: He is alert.      GCS: GCS eye subscore is 4. GCS verbal subscore is 4. GCS motor subscore is 6.   Psychiatric:         Mood and Affect: Affect is labile.         Speech: Speech normal.         Cognition and Memory: He exhibits impaired recent memory.         Judgment: Judgment is impulsive.        Vents:  Oxygen Concentration (%): 40 (07/14/23 2238)    Lines/Drains/Airways       Peripheral Intravenous Line  Duration                  Peripheral IV - Single Lumen 07/14/23 1820 20 G Left Forearm <1 day                    Significant Labs:    CBC/Anemia Profile:  Recent Labs   Lab 07/14/23 1825   WBC 6.26   HGB 10.2*   HCT 35.6*      *   RDW 14.6*        Chemistries:  Recent Labs   Lab 07/14/23 1825      K 4.4   CL 92*   CO2 40*   BUN 18   CREATININE 1.0   CALCIUM 9.5   ALBUMIN 2.8*   PROT 8.4   BILITOT 0.6   ALKPHOS 85  "  ALT 13   AST 22       All pertinent labs within the past 24 hours have been reviewed.    Significant Imaging:   I have reviewed all pertinent imaging results/findings within the past 24 hours.    Assessment/Plan:     Neuro  * Encephalopathy  CT head without acute finding  Family reports not at his baseline but could not clarify his baseline  Suspect COPD exacerbation/baseline hypercarbia/and potentially UTI contributing   Monitor in ICU overnight    Pulmonary  Chronic obstructive pulmonary disease with acute exacerbation  IV steroid  BRIANNE, LABA, ICS    Acute on chronic respiratory failure with hypoxia and hypercapnia  Patient with Hypercapnic and Hypoxic Respiratory failure which is Acute on chronic.  he is on home oxygen at 4l per face mask LPM. Supplemental oxygen was provided and noted- Oxygen Concentration (%):  [30-40] 40  Signs/symptoms of respiratory failure include- hallucinations. Contributing diagnoses includes - CHF, COPD and Obesity Hypoventilation Labs and images were reviewed. Patient Has recent ABG, which has been reviewed. Will treat underlying causes and adjust management of respiratory failure as follows-   -keep supplemental oxygen at home rate for goal Sat only 88-94%  -treat COPD exacerbation  -alexandr    Pt has severe disease and has consistently declined use of home NIPPV. He and his family express poor understanding of disease process and management. They discussed increasing oxygen at home for goal 98-99% and denied education regarding sat goals or harm of hyper oxygenation. Both were somewhat fixated on discussing the "cause" of his COPD including discussion of his working around dirt and chemicals as a young man. I attempted to redirect conversation to current status and plan. I also attempted to educate on the detrimental effect of morbid obesity and immobilization on underlying COPD. I believe they will need ongoing education and may benefit from a formal goals of care/palliative care " introduction.    Cardiac/Vascular  Atrial fibrillation  Rate controlled  Continue eliquis anticoagulation    Hypertensive heart disease with heart failure  Likely driven/exacerbated by pulmonary HTN, lung disease, and morbid obesity  Diurese  Strict I/O  Daily weight    Renal/  Acute cystitis with hematuria  Difficult to determine if symptomatic due to confusion/inattention  UA+  Cannot rule out contribution to confusion  Recent VRE cultured UTI  Keep contact isolation and treat empirically for VRE UTI until culture data available    Endocrine  Morbid obesity with BMI of 60.0-69.9, adult  Body mass index is 67.17 kg/m². Morbid obesity complicates all aspects of disease management from diagnostic modalities to treatment and in this situation is contributing to severity of disease. Education attempted to no evidence of understanding      Hypothyroidism (acquired)  Continue levothyroxine    Other  Sleep apnea  DENISHA/obesity hypoventilation/COPD overlap  Declines home CPAP/BiPAP      Critical Care Daily Checklist:    A: Awake: RASS Goal/Actual Goal:    Actual:     B: Spontaneous Breathing Trial Performed?     C: SAT & SBT Coordinated?  n/a                      D: Delirium: CAM-ICU     E: Early Mobility Performed? Yes   F: Feeding Goal:    Status:     Current Diet Order   Procedures    [facility]; Isolation Tray - Regular [place]; Fluid - 1500mL     Order Specific Question:   Indicate patient location for additional diet options:     Answer:   [facility]     Order Specific Question:   Tray type:     Answer:   Isolation Tray - Regular [place]     Order Specific Question:   Fluid restriction:     Answer:   Fluid - 1500mL      AS: Analgesia/Sedation prn   T: Thromboembolic Prophylaxis eliquis   H: HOB > 300 Yes   U: Stress Ulcer Prophylaxis (if needed) pepcid   G: Glucose Control monitor   B: Bowel Function     I: Indwelling Catheter (Lines & May) Necessity reviewed   D: De-escalation of  Antimicrobials/Pharmacotherapies reviewed    Plan for the day/ETD As above    Code Status:  Family/Goals of Care: Full Code  Wife and son at bedside updated on plan for admission to ICU overnight     Critical Care Time: 55 minutes  Critical secondary to encephalopathy; acute on chronic hypoxic and hypercapnic respiratory failure; COPD exacerbation; UTI   Critical care was time spent personally by me on the following activities: development of treatment plan with patient or surrogate and bedside caregivers, discussions with consultants, evaluation of patient's response to treatment, examination of patient, ordering and performing treatments and interventions, ordering and review of laboratory studies, ordering and review of radiographic studies, pulse oximetry, re-evaluation of patient's condition. This critical care time did not overlap with that of any other provider or involve time for any procedures.     JEREMÍAS Beltran-BC  Critical Care Medicine  [facility] - Emergency Dept.

## 2023-07-15 NOTE — ED PROVIDER NOTES
SCRIBE #1 NOTE: I, Paula Isaac, am scribing for, and in the presence of, Penny Hlaey DO. I have scribed the entire note.       History     Chief Complaint   Patient presents with    Shortness of Breath     Pt with SOB and altered mental status today, EMS reports intial O2 sat 83% room air and ETCO2 in route 74. Mental status and O2 sat improved with O2 administration PTA. Pt awake and alert upon arrival but remains confused. Continues to complain of SOB. Placed on BiPAP upon arrival     Review of patient's allergies indicates:   Allergen Reactions    Sulfamethoxazole-trimethoprim Itching and Shortness Of Breath         History of Present Illness     HPI      7/14/2023, 8:04 PM  History obtained from the Patient, EMS, and wife.      History of Present Illness: Gene Rothman is a 77 y.o. male patient with a PMHx of  pneumonia, depression, hypothyroidism, CHF, HTN, and lymphedema who presents to the Emergency Department for evaluation of SOB and AMS which onset today. EMS reports pt was coughing up brown sputum and an O2 saturation of 83% room air while en route. Mental status improved with O2 administration. Pt remains confused. Pt was placed on BiPAP upon arrival.     Pt's wife believes pt's CO2 levels were high because patient was hallucinating a few nights ago. Pt saw friend, Sandor, who passed away two weeks ago.     Arrival mode:   EMS      PCP: Ami Zarate DO        Past Medical History:  Past Medical History:   Diagnosis Date    Acute hypoxemic respiratory failure 9/17/2022    Arthritis     Atrial fibrillation 3/1/2023    CHF (congestive heart failure)     Chronic kidney disease     Coronary artery disease     Depression     Hypertension     Hypertensive heart disease with heart failure 9/16/2022    Hypothyroidism     Lymphedema of lower extremity     Nephrolithiasis     Obesity     Peripheral vascular disease     Pneumonia 9/17/2022    Recurrent cellulitis of lower leg     Sleep apnea     can't  stand the CPAP , sleeps elevated in hospital bed or chair    Tobacco dependence     resolved       Past Surgical History:  Past Surgical History:   Procedure Laterality Date    ADENOIDECTOMY      BACK SURGERY  ;  1976    x2 for disc; scar tissue removed    debridement of bilateral leg ulcers Bilateral 2017    Dr Hernandez    INNER EAR SURGERY Bilateral     separate - pinnaplasty , new eardrms constructed    KIDNEY STONE SURGERY Left  approx    open    TONSILLECTOMY           Family History:  Family History   Problem Relation Age of Onset    Cancer Mother         ? abd also    Diabetes Mother     Hypertension Mother     Cancer Father         ? abdonimal origin stomach/liver or pancreas    Heart disease Father         pacer    Cancer Brother         skin-part of ext ear removed    Heart disease Brother         CABG3    Alzheimer's disease Brother     Asthma Daughter          26 w/ asthma    Alcohol abuse Maternal Grandfather     Stroke Neg Hx     COPD Neg Hx     Mental illness Neg Hx     Mental retardation Neg Hx     Kidney disease Neg Hx        Social History:  Social History     Tobacco Use    Smoking status: Former     Packs/day: 1.50     Years: 1.00     Pack years: 1.50     Types: Cigarettes     Quit date:      Years since quittin.5    Smokeless tobacco: Never   Substance and Sexual Activity    Alcohol use: Not Currently    Drug use: Never    Sexual activity: Never        Review of Systems     Review of Systems   Constitutional:  Positive for fatigue. Negative for fever.   Respiratory:  Positive for cough (with brown sputum) and shortness of breath.    Cardiovascular:  Negative for chest pain, palpitations and leg swelling.   Gastrointestinal:  Negative for nausea and vomiting.   Psychiatric/Behavioral:  Positive for confusion and hallucinations (VH).       Physical Exam     Initial Vitals [23 1816]   BP Pulse Resp Temp SpO2   (!) 148/76 (!) 59 (!) 48 98.1 °F (36.7 °C) 95 %       MAP       --          Physical Exam  Physical Exam  Nursing Notes and Vital Signs Reviewed.  Constitutional: Patient is in mild distress. Well-developed and well-nourished.  Head: Atraumatic. Normocephalic.  Eyes: PERRL. EOM intact. Conjunctivae are not pale. No scleral icterus.  ENT: Hard of hearing, L>R.  Neck: No JVD.  Cardiovascular:  Bradycardia. Irregular.   Pulmonary/Chest: Tachypnea. Equal breath sounds bilaterally.  Slightly diminished.  Abdominal: Soft and non-distended.  There is no tenderness. No rebound, guarding, or rigidity.   Musculoskeletal: Moves all extremities. No obvious deformities. 2+ pitting edmea to BLE. No calf tenderness.  Skin: Warm and dry. Chronic venous stasis dermatitis to BLE.  Neurological:  Alert, awake, and appropriate.  Normal speech.  No acute focal neurological deficits are appreciated.  Psychiatric: Normal affect. Good eye contact. Appropriate in content.       ED Course   Critical Care    Date/Time: 7/14/2023 8:26 PM  Performed by: Penny Haley DO  Authorized by: Penny Haley DO   Direct patient critical care time: 10 minutes  Additional history critical care time: 6 minutes  Ordering / reviewing critical care time: 10 minutes  Documentation critical care time: 6 minutes  Consulting other physicians critical care time: 7 minutes  Total critical care time (exclusive of procedural time) : 39 minutes  Critical care time was exclusive of separately billable procedures and treating other patients and teaching time.  Critical care was necessary to treat or prevent imminent or life-threatening deterioration of the following conditions: respiratory failure.  Critical care was time spent personally by me on the following activities: development of treatment plan with patient or surrogate, discussions with consultants, evaluation of patient's response to treatment, examination of patient, obtaining history from patient or surrogate, ordering and performing treatments and  "interventions, ordering and review of laboratory studies, ordering and review of radiographic studies, pulse oximetry and re-evaluation of patient's condition.      ED Vital Signs:  Vitals:    07/17/23 0757 07/17/23 1243 07/17/23 1643 07/17/23 1920   BP:  (!) 135/59 (!) 123/59 (!) 118/53   Pulse: 60 62 (!) 53 61   Resp: 18 16 18 16   Temp:  98 °F (36.7 °C) 97.7 °F (36.5 °C) 97.9 °F (36.6 °C)   TempSrc:   Oral Oral   SpO2: 99% 98% 95% 97%   Weight: (!) 176 kg (388 lb)      Height: 5' 5" (1.651 m)       07/17/23 2053 07/17/23 2054 07/18/23 0019 07/18/23 0417   BP:   (!) 130/59 (!) 145/62   Pulse: (!) 56 (!) 56 64 67   Resp: 16 16 20 20   Temp:   98.2 °F (36.8 °C) 98 °F (36.7 °C)   TempSrc:   Oral Oral   SpO2: (!) 90% (!) 90% 95% 95%   Weight:       Height:        07/18/23 0727 07/18/23 0728 07/18/23 0731 07/18/23 0900   BP:  126/60 126/60    Pulse: (!) 55 (!) 52 (!) 52 65   Resp: 18 18 18    Temp:  98.4 °F (36.9 °C) 98.2 °F (36.8 °C)    TempSrc:  Oral Oral    SpO2: 98% 98% 98%    Weight:       Height:        07/18/23 1100 07/18/23 1245 07/18/23 1300   BP:  (!) 128/55    Pulse: (!) 55 (!) 118 (!) 56   Resp:  18    Temp:  96.6 °F (35.9 °C)    TempSrc:  Oral    SpO2:  (!) 91%    Weight:      Height:          Abnormal Lab Results:  Labs Reviewed   CBC W/ AUTO DIFFERENTIAL - Abnormal; Notable for the following components:       Result Value    RBC 3.31 (*)     Hemoglobin 10.2 (*)     Hematocrit 35.6 (*)      (*)     MCHC 28.7 (*)     RDW 14.6 (*)     All other components within normal limits   COMPREHENSIVE METABOLIC PANEL - Abnormal; Notable for the following components:    Chloride 92 (*)     CO2 40 (*)     Albumin 2.8 (*)     All other components within normal limits   URINALYSIS, REFLEX TO URINE CULTURE - Abnormal; Notable for the following components:    Appearance, UA Hazy (*)     Protein, UA Trace (*)     Occult Blood UA 2+ (*)     Nitrite, UA Positive (*)     Leukocytes, UA 3+ (*)     All other components " within normal limits    Narrative:     Specimen Source->Urine   B-TYPE NATRIURETIC PEPTIDE - Abnormal; Notable for the following components:     (*)     All other components within normal limits   URINALYSIS MICROSCOPIC - Abnormal; Notable for the following components:    RBC, UA 24 (*)     WBC, UA 16 (*)     WBC Clumps, UA Occasional (*)     Bacteria Few (*)     Yeast, UA Few (*)     All other components within normal limits    Narrative:     Specimen Source->Urine   ISTAT PROCEDURE - Abnormal; Notable for the following components:    POC PH 7.326 (*)     POC PCO2 91.5 (*)     POC PO2 63 (*)     POC HCO3 47.8 (*)     POC SATURATED O2 88 (*)     All other components within normal limits   ISTAT PROCEDURE - Abnormal; Notable for the following components:    POC PCO2 77.5 (*)     POC PO2 65 (*)     POC HCO3 48.3 (*)     POC SATURATED O2 91 (*)     All other components within normal limits   LACTIC ACID, PLASMA   TROPONIN I   TSH        All Lab Results:  Results for orders placed or performed during the hospital encounter of 07/14/23   Blood culture x two cultures. Draw prior to antibiotics.    Specimen: Peripheral, Antecubital, Right; Blood   Result Value Ref Range    Blood Culture, Routine       Gram stain anali bottle: Gram positive cocci in clusters resembling Staph    Blood Culture, Routine       Results called to and read back by:Darlyn Link  07/16/2023  18:29    Blood Culture, Routine (A)      COAGULASE-NEGATIVE STAPHYLOCOCCUS SPECIES  Organism is a probable contaminant     Blood culture x two cultures. Draw prior to antibiotics.    Specimen: Peripheral, Forearm, Left; Blood   Result Value Ref Range    Blood Culture, Routine No growth after 5 days.    Urine culture    Specimen: Urine   Result Value Ref Range    Urine Culture, Routine ESCHERICHIA COLI  >100,000 cfu/ml   (A)        Susceptibility    Escherichia coli - CULTURE, URINE     Amp/Sulbactam <=8/4 Sensitive mcg/mL     Ampicillin <=8 Sensitive  mcg/mL     Amox/K Clav'ate <=8/4 Sensitive mcg/mL     Ceftriaxone <=1 Sensitive mcg/mL     Cefazolin <=2 Sensitive mcg/mL     Ciprofloxacin >2 Resistant mcg/mL     Cefepime <=2 Sensitive mcg/mL     Ertapenem <=0.5 Sensitive mcg/mL     Nitrofurantoin <=32 Sensitive mcg/mL     Gentamicin <=4 Sensitive mcg/mL     Levofloxacin >4 Resistant mcg/mL     Meropenem <=1 Sensitive mcg/mL     Piperacillin/Tazo <=16 Sensitive mcg/mL     Trimeth/Sulfa <=2/38 Sensitive mcg/mL     Tobramycin <=4 Sensitive mcg/mL   Rapid Organism ID by PCR (from Blood culture)   Result Value Ref Range    Enterococcus faecalis Not Detected Not Detected    Enterococcus faecium Not Detected Not Detected    Listeria monocytogenes Not Detected Not Detected    Staphylococcus spp. Detected (A) Not Detected    Staphylococcus aureus Not Detected Not Detected    Staphylococcus epidermidis Not Detected Not Detected    Staphylococcus lugdunensis Not Detected Not Detected    Streptococcus species Not Detected Not Detected    Streptococcus agalactiae Not Detected Not Detected    Streptococcus pneumoniae Not Detected Not Detected    Streptococcus pyogenes Not Detected Not Detected    Acinetobacter calcoaceticus/baumannii complex Not Detected Not Detected    Bacteroides fragilis Not Detected Not Detected    Enterobacterales Not Detected Not Detected    Enterobacter cloacae complex Not Detected Not Detected    Escherichia coli Not Detected Not Detected    Klebsiella aerogenes Not Detected Not Detected    Klebsiella oxytoca Not Detected Not Detected    Klebsiella pneumoniae group Not Detected Not Detected    Proteus Not Detected Not Detected    Salmonella sp Not Detected Not Detected    Serratia marcescens Not Detected Not Detected    Haemophilus influenzae Not Detected Not Detected    Neisseria meningtidis Not Detected Not Detected    Pseudomonas aeruginosa Not Detected Not Detected    Stenotrophomonas maltophilia Not Detected Not Detected    Candida albicans Not  Detected Not Detected    Candida auris Not Detected Not Detected    Candida glabrata Not Detected Not Detected    Candida krusei Not Detected Not Detected    Candida parapsilosis Not Detected Not Detected    Candida tropicalis Not Detected Not Detected    Cryptococcus neoformans/gattii Not Detected Not Detected    CTX-M (ESBL ) Not Detected Not Detected    IMP (Carbapenem resistant) Not Detected Not Detected    KPC resistance gene (Carbapenem resistant) Not Detected Not Detected    mcr-1  Not Detected Not Detected    mec A/C  Not Detected Not Detected    mec A/C and MREJ (MRSA) gene Not Detected Not Detected    NDM (Carbapenem resistant) Not Detected Not Detected    OXA-48-like (Carbapenem resistant) Not Detected Not Detected    van A/B (VRE gene) Not Detected Not Detected    VIM (Carbapenem resistant) Not Detected Not Detected   Blood culture    Specimen: Antecubital, Left; Blood   Result Value Ref Range    Blood Culture, Routine No Growth to date     Blood Culture, Routine No Growth to date     Blood Culture, Routine No Growth to date     Blood Culture, Routine No Growth to date    CBC auto differential   Result Value Ref Range    WBC 6.26 3.90 - 12.70 K/uL    RBC 3.31 (L) 4.60 - 6.20 M/uL    Hemoglobin 10.2 (L) 14.0 - 18.0 g/dL    Hematocrit 35.6 (L) 40.0 - 54.0 %     (H) 82 - 98 fL    MCH 30.8 27.0 - 31.0 pg    MCHC 28.7 (L) 32.0 - 36.0 g/dL    RDW 14.6 (H) 11.5 - 14.5 %    Platelets 169 150 - 450 K/uL    MPV 9.5 9.2 - 12.9 fL    Immature Granulocytes 0.3 0.0 - 0.5 %    Gran # (ANC) 3.8 1.8 - 7.7 K/uL    Immature Grans (Abs) 0.02 0.00 - 0.04 K/uL    Lymph # 1.6 1.0 - 4.8 K/uL    Mono # 0.7 0.3 - 1.0 K/uL    Eos # 0.2 0.0 - 0.5 K/uL    Baso # 0.02 0.00 - 0.20 K/uL    nRBC 0 0 /100 WBC    Gran % 60.0 38.0 - 73.0 %    Lymph % 25.1 18.0 - 48.0 %    Mono % 11.3 4.0 - 15.0 %    Eosinophil % 3.0 0.0 - 8.0 %    Basophil % 0.3 0.0 - 1.9 %    Differential Method Automated    Comprehensive metabolic panel    Result Value Ref Range    Sodium 140 136 - 145 mmol/L    Potassium 4.4 3.5 - 5.1 mmol/L    Chloride 92 (L) 95 - 110 mmol/L    CO2 40 (H) 23 - 29 mmol/L    Glucose 100 70 - 110 mg/dL    BUN 18 8 - 23 mg/dL    Creatinine 1.0 0.5 - 1.4 mg/dL    Calcium 9.5 8.7 - 10.5 mg/dL    Total Protein 8.4 6.0 - 8.4 g/dL    Albumin 2.8 (L) 3.5 - 5.2 g/dL    Total Bilirubin 0.6 0.1 - 1.0 mg/dL    Alkaline Phosphatase 85 55 - 135 U/L    AST 22 10 - 40 U/L    ALT 13 10 - 44 U/L    eGFR >60 >60 mL/min/1.73 m^2    Anion Gap 8 8 - 16 mmol/L   Lactic acid, plasma #1   Result Value Ref Range    Lactate (Lactic Acid) 0.8 0.5 - 2.2 mmol/L   Urinalysis, Reflex to Urine Culture Urine, Catheterized    Specimen: Urine   Result Value Ref Range    Specimen UA Urine, Catheterized     Color, UA Yellow Yellow, Straw, Cindy    Appearance, UA Hazy (A) Clear    pH, UA 8.0 5.0 - 8.0    Specific Gravity, UA 1.010 1.005 - 1.030    Protein, UA Trace (A) Negative    Glucose, UA Negative Negative    Ketones, UA Negative Negative    Bilirubin (UA) Negative Negative    Occult Blood UA 2+ (A) Negative    Nitrite, UA Positive (A) Negative    Urobilinogen, UA Negative <2.0 EU/dL    Leukocytes, UA 3+ (A) Negative   Troponin I   Result Value Ref Range    Troponin I <0.006 0.000 - 0.026 ng/mL   BNP   Result Value Ref Range     (H) 0 - 99 pg/mL   TSH   Result Value Ref Range    TSH 2.252 0.400 - 4.000 uIU/mL   Urinalysis Microscopic   Result Value Ref Range    RBC, UA 24 (H) 0 - 4 /hpf    WBC, UA 16 (H) 0 - 5 /hpf    WBC Clumps, UA Occasional (A) None-Rare    Bacteria Few (A) None-Occ /hpf    Yeast, UA Few (A) None    Hyaline Casts, UA 1 0-1/lpf /lpf    Microscopic Comment SEE COMMENT    CBC Auto Differential   Result Value Ref Range    WBC 6.73 3.90 - 12.70 K/uL    RBC 3.06 (L) 4.60 - 6.20 M/uL    Hemoglobin 9.6 (L) 14.0 - 18.0 g/dL    Hematocrit 30.3 (L) 40.0 - 54.0 %    MCV 99 (H) 82 - 98 fL    MCH 31.4 (H) 27.0 - 31.0 pg    MCHC 31.7 (L) 32.0 - 36.0 g/dL     RDW 14.7 (H) 11.5 - 14.5 %    Platelets 196 150 - 450 K/uL    MPV 9.7 9.2 - 12.9 fL    Immature Granulocytes 0.3 0.0 - 0.5 %    Gran # (ANC) 5.6 1.8 - 7.7 K/uL    Immature Grans (Abs) 0.02 0.00 - 0.04 K/uL    Lymph # 0.8 (L) 1.0 - 4.8 K/uL    Mono # 0.3 0.3 - 1.0 K/uL    Eos # 0.0 0.0 - 0.5 K/uL    Baso # 0.01 0.00 - 0.20 K/uL    nRBC 0 0 /100 WBC    Gran % 83.8 (H) 38.0 - 73.0 %    Lymph % 11.3 (L) 18.0 - 48.0 %    Mono % 4.5 4.0 - 15.0 %    Eosinophil % 0.0 0.0 - 8.0 %    Basophil % 0.1 0.0 - 1.9 %    Differential Method Automated    Basic metabolic panel   Result Value Ref Range    Sodium 139 136 - 145 mmol/L    Potassium 3.4 (L) 3.5 - 5.1 mmol/L    Chloride 89 (L) 95 - 110 mmol/L    CO2 40 (H) 23 - 29 mmol/L    Glucose 134 (H) 70 - 110 mg/dL    BUN 32 (H) 8 - 23 mg/dL    Creatinine 1.1 0.5 - 1.4 mg/dL    Calcium 8.6 (L) 8.7 - 10.5 mg/dL    Anion Gap 10 8 - 16 mmol/L    eGFR >60 >60 mL/min/1.73 m^2   CBC Auto Differential   Result Value Ref Range    WBC 7.36 3.90 - 12.70 K/uL    RBC 3.47 (L) 4.60 - 6.20 M/uL    Hemoglobin 10.6 (L) 14.0 - 18.0 g/dL    Hematocrit 34.0 (L) 40.0 - 54.0 %    MCV 98 82 - 98 fL    MCH 30.5 27.0 - 31.0 pg    MCHC 31.2 (L) 32.0 - 36.0 g/dL    RDW 14.6 (H) 11.5 - 14.5 %    Platelets 201 150 - 450 K/uL    MPV 9.4 9.2 - 12.9 fL    Immature Granulocytes 0.4 0.0 - 0.5 %    Gran # (ANC) 5.6 1.8 - 7.7 K/uL    Immature Grans (Abs) 0.03 0.00 - 0.04 K/uL    Lymph # 1.0 1.0 - 4.8 K/uL    Mono # 0.8 0.3 - 1.0 K/uL    Eos # 0.0 0.0 - 0.5 K/uL    Baso # 0.00 0.00 - 0.20 K/uL    nRBC 0 0 /100 WBC    Gran % 75.8 (H) 38.0 - 73.0 %    Lymph % 12.9 (L) 18.0 - 48.0 %    Mono % 10.9 4.0 - 15.0 %    Eosinophil % 0.0 0.0 - 8.0 %    Basophil % 0.0 0.0 - 1.9 %    Differential Method Automated    Comprehensive Metabolic Panel   Result Value Ref Range    Sodium 139 136 - 145 mmol/L    Potassium 3.3 (L) 3.5 - 5.1 mmol/L    Chloride 88 (L) 95 - 110 mmol/L    CO2 40 (H) 23 - 29 mmol/L    Glucose 109 70 - 110 mg/dL     BUN 40 (H) 8 - 23 mg/dL    Creatinine 1.3 0.5 - 1.4 mg/dL    Calcium 8.5 (L) 8.7 - 10.5 mg/dL    Total Protein 7.2 6.0 - 8.4 g/dL    Albumin 2.6 (L) 3.5 - 5.2 g/dL    Total Bilirubin 0.3 0.1 - 1.0 mg/dL    Alkaline Phosphatase 60 55 - 135 U/L    AST 15 10 - 40 U/L    ALT 11 10 - 44 U/L    eGFR 56 (A) >60 mL/min/1.73 m^2    Anion Gap 11 8 - 16 mmol/L   Procalcitonin   Result Value Ref Range    Procalcitonin 0.18 <0.25 ng/mL   Echo   Result Value Ref Range    BSA 2.84 m2    TDI SEPTAL 0.10 m/s    LA WIDTH 5.50 cm    IVC diameter 1.90 cm    RV mid diameter 3.76 cm    Left Ventricular Outflow Tract Mean Velocity 0.70 cm/s    Left Ventricular Outflow Tract Mean Gradient 2.31 mmHg    Pulmonary Valve Mean Velocity 0.93 m/s    TDI LATERAL 0.14 m/s    PV PEAK VELOCITY 1.49 cm/s    LVIDd 5.34 3.5 - 6.0 cm    IVS 0.82 0.6 - 1.1 cm    Posterior Wall 0.89 0.6 - 1.1 cm    Ao root annulus 3.78 cm    LVIDs 3.62 2.1 - 4.0 cm    FS 32 28 - 44 %    LA volume 117.51 cm3    Sinus 4.00 cm    STJ 2.80 cm    Ascending aorta 2.92 cm    LV mass 165.44 g    LA size 4.41 cm    RVDD 3.51 cm    TAPSE 1.80 cm    Right ventricular length in diastole (apical 4-chamber view) 7.18 cm    RV S' 10.51 cm/s    RA area 28.0 cm2    Left Ventricle Relative Wall Thickness 0.33 cm    AV mean gradient 3 mmHg    AV valve area 2.60 cm2    AV Velocity Ratio 0.78     AV index (prosthetic) 0.80     Mean e' 0.12 m/s    IVRT 110.37 msec    LVOT diameter 2.03 cm    LVOT area 3.2 cm2    LVOT peak rodolfo 1.02 m/s    LVOT peak VTI 18.80 cm    Ao peak rodolfo 1.30 m/s    Ao VTI 23.4 cm    LVOT stroke volume 60.82 cm3    AV peak gradient 7 mmHg    TR Max Rodolfo 3.26 m/s    LV Systolic Volume 55.01 mL    LV Systolic Volume Index 21.0 mL/m2    LV Diastolic Volume 137.65 mL    LV Diastolic Volume Index 52.54 mL/m2    LA Volume Index 44.9 mL/m2    LV Mass Index 63 g/m2    Right Atrium Volume Systolic 97.02 mL    RA Major Axis 5.42 cm    Left Atrium Minor Axis 5.88 cm    Left Atrium  Major Axis 5.53 cm    Triscuspid Valve Regurgitation Peak Gradient 43 mmHg    LA Volume Index (Mod) 30.1 mL/m2    LA volume (mod) 78.78 cm3    RA Width 3.80 cm    Right Atrial Pressure (from IVC) 3 mmHg    EF 55 %    TV rest pulmonary artery pressure 46 mmHg   ISTAT PROCEDURE   Result Value Ref Range    POC PH 7.326 (L) 7.35 - 7.45    POC PCO2 91.5 (HH) 35 - 45 mmHg    POC PO2 63 (L) 80 - 100 mmHg    POC HCO3 47.8 (H) 24 - 28 mmol/L    POC BE 22 -2 to 2 mmol/L    POC SATURATED O2 88 (L) 95 - 100 %    Sample ARTERIAL     Site RR     Allens Test Pass     DelSys Nasal Can     Mode SPONT     Flow 5    ISTAT PROCEDURE   Result Value Ref Range    POC PH 7.402 7.35 - 7.45    POC PCO2 77.5 (HH) 35 - 45 mmHg    POC PO2 65 (L) 80 - 100 mmHg    POC HCO3 48.3 (H) 24 - 28 mmol/L    POC BE 24 -2 to 2 mmol/L    POC SATURATED O2 91 (L) 95 - 100 %    Sample ARTERIAL     Site RR     Allens Test Pass     DelSys CPAP/BiPAP     Mode BiPAP     FiO2 40     IP 10     EP 5    ISTAT PROCEDURE   Result Value Ref Range    POC PH 7.387 7.35 - 7.45    POC PCO2 79.1 (HH) 35 - 45 mmHg    POC PO2 77 (L) 80 - 100 mmHg    POC HCO3 47.6 (H) 24 - 28 mmol/L    POC BE 23 -2 to 2 mmol/L    POC SATURATED O2 94 (L) 95 - 100 %    Rate 15     Sample ARTERIAL     Site LR     Allens Test Pass     DelSys Venti Mask     Mode SPONT     FiO2 50         Imaging Results:  Imaging Results              CT Head Without Contrast (Final result)  Result time 07/14/23 19:19:47      Final result by Cy Ureña MD (07/14/23 19:19:47)                   Impression:      No acute intracranial CT abnormality on this artifact degraded exam.  Correlation and further evaluation as warranted.    All CT scans at this facility are performed  using dose modulation techniques as appropriate to performed exam including the following:  automated exposure control; adjustment of mA and/or kV according to the patients size (this includes techniques or standardized protocols for targeted  exams where dose is matched to indication/reason for exam: i.e. extremities or head);  iterative reconstruction technique.      Electronically signed by: Cy Ureña  Date:    07/14/2023  Time:    19:19               Narrative:    EXAMINATION:  CT HEAD WITHOUT CONTRAST    CLINICAL HISTORY:  Mental status change, unknown cause;    TECHNIQUE:  Low dose axial CT images obtained throughout the head without intravenous contrast. Sagittal and coronal reconstructions were performed.    COMPARISON:  Multiple priors.    FINDINGS:  Intracranial compartment:    Artifact degrades the exam.    Ventricles and sulci are normal in size for age without evidence of hydrocephalus. No extra-axial blood or fluid collections.    The brain parenchyma appears normal. Mild microvascular ischemic change.    Skull/extracranial contents (limited evaluation): No fracture. Mastoid air cells and paranasal sinuses are essentially clear.                                       X-Ray Chest AP Portable (Final result)  Result time 07/14/23 18:49:25      Final result by Cy Ureña MD (07/14/23 18:49:25)                   Impression:      No acute abnormality.      Electronically signed by: Cy Ureña  Date:    07/14/2023  Time:    18:49               Narrative:    EXAMINATION:  XR CHEST AP PORTABLE    CLINICAL HISTORY:  Sepsis;    TECHNIQUE:  Single frontal view of the chest was performed.    COMPARISON:  Multiple priors.    FINDINGS:  Right basilar opacity possibly atelectasis or infection.  No pleural fluid or pneumothorax.    The cardiac silhouette is normal in size. The hilar and mediastinal contours are unremarkable.    Bones are intact.                                       The EKG was ordered, reviewed, and independently interpreted by the ED provider.  Interpretation time: 18:13  Rate: 57 BPM  Rhythm: atrial fibrillation with slow ventricle response.   Interpretation: No acute ST changes. No STEMI.  When compared to EKG performed ,  there are no significant changes.           The Emergency Provider reviewed the vital signs and test results, which are outlined above.     ED Discussion       11:51 PM: Discussed case with ABILIO Beltran ([facility]). Dr. Hinkle agrees with current care and management of pt and accepts admission.   Admitting Service:   Admitting Physician: Dr. Hinkle  Admit to: ICU     11:52 PM: Re-evaluated pt. I have discussed test results, shared treatment plan, and the need for admission with patient and family at bedside. Pt and family express understanding at this time and agree with all information. All questions answered. Pt and family have no further questions or concerns at this time. Pt is ready for admit.     ED Course as of 07/20/23 1510   Fri Jul 14, 2023   1821 EKG shows atrial fibrillation with slow ventricular response.  .  .  Nonspecific changes.  No acute ischemia or STEMI. [NF]   2232 77-year-old male who presents with respiratory depression secondary to CO2 narcosis.  Appears to be secondary to COPD exacerbation with ? CHF, however patient is a chronic CO2 retainer and BNP is only slightly elevated at 189.  CT head shows no intracranial hemorrhage or ischemic stroke and he has no focal findings on neuro exam.  He is improving with BiPAP, IV diuresis, bronchodilators, and steroids.  EKG shows rate controlled AFib with no ischemia.  Troponin negative for ACS.  Chest x-ray with possible right lower lobe infiltrate versus atelectasis otherwise no signs of, cardiomegaly, pulmonary edema or pneumothorax.  He is afebrile with no leukocytosis and a negative lactic acid therefore pneumonia less likely.  H&H is at baseline.  CMP shows a chronic metabolic alkalosis that is likely compensatory for his chronic respiratory acidosis noted on ABG.  He also has mild hypoxia.  TSH is negative for thyrotoxicosis.  Urine is nitrite positive with leukocyte esterase, white blood cells, bacteria, and red  blood cells.  Urine and blood cultures pending at the time of my admission. [NF]      ED Course User Index  [NF] Penny Haley DO     Medical Decision Making:   Clinical Tests:   Lab Tests: Ordered and Reviewed  Radiological Study: Ordered and Reviewed  Medical Tests: Ordered and Reviewed         ED Medication(s):  Medications   albuterol-ipratropium 2.5 mg-0.5 mg/3 mL nebulizer solution 3 mL (3 mLs Nebulization Given 7/14/23 2044)   methylPREDNISolone sodium succinate injection 125 mg (125 mg Intravenous Given 7/14/23 2115)   furosemide injection 60 mg (60 mg Intravenous Given 7/14/23 2115)   methylPREDNISolone sodium succinate injection 40 mg (40 mg Intravenous Given 7/15/23 1900)       Discharge Medication List as of 7/18/2023  2:08 PM        START taking these medications    Details   amoxicillin-clavulanate 875-125mg (AUGMENTIN) 875-125 mg per tablet Take 1 tablet by mouth 2 (two) times daily. Please bring meds to patient's bedside prior to discharge for 5 days, Starting Tue 7/18/2023, Until Sun 7/23/2023, Normal      predniSONE (DELTASONE) 20 MG tablet Take 2 tablets (40 mg total) by mouth once daily. for 2 days, Starting Wed 7/19/2023, Until Fri 7/21/2023, Normal              Contact information for follow-up providers       Ami Zarate DO. Schedule an appointment as soon as possible for a visit in 3 day(s).    Specialty: Internal Medicine  Why: hospital follow up  Contact information:  [address] [place] [address] [address] [address]  [phone]                       Contact information for after-discharge care       Home Medical Care       [facility] .    Service: Home Health Services  Contact information:  [address] [facility] [id number] [phone]                                       Scribe Attestation:   Scribe #1: I performed the above scribed service and the documentation accurately describes the services I performed. I attest to the  accuracy of the note.     Attending:   Physician Attestation Statement for Scribe #1: I, Penny Haley DO, personally performed the services described in this documentation, as scribed by Paula Isaac, in my presence, and it is both accurate and complete.           Clinical Impression       ICD-10-CM ICD-9-CM   1. Acute on chronic respiratory failure with hypoxia and hypercapnia  J96.21 518.84    J96.22 786.09     799.02   2. SOB (shortness of breath)  R06.02 786.05   3. COPD exacerbation  J44.1 491.21   4. Atrial fibrillation  I48.91 427.31   5. Bradycardia  R00.1 427.89   6. Encephalopathy, metabolic  G93.41 348.31   7. Chronic obstructive pulmonary disease with acute exacerbation  J44.1 491.21       Disposition:   Disposition: Admitted  Condition: Yanna      Penny Haley DO  07/20/23 1511

## 2023-07-15 NOTE — ASSESSMENT & PLAN NOTE
Difficult to determine if symptomatic due to confusion/inattention  UA+  Cannot rule out contribution to confusion  Recent VRE cultured UTI  Keep contact isolation and treat empirically for VRE UTI until culture data available

## 2023-07-15 NOTE — ASSESSMENT & PLAN NOTE
Noncompliant with device at home   Directly negatively impacting patient's overall health and increases his morbidity and mortality

## 2023-07-15 NOTE — ASSESSMENT & PLAN NOTE
Likely relate to UTI +/- reported hypoxia (was not wearing oxygen upon EMS arrival and SPO2 low 80s)  CT head negative   Treating reversible causes  Safety and fall precautions in place

## 2023-07-15 NOTE — ASSESSMENT & PLAN NOTE
"Patient with Hypercapnic and Hypoxic Respiratory failure which is Acute on chronic.  he is on home oxygen at 4l per face mask LPM. Supplemental oxygen was provided and noted- Oxygen Concentration (%):  [30-40] 40  Signs/symptoms of respiratory failure include- hallucinations. Contributing diagnoses includes - CHF, COPD and Obesity Hypoventilation Labs and images were reviewed. Patient Has recent ABG, which has been reviewed. Will treat underlying causes and adjust management of respiratory failure as follows-   -keep supplemental oxygen at home rate for goal Sat only 88-94%  -treat COPD exacerbation  -alexandr    Pt has severe disease and has consistently declined use of home NIPPV. He and his family express poor understanding of disease process and management. They discussed increasing oxygen at home for goal 98-99% and denied education regarding sat goals or harm of hyper oxygenation. Both were somewhat fixated on discussing the "cause" of his COPD including discussion of his working around dirt and chemicals as a young man. I attempted to redirect conversation to current status and plan. I also attempted to educate on the detrimental effect of morbid obesity and immobilization on underlying COPD. I believe they will need ongoing education and may benefit from a formal goals of care/palliative care introduction.  "

## 2023-07-15 NOTE — HPI
Information obtained from chart review and discussion with providers as patient is a poor historian.     77-year-old bed bound male with a known past medical history of COPD, DENISHA (noncompliant with CPAP), chronic hypercapnic and hypoxic respiratory failure on 4-5L NC or face mask oxygen at home, lymphedema, hypothyroidism, A-fib on eliquis, HFpEF with pulm HTN, mod to severe RV systolic function, venous insufficiency, and super morbid obesity who presented 7/14/2023 with confusion. Upon arrival to ED, was placed on BiPAP 10/5. ED work up significant for elevated BNP and UA consistent with UTI. ABG compensated with CO2 77. CXR WNL. He was treated with bronchodilators, steroids, and lasix. Critical care consulted for admission with NIPPV in use.     Upon critical care team's initial assessment, NIPPV removed and placed on NC. Admitted overnight for close neurologic and respiratory monitoring. Deemed appropriate for step down 7/15/2023 morning.     Upon exam this morning, patient AAOx3. He is very restless and impulsive. He denies any subjective complaints aside from discomfort in the bed, wanting the head of his bed lowered. Hemodynamics acceptable.

## 2023-07-15 NOTE — SUBJECTIVE & OBJECTIVE
Past Medical History:   Diagnosis Date    Acute hypoxemic respiratory failure 2022    Arthritis     Atrial fibrillation 3/1/2023    CHF (congestive heart failure)     Chronic kidney disease     Coronary artery disease     Depression     Hypertension     Hypertensive heart disease with heart failure 2022    Hypothyroidism     Lymphedema of lower extremity     Nephrolithiasis     Obesity     Peripheral vascular disease     Pneumonia 2022    Recurrent cellulitis of lower leg     Sleep apnea     can't stand the CPAP , sleeps elevated in hospital bed or chair    Tobacco dependence     resolved       Past Surgical History:   Procedure Laterality Date    ADENOIDECTOMY      BACK SURGERY  ;  1976    x2 for disc; scar tissue removed    debridement of bilateral leg ulcers Bilateral 2017    Dr Hernandez    INNER EAR SURGERY Bilateral     separate - pinnaplasty , new eardrms constructed    KIDNEY STONE SURGERY Left  approx    open    TONSILLECTOMY         Review of patient's allergies indicates:   Allergen Reactions    Sulfamethoxazole-trimethoprim Itching and Shortness Of Breath       Family History       Problem Relation (Age of Onset)    Alcohol abuse Maternal Grandfather    Alzheimer's disease Brother    Asthma Daughter    Cancer Mother, Father, Brother    Diabetes Mother    Heart disease Father, Brother    Hypertension Mother          Tobacco Use    Smoking status: Former     Packs/day: 1.50     Years: 1.00     Pack years: 1.50     Types: Cigarettes     Quit date:      Years since quittin.5    Smokeless tobacco: Never   Substance and Sexual Activity    Alcohol use: Not Currently    Drug use: Never    Sexual activity: Never         Review of Systems   Reason unable to perform ROS: limited due to inattention.   Constitutional:  Negative for fever.   HENT:  Negative for trouble swallowing.    Respiratory:  Negative for shortness of breath.    Cardiovascular:  Positive for leg  swelling. Negative for chest pain.   Gastrointestinal:  Negative for abdominal pain, nausea and vomiting.   Endocrine: Positive for cold intolerance.   Neurological:  Negative for speech difficulty and headaches.   Psychiatric/Behavioral:  Positive for confusion.    Objective:     Vital Signs (Most Recent):  Temp: 98.1 °F (36.7 °C) (07/14/23 1816)  Pulse: (!) 59 (07/14/23 2238)  Resp: (!) 33 (07/14/23 2238)  BP: (!) 128/56 (07/14/23 2200)  SpO2: (!) 92 % (07/14/23 2238) Vital Signs (24h Range):  Temp:  [98.1 °F (36.7 °C)] 98.1 °F (36.7 °C)  Pulse:  [50-61] 59  Resp:  [24-48] 33  SpO2:  [92 %-100 %] 92 %  BP: (124-159)/(54-76) 128/56     Weight: (!) 183.1 kg (403 lb 10.6 oz)  Body mass index is 67.17 kg/m².    No intake or output data in the 24 hours ending 07/15/23 0009     Physical Exam  Vitals and nursing note reviewed.   Constitutional:       General: He is awake. He is not in acute distress.     Appearance: He is morbidly obese.      Interventions: Face mask in place.   HENT:      Head: Atraumatic.   Eyes:      Conjunctiva/sclera: Conjunctivae normal.   Neck:      Comments: Extensive unkempt beard  Cardiovascular:      Rate and Rhythm: Bradycardia present. Rhythm irregular.   Pulmonary:      Effort: Tachypnea present. No accessory muscle usage or respiratory distress.      Breath sounds: Decreased breath sounds present. No wheezing.   Abdominal:      General: Abdomen is protuberant. Bowel sounds are normal.      Palpations: Abdomen is soft.   Skin:     General: Skin is warm and dry.      Capillary Refill: Capillary refill takes less than 2 seconds.          Neurological:      Mental Status: He is alert.      GCS: GCS eye subscore is 4. GCS verbal subscore is 4. GCS motor subscore is 6.   Psychiatric:         Mood and Affect: Affect is labile.         Speech: Speech normal.         Cognition and Memory: He exhibits impaired recent memory.         Judgment: Judgment is impulsive.        Vents:  Oxygen  Concentration (%): 40 (07/14/23 2238)    Lines/Drains/Airways       Peripheral Intravenous Line  Duration                  Peripheral IV - Single Lumen 07/14/23 1820 20 G Left Forearm <1 day                    Significant Labs:    CBC/Anemia Profile:  Recent Labs   Lab 07/14/23  1825   WBC 6.26   HGB 10.2*   HCT 35.6*      *   RDW 14.6*        Chemistries:  Recent Labs   Lab 07/14/23  1825      K 4.4   CL 92*   CO2 40*   BUN 18   CREATININE 1.0   CALCIUM 9.5   ALBUMIN 2.8*   PROT 8.4   BILITOT 0.6   ALKPHOS 85   ALT 13   AST 22       All pertinent labs within the past 24 hours have been reviewed.    Significant Imaging:   I have reviewed all pertinent imaging results/findings within the past 24 hours.

## 2023-07-15 NOTE — ASSESSMENT & PLAN NOTE
Body mass index is 67.17 kg/m². Morbid obesity complicates all aspects of disease management from diagnostic modalities to treatment and in this situation is contributing to severity of disease. Education attempted to no evidence of understanding

## 2023-07-15 NOTE — ASSESSMENT & PLAN NOTE
Patient with Hypercapnic and Hypoxic Respiratory failure which is Chronic.  he is on home oxygen at 4-5 LPM. Supplemental oxygen was provided and noted- Oxygen Concentration (%):  [30-50] 35  Signs/symptoms of respiratory failure include- tachypnea and increased work of breathing. Contributing diagnoses includes - CHF and COPD Labs and images were reviewed. Patient Has recent ABG, which has been reviewed.  Will treat underlying causes and adjust management of respiratory failure as follows-   Cont supplemental oxygen to keep SPO2 88-92%   ABG reviewed- compensated with HCO 40 and CO2 79  Family adjust oxygen variably at home and he mostly wears a face mask for comfort   CXR normal

## 2023-07-15 NOTE — ASSESSMENT & PLAN NOTE
Likely driven/exacerbated by pulmonary HTN, lung disease, and morbid obesity  Diurese  Strict I/O  Daily weight

## 2023-07-15 NOTE — ED PROVIDER NOTES
SCRIBE #1 NOTE: I, Shaka Tovar, am scribing for, and in the presence of, Penny Haley DO. I have scribed the entire note.       History     Chief Complaint   Patient presents with    Shortness of Breath     Pt with SOB and altered mental status today, EMS reports intial O2 sat 83% room air and ETCO2 in route 74. Mental status and O2 sat improved with O2 administration PTA. Pt awake and alert upon arrival but remains confused. Continues to complain of SOB. Placed on BiPAP upon arrival     Review of patient's allergies indicates:   Allergen Reactions    Sulfamethoxazole-trimethoprim Itching and Shortness Of Breath         History of Present Illness     HPI    7/14/2023, 7:27 PM  History obtained from the patient      History of Present Illness: Gene Rothman is a 77 y.o. male patient with a PMHx of *** who presents to the Emergency Department for evaluation of *** which onset gradually***. Symptoms are constant*** and moderate*** in severity. No mitigating or exacerbating factors reported***. Associated sxs include ***. Patient denies any ***, and all other sxs at this time. Prior Tx includes ***. No further complaints or concerns at this time.       Arrival mode: Ambulance Services    PCP: Ami Zarate DO        Past Medical History:  Past Medical History:   Diagnosis Date    Acute hypoxemic respiratory failure 9/17/2022    Arthritis     Atrial fibrillation 3/1/2023    CHF (congestive heart failure)     Chronic kidney disease     Coronary artery disease     Depression     Hypertension     Hypertensive heart disease with heart failure 9/16/2022    Hypothyroidism     Lymphedema of lower extremity     Nephrolithiasis     Obesity     Peripheral vascular disease     Pneumonia 9/17/2022    Recurrent cellulitis of lower leg     Sleep apnea     can't stand the CPAP , sleeps elevated in hospital bed or chair    Tobacco dependence     resolved       Past Surgical History:  Past Surgical History:   Procedure Laterality  Date    ADENOIDECTOMY      BACK SURGERY  ;  1976    x2 for disc; scar tissue removed    debridement of bilateral leg ulcers Bilateral 2017    Dr Hernandez    INNER EAR SURGERY Bilateral     separate - pinnaplasty , new eardrms constructed    KIDNEY STONE SURGERY Left  approx    open    TONSILLECTOMY           Family History:  Family History   Problem Relation Age of Onset    Cancer Mother         ? abd also    Diabetes Mother     Hypertension Mother     Cancer Father         ? abdonimal origin stomach/liver or pancreas    Heart disease Father         pacer    Cancer Brother         skin-part of ext ear removed    Heart disease Brother         CABG3    Alzheimer's disease Brother     Asthma Daughter          26 w/ asthma    Alcohol abuse Maternal Grandfather     Stroke Neg Hx     COPD Neg Hx     Mental illness Neg Hx     Mental retardation Neg Hx     Kidney disease Neg Hx        Social History:  Social History     Tobacco Use    Smoking status: Former     Packs/day: 1.50     Years: 1.00     Pack years: 1.50     Types: Cigarettes     Quit date:      Years since quittin.5    Smokeless tobacco: Never   Substance and Sexual Activity    Alcohol use: Not Currently    Drug use: Never    Sexual activity: Never        Review of Systems     Review of Systems  ***   Physical Exam     Initial Vitals [23]   BP Pulse Resp Temp SpO2   (!) 148/76 (!) 59 (!) 48 98.1 °F (36.7 °C) 95 %      MAP       --          Physical Exam  Nursing Notes and Vital Signs Reviewed.  Constitutional: Patient is in no acute distress. Well-developed and well-nourished.  Head: Atraumatic. Normocephalic.  Eyes: PERRL. EOM intact. Conjunctivae are not pale. No scleral icterus.  ENT: Mucous membranes are moist. Oropharynx is clear and symmetric. Hard of hearing.  Neck: Supple. Full ROM. No lymphadenopathy.  Cardiovascular: Regular rate. Regular rhythm. No murmurs, rubs, or gallops. Distal pulses are 2+ and  "symmetric.  Pulmonary/Chest: Tachypnea. Clear to auscultation bilaterally. No wheezing or rales.  Abdominal: Soft and non-distended.  There is no tenderness. No rebound, guarding, or rigidity. Good bowel sounds.  Genitourinary: No CVA tenderness  Musculoskeletal: Moves all extremities. No obvious deformities. 2+ pitting edmea to BLE. No calf tenderness.  Skin: Warm and dry. Skin chronic venous stasis dermatitis  Neurological:  Alert, awake, and appropriate.  Normal speech.  No acute focal neurological deficits are appreciated.  Psychiatric: Normal affect. Good eye contact. Appropriate in content.     ED Course   Procedures  ED Vital Signs:  Vitals:    07/14/23 1816 07/14/23 1827 07/14/23 1830 07/14/23 1845   BP: (!) 148/76  (!) 159/70 (!) 142/62   Pulse: (!) 59 60 (!) 57 (!) 56   Resp: (!) 48 (!) 42 (!) 29 (!) 26   Temp: 98.1 °F (36.7 °C)      TempSrc: Oral      SpO2: 95% 97% 98% 95%   Weight: (!) 183.1 kg (403 lb 10.6 oz)      Height: 5' 5" (1.651 m)          Abnormal Lab Results:  Labs Reviewed   CBC W/ AUTO DIFFERENTIAL - Abnormal; Notable for the following components:       Result Value    RBC 3.31 (*)     Hemoglobin 10.2 (*)     Hematocrit 35.6 (*)      (*)     MCHC 28.7 (*)     RDW 14.6 (*)     All other components within normal limits   COMPREHENSIVE METABOLIC PANEL - Abnormal; Notable for the following components:    Chloride 92 (*)     CO2 40 (*)     Albumin 2.8 (*)     All other components within normal limits   ISTAT PROCEDURE - Abnormal; Notable for the following components:    POC PH 7.326 (*)     POC PCO2 91.5 (*)     POC PO2 63 (*)     POC HCO3 47.8 (*)     POC SATURATED O2 88 (*)     All other components within normal limits   CULTURE, BLOOD   CULTURE, BLOOD   LACTIC ACID, PLASMA   TROPONIN I   URINALYSIS, REFLEX TO URINE CULTURE        All Lab Results:  ***    Imaging Results:  Imaging Results              CT Head Without Contrast (Final result)  Result time 07/14/23 19:19:47      Final " result by Cy Ureña MD (07/14/23 19:19:47)                   Impression:      No acute intracranial CT abnormality on this artifact degraded exam.  Correlation and further evaluation as warranted.    All CT scans at this facility are performed  using dose modulation techniques as appropriate to performed exam including the following:  automated exposure control; adjustment of mA and/or kV according to the patients size (this includes techniques or standardized protocols for targeted exams where dose is matched to indication/reason for exam: i.e. extremities or head);  iterative reconstruction technique.      Electronically signed by: Cy Ureña  Date:    07/14/2023  Time:    19:19               Narrative:    EXAMINATION:  CT HEAD WITHOUT CONTRAST    CLINICAL HISTORY:  Mental status change, unknown cause;    TECHNIQUE:  Low dose axial CT images obtained throughout the head without intravenous contrast. Sagittal and coronal reconstructions were performed.    COMPARISON:  Multiple priors.    FINDINGS:  Intracranial compartment:    Artifact degrades the exam.    Ventricles and sulci are normal in size for age without evidence of hydrocephalus. No extra-axial blood or fluid collections.    The brain parenchyma appears normal. Mild microvascular ischemic change.    Skull/extracranial contents (limited evaluation): No fracture. Mastoid air cells and paranasal sinuses are essentially clear.                                       X-Ray Chest AP Portable (Final result)  Result time 07/14/23 18:49:25      Final result by Cy Ureña MD (07/14/23 18:49:25)                   Impression:      No acute abnormality.      Electronically signed by: Cy Ureña  Date:    07/14/2023  Time:    18:49               Narrative:    EXAMINATION:  XR CHEST AP PORTABLE    CLINICAL HISTORY:  Sepsis;    TECHNIQUE:  Single frontal view of the chest was performed.    COMPARISON:  Multiple priors.    FINDINGS:  Right basilar  opacity possibly atelectasis or infection.  No pleural fluid or pneumothorax.    The cardiac silhouette is normal in size. The hilar and mediastinal contours are unremarkable.    Bones are intact.                                       The EKG was ordered, reviewed, and independently interpreted by the ED provider.  Interpretation time: 18:13  Rate: 57 BPM  Rhythm: atrial fibrillation with slow ventricular response  Interpretation: No acute ST changes. No STEMI.             The Emergency Provider reviewed the vital signs and test results, which are outlined above.     ED Discussion       ***    ED Course as of 07/14/23 1927 Fri Jul 14, 2023 1821 EKG shows atrial fibrillation with slow ventricular response.  .  .  Nonspecific changes.  No acute ischemia or STEMI. [NF]      ED Course User Index  [NF] Penny Haley DO     Medical Decision Making:   Clinical Tests:   Lab Tests: Ordered and Reviewed  Radiological Study: Ordered and Reviewed  Medical Tests: Ordered and Reviewed         ED Medication(s):  Medications - No data to display    New Prescriptions    No medications on file               Scribe Attestation:   Scribe #1: I performed the above scribed service and the documentation accurately describes the services I performed. I attest to the accuracy of the note.     Attending:   Physician Attestation Statement for Scribe #1: I, Penny Haley DO, personally performed the services described in this documentation, as scribed by Shaka Tovar, in my presence, and it is both accurate and complete.           Clinical Impression       ICD-10-CM ICD-9-CM   1. SOB (shortness of breath)  R06.02 786.05

## 2023-07-15 NOTE — ASSESSMENT & PLAN NOTE
Body mass index is 64.68 kg/m². Morbid obesity complicates all aspects of disease management from diagnostic modalities to treatment.    Increases morbidity and mortality

## 2023-07-15 NOTE — CONSULTS
[facility] Medicine  Consult Note    Patient Name: Gene Rothman  MRN: [id number]  Admission Date: 7/14/2023  Hospital Length of Stay: 1 days  Attending Physician: Dhaval Alberts MD   Primary Care Provider: Ami Zarate DO           Patient information was obtained from past medical records.     Consults  Subjective:     Principal Problem: Encephalopathy    Chief Complaint:   Chief Complaint   Patient presents with    Shortness of Breath     Pt with SOB and altered mental status today, EMS reports intial O2 sat 83% room air and ETCO2 in route 74. Mental status and O2 sat improved with O2 administration PTA. Pt awake and alert upon arrival but remains confused. Continues to complain of SOB. Placed on BiPAP upon arrival        HPI: Information obtained from chart review and discussion with providers as patient is a poor historian.     77-year-old bed bound male with a known past medical history of COPD, DENISHA (noncompliant with CPAP), chronic hypercapnic and hypoxic respiratory failure on 4-5L NC or face mask oxygen at home, lymphedema, hypothyroidism, A-fib on eliquis, HFpEF with pulm HTN, mod to severe RV systolic function, venous insufficiency, and super morbid obesity who presented 7/14/2023 with confusion. Upon arrival to ED, was placed on BiPAP 10/5. ED work up significant for elevated BNP and UA consistent with UTI. ABG compensated with CO2 77. CXR WNL. He was treated with bronchodilators, steroids, and lasix. Critical care consulted for admission with NIPPV in use.     Upon critical care team's initial assessment, NIPPV removed and placed on NC. Admitted overnight for close neurologic and respiratory monitoring. Deemed appropriate for step down 7/15/2023 morning.     Upon exam this morning, patient AAOx3. He is very restless and impulsive. He denies any subjective complaints aside from discomfort in the bed, wanting the head of his bed lowered. Hemodynamics acceptable.       Past Medical History:    Diagnosis Date    Acute hypoxemic respiratory failure 9/17/2022    Arthritis     Atrial fibrillation 3/1/2023    CHF (congestive heart failure)     Chronic kidney disease     Coronary artery disease     Depression     Hypertension     Hypertensive heart disease with heart failure 9/16/2022    Hypothyroidism     Lymphedema of lower extremity     Nephrolithiasis     Obesity     Peripheral vascular disease     Pneumonia 9/17/2022    Recurrent cellulitis of lower leg     Sleep apnea     can't stand the CPAP , sleeps elevated in hospital bed or chair    Tobacco dependence     resolved       Past Surgical History:   Procedure Laterality Date    ADENOIDECTOMY  1961    BACK SURGERY  1975;  1976    x2 for disc; scar tissue removed    debridement of bilateral leg ulcers Bilateral 01/01/2017    Dr Hernandez    INNER EAR SURGERY Bilateral 1961    separate - pinnaplasty , new eardrms constructed    KIDNEY STONE SURGERY Left 1976 approx    open    TONSILLECTOMY  1961       Review of patient's allergies indicates:   Allergen Reactions    Sulfamethoxazole-trimethoprim Itching and Shortness Of Breath       No current facility-administered medications on file prior to encounter.     Current Outpatient Medications on File Prior to Encounter   Medication Sig    acetaminophen (TYLENOL) 500 MG tablet Take 500 mg by mouth every 6 (six) hours as needed for Pain.    apixaban (ELIQUIS) 5 mg Tab Take 1 tablet (5 mg total) by mouth 2 (two) times daily.    aspirin 81 MG Chew Take 1 tablet (81 mg total) by mouth once daily.    atorvastatin (LIPITOR) 40 MG tablet Take 1 tablet (40 mg total) by mouth once daily.    furosemide (LASIX) 40 MG tablet Take 1 tablet (40 mg total) by mouth once daily.    levalbuterol (XOPENEX) 1.25 mg/3 mL nebulizer solution Take 3 mLs (1.25 mg total) by nebulization every 4 (four) hours as needed for Wheezing. Rescue    levothyroxine (SYNTHROID) 50 MCG tablet Take 1 tablet (50 mcg total) by  mouth before breakfast.    miconazole nitrate 2% (MICOTIN) 2 % Oint Apply topically 2 (two) times daily. Intertrigo to lower abdomen, groin, periarea: please apply Antifungal bid (Patient not taking: Reported on 3/14/2023)    nebulizer and compressor (HOME NEBULIZER PLUS SIDESTREAM) Diana use as directed    polyethylene glycol (GLYCOLAX) 17 gram PwPk Take 17 g by mouth 2 (two) times daily. (Patient not taking: Reported on 2023)    senna-docusate 8.6-50 mg (PERICOLACE) 8.6-50 mg per tablet Take 1 tablet by mouth once daily.    tiotropium (SPIRIVA) 18 mcg inhalation capsule Inhale 1 capsule (18 mcg total) into the lungs once daily. Controller     Family History       Problem Relation (Age of Onset)    Alcohol abuse Maternal Grandfather    Alzheimer's disease Brother    Asthma Daughter    Cancer Mother, Father, Brother    Diabetes Mother    Heart disease Father, Brother    Hypertension Mother          Tobacco Use    Smoking status: Former     Packs/day: 1.50     Years: 1.00     Pack years: 1.50     Types: Cigarettes     Quit date:      Years since quittin.5    Smokeless tobacco: Never   Substance and Sexual Activity    Alcohol use: Not Currently    Drug use: Never    Sexual activity: Never     Review of Systems   Constitutional:  Negative for chills and fever.   HENT:  Negative for congestion and sore throat.    Eyes:  Negative for photophobia and visual disturbance.   Respiratory:  Negative for shortness of breath.    Cardiovascular:  Positive for leg swelling. Negative for chest pain.   Gastrointestinal:  Negative for nausea and vomiting.   Genitourinary:  Negative for difficulty urinating and dysuria.   Musculoskeletal:  Negative for arthralgias and back pain.   Neurological:  Negative for dizziness and headaches.   Psychiatric/Behavioral:  Negative for sleep disturbance. The patient is not nervous/anxious.    Objective:     Vital Signs (Most Recent):  Temp: 99.4 °F (37.4 °C) (07/15/23  0954)  Pulse: 63 (07/15/23 1359)  Resp: (!) 22 (07/15/23 0954)  BP: 133/63 (07/15/23 0954)  SpO2: (!) 94 % (07/15/23 0954) Vital Signs (24h Range):  Temp:  [98 °F (36.7 °C)-99.4 °F (37.4 °C)] 99.4 °F (37.4 °C)  Pulse:  [50-71] 63  Resp:  [18-48] 22  SpO2:  [84 %-100 %] 94 %  BP: (117-159)/(54-76) 133/63     Weight: (!) 176.3 kg (388 lb 10.7 oz)  Body mass index is 64.68 kg/m².     Physical Exam  Vitals reviewed.   Constitutional:       General: He is awake. He is not in acute distress.     Comments: Morbidly obese, unkempt, chronically ill appearing male   HENT:      Head: Normocephalic and atraumatic.      Mouth/Throat:      Mouth: Mucous membranes are moist.      Pharynx: Oropharynx is clear.   Eyes:      Extraocular Movements: Extraocular movements intact.      Conjunctiva/sclera: Conjunctivae normal.   Cardiovascular:      Rate and Rhythm: Normal rate. Rhythm irregular.   Pulmonary:      Effort: Pulmonary effort is normal.      Breath sounds: No wheezing or rhonchi.      Comments: Diminished in bases. On venturi mask 35%   Abdominal:      General: Bowel sounds are normal.      Palpations: Abdomen is soft.   Musculoskeletal:         General: No deformity.      Cervical back: Normal range of motion and neck supple.      Right lower leg: Edema present.      Left lower leg: Edema present.      Comments: Vascular changes to BLE    Skin:     General: Skin is warm and dry.   Neurological:      General: No focal deficit present.      Mental Status: He is alert and oriented to person, place, and time.   Psychiatric:         Behavior: Behavior is cooperative.      Comments: Impulsive, poor insight        Significant Labs: All pertinent labs within the past 24 hours have been reviewed.    Significant Imaging: I have reviewed all pertinent imaging results/findings within the past 24 hours.    Assessment/Plan:     * Encephalopathy  Likely relate to UTI +/- reported hypoxia (was not wearing oxygen upon EMS arrival and SPO2  low 80s)  CT head negative   Treating reversible causes  Safety and fall precautions in place       Chronic obstructive pulmonary disease with acute exacerbation  Pulmonary exam this morning stable - no wheezing, tachypnea or labored breathing  Suspect volume contributed to symptoms yesterday   Budesonide and arformoterol nebs while inpatient   Change duo-nebs to prn  Change steroids to PO in am- 5 day course     Acute cystitis with hematuria  Hx of VRE per culture review   Urine culture pending   Rocephin and Zyvox, de-escalate as able       Atrial fibrillation  Patient with Long standing persistent (>12 months) atrial fibrillation which is controlled currently with out medication. Patient is currently in atrial fibrillation.[id number] Score: 4. HASBLED Score: 2 . Anticoagulation indicated. Anticoagulation done with Eliquis.    Chronic respiratory failure with hypoxia and hypercapnia  Patient with Hypercapnic and Hypoxic Respiratory failure which is Chronic.  he is on home oxygen at 4-5 LPM. Supplemental oxygen was provided and noted- Oxygen Concentration (%):  [30-50] 35  Signs/symptoms of respiratory failure include- tachypnea and increased work of breathing. Contributing diagnoses includes - CHF and COPD Labs and images were reviewed. Patient Has recent ABG, which has been reviewed.  Will treat underlying causes and adjust management of respiratory failure as follows-   Cont supplemental oxygen to keep SPO2 88-92%   ABG reviewed- compensated with HCO 40 and CO2 79  Family adjust oxygen variably at home and he mostly wears a face mask for comfort   CXR normal      Hypertensive heart disease with heart failure  Multi-factorial with severe pulmonary disease contributing as well   Echo 1/2023 w/ EF 55%, grade I DD, pulm HTN, and mod-severe RV dysfunction   Appears to be volume overloaded   Lasix 60 mg BID   Strict I/Os   Daily weights         Sleep apnea  Noncompliant with device at home   Directly negatively  impacting patient's overall health and increases his morbidity and mortality       Morbid obesity with BMI of 60.0-69.9, adult  Body mass index is 64.68 kg/m². Morbid obesity complicates all aspects of disease management from diagnostic modalities to treatment.    Increases morbidity and mortality       Hypothyroidism (acquired)  synthroid        VTE Risk Mitigation (From admission, onward)         Ordered     apixaban tablet 5 mg  2 times daily         07/14/23 7014              Thank you for your consult. Hospital medicine to assume primary management.     David Ch NP  Department of Hospital Medicine   [facility]

## 2023-07-15 NOTE — SUBJECTIVE & OBJECTIVE
Past Medical History:   Diagnosis Date    Acute hypoxemic respiratory failure 9/17/2022    Arthritis     Atrial fibrillation 3/1/2023    CHF (congestive heart failure)     Chronic kidney disease     Coronary artery disease     Depression     Hypertension     Hypertensive heart disease with heart failure 9/16/2022    Hypothyroidism     Lymphedema of lower extremity     Nephrolithiasis     Obesity     Peripheral vascular disease     Pneumonia 9/17/2022    Recurrent cellulitis of lower leg     Sleep apnea     can't stand the CPAP , sleeps elevated in hospital bed or chair    Tobacco dependence     resolved       Past Surgical History:   Procedure Laterality Date    ADENOIDECTOMY  1961    BACK SURGERY  1975;  1976    x2 for disc; scar tissue removed    debridement of bilateral leg ulcers Bilateral 01/01/2017    Dr Hernandez    INNER EAR SURGERY Bilateral 1961    separate - pinnaplasty , new eardrms constructed    KIDNEY STONE SURGERY Left 1976 approx    open    TONSILLECTOMY  1961       Review of patient's allergies indicates:   Allergen Reactions    Sulfamethoxazole-trimethoprim Itching and Shortness Of Breath       No current facility-administered medications on file prior to encounter.     Current Outpatient Medications on File Prior to Encounter   Medication Sig    acetaminophen (TYLENOL) 500 MG tablet Take 500 mg by mouth every 6 (six) hours as needed for Pain.    apixaban (ELIQUIS) 5 mg Tab Take 1 tablet (5 mg total) by mouth 2 (two) times daily.    aspirin 81 MG Chew Take 1 tablet (81 mg total) by mouth once daily.    atorvastatin (LIPITOR) 40 MG tablet Take 1 tablet (40 mg total) by mouth once daily.    furosemide (LASIX) 40 MG tablet Take 1 tablet (40 mg total) by mouth once daily.    levalbuterol (XOPENEX) 1.25 mg/3 mL nebulizer solution Take 3 mLs (1.25 mg total) by nebulization every 4 (four) hours as needed for Wheezing. Rescue    levothyroxine (SYNTHROID) 50 MCG tablet Take 1 tablet (50 mcg total) by mouth  before breakfast.    miconazole nitrate 2% (MICOTIN) 2 % Oint Apply topically 2 (two) times daily. Intertrigo to lower abdomen, groin, periarea: please apply Antifungal bid (Patient not taking: Reported on 3/14/2023)    nebulizer and compressor (HOME NEBULIZER PLUS SIDESTREAM) Diana use as directed    polyethylene glycol (GLYCOLAX) 17 gram PwPk Take 17 g by mouth 2 (two) times daily. (Patient not taking: Reported on 2023)    senna-docusate 8.6-50 mg (PERICOLACE) 8.6-50 mg per tablet Take 1 tablet by mouth once daily.    tiotropium (SPIRIVA) 18 mcg inhalation capsule Inhale 1 capsule (18 mcg total) into the lungs once daily. Controller     Family History       Problem Relation (Age of Onset)    Alcohol abuse Maternal Grandfather    Alzheimer's disease Brother    Asthma Daughter    Cancer Mother, Father, Brother    Diabetes Mother    Heart disease Father, Brother    Hypertension Mother          Tobacco Use    Smoking status: Former     Packs/day: 1.50     Years: 1.00     Pack years: 1.50     Types: Cigarettes     Quit date:      Years since quittin.5    Smokeless tobacco: Never   Substance and Sexual Activity    Alcohol use: Not Currently    Drug use: Never    Sexual activity: Never     Review of Systems   Constitutional:  Negative for chills and fever.   HENT:  Negative for congestion and sore throat.    Eyes:  Negative for photophobia and visual disturbance.   Respiratory:  Negative for shortness of breath.    Cardiovascular:  Positive for leg swelling. Negative for chest pain.   Gastrointestinal:  Negative for nausea and vomiting.   Genitourinary:  Negative for difficulty urinating and dysuria.   Musculoskeletal:  Negative for arthralgias and back pain.   Neurological:  Negative for dizziness and headaches.   Psychiatric/Behavioral:  Negative for sleep disturbance. The patient is not nervous/anxious.    Objective:     Vital Signs (Most Recent):  Temp: 99.4 °F (37.4 °C) (07/15/23 0954)  Pulse: 63  (07/15/23 1359)  Resp: (!) 22 (07/15/23 0954)  BP: 133/63 (07/15/23 0954)  SpO2: (!) 94 % (07/15/23 0954) Vital Signs (24h Range):  Temp:  [98 °F (36.7 °C)-99.4 °F (37.4 °C)] 99.4 °F (37.4 °C)  Pulse:  [50-71] 63  Resp:  [18-48] 22  SpO2:  [84 %-100 %] 94 %  BP: (117-159)/(54-76) 133/63     Weight: (!) 176.3 kg (388 lb 10.7 oz)  Body mass index is 64.68 kg/m².     Physical Exam  Vitals reviewed.   Constitutional:       General: He is awake. He is not in acute distress.     Comments: Morbidly obese, unkempt, chronically ill appearing male   HENT:      Head: Normocephalic and atraumatic.      Mouth/Throat:      Mouth: Mucous membranes are moist.      Pharynx: Oropharynx is clear.   Eyes:      Extraocular Movements: Extraocular movements intact.      Conjunctiva/sclera: Conjunctivae normal.   Cardiovascular:      Rate and Rhythm: Normal rate. Rhythm irregular.   Pulmonary:      Effort: Pulmonary effort is normal.      Breath sounds: No wheezing or rhonchi.      Comments: Diminished in bases. On venturi mask 35%   Abdominal:      General: Bowel sounds are normal.      Palpations: Abdomen is soft.   Musculoskeletal:         General: No deformity.      Cervical back: Normal range of motion and neck supple.      Right lower leg: Edema present.      Left lower leg: Edema present.      Comments: Vascular changes to BLE    Skin:     General: Skin is warm and dry.   Neurological:      General: No focal deficit present.      Mental Status: He is alert and oriented to person, place, and time.   Psychiatric:         Behavior: Behavior is cooperative.      Comments: Impulsive, poor insight        Significant Labs: All pertinent labs within the past 24 hours have been reviewed.    Significant Imaging: I have reviewed all pertinent imaging results/findings within the past 24 hours.

## 2023-07-15 NOTE — HPI
Gene Rothman is a 77-year-old male patient with past medical history of chronic diastolic heart failure, chronic lymphedema, chronic kidney disease, coronary artery disease, hypertension, hypothyroidism, peripheral vascular disease, chronic obstructive pulmonary disease, and DENISHA/obesity hypoventilation on oxygen at home, prior intermediate V/Q scan, LLE DVT, and recurrent UTI. Patient has had multiple hospital admissions in the past year for sepsis due to UTI and acute on chronic hypercapnic respiratory failure. Patient presented to the emergency room on 7/14/2023 with hypoxia, confusion and reported hallucinations. He was initially placed on NIPPV 10/5 upon arrival per ED staff. Initial ABG notable for a pH of 7.32, pCO2 61.5, paO2 63, and HCO3 47.8. He was treated with DuoNeb, Solu-Medrol, and IV Lasix with repeat ABG noting a pH of 7.40, pCO2 77.5, paO2 65, and HCO3 48.3. Critical care team was called for ICU admission. At that time, he was removed from NIPPV during exam and remained at bedside >30 min to assess his response. The patient remained awake and alert and was able to have extensive conversation despite confusion to situation and recent events. He was oriented to self, time, and place. He was admitted to ICU for neuro/respiratory monitoring and transitioned from ICU on 7/15/2023. Pulmonary has been consulted for evaluation of hypercapnic respiratory failure.     Interval history:  Patient remains at his baseline. Family at bedside and updated on plan of care. No acute events overnight. Supplemental oxygen weaned to 5l/NC.

## 2023-07-15 NOTE — ASSESSMENT & PLAN NOTE
CT head without acute finding  Family reports not at his baseline but could not clarify his baseline  Suspect COPD exacerbation/baseline hypercarbia/and potentially UTI contributing   Monitor in ICU overnight

## 2023-07-15 NOTE — ASSESSMENT & PLAN NOTE
Pulmonary exam this morning stable - no wheezing, tachypnea or labored breathing  Suspect volume contributed to symptoms yesterday   Budesonide and arformoterol nebs while inpatient   Change duo-nebs to prn  Change steroids to PO in am- 5 day course

## 2023-07-15 NOTE — ASSESSMENT & PLAN NOTE
Patient with Long standing persistent (>12 months) atrial fibrillation which is controlled currently with out medication. Patient is currently in atrial fibrillation.[id number] Score: 4. HASBLED Score: 2 . Anticoagulation indicated. Anticoagulation done with Eliquis.

## 2023-07-15 NOTE — NURSING
Pt AAOx3; disoriented to situation. GCS 14; confused. Afib on monitor; BP stable. Currently on 12L/35% on venturi mask; sat goal 88-94 maintained. Quivi in place. BM x1 this shift.     Pt resting comfortably in bed with side rails up x3; locked and lowered with alarm set. Call light in place. Advised pt to call out if anything is needed. Pt verbalized understanding.

## 2023-07-15 NOTE — ASSESSMENT & PLAN NOTE
Multi-factorial with severe pulmonary disease contributing as well   Echo 1/2023 w/ EF 55%, grade I DD, pulm HTN, and mod-severe RV dysfunction   Appears to be volume overloaded   Lasix 60 mg BID   Strict I/Os   Daily weights

## 2023-07-15 NOTE — PLAN OF CARE
Discussed poc with pt, pt verbalized understanding    Purposeful rounding every 2hours    VS wnl  Cardiac monitoring in use tele monitor #[id number]  No complaints of pain    Abx given as prescribed  Bed locked at lowest position  Call light within reach    Chart check complete  Will cont with POC

## 2023-07-16 PROBLEM — G93.41 ENCEPHALOPATHY, METABOLIC: Status: ACTIVE | Noted: 2023-01-01

## 2023-07-16 NOTE — PLAN OF CARE
[facility]  Initial Discharge Assessment       Primary Care Provider: Ami Zarate DO    Admission Diagnosis: SOB (shortness of breath) [R06.02]  COPD exacerbation [J44.1]    Admission Date: 7/14/2023  Expected Discharge Date: Per Attending     Transition of Care Barriers: None    Payor: HU[employer] MANAGED MEDICARE / Plan: HUMANA TOTAL CARE ADVANTAGE / Product Type: Medicare Advantage /     Extended Emergency Contact Information  Primary Emergency Contact: Solo Rothman  Address: [address] [address] [place]  Mobile Phone: [phone]  Relation: Son  Secondary Emergency Contact: Alise Rothman  Address: [address] [address] [facility]  Home Phone: [phone]  Relation: Daughter    Discharge Plan A: [facility] - [place], [place] - [address]  [address] [facility] [address]  Phone: [phone] Fax: [phone]    [facility]  [address] [place] [address] [address] [place] [address]  Phone: [phone] Fax: [phone]    [facility] [place] - [address] [facility] [address]  [address] [facility]   [place] [address]  Phone: [phone] Fax: [phone]      Initial Assessment (most recent)       Adult Discharge Assessment - 07/16/23 1207          Discharge Assessment    Assessment Type Discharge Planning Assessment     Confirmed/corrected address, phone number and insurance Yes     Confirmed Demographics Correct on Facesheet     Source of Information family     Communicated JAMES with patient/caregiver Date not available/Unable to determine     Reason For Admission encephalopathy     People in Home child(fouzia), adult;child(fouzia), dependent;spouse     Do you expect to return to your current living situation? Yes     Do you have help at home or someone to help you manage your care at home? Yes     Who are your caregiver(s) and their phone number(s)?  SonSolo     Walking or Climbing Stairs ambulation difficulty, dependent     Dressing/Bathing bathing difficulty, dependent     Home Accessibility wheelchair accessible     Home Layout Able to live on 1st floor     Equipment Currently Used at Home walker, rolling;wheelchair;nebulizer     Readmission within 30 days? No     Patient currently being followed by outpatient case management? No     Do you currently have service(s) that help you manage your care at home? No     Do you take prescription medications? Yes     Do you have prescription coverage? Yes     Coverage [employer]     Do you have any problems affording any of your prescribed medications? No     Is the patient taking medications as prescribed? yes     Who is going to help you get home at discharge? Family     How do you get to doctors appointments? family or friend will provide     Are you on dialysis? No     Do you take coumadin? No     Discharge Plan A Home Health     DME Needed Upon Discharge  none     Discharge Plan discussed with: Adult children     Transition of Care Barriers None                   [facility] in the past; wishes to resume at d/c.

## 2023-07-16 NOTE — PT/OT/SLP EVAL
Physical Therapy Evaluation and Discharge Note    Patient Name:  Gene Rothman   MRN:  [id number]    Recommendations:     Discharge Recommendations: home  Discharge Equipment Recommendations: none   Barriers to discharge: None    Assessment:     Gene Rothman is a 77 y.o. male admitted with a medical diagnosis of Encephalopathy. .  At this time, patient is functioning at their prior level of function and does not require further acute PT services.     Recent Surgery: * No surgery found *      Plan:     During this hospitalization, patient does not require further acute PT services.  Please re-consult if situation changes.      Subjective     Chief Complaint: Pain LLE.  Patient/Family Comments/goals: Improve pain in LLE.   Pain/Comfort:  Pain Rating 1: 2/10  Location - Side 1: Left  Location 1: leg  Pain Addressed 1: Cessation of Activity, Distraction  Pain Rating Post-Intervention 1: 2/10    Patients cultural, spiritual, Nondenominational conflicts given the current situation: no    Living Environment:    Patient is a poor historian but states that he lives with his spouse in a single level home with one step to enter.   Prior to admission, patients level of function was bed bound since 12/2021. Patient reports his son with assist with bed baths.  Equipment used at home: rollator, walker, rolling, wheelchair, shower chair, hospital bed.  DME owned (not currently used): rolling walker, shower chair, wheelchair, and rollator .  Upon discharge, patient will have assistance from unknown.    Objective:     Communicated with RICKEY Rodrigez prior to session.  Patient found supine with peripheral IV, telemetry, oxygen upon PT entry to room.    General Precautions: Standard, fall    Orthopedic Precautions:N/A   Braces: N/A  Respiratory Status: Nasal cannula, flow 5 L/min    Exams:  Cognitive Exam:  Patient is oriented to Person and Place  RLE ROM: AAROM-PROM WFL  RLE Strength: Deficits: -2/5  LLE ROM: AAROM-PROM WFL  LLE Strength:  Deficits: -2/5    Functional Mobility:  Bed Mobility:     Rolling Left:  total assistance and of 3 persons  Rolling Right: total assistance and of 3 persons  Scooting: total assistance and of 3 persons to scoot up in bed    AM-PAC 6 CLICK MOBILITY  Total Score:7       Treatment and Education:    Patient found supine in bed upon PT arrival. PT provided education on role of PT and POC. Patient Tot A x3 with all bed mobility, which is patient's prior level of functioning. Patient will not require skilled therapy services at this time and is DC from PT services.     AM-PAC 6 CLICK MOBILITY  Total Score:7     Patient left supine with all lines intact, call button in reach, and RN present.    GOALS:   Multidisciplinary Problems       Physical Therapy Goals       Not on file                    History:     Past Medical History:   Diagnosis Date    Acute hypoxemic respiratory failure 9/17/2022    Arthritis     Atrial fibrillation 3/1/2023    CHF (congestive heart failure)     Chronic kidney disease     Coronary artery disease     Depression     Hypertension     Hypertensive heart disease with heart failure 9/16/2022    Hypothyroidism     Lymphedema of lower extremity     Nephrolithiasis     Obesity     Peripheral vascular disease     Pneumonia 9/17/2022    Recurrent cellulitis of lower leg     Sleep apnea     can't stand the CPAP , sleeps elevated in hospital bed or chair    Tobacco dependence     resolved       Past Surgical History:   Procedure Laterality Date    ADENOIDECTOMY  1961    BACK SURGERY  1975;  1976    x2 for disc; scar tissue removed    debridement of bilateral leg ulcers Bilateral 01/01/2017    Dr Hernandez    INNER EAR SURGERY Bilateral 1961    separate - pinnaplasty , new eardrms constructed    KIDNEY STONE SURGERY Left 1976 approx    open    TONSILLECTOMY  1961       Time Tracking:     PT Received On: 07/16/23  PT Start Time: 0820     PT Stop Time: 0843  PT Total Time (min): 23 min     Billable Minutes:  Evaluation 13 and Therapeutic Activity 10      07/16/2023

## 2023-07-16 NOTE — ASSESSMENT & PLAN NOTE
Refuses CPAP or BIPAP   Directly negatively impacting patient's overall health and increases his morbidity and mortality

## 2023-07-16 NOTE — PROGRESS NOTES
[facility] Medicine  Progress Note    Patient Name: Gene Rothman  MRN: [id number]  Patient Class: IP- Inpatient   Admission Date: 7/14/2023  Length of Stay: 2 days  Attending Physician: Donald Smyth MD  Primary Care Provider: Ami Zarate DO        Subjective:     Principal Problem:Encephalopathy, metabolic        HPI:  Information obtained from chart review and discussion with providers as patient is a poor historian.     77-year-old bed bound male with a known past medical history of COPD, DENISHA (noncompliant with CPAP), chronic hypercapnic and hypoxic respiratory failure on 4-5L NC or face mask oxygen at home, lymphedema, hypothyroidism, A-fib on eliquis, HFpEF with pulm HTN, mod to severe RV systolic function, venous insufficiency, and super morbid obesity who presented 7/14/2023 with confusion. Upon arrival to ED, was placed on BiPAP 10/5. ED work up significant for elevated BNP and UA consistent with UTI. ABG compensated with CO2 77. CXR WNL. He was treated with bronchodilators, steroids, and lasix. Critical care consulted for admission with NIPPV in use.     Upon critical care team's initial assessment, NIPPV removed and placed on NC. Admitted overnight for close neurologic and respiratory monitoring. Deemed appropriate for step down 7/15/2023 morning.     Upon exam this morning, patient AAOx3. He is very restless and impulsive. He denies any subjective complaints aside from discomfort in the bed, wanting the head of his bed lowered. Hemodynamics acceptable.       Overview/Hospital Course:  77-year-old bed bound male with COPD, DENISHA (refuses CPAP/Bipap), chronic hypercapnic and hypoxic respiratory failure on 4-5L NC or face mask oxygen at home, Chronic lymphedema, hypothyroidism, A-fib on eliquis, HFpEF with pulm HTN, mod to severe RV systolic function, venous insufficiency, and severe morbid obesity admitted to ICU for Acute metabolic encephalopathy, Acute on chronic respiratory on Bipap,  and UTI-on IV abx. Suspect COPD exacerbation/baseline hypercarbia/and potentially UTI contributing to AMS. He was placed on IV Steroids and IV lasix. Pt slowly improved- transferred to floor. Pt now refuses BIPAP/CPAP. He is currently on IV Lasix and po prednisone as well as IV Rocephin and Zyvox. Respiratory status relatively stable.   Recent VRE cultured UTI-await ID and sensitivities on urine culture   Noted to be bradycardic with episodes dropping in 30s when sleeping-likely 2/2 sleep apnea but will consult Cardiology for bradycardia.        Interval history: Currently on 5 liters NC. Pt feels much improved. Denies SOB. AMS nearly resolved. + bradycardia while sleeping-Cardiology consulted. Urine culture pending. Hx VRE    Review of Systems   Constitutional:  Negative for chills and fever.   HENT:  Negative for congestion and sore throat.    Eyes:  Negative for photophobia and visual disturbance.   Respiratory:  Negative for shortness of breath.    Cardiovascular:  Positive for leg swelling. Negative for chest pain.   Gastrointestinal:  Negative for nausea and vomiting.   Genitourinary:  Negative for difficulty urinating and dysuria.   Musculoskeletal:  Negative for arthralgias and back pain.   Neurological:  Negative for dizziness and headaches.   Psychiatric/Behavioral:  Positive for confusion (spouse reports much improved but still having intermittent hallucinations). Negative for sleep disturbance. The patient is not nervous/anxious.    Objective:     Vital Signs (Most Recent):  Temp: 97.7 °F (36.5 °C) (07/16/23 1233)  Pulse: (!) 52 (07/16/23 1300)  Resp: 16 (07/16/23 0740)  BP: (!) 148/65 (07/16/23 1233)  SpO2: (!) 94 % (07/16/23 1233) Vital Signs (24h Range):  Temp:  [96 °F (35.6 °C)-98.7 °F (37.1 °C)] 97.7 °F (36.5 °C)  Pulse:  [50-63] 52  Resp:  [16-20] 16  SpO2:  [90 %-96 %] 94 %  BP: (107-148)/(56-65) 148/65     Weight: (!) 176.3 kg (388 lb 10.7 oz)  Body mass index is 64.68 kg/m².     Physical  Exam  Vitals reviewed.   Constitutional:       General: He is awake. He is not in acute distress.     Comments: Morbidly obese, unkempt, chronically ill appearing male   HENT:      Head: Normocephalic and atraumatic.      Mouth/Throat:      Mouth: Mucous membranes are moist.      Pharynx: Oropharynx is clear.   Eyes:      Extraocular Movements: Extraocular movements intact.      Conjunctiva/sclera: Conjunctivae normal.   Cardiovascular:      Rate and Rhythm: Bradycardia present. Rhythm irregular.   Pulmonary:      Effort: Pulmonary effort is normal.      Breath sounds: No wheezing or rhonchi.      Comments: Diminished in bases.   Abdominal:      General: Bowel sounds are normal.      Palpations: Abdomen is soft.   Musculoskeletal:         General: No deformity.      Cervical back: Normal range of motion and neck supple.      Right lower leg: Edema present.      Left lower leg: Edema present.      Comments: Vascular changes to BLE    Skin:     General: Skin is warm and dry.   Neurological:      General: No focal deficit present.      Mental Status: He is alert and oriented to person, place, and time.   Psychiatric:         Behavior: Behavior is cooperative.      Comments: Impulsive, poor insight        Significant Labs: All pertinent labs within the past 24 hours have been reviewed.    Significant Imaging: I have reviewed all pertinent imaging results/findings within the past 24 hours.      Assessment/Plan:      * Encephalopathy, metabolic  Likely relate to UTI +/- reported hypoxia (was not wearing oxygen upon EMS arrival and SPO2 low 80s)  CT head negative   Treating reversible causes  Safety and fall precautions in place       Chronic obstructive pulmonary disease with acute exacerbation  Pulmonary exam this morning stable - no wheezing, tachypnea or labored breathing  Suspect volume contributed to symptoms yesterday   Budesonide and arformoterol nebs while inpatient   Change duo-nebs to prn  Change steroids to PO  in am- 5 day course     Acute cystitis with hematuria  Hx of VRE per culture review   Urine culture pending   Rocephin and Zyvox, de-escalate as able       Atrial fibrillation  Patient with Long standing persistent (>12 months) atrial fibrillation which is controlled currently with out medication. Patient is currently in atrial fibrillation.[id number] Score: 4. HASBLED Score: 2 . Anticoagulation indicated. Anticoagulation done with Eliquis.    With slow ventricular response -cardiology consulted     Acute on chronic respiratory failure with hypoxia and hypercapnia  Patient with Hypercapnic and Hypoxic Respiratory failure which is acute on  Chronic.  he is on home oxygen at 4-5 LPM. Supplemental oxygen was provided and noted- Oxygen Concentration (%):  [35] 35  Signs/symptoms of respiratory failure include- tachypnea and increased work of breathing. Contributing diagnoses includes - CHF and COPD Labs and images were reviewed. Patient Has recent ABG, which has been reviewed.  Will treat underlying causes and adjust management of respiratory failure as follows-   Cont supplemental oxygen to keep SPO2 88-92%   ABG reviewed- compensated with HCO 40 and CO2 79  Family adjust oxygen variably at home and he mostly wears a face mask for comfort   CXR normal      Hypertensive heart disease with heart failure  Multi-factorial with severe pulmonary disease contributing as well   Echo 1/2023 w/ EF 55%, grade I DD, pulm HTN, and mod-severe RV dysfunction   Appears to be volume overloaded   Lasix 60 mg BID   Strict I/Os   Daily weights         Sleep apnea  Refuses CPAP or BIPAP   Directly negatively impacting patient's overall health and increases his morbidity and mortality       Morbid obesity with BMI of 60.0-69.9, adult  Body mass index is 64.68 kg/m². Morbid obesity complicates all aspects of disease management from diagnostic modalities to treatment.    Increases morbidity and mortality       Hypothyroidism  (acquired)  synthroid        VTE Risk Mitigation (From admission, onward)         Ordered     apixaban tablet 5 mg  2 times daily         07/14/23 7335                Discharge Planning   JAMES:      Code Status: Full Code   Is the patient medically ready for discharge?:     Reason for patient still in hospital (select all that apply): Patient trending condition  Discharge Plan A: Home Health                  Sheila Bauer NP  Department of Hospital Medicine   '[facility]

## 2023-07-16 NOTE — CONSULTS
[facility]  Cardiology  Consult Note    Patient Name: Gene Rothman  MRN: [id number]  Admission Date: 7/14/2023  Hospital Length of Stay: 2 days  Code Status: Full Code   Attending Provider: Donald Smyth MD   Consulting Provider: Nabeel Shook MD  Primary Care Physician: Ami Zarate DO  Principal Problem:Encephalopathy, metabolic    Patient information was obtained from spouse/SO and ER records.     Inpatient consult to Cardiology  Consult performed by: Nabeel Shook MD  Consult ordered by: Sheila Bauer NP  Reason for consult: bradycardia         Subjective:        HPI:   77-year-old male patient with past medical history of being bed-bound, AFib on Eliquis, CHF, CKD, CAD, altered mental status, hallucinations.  COPD,  HTN, hypothyroidism, peripheral vascular disease, and obesity hypoventilation on oxygen at home who was admitted for hypoxia, altered mental status, hallucinations.  Was placed on BiPAP upon admission.  Initially admitted to the ICU then transferred to the floor.  Patient reported to have episodes of bradycardia in atrial fibrillation.  HR 50s.  Not taking a beta-blocker for calcium channel blocker at home.  Patient denies dizziness.  Takes Eliquis at home, no bleeding episodes.    Echo 1/22 EF 55%, mild-to-moderate TR  EKG rate controlled afib         Past Medical History:   Diagnosis Date    Acute hypoxemic respiratory failure 9/17/2022    Arthritis     Atrial fibrillation 3/1/2023    CHF (congestive heart failure)     Chronic kidney disease     Coronary artery disease     Depression     Hypertension     Hypertensive heart disease with heart failure 9/16/2022    Hypothyroidism     Lymphedema of lower extremity     Nephrolithiasis     Obesity     Peripheral vascular disease     Pneumonia 9/17/2022    Recurrent cellulitis of lower leg     Sleep apnea     can't stand the CPAP , sleeps elevated in hospital bed or chair    Tobacco dependence     resolved        Past Surgical History:   Procedure Laterality Date    ADENOIDECTOMY  1961    BACK SURGERY  1975;  1976    x2 for disc; scar tissue removed    debridement of bilateral leg ulcers Bilateral 01/01/2017    Dr Hernandez    INNER EAR SURGERY Bilateral 1961    separate - pinnaplasty , new eardrms constructed    KIDNEY STONE SURGERY Left 1976 approx    open    TONSILLECTOMY  1961       Review of patient's allergies indicates:   Allergen Reactions    Sulfamethoxazole-trimethoprim Itching and Shortness Of Breath       No current facility-administered medications on file prior to encounter.     Current Outpatient Medications on File Prior to Encounter   Medication Sig    acetaminophen (TYLENOL) 500 MG tablet Take 500 mg by mouth every 6 (six) hours as needed for Pain.    apixaban (ELIQUIS) 5 mg Tab Take 1 tablet (5 mg total) by mouth 2 (two) times daily.    aspirin 81 MG Chew Take 1 tablet (81 mg total) by mouth once daily.    atorvastatin (LIPITOR) 40 MG tablet Take 1 tablet (40 mg total) by mouth once daily.    furosemide (LASIX) 40 MG tablet Take 1 tablet (40 mg total) by mouth once daily.    levalbuterol (XOPENEX) 1.25 mg/3 mL nebulizer solution Take 3 mLs (1.25 mg total) by nebulization every 4 (four) hours as needed for Wheezing. Rescue    levothyroxine (SYNTHROID) 50 MCG tablet Take 1 tablet (50 mcg total) by mouth before breakfast.    miconazole nitrate 2% (MICOTIN) 2 % Oint Apply topically 2 (two) times daily. Intertrigo to lower abdomen, groin, periarea: please apply Antifungal bid (Patient not taking: Reported on 3/14/2023)    nebulizer and compressor (HOME NEBULIZER PLUS SIDESTREAM) Diana use as directed    polyethylene glycol (GLYCOLAX) 17 gram PwPk Take 17 g by mouth 2 (two) times daily. (Patient not taking: Reported on 2/20/2023)    senna-docusate 8.6-50 mg (PERICOLACE) 8.6-50 mg per tablet Take 1 tablet by mouth once daily.    tiotropium (SPIRIVA) 18 mcg inhalation capsule Inhale 1 capsule  (18 mcg total) into the lungs once daily. Controller     Family History       Problem Relation (Age of Onset)    Alcohol abuse Maternal Grandfather    Alzheimer's disease Brother    Asthma Daughter    Cancer Mother, Father, Brother    Diabetes Mother    Heart disease Father, Brother    Hypertension Mother          Tobacco Use    Smoking status: Former     Packs/day: 1.50     Years: 1.00     Pack years: 1.50     Types: Cigarettes     Quit date:      Years since quittin.5    Smokeless tobacco: Never   Substance and Sexual Activity    Alcohol use: Not Currently    Drug use: Never    Sexual activity: Never     Review of Systems   Constitutional: Negative for diaphoresis, malaise/fatigue, weight gain and weight loss.   HENT:  Negative for congestion and nosebleeds.    Cardiovascular:  Negative for chest pain, claudication, cyanosis, dyspnea on exertion, irregular heartbeat, leg swelling, near-syncope, orthopnea, palpitations, paroxysmal nocturnal dyspnea and syncope.   Respiratory:  Negative for cough, hemoptysis, shortness of breath, sleep disturbances due to breathing, snoring, sputum production and wheezing.    Hematologic/Lymphatic: Negative for bleeding problem. Does not bruise/bleed easily.   Skin:  Negative for rash.   Musculoskeletal:  Negative for arthritis, back pain, falls, joint pain, muscle cramps and muscle weakness.   Gastrointestinal:  Negative for abdominal pain, constipation, diarrhea, heartburn, hematemesis, hematochezia, melena, nausea and vomiting.   Genitourinary:  Negative for dysuria, hematuria and nocturia.   Neurological:  Negative for excessive daytime sleepiness, dizziness, headaches, light-headedness, loss of balance, numbness, vertigo and weakness.   Objective:     Vital Signs (Most Recent):  Temp: 97.9 °F (36.6 °C) (23)  Pulse: (!) 50 (23)  Resp: 16 (23)  BP: 132/62 (23)  SpO2: 98 % (23) Vital Signs (24h Range):  Temp:   [96 °F (35.6 °C)-98.7 °F (37.1 °C)] 97.9 °F (36.6 °C)  Pulse:  [50-63] 50  Resp:  [16-20] 16  SpO2:  [90 %-98 %] 98 %  BP: (107-148)/(56-65) 132/62     Weight: (!) 176.3 kg (388 lb 10.7 oz)  Body mass index is 64.68 kg/m².    SpO2: 98 %         Intake/Output Summary (Last 24 hours) at 7/16/2023 1618  Last data filed at 7/16/2023 1235  Gross per 24 hour   Intake 672.33 ml   Output 2300 ml   Net -1627.67 ml       Lines/Drains/Airways       Drain  Duration             Male External Urinary Catheter 07/15/23 0205 Other (Comment) 1 day              Peripheral Intravenous Line  Duration                  Peripheral IV - Single Lumen 07/16/23 1130 22 G Anterior;Distal;Left Upper Arm <1 day                     Physical Exam  Vitals and nursing note reviewed.   Constitutional:       Appearance: He is well-developed. He is obese. He is ill-appearing.   HENT:      Head: Normocephalic.      Mouth/Throat:      Mouth: Mucous membranes are moist.   Neck:      Vascular: No carotid bruit or JVD.   Cardiovascular:      Rate and Rhythm: Normal rate. Rhythm irregular.      Pulses: Normal pulses.      Heart sounds: Normal heart sounds. No murmur heard.    No friction rub.   Pulmonary:      Effort: Pulmonary effort is normal. No respiratory distress.      Breath sounds: Normal breath sounds. No wheezing or rales.   Abdominal:      General: Bowel sounds are normal. There is no distension.      Palpations: Abdomen is soft.      Tenderness: There is no abdominal tenderness. There is no guarding.   Musculoskeletal:         General: Swelling present. No tenderness.      Cervical back: Neck supple. No tenderness.      Right lower leg: Edema present.      Left lower leg: Edema present.   Skin:     General: Skin is warm and dry.      Capillary Refill: Capillary refill takes less than 2 seconds.      Findings: No rash.   Neurological:      General: No focal deficit present.      Mental Status: He is alert.   Psychiatric:         Mood and Affect:  Mood normal.         Behavior: Behavior normal.         Thought Content: Thought content normal.        Significant Labs: BMP:   Recent Labs   Lab 07/14/23 1825 07/16/23  0824    134*    139   K 4.4 3.4*   CL 92* 89*   CO2 40* 40*   BUN 18 32*   CREATININE 1.0 1.1   CALCIUM 9.5 8.6*   , CMP   Recent Labs   Lab 07/14/23 1825 07/16/23  0824    139   K 4.4 3.4*   CL 92* 89*   CO2 40* 40*    134*   BUN 18 32*   CREATININE 1.0 1.1   CALCIUM 9.5 8.6*   PROT 8.4  --    ALBUMIN 2.8*  --    BILITOT 0.6  --    ALKPHOS 85  --    AST 22  --    ALT 13  --    ANIONGAP 8 10   , CBC   Recent Labs   Lab 07/14/23 1825 07/16/23  0824   WBC 6.26 6.73   HGB 10.2* 9.6*   HCT 35.6* 30.3*    196   , INR No results for input(s): INR, PROTIME in the last 48 hours., and Troponin   Recent Labs   Lab 07/14/23 1825   TROPONINI <0.006       Significant Imaging: Echocardiogram: 2D echo with color flow doppler: No results found for this or any previous visit. and Transthoracic echo (TTE) complete (Cupid Only):   Results for orders placed or performed during the hospital encounter of 01/02/22   Echo   Result Value Ref Range    BSA 3.05 m2    TDI SEPTAL 0.05 m/s    LV LATERAL E/E' RATIO 5.78 m/s    LV SEPTAL E/E' RATIO 10.40 m/s    LA WIDTH 3.27 cm    IVC diameter 1.14 cm    Left Ventricular Outflow Tract Mean Velocity 0.30200911857285 cm/s    Left Ventricular Outflow Tract Mean Gradient 1.46 mmHg    TDI LATERAL 0.09 m/s    LVIDd 3.84 3.5 - 6.0 cm    IVS 1.56 (A) 0.6 - 1.1 cm    Posterior Wall 1.88 (A) 0.6 - 1.1 cm    Ao root annulus 3.63 cm    LVIDs 2.90 2.1 - 4.0 cm    FS 24 28 - 44 %    LA volume 25.66 cm3    Sinus 3.14 cm    STJ 2.64 cm    Ascending aorta 2.98 cm    LV mass 274.50 g    LA size 2.29 cm    RVDD 5.32 cm    TAPSE 1.44 cm    Left Ventricle Relative Wall Thickness 0.98 cm    AV mean gradient 3 mmHg    AV valve area 2.65 cm2    AV Velocity Ratio 0.74     AV index (prosthetic) 0.70     MV valve area p  1/2 method 3.17 cm2    E/A ratio 0.60     Mean e' 0.07 m/s    E wave deceleration time 239.375893138873733 msec    IVRT 99.285105457014694 msec    LVOT diameter 2.20 cm    LVOT area 3.8 cm2    LVOT peak rodolfo 0.77 m/s    LVOT peak VTI 12.70 cm    Ao peak rodolfo 1.04 m/s    Ao VTI 18.2 cm    RVOT peak rodolfo 0.66 m/s    RVOT peak VTI 13.4 cm    LVOT stroke volume 48.25 cm3    AV peak gradient 4 mmHg    PV mean gradient 0.78 mmHg    E/E' ratio 7.43 m/s    MV Peak E Rodolfo 0.52 m/s    TR Max Rodolfo 3.18 m/s    MV stenosis pressure 1/2 time 69.787486023354214 ms    MV Peak A Rodolfo 0.87 m/s    LV Systolic Volume 32.09 mL    LV Systolic Volume Index 11.1 mL/m2    LV Diastolic Volume 63.39 mL    LV Diastolic Volume Index 21.93 mL/m2    LA Volume Index 8.9 mL/m2    LV Mass Index 95 g/m2    Echo EF Estimated 49 %    RA Major Axis 4.13 cm    Left Atrium Minor Axis 3.25 cm    Left Atrium Major Axis 5.31 cm    Triscuspid Valve Regurgitation Peak Gradient 40 mmHg    RA Width 2.81 cm    Right Atrial Pressure (from IVC) 3 mmHg    EF 55 %    TV rest pulmonary artery pressure 43 mmHg    Narrative    · The left ventricle is normal in size with concentric remodeling and   normal systolic function.  · The estimated ejection fraction is 55%.  · Grade I left ventricular diastolic dysfunction.  · Severe right ventricular enlargement with moderately to severely reduced   right ventricular systolic function.  · Right atrial enlargement.  · Mild to moderate tricuspid regurgitation.  · Normal central venous pressure (3 mmHg).  · The estimated PA systolic pressure is 43 mmHg.  · There is pulmonary hypertension.        Assessment and Plan:     * Encephalopathy, metabolic  Intermittent episodes     Atrial fibrillation  Currently in in atrial fibrillation  HR 50s, patient asymptomatic  Avoid AV robert blocking agents   Continue eliquis   Obtain echo      Acute on chronic respiratory failure with hypoxia and hypercapnia  Being followed by pulmonary      Hypertensive heart disease with heart failure  Does not follow up with cardiology  Bed bound  Continue diuresis  Obtain echo      Sleep apnea  Mgmt per hospital medicine    Hypothyroidism (acquired)  Continue synthroid        VTE Risk Mitigation (From admission, onward)         Ordered     apixaban tablet 5 mg  2 times daily         07/14/23 4808                Thank you for your consult. I will sign off. Please contact us if you have any additional questions.    Nabeel Shook MD  Cardiology   [facility]

## 2023-07-16 NOTE — ASSESSMENT & PLAN NOTE
Currently in in atrial fibrillation  HR 50s, patient asymptomatic  Avoid AV robert blocking agents   Continue eliquis   Obtain echo

## 2023-07-16 NOTE — CONSULTS
[facility]  Pulmonology Consult Note    Patient Name: Gene Rothman   MRN: [id number]  Admission Date: 7/14/2023   Hospital Length of Stay: 2 days  Code Status: Full Code    Attending Physician: Donald Smyth MD    Principal Problem: Encephalopathy  Subjective:   History of Present Illness:  Gene Rothman is a 77-year-old male patient with past medical history of chronic diastolic heart failure, chronic lymphedema, chronic kidney disease, coronary artery disease, hypertension, hypothyroidism, peripheral vascular disease, chronic obstructive pulmonary disease, and DENISHA/obesity hypoventilation on oxygen at home, prior intermediate V/Q scan, LLE DVT, and recurrent UTI. Patient has had multiple hospital admissions in the past year for sepsis due to UTI and acute on chronic hypercapnic respiratory failure. Patient presented to the emergency room on 7/14/2023 with hypoxia, confusion and reported hallucinations. He was initially placed on NIPPV 10/5 upon arrival per ED staff. Initial ABG notable for a pH of 7.32, pCO2 61.5, paO2 63, and HCO3 47.8. He was treated with DuoNeb, Solu-Medrol, and IV Lasix with repeat ABG noting a pH of 7.40, pCO2 77.5, paO2 65, and HCO3 48.3. Critical care team was called for ICU admission. At that time, he was removed from NIPPV during exam and remained at bedside >30 min to assess his response. The patient remained awake and alert and was able to have extensive conversation despite confusion to situation and recent events. He was oriented to self, time, and place. He was admitted to ICU for neuro/respiratory monitoring and transitioned from ICU on 7/15/2023. Pulmonary has been consulted for evaluation of hypercapnic respiratory failure.     Interval history:  Patient remains at his baseline. Family at bedside and updated on plan of care. No acute events overnight. Supplemental oxygen weaned to 5l/NC.     Past Medical History:   Diagnosis Date    Acute  hypoxemic respiratory failure 2022    Arthritis     Atrial fibrillation 3/1/2023    CHF (congestive heart failure)     Chronic kidney disease     Coronary artery disease     Depression     Hypertension     Hypertensive heart disease with heart failure 2022    Hypothyroidism     Lymphedema of lower extremity     Nephrolithiasis     Obesity     Peripheral vascular disease     Pneumonia 2022    Recurrent cellulitis of lower leg     Sleep apnea     can't stand the CPAP , sleeps elevated in hospital bed or chair    Tobacco dependence     resolved     Past Surgical History:   Procedure Laterality Date    ADENOIDECTOMY      BACK SURGERY  ;  1976    x2 for disc; scar tissue removed    debridement of bilateral leg ulcers Bilateral 2017    Dr Hernandez    INNER EAR SURGERY Bilateral     separate - pinnaplasty , new eardrms constructed    KIDNEY STONE SURGERY Left 1976 approx    open    TONSILLECTOMY       Review of patient's allergies indicates:   Allergen Reactions    Sulfamethoxazole-trimethoprim Itching and Shortness Of Breath     Family History       Problem Relation (Age of Onset)    Alcohol abuse Maternal Grandfather    Alzheimer's disease Brother    Asthma Daughter    Cancer Mother, Father, Brother    Diabetes Mother    Heart disease Father, Brother    Hypertension Mother          Tobacco Use    Smoking status: Former     Packs/day: 1.50     Years: 1.00     Pack years: 1.50     Types: Cigarettes     Quit date:      Years since quittin.5    Smokeless tobacco: Never   Substance and Sexual Activity    Alcohol use: Not Currently    Drug use: Never    Sexual activity: Never       Review of Systems: Negative except as indicated in HPI  Objective:     Vital Signs (Most Recent):  Temp: 96 °F (35.6 °C) (23)  Pulse: (!) 58 (23 0947)  Resp: 16 (23 0740)  BP: (!) 107/59 (23 0715)  SpO2: (!) 94 % (23 0740) Vital Signs (24h Range):  Temp:  [96 °F (35.6  °C)-98.7 °F (37.1 °C)] 96 °F (35.6 °C)  Pulse:  [53-63] 58  Resp:  [16-20] 16  SpO2:  [90 %-96 %] 94 %  BP: (107-133)/(56-62) 107/59   Weight: (!) 176.3 kg (388 lb 10.7 oz);  Body mass index is 64.68 kg/m².    Intake/Output Summary (Last 24 hours) at 7/16/2023 1141  Last data filed at 7/16/2023 0112  Gross per 24 hour   Intake --   Output 1600 ml   Net -1600 ml      Physical Exam  Vitals and nursing note reviewed.   Constitutional:       General: He is not in acute distress.     Appearance: He is obese. He is ill-appearing.   HENT:      Head: Normocephalic.   Eyes:      Conjunctiva/sclera: Conjunctivae normal.   Cardiovascular:      Rate and Rhythm: Normal rate.   Pulmonary:      Breath sounds: No wheezing.      Comments: Diminished throughout  Abdominal:      Palpations: Abdomen is soft.   Musculoskeletal:      Cervical back: Normal range of motion and neck supple.      Right lower leg: Edema present.      Left lower leg: Edema present.   Skin:     General: Skin is warm and dry.   Neurological:      Mental Status: Mental status is at baseline.   Psychiatric:         Mood and Affect: Mood normal.      Significant Labs:  CBC/Anemia Profile:  Recent Labs   Lab 07/14/23 1825 07/16/23  0824   WBC 6.26 6.73   HGB 10.2* 9.6*   HCT 35.6* 30.3*    196   * 99*   RDW 14.6* 14.7*   Chemistries:  Recent Labs   Lab 07/14/23  1825 07/16/23  0824    139   K 4.4 3.4*   CL 92* 89*   CO2 40* 40*   BUN 18 32*   CREATININE 1.0 1.1   CALCIUM 9.5 8.6*   ALBUMIN 2.8*  --    PROT 8.4  --    BILITOT 0.6  --    ALKPHOS 85  --    ALT 13  --    AST 22  --    ABG  Recent Labs   Lab 07/15/23  0458   PH 7.387   PO2 77*   PCO2 79.1*   HCO3 47.6*   BE 23     Assessment/Plan:     Spoke with spouse  Home bound last 2 years: has nebulizer, O 2 and hospital bed  No prior spirometry  State in office visits will be burdensome  Had NP @ home visits  Home vent may be a reasonable addition however not interested or adherent with  treatment modality  Other that may be consider given DENISHA / Obesity and COPD overlap would be chronic tracheostomy if aggreable and willing to manage    Pulmonary  Chronic obstructive pulmonary disease with acute exacerbation  No wheezing noted on exam; monitor for bronchospasm  IV steroids discontinued and transitioned to oral Prednisone  Continue Brovana and Pulmicort, prn DuoNebs    Chronic respiratory failure with hypoxia and hypercapnia  Patient with acute on chronic hypercapnic and hypoxic respiratory failure who is on chronic home oxygen at 5l/NC. Patient is currently on supplemental oxygen at 4L/NC with nocturnal BiPAP ordered. Signs/symptoms of respiratory failure included increased work of breathing and respiratory distress. Contributing diagnoses included chronic diastolic heart failure, COPD, and obesity hypoventilation. Patient has a recent ABGwhich has been reviewed.     Will treat underlying causes and adjust management of respiratory failure as follows:  Continue supplemental oxygen to maintain saturations 88% or greater  Continue scheduled Brovana and Pulmicort  Empiric Rocephin and Zyvox  Wean off Prednisone  Diurese as able  Patient has consistently refused BiPAP/CPAP and has multiple conversations with multiple providers but has failed to appreciate the gravity of his illness.   Follow chest imaging  Monitor for decompensated heart failure and diurese as needed    Endocrine  Morbid obesity with BMI of 60.0-69.9, adult  Body mass index is 64.68 kg/m². Morbid obesity complicates all aspects of disease management from diagnostic modalities to treatment and in this situation is contributing to severity of disease. Education attempted to no evidence of understanding    Sleep apnea  Patient with a history of DENISHA/OHS with COPD overlap    Declines home CPAP/BiPAP    Thank you for your consult. I will follow-up with patient. Please contact us if you have any additional questions.     Juan Antonio Hernandez,  MD    Pulmonology  [facility]

## 2023-07-16 NOTE — SUBJECTIVE & OBJECTIVE
Interval history: Currently on 5 liters NC. Pt feels much improved. Denies SOB. AMS nearly resolved. + bradycardia while sleeping-Cardiology consulted. Urine culture pending. Hx VRE    Review of Systems   Constitutional:  Negative for chills and fever.   HENT:  Negative for congestion and sore throat.    Eyes:  Negative for photophobia and visual disturbance.   Respiratory:  Negative for shortness of breath.    Cardiovascular:  Positive for leg swelling. Negative for chest pain.   Gastrointestinal:  Negative for nausea and vomiting.   Genitourinary:  Negative for difficulty urinating and dysuria.   Musculoskeletal:  Negative for arthralgias and back pain.   Neurological:  Negative for dizziness and headaches.   Psychiatric/Behavioral:  Positive for confusion (spouse reports much improved but still having intermittent hallucinations). Negative for sleep disturbance. The patient is not nervous/anxious.    Objective:     Vital Signs (Most Recent):  Temp: 97.7 °F (36.5 °C) (07/16/23 1233)  Pulse: (!) 52 (07/16/23 1300)  Resp: 16 (07/16/23 0740)  BP: (!) 148/65 (07/16/23 1233)  SpO2: (!) 94 % (07/16/23 1233) Vital Signs (24h Range):  Temp:  [96 °F (35.6 °C)-98.7 °F (37.1 °C)] 97.7 °F (36.5 °C)  Pulse:  [50-63] 52  Resp:  [16-20] 16  SpO2:  [90 %-96 %] 94 %  BP: (107-148)/(56-65) 148/65     Weight: (!) 176.3 kg (388 lb 10.7 oz)  Body mass index is 64.68 kg/m².     Physical Exam  Vitals reviewed.   Constitutional:       General: He is awake. He is not in acute distress.     Comments: Morbidly obese, unkempt, chronically ill appearing male   HENT:      Head: Normocephalic and atraumatic.      Mouth/Throat:      Mouth: Mucous membranes are moist.      Pharynx: Oropharynx is clear.   Eyes:      Extraocular Movements: Extraocular movements intact.      Conjunctiva/sclera: Conjunctivae normal.   Cardiovascular:      Rate and Rhythm: Bradycardia present. Rhythm irregular.   Pulmonary:      Effort: Pulmonary effort is normal.       Breath sounds: No wheezing or rhonchi.      Comments: Diminished in bases.   Abdominal:      General: Bowel sounds are normal.      Palpations: Abdomen is soft.   Musculoskeletal:         General: No deformity.      Cervical back: Normal range of motion and neck supple.      Right lower leg: Edema present.      Left lower leg: Edema present.      Comments: Vascular changes to BLE    Skin:     General: Skin is warm and dry.   Neurological:      General: No focal deficit present.      Mental Status: He is alert and oriented to person, place, and time.   Psychiatric:         Behavior: Behavior is cooperative.      Comments: Impulsive, poor insight        Significant Labs: All pertinent labs within the past 24 hours have been reviewed.    Significant Imaging: I have reviewed all pertinent imaging results/findings within the past 24 hours.

## 2023-07-16 NOTE — PLAN OF CARE
Discussed poc with pt, pt verbalized understanding    Purposeful rounding every 2hours      Cardiac monitoring in use, pt is afib/bradycardic tele monitor # [id number]    Fall precautions in place, remains injury free  Pt denies c/o pain and nausea       Accurate I&Os  Abx given as prescribed  Bed locked at lowest position  Call light within reach    Chart check complete  Will cont with POC     Still on 5l nc  Cards consulted for bradycardia today  Pt still refusing bipap/cpap

## 2023-07-16 NOTE — ASSESSMENT & PLAN NOTE
Patient with Hypercapnic and Hypoxic Respiratory failure which is acute on  Chronic.  he is on home oxygen at 4-5 LPM. Supplemental oxygen was provided and noted- Oxygen Concentration (%):  [35] 35  Signs/symptoms of respiratory failure include- tachypnea and increased work of breathing. Contributing diagnoses includes - CHF and COPD Labs and images were reviewed. Patient Has recent ABG, which has been reviewed.  Will treat underlying causes and adjust management of respiratory failure as follows-   Cont supplemental oxygen to keep SPO2 88-92%   ABG reviewed- compensated with HCO 40 and CO2 79  Family adjust oxygen variably at home and he mostly wears a face mask for comfort   CXR normal

## 2023-07-16 NOTE — SUBJECTIVE & OBJECTIVE
Past Medical History:   Diagnosis Date    Acute hypoxemic respiratory failure 2022    Arthritis     Atrial fibrillation 3/1/2023    CHF (congestive heart failure)     Chronic kidney disease     Coronary artery disease     Depression     Hypertension     Hypertensive heart disease with heart failure 2022    Hypothyroidism     Lymphedema of lower extremity     Nephrolithiasis     Obesity     Peripheral vascular disease     Pneumonia 2022    Recurrent cellulitis of lower leg     Sleep apnea     can't stand the CPAP , sleeps elevated in hospital bed or chair    Tobacco dependence     resolved       Past Surgical History:   Procedure Laterality Date    ADENOIDECTOMY      BACK SURGERY  ;  1976    x2 for disc; scar tissue removed    debridement of bilateral leg ulcers Bilateral 2017    Dr Hernandez    INNER EAR SURGERY Bilateral     separate - pinnaplasty , new eardrms constructed    KIDNEY STONE SURGERY Left  approx    open    TONSILLECTOMY         Review of patient's allergies indicates:   Allergen Reactions    Sulfamethoxazole-trimethoprim Itching and Shortness Of Breath       Family History       Problem Relation (Age of Onset)    Alcohol abuse Maternal Grandfather    Alzheimer's disease Brother    Asthma Daughter    Cancer Mother, Father, Brother    Diabetes Mother    Heart disease Father, Brother    Hypertension Mother          Tobacco Use    Smoking status: Former     Packs/day: 1.50     Years: 1.00     Pack years: 1.50     Types: Cigarettes     Quit date:      Years since quittin.5    Smokeless tobacco: Never   Substance and Sexual Activity    Alcohol use: Not Currently    Drug use: Never    Sexual activity: Never         Review of Systems  Objective:     Vital Signs (Most Recent):  Temp: 96 °F (35.6 °C) (23 0715)  Pulse: (!) 50 (23 1100)  Resp: 16 (23 0740)  BP: (!) 107/59 (23 0715)  SpO2: (!) 94 % (23 0740) Vital Signs (24h Range):  Temp:   [96 °F (35.6 °C)-98.7 °F (37.1 °C)] 96 °F (35.6 °C)  Pulse:  [50-63] 50  Resp:  [16-20] 16  SpO2:  [90 %-96 %] 94 %  BP: (107-133)/(56-62) 107/59     Weight: (!) 176.3 kg (388 lb 10.7 oz)  Body mass index is 64.68 kg/m².      Intake/Output Summary (Last 24 hours) at 7/16/2023 1207  Last data filed at 7/16/2023 0112  Gross per 24 hour   Intake --   Output 1600 ml   Net -1600 ml        Physical Exam     Vents:  Oxygen Concentration (%): 35 (07/1943)    Lines/Drains/Airways       Drain  Duration             Male External Urinary Catheter 07/15/23 0205 Other (Comment) 1 day              Peripheral Intravenous Line  Duration                  Peripheral IV - Single Lumen 07/16/23 1130 22 G Anterior;Distal;Left Upper Arm <1 day                    Significant Labs:    CBC/Anemia Profile:  Recent Labs   Lab 07/14/23 1825 07/16/23  0824   WBC 6.26 6.73   HGB 10.2* 9.6*   HCT 35.6* 30.3*    196   * 99*   RDW 14.6* 14.7*        Chemistries:  Recent Labs   Lab 07/14/23 1825 07/16/23  0824    139   K 4.4 3.4*   CL 92* 89*   CO2 40* 40*   BUN 18 32*   CREATININE 1.0 1.1   CALCIUM 9.5 8.6*   ALBUMIN 2.8*  --    PROT 8.4  --    BILITOT 0.6  --    ALKPHOS 85  --    ALT 13  --    AST 22  --        ABGs:   Recent Labs   Lab 07/15/23  0458   PH 7.387   PCO2 79.1*   HCO3 47.6*   POCSATURATED 94*   BE 23     All pertinent labs within the past 24 hours have been reviewed.    Significant Imaging:   I have reviewed all pertinent imaging results/findings within the past 24 hours.    CT Renal Stone Study Abd Pelvis WO  Narrative: EXAMINATION:  CT RENAL STONE STUDY ABD PELVIS WO, multiplanar reconstructions    CLINICAL HISTORY:  Flank pain, kidney stone suspected;    TECHNIQUE:  Axial images through the abdomen and pelvis were obtained without the use of IV contrast.  Sagittal and coronal reconstructions are provided for review.  Oral contrast was not utilized.    COMPARISON:  December 11, 2022    FINDINGS:  LUNG  BASES: Chronic or recurrent small effusions with adjacent compressive atelectasis/consolidation in the left greater than right lower lungs.  Stable calcified granuloma in the left lower lung with calcified inferior right hilar lymph nodes.  There is been interval development of a peripheral ground-glass nodule measuring 2.2 cm in the right lower lobe on image 10 of series 2.  There also some amorphous ground-glass opacities within the posterior right lower lobe and right upper lobe.  Lung bases are otherwise clear.  Negative for pericardial effusions. The distal esophagus is normal.  Coronary artery calcifications.    ABDOMEN: Again seen is a large staghorn calculus within the right renal collecting system with multiple other nonobstructing right renal stones.  Negative for left renal, bilateral ureteral or bladder stones.  Negative for hydronephrosis.    Multiple calcified granulomas are again seen in the spleen.  Multiple layering calcified gallstones in an otherwise normal appearing gallbladder. Multiple left renal cysts measuring up to 3.7 cm.  Mild fatty infiltration of the pancreas.  The liver, spleen and gallbladder otherwise appear normal.  The pancreas is otherwise normal.  The adrenal glands are normal.    Numerous fatty infiltrated retroperitoneal lymph nodes again seen.    Negative for ascites or inflammatory change noted within the abdomen or pelvis.  Vascular calcifications are present without aneurysmal changes.    There are mild inflammatory changes along the distal descending colon, which is mildly thick walled.  Similar changes were seen on the comparison study.  Moderate stool in the rectum.  The large and small bowel loops are otherwise normal.  I do not see a normal or an abnormal appendix.  Scattered colonic diverticula.  Negative for free air.    PELVIS: The urinary bladder is unremarkable.  Prostate calcifications again seen.  The rest of the male pelvic organs are normal. There are pelvic  phleboliths.    Fat filled left greater than right inguinal hernias.  There is a fat filled left spigelian hernia.  The abdominal wall is otherwise intact.    No significant osseous abnormality is identified.  Negative for significant spinal canal stenosis. Moderate to marked degenerative changes of the lower thoracic lumbar spine again seen.  Stable osteopenia.  Stable degenerative facet arthropathy.  Convex left curvature of the lumbar spine again seen.  Stable degenerative changes of the pelvis and hips.    Negative for groin adenopathy.  Impression: 1.  There are mild inflammatory changes along the descending colon, which is thick walled.  These changes were seen previously.  These are most likely chronic changes of a prior infectious or inflammatory process, however, recurrent uncomplicated colitis could have this appearance in the right clinical setting.  Clinical correlation is advised.    2.  Negative for acute process involving the abdomen or pelvis otherwise.  Negative for other inflammatory changes.  Negative for hydroureter or left hydronephrosis.  Negative for free air.    3. There are ground-glass opacities within the right upper lobe and right lower lobe, to include a 2.2 cm ground-glass nodule.  Infectious and inflammatory changes must be considered.  A follow-up chest CT scan in 6-8 weeks is recommended to re-evaluate.    4.  Increased stool in the rectum.  Fecal impaction?    5.  Redemonstration of multiple nonobstructing right renal stones to include a large staghorn calculus.    6.  Numerous stable findings as noted above.    All CT scans at this facility are performed  using dose modulation techniques as appropriate to performed exam including the following:  automated exposure control; adjustment of mA and/or kV according to the patients size (this includes techniques or standardized protocols for targeted exams where dose is matched to indication/reason for exam: i.e. extremities or head);   iterative reconstruction technique.    Electronically signed by: Jethro Lorenz MD  Date:    07/15/2023  Time:    12:20

## 2023-07-16 NOTE — PLAN OF CARE
Fall precautions maintained. IV antibiotics given as ordered. Pt encouraged to turn with max assist every 2 hours, pt refused. Pt occassionally bradycardic on heart monitor. Pt refuses nightly CPAP and remains on 12 L venturi mask, with no complaints of SOB with exertion. Pt oriented x4. Chart check complete.     Problem: Adult Inpatient Plan of Care  Goal: Plan of Care Review  Outcome: Ongoing, Progressing     Problem: Adult Inpatient Plan of Care  Goal: Absence of Hospital-Acquired Illness or Injury  Outcome: Ongoing, Progressing     Problem: Adult Inpatient Plan of Care  Goal: Optimal Comfort and Wellbeing  Outcome: Ongoing, Progressing     Problem: Skin Injury Risk Increased  Goal: Skin Health and Integrity  Outcome: Ongoing, Progressing

## 2023-07-16 NOTE — PT/OT/SLP EVAL
"Occupational Therapy   Evaluation and Discharge Note    Name: Gene Rothman  MRN: [id number]  Admitting Diagnosis: Encephalopathy  Recent Surgery: * No surgery found *      Recommendations:     Discharge Recommendations: home  Discharge Equipment Recommendations: none  Barriers to discharge:  None    Assessment:     Gene Rothman is a 77 y.o. male with a medical diagnosis of Encephalopathy. At this time, patient is functioning at their prior level of function and does not require further acute OT services. Has been bed-bound since December 2021. Does not sit in w/c or EOB.    Plan:     During this hospitalization, patient does not require further acute OT services.  Please re-consult if situation changes.    Plan of Care Reviewed with: patient    Subjective     Chief Complaint: Reported "I don't get out of the bed."  Patient/Family Comments/goals: none reported    Occupational Profile:  PLOF information obtained from patient as able. Noted to have confusion.  Living Environment: Patient resides in a [facility] with a threshold to enter.  Previous level of function: Patient is bed-bound at baseline. Requires total A for bed mobility and ADLs.  Roles and Routines: n/a  Equipment Used at home: wheelchair, hospital bed  Assistance upon Discharge: wife and ?son    Pain/Comfort:  Pain Rating 1: 2/10  Location - Side 1: Left  Location 1: leg  Pain Addressed 1:  (activity pacing)    Objective:     Communicated with: Charge nurse, Rain, prior to session.  Patient found supine with oxygen, peripheral IV, telemetry (LO mattress) upon OT entry to room.    General Precautions: Standard, fall  Orthopedic Precautions: N/A  Braces: N/A  Respiratory Status: Nasal cannula, flow 5 L/min     Occupational Performance:    Bed Mobility:    Patient completed Scooting/Bridging with total assistance and 2 persons  Unable to engage in supine scooting to HOB.  Total A of 2 for B rolling.    Functional Mobility/Transfers:  Not appropriate does not " complete at baseline    Activities of Daily Living:  Upper Body Dressing: total assistance .  Lower Body Dressing: total assistance .    Cognitive/Visual Perceptual:  Cognitive/Psychosocial Skills:     -       Oriented to: Person   -       Follows Commands/attention:Easily distracted and inconsistently follows 1 step commands.    Physical Exam:  Upper Extremity Range of Motion:     -       Right Upper Extremity: WFL  -       Left Upper Extremity: WFL  Upper Extremity Strength:    -       Right Upper Extremity: Deficits: grossly 4-/5  -       Left Upper Extremity: Deficits: grossly 3+/5   Strength:    -       Right Upper Extremity: Deficits: poor  -       Left Upper Extremity: Deficits: poor  Reports baseline L shdr pain limiting functional use and strength.    [facility] 6 Click ADL:  AMPAC Total Score: 7    Treatment & Education:  Patient encouraged to engage in bed mobility as able. Encouraged completion of B UE AROM to tolerance via HEP to maintain current functional strength.     D/C OT.     Patient left HOB elevated with all lines intact, call button in reach, and nurse present    History:     Past Medical History:   Diagnosis Date    Acute hypoxemic respiratory failure 9/17/2022    Arthritis     Atrial fibrillation 3/1/2023    CHF (congestive heart failure)     Chronic kidney disease     Coronary artery disease     Depression     Hypertension     Hypertensive heart disease with heart failure 9/16/2022    Hypothyroidism     Lymphedema of lower extremity     Nephrolithiasis     Obesity     Peripheral vascular disease     Pneumonia 9/17/2022    Recurrent cellulitis of lower leg     Sleep apnea     can't stand the CPAP , sleeps elevated in hospital bed or chair    Tobacco dependence     resolved         Past Surgical History:   Procedure Laterality Date    ADENOIDECTOMY  1961    BACK SURGERY  1975;  1976    x2 for disc; scar tissue removed    debridement of bilateral leg ulcers Bilateral 01/01/2017    Dr Hernandez     INNER EAR SURGERY Bilateral 1961    separate - pinnaplasty , new eardrms constructed    KIDNEY STONE SURGERY Left 1976 approx    open    TONSILLECTOMY  1961       Time Tracking:     OT Date of Treatment: 07/16/23  OT Start Time: 0840  OT Stop Time: 0855  OT Total Time (min): 15 min    Billable Minutes:Evaluation 15    7/16/2023

## 2023-07-16 NOTE — ASSESSMENT & PLAN NOTE
Patient with acute on chronic hypercapnic and hypoxic respiratory failure who is on chronic home oxygen at 5l/NC. Patient is currently on supplemental oxygen at 4L/NC with nocturnal BiPAP ordered. Signs/symptoms of respiratory failure included increased work of breathing and respiratory distress. Contributing diagnoses included chronic diastolic heart failure, COPD, and obesity hypoventilation. Patient has a recent ABGwhich has been reviewed.     Will treat underlying causes and adjust management of respiratory failure as follows:   Continue supplemental oxygen to maintain saturations 88% or greater   Continue scheduled Brovana and Pulmicort   Empiric Rocephin and Zyvox   Wean off Prednisone   Diurese as able   Patient has consistently refused BiPAP/CPAP and has multiple conversations with multiple providers but has failed to appreciate the gravity of his illness.    Follow chest imaging   Monitor for decompensated heart failure and diurese as needed

## 2023-07-16 NOTE — ASSESSMENT & PLAN NOTE
· No wheezing noted on exam; monitor for bronchospasm  · IV steroids discontinued and transitioned to oral Prednisone  · Continue Brovana and Pulmicort, prn Robert

## 2023-07-16 NOTE — PLAN OF CARE
Initial PT eval completed. Patient Tot A x3 with all bed mobility, which is patient's prior level of functioning. Patient will not require skilled therapy services at this time and is DC from PT services.

## 2023-07-16 NOTE — ASSESSMENT & PLAN NOTE
Patient with acute on chronic hypercapnic and hypoxic respiratory failure who is on chronic home oxygen at 5l/NC. Patient is currently on supplemental oxygen at 4L/NC with nocturnal BiPAP ordered. Signs/symptoms of respiratory failure included increased work of breathing and respiratory distress. Contributing diagnoses included chronic diastolic heart failure, COPD, and obesity hypoventilation. Patient has a recent ABGwhich has been reviewed.     Will treat underlying causes and adjust management of respiratory failure as follows:   Continue supplemental oxygen to maintain saturations 88% or greater   Continue scheduled Brovana and Pulmicort   Empiric Rocephin and Zyvox   Wean off Prednisone   Diurese as able   Patient has consistently refused BiPAP/CPAP and has multiple conversations with multiple providers but has failed to appreciate the gravity of his illness.    Follow chest imaging   Monitor for decompensated heart failure and diurese as needed   Bedside spirometry

## 2023-07-16 NOTE — SUBJECTIVE & OBJECTIVE
Past Medical History:   Diagnosis Date    Acute hypoxemic respiratory failure 9/17/2022    Arthritis     Atrial fibrillation 3/1/2023    CHF (congestive heart failure)     Chronic kidney disease     Coronary artery disease     Depression     Hypertension     Hypertensive heart disease with heart failure 9/16/2022    Hypothyroidism     Lymphedema of lower extremity     Nephrolithiasis     Obesity     Peripheral vascular disease     Pneumonia 9/17/2022    Recurrent cellulitis of lower leg     Sleep apnea     can't stand the CPAP , sleeps elevated in hospital bed or chair    Tobacco dependence     resolved       Past Surgical History:   Procedure Laterality Date    ADENOIDECTOMY  1961    BACK SURGERY  1975;  1976    x2 for disc; scar tissue removed    debridement of bilateral leg ulcers Bilateral 01/01/2017    Dr Hernandez    INNER EAR SURGERY Bilateral 1961    separate - pinnaplasty , new eardrms constructed    KIDNEY STONE SURGERY Left 1976 approx    open    TONSILLECTOMY  1961       Review of patient's allergies indicates:   Allergen Reactions    Sulfamethoxazole-trimethoprim Itching and Shortness Of Breath       No current facility-administered medications on file prior to encounter.     Current Outpatient Medications on File Prior to Encounter   Medication Sig    acetaminophen (TYLENOL) 500 MG tablet Take 500 mg by mouth every 6 (six) hours as needed for Pain.    apixaban (ELIQUIS) 5 mg Tab Take 1 tablet (5 mg total) by mouth 2 (two) times daily.    aspirin 81 MG Chew Take 1 tablet (81 mg total) by mouth once daily.    atorvastatin (LIPITOR) 40 MG tablet Take 1 tablet (40 mg total) by mouth once daily.    furosemide (LASIX) 40 MG tablet Take 1 tablet (40 mg total) by mouth once daily.    levalbuterol (XOPENEX) 1.25 mg/3 mL nebulizer solution Take 3 mLs (1.25 mg total) by nebulization every 4 (four) hours as needed for Wheezing. Rescue    levothyroxine (SYNTHROID) 50 MCG tablet Take 1 tablet (50 mcg total) by mouth  before breakfast.    miconazole nitrate 2% (MICOTIN) 2 % Oint Apply topically 2 (two) times daily. Intertrigo to lower abdomen, groin, periarea: please apply Antifungal bid (Patient not taking: Reported on 3/14/2023)    nebulizer and compressor (HOME NEBULIZER PLUS SIDESTREAM) Diana use as directed    polyethylene glycol (GLYCOLAX) 17 gram PwPk Take 17 g by mouth 2 (two) times daily. (Patient not taking: Reported on 2023)    senna-docusate 8.6-50 mg (PERICOLACE) 8.6-50 mg per tablet Take 1 tablet by mouth once daily.    tiotropium (SPIRIVA) 18 mcg inhalation capsule Inhale 1 capsule (18 mcg total) into the lungs once daily. Controller     Family History       Problem Relation (Age of Onset)    Alcohol abuse Maternal Grandfather    Alzheimer's disease Brother    Asthma Daughter    Cancer Mother, Father, Brother    Diabetes Mother    Heart disease Father, Brother    Hypertension Mother          Tobacco Use    Smoking status: Former     Packs/day: 1.50     Years: 1.00     Pack years: 1.50     Types: Cigarettes     Quit date:      Years since quittin.5    Smokeless tobacco: Never   Substance and Sexual Activity    Alcohol use: Not Currently    Drug use: Never    Sexual activity: Never     Review of Systems   Constitutional: Negative for diaphoresis, malaise/fatigue, weight gain and weight loss.   HENT:  Negative for congestion and nosebleeds.    Cardiovascular:  Negative for chest pain, claudication, cyanosis, dyspnea on exertion, irregular heartbeat, leg swelling, near-syncope, orthopnea, palpitations, paroxysmal nocturnal dyspnea and syncope.   Respiratory:  Negative for cough, hemoptysis, shortness of breath, sleep disturbances due to breathing, snoring, sputum production and wheezing.    Hematologic/Lymphatic: Negative for bleeding problem. Does not bruise/bleed easily.   Skin:  Negative for rash.   Musculoskeletal:  Negative for arthritis, back pain, falls, joint pain, muscle cramps and muscle  weakness.   Gastrointestinal:  Negative for abdominal pain, constipation, diarrhea, heartburn, hematemesis, hematochezia, melena, nausea and vomiting.   Genitourinary:  Negative for dysuria, hematuria and nocturia.   Neurological:  Negative for excessive daytime sleepiness, dizziness, headaches, light-headedness, loss of balance, numbness, vertigo and weakness.   Objective:     Vital Signs (Most Recent):  Temp: 97.9 °F (36.6 °C) (07/16/23 1614)  Pulse: (!) 50 (07/16/23 1614)  Resp: 16 (07/16/23 1614)  BP: 132/62 (07/16/23 1614)  SpO2: 98 % (07/16/23 1614) Vital Signs (24h Range):  Temp:  [96 °F (35.6 °C)-98.7 °F (37.1 °C)] 97.9 °F (36.6 °C)  Pulse:  [50-63] 50  Resp:  [16-20] 16  SpO2:  [90 %-98 %] 98 %  BP: (107-148)/(56-65) 132/62     Weight: (!) 176.3 kg (388 lb 10.7 oz)  Body mass index is 64.68 kg/m².    SpO2: 98 %         Intake/Output Summary (Last 24 hours) at 7/16/2023 1618  Last data filed at 7/16/2023 1235  Gross per 24 hour   Intake 672.33 ml   Output 2300 ml   Net -1627.67 ml       Lines/Drains/Airways       Drain  Duration             Male External Urinary Catheter 07/15/23 0205 Other (Comment) 1 day              Peripheral Intravenous Line  Duration                  Peripheral IV - Single Lumen 07/16/23 1130 22 G Anterior;Distal;Left Upper Arm <1 day                     Physical Exam  Vitals and nursing note reviewed.   Constitutional:       Appearance: He is well-developed. He is obese. He is ill-appearing.   HENT:      Head: Normocephalic.      Mouth/Throat:      Mouth: Mucous membranes are moist.   Neck:      Vascular: No carotid bruit or JVD.   Cardiovascular:      Rate and Rhythm: Normal rate. Rhythm irregular.      Pulses: Normal pulses.      Heart sounds: Normal heart sounds. No murmur heard.    No friction rub.   Pulmonary:      Effort: Pulmonary effort is normal. No respiratory distress.      Breath sounds: Normal breath sounds. No wheezing or rales.   Abdominal:      General: Bowel sounds  are normal. There is no distension.      Palpations: Abdomen is soft.      Tenderness: There is no abdominal tenderness. There is no guarding.   Musculoskeletal:         General: Swelling present. No tenderness.      Cervical back: Neck supple. No tenderness.      Right lower leg: Edema present.      Left lower leg: Edema present.   Skin:     General: Skin is warm and dry.      Capillary Refill: Capillary refill takes less than 2 seconds.      Findings: No rash.   Neurological:      General: No focal deficit present.      Mental Status: He is alert.   Psychiatric:         Mood and Affect: Mood normal.         Behavior: Behavior normal.         Thought Content: Thought content normal.        Significant Labs: BMP:   Recent Labs   Lab 07/14/23 1825 07/16/23  0824    134*    139   K 4.4 3.4*   CL 92* 89*   CO2 40* 40*   BUN 18 32*   CREATININE 1.0 1.1   CALCIUM 9.5 8.6*   , CMP   Recent Labs   Lab 07/14/23 1825 07/16/23 0824    139   K 4.4 3.4*   CL 92* 89*   CO2 40* 40*    134*   BUN 18 32*   CREATININE 1.0 1.1   CALCIUM 9.5 8.6*   PROT 8.4  --    ALBUMIN 2.8*  --    BILITOT 0.6  --    ALKPHOS 85  --    AST 22  --    ALT 13  --    ANIONGAP 8 10   , CBC   Recent Labs   Lab 07/14/23 1825 07/16/23 0824   WBC 6.26 6.73   HGB 10.2* 9.6*   HCT 35.6* 30.3*    196   , INR No results for input(s): INR, PROTIME in the last 48 hours., and Troponin   Recent Labs   Lab 07/14/23 1825   TROPONINI <0.006       Significant Imaging: Echocardiogram: 2D echo with color flow doppler: No results found for this or any previous visit. and Transthoracic echo (TTE) complete (Cupid Only):   Results for orders placed or performed during the hospital encounter of 01/02/22   Echo   Result Value Ref Range    BSA 3.05 m2    TDI SEPTAL 0.05 m/s    LV LATERAL E/E' RATIO 5.78 m/s    LV SEPTAL E/E' RATIO 10.40 m/s    LA WIDTH 3.27 cm    IVC diameter 1.14 cm    Left Ventricular Outflow Tract Mean Velocity  0.56963407102541 cm/s    Left Ventricular Outflow Tract Mean Gradient 1.46 mmHg    TDI LATERAL 0.09 m/s    LVIDd 3.84 3.5 - 6.0 cm    IVS 1.56 (A) 0.6 - 1.1 cm    Posterior Wall 1.88 (A) 0.6 - 1.1 cm    Ao root annulus 3.63 cm    LVIDs 2.90 2.1 - 4.0 cm    FS 24 28 - 44 %    LA volume 25.66 cm3    Sinus 3.14 cm    STJ 2.64 cm    Ascending aorta 2.98 cm    LV mass 274.50 g    LA size 2.29 cm    RVDD 5.32 cm    TAPSE 1.44 cm    Left Ventricle Relative Wall Thickness 0.98 cm    AV mean gradient 3 mmHg    AV valve area 2.65 cm2    AV Velocity Ratio 0.74     AV index (prosthetic) 0.70     MV valve area p 1/2 method 3.17 cm2    E/A ratio 0.60     Mean e' 0.07 m/s    E wave deceleration time 239.342469940789206 msec    IVRT 99.116737382264461 msec    LVOT diameter 2.20 cm    LVOT area 3.8 cm2    LVOT peak rodolfo 0.77 m/s    LVOT peak VTI 12.70 cm    Ao peak rodolfo 1.04 m/s    Ao VTI 18.2 cm    RVOT peak rodolfo 0.66 m/s    RVOT peak VTI 13.4 cm    LVOT stroke volume 48.25 cm3    AV peak gradient 4 mmHg    PV mean gradient 0.78 mmHg    E/E' ratio 7.43 m/s    MV Peak E Rodolfo 0.52 m/s    TR Max Rodolfo 3.18 m/s    MV stenosis pressure 1/2 time 69.569245855406926 ms    MV Peak A Rodolfo 0.87 m/s    LV Systolic Volume 32.09 mL    LV Systolic Volume Index 11.1 mL/m2    LV Diastolic Volume 63.39 mL    LV Diastolic Volume Index 21.93 mL/m2    LA Volume Index 8.9 mL/m2    LV Mass Index 95 g/m2    Echo EF Estimated 49 %    RA Major Axis 4.13 cm    Left Atrium Minor Axis 3.25 cm    Left Atrium Major Axis 5.31 cm    Triscuspid Valve Regurgitation Peak Gradient 40 mmHg    RA Width 2.81 cm    Right Atrial Pressure (from IVC) 3 mmHg    EF 55 %    TV rest pulmonary artery pressure 43 mmHg    Narrative    · The left ventricle is normal in size with concentric remodeling and   normal systolic function.  · The estimated ejection fraction is 55%.  · Grade I left ventricular diastolic dysfunction.  · Severe right ventricular enlargement with moderately to  severely reduced   right ventricular systolic function.  · Right atrial enlargement.  · Mild to moderate tricuspid regurgitation.  · Normal central venous pressure (3 mmHg).  · The estimated PA systolic pressure is 43 mmHg.  · There is pulmonary hypertension.

## 2023-07-16 NOTE — HOSPITAL COURSE
77-year-old bed bound male with COPD, DENISHA (refuses CPAP/Bipap), chronic hypercapnic and hypoxic respiratory failure on 4-5L NC or face mask oxygen at home, Chronic lymphedema, hypothyroidism, A-fib on eliquis, HFpEF with pulm HTN, mod to severe RV systolic function, venous insufficiency, and severe morbid obesity admitted to ICU for Acute metabolic encephalopathy, Acute on chronic respiratory on Bipap, and UTI-on IV abx. Suspect COPD exacerbation/baseline hypercarbia/and potentially UTI contributing to AMS. He was placed on IV Steroids and IV lasix. Pt slowly improved- transferred to floor. Pt now refuses BIPAP/CPAP. He is currently on IV Lasix and po prednisone as well as IV Rocephin and Zyvox. Respiratory status relatively stable.   Recent VRE cultured UTI-await ID and sensitivities on urine culture   Noted to be bradycardic with episodes dropping in 30s when sleeping-likely 2/2 sleep apnea but will consult Cardiology for bradycardia.    7/17 palliative consulted. Echocardiogram reviewed. Wound care following  7/18 infectious disease consulted for bacteremia but blood culture growth likely contaminant. Remains stable. Plans for discharge home soon.    Patient requesting discharge. Discharge instructions provided. Nursing present. Visitor provides collateral. Counseling provided. Prescriptions to be delivered to bedside prior to discharge.     Patient seen and evaluated by me. Patient was determined to be suitable for discharge. Patient deemed stable for discharge to home with homehealth physical/occupational therapy and nurse practitioner to visit home program.

## 2023-07-16 NOTE — PLAN OF CARE
OT eval completed. Patient at dependent functional baseline. Continued skilled OT intervention in acute and post acute not warranted. D/C OT.

## 2023-07-16 NOTE — ASSESSMENT & PLAN NOTE
Patient with Long standing persistent (>12 months) atrial fibrillation which is controlled currently with out medication. Patient is currently in atrial fibrillation.[id number] Score: 4. HASBLED Score: 2 . Anticoagulation indicated. Anticoagulation done with Eliquis.    With slow ventricular response -cardiology consulted

## 2023-07-16 NOTE — SUBJECTIVE & OBJECTIVE
Past Medical History:   Diagnosis Date    Acute hypoxemic respiratory failure 2022    Arthritis     Atrial fibrillation 3/1/2023    CHF (congestive heart failure)     Chronic kidney disease     Coronary artery disease     Depression     Hypertension     Hypertensive heart disease with heart failure 2022    Hypothyroidism     Lymphedema of lower extremity     Nephrolithiasis     Obesity     Peripheral vascular disease     Pneumonia 2022    Recurrent cellulitis of lower leg     Sleep apnea     can't stand the CPAP , sleeps elevated in hospital bed or chair    Tobacco dependence     resolved     Past Surgical History:   Procedure Laterality Date    ADENOIDECTOMY      BACK SURGERY  ;  1976    x2 for disc; scar tissue removed    debridement of bilateral leg ulcers Bilateral 2017    Dr Hernandez    INNER EAR SURGERY Bilateral     separate - pinnaplasty , new eardrms constructed    KIDNEY STONE SURGERY Left  approx    open    TONSILLECTOMY       Review of patient's allergies indicates:   Allergen Reactions    Sulfamethoxazole-trimethoprim Itching and Shortness Of Breath     Family History       Problem Relation (Age of Onset)    Alcohol abuse Maternal Grandfather    Alzheimer's disease Brother    Asthma Daughter    Cancer Mother, Father, Brother    Diabetes Mother    Heart disease Father, Brother    Hypertension Mother          Tobacco Use    Smoking status: Former     Packs/day: 1.50     Years: 1.00     Pack years: 1.50     Types: Cigarettes     Quit date:      Years since quittin.5    Smokeless tobacco: Never   Substance and Sexual Activity    Alcohol use: Not Currently    Drug use: Never    Sexual activity: Never       Review of Systems: Negative except as indicated in HPI  Objective:     Vital Signs (Most Recent):  Temp: 96 °F (35.6 °C) (23)  Pulse: (!) 58 (23 0947)  Resp: 16 (23)  BP: (!) 107/59 (23)  SpO2: (!) 94 % (23)  Vital Signs (24h Range):  Temp:  [96 °F (35.6 °C)-98.7 °F (37.1 °C)] 96 °F (35.6 °C)  Pulse:  [53-63] 58  Resp:  [16-20] 16  SpO2:  [90 %-96 %] 94 %  BP: (107-133)/(56-62) 107/59   Weight: (!) 176.3 kg (388 lb 10.7 oz);  Body mass index is 64.68 kg/m².    Intake/Output Summary (Last 24 hours) at 7/16/2023 1141  Last data filed at 7/16/2023 0112  Gross per 24 hour   Intake --   Output 1600 ml   Net -1600 ml      Physical Exam  Vitals and nursing note reviewed.   Constitutional:       General: He is not in acute distress.     Appearance: He is obese. He is ill-appearing.   HENT:      Head: Normocephalic.   Eyes:      Conjunctiva/sclera: Conjunctivae normal.   Cardiovascular:      Rate and Rhythm: Normal rate.   Pulmonary:      Breath sounds: No wheezing.      Comments: Diminished throughout  Abdominal:      Palpations: Abdomen is soft.   Musculoskeletal:      Cervical back: Normal range of motion and neck supple.      Right lower leg: Edema present.      Left lower leg: Edema present.   Skin:     General: Skin is warm and dry.   Neurological:      Mental Status: Mental status is at baseline.   Psychiatric:         Mood and Affect: Mood normal.      Significant Labs:  CBC/Anemia Profile:  Recent Labs   Lab 07/14/23 1825 07/16/23  0824   WBC 6.26 6.73   HGB 10.2* 9.6*   HCT 35.6* 30.3*    196   * 99*   RDW 14.6* 14.7*   Chemistries:  Recent Labs   Lab 07/14/23 1825 07/16/23  0824    139   K 4.4 3.4*   CL 92* 89*   CO2 40* 40*   BUN 18 32*   CREATININE 1.0 1.1   CALCIUM 9.5 8.6*   ALBUMIN 2.8*  --    PROT 8.4  --    BILITOT 0.6  --    ALKPHOS 85  --    ALT 13  --    AST 22  --

## 2023-07-16 NOTE — HPI
77-year-old male patient with past medical history of being bed-bound, AFib on Eliquis, CHF, CKD, CAD, altered mental status, hallucinations.  COPD,  HTN, hypothyroidism, peripheral vascular disease, and obesity hypoventilation on oxygen at home who was admitted for hypoxia, altered mental status, hallucinations.  Was placed on BiPAP upon admission.  Initially admitted to the ICU then transferred to the floor.  Patient reported to have episodes of bradycardia in atrial fibrillation.  HR 50s.  Not taking a beta-blocker for calcium channel blocker at home.  Patient denies dizziness.  Takes Eliquis at home, no bleeding episodes.    Echo 1/22 EF 55%, mild-to-moderate TR  EKG rate controlled afib

## 2023-07-16 NOTE — ASSESSMENT & PLAN NOTE
Body mass index is 64.68 kg/m². Morbid obesity complicates all aspects of disease management from diagnostic modalities to treatment and in this situation is contributing to severity of disease. Education attempted to no evidence of understanding

## 2023-07-17 NOTE — ASSESSMENT & PLAN NOTE
Patient with Long standing persistent (>12 months) atrial fibrillation which is controlled currently with out medication. Patient is currently in atrial fibrillation.[id number] Score: 4. HASBLED Score: 2 . Anticoagulation indicated. Anticoagulation done with Eliquis.    With slow ventricular response -cardiology consulted

## 2023-07-17 NOTE — PLAN OF CARE
Fall precautions maintained. Q2 turning encouraged, but refused. Pt bradycardic and occasionally going into afib on tele monitor but remains asymptomatic. Pts O2 saturation remains in the mid to lower 90's on 5L NC. IV antibiotics infused. Chart check complete.     Problem: Adult Inpatient Plan of Care  Goal: Plan of Care Review  Outcome: Ongoing, Progressing     Problem: Adult Inpatient Plan of Care  Goal: Absence of Hospital-Acquired Illness or Injury  Outcome: Ongoing, Progressing     Problem: Adult Inpatient Plan of Care  Goal: Optimal Comfort and Wellbeing  Outcome: Ongoing, Progressing     Problem: Infection  Goal: Absence of Infection Signs and Symptoms  Outcome: Ongoing, Progressing

## 2023-07-17 NOTE — CONSULTS
"Advance Care Planning   [facility]  Palliative Care RN      Patient Name: Gene Rothman  MRN: [id number]  Admission Date: 7/14/2023  Hospital Length of Stay: 3 days  Code Status: Full Code   Attending Provider: Jenna Carrillo MD  Palliative Care Provider: DANIEL Canseco  Primary Care Physician: Ami Zarate DO  Principal Problem:Encephalopathy, metabolic    Advance Care Planning     Date: 07/17/2023    Today a voluntary meeting took place: bedside    Patient Participation: Patient is able to participate     Attendees (Name and  Relationship to patient): Legal surrogate decision-maker: wife Emy Rothman      Staff attendees (Name and  Role): Neha Wei RN    ACP Conversation (General): 'Living well': What does living well mean to you? Mr. Rothman reported he has been bed bound for the past 2 years. His son Wesley lives with him and his wife Emy and is patient's primary caregiver. Wesley assisted with brief changes and bathing, since Mr. Rothman is immobile. Mr. Rothman finds his current quality-of-life acceptable and stated, "I'm not ready to go anywhere yet". Mr. Rothman relies on ambulance for transportation, which costs $300 per trip. This barrier is why he reports he has not followed-up with pulmonology or other providers as indicated. He instead relies on [facility] to assist with medication refills when needed. He has no concern over returning the hospital if necessary and his preference to all life-prolonging no matter treatment burden.       Code Status: Full Code; Mr. Rothman stated that he would want us (healthcare providers) to "jump on" his chest to resuscitate him. We discussed in detail the difference between a FULL code and DNR/I. We discussed when resuscitation is helpful and the complications associated with CPR, specifically with patient's BMI, underlying COPD and heart failure the risk for: vent dependence, trach/PEG, and need for long-term placement in a nursing facility " due to medical complexity if unable to liberate from ventilator. He was quiet during this time, as we discussed the different scenarios that may occur. I prepared both he and his wife that they should not be surprised when this conversation comes up again in the future. Mrs. Rothman was tearful and verbalized her understanding of the importance of discussing end-of-life wishes.      ACP Documents: None    Goals of care: We discussed hospice at length and in detail. Mr. Rothman's goals are not in line with hospice at this time, as he stated that he did not want to be a DNR/I and this is the reason he revoked hospice previously when he was with [facility]. He and his wife know the option is there if things change in regard to patient's GOC. Mrs. Rothman shared that they lost one of their son's 1.5 years ago and she feels this impacts patient's decision in regard to CPR, which she supports.The patient endorses that what is most important right now is to focus on extending life as long as possible, even it it means sacrificing quality and curative/life-prolongation (regardless of treatment burdens).     Accordingly, we have decided that the best plan to meet the patient's goals includes continuing with treatment. [facility] and is agreeable to home health if indicated with [facility] as his preference. Patient would benefit from continued discussions in regard to GOC and the risks and benefits of cardiopulmonary resuscitation. Updated attending MD and CM.       Thank you for allowing Palliative Medicine to assist in the care of  Mr. Gene Rothman.             Neha Wei RN-EREN, Palliative Medicine   [phone]

## 2023-07-17 NOTE — ASSESSMENT & PLAN NOTE
Pulmonary exam this morning stable - no wheezing, tachypnea or labored breathing  Suspect volume contributed to symptoms yesterday   Budesonide and arformoterol nebs while inpatient   Change duo-nebs to prn  Change steroids to PO in am- 5 day course     Procalcitonin pending

## 2023-07-17 NOTE — ASSESSMENT & PLAN NOTE
Hx of VRE per culture review   Urine culture pending   Rocephin and Zyvox, de-escalate as able     Sensitive to rocephin  Discontinue zyvox

## 2023-07-17 NOTE — ASSESSMENT & PLAN NOTE
Body mass index is 64.57 kg/m². Morbid obesity complicates all aspects of disease management from diagnostic modalities to treatment.    Increases morbidity and mortality   At baseline functioning  No further Physical/occupational therapy needs

## 2023-07-17 NOTE — ASSESSMENT & PLAN NOTE
Likely relate to UTI +/- reported hypoxia (was not wearing oxygen upon EMS arrival and SPO2 low 80s)  CT head negative   Treating reversible causes  Safety and fall precautions in place     Resolved

## 2023-07-17 NOTE — CONSULTS
[facility] Surg  Wound Care    Patient Name:  Gene Rothman   MRN:  [id number]  Date: 2023  Diagnosis: Encephalopathy, metabolic    History:     Past Medical History:   Diagnosis Date    Acute hypoxemic respiratory failure 2022    Arthritis     Atrial fibrillation 3/1/2023    CHF (congestive heart failure)     Chronic kidney disease     Coronary artery disease     Depression     Hypertension     Hypertensive heart disease with heart failure 2022    Hypothyroidism     Lymphedema of lower extremity     Nephrolithiasis     Obesity     Peripheral vascular disease     Pneumonia 2022    Recurrent cellulitis of lower leg     Sleep apnea     can't stand the CPAP , sleeps elevated in hospital bed or chair    Tobacco dependence     resolved       Social History     Socioeconomic History    Marital status:    Tobacco Use    Smoking status: Former     Packs/day: 1.50     Years: 1.00     Pack years: 1.50     Types: Cigarettes     Quit date:      Years since quittin.5    Smokeless tobacco: Never   Substance and Sexual Activity    Alcohol use: Not Currently    Drug use: Never    Sexual activity: Never   Social History Narrative    ** Merged History Encounter **          Lives with wife and 2 sons and a daughter. No longer drives. Quit in  because couldn't hold foot on pedal. 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 Uses a Rolator at wo    rk and wheelchair at home. No caffeine. Does not have a Living Will or Advanced Directive.       Social Determinants of Health     Financial Resource Strain: High Risk    Difficulty of Paying Living Expenses: Hard   Food Insecurity: Food Insecurity Present    Worried About Running Out of Food in the Last Year: Often true    Ran Out of Food in the Last Year: Often true   Transportation Needs: Unmet Transportation Needs    Lack of Transportation (Medical): Yes    Lack of  Transportation (Non-Medical): Yes   Physical Activity: Insufficiently Active    Days of Exercise per Week: 7 days    Minutes of Exercise per Session: 10 min   Stress: Stress Concern Present    Feeling of Stress : To some extent   Social Connections: Socially Isolated    Frequency of Communication with Friends and Family: Never    Frequency of Social Gatherings with Friends and Family: Once a week    Attends [employer] Services: Never    Active Member of Clubs or Organizations: No    Attends Club or Organization Meetings: Never    Marital Status:    Housing Stability: Low Risk     Unable to Pay for Housing in the Last Year: No    Number of Places Lived in the Last Year: 1    Unstable Housing in the Last Year: No       Precautions:     Allergies as of 07/14/2023 - Reviewed 07/14/2023   Allergen Reaction Noted    Sulfamethoxazole-trimethoprim Itching and Shortness Of Breath 08/29/2014       [facility] Assessment Details/Treatment     Consulted on this 76 y/o M patient due to present on admission altered skin integrity and obesity. Patient currently on sizewise pulsate  LO hercules bed. He is awake and alert. BLE with skin changes consistent with chronic venous insufficiency including dry scaly skin and hemosiderin staining. No open venous ulcerations noted at this time. Cleansed with bath wipes, loose skin scales easily removed, and thick layer moisturizing cream applied. Heel offloaidng boots maintained. Patient noted to have large liquid brown BM. Incontience care provided x multiple staff members. Sacrum, buttocks - small 1x0.1x0.1cm partial thickness opening noted in gluteal cleft related to MASD, otherwise intact. Scattered areas of blanchable redness are noted, and redness to skin folds, cleansed all with bathing wipes and mositure barrier paste applied to high risk areas and folds. Recommend turn q2 hours, avoid diapers, keep clean and dry, use moisture wicking pads. No further wound care  needs.    07/17/2023

## 2023-07-17 NOTE — PROGRESS NOTES
[facility] Medicine  Progress Note    Patient Name: Gene Rothman  MRN: [id number]  Patient Class: IP- Inpatient   Admission Date: 7/14/2023  Length of Stay: 3 days  Attending Physician: Jenna Carrillo MD  Primary Care Provider: Ami Zarate DO        Subjective:     Principal Problem:Encephalopathy, metabolic        HPI:  Information obtained from chart review and discussion with providers as patient is a poor historian.     77-year-old bed bound male with a known past medical history of COPD, DENISHA (noncompliant with CPAP), chronic hypercapnic and hypoxic respiratory failure on 4-5L NC or face mask oxygen at home, lymphedema, hypothyroidism, A-fib on eliquis, HFpEF with pulm HTN, mod to severe RV systolic function, venous insufficiency, and super morbid obesity who presented 7/14/2023 with confusion. Upon arrival to ED, was placed on BiPAP 10/5. ED work up significant for elevated BNP and UA consistent with UTI. ABG compensated with CO2 77. CXR WNL. He was treated with bronchodilators, steroids, and lasix. Critical care consulted for admission with NIPPV in use.     Upon critical care team's initial assessment, NIPPV removed and placed on NC. Admitted overnight for close neurologic and respiratory monitoring. Deemed appropriate for step down 7/15/2023 morning.     Upon exam this morning, patient AAOx3. He is very restless and impulsive. He denies any subjective complaints aside from discomfort in the bed, wanting the head of his bed lowered. Hemodynamics acceptable.       Overview/Hospital Course:  77-year-old bed bound male with COPD, DENISHA (refuses CPAP/Bipap), chronic hypercapnic and hypoxic respiratory failure on 4-5L NC or face mask oxygen at home, Chronic lymphedema, hypothyroidism, A-fib on eliquis, HFpEF with pulm HTN, mod to severe RV systolic function, venous insufficiency, and severe morbid obesity admitted to ICU for Acute metabolic encephalopathy, Acute on chronic respiratory on Bipap, and  UTI-on IV abx. Suspect COPD exacerbation/baseline hypercarbia/and potentially UTI contributing to AMS. He was placed on IV Steroids and IV lasix. Pt slowly improved- transferred to floor. Pt now refuses BIPAP/CPAP. He is currently on IV Lasix and po prednisone as well as IV Rocephin and Zyvox. Respiratory status relatively stable.   Recent VRE cultured UTI-await ID and sensitivities on urine culture   Noted to be bradycardic with episodes dropping in 30s when sleeping-likely 2/2 sleep apnea but will consult Cardiology for bradycardia.    7/17 palliative consulted. Echocardiogram reviewed. Wound care following      Interval History: See hospital course for today      Review of Systems   Constitutional:  Negative for activity change, appetite change and fever.   Respiratory:  Positive for shortness of breath.    Gastrointestinal:  Negative for constipation, nausea and vomiting.   Skin:  Positive for wound.   Neurological:  Positive for weakness.   Psychiatric/Behavioral:  Negative for agitation, behavioral problems, confusion and decreased concentration.    Objective:     Vital Signs (Most Recent):  Temp: 98 °F (36.7 °C) (07/17/23 1243)  Pulse: 62 (07/17/23 1243)  Resp: 16 (07/17/23 1243)  BP: (!) 135/59 (07/17/23 1243)  SpO2: 98 % (07/17/23 1243) Vital Signs (24h Range):  Temp:  [97.6 °F (36.4 °C)-98.1 °F (36.7 °C)] 98 °F (36.7 °C)  Pulse:  [44-62] 62  Resp:  [16-20] 16  SpO2:  [94 %-99 %] 98 %  BP: (131-152)/(59-69) 135/59     Weight: (!) 176 kg (388 lb)  Body mass index is 64.57 kg/m².    Intake/Output Summary (Last 24 hours) at 7/17/2023 1356  Last data filed at 7/17/2023 0627  Gross per 24 hour   Intake 540.55 ml   Output 1000 ml   Net -459.45 ml         Physical Exam  Vitals and nursing note reviewed. Exam conducted with a chaperone present (visitor, wound care).   Constitutional:       General: He is not in acute distress.     Appearance: He is morbidly obese. He is ill-appearing. He is not toxic-appearing.       Interventions: Nasal cannula in place.   HENT:      Head: Normocephalic and atraumatic.   Cardiovascular:      Rate and Rhythm: Normal rate.   Pulmonary:      Effort: No respiratory distress.   Abdominal:      Palpations: Abdomen is soft.      Tenderness: There is no abdominal tenderness.   Genitourinary:     Comments: bowman  Musculoskeletal:      Right lower leg: Edema present.      Left lower leg: Edema present.   Skin:     General: Skin is warm.      Capillary Refill: Capillary refill takes 2 to 3 seconds.      Findings: Lesion present.   Neurological:      Mental Status: He is alert.      Motor: Weakness present.           Significant Labs: All pertinent labs within the past 24 hours have been reviewed.  CBC:   Recent Labs   Lab 07/16/23  0824 07/17/23  0440   WBC 6.73 7.36   HGB 9.6* 10.6*   HCT 30.3* 34.0*    201     CMP:   Recent Labs   Lab 07/16/23  0824 07/17/23  0440    139   K 3.4* 3.3*   CL 89* 88*   CO2 40* 40*   * 109   BUN 32* 40*   CREATININE 1.1 1.3   CALCIUM 8.6* 8.5*   PROT  --  7.2   ALBUMIN  --  2.6*   BILITOT  --  0.3   ALKPHOS  --  60   AST  --  15   ALT  --  11   ANIONGAP 10 11       Significant Imaging: I have reviewed all pertinent imaging results/findings within the past 24 hours.      Assessment/Plan:      * Encephalopathy, metabolic  Likely relate to UTI +/- reported hypoxia (was not wearing oxygen upon EMS arrival and SPO2 low 80s)  CT head negative   Treating reversible causes  Safety and fall precautions in place     Resolved       Chronic obstructive pulmonary disease with acute exacerbation  Pulmonary exam this morning stable - no wheezing, tachypnea or labored breathing  Suspect volume contributed to symptoms yesterday   Budesonide and arformoterol nebs while inpatient   Change duo-nebs to prn  Change steroids to PO in am- 5 day course     Procalcitonin pending    Acute cystitis with hematuria  Hx of VRE per culture review   Urine culture pending   Rocephin and  Zyvox, de-escalate as able     Sensitive to rocephin  Discontinue zyvox      Atrial fibrillation  Patient with Long standing persistent (>12 months) atrial fibrillation which is controlled currently with out medication. Patient is currently in atrial fibrillation.[id number] Score: 4. HASBLED Score: 2 . Anticoagulation indicated. Anticoagulation done with Eliquis.    With slow ventricular response -cardiology consulted     Acute on chronic respiratory failure with hypoxia and hypercapnia  Patient with Hypercapnic and Hypoxic Respiratory failure which is acute on  Chronic.  he is on home oxygen at 4-5 LPM. Supplemental oxygen was provided and noted- Oxygen Concentration (%):  [32-40] 32  Signs/symptoms of respiratory failure include- tachypnea and increased work of breathing. Contributing diagnoses includes - CHF and COPD Labs and images were reviewed. Patient Has recent ABG, which has been reviewed.  Will treat underlying causes and adjust management of respiratory failure as follows-   Cont supplemental oxygen to keep SPO2 88-92%   ABG reviewed- compensated with HCO 40 and CO2 79  Family adjust oxygen variably at home and he mostly wears a face mask for comfort   CXR normal      At baseline    Hypertensive heart disease with heart failure  Multi-factorial with severe pulmonary disease contributing as well   Echo 1/2023 w/ EF 55%, grade I DD, pulm HTN, and mod-severe RV dysfunction   Appears to be volume overloaded   Lasix 60 mg BID   Strict I/Os   Daily weights         Sleep apnea  Refuses CPAP or BIPAP   Directly negatively impacting patient's overall health and increases his morbidity and mortality   noncompliant    Morbid obesity with BMI of 60.0-69.9, adult  Body mass index is 64.57 kg/m². Morbid obesity complicates all aspects of disease management from diagnostic modalities to treatment.    Increases morbidity and mortality   At baseline functioning  No further Physical/occupational therapy  needs      Hypothyroidism (acquired)  synthroid      VTE Risk Mitigation (From admission, onward)         Ordered     apixaban tablet 5 mg  2 times daily         07/14/23 2768                Discharge Planning   JAMES:      Code Status: Full Code   Is the patient medically ready for discharge?:     Reason for patient still in hospital (select all that apply): Patient trending condition, Laboratory test, Treatment, Consult recommendations and Pending disposition  Discharge Plan A: Home Health                  Jenna Carrillo MD  Department of Hospital Medicine   [facility] - Med Surg

## 2023-07-17 NOTE — PLAN OF CARE
Discussed poc with pt, pt verbalized understanding     Purposeful rounding every 2hours     Cardiac monitoring in use, pt is afib/bradycardic tele monitor # [id number]     Fall precautions in place, remains injury free  Pt denies c/o pain and nausea         Accurate I&Os  Abx given as prescribed  Bed locked at lowest position  Call light within reach     Chart check complete  Will cont with POC     Still on 5l nc  Palliative consulted today to discuss poc  Pt still refusing bipap/cpap

## 2023-07-17 NOTE — ASSESSMENT & PLAN NOTE
Patient with Hypercapnic and Hypoxic Respiratory failure which is acute on  Chronic.  he is on home oxygen at 4-5 LPM. Supplemental oxygen was provided and noted- Oxygen Concentration (%):  [32-40] 32  Signs/symptoms of respiratory failure include- tachypnea and increased work of breathing. Contributing diagnoses includes - CHF and COPD Labs and images were reviewed. Patient Has recent ABG, which has been reviewed.  Will treat underlying causes and adjust management of respiratory failure as follows-   Cont supplemental oxygen to keep SPO2 88-92%   ABG reviewed- compensated with HCO 40 and CO2 79  Family adjust oxygen variably at home and he mostly wears a face mask for comfort   CXR normal      At baseline

## 2023-07-17 NOTE — PLAN OF CARE
07/17/23 1449   Discharge Reassessment   Assessment Type Discharge Planning Reassessment   Did the patient's condition or plan change since previous assessment? No   Discharge Plan discussed with:   (Attending MD, Dr. Carrillo)   Communicated JAMES with patient/caregiver Date not available/Unable to determine   Discharge Plan A Home Health   DME Needed Upon Discharge  none   Transition of Care Barriers None   Why the patient remains in the hospital Requires continued medical care   Post-Acute Status   Post-Acute Authorization Home Health  (Pending MD orders)   Discharge Delays None known at this time     Cardiology consulted; pending treatment recommendations.     Plan for pt to d/c home with [facility]  once medically stable.

## 2023-07-17 NOTE — ASSESSMENT & PLAN NOTE
Refuses CPAP or BIPAP   Directly negatively impacting patient's overall health and increases his morbidity and mortality   noncompliant

## 2023-07-17 NOTE — SUBJECTIVE & OBJECTIVE
Interval History: See hospital course for today      Review of Systems   Constitutional:  Negative for activity change, appetite change and fever.   Respiratory:  Positive for shortness of breath.    Gastrointestinal:  Negative for constipation, nausea and vomiting.   Skin:  Positive for wound.   Neurological:  Positive for weakness.   Psychiatric/Behavioral:  Negative for agitation, behavioral problems, confusion and decreased concentration.    Objective:     Vital Signs (Most Recent):  Temp: 98 °F (36.7 °C) (07/17/23 1243)  Pulse: 62 (07/17/23 1243)  Resp: 16 (07/17/23 1243)  BP: (!) 135/59 (07/17/23 1243)  SpO2: 98 % (07/17/23 1243) Vital Signs (24h Range):  Temp:  [97.6 °F (36.4 °C)-98.1 °F (36.7 °C)] 98 °F (36.7 °C)  Pulse:  [44-62] 62  Resp:  [16-20] 16  SpO2:  [94 %-99 %] 98 %  BP: (131-152)/(59-69) 135/59     Weight: (!) 176 kg (388 lb)  Body mass index is 64.57 kg/m².    Intake/Output Summary (Last 24 hours) at 7/17/2023 1356  Last data filed at 7/17/2023 0627  Gross per 24 hour   Intake 540.55 ml   Output 1000 ml   Net -459.45 ml         Physical Exam  Vitals and nursing note reviewed. Exam conducted with a chaperone present (visitor, wound care).   Constitutional:       General: He is not in acute distress.     Appearance: He is morbidly obese. He is ill-appearing. He is not toxic-appearing.      Interventions: Nasal cannula in place.   HENT:      Head: Normocephalic and atraumatic.   Cardiovascular:      Rate and Rhythm: Normal rate.   Pulmonary:      Effort: No respiratory distress.   Abdominal:      Palpations: Abdomen is soft.      Tenderness: There is no abdominal tenderness.   Genitourinary:     Comments: bowman  Musculoskeletal:      Right lower leg: Edema present.      Left lower leg: Edema present.   Skin:     General: Skin is warm.      Capillary Refill: Capillary refill takes 2 to 3 seconds.      Findings: Lesion present.   Neurological:      Mental Status: He is alert.      Motor: Weakness  present.           Significant Labs: All pertinent labs within the past 24 hours have been reviewed.  CBC:   Recent Labs   Lab 07/16/23  0824 07/17/23  0440   WBC 6.73 7.36   HGB 9.6* 10.6*   HCT 30.3* 34.0*    201     CMP:   Recent Labs   Lab 07/16/23  0824 07/17/23  0440    139   K 3.4* 3.3*   CL 89* 88*   CO2 40* 40*   * 109   BUN 32* 40*   CREATININE 1.1 1.3   CALCIUM 8.6* 8.5*   PROT  --  7.2   ALBUMIN  --  2.6*   BILITOT  --  0.3   ALKPHOS  --  60   AST  --  15   ALT  --  11   ANIONGAP 10 11       Significant Imaging: I have reviewed all pertinent imaging results/findings within the past 24 hours.

## 2023-07-18 PROBLEM — N30.01 ACUTE CYSTITIS WITH HEMATURIA: Status: RESOLVED | Noted: 2023-01-01 | Resolved: 2023-01-01

## 2023-07-18 PROBLEM — G93.41 ENCEPHALOPATHY, METABOLIC: Status: RESOLVED | Noted: 2023-01-01 | Resolved: 2023-01-01

## 2023-07-18 PROBLEM — I11.0 HYPERTENSIVE HEART DISEASE WITH HEART FAILURE: Status: RESOLVED | Noted: 2022-09-16 | Resolved: 2023-01-01

## 2023-07-18 NOTE — PLAN OF CARE
[facility] - Med Surg  Discharge Final Note    Primary Care Provider: Ami Zarate DO    Expected Discharge Date: 7/18/2023    Final Discharge Note (most recent)       Final Note - 07/18/23 1325          Final Note    Assessment Type Final Discharge Note     Anticipated Discharge Disposition [place] [facility]     Hospital Resources/Appts/Education Provided Appointments scheduled by Navigator/Coordinator;Appointments scheduled and added to AVS        Post-Acute Status    Post-Acute Authorization Home Health     Home Health Status Set-up Complete/Auth obtained     Discharge Delays None known at this time                     After-discharge care                [facility]   [facility]    [address] [address]   Phone: [phone]                     [facility]  arranged.     PCP appt scheduled and added to AVS.     No d/c needs.

## 2023-07-18 NOTE — DISCHARGE SUMMARY
[facility] Medicine  Discharge Summary      Patient Name: Gene Rothman  MRN: [id number]  Admission Date: 7/14/2023  Hospital Length of Stay: 4 days  Discharge Date and Time:  07/18/2023 2:25 PM  Attending Physician: Jenna Carrillo MD   Discharging Provider: Jenna Carrillo MD  Discharge Provider Team: [facility] MEDICINE B  Primary Care Provider: Ami Zarate DO        HPI: 77 year old morbidly obese and bed bound male with known medical issues including COPD with h/o CO2 narcosis and DENISHA that declines use of CPAP/BiPap support, he does where home oxygen via face mask; atrial fib on eliquis; HFpEF with pulm HTN, mod to severe RV systolic function; chronic lymphedema     Presented to ED on evening of 7/14 with hypoxia, confusion, reported hallucinations  Placed on minimal NIPPV 10/5 on arrival  Initial abg 7.32/61.5/63/47.8  Treated with duoneb, solumedrol, and IV lasix  Repeat abg 7.40/77.5/65/48.3     Critical care team called for ICU admission  Seen and examined in ED. Removed NIPPV during exam and remained at bedside >30 min to assess response  Pt awake and alert; able to have extensive conversation despite confusion to situation and recent events, he is oriented to self, time, and place. Family reports this is not his baseline mental status.  RR and oxygenation stable on home face mask 02  Will admit to ICU for neuro/respiratory monitoring overnight         * No surgery found *      Hospital Course: 77-year-old bed bound male with COPD, DENISHA (refuses CPAP/Bipap), chronic hypercapnic and hypoxic respiratory failure on 4-5L NC or face mask oxygen at home, Chronic lymphedema, hypothyroidism, A-fib on eliquis, HFpEF with pulm HTN, mod to severe RV systolic function, venous insufficiency, and severe morbid obesity admitted to ICU for Acute metabolic encephalopathy, Acute on chronic respiratory on Bipap, and UTI-on IV abx. Suspect COPD exacerbation/baseline hypercarbia/and potentially UTI contributing to  AMS. He was placed on IV Steroids and IV lasix. Pt slowly improved- transferred to floor. Pt now refuses BIPAP/CPAP. He is currently on IV Lasix and po prednisone as well as IV Rocephin and Zyvox. Respiratory status relatively stable.   Recent VRE cultured UTI-await ID and sensitivities on urine culture   Noted to be bradycardic with episodes dropping in 30s when sleeping-likely 2/2 sleep apnea but will consult Cardiology for bradycardia.    7/17 palliative consulted. Echocardiogram reviewed. Wound care following  7/18 infectious disease consulted for bacteremia but blood culture growth likely contaminant. Remains stable. Plans for discharge home soon.    Patient requesting discharge. Discharge instructions provided. Nursing present. Visitor provides collateral. Counseling provided. Prescriptions to be delivered to bedside prior to discharge.     Patient seen and evaluated by me. Patient was determined to be suitable for discharge. Patient deemed stable for discharge to home with homehealth physical/occupational therapy and nurse practitioner to visit home program.    Consults:   Consults (From admission, onward)          Status Ordering Provider     Inpatient consult to Palliative Care  Once        Provider:  (Not yet assigned)    Completed PAL LAI     Inpatient consult to Cardiology  Once        Provider:  Nabeel Shook MD    Completed MINOR WOODY     Inpatient consult to Pulmonology  Once        Provider:  Juan Antonio Hernandez MD    Completed MARSHA DAMICO     IP consult to case management  Once        Provider:  (Not yet assigned)    Completed MARSHA DAMICO     Inpatient consult to Hospitalist  Once        Provider:  Marsha Damico MD    Acknowledged LASHELL CURIEL            Final Active Diagnoses:    Diagnosis Date Noted POA    Chronic obstructive pulmonary disease with acute exacerbation [J44.1] 03/08/2023 Yes    Acute on chronic respiratory failure with hypoxia and hypercapnia [J96.21, J96.22] 03/01/2023  Yes    Atrial fibrillation [I48.91] 03/01/2023 Yes    Sleep apnea [G47.30] 04/08/2016 Yes     Chronic    Morbid obesity with BMI of 60.0-69.9, adult [E66.01, Z68.44] 12/19/2014 Not Applicable     Chronic    Hypothyroidism (acquired) [E03.9] 12/19/2014 Yes      Problems Resolved During this Admission:    Diagnosis Date Noted Date Resolved POA    PRINCIPAL PROBLEM:  Encephalopathy, metabolic [G93.41] 07/15/2023 07/18/2023 Yes    Acute cystitis with hematuria [N30.01] 03/02/2023 07/18/2023 Yes    Hypertensive heart disease with heart failure [I11.0] 09/16/2022 07/18/2023 Yes      Discharged Condition: stable    Disposition: Home-Health Care [facility]    Follow Up:   Contact information for follow-up providers       Ami Zraate DO. Schedule an appointment as soon as possible for a visit in 3 day(s).    Specialty: Internal Medicine  Why: hospital follow up  Contact information:  [address] [place] [address] [address] [place] [address] [phone]                       Contact information for after-discharge care       Home Medical Care       [facility] .    Service: Home Health Services  Contact information:  [address] [place] [address] [phone]                                 Patient Instructions:      Ambulatory referral/consult to Outpatient Case Management   Referral Priority: Routine Referral Type: Consultation   Referral Reason: Specialty Services Required   Number of Visits Requested: 1     Ambulatory referral/consult to Home Health   Standing Status: Future   Referral Priority: Routine Referral Type: Home Health   Referral Reason: Specialty Services Required   Requested Specialty: Home Health Services   Number of Visits Requested: 1     Medications:  Reconciled Home Medications:      Medication List        START taking these medications      amoxicillin-clavulanate 875-125mg 875-125 mg per tablet  Commonly known as: AUGMENTIN  Take 1 tablet by mouth 2 (two)  times daily. Please bring meds to patient's bedside prior to discharge for 5 days     predniSONE 20 MG tablet  Commonly known as: DELTASONE  Take 2 tablets (40 mg total) by mouth once daily. for 2 days  Start taking on: July 19, 2023            CONTINUE taking these medications      acetaminophen 500 MG tablet  Commonly known as: TYLENOL  Take 500 mg by mouth every 6 (six) hours as needed for Pain.     apixaban 5 mg Tab  Commonly known as: ELIQUIS  Take 1 tablet (5 mg total) by mouth 2 (two) times daily.     aspirin 81 MG Chew  Take 1 tablet (81 mg total) by mouth once daily.     atorvastatin 40 MG tablet  Commonly known as: LIPITOR  Take 1 tablet (40 mg total) by mouth once daily.     furosemide 40 MG tablet  Commonly known as: LASIX  Take 1 tablet (40 mg total) by mouth once daily.     levalbuterol 1.25 mg/3 mL nebulizer solution  Commonly known as: XOPENEX  Take 3 mLs (1.25 mg total) by nebulization every 4 (four) hours as needed for Wheezing. Rescue     levothyroxine 50 MCG tablet  Commonly known as: SYNTHROID  Take 1 tablet (50 mcg total) by mouth before breakfast.     nebulizer and compressor Diana  Commonly known as: HOME NEBULIZER PLUS SIDESTREAM  use as directed     senna-docusate 8.6-50 mg 8.6-50 mg per tablet  Commonly known as: PERICOLACE  Take 1 tablet by mouth once daily.     tiotropium 18 mcg inhalation capsule  Commonly known as: SPIRIVA  Inhale 1 capsule (18 mcg total) into the lungs once daily. Controller            STOP taking these medications      miconazole nitrate 2% 2 % Oint  Commonly known as: MICOTIN     polyethylene glycol 17 gram Pwpk  Commonly known as: GLYCOLAX              Significant Diagnostic Studies: Labs: CMP   Recent Labs   Lab 07/17/23  0440      K 3.3*   CL 88*   CO2 40*      BUN 40*   CREATININE 1.3   CALCIUM 8.5*   PROT 7.2   ALBUMIN 2.6*   BILITOT 0.3   ALKPHOS 60   AST 15   ALT 11   ANIONGAP 11   , CBC   Recent Labs   Lab 07/17/23  0440   WBC 7.36   HGB 10.6*    HCT 34.0*      , A1C: No results for input(s): HGBA1C in the last 4320 hours., and All labs within the past 24 hours have been reviewed  Microbiology: Blood Culture   Lab Results   Component Value Date    LABBLOO No Growth to date 07/16/2023    LABBLOO No Growth to date 07/16/2023    and Urine Culture    Lab Results   Component Value Date    LABURIN ESCHERICHIA COLI  >100,000 cfu/ml   (A) 07/14/2023     Radiology: X-Ray: CXR: portable  CT scan:  CT renal stone; ct head wo contrast  Cardiac Graphics: Echocardiogram: Transthoracic echo (TTE) complete (Cupid Only):   Results for orders placed or performed during the hospital encounter of 07/14/23   Echo   Result Value Ref Range    BSA 2.84 m2    TDI SEPTAL 0.10 m/s    LA WIDTH 5.50 cm    IVC diameter 1.90 cm    RV mid diameter 3.76 cm    Left Ventricular Outflow Tract Mean Velocity 0.70 cm/s    Left Ventricular Outflow Tract Mean Gradient 2.31 mmHg    Pulmonary Valve Mean Velocity 0.93 m/s    TDI LATERAL 0.14 m/s    PV PEAK VELOCITY 1.49 cm/s    LVIDd 5.34 3.5 - 6.0 cm    IVS 0.82 0.6 - 1.1 cm    Posterior Wall 0.89 0.6 - 1.1 cm    Ao root annulus 3.78 cm    LVIDs 3.62 2.1 - 4.0 cm    FS 32 28 - 44 %    LA volume 117.51 cm3    Sinus 4.00 cm    STJ 2.80 cm    Ascending aorta 2.92 cm    LV mass 165.44 g    LA size 4.41 cm    RVDD 3.51 cm    TAPSE 1.80 cm    Right ventricular length in diastole (apical 4-chamber view) 7.18 cm    RV S' 10.51 cm/s    RA area 28.0 cm2    Left Ventricle Relative Wall Thickness 0.33 cm    AV mean gradient 3 mmHg    AV valve area 2.60 cm2    AV Velocity Ratio 0.78     AV index (prosthetic) 0.80     Mean e' 0.12 m/s    IVRT 110.37 msec    LVOT diameter 2.03 cm    LVOT area 3.2 cm2    LVOT peak rodolfo 1.02 m/s    LVOT peak VTI 18.80 cm    Ao peak rodolfo 1.30 m/s    Ao VTI 23.4 cm    LVOT stroke volume 60.82 cm3    AV peak gradient 7 mmHg    TR Max Rodolfo 3.26 m/s    LV Systolic Volume 55.01 mL    LV Systolic Volume Index 21.0 mL/m2    LV  Diastolic Volume 137.65 mL    LV Diastolic Volume Index 52.54 mL/m2    LA Volume Index 44.9 mL/m2    LV Mass Index 63 g/m2    Right Atrium Volume Systolic 97.02 mL    RA Major Axis 5.42 cm    Left Atrium Minor Axis 5.88 cm    Left Atrium Major Axis 5.53 cm    Triscuspid Valve Regurgitation Peak Gradient 43 mmHg    LA Volume Index (Mod) 30.1 mL/m2    LA volume (mod) 78.78 cm3    RA Width 3.80 cm    Right Atrial Pressure (from IVC) 3 mmHg    EF 55 %    TV rest pulmonary artery pressure 46 mmHg    Narrative    · Moderate left atrial enlargement.  · The left ventricle is normal in size with normal systolic function.  · The estimated ejection fraction is 55%.  · Indeterminate left ventricular diastolic function.  · Normal right ventricular size with normal right ventricular systolic   function.  · Normal central venous pressure (3 mmHg).  · The estimated PA systolic pressure is 46 mmHg.  · There is pulmonary hypertension.          Pending Diagnostic Studies:       None          Indwelling Lines/Drains at time of discharge:   Lines/Drains/Airways       None                   Time spent on the discharge of patient: 51 minutes         Jenna Carrillo MD  Department of Hospital Medicine  [facility]

## 2023-07-18 NOTE — PLAN OF CARE
Pt ready for discharge. Wife at bedside. No s/s of distress noted. Pt free from injury. IV discontinued. Tele monitor discontinued. Belongings with pt. Rx delivered to pt's bedside. Discharge instructions reviewed with pt. Pt verbalized understanding. F/u appts made. Transport arranged for pt to d/c home with home health.

## 2023-07-19 NOTE — PROGRESS NOTES
C3 nurse attempted to contact Gene Rothman  for a TCC post hospital discharge follow up call. The patient is unable to conduct the call @ this time. The patient requested a callback.    The patient has a scheduled [facility] appointment with Cassidy Dickerson PA-C  (Internal Medicine) on7/24/23 @ 7:20 AM. Message sent to Physician staff.

## 2023-07-20 NOTE — TELEPHONE ENCOUNTER
I called pt. No answer. LVM for him to return my call.           ----- Message from Marina Wilson RN sent at 2023  9:23 AM CDT -----  Regardin hour call

## 2023-07-20 NOTE — PROGRESS NOTES
C3 nurse spoke with Gene Rothman (and wife)  for a TCC post hospital discharge follow up call. The patient has a scheduled [facility] appointment with Cassidy Dickerson PA-C  (Internal Medicine) on 7/24/23 @ 7:20 AM.

## 2023-07-21 NOTE — TELEPHONE ENCOUNTER
"[facility]) hospital discharge 48-72 hour phone follow up completed.     Most Recent Hospital Discharge Date: 7/18/2023  Last admission Diagnosis/chief complaint:Encephalopathy    TCC nurse Navigator spoke with jose Banda    Current Patient reported weight: Pt unable to weigh, bedbound.    Current Patient reported blood pressure and heart rate: Pt does not have a machine.     Pt reports the following:  []  Shortness of Breath with Activity  []  Shortness of Breath at rest   []  Fatigue  []  Edema   [] Chest pain or tightness  [] Weight Increase since discharge  [x] None of the above    Medications:   Discharge medication reviewed with pt.  Pt reports having medication list available and has all medications at home for use per list.     Education:     Reviewed key points as listed below.     Recommend 2 -3 gram sodium restriction and 1500 cc-2000 cc fluid restriction.  Encourage physical activity with graded exercise program.  Requested patient to weigh themselves daily, and to notify us if their weight increases by more than 3 lbs in 1 day or 5 lbs in 3 days.   Reminded patient to use "Daily weight and symptom tracker" to record and guide patient on when and how to call HFT[facility]. PT may also use symptom tracker if no scale available  Pt reports being in the green(color) Zone. If in yellow/red, reminded that they should be calling HFT[facility] today or now.     Watch for these Signs and Symptoms: If any of these occur, contact HFT[facility] immediately:   Increase in shortness of breath with movement   Increase in swelling in your legs and ankles   Weight gain of more than 3 pounds in a day or 5 pounds in 3 days.   Difficulty breathing when you are lying down   Worsening fatigue or tiredness   Stomach bloating, a full feeling or a loss of appetite   Increased coughing--especially when you are lying down      Solo educated on follow-up plan while in HFT[facility] program to include:   Week 1 -  " F/u appt with JUAN Taylor on 7/25/2023 verified with son. Solo informed pt bedbound, could not tell me last time pt able to get up, informed PT working with pt. F/u appt changed to virtual ok'd with JUAN Taylor and verifed with Solo.    Week 2-5 - In person/ Virtual/ phone call check ins    Week 5-7 - Pt will discharge from [facility] and transition to longterm care provider (Cardiology/PCP/ Advanced Heart Failure).      Patient active on myChart? Yes      Pt given the following contact information for ease of communication: [phone] (Mon-Fri, 8a-5p) & for urgent issues on the weekend call Jose A on-call [phone] to page the Cardiology MD on call.  Pt also encouraged utilize [facility] messaging as well.      Will follow up with pt at first clinic visit and HF nurse navigator available for pt questions, issues or concerns.

## 2023-07-24 NOTE — PROGRESS NOTES
"Subjective:      Patient ID: Gene Rothman is a 77 y.o. male.    Chief Complaint: No chief complaint on file.    The patient location is: [place]   The chief complaint leading to consultation is: hospital h/u    Visit type: audiovisual    Face to Face time with patient: 1:05-1:17  20 minutes of total time spent on the encounter, which includes face to face time and non-face to face time preparing to see the patient (eg, review of tests), Obtaining and/or reviewing separately obtained history, Documenting clinical information in the electronic or other health record, Independently interpreting results (not separately reported) and communicating results to the patient/family/caregiver, or Care coordination (not separately reported).     Each patient to whom he or she provides medical services by telemedicine is:  (1) informed of the relationship between the physician and patient and the respective role of any other health care provider with respect to management of the patient; and (2) notified that he or she may decline to receive medical services by telemedicine and may withdraw from such care at any time.    Notes: Patient is known to me, being seen today for hospital follow up.     Admitted on 7/14 for acute on chronic resp failure, SOB, COPD exacerbation, metabolic encephalopathy.  Discharged 7/18 with steroids (COPD exacerbation) and Augmentin (UTI), completed both   To continue home O and inhalers/nebulizer   At discharge procalitonin normal, potassium and chloride low, kidney function decreased from baseline, persistent stable anemia, urine culture shows sensitivity to Augmentin  CT "There are ground-glass opacities within the right upper lobe and right lower lobe, to include a 2.2 cm ground-glass nodule.  Infectious and inflammatory changes must be considered.  A follow-up chest CT scan in 6-8 weeks is recommended to re-evaluate."  Card appt tomorrow (virtual), needs [facility] hospital f/u   [facility]    [facility] coming, skilled nursing, PT, OT     Patient currently can't leave house, must be transported via ambulance     Last visit June 2023 with myself.     Review of Systems   Constitutional:  Positive for diaphoresis. Negative for chills and fever.   HENT:  Negative for congestion, rhinorrhea and sore throat.    Respiratory:  Positive for cough (mild) and shortness of breath (worse when lying down). Negative for wheezing.         On supplemental oxygen (4L), 96%   Gastrointestinal:  Negative for abdominal pain, constipation, diarrhea, nausea and vomiting.   Skin:  Negative for rash.   Neurological:  Negative for dizziness, light-headedness and headaches.     Objective:   There were no vitals taken for this visit.  Physical Exam  Constitutional:       General: He is not in acute distress.     Appearance: He is well-developed. He is not ill-appearing or diaphoretic.      Interventions: Face mask in place.      Comments: Bedbound   HENT:      Head: Normocephalic and atraumatic.      Right Ear: External ear normal.      Left Ear: External ear normal.   Eyes:      General: Lids are normal.         Right eye: No discharge.         Left eye: No discharge.      Conjunctiva/sclera: Conjunctivae normal.      Right eye: Right conjunctiva is not injected.      Left eye: Left conjunctiva is not injected.   Pulmonary:      Effort: Pulmonary effort is normal. No respiratory distress.   Skin:     General: Skin is warm and dry.      Findings: No rash.   Neurological:      Mental Status: He is alert and oriented to person, place, and time.   Psychiatric:         Speech: Speech normal.         Behavior: Behavior normal.         Thought Content: Thought content normal.         Judgment: Judgment normal.     Assessment:      1. Acute on chronic respiratory failure with hypoxia and hypercapnia    2. Chronic obstructive pulmonary disease with acute exacerbation    3. Stage 3 chronic kidney disease,  unspecified whether stage 3a or 3b CKD    4. Hypokalemia       Plan:   Acute on chronic respiratory failure with hypoxia and hypercapnia    Chronic obstructive pulmonary disease with acute exacerbation    Stage 3 chronic kidney disease, unspecified whether stage 3a or 3b CKD    Hypokalemia  -     Comprehensive Metabolic Panel; Future; Expected date: 07/24/2023      F/u Pulm to be scheduled     Keep upcoming appt with [facility] NP and specialists     Transitional Care Note    Family and/or Caretaker present at visit?  Yes.  Diagnostic tests reviewed/disposition: I have reviewed all completed as well as pending diagnostic tests at the time of discharge.  Disease/illness education: Discussed  Home health/community services discussion/referrals: Patient has home health established at [facility] .   Establishment or re-establishment of referral orders for community resources: No other necessary community resources.   Discussion with other health care providers: No discussion with other health care providers necessary.

## 2023-07-25 PROBLEM — J96.12 CHRONIC RESPIRATORY FAILURE WITH HYPOXIA AND HYPERCAPNIA: Status: ACTIVE | Noted: 2023-01-01

## 2023-07-25 PROBLEM — J96.11 CHRONIC RESPIRATORY FAILURE WITH HYPOXIA AND HYPERCAPNIA: Status: ACTIVE | Noted: 2023-01-01

## 2023-07-25 NOTE — LETTER
Gene Rothman  [address] [place] [address]      Dear Gene Rothman,     I work with [facility] Outpatient Care Management Department. We received a referral to call you to discuss your medical history. These services are free of charge and are offered to [facility] patients who have recently been discharged from any of our facilities or who have medical conditions that may require the skill of a nurse to assist with management.     I am a Registered Nurse who specializes in connecting patients with available medical and financial resources as well as addressing any educational needs that may be indicated.    I attempted to reach you by telephone, but I was unsuccessful. Please call our department so that we can go over some questions with you, regarding your health.    The Outpatient Care Management Department can be reached at [phone] from 8:00AM to 4:30 PM, on Monday thru Friday.     Additionally, [facility] also has a program where a nurse is available 24/7 to answer questions or provide medical advice, their number is [phone].      Thanks,        Lorri Martínez RN  Outpatient Care Management  Phone #: [phone]

## 2023-07-25 NOTE — PROGRESS NOTES
[facility] Provider Note (Initial Clinic) Consult Note    Date of original referral: 7/20/2023  Age: 77 y.o.  Gender: male  Ethnicity: Not  or /a   Number of admissions for CHF within the preceding year:   Duration of CHF:   Type of Congestive Heart Failure: Diastolic   Etiology: Non-ischemic    Social history:   Enrolled in Infusion suite: no    Diagnostic Labs:   EKG - 07/17/2023  CXR - 07/14/2023  ECHO - 07/17/2023  Stress test -   Stress echo -   Pharmacologic stress -   Cardiac catheterization -    Cardiac MRI -     Lab Results   Component Value Date     07/17/2023     07/16/2023    K 3.3 (L) 07/17/2023    K 3.4 (L) 07/16/2023    CL 88 (L) 07/17/2023    CL 89 (L) 07/16/2023    CO2 40 (H) 07/17/2023    CO2 40 (H) 07/16/2023     07/17/2023     (H) 07/16/2023    BUN 40 (H) 07/17/2023    BUN 32 (H) 07/16/2023    CREATININE 1.3 07/17/2023    CREATININE 1.1 07/16/2023    CALCIUM 8.5 (L) 07/17/2023    CALCIUM 8.6 (L) 07/16/2023    PROT 7.2 07/17/2023    PROT 8.4 07/14/2023    ALBUMIN 2.6 (L) 07/17/2023    ALBUMIN 2.8 (L) 07/14/2023    BILITOT 0.3 07/17/2023    BILITOT 0.6 07/14/2023    ALKPHOS 60 07/17/2023    ALKPHOS 85 07/14/2023    AST 15 07/17/2023    AST 22 07/14/2023    ALT 11 07/17/2023    ALT 13 07/14/2023    ANIONGAP 11 07/17/2023    ANIONGAP 10 07/16/2023    ESTGFRAFRICA 40 12/23/2022    ESTGFRAFRICA 32 12/20/2022    EGFRNONAA 44 (A) 06/14/2022    EGFRNONAA 38 (A) 06/13/2022       Lab Results   Component Value Date    WBC 7.36 07/17/2023    WBC 6.73 07/16/2023    RBC 3.47 (L) 07/17/2023    RBC 3.06 (L) 07/16/2023    HGB 10.6 (L) 07/17/2023    HGB 9.6 (L) 07/16/2023    HCT 34.0 (L) 07/17/2023    HCT 30.3 (L) 07/16/2023    MCV 98 07/17/2023    MCV 99 (H) 07/16/2023    MCH 30.5 07/17/2023    MCH 31.4 (H) 07/16/2023    MCHC 31.2 (L) 07/17/2023    MCHC 31.7 (L) 07/16/2023    RDW 14.6 (H) 07/17/2023    RDW 14.7 (H) 07/16/2023     07/17/2023     07/16/2023    MPV  9.4 07/17/2023    MPV 9.7 07/16/2023    IMMGR 0.4 07/17/2023    IMMGR 0.3 07/16/2023    IGABS 0.03 07/17/2023    IGABS 0.02 07/16/2023    LYMPH 1.0 07/17/2023    LYMPH 12.9 (L) 07/17/2023    MONO 0.8 07/17/2023    MONO 10.9 07/17/2023    EOS 0.0 07/17/2023    EOS 0.0 07/16/2023    BASO 0.00 07/17/2023    BASO 0.01 07/16/2023    NRBC 0 07/17/2023    NRBC 0 07/16/2023    GRAN 5.6 07/17/2023    GRAN 75.8 (H) 07/17/2023    EOSINOPHIL 0.0 07/17/2023    EOSINOPHIL 0.0 07/16/2023    BASOPHIL 0.0 07/17/2023    BASOPHIL 0.1 07/16/2023    PLTEST Appears normal 12/09/2021    PLTEST Appears normal 12/08/2021    ANISO 1+ (A) 02/03/2011    ANISO 1+ (A) 02/02/2011    HYPO Occasional 12/08/2021    HYPO Occasional 12/04/2021       Lab Results   Component Value Date     (H) 07/14/2023     (H) 03/01/2023    MG 2.0 03/03/2023    MG 1.8 09/19/2022    PHOS 2.8 12/13/2021    PHOS 2.9 12/09/2021    TROPONINI <0.006 07/14/2023    TROPONINI 0.020 03/01/2023    HGBA1C 5.6 01/03/2022    HGBA1C 5.5 05/19/2021    TSH 2.252 07/14/2023    TSH 3.002 01/11/2023    FREET4 0.84 05/19/2021    FREET4 0.94 06/02/2017    V6AYLUA 6.4 05/19/2021       Lab Results   Component Value Date    IRON 12 (L) 12/12/2022    TIBC 211 12/12/2022    FERRITIN 334.9 (H) 12/12/2022    CHOL 72 (L) 01/03/2022    TRIG 85 01/03/2022    HDL 28 (L) 01/03/2022    LDLCALC 27.0 (L) 01/03/2022    CHOLHDL 38.9 01/03/2022    TOTALCHOLEST 2.6 01/03/2022    NONHDLCHOL 44 01/03/2022    COLORU Yellow 07/14/2023    APPEARANCEUA Hazy (A) 07/14/2023    PHUR 8.0 07/14/2023    SPECGRAV 1.010 07/14/2023    PROTEINUA Trace (A) 07/14/2023    GLUCUA Negative 07/14/2023    KETONESU Negative 07/14/2023    BILIRUBINUA Negative 07/14/2023    OCCULTUA 2+ (A) 07/14/2023    NITRITE Positive (A) 07/14/2023    LEUKOCYTESUR 3+ (A) 07/14/2023       List all implanted cardiac devices:     Cardiomems: no    Current Outpatient Medications on File Prior to Visit   Medication Sig Dispense Refill     acetaminophen (TYLENOL) 500 MG tablet Take 500 mg by mouth every 6 (six) hours as needed for Pain.      apixaban (ELIQUIS) 5 mg Tab Take 1 tablet (5 mg total) by mouth 2 (two) times daily. 180 tablet 3    aspirin 81 MG Chew Take 1 tablet (81 mg total) by mouth once daily. 30 tablet 0    atorvastatin (LIPITOR) 40 MG tablet Take 1 tablet (40 mg total) by mouth once daily. 30 tablet 0    furosemide (LASIX) 40 MG tablet Take 1 tablet (40 mg total) by mouth once daily. 90 tablet 3    levalbuterol (XOPENEX) 1.25 mg/3 mL nebulizer solution Take 3 mLs (1.25 mg total) by nebulization every 4 (four) hours as needed for Wheezing. Rescue 50 each 3    levothyroxine (SYNTHROID) 50 MCG tablet Take 1 tablet (50 mcg total) by mouth before breakfast. 30 tablet 11    nebulizer and compressor (HOME NEBULIZER PLUS SIDESTREAM) Diana use as directed 1 each 0    senna-docusate 8.6-50 mg (PERICOLACE) 8.6-50 mg per tablet Take 1 tablet by mouth once daily.      tiotropium (SPIRIVA) 18 mcg inhalation capsule Inhale 1 capsule (18 mcg total) into the lungs once daily. Controller 30 capsule 11     No current facility-administered medications on file prior to visit.         HPI:  Patient does not ambulate   Patient sleeps on 2 pillows   Patient wakes up SOB, has to get out of bed, associated cough, sputum none   Palpitations - none   Dizzy, light-headed, pre-syncope or syncope none   Since discharge frequency of performing weights, home weight and weight change no weight, bed bound   Other information felt pertinent to HPI    The patient location is: [place]   The chief complaint leading to consultation is: hospital h/u     Visit type: audiovisual       20 minutes of total time spent on the encounter, which includes face to face time and non-face to face time preparing to see the patient (eg, review of tests), Obtaining and/or reviewing separately obtained history, Documenting clinical information in the electronic or other health record,  "Independently interpreting results (not separately reported) and communicating results to the patient/family/caregiver, or Care coordination (not separately reported).      Each patient to whom he or she provides medical services by telemedicine is:  (1) informed of the relationship between the physician and patient and the respective role of any other health care provider with respect to management of the patient; and (2) notified that he or she may decline to receive medical services by telemedicine and may withdraw from such care at any time.     Notes: Patient is being seen today for hospital follow up.      Admitted on 7/14 for acute on chronic resp failure, SOB, COPD exacerbation, metabolic encephalopathy.  Discharged 7/18 with steroids (COPD exacerbation) and Augmentin (UTI), completed both   To continue home O and inhalers/nebulizer   At discharge procalitonin normal, potassium and chloride low, kidney function decreased from baseline, persistent stable anemia, urine culture shows sensitivity to Augmentin  CT "There are ground-glass opacities within the right upper lobe and right lower lobe, to include a 2.2 cm ground-glass nodule.  Infectious and inflammatory changes must be considered.  A follow-up chest CT scan in 6-8 weeks is recommended to re-evaluate."  Card appt tomorrow (virtual), needs [facility] f/u   Has [facility] appt later this week     [facility] coming, skilled nursing, PT, OT      Patient currently can't leave house, must be transported via ambulance     Today reports no SOB, no LE edema. Is on lasix 40 mg daily    Bedbound. No PND.         PHYSICAL: There were no vitals filed for this visit.   Wt Readings from Last 3 Encounters:   07/17/23 (!) 176 kg (388 lb)   03/07/23 (!) 180.7 kg (398 lb 5.9 oz)   02/20/23 (!) 154.7 kg (341 lb)     Physical Exam  Constitutional:       General: He is not in acute distress.     Appearance: He is well-developed. He is not ill-appearing or " diaphoretic.      Interventions: Face mask in place.      Comments: Bedbound   HENT:      Head: Normocephalic and atraumatic.      Right Ear: External ear normal.      Left Ear: External ear normal.   Eyes:      General: Lids are normal.         Right eye: No discharge.         Left eye: No discharge.      Conjunctiva/sclera: Conjunctivae normal.      Right eye: Right conjunctiva is not injected.      Left eye: Left conjunctiva is not injected.   Pulmonary:      Effort: Pulmonary effort is normal. No respiratory distress.   Skin:     General: Skin is warm and dry.      Findings: No rash.   Neurological:      Mental Status: He is alert and oriented to person, place, and time.   Psychiatric:         Speech: Speech normal.         Behavior: Behavior normal.         Thought Content: Thought content normal.         Judgment: Judgment normal.       ASSESSMENT: chronic hypoxic resp failure, diastolic dysfunction     PLAN:      Patient Instructions:   Instruct the patient to notify this clinic if HH, a physician or an advanced care provider wants to change medication one of their HF medications   Activity and Diet restrictions:   Recommend 2-3 gram sodium restriction and 1500cc- 2000cc fluid restriction.  Encourage physical activity with graded exercise program.  Requested patient to weigh themselves daily, and to notify us if their weight increases by more than 3 lbs in 1 day or 5 lbs in 3 days.    Assigned dry weight on home scale: 170 kg  Medication changes (include current dose and changed dose)  HF education done  Continue lasix 40 mg daily  Recommend palliative care following patient  Continue home services  Close HF episode   Upcoming labs and date anticipated: prn

## 2023-07-25 NOTE — PROGRESS NOTES
7-25-23 1st attempt to reach for OPCM enrollment, left VM. Unable to reach letter will be mailed and will attempt to reach again tomorrow.

## 2023-08-01 NOTE — PROGRESS NOTES
Spoke with patient and patient's son Marvin to tell them that the patient needs to go to the ER because of his CO2 level is critical high. Son and patient verbalized understanding and will call 911.

## 2023-08-01 NOTE — TELEPHONE ENCOUNTER
Please have someone evaluate this patient. Looks like he was recently sent home from the hospital and has [facility] services. I have not seen him since 2019 and would  need to have a visit with him.

## 2023-08-01 NOTE — TELEPHONE ENCOUNTER
Called patient and spoke to him and his son and recommended that the patient be evaluated at the ER. Son Beau and patient verbalized understanding

## 2023-08-02 NOTE — ED PROVIDER NOTES
"SCRIBE #1 NOTE: I, Marina Espana, am scribing for, and in the presence of, Lynda Holly MD. I have scribed the entire note.       History     Chief Complaint   Patient presents with    Shortness of Breath     Pt from home with SOB. Home health called EMS after finding pt to have "high CO2". EMS reports initial CO2 in the 70's and O2 on home 5L nasal cannula oxygen concentrator was 90%. Placed on EMS O2 tank at 5L and sat up, pt's O2 increased to 97% and CO2 decreased to 40. Pt no longer complaining of SOB.      Review of patient's allergies indicates:   Allergen Reactions    Sulfamethoxazole-trimethoprim Itching and Shortness Of Breath    Sulfamethoxazole          History of Present Illness     HPI    8/1/2023, 7:30 PM  History obtained from the patient      History of Present Illness: Gene Rothman is a 77 y.o. male patient with a PMHx of HTN, atrial fibrillation, CKD, CHF and CAD who presents to the Emergency Department for evaluation of SOB. Pt's home health nurse called EMS after finding pt having "high CO2". Per EMS, initial CO2 in the 70's and O2 on home 5L nasal cannula oxygen concentrator was 90%. Placed on EMS O2 tank at 5L and sat up, pt's O2 increased to 97% and CO2 decreased to 40. Pt no longer complaining of SOB. Symptoms intermittent and moderate in severity. No associated sxs reported.  No further complaints or concerns at this time.       Arrival mode: EMS  PCP: Ami Zarate DO        Past Medical History:  Past Medical History:   Diagnosis Date    Acute hypoxemic respiratory failure 9/17/2022    Arthritis     Atrial fibrillation 3/1/2023    CHF (congestive heart failure)     Chronic kidney disease     Coronary artery disease     Depression     Hypertension     Hypertensive heart disease with heart failure 9/16/2022    Hypothyroidism     Lymphedema of lower extremity     Nephrolithiasis     Obesity     Peripheral vascular disease     Pneumonia 9/17/2022    Recurrent cellulitis of lower leg     " Sleep apnea     can't stand the CPAP , sleeps elevated in hospital bed or chair    Tobacco dependence     resolved       Past Surgical History:  Past Surgical History:   Procedure Laterality Date    ADENOIDECTOMY      BACK SURGERY  ;  1976    x2 for disc; scar tissue removed    debridement of bilateral leg ulcers Bilateral 2017    Dr Hernandez    INNER EAR SURGERY Bilateral     separate - pinnaplasty , new eardrms constructed    KIDNEY STONE SURGERY Left  approx    open    TONSILLECTOMY           Family History:  Family History   Problem Relation Age of Onset    Cancer Mother         ? abd also    Diabetes Mother     Hypertension Mother     Cancer Father         ? abdonimal origin stomach/liver or pancreas    Heart disease Father         pacer    Cancer Brother         skin-part of ext ear removed    Heart disease Brother         CABG3    Alzheimer's disease Brother     Asthma Daughter          26 w/ asthma    Alcohol abuse Maternal Grandfather     Stroke Neg Hx     COPD Neg Hx     Mental illness Neg Hx     Mental retardation Neg Hx     Kidney disease Neg Hx        Social History:  Social History     Tobacco Use    Smoking status: Former     Current packs/day: 0.00     Average packs/day: 1.5 packs/day for 1 year (1.5 ttl pk-yrs)     Types: Cigarettes     Start date:      Quit date:      Years since quittin.6    Smokeless tobacco: Never   Substance and Sexual Activity    Alcohol use: Not Currently    Drug use: Never    Sexual activity: Never        Review of Systems     Review of Systems   Respiratory:  Positive for shortness of breath.       Physical Exam     Initial Vitals   BP Pulse Resp Temp SpO2   23 1738 23 1738 23 1738 23 1851 23 1738   118/67 63 (!) 29 97.3 °F (36.3 °C) 95 %      MAP       --                 Physical Exam  Nursing Notes and Vital Signs Reviewed.  Constitutional: Patient is in no acute distress. Morbidly obese. Bed bound.  "  Head: Atraumatic. Normocephalic.  Eyes: PERRL. EOM intact. Conjunctivae are not pale. No scleral icterus.  ENT: Mucous membranes are moist. Oropharynx is clear and symmetric.    Neck: Supple. Full ROM. No lymphadenopathy.  Cardiovascular: Regular rate. Regular rhythm. No murmurs, rubs, or gallops. Distal pulses are 2+ and symmetric.  Pulmonary/Chest: Diminished breath sounds bilaterally. Nasal canula O2.   Abdominal: Soft and non-distended.  There is no tenderness.  No rebound, guarding, or rigidity.  Genitourinary: No CVA tenderness  Musculoskeletal: Moves all extremities. No obvious deformities. No edema.  Skin: Warm and dry.  Neurological:  Alert, awake, and appropriate.  Normal speech.  No acute focal neurological deficits are appreciated.  Psychiatric: Normal affect. Good eye contact. Appropriate in content.     ED Course   Procedures  ED Vital Signs:  Vitals:    08/01/23 1738 08/01/23 1753 08/01/23 1851 08/01/23 2003   BP: 118/67  (!) 141/65 (!) 143/68   Pulse: 63 64 (!) 56 (!) 58   Resp: (!) 29  20    Temp:   97.3 °F (36.3 °C)    TempSrc:   Oral    SpO2: 95%  (!) 94% 97%   Weight:   (!) 178.6 kg (393 lb 11.9 oz)    Height: 5' 5" (1.651 m)       08/01/23 2100   BP: 139/64   Pulse: (!) 58   Resp: (!) 26   Temp:    TempSrc:    SpO2: (!) 94%   Weight:    Height:        Abnormal Lab Results:  Labs Reviewed   CBC W/ AUTO DIFFERENTIAL - Abnormal; Notable for the following components:       Result Value    RBC 3.50 (*)     Hemoglobin 10.3 (*)     Hematocrit 34.8 (*)     MCV 99 (*)     MCHC 29.6 (*)     RDW 15.0 (*)     All other components within normal limits   COMPREHENSIVE METABOLIC PANEL - Abnormal; Notable for the following components:    Chloride 90 (*)     CO2 39 (*)     Albumin 2.4 (*)     All other components within normal limits   B-TYPE NATRIURETIC PEPTIDE   TROPONIN I        All Lab Results:  Results for orders placed or performed during the hospital encounter of 08/01/23   CBC auto differential "   Result Value Ref Range    WBC 6.31 3.90 - 12.70 K/uL    RBC 3.50 (L) 4.60 - 6.20 M/uL    Hemoglobin 10.3 (L) 14.0 - 18.0 g/dL    Hematocrit 34.8 (L) 40.0 - 54.0 %    MCV 99 (H) 82 - 98 fL    MCH 29.4 27.0 - 31.0 pg    MCHC 29.6 (L) 32.0 - 36.0 g/dL    RDW 15.0 (H) 11.5 - 14.5 %    Platelets 153 150 - 450 K/uL    MPV 9.7 9.2 - 12.9 fL    Immature Granulocytes 0.5 0.0 - 0.5 %    Gran # (ANC) 4.1 1.8 - 7.7 K/uL    Immature Grans (Abs) 0.03 0.00 - 0.04 K/uL    Lymph # 1.4 1.0 - 4.8 K/uL    Mono # 0.6 0.3 - 1.0 K/uL    Eos # 0.2 0.0 - 0.5 K/uL    Baso # 0.02 0.00 - 0.20 K/uL    nRBC 0 0 /100 WBC    Gran % 64.7 38.0 - 73.0 %    Lymph % 21.9 18.0 - 48.0 %    Mono % 9.4 4.0 - 15.0 %    Eosinophil % 3.2 0.0 - 8.0 %    Basophil % 0.3 0.0 - 1.9 %    Differential Method Automated    Comprehensive metabolic panel   Result Value Ref Range    Sodium 139 136 - 145 mmol/L    Potassium 4.0 3.5 - 5.1 mmol/L    Chloride 90 (L) 95 - 110 mmol/L    CO2 39 (H) 23 - 29 mmol/L    Glucose 104 70 - 110 mg/dL    BUN 21 8 - 23 mg/dL    Creatinine 0.8 0.5 - 1.4 mg/dL    Calcium 8.8 8.7 - 10.5 mg/dL    Total Protein 6.8 6.0 - 8.4 g/dL    Albumin 2.4 (L) 3.5 - 5.2 g/dL    Total Bilirubin 0.4 0.1 - 1.0 mg/dL    Alkaline Phosphatase 75 55 - 135 U/L    AST 16 10 - 40 U/L    ALT 16 10 - 44 U/L    eGFR >60 >60 mL/min/1.73 m^2    Anion Gap 10 8 - 16 mmol/L   B-Type natriuretic peptide (BNP)   Result Value Ref Range    BNP 83 0 - 99 pg/mL   Troponin I   Result Value Ref Range    Troponin I 0.018 0.000 - 0.026 ng/mL         Imaging Results:  Imaging Results              X-Ray Chest AP Portable (Final result)  Result time 08/01/23 18:00:50      Final result by Brannon Orellana MD (08/01/23 18:00:50)                   Impression:      No acute abnormality.      Electronically signed by: Brannon Orellana  Date:    08/01/2023  Time:    18:00               Narrative:    EXAMINATION:  XR CHEST AP PORTABLE    CLINICAL HISTORY:  Chest Pain;    TECHNIQUE:  Single  frontal view of the chest was performed.    COMPARISON:  None    FINDINGS:  No pneumonic consolidation.  Senescent changes.    Cardiomegaly    Bones are intact.                                       The EKG was ordered, reviewed, and independently interpreted by the ED provider.  Interpretation time: 17:45  Rate: 61 BPM  Rhythm: atrial fibrillation  Interpretation: No acute ST or T wave abnormalities. No STEMI.             The Emergency Provider reviewed the vital signs and test results, which are outlined above.     ED Discussion     7:38 PM: Reassessed pt at this time. . Discussed with pt all pertinent ED information and results. Discussed pt dx and plan of tx. Gave pt all f/u and return to the ED instructions. All questions and concerns were addressed at this time. Pt expresses understanding of information and instructions, and is comfortable with plan to discharge. Pt is stable for discharge.    I discussed with patient and/or family/caretaker that evaluation in the ED does not suggest any emergent or life threatening medical conditions requiring immediate intervention beyond what was provided in the ED, and I believe patient is safe for discharge.  Regardless, an unremarkable evaluation in the ED does not preclude the development or presence of a serious of life threatening condition. As such, patient was instructed to return immediately for any worsening or change in current symptoms.         Medical Decision Making:   Clinical Tests:   Lab Tests: Ordered and Reviewed  Radiological Study: Ordered and Reviewed  Medical Tests: Ordered and Reviewed           ED Medication(s):  Medications - No data to display    Discharge Medication List as of 8/1/2023  7:37 PM           Follow-up Information       Schedule an appointment as soon as possible for a visit  with Ami Zarate DO.    Specialty: Internal Medicine  Why: As needed  Contact information:  [address] [place] [address] [address] Reanna [place]AY [address] [phone]                                  Scribe Attestation:   Scribe #1: I performed the above scribed service and the documentation accurately describes the services I performed. I attest to the accuracy of the note.     Attending:   Physician Attestation Statement for Scribe #1: I, Lynda Holly MD, personally performed the services described in this documentation, as scribed by Marina Espana, in my presence, and it is both accurate and complete.           Clinical Impression       ICD-10-CM ICD-9-CM   1. Shortness of breath  R06.02 786.05       Disposition:   Disposition: Discharged  Condition: Stable         Lynda Holly MD  08/04/23 154

## 2023-08-02 NOTE — ED NOTES
Awake and alert, respirations even and unlabored, skin warm and dry, no apparent distress noted. Patient denies any questions upon discharge

## 2023-08-02 NOTE — PROGRESS NOTES
Outpatient Care Management  Patient Does Not Consent    Patient: Gene Rothman  MRN:  [id number]  Date of Service:  8/2/2023  Completed by:  Lorri Martínez RN    Chief Complaint   Patient presents with    Case Closure     Unable to reach x 3 attempts and no response to unable to reach letter mailed 1 week ago.        Patient Summary           Consent Received:  Decline

## 2023-08-03 NOTE — TELEPHONE ENCOUNTER
----- Message from Yessenia Diallo LPN sent at 8/3/2023  2:41 PM CDT -----  Contact: Pt    ----- Message -----  From: Anton Donohue  Sent: 8/3/2023   2:33 PM CDT  To: [facility] Clinical Staff    Type: Needs Medical Advice    Who Called:Pt wife   Best Call Back Number:[phone]    Additional Information Requesting a call back regarding Pt wife was calling to speak with office in regards to an upcoming appt pt wife stated to please call when available they have some questions Thank you  Please Advise-Thank you

## 2023-08-03 NOTE — PROGRESS NOTES
[facility]  Medical Home Visit    Visit Date: 8/4/2023  Encounter Provider: Kyara Stephens  PCP:  Ami Zarate DO    Subjective:      Patient ID: Gene Rothman is a 77 y.o. male.    Consult Requested By:  No ref. provider found  Reason for Consult:  Follow Up from an ER visit    Gene is being seen at home due to being seen at home due to physical debility that presents a taxing effort to leave the home, to mitigate high risk of hospital readmission and/or due to the limited availability of reliable or safe options for transportation to the point of access to the provider. Prior to treatment on this visit the chart was reviewed and patient verbal consent was obtained.    Chief Complaint: Follow-up      Patient is being seen today for a follow up for an ER visit. Upon arrival patient was lying in the bed. Upon arrival patient was laying in bed AAOx3 in no acute distress. Patient with O2 via face mask at 4.5L continuous. Patient has no new complaints of today. Patient went to ER on 8/1 with a CH CO2 of 45. Rechecked in the ER and CO2 was 39 and was subsequently discharged to home. Medications were reviewed and no medication changes were made. Patient reports compliance to all medications. VSS           Review of Systems   Constitutional: Negative.    HENT: Negative.     Eyes: Negative.    Respiratory:  Positive for shortness of breath (chronic no worsening).    Cardiovascular:  Positive for leg swelling.   Gastrointestinal: Negative.    Endocrine: Negative.    Genitourinary: Negative.    Musculoskeletal:  Positive for arthralgias and myalgias.   Allergic/Immunologic: Negative.    Neurological: Negative.        Assessments:  Environmental: Patient lives in a single family dwelling home with his family. There are no steps at the entrance. Lighting is dim and temperature is comfortable.  Functional Status: Patient is bed bound and needs assistance with ADLs  Safety: Fall, Oxygen and General Safety  Precautions  Nutritional: Adequate food in the home  Home Health/DME/Supplies: [facility], DMEs: wheelchair, hospital bed, oxygen concentrator    Objective:   Physical Exam  Vitals reviewed.   Constitutional:       General: He is not in acute distress.     Appearance: He is obese.   HENT:      Head: Normocephalic and atraumatic.      Nose: Nose normal.      Mouth/Throat:      Mouth: Mucous membranes are moist.      Pharynx: Oropharynx is clear.   Cardiovascular:      Rate and Rhythm: Normal rate and regular rhythm.      Pulses: Normal pulses.      Heart sounds: Normal heart sounds.   Abdominal:      General: Abdomen is protuberant. Bowel sounds are normal.      Palpations: Abdomen is soft.   Musculoskeletal:      Right lower leg: Edema present.      Left lower leg: Edema present.   Skin:     General: Skin is warm and dry.      Capillary Refill: Capillary refill takes less than 2 seconds.   Neurological:      Mental Status: He is alert and oriented to person, place, and time. Mental status is at baseline.   Psychiatric:         Mood and Affect: Mood normal.         Behavior: Behavior normal.         Thought Content: Thought content normal.         Judgment: Judgment normal.         Vitals:    08/04/23 1030   BP: 122/60   Pulse: (!) 56   Resp: 20   Temp: 97.7 °F (36.5 °C)   SpO2: 99%   PainSc: 0-No pain     There is no height or weight on file to calculate BMI.    Assessment:     1. Hypercapnia        Plan:     Ethical / Legal: Advance Care Planning   Surrogate decision maker:  Name Solo Rothman , Relationship: Son  Code Status:  Full Code  [place]:  None  Other advance directive:  None, Capacity to make medical decisions:  Yes, Conflict None       1. Hypercapnia  Comments:  patient last CO2 level on 8/1 was 39  Patient to ween down O2 from 4.5L (reduce 0.5L per day)   OK to keep O2 Sats no lwer than 90%   Cont meds as prescribed       Encounter for Medical Follow-Up and Medication Review  - [facility] to schedule follow-up visit with patient in 4 weeks or PRN     Patient Instructions Given:  - Continue all medications, treatments and therapies as ordered.   - Follow all instructions, recommendations as discussed.  - Maintain Safety Precautions at all times.  - Attend all medical appointments as scheduled.  - For worsening symptoms: call Primary Care Physician or Nurse Practitioner.  - For emergencies, call 911 or immediately report to the nearest emergency room      Were controlled substances prescribed?  No    Follow Up Appointments:   Future Appointments   Date Time Provider Department Center   8/10/2023  2:30 PM PULMONARY DISEASE MANAGEMENT [facility]  [facility]   9/8/2023  8:00 AM Kyara Cassidy NP [id number]       Signature: Kyara Cassidy NP

## 2023-08-10 NOTE — PATIENT INSTRUCTIONS
ACTION PLAN    GREEN ZONE  My sputum is clear/white/usual color and easily cleared.  My breathing is no harder than usual.  I can do my usual activities.  I can think clearly.   Take your usual medicines, including oxygen, as you are told to do so by your health care provider.   YELLOW ZONE  My sputum has change (color, thickness, amount).  I am more short of breath than usual.  I cough or wheeze more.  I weigh more and my legs/feet swell.  I cannot do my usual activities without resting.   Call your health care provider. You will probably be told to begin taking an antibiotic and prednisone. Have your pharmacy phone number available.   RED ZONE  I cannot cough out my sputum.  I am much more short of breath than normal.  I need to sit up to breathe  I cannot do my usual activities.  I am unable to speak more than one or two words at a time.  I am confused.   Call your health care provider. You may be asked to come in to be seen, told to go to the emergency room, or told to call 9-1-1.

## 2023-08-10 NOTE — PROGRESS NOTES
Pulmonary Disease Management  [facility]  Chronic Care Management  Initial Visit       Diagnosis: COPD  Last Hospital Admission: 23  Last provider visit: NA      Current Outpatient Medications:     acetaminophen (TYLENOL) 500 MG tablet, Take 500 mg by mouth every 6 (six) hours as needed for Pain., Disp: , Rfl:     apixaban (ELIQUIS) 5 mg Tab, Take 1 tablet (5 mg total) by mouth 2 (two) times daily., Disp: 180 tablet, Rfl: 3    aspirin 81 MG Chew, Take 1 tablet (81 mg total) by mouth once daily., Disp: 30 tablet, Rfl: 0    atorvastatin (LIPITOR) 40 MG tablet, Take 1 tablet (40 mg total) by mouth once daily., Disp: 30 tablet, Rfl: 0    furosemide (LASIX) 40 MG tablet, Take 1 tablet (40 mg total) by mouth once daily., Disp: 90 tablet, Rfl: 3    levalbuterol (XOPENEX) 1.25 mg/3 mL nebulizer solution, Take 3 mLs (1.25 mg total) by nebulization every 4 (four) hours as needed for Wheezing. Rescue, Disp: 50 each, Rfl: 3    levothyroxine (SYNTHROID) 50 MCG tablet, Take 1 tablet (50 mcg total) by mouth before breakfast., Disp: 30 tablet, Rfl: 11    nebulizer and compressor (HOME NEBULIZER PLUS SIDESTREAM) Diana, use as directed, Disp: 1 each, Rfl: 0    senna-docusate 8.6-50 mg (PERICOLACE) 8.6-50 mg per tablet, Take 1 tablet by mouth once daily., Disp: , Rfl:     tiotropium (SPIRIVA) 18 mcg inhalation capsule, Inhale 1 capsule (18 mcg total) into the lungs once daily. Controller, Disp: 30 capsule, Rfl: 11    Review of patient's allergies indicates:   Allergen Reactions    Sulfamethoxazole-trimethoprim Itching and Shortness Of Breath    Sulfamethoxazole        Smoking history:   Social History     Tobacco Use   Smoking Status Former    Current packs/day: 0.00    Average packs/day: 1.5 packs/day for 1 year (1.5 ttl pk-yrs)    Types: Cigarettes    Start date:     Quit date:     Years since quittin.6   Smokeless Tobacco Never             Patient Concerns or Expectations:   The patient would like to  know if he would benefit from CPAP/BiPAP.     Therapist Comments: Gene Rothman  was seen 8/10/2023  2:43 PM in the Pulmonary Disease management clinic for evaluation, education, reinforcement of self management techniques and exacerbation action plan.    Allison Martinez    Past Medical History:   Diagnosis Date    Acute hypoxemic respiratory failure 9/17/2022    Arthritis     Atrial fibrillation 3/1/2023    CHF (congestive heart failure)     Chronic kidney disease     Coronary artery disease     Depression     Hypertension     Hypertensive heart disease with heart failure 9/16/2022    Hypothyroidism     Lymphedema of lower extremity     Nephrolithiasis     Obesity     Peripheral vascular disease     Pneumonia 9/17/2022    Recurrent cellulitis of lower leg     Sleep apnea     can't stand the CPAP , sleeps elevated in hospital bed or chair    Tobacco dependence     resolved             Educational assessment:   [x]            Good  []            Fair  []            Poor    Readiness to learn:   [x]            Good  []            Fair  []            Poor    Vision Status:   [x]            Good  []            Fair  []            Poor    Reading Ability:  [x]            Good  []            Fair  []            Poor    Knowledge of condition:   [x]            Good  []            Fair  []            Poor    Language Barriers:   []            Good  []            Fair  []            Poor  [x]            None    Cognitive/ Physical Barriers:   []            Good  []            Fair  []            Poor  [x]            None    Learning best by:                       [x]            Seeing  [x]            Hearing  []            Reading                         [x]            Doing    Describe any barrier /Limitation or financial implications of care choices identified     []            Financial  []            Emotional  []            Education  []            Vision/Hearing  []            Physical  [x]            None  []                 TOPIC /CONTENT FOR TODAY:    [x]            MDI with or without spacer  [x]            Dry powder inhaler  []            Acapella   []           Peak Flow meter  [x]            COPD action plan  [x]            Nebulizer use  [x]            Oxygen use safety  [x]            Breathing and cough techniques  []            Energy conservation  []            Infection prevention  []           OTHER________________________        Learner:    [x]            Patient   [x]            Caregiver    Method:    [x]            Verbal explanation  []            Audio visual    []            Literature  [x]            Teach back      Evaluation:    [x]            Teach back  [x]            Demonstrate  []            Follow up phone call    []            2 weeks     []            4 weeks   []            PRN    Additional comments:    Patient was seen today to review respiratory medication purpose and proper technique for use of inhalers. Patient practiced proper technique using MDI with valved holding chamber (spacer) and DPI inhalers. Reviewed the difference in controller (Spiriva) and rescue (xopenex) medications. Patient demonstrated understanding. Literature was given to patient.     Infection prevention was discussed. Patient was reminded to get influenza vaccine. Hand hygiene and cleaning of respiratory equipment was also discussed. Patient verbalized understanding.      COPD action plan was reviewed and literature was given to patient. Patient verbalized understanding.        Reviewed breathing techniques such as pursed-lip breathing, wright-cough technique, and diaphragmatic breathing. Patient practiced proper technique and verbalized understanding. Literature given to patient.      Educated patient on the importance of utilizing supplemental oxygen when prescribed. Reviewed strategies for maximizing energy. Patient verbalized understanding. Literature given to patient.       Oxygen Therapy    Your body needs oxygen to  work the right way. There is some oxygen in the air around us that goes into our lungs and makes normal levels of oxygen in the blood. Some people need more oxygen than what is in the air around us to breathe easier. You may need extra oxygen for a:  Short-term problem. This might be an infection, asthma flare up, or lung injury.  Long-term problem. These are illnesses like COPD (chronic obstructive pulmonary disease), cystic fibrosis, or emphysema.  Heart problem. You may have had a heart attack or have heart failure. You may also have a problem with your heart that you were born with.  Oxygen can be:  Compressed ? Oxygen is stored in metal tanks under high pressure. The tanks come in different sizes. Some are small enough to carry around with you.  Liquid ? Liquid oxygen is very cold. It turns into a gas when you breathe. It takes up less room than other types of oxygen, but costs more.  Concentrated ? This uses oxygen already in the air. An electric machine filters out other gases and keeps just the oxygen. If you have this type of oxygen, you need to tell your electric company because your machine must be plugged into a wall socket to work.  Oxygen therapy is delivered to the lungs by:  Nasal cannula ? This is a plastic tube with two small prongs, which are placed in your nostrils. The long part of the tubing wraps around your ears, like glasses, then goes under your chin.  Face mask ? The mask fits over your nose and mouth. A tube attached to the mask is connected to the oxygen tank. You may need a mask if you get a higher rate of oxygen, if you have a stuffy nose, or cannot wear nasal cannula for some reason.    You will be able to breathe easier. You may be less tired and sleep better. You may see that your lips, ears, or fingers will have a more normal color.  The doctor may order an arterial blood gas (ABG) test. This is a blood test that shows how much oxygen is in your blood.  You may also have a pulse  oximetry test. This is a tiny probe that is clipped to your finger. It also tells how much oxygen is in your blood.  The doctor will tell you how much oxygen you need and how often you will have the oxygen therapy.  Oxygen therapy is measured in one of 2 ways. Some people call this the flow rate or oxygen setting. Your doctor may order an amount of flow like 2 or 3 liters per minute. Or your doctor may order a percent of oxygen like 40% or 50%. Sometimes your doctor will order your oxygen using both of these numbers.  If you need home oxygen therapy, an oxygen supplier will set up the equipment in your house.  You will breathe oxygen through a tube or mask. You will not feel any pain. The length of treatment depends on your sickness.  You may need a humidifier attached to the oxygen system. This will help add moisture to the oxygen to keep your nose and throat from feeling so dry.  If you are in a hospital, the staff will watch you closely and check your oxygen level. You may go home when your condition is stable. The length of treatment depends upon your illness.  Talk to your doctor about the right amount of activity for you.  You may have to take the oxygen tank with you when you go out.  Do not smoke or let anyone else smoke near you when you are using oxygen.  Tell your doctor and oxygen supplier if are going to travel.  Dry or stuffy nose  Nose bleeds  The tube may come off of the oxygen tank by accident  Risk of running out of oxygen  Risk of tripping over the tube  Risk of fire  Oxygen tank falls over and causes damage  Skin breakdown from the tubing being in one place all the time, such as behind your ears       Plan:  Monthly Pulmonary Disease Management Questionnaire  Follow-up PDM appointment scheduled for 9/13/23   Reinforce education  Meds: Spiriva  DME Needs: na  Action Plan: copd  Immunization: Pneumococcal- current, Flu-current  Next Provider Visit: na  Next Spirometry/CPFT: not  scheduled  Approximate time spent with patient: 60 minutes

## 2023-08-11 NOTE — TELEPHONE ENCOUNTER
----- Message from Yessenia Diallo LPN sent at 8/10/2023  5:26 PM CDT -----  Regarding: FW: cr    ----- Message -----  From: Juan Antonio Hernandez MD  Sent: 8/10/2023   5:14 PM CDT  To: Mary Romano Staff  Subject: FW: cr                                          Appt with Reyna Hartman  ----- Message -----  From: Allison Martinez RRT  Sent: 8/10/2023   5:02 PM CDT  To: Juan Antonio Hernandez MD  Subject: cr                                              Hi Dr. Mcfadden  This patient would like to be evaluated for PAP therapy.

## 2023-08-11 NOTE — TELEPHONE ENCOUNTER
----- Message from Jes Cuevas sent at 8/11/2023 10:40 AM CDT -----  Patient is requesting a call back concerning a call he just missed. Call back at [phone]  Thx jm

## 2023-08-15 NOTE — TELEPHONE ENCOUNTER
Returned patients wife call back. Patient wife rescheduled appt due to patient being bed bound. Wife stated she would call back at a later date once she calls the insurance company to see  how much everything would cost. Staff cancelled appointment----- Message from Coco Jean sent at 8/15/2023 12:01 PM CDT -----  Patient called in this morning stating her  is bed ridden and his appointment is scheduled of 12:40. Please call back [phone] .  Thanks tpw

## 2023-09-01 NOTE — PROGRESS NOTES
[facility]  Medical Home Visit    Visit Date: 9/1/2023  Encounter Provider: Kyara Stephens  PCP:  Ami Zarate DO    Subjective:      Patient ID: Gene Rothman is a 77 y.o. male.    Consult Requested By:  No ref. provider found  Reason for Consult:  Urgent (redness to left flank)    Gene is being seen at home due to being seen at home due to physical debility that presents a taxing effort to leave the home, to mitigate high risk of hospital readmission and/or due to the limited availability of reliable or safe options for transportation to the point of access to the provider. Prior to treatment on this visit the chart was reviewed and patient verbal consent was obtained.    Chief Complaint: No chief complaint on file.      Patient is being seen today for an urgent visit. Patient's HH nurse called and reports that the patient has redness to the left flank with small abrasions. Patient reports that he did not know it was there. He has scratched at the area. Patient reports no pain to the area. There is some warmth to the area. No drainage noted. No induration. Area is soft. Medications and allergies reviewed. Patient is allergic to Bactrim. VSS.            Review of Systems   Constitutional: Negative.  Negative for fever.   HENT: Negative.     Eyes: Negative.    Respiratory:  Positive for shortness of breath (chronic).    Cardiovascular:  Positive for leg swelling.   Gastrointestinal: Negative.    Genitourinary: Negative.    Skin:  Positive for color change (to left abdomen/flank).   Neurological: Negative.        Assessments:  Environmental: Patient lives in a single family dwelling woth his wife and adult daughter and son. Son is the primary caregiver to all in the home.   Functional Status: Patient is bed bound and needs assistance with all ADLs  Safety: Fall and Oxygen Precautions  Nutritional: Adequate food in the home  Home Health/DME/Supplies: [facility]    Objective:   Physical Exam  Vitals  reviewed.   Constitutional:       General: He is not in acute distress.     Appearance: He is obese.   HENT:      Head: Normocephalic and atraumatic.   Abdominal:      General: Bowel sounds are normal.      Palpations: Abdomen is soft.   Musculoskeletal:      Right lower leg: Edema present.      Left lower leg: Edema present.   Skin:     General: Skin is warm and dry.      Findings: Erythema present.   Neurological:      Mental Status: He is alert and oriented to person, place, and time.   Psychiatric:         Mood and Affect: Mood normal.         Behavior: Behavior normal.           There were no vitals filed for this visit.  There is no height or weight on file to calculate BMI.    Assessment:     1. Cellulitis of abdominal wall        Plan:     Ethical / Legal: Advance Care Planning   Surrogate decision maker:  Name Solo Rothman, Relationship: Son  Code Status:  Full Code  [place]:  None  Other advance directive:  None, Capacity to make medical decisions:  Yes, Conflict None       1. Cellulitis of abdominal wall  -     cefadroxil (DURICEF) 500 MG Cap; Take 1 capsule (500 mg total) by mouth every 12 (twelve) hours. for 7 days  Dispense: 14 capsule; Refill: 0  -     mupirocin (BACTROBAN) 2 % ointment; Apply topically 3 (three) times daily. for 7 days  Dispense: 1 each; Refill: 0       Encounter for Medical Follow-Up and Medication Review  - [facility] to schedule follow-up visit with patient in 4 weeks or PRN     Patient Instructions Given:  - Continue all medications, treatments and therapies as ordered.   - Follow all instructions, recommendations as discussed.  - Maintain Safety Precautions at all times.  - Attend all medical appointments as scheduled.  - For worsening symptoms: call Primary Care Physician or Nurse Practitioner.  - For emergencies, call 911 or immediately report to the nearest emergency room      Were controlled substances prescribed?  No    Follow Up Appointments:   Future  Appointments   Date Time Provider Department Center   9/13/2023  2:00 PM PULMONARY DISEASE MANAGEMENT [facility]       Signature: Kyara Stephens NP

## 2023-09-02 PROBLEM — R65.21 SEPTIC SHOCK: Status: ACTIVE | Noted: 2021-12-03

## 2023-09-02 PROBLEM — N17.9 AKI (ACUTE KIDNEY INJURY): Status: ACTIVE | Noted: 2023-01-01

## 2023-09-02 PROBLEM — K57.92 DIVERTICULITIS: Status: ACTIVE | Noted: 2023-01-01

## 2023-09-02 NOTE — ED NOTES
Xray for PICC line placement complete. MD at bedside to review. Ok to use PICC line per Dr. Linton.

## 2023-09-02 NOTE — ASSESSMENT & PLAN NOTE
Contributing to and complicating the above  Noncompliant with PAP therapy at home  Will attempt CPAP qhs while he is here

## 2023-09-02 NOTE — ASSESSMENT & PLAN NOTE
- continue Synthroid   Pt was to busy to talk. Pt will call on Monday to schedule for a 2 week f/u

## 2023-09-02 NOTE — CONSULTS
"Pharmacokinetic Initial Assessment: IV Vancomycin    Assessment/Plan:    Initiate intravenous vancomycin with loading dose of 2000 mg once with subsequent doses when random concentrations are less than 20 mcg/mL  Desired empiric serum trough concentration is 10 to 20 mcg/mL  Draw vancomycin random level on 9/3/23 at 0430.  Pharmacy will continue to follow and monitor vancomycin.      Please contact pharmacy at extension [phone] with any questions regarding this assessment.     Thank you for the consult,   Michel CarterD       Patient brief summary:  Gene Rothman is a 77 y.o. male initiated on antimicrobial therapy with IV Vancomycin for treatment of suspected sepsis, Source likely diverticulitis, possibly urine.     Drug Allergies:   Review of patient's allergies indicates:   Allergen Reactions    Sulfamethoxazole-trimethoprim Itching and Shortness Of Breath    Sulfamethoxazole      Actual Body Weight:   177 kg    Renal Function:   Estimated Creatinine Clearance: 50.7 mL/min (A) (based on SCr of 1.8 mg/dL (H)).,     Dialysis Method (if applicable):  N/A    CBC (last 72 hours):  Recent Labs   Lab Result Units 09/02/23  0349   WBC K/uL 16.33*   Hemoglobin g/dL 13.1*   Hematocrit % 43.7   Platelets K/uL 259   Gran % % 80.6*   Lymph % % 9.3*   Mono % % 8.8   Eosinophil % % 0.4   Basophil % % 0.3   Differential Method  Automated       Metabolic Panel (last 72 hours):  Recent Labs   Lab Result Units 09/02/23  0349 09/02/23  0908   Sodium mmol/L 142  --    Potassium mmol/L 3.3*  --    Chloride mmol/L 89*  --    CO2 mmol/L 38*  --    Glucose mg/dL 184*  --    Glucose, UA   --  Negative   BUN mg/dL 26*  --    Creatinine mg/dL 1.8*  --    Albumin g/dL 2.9*  --    Total Bilirubin mg/dL 0.7  --    Alkaline Phosphatase U/L 92  --    AST U/L 23  --    ALT U/L 13  --    Magnesium mg/dL 2.6  --        Drug levels (last 3 results):  No results for input(s): "VANCOMYCINRA", "VANCORANDOM", "VANCOMYCINPE", "VANCOPEAK", " ""VANCOMYCINTR", "VANCOTROUGH" in the last 72 hours.    Microbiologic Results:  Microbiology Results (last 7 days)       Procedure Component Value Units Date/Time    Urine culture [[id number]] Collected: 09/02/23 0908    Order Status: No result Specimen: Urine Updated: 09/02/23 0925    Blood culture #1 **CANNOT BE ORDERED STAT** [[id number]] Collected: 09/02/23 0822    Order Status: Sent Specimen: Blood from Line, PICC Right Basilic Updated: 09/02/23 0822    Blood culture #2 **CANNOT BE ORDERED STAT** [[id number]] Collected: 09/02/23 0810    Order Status: Sent Specimen: Blood from Line, PICC Right Basilic Updated: 09/02/23 0822          Thank you for allowing us to participate in this patient's care.     Zoe Merrill PharmD 09/02/2023 2:55 PM      "

## 2023-09-02 NOTE — PROCEDURES
Gene Rothman is a 77 y.o. male patient.    Temp: 97.7 °F (36.5 °C) (09/02/23 0700)  Pulse: 74 (09/02/23 0730)  Resp: (!) 26 (09/02/23 0730)  BP: 119/79 (09/02/23 0730)  SpO2: 97 % (09/02/23 0730)  Weight: (!) 177 kg (390 lb 3.4 oz) (09/02/23 0416)    PICC  Date/Time: 9/2/2023 8:00 AM  Performed by: Migue Olvera, RN  Consent Done: Yes  Time out: Immediately prior to procedure a time out was called to verify the correct patient, procedure, equipment, support staff and site/side marked as required  Indications: med administration  Anesthesia: local infiltration  Local anesthetic: lidocaine 1% without epinephrine  Anesthetic Total (mL): 2  Preparation: skin prepped with chlorhexidine (without alcohol)  Skin prep agent dried: skin prep agent completely dried prior to procedure  Sterile barriers: all five maximum sterile barriers used - cap, mask, sterile gown, sterile gloves, and large sterile sheet  Hand hygiene: hand hygiene performed prior to central venous catheter insertion  Location details: right brachial  Catheter type: double lumen  Catheter size: 4 Fr  Catheter Length: 44cm    Ultrasound guidance: yes  Vessel Caliber: small and patent, compressibility normal  Needle advanced into vessel with real time Ultrasound guidance.  Guidewire confirmed in vessel.  Sterile sheath used.  Number of attempts: 1  Post-procedure: blood return through all ports, chlorhexidine patch and sterile dressing applied  Specimens: No  Implants: No  Assessment: placement verified by x-ray, free fluid flow, no pneumothorax on x-ray and successful placement          Migue Olvera RN  9/2/2023

## 2023-09-02 NOTE — ED NOTES
Pt vomited brown malodorous emesis. O2 sat 86%. Pt place on 15 L venti mask at 40%. O2 sat 95%. RT notified.

## 2023-09-02 NOTE — ED NOTES
Pt had large bowel loose and liquid bowel movement. Pt cleaned and placed in dry gown and warm blankets.

## 2023-09-02 NOTE — SUBJECTIVE & OBJECTIVE
Past Medical History:   Diagnosis Date    Acute hypoxemic respiratory failure 2022    Arthritis     Atrial fibrillation 3/1/2023    CHF (congestive heart failure)     Chronic kidney disease     Coronary artery disease     Depression     Hypertension     Hypertensive heart disease with heart failure 2022    Hypothyroidism     Lymphedema of lower extremity     Nephrolithiasis     Obesity     Peripheral vascular disease     Pneumonia 2022    Recurrent cellulitis of lower leg     Sleep apnea     can't stand the CPAP , sleeps elevated in hospital bed or chair    Tobacco dependence     resolved       Past Surgical History:   Procedure Laterality Date    ADENOIDECTOMY      BACK SURGERY  ;  1976    x2 for disc; scar tissue removed    debridement of bilateral leg ulcers Bilateral 2017    Dr Hernandez    INNER EAR SURGERY Bilateral     separate - pinnaplasty , new eardrms constructed    KIDNEY STONE SURGERY Left  approx    open    TONSILLECTOMY         Review of patient's allergies indicates:   Allergen Reactions    Sulfamethoxazole-trimethoprim Itching and Shortness Of Breath    Sulfamethoxazole        Family History       Problem Relation (Age of Onset)    Alcohol abuse Maternal Grandfather    Alzheimer's disease Brother    Asthma Daughter    Cancer Mother, Father, Brother    Diabetes Mother    Heart disease Father, Brother    Hypertension Mother          Tobacco Use    Smoking status: Former     Current packs/day: 0.00     Average packs/day: 1.5 packs/day for 1 year (1.5 ttl pk-yrs)     Types: Cigarettes     Start date:      Quit date:      Years since quittin.7    Smokeless tobacco: Never   Substance and Sexual Activity    Alcohol use: Not Currently    Drug use: Never    Sexual activity: Never         Review of Systems   All other systems reviewed and are negative.    Objective:     Vital Signs (Most Recent):  Temp: 98.2 °F (36.8 °C) (23 0955)  Pulse: 73 (23  1050)  Resp: (!) 33 (09/02/23 1050)  BP: (!) 78/37 (09/02/23 1050)  SpO2: (!) 94 % (09/02/23 1050) Vital Signs (24h Range):  Temp:  [97.3 °F (36.3 °C)-98.2 °F (36.8 °C)] 98.2 °F (36.8 °C)  Pulse:  [62-79] 73  Resp:  [16-37] 33  SpO2:  [91 %-99 %] 94 %  BP: ()/(29-84) 78/37     Weight: (!) 177 kg (390 lb 3.4 oz)  Body mass index is 64.94 kg/m².      Intake/Output Summary (Last 24 hours) at 9/2/2023 1059  Last data filed at 9/2/2023 0957  Gross per 24 hour   Intake 2845 ml   Output 175 ml   Net 2670 ml        Physical Exam  Vitals reviewed.   Constitutional:       General: He is not in acute distress.     Comments: Morbidly obese, lying totally flat in bed because it hurts his abdomen to sit up   HENT:      Head: Normocephalic and atraumatic.   Eyes:      General: No scleral icterus.     Extraocular Movements: Extraocular movements intact.      Conjunctiva/sclera: Conjunctivae normal.      Pupils: Pupils are equal, round, and reactive to light.   Cardiovascular:      Rate and Rhythm: Normal rate and regular rhythm.      Pulses: Normal pulses.      Heart sounds: No murmur heard.  Pulmonary:      Effort: Pulmonary effort is normal. No respiratory distress.      Breath sounds: No wheezing, rhonchi or rales.   Abdominal:      Comments: Very morbidly obese, mild diffuse tenderness, soft, BS intact, no guarding   Musculoskeletal:      Cervical back: Normal range of motion and neck supple. No rigidity or tenderness.      Right lower leg: Edema present.      Left lower leg: Edema present.      Comments: Chronic lymphedema and venous stasis changes bilaterally   Skin:     General: Skin is warm and dry.      Coloration: Skin is not jaundiced or pale.   Neurological:      General: No focal deficit present.      Mental Status: He is alert and oriented to person, place, and time. Mental status is at baseline.          Vents:  Oxygen Concentration (%): 40 (09/02/23 1000)    Lines/Drains/Airways       Peripherally Inserted  Central Catheter Line  Duration             PICC Double Lumen 09/02/23 0800 right brachial <1 day              Drain  Duration                  Urethral Catheter 09/02/23 0900 Silicone 16 Fr. <1 day              Peripheral Intravenous Line  Duration                  Peripheral IV - Double Lumen 09/02/23 0418 18 G;20 G Right Upper Arm <1 day         Peripheral IV - Single Lumen 09/02/23 0338 22 G Right Antecubital <1 day                    Significant Labs:    CBC/Anemia Profile:  Recent Labs   Lab 09/02/23  0349   WBC 16.33*   HGB 13.1*   HCT 43.7      *   RDW 16.3*        Chemistries:  Recent Labs   Lab 09/02/23  0349      K 3.3*   CL 89*   CO2 38*   BUN 26*   CREATININE 1.8*   CALCIUM 9.5   ALBUMIN 2.9*   PROT 7.7   BILITOT 0.7   ALKPHOS 92   ALT 13   AST 23   MG 2.6       All pertinent labs within the past 24 hours have been reviewed.    Significant Imaging:   I have reviewed all pertinent imaging results/findings within the past 24 hours.

## 2023-09-02 NOTE — HPI
Mr Rothman is a 76 y/o morbidly obese man with COPD with OHS/DENISHA (noncompliant with PAP therapies), chronic hypoxic respiratory failure (4L, prefers to use simple mask), Afib on Eliquis, pHTN, diastolic CHF, RV dysfxn, and chronic lymphedema who presented to the ER today due to abdominal pain that started last night.  He reports diffuse abdominal pain and tenderness.  Deneis N/V, diarrhea, dyspnea, cough, sputum, F/C, sick contacts, chest pain, change in bowel/bladder.      Work-up in the ER showed mild distal descending colon diverticulitis, LA 2.8, WBC 16.33, Cr 1.8 (b/l around 1.0), Bicarb 38.  He was given Vanc/Zosyn.  Required initiation of Levo due to hypotension after he recieved his 30cc/kg of fluids.  He is being admitted to the ICU for septic shock, presumably with diverticulitis as the source.    Interval history:  9/2 - admitted to ICU on Levo for septic shock.    9/3 - no acute events.  Remains on Levo.  Abdominal pain improving.  9/4: no new issues or c/o noted, remains on levo and vaso - weaning as able, remains on venti mask per his choice and not NC  9/5: no new issues or c/o overnight, pt awake and alert on venti mask (his choice), weaning pressors s/t pt's mentation given inconsistencies in BP readings given body habitus, adding midodrine  9/6: remains on levo, increasing midodrine, no new issues or c/o on exam this AM, with some indigestion, no family at bedside   9/7: levo requirements decreasing, increasing midodrine to 15mg TID, no issues or n/o on exam, nursing reports pt continues to refuse many parts of his POC (ie: turning, NIPPV, etc.)  9/8: off levo since 2am, UOP remains low, crt bumped this AM to 2.3 - consulting nephro for assistance, stable for transfer of the ICU  9/12: Patient has been tolerant with nocturnal PAP therapy. Case discussed with primary team and requested evaluation for nocturnal home PAP therapy

## 2023-09-02 NOTE — PLAN OF CARE
Problem: Adult Inpatient Plan of Care  Goal: Plan of Care Review  Outcome: Ongoing, Progressing  Goal: Patient-Specific Goal (Individualized)  Outcome: Ongoing, Progressing  Goal: Absence of Hospital-Acquired Illness or Injury  Outcome: Ongoing, Progressing  Goal: Optimal Comfort and Wellbeing  Outcome: Ongoing, Progressing     Problem: Infection  Goal: Absence of Infection Signs and Symptoms  Outcome: Ongoing, Progressing     Problem: Impaired Wound Healing  Goal: Optimal Wound Healing  Outcome: Ongoing, Progressing     Problem: Skin Injury Risk Increased  Goal: Skin Health and Integrity  Outcome: Ongoing, Progressing     Problem: Fluid and Electrolyte Imbalance (Acute Kidney Injury/Impairment)  Goal: Fluid and Electrolyte Balance  Outcome: Ongoing, Progressing     Problem: Oral Intake Inadequate (Acute Kidney Injury/Impairment)  Goal: Optimal Nutrition Intake  Outcome: Ongoing, Progressing     Problem: Fall Injury Risk  Goal: Absence of Fall and Fall-Related Injury  Outcome: Ongoing, Progressing

## 2023-09-02 NOTE — NURSING TRANSFER
Nursing Transfer Note      9/2/2023   10:06 AM    Nurse giving handoff:ivan Haines  Nurse receiving handoff:ivan Nelson    Reason patient is being transferred: Levophed infusion    Transfer To: ICU 19 from ER    Transfer via stretcher    Transfer with supplemental O2, cardiac monitoring, Levophed infusing at 0.1 mcg/kg/min.    Transported by RN and tech    Transfer Vital Signs:  See chart for vitals    Telemetry: Box Number portable telemonitor, Rate 80, Rhythm a-fib, and Telemetry  RN  Order for Tele Monitor? Yes    Additional Lines: May Catheter    4eyes on Skin: yes    Medicines sent: Levophed.  Vancomycin 2gm    Any special needs or follow-up needed:  Continue to monitor vitals, titrate levo as ordered, give iv antibiotics.    Patient belongings transferred with patient:  Patient did not arrive with personal belongings.    Chart send with patient: Yes    Patient reassessed at: 09/02/2023 @ 0945    Upon arrival to floor: cardiac monitor applied, patient oriented to room, call bell in reach, and bed in lowest position.

## 2023-09-02 NOTE — ASSESSMENT & PLAN NOTE
Source likely diverticulitis, though urine is certainly possible  Blood and urine cultures pending  Started on Levo after 30cc/kg of saline  Vanc/Zosyn  Wean Levo for goal MAP > 65

## 2023-09-02 NOTE — ASSESSMENT & PLAN NOTE
Cr 1.8 at admission; baseline around 1.0  - likely 2/2 to sepsis and shock  - will continue to monitor with resuscitation  - renally dose meds and avoid nephrotoxins

## 2023-09-02 NOTE — ED PROVIDER NOTES
SCRIBE #1 NOTE: IKaruna, am scribing for, and in the presence of, Penny aHley DO. I have scribed the entire note.     SCRIBE #2 NOTE: I, Shirley Ventura, am scribing for, and in the presence of,  Kahlil Linton MD. I have scribed the remaining portions of the note not scribed by Scribe #1.      History     Chief Complaint   Patient presents with    Abdominal Pain     Abdominal pain after taking abx on empty stomach.     Review of patient's allergies indicates:   Allergen Reactions    Sulfamethoxazole-trimethoprim Itching and Shortness Of Breath    Sulfamethoxazole          History of Present Illness     HPI    9/2/2023, 3:35 AM  History obtained from the patient      History of Present Illness: Gene Rothman is a 77 y.o. male patient with a PMHx of CHF, CKD, oxygen dependence, atrial fibrillation, CAD, and HTN who presents to the Emergency Department for evaluation of abdominal pain which onset tonight. Pt reports sxs were caused by taking amoxicillin on an empty stomach even though he has taken amoxicillin before with no issues. Symptoms are constant and moderate in severity. No mitigating or exacerbating factors reported. Pt also reports nausea, diarrhea, and SOB. Patient denies any fever, chills, vomiting, CP, and all other sxs at this time. No prior tx reported. No further complaints or concerns at this time.       Arrival mode: Ambulance Service    PCP: Ami Zarate DO        Past Medical History:  Past Medical History:   Diagnosis Date    Acute hypoxemic respiratory failure 9/17/2022    Arthritis     Atrial fibrillation 3/1/2023    CHF (congestive heart failure)     Chronic kidney disease     Coronary artery disease     Depression     Hypertension     Hypertensive heart disease with heart failure 9/16/2022    Hypothyroidism     Lymphedema of lower extremity     Nephrolithiasis     Obesity     Peripheral vascular disease     Pneumonia 9/17/2022    Recurrent cellulitis of lower leg     Sleep  apnea     can't stand the CPAP , sleeps elevated in hospital bed or chair    Tobacco dependence     resolved       Past Surgical History:  Past Surgical History:   Procedure Laterality Date    ADENOIDECTOMY      BACK SURGERY  ;  1976    x2 for disc; scar tissue removed    debridement of bilateral leg ulcers Bilateral 2017    Dr Hernandez    INNER EAR SURGERY Bilateral     separate - pinnaplasty , new eardrms constructed    KIDNEY STONE SURGERY Left  approx    open    TONSILLECTOMY           Family History:  Family History   Problem Relation Age of Onset    Cancer Mother         ? abd also    Diabetes Mother     Hypertension Mother     Cancer Father         ? abdonimal origin stomach/liver or pancreas    Heart disease Father         pacer    Cancer Brother         skin-part of ext ear removed    Heart disease Brother         CABG3    Alzheimer's disease Brother     Asthma Daughter          26 w/ asthma    Alcohol abuse Maternal Grandfather     Stroke Neg Hx     COPD Neg Hx     Mental illness Neg Hx     Mental retardation Neg Hx     Kidney disease Neg Hx        Social History:  Social History     Tobacco Use    Smoking status: Former     Current packs/day: 0.00     Average packs/day: 1.5 packs/day for 1 year (1.5 ttl pk-yrs)     Types: Cigarettes     Start date:      Quit date:      Years since quittin.7    Smokeless tobacco: Never   Substance and Sexual Activity    Alcohol use: Not Currently    Drug use: Never    Sexual activity: Never        Review of Systems     Review of Systems   Constitutional:  Negative for chills and fever.   Respiratory:  Positive for shortness of breath.    Cardiovascular:  Negative for chest pain.   Gastrointestinal:  Positive for abdominal pain, diarrhea and nausea. Negative for vomiting.        Physical Exam     Initial Vitals [23 0136]   BP Pulse Resp Temp SpO2   105/65 67 18 97.6 °F (36.4 °C) 96 %      MAP       --          Physical  Exam  Nursing Notes and Vital Signs Reviewed.  Constitutional: Patient is in no acute distress. Well-developed and well-nourished.  Head: Atraumatic. Normocephalic.  Eyes: PERRL. EOM intact. Conjunctivae are not pale. No scleral icterus.  ENT: Mucous membranes are moist. Oropharynx is clear and symmetric.  hard of hearing  Cardiovascular: Regular rate. Regular rhythm  Pulmonary/Chest: No respiratory distress. Clear to auscultation bilaterally. No wheezing or rales.  Abdominal:  Diffuse tenderness.    Musculoskeletal: Moves all extremities. No obvious deformities. No calf tenderness.  Skin: Warm and dry.  Neurological:  Alert, awake, and appropriate.  Normal speech.  No acute focal neurological deficits are appreciated.  Psychiatric: Normal affect. Good eye contact. Appropriate in content.     ED Course   Critical Care    Date/Time: 9/2/2023 8:19 AM    Performed by: Kahlil Linton Jr., MD  Authorized by: Kahlil Linton Jr., MD  Direct patient critical care time: 20 minutes  Additional history critical care time: 10 minutes  Ordering / reviewing critical care time: 10 minutes  Documentation critical care time: 10 minutes  Consulting other physicians critical care time: 10 minutes  Total critical care time (exclusive of procedural time) : 60 minutes  Critical care time was exclusive of separately billable procedures and treating other patients and teaching time.  Critical care was necessary to treat or prevent imminent or life-threatening deterioration of the following conditions: septic shock.  Critical care was time spent personally by me on the following activities: blood draw for specimens, development of treatment plan with patient or surrogate, discussions with consultants, interpretation of cardiac output measurements, evaluation of patient's response to treatment, examination of patient, obtaining history from patient or surrogate, ordering and performing treatments and interventions, ordering and review  of laboratory studies, ordering and review of radiographic studies, pulse oximetry, re-evaluation of patient's condition and review of old charts.        ED Vital Signs:  Vitals:    09/02/23 0355 09/02/23 0400 09/02/23 0402 09/02/23 0410   BP: (!) 68/33 (!) 58/29 (!) 77/42 (!) 79/43   Pulse: 76  66 69   Resp: 17  (!) 24 20   Temp:       TempSrc:       SpO2: 97%  98% 98%   Weight:        09/02/23 0416 09/02/23 0422 09/02/23 0430 09/02/23 0443   BP:  132/62 (!) 123/56 129/60   Pulse:  69 72 76   Resp:  (!) 29 (!) 26 (!) 26   Temp:       TempSrc:       SpO2:  98% 96% 96%   Weight: (!) 177 kg (390 lb 3.4 oz)       09/02/23 0510 09/02/23 0550 09/02/23 0620 09/02/23 0640   BP: 118/61 (!) 129/58 129/78 (!) 113/58   Pulse: 74 73 79 75   Resp: 16 (!) 24 (!) 29 (!) 24   Temp:       TempSrc:       SpO2: 96% 97% 97% 97%   Weight:        09/02/23 0700 09/02/23 0730 09/02/23 0815   BP: (!) 114/55 119/79 (!) 117/56   Pulse: 74 74 76   Resp: (!) 28 (!) 26 (!) 26   Temp: 97.7 °F (36.5 °C)     TempSrc:      SpO2: 97% 97% 96%   Weight:          Abnormal Lab Results:  Labs Reviewed   CBC W/ AUTO DIFFERENTIAL - Abnormal; Notable for the following components:       Result Value    WBC 16.33 (*)     RBC 4.27 (*)     Hemoglobin 13.1 (*)      (*)     MCHC 30.0 (*)     RDW 16.3 (*)     Immature Granulocytes 0.6 (*)     Gran # (ANC) 13.2 (*)     Immature Grans (Abs) 0.09 (*)     Mono # 1.4 (*)     Gran % 80.6 (*)     Lymph % 9.3 (*)     All other components within normal limits   COMPREHENSIVE METABOLIC PANEL - Abnormal; Notable for the following components:    Potassium 3.3 (*)     Chloride 89 (*)     CO2 38 (*)     Glucose 184 (*)     BUN 26 (*)     Creatinine 1.8 (*)     Albumin 2.9 (*)     eGFR 38 (*)     All other components within normal limits   LIPASE - Abnormal; Notable for the following components:    Lipase 92 (*)     All other components within normal limits   TROPONIN I - Abnormal; Notable for the following components:     Troponin I 0.030 (*)     All other components within normal limits   TSH - Abnormal; Notable for the following components:    TSH 7.137 (*)     All other components within normal limits   LACTIC ACID, PLASMA - Abnormal; Notable for the following components:    Lactate (Lactic Acid) 2.8 (*)     All other components within normal limits   CULTURE, BLOOD   CULTURE, BLOOD   MAGNESIUM   B-TYPE NATRIURETIC PEPTIDE   T4, FREE   URINALYSIS, REFLEX TO URINE CULTURE        All Lab Results:  Results for orders placed or performed during the hospital encounter of 09/02/23   CBC auto differential   Result Value Ref Range    WBC 16.33 (H) 3.90 - 12.70 K/uL    RBC 4.27 (L) 4.60 - 6.20 M/uL    Hemoglobin 13.1 (L) 14.0 - 18.0 g/dL    Hematocrit 43.7 40.0 - 54.0 %     (H) 82 - 98 fL    MCH 30.7 27.0 - 31.0 pg    MCHC 30.0 (L) 32.0 - 36.0 g/dL    RDW 16.3 (H) 11.5 - 14.5 %    Platelets 259 150 - 450 K/uL    MPV 9.6 9.2 - 12.9 fL    Immature Granulocytes 0.6 (H) 0.0 - 0.5 %    Gran # (ANC) 13.2 (H) 1.8 - 7.7 K/uL    Immature Grans (Abs) 0.09 (H) 0.00 - 0.04 K/uL    Lymph # 1.5 1.0 - 4.8 K/uL    Mono # 1.4 (H) 0.3 - 1.0 K/uL    Eos # 0.1 0.0 - 0.5 K/uL    Baso # 0.05 0.00 - 0.20 K/uL    nRBC 0 0 /100 WBC    Gran % 80.6 (H) 38.0 - 73.0 %    Lymph % 9.3 (L) 18.0 - 48.0 %    Mono % 8.8 4.0 - 15.0 %    Eosinophil % 0.4 0.0 - 8.0 %    Basophil % 0.3 0.0 - 1.9 %    Differential Method Automated    Comprehensive metabolic panel   Result Value Ref Range    Sodium 142 136 - 145 mmol/L    Potassium 3.3 (L) 3.5 - 5.1 mmol/L    Chloride 89 (L) 95 - 110 mmol/L    CO2 38 (H) 23 - 29 mmol/L    Glucose 184 (H) 70 - 110 mg/dL    BUN 26 (H) 8 - 23 mg/dL    Creatinine 1.8 (H) 0.5 - 1.4 mg/dL    Calcium 9.5 8.7 - 10.5 mg/dL    Total Protein 7.7 6.0 - 8.4 g/dL    Albumin 2.9 (L) 3.5 - 5.2 g/dL    Total Bilirubin 0.7 0.1 - 1.0 mg/dL    Alkaline Phosphatase 92 55 - 135 U/L    AST 23 10 - 40 U/L    ALT 13 10 - 44 U/L    eGFR 38 (A) >60 mL/min/1.73 m^2     Anion Gap 15 8 - 16 mmol/L   Lipase   Result Value Ref Range    Lipase 92 (H) 4 - 60 U/L   Magnesium   Result Value Ref Range    Magnesium 2.6 1.6 - 2.6 mg/dL   Troponin I   Result Value Ref Range    Troponin I 0.030 (H) 0.000 - 0.026 ng/mL   TSH   Result Value Ref Range    TSH 7.137 (H) 0.400 - 4.000 uIU/mL   Lactic acid, plasma   Result Value Ref Range    Lactate (Lactic Acid) 2.8 (H) 0.5 - 2.2 mmol/L   Brain natriuretic peptide   Result Value Ref Range    BNP 67 0 - 99 pg/mL   T4, Free   Result Value Ref Range    Free T4 0.84 0.71 - 1.51 ng/dL         Imaging Results:  Imaging Results              X-Ray Chest AP Portable (Final result)  Result time 09/02/23 08:20:03      Final result by Live Duckworth III, MD (09/02/23 08:20:03)                   Impression:      Well-positioned PICC line.      Electronically signed by: Live Duckworth MD  Date:    09/02/2023  Time:    08:20               Narrative:    EXAMINATION:  XR CHEST AP PORTABLE    CLINICAL HISTORY:  Encounter for adjustment and management of vascular access device    FINDINGS:  Comparison is made with the most recent prior chest x-ray.  Right arm PICC line is well positioned in the SVC..  Stable cardiomegaly, pulmonary vascular congestion and trace bilateral pleural effusions..  No acute airspace consolidation or pneumothorax identified.                                       CT Abdomen Pelvis  Without Contrast (Final result)  Result time 09/02/23 07:13:05      Final result by Live Duckworth III, MD (09/02/23 07:13:05)                   Impression:      Mild distal descending colonic diverticulitis suspected with possible associated postinflammatory stricture causing a partial obstruction.  Possible rectal fecal impaction.  No free air or abscess.  Stable chronic findings, as above.    Note: All CT scans at this facility are performed  using dose modulation techniques as appropriate to performed exam including the following:  automated exposure  control; adjustment of mA and/or kV according to the patients size (this includes techniques or standardized protocols for targeted exams where dose is matched to indication/reason for exam: i.e. extremities or head);  iterative reconstruction technique.      Electronically signed by: Live Duckworth MD  Date:    09/02/2023  Time:    07:13               Narrative:    EXAMINATION:  CT ABDOMEN PELVIS WITHOUT CONTRAST    CLINICAL HISTORY:  Abdominal abscess/infection suspected;    TECHNIQUE:  Routine CT abdomen and pelvis performed without IV contrast.  Coronal and sagittal reformatted images obtained.    COMPARISON:  07/15/2023    FINDINGS:  Lung bases: No acute findings.  Bibasilar subsegmental atelectasis.    Bones: No acute osseous abnormality or suspicious bone lesions.    Kidneys and ureters: Stable bilateral renal atrophy, renal cysts, and prominent staghorn calculi in the right renal pelvis and calices.  No ureterolithiasis or hydronephrosis.    Stomach and bowel: Mild acute diverticulitis of the distal descending colon with luminal narrowing, upstream colonic dilation with air-fluid levels, possibly a post inflammatory stricture in partial obstruction versus spasm/peristalsis.  There is a large amount of stool in the rectum which is mildly distended in could represent fecal impaction.  No small bowel dilation.    Appendix: No evidence of appendicitis.    Liver: Unremarkable for noncontrast technique.    Gallbladder and bile ducts: Cholelithiasis without acute cholecystitis.    Pancreas: Unremarkable for noncontrast technique.    Spleen: Unremarkable for noncontrast technique.    Adrenals: Unremarkable.    Bladder: Unremarkable.    Aorta: No aneurysm.    Reproductive organs: Within normal limits.    Miscellaneous: No free intraperitoneal fluid, free air or abscess identified. No pathologic lymphadenopathy.                                       X-Ray Chest 1 View (Final result)  Result time 09/02/23 08:03:02       Final result by Live Duckworth III, MD (09/02/23 08:03:02)                   Impression:      No acute findings.      Electronically signed by: Live Duckworth MD  Date:    09/02/2023  Time:    08:03               Narrative:    EXAMINATION:  XR CHEST 1 VIEW    CLINICAL HISTORY:  Shortness of breath    FINDINGS:  Comparison is made with the most recent prior chest x-ray.  Stable cardiomegaly and aortic atherosclerosis..  The lungs appear clear of active disease. No acute appearing infiltrate, pleural effusion or pneumothorax identified.                        Wet Read by Kahlil Linton Jr., MD (09/02/23 07:59:06, [facility] - Emergency Dept., Emergency Medicine)    No acute findings                                   CT Abdomen and Pelvis Without IV Contrast  09/02/23 06:31  Impression: Findings compatible with acute diverticulitis of the descending colon. Follow-up advised after acute phase to exclude underlying lesion. Additional ancillary findings as above.     The EKG was ordered, reviewed, and independently interpreted by the ED provider.  Interpretation time: 03:36  Rate: 63 BPM  Rhythm: atrial fibrillation  Interpretation: Nonspecific T wave abnormality. No STEMI.           The Emergency Provider reviewed the vital signs and test results, which are outlined above.     ED Discussion       6:00 AM: Dr. Haley transfers care of patient to Dr. Linton pending Imaging results.     6:01 AM:  Sepsis focused re-evaluation:  30 cc/kilogram of IV crystalloid fluids provided to the patient.  Cardiopulmonary exam is unchanged.  Patient's vital signs have been reviewed the patient remains hypotensive.  Pressors have been initiated.  Pulses are 1+ with slowed cap refill prior to pressors.  Skin is pale but warm to the touch.    8:20 AM: Discussed case with Rodrigo Josue MD (Critical Care Medicine). Dr. Josue agrees with current care and management of pt and accepts admission.   Admitting Service: Critical Care  Medicine  Admitting Physician: Dr. Josue  Admit to: Inpatient    8:20 AM: Re-evaluated pt. I have discussed test results, shared treatment plan, and the need for admission with patient and family at bedside. Pt and family express understanding at this time and agree with all information. All questions answered. Pt and family have no further questions or concerns at this time. Pt is ready for admit.      ED Course as of 09/02/23 0854   Sat Sep 02, 2023   0345 Atrial fibrillation with a rate of 63.  Nonspecific T-wave changes.  No STEMI.  .  .  [NF]      ED Course User Index  [NF] Penny Haley, DO     Medical Decision Making  Differential diagnosis: Sepsis, severe sepsis, septic shock, hypotension, shortness breath, pneumonia, CO2 retention, renal insufficiency, diverticulitis, intra-abdominal abscess    Patient was evaluated for his initial complaints noted to have a blood pressure that dropped.  This refractory is sepsis fluids and pressors were initiated along with broad-spectrum antibiotics and sepsis workup.  Patient has sepsis focused re-evaluation after 30 cc/kilogram of crystalloid infusion.  This did not improve his blood pressure subsequently received a PICC line as well as pressors.  Workup indicated diverticulitis partial small-bowel obstruction.  Critical care team consulted to evaluate for admission.    Amount and/or Complexity of Data Reviewed  Labs: ordered. Decision-making details documented in ED Course.     Details: Patient with an elevated lactate at 2.8, free T4 0.84 is normal but TSH is elevated at 7.13.  Troponin is mildly elevated 0.03.  BNP is normal at 67 magnesium is normal at 2.6.  CMP shows renal insufficiency with BUN 26 creatinine 1.8.  CO2 is elevated at 38.  Has a history of CO2 retention.  Blood cultures have been obtained.  Patient has been dosed with sepsis fluids broad-spectrum antibiotics.  Currently on Levophed.  Radiology: ordered and independent  interpretation performed.     Details: Chest x-ray is negative.  Repeat chest x-ray shows good determination of the PICC.  CT abdomen pelvis shows mild distal descending colon diverticulitis suspected with postinflammatory stricture causing a partial obstruction.  ECG/medicine tests: ordered and independent interpretation performed. Decision-making details documented in ED Course.     Details: Atrial fibrillation rate of 63.  No STEMI  Discussion of management or test interpretation with external provider(s): Discussed the case with the critical care team.  They are going to evaluate for admission    Risk  Prescription drug management.  Drug therapy requiring intensive monitoring for toxicity.  Decision regarding hospitalization.    Critical Care  Total time providing critical care: 60 minutes           Medical Decision Making:   Clinical Tests:   Lab Tests: Ordered and Reviewed  Radiological Study: Ordered and Reviewed  Medical Tests: Ordered and Reviewed      ED Medication(s):  Medications   NORepinephrine 4 mg in dextrose 5% 250 mL infusion (premix) (0.1 mcg/kg/min × 177 kg Intravenous New Bag 9/2/23 3934)   vancomycin - pharmacy to dose (has no administration in time range)   piperacillin-tazobactam (ZOSYN) 4.5 g in dextrose 5 % in water (D5W) 100 mL IVPB (MB+) (4.5 g Intravenous New Bag 9/2/23 0831)   vancomycin 2 g in dextrose 5 % 500 mL IVPB (has no administration in time range)   sodium chloride 0.9% flush 10 mL (has no administration in time range)   sodium chloride 0.9% bolus 1,000 mL 1,000 mL (0 mLs Intravenous Stopped 9/2/23 0530)   sodium chloride 0.9% bolus 1,845 mL 1,845 mL (1,845 mLs Intravenous New Bag 9/2/23 0639)       New Prescriptions    No medications on file               Scribe Attestation:   Scribe #1: I performed the above scribed service and the documentation accurately describes the services I performed. I attest to the accuracy of the note.     Attending:   Physician Attestation  Statement for Scribe #1: I, Penny Haley DO, personally performed the services described in this documentation, as scribed by Karuna Logan, in my presence, and it is both accurate and complete.         Attending Attestation:           Physician Attestation for Scribe:    Physician Attestation Statement for Scribe #2: I, Kahlil Linton MD, reviewed documentation, as scribed by Shirley Ventura in my presence, and it is both accurate and complete. I also acknowledge and confirm the content of the note done by Campbellibe #1.           Clinical Impression       ICD-10-CM ICD-9-CM   1. Septic shock  A41.9 038.9    R65.21 785.52     995.92   2. SOB (shortness of breath)  R06.02 786.05   3. Acute diverticulitis  K57.92 562.11   4. Encounter for central line placement  Z45.2 V58.81   5. Acute renal failure, unspecified acute renal failure type  N17.9 584.9   6. Troponin I above reference range  R77.8 790.6       Disposition:   Disposition: Admitted  Condition: Serious         Kahlil Linton Jr., MD  09/02/23 0855

## 2023-09-02 NOTE — H&P
[facility] ([facility])  Critical Care Medicine  History & Physical    Patient Name: Gene Rothman  MRN: [id number]  Admission Date: 9/2/2023  Hospital Length of Stay: 0 days  Code Status: Full Code  Attending Physician: Rodrigo Josue MD   Primary Care Provider: Ami Zarate DO   Principal Problem: Septic shock    Subjective:     HPI:  Mr Rothman is a 78 y/o morbidly obese man with COPD with OHS/DENISHA (noncompliant with PAP therapies), chronic hypoxic respiratory failure (4L, prefers to use simple mask), Afib on Eliquis, pHTN, diastolic CHF, RV dysfxn, and chronic lymphedema who presented to the ER today due to abdominal pain that started last night.  He reports diffuse abdominal pain and tenderness.  Deneis N/V, diarrhea, dyspnea, cough, sputum, F/C, sick contacts, chest pain, change in bowel/bladder.      Work-up in the ER showed mild distal descending colon diverticulitis, LA 2.8, WBC 16.33, Cr 1.8 (b/l around 1.0), Bicarb 38.  He was given Vanc/Zosyn.  Required initiation of Levo due to hypotension after he recieved his 30cc/kg of fluids.  He is being admitted to the ICU for septic shock, presumably with diverticulitis as the source.      Hospital/ICU Course:  No notes on file     Past Medical History:   Diagnosis Date    Acute hypoxemic respiratory failure 9/17/2022    Arthritis     Atrial fibrillation 3/1/2023    CHF (congestive heart failure)     Chronic kidney disease     Coronary artery disease     Depression     Hypertension     Hypertensive heart disease with heart failure 9/16/2022    Hypothyroidism     Lymphedema of lower extremity     Nephrolithiasis     Obesity     Peripheral vascular disease     Pneumonia 9/17/2022    Recurrent cellulitis of lower leg     Sleep apnea     can't stand the CPAP , sleeps elevated in hospital bed or chair    Tobacco dependence     resolved       Past Surgical History:   Procedure Laterality Date    ADENOIDECTOMY  1961    BACK SURGERY   1975;  1976    x2 for disc; scar tissue removed    debridement of bilateral leg ulcers Bilateral 2017    Dr Hernandez    INNER EAR SURGERY Bilateral     separate - pinnaplasty , new eardrms constructed    KIDNEY STONE SURGERY Left  approx    open    TONSILLECTOMY         Review of patient's allergies indicates:   Allergen Reactions    Sulfamethoxazole-trimethoprim Itching and Shortness Of Breath    Sulfamethoxazole        Family History       Problem Relation (Age of Onset)    Alcohol abuse Maternal Grandfather    Alzheimer's disease Brother    Asthma Daughter    Cancer Mother, Father, Brother    Diabetes Mother    Heart disease Father, Brother    Hypertension Mother          Tobacco Use    Smoking status: Former     Current packs/day: 0.00     Average packs/day: 1.5 packs/day for 1 year (1.5 ttl pk-yrs)     Types: Cigarettes     Start date:      Quit date:      Years since quittin.7    Smokeless tobacco: Never   Substance and Sexual Activity    Alcohol use: Not Currently    Drug use: Never    Sexual activity: Never         Review of Systems   All other systems reviewed and are negative.    Objective:     Vital Signs (Most Recent):  Temp: 98.2 °F (36.8 °C) (23 0955)  Pulse: 73 (23 1050)  Resp: (!) 33 (23 1050)  BP: (!) 78/37 (23 1050)  SpO2: (!) 94 % (23 1050) Vital Signs (24h Range):  Temp:  [97.3 °F (36.3 °C)-98.2 °F (36.8 °C)] 98.2 °F (36.8 °C)  Pulse:  [62-79] 73  Resp:  [16-37] 33  SpO2:  [91 %-99 %] 94 %  BP: ()/(29-84) 78/37     Weight: (!) 177 kg (390 lb 3.4 oz)  Body mass index is 64.94 kg/m².      Intake/Output Summary (Last 24 hours) at 2023 1059  Last data filed at 2023 0957  Gross per 24 hour   Intake 2845 ml   Output 175 ml   Net 2670 ml        Physical Exam  Vitals reviewed.   Constitutional:       General: He is not in acute distress.     Comments: Morbidly obese, lying totally flat in bed because it hurts his abdomen  to sit up   HENT:      Head: Normocephalic and atraumatic.   Eyes:      General: No scleral icterus.     Extraocular Movements: Extraocular movements intact.      Conjunctiva/sclera: Conjunctivae normal.      Pupils: Pupils are equal, round, and reactive to light.   Cardiovascular:      Rate and Rhythm: Normal rate and regular rhythm.      Pulses: Normal pulses.      Heart sounds: No murmur heard.  Pulmonary:      Effort: Pulmonary effort is normal. No respiratory distress.      Breath sounds: No wheezing, rhonchi or rales.   Abdominal:      Comments: Very morbidly obese, mild diffuse tenderness, soft, BS intact, no guarding   Musculoskeletal:      Cervical back: Normal range of motion and neck supple. No rigidity or tenderness.      Right lower leg: Edema present.      Left lower leg: Edema present.      Comments: Chronic lymphedema and venous stasis changes bilaterally   Skin:     General: Skin is warm and dry.      Coloration: Skin is not jaundiced or pale.   Neurological:      General: No focal deficit present.      Mental Status: He is alert and oriented to person, place, and time. Mental status is at baseline.          Vents:  Oxygen Concentration (%): 40 (09/02/23 1000)    Lines/Drains/Airways       Peripherally Inserted Central Catheter Line  Duration             PICC Double Lumen 09/02/23 0800 right brachial <1 day              Drain  Duration                  Urethral Catheter 09/02/23 0900 Silicone 16 Fr. <1 day              Peripheral Intravenous Line  Duration                  Peripheral IV - Double Lumen 09/02/23 0418 18 G;20 G Right Upper Arm <1 day         Peripheral IV - Single Lumen 09/02/23 0338 22 G Right Antecubital <1 day                    Significant Labs:    CBC/Anemia Profile:  Recent Labs   Lab 09/02/23  0349   WBC 16.33*   HGB 13.1*   HCT 43.7      *   RDW 16.3*        Chemistries:  Recent Labs   Lab 09/02/23  0349      K 3.3*   CL 89*   CO2 38*   BUN 26*   CREATININE  1.8*   CALCIUM 9.5   ALBUMIN 2.9*   PROT 7.7   BILITOT 0.7   ALKPHOS 92   ALT 13   AST 23   MG 2.6       All pertinent labs within the past 24 hours have been reviewed.    Significant Imaging:   I have reviewed all pertinent imaging results/findings within the past 24 hours.    Assessment/Plan:     Pulmonary  Chronic obstructive pulmonary disease with acute exacerbation  Not in exacerbation  Bronchodilators PRN    Cardiac/Vascular  Atrial fibrillation  Rate controlled Afib currently  Continue Eliquis    Venous stasis dermatitis of both lower extremities  Chronic and longstanding  Supportive care  Will consider diuresis, when able    Renal/  BETY (acute kidney injury)  Cr 1.8 at admission; baseline around 1.0  - likely 2/2 to sepsis and shock  - will continue to monitor with resuscitation  - renally dose meds and avoid nephrotoxins    ID  * Septic shock  Source likely diverticulitis, though urine is certainly possible  Blood and urine cultures pending  Started on Levo after 30cc/kg of saline  Vanc/Zosyn  Wean Levo for goal MAP > 65    Endocrine  Morbid obesity with BMI of 60.0-69.9, adult  Contributing to and complicating the above    Hypothyroidism (acquired)  - continue Synthroid    GI  Diverticulitis  Mild findings on CT and abdominal pain  Abx as detailed in Septic Shock  Bowel regimen    Other  Chronic acquired lymphedema  Chronic and longstanding  Supportive care    Sleep apnea  Contributing to and complicating the above  Noncompliant with PAP therapy at home  Will attempt CPAP qhs while he is here     Critical Care Time: 49 minutes  Critical secondary to infusion of high risk medications     Critical care was time spent personally by me on the following activities: development of treatment plan with patient or surrogate and bedside caregivers, discussions with consultants, evaluation of patient's response to treatment, examination of patient, ordering and performing treatments and interventions, ordering and  review of laboratory studies, ordering and review of radiographic studies, pulse oximetry, re-evaluation of patient's condition. This critical care time did not overlap with that of any other provider or involve time for any procedures.     Rodrigo Josue MD  Critical Care Medicine  [facility] - Intensive [facility])

## 2023-09-03 NOTE — ASSESSMENT & PLAN NOTE
Source likely diverticulitis, though urine is certainly possible  Blood and urine cultures pending  Started on Levo after 30cc/kg of saline; vaso added overnight  Continue Vanc/Zosyn  Wean Levo for goal MAP > 65  Continue to trend lactic as it remains mildly elevated

## 2023-09-03 NOTE — PROGRESS NOTES
Pharmacokinetic Assessment Follow Up: IV Vancomycin    Vancomycin serum concentration assessment(s):    The random level was drawn correctly and can be used to guide therapy at this time. The measurement is within the desired definitive target range of 10 to 20 mcg/mL.    Vancomycin Regimen Plan:    Continue pulse dosing regimen with a one time dose of vancomycin 1750 mg.    Re-dose when the random level is less than 20 mcg/mL, next level to be drawn at 0430 on 09/04    Drug levels (last 3 results):  Recent Labs   Lab Result Units 09/03/23  0420   Vancomycin, Random ug/mL 17.1       Pharmacy will continue to follow and monitor vancomycin.    Please contact pharmacy at extension [phone] for questions regarding this assessment.    Thank you for the consult,   Delphine Shah       Patient brief summary:  eGne Rothman is a 77 y.o. male initiated on antimicrobial therapy with IV Vancomycin for treatment of sepsis (diverticulitis vs UTI)    The patient's current regimen is pulse dosing.    Drug Allergies:   Review of patient's allergies indicates:   Allergen Reactions    Sulfamethoxazole-trimethoprim Itching and Shortness Of Breath    Sulfamethoxazole        Actual Body Weight:   184.6 kg    Renal Function:   Estimated Creatinine Clearance: 46.9 mL/min (A) (based on SCr of 2 mg/dL (H)).,     Dialysis Method (if applicable):  N/A    CBC (last 72 hours):  Recent Labs   Lab Result Units 09/02/23  0349 09/03/23  0420   WBC K/uL 16.33* 26.70*   Hemoglobin g/dL 13.1* 12.1*   Hematocrit % 43.7 38.9*   Platelets K/uL 259 273   Gran % % 80.6* 84.9*   Lymph % % 9.3* 3.3*   Mono % % 8.8 10.4   Eosinophil % % 0.4 0.0   Basophil % % 0.3 0.4   Differential Method  Automated Automated       Metabolic Panel (last 72 hours):  Recent Labs   Lab Result Units 09/02/23  0349 09/02/23  0908 09/02/23  1903 09/03/23  0420   Sodium mmol/L 142  --  137 138   Potassium mmol/L 3.3*  --  3.9 3.8   Chloride mmol/L 89*  --  92* 94*   CO2 mmol/L 38*  --   32* 33*   Glucose mg/dL 184*  --  193* 173*   Glucose, UA   --  Negative  --   --    BUN mg/dL 26*  --  38* 45*   Creatinine mg/dL 1.8*  --  1.7* 2.0*   Albumin g/dL 2.9*  --   --  2.2*   Total Bilirubin mg/dL 0.7  --   --  1.1*   Alkaline Phosphatase U/L 92  --   --  58   AST U/L 23  --   --  23   ALT U/L 13  --   --  12   Magnesium mg/dL 2.6  --   --   --        Vancomycin Administrations:  vancomycin given in the last 96 hours                     vancomycin 2 g in dextrose 5 % 500 mL IVPB (mg) 2,000 mg New Bag 09/02/23 1021                    Microbiologic Results:  Microbiology Results (last 7 days)       Procedure Component Value Units Date/Time    Blood culture #1 **CANNOT BE ORDERED STAT** [[id number]] Collected: 09/02/23 0822    Order Status: Completed Specimen: Blood from Line, PICC Right Basilic Updated: 09/03/23 0115     Blood Culture, Routine No Growth to date    Blood culture #2 **CANNOT BE ORDERED STAT** [[id number]] Collected: 09/02/23 0810    Order Status: Completed Specimen: Blood from Line, PICC Right Basilic Updated: 09/03/23 0115     Blood Culture, Routine No Growth to date    Urine culture [[id number]] Collected: 09/02/23 0908    Order Status: No result Specimen: Urine Updated: 09/02/23 0925

## 2023-09-03 NOTE — SUBJECTIVE & OBJECTIVE
Interval History:   No acute events overnight.  Lactic still mildly elevated.  Cr up a bit with not much UOP yesterday.  Remains on levo (0.16 mcg/kg/min) and vaso added overnight, though pressors have room to wean this morning.  Remains on debbie-mask, which is his preferred mode of oxygen delivery.      Objective:     Vital Signs (Most Recent):  Temp: 99.3 °F (37.4 °C) (09/03/23 0701)  Pulse: 75 (09/03/23 0830)  Resp: (!) 26 (09/03/23 0830)  BP: 131/66 (09/03/23 0830)  SpO2: (!) 91 % (09/03/23 0830) Vital Signs (24h Range):  Temp:  [98.2 °F (36.8 °C)-100.7 °F (38.2 °C)] 99.3 °F (37.4 °C)  Pulse:  [71-93] 75  Resp:  [15-40] 26  SpO2:  [75 %-99 %] 91 %  BP: ()/() 131/66     Weight: (!) 184.6 kg (407 lb)  Body mass index is 67.73 kg/m².      Intake/Output Summary (Last 24 hours) at 9/3/2023 0906  Last data filed at 9/3/2023 0812  Gross per 24 hour   Intake 5700.91 ml   Output 789 ml   Net 4911.91 ml        Physical Exam  Vitals and nursing note reviewed.   Constitutional:       General: He is not in acute distress.     Appearance: He is obese.   Cardiovascular:      Rate and Rhythm: Normal rate and regular rhythm.      Pulses: Normal pulses.      Heart sounds: No murmur heard.  Pulmonary:      Effort: Pulmonary effort is normal. No respiratory distress.      Breath sounds: No wheezing, rhonchi or rales.      Comments: Shallow inspirations  Abdominal:      Comments: Super obese, soft, mild diffuse TTP, no guarding   Musculoskeletal:      Right lower leg: Edema present.      Left lower leg: Edema present.      Comments: Lymphedema and chronic venous stasis changes   Neurological:      General: No focal deficit present.      Mental Status: He is alert and oriented to person, place, and time. Mental status is at baseline.           Review of Systems    Vents:  Oxygen Concentration (%): 40 (09/03/23 0705)    Lines/Drains/Airways       Peripherally Inserted Central Catheter Line  Duration             PICC Double  Lumen 09/02/23 0800 right brachial 1 day              Drain  Duration                  Urethral Catheter 09/02/23 0900 Silicone 16 Fr. 1 day         Rectal Tube 09/02/23 1430 rectal tube w/ balloon (indicate number of mLs) <1 day              Peripheral Intravenous Line  Duration                  Peripheral IV - Double Lumen 09/02/23 0418 18 G;20 G Right Upper Arm 1 day         Peripheral IV - Single Lumen 09/02/23 0338 22 G Right Antecubital 1 day                    Significant Labs:    CBC/Anemia Profile:  Recent Labs   Lab 09/02/23  0349 09/03/23  0420   WBC 16.33* 26.70*   HGB 13.1* 12.1*   HCT 43.7 38.9*    273   * 99*   RDW 16.3* 16.4*        Chemistries:  Recent Labs   Lab 09/02/23 0349 09/02/23  1903 09/03/23  0420    137 138   K 3.3* 3.9 3.8   CL 89* 92* 94*   CO2 38* 32* 33*   BUN 26* 38* 45*   CREATININE 1.8* 1.7* 2.0*   CALCIUM 9.5 8.5* 8.2*   ALBUMIN 2.9*  --  2.2*   PROT 7.7  --  6.4   BILITOT 0.7  --  1.1*   ALKPHOS 92  --  58   ALT 13  --  12   AST 23  --  23   MG 2.6  --   --        All pertinent labs within the past 24 hours have been reviewed.    Significant Imaging:  I have reviewed all pertinent imaging results/findings within the past 24 hours.

## 2023-09-03 NOTE — PROGRESS NOTES
Pharmacist Renal Dose Adjustment Note    Gene Rothman is a 77 y.o. male being treated with the medication famotidine.    Patient Data:    Vital Signs (Most Recent):  Temp: 98.8 °F (37.1 °C) (09/03/23 1501)  Pulse: 70 (09/03/23 1702)  Resp: (!) 24 (09/03/23 1702)  BP: (!) 79/40 (09/03/23 1702)  SpO2: 95 % (09/03/23 1702) Vital Signs (72h Range):  Temp:  [97.3 °F (36.3 °C)-100.7 °F (38.2 °C)]   Pulse:  [62-93]   Resp:  [7-40]   BP: ()/()   SpO2:  [75 %-99 %]      Recent Labs   Lab 09/02/23  0349 09/02/23  1903 09/03/23  0420   CREATININE 1.8* 1.7* 2.0*     Serum creatinine: 2 mg/dL (H) 09/03/23 0420  Estimated creatinine clearance: 46.9 mL/min (A)    Medication: famotidine dose:  20 mg frequency 2 times daily will be changed to medication: famotidine dose: 20 mg frequency:daily, for CrCl < 50 mL/min    Pharmacist's Name: Varsha Soria

## 2023-09-03 NOTE — PLAN OF CARE
Problem: Adult Inpatient Plan of Care  Goal: Plan of Care Review  Outcome: Ongoing, Not Progressing  Pt remains AAOx4, no complaints of pain or discomfort. PRN Zofran given for nausea. Tmax 100.7, pt refused tylenol due to nausea; blankets removed and temp came down to 99. Afib on monitor. BP labile. PICC infusing Levo; Vaso and NS added this shift as well. 10-20ml/hr urine output via bowman cath. 175ml total output to rectal tube. Pt turned q2hrs with wedge, green heel boots in place. POC reviewed with pt and family, verbalized understanding but needs reinforcement. Will continue with current POC and update as needed.

## 2023-09-03 NOTE — ASSESSMENT & PLAN NOTE
Cr 1.8 at admission; baseline around 1.0  - likely 2/2 to sepsis and shock  - will continue to monitor with resuscitation  - renally dose meds and avoid nephrotoxins    Up to 2.0 today with not much UOP.  Very positively fluid balanced yesterday, will trial some diuresis this morning.

## 2023-09-03 NOTE — PROGRESS NOTES
[facility] Care ([facility])  Critical Care Medicine  Progress Note    Patient Name: Gene Rothman  MRN: [id number]  Admission Date: 9/2/2023  Hospital Length of Stay: 1 days  Code Status: Full Code  Attending Provider: Rodrigo Josue MD  Primary Care Provider: Ami Zarate DO   Principal Problem: Septic shock    Subjective:     HPI:  Mr Rothman is a 78 y/o morbidly obese man with COPD with OHS/DENISHA (noncompliant with PAP therapies), chronic hypoxic respiratory failure (4L, prefers to use simple mask), Afib on Eliquis, pHTN, diastolic CHF, RV dysfxn, and chronic lymphedema who presented to the ER today due to abdominal pain that started last night.  He reports diffuse abdominal pain and tenderness.  Deneis N/V, diarrhea, dyspnea, cough, sputum, F/C, sick contacts, chest pain, change in bowel/bladder.      Work-up in the ER showed mild distal descending colon diverticulitis, LA 2.8, WBC 16.33, Cr 1.8 (b/l around 1.0), Bicarb 38.  He was given Vanc/Zosyn.  Required initiation of Levo due to hypotension after he recieved his 30cc/kg of fluids.  He is being admitted to the ICU for septic shock, presumably with diverticulitis as the source.      Hospital/ICU Course:  9/2 - admitted to ICU on Levo for septic shock.    9/3 - no acute events.  Remains on Levo.  Abdominal pain improving.      Interval History:   No acute events overnight.  Lactic still mildly elevated.  Cr up a bit with not much UOP yesterday.  Remains on levo (0.16 mcg/kg/min) and vaso added overnight, though pressors have room to wean this morning.  Remains on debbie-mask, which is his preferred mode of oxygen delivery.      Objective:     Vital Signs (Most Recent):  Temp: 99.3 °F (37.4 °C) (09/03/23 0701)  Pulse: 75 (09/03/23 0830)  Resp: (!) 26 (09/03/23 0830)  BP: 131/66 (09/03/23 0830)  SpO2: (!) 91 % (09/03/23 0830) Vital Signs (24h Range):  Temp:  [98.2 °F (36.8 °C)-100.7 °F (38.2 °C)] 99.3 °F (37.4 °C)  Pulse:  [71-93] 75  Resp:  [15-40]  26  SpO2:  [75 %-99 %] 91 %  BP: ()/() 131/66     Weight: (!) 184.6 kg (407 lb)  Body mass index is 67.73 kg/m².      Intake/Output Summary (Last 24 hours) at 9/3/2023 0906  Last data filed at 9/3/2023 0812  Gross per 24 hour   Intake 5700.91 ml   Output 789 ml   Net 4911.91 ml        Physical Exam  Vitals and nursing note reviewed.   Constitutional:       General: He is not in acute distress.     Appearance: He is obese.   Cardiovascular:      Rate and Rhythm: Normal rate and regular rhythm.      Pulses: Normal pulses.      Heart sounds: No murmur heard.  Pulmonary:      Effort: Pulmonary effort is normal. No respiratory distress.      Breath sounds: No wheezing, rhonchi or rales.      Comments: Shallow inspirations  Abdominal:      Comments: Super obese, soft, mild diffuse TTP, no guarding   Musculoskeletal:      Right lower leg: Edema present.      Left lower leg: Edema present.      Comments: Lymphedema and chronic venous stasis changes   Neurological:      General: No focal deficit present.      Mental Status: He is alert and oriented to person, place, and time. Mental status is at baseline.           Review of Systems    Vents:  Oxygen Concentration (%): 40 (09/03/23 0705)    Lines/Drains/Airways       Peripherally Inserted Central Catheter Line  Duration             PICC Double Lumen 09/02/23 0800 right brachial 1 day              Drain  Duration                  Urethral Catheter 09/02/23 0900 Silicone 16 Fr. 1 day         Rectal Tube 09/02/23 1430 rectal tube w/ balloon (indicate number of mLs) <1 day              Peripheral Intravenous Line  Duration                  Peripheral IV - Double Lumen 09/02/23 0418 18 G;20 G Right Upper Arm 1 day         Peripheral IV - Single Lumen 09/02/23 0338 22 G Right Antecubital 1 day                    Significant Labs:    CBC/Anemia Profile:  Recent Labs   Lab 09/02/23  0349 09/03/23  0420   WBC 16.33* 26.70*   HGB 13.1* 12.1*   HCT 43.7 38.9*    273  "  * 99*   RDW 16.3* 16.4*        Chemistries:  Recent Labs   Lab 09/02/23  0349 09/02/23  1903 09/03/23  0420    137 138   K 3.3* 3.9 3.8   CL 89* 92* 94*   CO2 38* 32* 33*   BUN 26* 38* 45*   CREATININE 1.8* 1.7* 2.0*   CALCIUM 9.5 8.5* 8.2*   ALBUMIN 2.9*  --  2.2*   PROT 7.7  --  6.4   BILITOT 0.7  --  1.1*   ALKPHOS 92  --  58   ALT 13  --  12   AST 23  --  23   MG 2.6  --   --        All pertinent labs within the past 24 hours have been reviewed.    Significant Imaging:  I have reviewed all pertinent imaging results/findings within the past 24 hours.      ABG  No results for input(s): "PH", "PO2", "PCO2", "HCO3", "BE" in the last 168 hours.  Assessment/Plan:     Pulmonary  Chronic obstructive pulmonary disease with acute exacerbation  Not in exacerbation  Bronchodilators PRN  - goal SpO2 > 88%    Cardiac/Vascular  Atrial fibrillation  Rate controlled Afib currently  Continue Eliquis    Venous stasis dermatitis of both lower extremities  Chronic and longstanding  Supportive care  Will consider diuresis, when able    Renal/  BETY (acute kidney injury)  Cr 1.8 at admission; baseline around 1.0  - likely 2/2 to sepsis and shock  - will continue to monitor with resuscitation  - renally dose meds and avoid nephrotoxins    Up to 2.0 today with not much UOP.  Very positively fluid balanced yesterday, will trial some diuresis this morning.    ID  * Septic shock  Source likely diverticulitis, though urine is certainly possible  Blood and urine cultures pending  Started on Levo after 30cc/kg of saline; vaso added overnight  Continue Vanc/Zosyn  Wean Levo for goal MAP > 65  Continue to trend lactic as it remains mildly elevated    Endocrine  Morbid obesity with BMI of 60.0-69.9, adult  Contributing to and complicating the above    Hypothyroidism (acquired)  - continue Synthroid    GI  Diverticulitis  Mild findings on CT and abdominal pain  Abx as detailed in Septic Shock  Bowel regimen    Other  Chronic " acquired lymphedema  Chronic and longstanding  Supportive care    Sleep apnea  Contributing to and complicating the above  Noncompliant with PAP therapy at home  Will attempt CPAP qhs while he is here      Critical Care Time: 33 minutes  Critical secondary to infusion of high risk medications      Critical care was time spent personally by me on the following activities: development of treatment plan with patient or surrogate and bedside caregivers, discussions with consultants, evaluation of patient's response to treatment, examination of patient, ordering and performing treatments and interventions, ordering and review of laboratory studies, ordering and review of radiographic studies, pulse oximetry, re-evaluation of patient's condition. This critical care time did not overlap with that of any other provider or involve time for any procedures.     Rodrigo Josue MD  Critical Care Medicine  '[place] - Intensive Care ([facility])

## 2023-09-03 NOTE — PLAN OF CARE
Pt AAOx4. Afebrile. May in place; low UOP; MD aware. Vmask at documented settings; no SOB reported. Pt on bariatric bed; turned [id number]. Pt remains on levo and vaso drips for BP support; see MAR. NSR with intermittent rate controlled Afibb. Pt and pt's wife updated on status and plan of care.

## 2023-09-04 NOTE — PLAN OF CARE
No significant changes this shift. Pt remains on levo and vaso for BP support. 10-20 ml/hr UO. Turned q2h. Bilat heel boots in place. Safety precautions maintained. Call light within reach. Will continue to monitor.

## 2023-09-04 NOTE — PLAN OF CARE
Pt AAOx4. Oxygenating >88% SPO2 with venturi mask set at 15% FiO2. Pt remained in rate controlled a-fib and MAP >65 with Levo and Vaso. Flexiseal in place with no output during this shift. May in place with total output of 416 mL during this shift. Pt refused to turn q2h. Put specialty bed on alternating pressure q10 min to prevent skin breakdown. Dry and flaky skin noted in bilat lower extremities. POC reviewed with pt and spouse. Bed in lowest position, side rails up x3, call light within reach, wheels locked, and alarms on and audible.

## 2023-09-04 NOTE — PROGRESS NOTES
Pharmacokinetic Assessment Follow Up: IV Vancomycin    Vancomycin serum concentration assessment(s):    The random level was drawn correctly and can be used to guide therapy at this time. The measurement is above the desired definitive target range of 10 to 20 mcg/mL.    Vancomycin Regimen Plan:    Re-dose when the random level is less than 20 mcg/mL, next level to be drawn at 1630 on 09/04    Drug levels (last 3 results):  Recent Labs   Lab Result Units 09/03/23 0420 09/04/23 0432   Vancomycin, Random ug/mL 17.1 23.1       Pharmacy will continue to follow and monitor vancomycin.    Please contact pharmacy at extension [phone] for questions regarding this assessment.    Thank you for the consult,   Delphine Shah       Patient brief summary:  Gene Rothman is a 77 y.o. male initiated on antimicrobial therapy with IV Vancomycin for treatment of  sepsis (diverticulitis vs UTI)    The patient's current regimen is pulse dosing    Drug Allergies:   Review of patient's allergies indicates:   Allergen Reactions    Sulfamethoxazole-trimethoprim Itching and Shortness Of Breath    Sulfamethoxazole        Actual Body Weight:   188.2 kg    Renal Function:   Estimated Creatinine Clearance: 48.6 mL/min (A) (based on SCr of 2.1 mg/dL (H)).,     Dialysis Method (if applicable):  N/A    CBC (last 72 hours):  Recent Labs   Lab Result Units 09/02/23 0349 09/03/23 0420 09/04/23 0432   WBC K/uL 16.33* 26.70* 19.50*   Hemoglobin g/dL 13.1* 12.1* 10.4*   Hematocrit % 43.7 38.9* 34.0*   Platelets K/uL 259 273 245   Gran % % 80.6* 84.9*  --    Lymph % % 9.3* 3.3*  --    Mono % % 8.8 10.4  --    Eosinophil % % 0.4 0.0  --    Basophil % % 0.3 0.4  --    Differential Method  Automated Automated  --        Metabolic Panel (last 72 hours):  Recent Labs   Lab Result Units 09/02/23  0349 09/02/23  0908 09/02/23  1903 09/03/23 0420 09/04/23 0432   Sodium mmol/L 142  --  137 138 136   Potassium mmol/L 3.3*  --  3.9 3.8 3.8   Chloride mmol/L 89*   --  92* 94* 93*   CO2 mmol/L 38*  --  32* 33* 29   Glucose mg/dL 184*  --  193* 173* 159*   Glucose, UA   --  Negative  --   --   --    BUN mg/dL 26*  --  38* 45* 50*   Creatinine mg/dL 1.8*  --  1.7* 2.0* 2.1*   Albumin g/dL 2.9*  --   --  2.2* 1.9*   Total Bilirubin mg/dL 0.7  --   --  1.1* 1.0   Alkaline Phosphatase U/L 92  --   --  58 49*   AST U/L 23  --   --  23 20   ALT U/L 13  --   --  12 10   Magnesium mg/dL 2.6  --   --   --  2.2       Vancomycin Administrations:  vancomycin given in the last 96 hours                     vancomycin 1.75 g in 5 % dextrose 500 mL IVPB (mg) 1,750 mg New Bag 09/03/23 0657    vancomycin 2 g in dextrose 5 % 500 mL IVPB (mg) 2,000 mg New Bag 09/02/23 1021                    Microbiologic Results:  Microbiology Results (last 7 days)       Procedure Component Value Units Date/Time    Urine culture [[id number]]  (Abnormal) Collected: 09/02/23 0908    Order Status: Completed Specimen: Urine Updated: 09/03/23 2104     Urine Culture, Routine PRESUMPTIVE E COLI  >100,000 cfu/ml  Identification and susceptibility pending  No other significant isolate      Narrative:      Specimen Source->Urine    Blood culture #2 **CANNOT BE ORDERED STAT** [[id number]] Collected: 09/02/23 0810    Order Status: Completed Specimen: Blood from Line, PICC Right Basilic Updated: 09/03/23 1612     Blood Culture, Routine No Growth to date      No Growth to date    Blood culture #1 **CANNOT BE ORDERED STAT** [[id number]] Collected: 09/02/23 0822    Order Status: Completed Specimen: Blood from Line, PICC Right Basilic Updated: 09/03/23 1612     Blood Culture, Routine No Growth to date      No Growth to date

## 2023-09-04 NOTE — ASSESSMENT & PLAN NOTE
- source possibilities include diverticulitis and UTI  - blood cx unrevealing, urine with presumed e coli  - stopping vanc, continue zosyn  - remains on levo and vaso, wean as able for MAP of 65 or better

## 2023-09-04 NOTE — PROGRESS NOTES
Pharmacokinetic Assessment Sign Off: IV Vancomycin     Therapy with Vancomycin complete and/or consult discontinued by provider.  Pharmacy will sign off, please re-consult as needed.     Thank you for allowing us to participate in this patient's care.      Zoe Merrill, PharmD 09/04/2023 1:46 PM

## 2023-09-04 NOTE — ASSESSMENT & PLAN NOTE
- affecting medical decision making and complicating the above   - pt would benefit greatly from weight loss and lifestyle modifications   - refuses NIPPV

## 2023-09-04 NOTE — PROGRESS NOTES
[facility] ([facility])  Critical Care Medicine  Progress Note    Patient Name: Gene Rothman  MRN: [id number]  Admission Date: 9/2/2023  Hospital Length of Stay: 2 days  Code Status: Full Code  Attending Provider: Rodrigo Josue MD  Primary Care Provider: Ami Zarate DO   Principal Problem: Septic shock    Subjective:     HPI:  Mr Rothman is a 78 y/o morbidly obese man with COPD with OHS/DENISHA (noncompliant with PAP therapies), chronic hypoxic respiratory failure (4L, prefers to use simple mask), Afib on Eliquis, pHTN, diastolic CHF, RV dysfxn, and chronic lymphedema who presented to the ER today due to abdominal pain that started last night.  He reports diffuse abdominal pain and tenderness.  Deneis N/V, diarrhea, dyspnea, cough, sputum, F/C, sick contacts, chest pain, change in bowel/bladder.      Work-up in the ER showed mild distal descending colon diverticulitis, LA 2.8, WBC 16.33, Cr 1.8 (b/l around 1.0), Bicarb 38.  He was given Vanc/Zosyn.  Required initiation of Levo due to hypotension after he recieved his 30cc/kg of fluids.  He is being admitted to the ICU for septic shock, presumably with diverticulitis as the source.      Hospital/ICU Course:  9/2 - admitted to ICU on Levo for septic shock.    9/3 - no acute events.  Remains on Levo.  Abdominal pain improving.  9/4: no new issues or c/o noted, remains on levo and vaso - weaning as able, remains on venti mask per his choice and not NC      ROS complete and negative unless stated in the interval HPI    Objective:     Vital Signs (Most Recent):  Temp: 98.1 °F (36.7 °C) (09/04/23 1100)  Pulse: 70 (09/04/23 1345)  Resp: (!) 28 (09/04/23 1345)  BP: (!) 93/55 (09/04/23 1330)  SpO2: (!) 93 % (09/04/23 1345) Vital Signs (24h Range):  Temp:  [97.5 °F (36.4 °C)-98.8 °F (37.1 °C)] 98.1 °F (36.7 °C)  Pulse:  [64-76] 70  Resp:  [11-45] 28  SpO2:  [86 %-98 %] 93 %  BP: ()/() 93/55     Weight: (!) 188.2 kg (415 lb)  Body mass index is 57.88  kg/m².      Intake/Output Summary (Last 24 hours) at 9/4/2023 1357  Last data filed at 9/4/2023 1300  Gross per 24 hour   Intake 4436.45 ml   Output 557 ml   Net 3879.45 ml        Physical Exam  Vitals reviewed.   Constitutional:       General: He is awake.      Appearance: He is morbidly obese. He is ill-appearing.   Cardiovascular:      Rate and Rhythm: Normal rate.   Pulmonary:      Breath sounds: Decreased breath sounds present. No wheezing or rhonchi.   Abdominal:      General: There is no distension.      Palpations: Abdomen is soft.   Musculoskeletal:      Right lower leg: Edema present.      Left lower leg: Edema present.   Skin:     General: Skin is warm and dry.      Comments: BLE with lymphedema    Neurological:      General: No focal deficit present.      Mental Status: He is alert.   Psychiatric:         Behavior: Behavior is cooperative.           Vents:  Oxygen Concentration (%): 40 (09/04/23 0741)    Lines/Drains/Airways       Peripherally Inserted Central Catheter Line  Duration             PICC Double Lumen 09/02/23 0800 right brachial 2 days              Drain  Duration                  Urethral Catheter 09/02/23 0900 Silicone 16 Fr. 2 days         Rectal Tube 09/02/23 1430 rectal tube w/ balloon (indicate number of mLs) 1 day              Peripheral Intravenous Line  Duration                  Peripheral IV - Double Lumen 09/02/23 0418 18 G;20 G Right Upper Arm 2 days         Peripheral IV - Single Lumen 09/02/23 0338 22 G Right Antecubital 2 days                    Significant Labs:    CBC/Anemia Profile:  Recent Labs   Lab 09/03/23  0420 09/04/23  0432   WBC 26.70* 19.50*   HGB 12.1* 10.4*   HCT 38.9* 34.0*    245   MCV 99* 101*   RDW 16.4* 16.5*        Chemistries:  Recent Labs   Lab 09/02/23  1903 09/03/23  0420 09/04/23  0432    138 136   K 3.9 3.8 3.8   CL 92* 94* 93*   CO2 32* 33* 29   BUN 38* 45* 50*   CREATININE 1.7* 2.0* 2.1*   CALCIUM 8.5* 8.2* 7.7*   ALBUMIN  --  2.2* 1.9*  "  PROT  --  6.4 5.8*   BILITOT  --  1.1* 1.0   ALKPHOS  --  58 49*   ALT  --  12 10   AST  --  23 20   MG  --   --  2.2       All pertinent labs within the past 24 hours have been reviewed.    Significant Imaging:  I have reviewed all pertinent imaging results/findings within the past 24 hours.      ABG  No results for input(s): "PH", "PO2", "PCO2", "HCO3", "BE" in the last 168 hours.  Assessment/Plan:     Pulmonary  Chronic obstructive pulmonary disease with acute exacerbation  - not in acute exacerbation  - continue with bronchodilators PRN  - goal SpO2 > 88%    Cardiac/Vascular  Atrial fibrillation  - rate controlled, continue telemetry monitoring   - continue Eliquis    Venous stasis dermatitis of both lower extremities  - chronic and longstanding  - supportive care  - PT/OT, OOB as able     Renal/  BETY (acute kidney injury)  - creatinine 1.8 at admission, baseline around 1.0  - likely s/t to sepsis and shock  - will continue IVF and hemodynamic support with pressors   - renally dose meds and avoid nephrotoxins  - monitor UOP, lytes, and RFP    ID  * Septic shock  - source possibilities include diverticulitis and UTI  - blood cx unrevealing, urine with presumed e coli  - stopping vanc, continue zosyn  - remains on levo and vaso, wean as able for MAP of 65 or better    Endocrine  Morbid obesity with BMI of 60.0-69.9, adult  - affecting medical decision making and complicating the above   - pt would benefit greatly from weight loss and lifestyle modifications   - refuses NIPPV    Hypothyroidism (acquired)  - synthroid    GI  Diverticulitis  - mild findings on CT and abdominal pain  - remains on zosyn  - felexiseal in place   - monitor     Other  Chronic acquired lymphedema  - chronic and longstanding  - supportive care    Sleep apnea  - contributing to and complicating the above  - refusing NIPPV    Prophylaxis Measures:  GI ppx: Famotidine  VTE ppx: Apixaban  Glucose control: Monitor blood glucose    Code " Status: Full Code    Critical Care Time: 36 minutes  Critical secondary to Patient has a condition that poses threat to life and bodily function: septic shock and BETY on pressors       Critical care was time spent personally by me on the following activities: development of treatment plan with patient or surrogate and bedside caregivers, discussions with consultants, evaluation of patient's response to treatment, examination of patient, ordering and performing treatments and interventions, ordering and review of laboratory studies, ordering and review of radiographic studies, pulse oximetry, re-evaluation of patient's condition. This critical care time did not overlap with that of any other provider or involve time for any procedures.     Prabhu French, NP  Critical Care Medicine  [facility] ([facility])

## 2023-09-04 NOTE — SUBJECTIVE & OBJECTIVE
ROS complete and negative unless stated in the interval HPI    Objective:     Vital Signs (Most Recent):  Temp: 98.1 °F (36.7 °C) (09/04/23 1100)  Pulse: 70 (09/04/23 1345)  Resp: (!) 28 (09/04/23 1345)  BP: (!) 93/55 (09/04/23 1330)  SpO2: (!) 93 % (09/04/23 1345) Vital Signs (24h Range):  Temp:  [97.5 °F (36.4 °C)-98.8 °F (37.1 °C)] 98.1 °F (36.7 °C)  Pulse:  [64-76] 70  Resp:  [11-45] 28  SpO2:  [86 %-98 %] 93 %  BP: ()/() 93/55     Weight: (!) 188.2 kg (415 lb)  Body mass index is 57.88 kg/m².      Intake/Output Summary (Last 24 hours) at 9/4/2023 1357  Last data filed at 9/4/2023 1300  Gross per 24 hour   Intake 4436.45 ml   Output 557 ml   Net 3879.45 ml        Physical Exam  Vitals reviewed.   Constitutional:       General: He is awake.      Appearance: He is morbidly obese. He is ill-appearing.   Cardiovascular:      Rate and Rhythm: Normal rate.   Pulmonary:      Breath sounds: Decreased breath sounds present. No wheezing or rhonchi.   Abdominal:      General: There is no distension.      Palpations: Abdomen is soft.   Musculoskeletal:      Right lower leg: Edema present.      Left lower leg: Edema present.   Skin:     General: Skin is warm and dry.      Comments: BLE with lymphedema    Neurological:      General: No focal deficit present.      Mental Status: He is alert.   Psychiatric:         Behavior: Behavior is cooperative.           Vents:  Oxygen Concentration (%): 40 (09/04/23 0741)    Lines/Drains/Airways       Peripherally Inserted Central Catheter Line  Duration             PICC Double Lumen 09/02/23 0800 right brachial 2 days              Drain  Duration                  Urethral Catheter 09/02/23 0900 Silicone 16 Fr. 2 days         Rectal Tube 09/02/23 1430 rectal tube w/ balloon (indicate number of mLs) 1 day              Peripheral Intravenous Line  Duration                  Peripheral IV - Double Lumen 09/02/23 0418 18 G;20 G Right Upper Arm 2 days         Peripheral IV - Single  Lumen 09/02/23 0338 22 G Right Antecubital 2 days                    Significant Labs:    CBC/Anemia Profile:  Recent Labs   Lab 09/03/23 0420 09/04/23  0432   WBC 26.70* 19.50*   HGB 12.1* 10.4*   HCT 38.9* 34.0*    245   MCV 99* 101*   RDW 16.4* 16.5*        Chemistries:  Recent Labs   Lab 09/02/23  1903 09/03/23 0420 09/04/23 0432    138 136   K 3.9 3.8 3.8   CL 92* 94* 93*   CO2 32* 33* 29   BUN 38* 45* 50*   CREATININE 1.7* 2.0* 2.1*   CALCIUM 8.5* 8.2* 7.7*   ALBUMIN  --  2.2* 1.9*   PROT  --  6.4 5.8*   BILITOT  --  1.1* 1.0   ALKPHOS  --  58 49*   ALT  --  12 10   AST  --  23 20   MG  --   --  2.2       All pertinent labs within the past 24 hours have been reviewed.    Significant Imaging:  I have reviewed all pertinent imaging results/findings within the past 24 hours.

## 2023-09-04 NOTE — PLAN OF CARE
Medication Requested: Oxycodone- acetaminophen 5-325mg  Take 1 tablet by mouth every 8 hours PRN pain    Last Rx written: 12-3-2021 84 tablets 0 refills  Last Rx dispensed (per PDMP): 12-3-2021    Last office visit: 9-  Upcoming office visit: N/A    Patient due, prepped if agreed    Routed to provider/SAE for Review     SW attempted complete initial assessment. Pt was asleep. SW will follow up.

## 2023-09-04 NOTE — ASSESSMENT & PLAN NOTE
- creatinine 1.8 at admission, baseline around 1.0  - likely s/t to sepsis and shock  - will continue IVF and hemodynamic support with pressors   - renally dose meds and avoid nephrotoxins  - monitor UOP, lytes, and RFP

## 2023-09-05 NOTE — PT/OT/SLP EVAL
Occupational Therapy   Evaluation and Discharge Note    Name: Gene Rothman  MRN: [id number]  Admitting Diagnosis: Septic shock  Recent Surgery: * No surgery found *      Recommendations:     Discharge Recommendations: home  Discharge Equipment Recommendations: none  Barriers to discharge:  None    Assessment:     Gene Rothman is a 77 y.o. male with a medical diagnosis of Septic shock. At this time, patient is functioning at their prior level of function and does not require further acute OT services. Pt requires total care and is bed bound at baseline.    Plan:   D/C OT.  During this hospitalization, patient does not require further acute OT services.  Please re-consult if situation changes.    Plan of Care Reviewed with: patient    Subjective     Chief Complaint: wants HOB flattened  Patient/Family Comments/goals: return home    Occupational Profile:  Living Environment: lives with wife and son in a 1 story house with no steps to enter. Pt has 24/7 care at home. Pt sleeps in a hospital bed.  Previous level of function: Pt requires total assist with ADLs, wears diapers for toileting and receives sponge baths in bed. Pt with incontinence. Pt reports he is bedbound and has been non-ambulatory since 2020. Does not perform t/fs out of bed.  Roles and Routines: does not drive  Equipment Used at home: hospital bed, oxygen, other (see comments) (TRAPEZE. pt has but does not use RW, rollator, or w/c)  Assistance upon Discharge: family    Pain/Comfort:  Pain Rating 1: 8/10  Location - Side 1: Bilateral  Location - Orientation 1: generalized  Location 1: leg  Pain Addressed 1: Reposition, Distraction  Pain Rating Post-Intervention 1: 8/10    Objective:     Communicated with: NP and [facility] chart review prior to session.  Patient found HOB elevated with PICC line, arterial line, bowman catheter, bowel management system, oxygen, peripheral IV, telemetry, pulse ox (continuous), blood pressure cuff, pressure relief boots upon OT entry  to room.    General Precautions: Standard, fall  Orthopedic Precautions: N/A  Braces: N/A  Respiratory Status: Nasal cannula, flow 8.5 L/min     Occupational Performance:    Bed Mobility:    Patient completed Rolling/Turning to Left with  total assistance and 2 persons  Patient completed Rolling/Turning to Right with total assistance and 2 persons    Activities of Daily Living:  Lower Body Dressing: total assistance doff/yessy boots  Toileting: total assistance rectal tube + bowman    Cognitive/Visual Perceptual:  Cognitive/Psychosocial Skills:     -       Oriented to: Person, Place, Time, and Situation   -       Follows Commands/attention:Follows two-step commands  -       Communication: clear/fluent and hard of hearing  -       Safety awareness/insight to disability: impaired     Physical Exam:  Sensation:    -       Impaired  pt reports BLE numbness  Dominant hand:    -       right  Upper Extremity Range of Motion:     -       Right Upper Extremity: WFL  -       Left Upper Extremity: Deficits: limited AROM in shoulder, elbow WFL  Upper Extremity Strength:    -       Right Upper Extremity: 4/5 grossly  -       Left Upper Extremity: Deficits: 3-/5 shoulder, 3/5 elbow   Strength:    -       Right Upper Extremity: WFL  -       Left Upper Extremity: fair    AMPAC 6 Click ADL:  AMPAC Total Score: 10    Treatment & Education:  Educated pt on importance of turning/pressure relief to prevent skin breakdown/injury. Patient educated on role of OT in acute setting and benefits of participation. Educated on techniques to use to increase independence. Educated on importance of bed mobility and calling for A to meet needs. Encouraged completion of B UE AROM/AAROM therex throughout the day to tolerance to increase functional strength and activity tolerance. Educated pt on lying with HOB elevated with upright positioning d/t desat of O2 while in supine. Patient stated understanding and in agreement with POC.     Patient left  HOB elevated with all lines intact, call button in reach, and nurse present    GOALS:   Multidisciplinary Problems       Occupational Therapy Goals       Not on file                    History:     Past Medical History:   Diagnosis Date    Acute hypoxemic respiratory failure 9/17/2022    Arthritis     Atrial fibrillation 3/1/2023    CHF (congestive heart failure)     Chronic kidney disease     Coronary artery disease     Depression     Hypertension     Hypertensive heart disease with heart failure 9/16/2022    Hypothyroidism     Lymphedema of lower extremity     Nephrolithiasis     Obesity     Peripheral vascular disease     Pneumonia 9/17/2022    Recurrent cellulitis of lower leg     Sleep apnea     can't stand the CPAP , sleeps elevated in hospital bed or chair    Tobacco dependence     resolved         Past Surgical History:   Procedure Laterality Date    ADENOIDECTOMY  1961    BACK SURGERY  1975;  1976    x2 for disc; scar tissue removed    debridement of bilateral leg ulcers Bilateral 01/01/2017    Dr Hernandez    INNER EAR SURGERY Bilateral 1961    separate - pinnaplasty , new eardrms constructed    KIDNEY STONE SURGERY Left 1976 approx    open    TONSILLECTOMY  1961       Time Tracking:     OT Date of Treatment: 09/05/23  OT Start Time: 1355  OT Stop Time: 1420  OT Total Time (min): 25 min    Billable Minutes:Evaluation 15  Therapeutic Activity 10    9/5/2023  Jeanna Ontiveros OT

## 2023-09-05 NOTE — PLAN OF CARE
[facility] - Intensive Care (Hospital)  Initial Discharge Assessment       Primary Care Provider: Ami Zarate DO    Admission Diagnosis: SOB (shortness of breath) [R06.02]  Encounter for central line placement [Z45.2]  Troponin I above reference range [R77.8]  Acute diverticulitis [K57.92]  Septic shock [A41.9, R65.21]  Acute renal failure, unspecified acute renal failure type [N17.9]    Admission Date: 9/2/2023  Expected Discharge Date:     Transition of Care Barriers: Bariatric, Does not adhere to care plan    Payor: HUMANA MANAGED MEDICARE / Plan: HUMANA TOTAL CARE ADVANTAGE / Product Type: Medicare Advantage /     Extended Emergency Contact Information  Primary Emergency Contact: Solo Rothman  Address: [address] [address] [place]  Mobile Phone: [phone]  Relation: Son  Secondary Emergency Contact: Alise Rothman  Address: [facility] [address] [address] [facility]  Home Phone: [phone]  Relation: Daughter    Discharge Plan A: [facility] - [place], [place] - [address]  [address] [address]  Phone: [phone] Fax: [phone]    [facility] [place] [address]  Phone: [phone] Fax: [phone]    [facility] [address] - [address] [facility]  [address]   [place] [address]  Phone: [phone] Fax: [phone]      Initial Assessment (most recent)       Adult Discharge Assessment - 09/05/23 1200          Discharge Assessment    Assessment Type Discharge Planning Assessment     Confirmed/corrected address, phone number and insurance Yes     Confirmed Demographics Correct on Facesheet     Source of Information patient;family     Communicated JAMES with patient/caregiver Date not available/Unable to determine     Reason For Admission septic shock     People in Home child(fouzia),  adult;spouse     Facility Arrived From: home     Do you expect to return to your current living situation? Yes     Do you have help at home or someone to help you manage your care at home? Yes     Who are your caregiver(s) and their phone number(s)? adult children, Solo and Alise     Prior to hospitilization cognitive status: Alert/Oriented     Current cognitive status: Alert/Oriented     Walking or Climbing Stairs ambulation difficulty, dependent;stair climbing difficulty, dependent;transferring difficulty, dependent     Mobility Management bed bound, total assist     Dressing/Bathing bathing difficulty, dependent;dressing difficulty, dependent     Dressing/Bathing Management family assists with ADLs     Home Accessibility wheelchair accessible     Home Layout Able to live on 1st floor     Equipment Currently Used at Home hospital bed;oxygen     Readmission within 30 days? No     Patient currently being followed by outpatient case management? No     Do you currently have service(s) that help you manage your care at home? Yes     Name and Contact number of agency [facility]     Is the pt/caregiver preference to resume services with current agency Yes     Do you take prescription medications? Yes     Do you have prescription coverage? Yes     Coverage MCR     Do you have any problems affording any of your prescribed medications? No     Is the patient taking medications as prescribed? yes     Who is going to help you get home at discharge? Ambulance     How do you get to doctors appointments? other (see comments)   Ambulance    Are you on dialysis? No     Do you take coumadin? No     DME Needed Upon Discharge  none     Discharge Plan discussed with: Patient;Spouse/sig other;Adult children     Transition of Care Barriers Bariatric;Does not adhere to care plan     Discharge Plan A Home Health                   Anticipated DC dispo: home health - current with [facility]   Prior Level of Function: bed  bound, dependent with ADLs   People in home:  spouse and adult children     Comments:  CM met with patient, spouse, and son at bedside to introduce role and discuss discharge planning. Family will be help at home. Will require ambulance transport at time of discharge. Confirmed demographics, insurance, and emergency contacts. CM discharge needs depends on hospital progress. CM will continue following to assist with other needs.

## 2023-09-05 NOTE — SUBJECTIVE & OBJECTIVE
Review of Systems   Unable to perform ROS: Intubated        Objective:     Vital Signs (Most Recent):  Temp: 97.9 °F (36.6 °C) (09/05/23 0700)  Pulse: 66 (09/05/23 1004)  Resp: (!) 24 (09/05/23 1004)  BP: (!) 76/48 (09/05/23 1017)  SpO2: 97 % (09/05/23 1004) Vital Signs (24h Range):  Temp:  [97.7 °F (36.5 °C)-98.5 °F (36.9 °C)] 97.9 °F (36.6 °C)  Pulse:  [59-76] 66  Resp:  [17-45] 24  SpO2:  [84 %-99 %] 97 %  BP: ()/() 76/48     Weight: (!) 188 kg (414 lb 9.2 oz)  Body mass index is 57.82 kg/m².      Intake/Output Summary (Last 24 hours) at 9/5/2023 1019  Last data filed at 9/5/2023 0900  Gross per 24 hour   Intake 4339.13 ml   Output 1336 ml   Net 3003.13 ml        Physical Exam  Vitals reviewed.   Constitutional:       General: He is awake. He is not in acute distress.     Appearance: He is morbidly obese. He is ill-appearing.   Cardiovascular:      Rate and Rhythm: Normal rate.      Comments: BLE lymphedema   Pulmonary:      Breath sounds: Decreased breath sounds present.      Comments: On venti per his choice and not NC  Abdominal:      General: There is no distension.      Palpations: Abdomen is soft.      Comments: Large panus, induration to left abd panus   Genitourinary:     Comments: bowman  Skin:     General: Skin is warm and dry.   Neurological:      General: No focal deficit present.      Mental Status: He is alert.   Psychiatric:      Comments: Irritable at times; refusing position changes, NC O2, NIPPV qHS and various other treatment interventions per nursing report           Vents:  Oxygen Concentration (%): 40 (09/05/23 0930)    Lines/Drains/Airways       Peripherally Inserted Central Catheter Line  Duration             PICC Double Lumen 09/02/23 0800 right brachial 3 days              Drain  Duration                  Urethral Catheter 09/02/23 0900 Silicone 16 Fr. 3 days         Rectal Tube 09/02/23 1430 rectal tube w/ balloon (indicate number of mLs) 2 days                    Significant  Labs:    CBC/Anemia Profile:  Recent Labs   Lab 09/04/23  0432 09/05/23  0406   WBC 19.50* 13.72*   HGB 10.4* 9.6*   HCT 34.0* 30.9*    228   * 98   RDW 16.5* 16.5*        Chemistries:  Recent Labs   Lab 09/04/23  0432 09/05/23  0406    135*   K 3.8 3.2*   CL 93* 95   CO2 29 29   BUN 50* 50*   CREATININE 2.1* 2.0*   CALCIUM 7.7* 7.4*   ALBUMIN 1.9* 1.9*   PROT 5.8* 5.6*   BILITOT 1.0 0.7   ALKPHOS 49* 51*   ALT 10 16   AST 20 26   MG 2.2 2.1       All pertinent labs within the past 24 hours have been reviewed.    Significant Imaging:  I have reviewed all pertinent imaging results/findings within the past 24 hours.

## 2023-09-05 NOTE — ASSESSMENT & PLAN NOTE
- affecting medical decision making and complicating the above   - pt would benefit greatly from weight loss and lifestyle modifications   - refuses NIPPV  - this is the pt's largest barrier to improving his health

## 2023-09-05 NOTE — PT/OT/SLP EVAL
Physical Therapy Evaluation and Discharge Note    Patient Name:  Gene Rothman   MRN:  [id number]    Recommendations:     Discharge Recommendations: home  Discharge Equipment Recommendations: none   Barriers to discharge: None    Assessment:     Gene Rothman is a 77 y.o. male admitted with a medical diagnosis of Septic shock. .  At this time, patient is functioning at their prior level of function and does not require further acute PT services.     Recent Surgery: * No surgery found *      Plan:     During this hospitalization, patient does not require further acute PT services.  Please re-consult if situation changes.      Subjective     Chief Complaint: Pt asked to be laid flat, per nurse, pt cannot lay flat due to difficulty breathing  Patient/Family Comments/goals: none stated  Pain/Comfort:  Pain Rating 1: 8/10  Location - Side 1: Bilateral  Location - Orientation 1: generalized  Location 1: leg  Pain Addressed 1: Reposition, Distraction, Cessation of Activity  Pain Rating Post-Intervention 1: 8/10    Patients cultural, spiritual, Hinduism conflicts given the current situation: no    Living Environment:  Pt reports living with his wife and son in a 1 story home, no steps to enter, son and wife provide assistance.   Prior to admission, patient required total assistance with bathing and dressing, reports he is bed bound and has been non-ambulatory since 2020.  Equipment used at home: oxygen, hospital bed (bed trapeze).  DME owned (not currently used): rolling walker, wheelchair, and Rolator .  Upon discharge, patient will have assistance from FAMILY.    Objective:     Communicated with NP and epic chart review prior to session.  Patient found HOB elevated with PICC line, arterial line, blood pressure cuff, pulse ox (continuous), telemetry, bowman catheter, bowel management system, oxygen upon PT entry to room.    General Precautions: Standard, fall    Orthopedic Precautions:N/A   Braces: N/A  Respiratory Status:  "Nasal cannula, flow 8.5 L/min    Exams:  Cognitive Exam:  Patient is oriented to Person, Place, Time, and Situation  Sensation:    -       Impaired  B LE  Skin Integrity/Edema:      -       Skin integrity: altered B LE  RLE ROM: PROM WFL  RLE Strength: Grossly 2-/5  LLE ROM: PROM WFL  LLE Strength: Grossly 1/5    Functional Mobility:  Bed Mobility:     Rolling Left:  total assistance and of 2 persons  Rolling Right: total assistance and of 2 persons    AM-PAC 6 CLICK MOBILITY  Total Score:6       Treatment and Education:  Educated on importance of position changes every 2 hours to prevent pressure injuries. Educated on importance activity pacing and HEP hip flex, hip abd, heel slides, quad sets, ankle pumps) in order to maintain/regain strength. Educated on "call don't fall" policy and increased risk of falling due to weakness, instructed to utilize call bell for assistance with all mobility. Pt agreeable to all requests.    AM-PAC 6 CLICK MOBILITY  Total Score:6     Patient left HOB elevated with all lines intact, call button in reach, and nurse present.    History:     Past Medical History:   Diagnosis Date    Acute hypoxemic respiratory failure 9/17/2022    Arthritis     Atrial fibrillation 3/1/2023    CHF (congestive heart failure)     Chronic kidney disease     Coronary artery disease     Depression     Hypertension     Hypertensive heart disease with heart failure 9/16/2022    Hypothyroidism     Lymphedema of lower extremity     Nephrolithiasis     Obesity     Peripheral vascular disease     Pneumonia 9/17/2022    Recurrent cellulitis of lower leg     Sleep apnea     can't stand the CPAP , sleeps elevated in hospital bed or chair    Tobacco dependence     resolved       Past Surgical History:   Procedure Laterality Date    ADENOIDECTOMY  1961    BACK SURGERY  1975;  1976    x2 for disc; scar tissue removed    debridement of bilateral leg ulcers Bilateral 01/01/2017    Dr Hernandez    INNER EAR SURGERY Bilateral " 1961    separate - pinnaplasty , new eardrms constructed    KIDNEY STONE SURGERY Left 1976 approx    open    TONSILLECTOMY  1961       Time Tracking:     PT Received On: 09/05/23  PT Start Time: 1352     PT Stop Time: 1417  PT Total Time (min): 25 min     Billable Minutes: Evaluation 25min      09/05/2023

## 2023-09-05 NOTE — PLAN OF CARE
PT EVAL complete. Pt is bed bound at baseline. Reports non-ambulatory since 2020. Pt is currently at his PLOF and does not require further acute PT services. Please re-consult if situation changes.

## 2023-09-05 NOTE — PROGRESS NOTES
[facility] - Intensive Care ([facility])  Critical Care Medicine  Progress Note    Patient Name: Gene Rothman  MRN: [id number]  Admission Date: 9/2/2023  Hospital Length of Stay: 3 days  Code Status: Full Code  Attending Provider: Rodrigo Josue MD  Primary Care Provider: Ami Zarate DO   Principal Problem: Septic shock    Subjective:     HPI:  Mr Rothman is a 76 y/o morbidly obese man with COPD with OHS/DENISHA (noncompliant with PAP therapies), chronic hypoxic respiratory failure (4L, prefers to use simple mask), Afib on Eliquis, pHTN, diastolic CHF, RV dysfxn, and chronic lymphedema who presented to the ER today due to abdominal pain that started last night.  He reports diffuse abdominal pain and tenderness.  Deneis N/V, diarrhea, dyspnea, cough, sputum, F/C, sick contacts, chest pain, change in bowel/bladder.      Work-up in the ER showed mild distal descending colon diverticulitis, LA 2.8, WBC 16.33, Cr 1.8 (b/l around 1.0), Bicarb 38.  He was given Vanc/Zosyn.  Required initiation of Levo due to hypotension after he recieved his 30cc/kg of fluids.  He is being admitted to the ICU for septic shock, presumably with diverticulitis as the source.      Hospital/ICU Course:  9/2 - admitted to ICU on Levo for septic shock.    9/3 - no acute events.  Remains on Levo.  Abdominal pain improving.  9/4: no new issues or c/o noted, remains on levo and vaso - weaning as able, remains on venti mask per his choice and not NC  9/5: no new issues or c/o overnight, pt awake and alert on venti mask (his choice), weaning pressors s/t pt's mentation given inconsistencies in BP readings given body habitus, adding midodrine      Review of Systems   Unable to perform ROS: Intubated        Objective:     Vital Signs (Most Recent):  Temp: 97.9 °F (36.6 °C) (09/05/23 0700)  Pulse: 66 (09/05/23 1004)  Resp: (!) 24 (09/05/23 1004)  BP: (!) 76/48 (09/05/23 1017)  SpO2: 97 % (09/05/23 1004) Vital Signs (24h Range):  Temp:  [97.7 °F (36.5  °C)-98.5 °F (36.9 °C)] 97.9 °F (36.6 °C)  Pulse:  [59-76] 66  Resp:  [17-45] 24  SpO2:  [84 %-99 %] 97 %  BP: ()/() 76/48     Weight: (!) 188 kg (414 lb 9.2 oz)  Body mass index is 57.82 kg/m².      Intake/Output Summary (Last 24 hours) at 9/5/2023 1019  Last data filed at 9/5/2023 0900  Gross per 24 hour   Intake 4339.13 ml   Output 1336 ml   Net 3003.13 ml        Physical Exam  Vitals reviewed.   Constitutional:       General: He is awake. He is not in acute distress.     Appearance: He is morbidly obese. He is ill-appearing.   Cardiovascular:      Rate and Rhythm: Normal rate.      Comments: BLE lymphedema   Pulmonary:      Breath sounds: Decreased breath sounds present.      Comments: On venti per his choice and not NC  Abdominal:      General: There is no distension.      Palpations: Abdomen is soft.      Comments: Large panus, induration to left abd panus   Genitourinary:     Comments: bowman  Skin:     General: Skin is warm and dry.   Neurological:      General: No focal deficit present.      Mental Status: He is alert.   Psychiatric:      Comments: Irritable at times; refusing position changes, NC O2, NIPPV qHS and various other treatment interventions per nursing report           Vents:  Oxygen Concentration (%): 40 (09/05/23 0930)    Lines/Drains/Airways       Peripherally Inserted Central Catheter Line  Duration             PICC Double Lumen 09/02/23 0800 right brachial 3 days              Drain  Duration                  Urethral Catheter 09/02/23 0900 Silicone 16 Fr. 3 days         Rectal Tube 09/02/23 1430 rectal tube w/ balloon (indicate number of mLs) 2 days                    Significant Labs:    CBC/Anemia Profile:  Recent Labs   Lab 09/04/23  0432 09/05/23  0406   WBC 19.50* 13.72*   HGB 10.4* 9.6*   HCT 34.0* 30.9*    228   * 98   RDW 16.5* 16.5*        Chemistries:  Recent Labs   Lab 09/04/23  0432 09/05/23  0406    135*   K 3.8 3.2*   CL 93* 95   CO2 29 29   BUN  "50* 50*   CREATININE 2.1* 2.0*   CALCIUM 7.7* 7.4*   ALBUMIN 1.9* 1.9*   PROT 5.8* 5.6*   BILITOT 1.0 0.7   ALKPHOS 49* 51*   ALT 10 16   AST 20 26   MG 2.2 2.1       All pertinent labs within the past 24 hours have been reviewed.    Significant Imaging:  I have reviewed all pertinent imaging results/findings within the past 24 hours.      ABG  No results for input(s): "PH", "PO2", "PCO2", "HCO3", "BE" in the last 168 hours.  Assessment/Plan:     Pulmonary  Chronic obstructive pulmonary disease with acute exacerbation  - not in acute exacerbation  - continue with bronchodilators PRN  - goal SpO2 > 88%    Cardiac/Vascular  Atrial fibrillation  - rate controlled, continue telemetry monitoring   - continue Eliquis    Venous stasis dermatitis of both lower extremities  - chronic and longstanding  - supportive care  - PT/OT, OOB as able     Renal/  BETY (acute kidney injury)  - baseline serum creatinine around 1.0, remains at 2, good UOP  - likely s/t to sepsis and shock  - will continue IVF and hemodynamic support with pressors  - renally dose meds and avoid nephrotoxins  - monitor UOP, lytes, and RFP    ID  * Septic shock  - blood cx unrevealing, urine with e coli  - change zosyn to rocephin based on cut lure data   - weaning levo, stopping vaso  - BP readings unreliable given pt's body habitus, will wean pressors based on pt's mentation/UOP  - adding low dose midodrine  - avoiding placement of arterial line at this time      Endocrine  Morbid obesity with BMI of 60.0-69.9, adult  - affecting medical decision making and complicating the above   - pt would benefit greatly from weight loss and lifestyle modifications   - refuses NIPPV  - this is the pt's largest barrier to improving his health     Hypothyroidism (acquired)  - synthroid    GI  Diverticulitis  - mild findings on CT, no abdominal pain  - felexiseal in place   - monitor     Other  Chronic acquired lymphedema  - chronic and longstanding  - supportive " care    Sleep apnea  - contributing to and complicating the above  - refusing NIPPV      Prophylaxis Measures:  GI ppx: Famotidine  VTE ppx: Apixaban  Glucose control: Monitor blood glucose    Code Status: Full Code      Critical Care Time: 35 minutes  Critical secondary to Patient has a condition that poses threat to life and bodily function: septic shock and BETY on pressors       Critical care was time spent personally by me on the following activities: development of treatment plan with patient or surrogate and bedside caregivers, discussions with consultants, evaluation of patient's response to treatment, examination of patient, ordering and performing treatments and interventions, ordering and review of laboratory studies, ordering and review of radiographic studies, pulse oximetry, re-evaluation of patient's condition. This critical care time did not overlap with that of any other provider or involve time for any procedures.     Prabhu French NP  Critical Care Medicine  [facility]1983 - Intensive Care ([facility])

## 2023-09-05 NOTE — ASSESSMENT & PLAN NOTE
- baseline serum creatinine around 1.0, remains at 2, good UOP  - likely s/t to sepsis and shock  - will continue IVF and hemodynamic support with pressors  - renally dose meds and avoid nephrotoxins  - monitor UOP, lytes, and RFP

## 2023-09-05 NOTE — PLAN OF CARE
Patient awake, alert and oriented x4. Venti mask  15 L/ 40%. May in place with good urine output. FMS in place, output slowed. MAGGIE PICC with vasopressin and levophed infusing for blood pressure support. Patient Afib in monitor. Maintenance fluids infusing at a rate of 150 ml/hr.  No overt issues over night.

## 2023-09-05 NOTE — ASSESSMENT & PLAN NOTE
- blood cx unrevealing, urine with e coli  - change zosyn to rocephin based on cut lure data   - weaning levo, stopping vaso  - BP readings unreliable given pt's body habitus, will wean pressors based on pt's mentation/UOP  - adding low dose midodrine  - avoiding placement of arterial line at this time

## 2023-09-05 NOTE — CONSULTS
[facility] (Hospital)  Wound Care    Patient Name:  Gene Rothman   MRN:  [id number]  Date: 2023  Diagnosis: Septic shock    History:     Past Medical History:   Diagnosis Date    Acute hypoxemic respiratory failure 2022    Arthritis     Atrial fibrillation 3/1/2023    CHF (congestive heart failure)     Chronic kidney disease     Coronary artery disease     Depression     Hypertension     Hypertensive heart disease with heart failure 2022    Hypothyroidism     Lymphedema of lower extremity     Nephrolithiasis     Obesity     Peripheral vascular disease     Pneumonia 2022    Recurrent cellulitis of lower leg     Sleep apnea     can't stand the CPAP , sleeps elevated in hospital bed or chair    Tobacco dependence     resolved       Social History     Socioeconomic History    Marital status:    Tobacco Use    Smoking status: Former     Current packs/day: 0.00     Average packs/day: 1.5 packs/day for 1 year (1.5 ttl pk-yrs)     Types: Cigarettes     Start date:      Quit date:      Years since quittin.7    Smokeless tobacco: Never   Substance and Sexual Activity    Alcohol use: Not Currently    Drug use: Never    Sexual activity: Never   Social History Narrative    ** Merged History Encounter **          Lives with wife and 2 sons and a daughter. No longer drives. Quit in  because couldn't hold foot on pedal. 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 Uses a Rolator at wo    rk and wheelchair at home. No caffeine. Does not have a Living Will or Advanced Directive.       Social Determinants of Health     Financial Resource Strain: High Risk (2023)    Overall Financial Resource Strain (CARDIA)     Difficulty of Paying Living Expenses: Hard   Food Insecurity: Food Insecurity Present (2023)    Hunger Vital Sign     Worried About Running Out of Food in the Last Year: Often true     Ran  Out of Food in the Last Year: Often true   Transportation Needs: Unmet Transportation Needs (2/20/2023)    PRAPARE - Transportation     Lack of Transportation (Medical): Yes     Lack of Transportation (Non-Medical): Yes   Physical Activity: Insufficiently Active (2/20/2023)    Exercise Vital Sign     Days of Exercise per Week: 7 days     Minutes of Exercise per Session: 10 min   Stress: Stress Concern Present (2/20/2023)    [employer] - Occupational Stress Questionnaire     Feeling of Stress : To some extent   Social Connections: Socially Isolated (2/20/2023)    Social Connection and Isolation Panel [NHANES]     Frequency of Communication with Friends and Family: Never     Frequency of Social Gatherings with Friends and Family: Once a week     Attends Christianity Services: Never     Active Member of Clubs or Organizations: No     Attends Club or Organization Meetings: Never     Marital Status:    Housing Stability: Low Risk  (2/20/2023)    Housing Stability Vital Sign     Unable to Pay for Housing in the Last Year: No     Number of Places Lived in the Last Year: 1     Unstable Housing in the Last Year: No       Precautions:     Allergies as of 09/02/2023 - Reviewed 09/02/2023   Allergen Reaction Noted    Sulfamethoxazole-trimethoprim Itching and Shortness Of Breath 08/29/2014    Sulfamethoxazole  07/17/2023       WO Assessment Details/Treatment     Consulted on this 78 y/o M patient due to presenton admission altered skin integrity. He is awake and alert, yelling out intermittently during care.  Documented stage 2 PI to sacrum on admission, sacral foam dressing place,flexiseal in place.  He is on LO hercules bed.  BLE with changes consistent with chronic venous inf=sufficiency. Hemosiderin staining, dry scaly skin. Few loose skin scales easily removed. BLE cleansed with bath wipes. Moisturizer cream applied. Venous leg ulcer noted to left lateral lower leg, measures 1x1x0.2cm,  moist red wound bed, foam dressing applied. Heel offloading boots in use,heels intact with no redness.  Recommend ongoing pressure injury prevention interventions per orders, maintain heel boots, turn q2 hours with foam wedge.        09/05/23 1055   WOCN Assessment   WOCN Total Time (mins) 45   Visit Date 09/05/23   Visit Time 1055   Consult Type New   WOCN Speciality Wound   Wound venous;moisture   Intervention assessed;applied;chart review;coordination of care;team conference;orders        Altered Skin Integrity 09/05/23 Left lower;lateral Leg Venous Ulcer   Date First Assessed: 09/05/23   Altered Skin Integrity Present on Admission - Did Patient arrive to the hospital with altered skin?: yes  Side: Left  Orientation: lower;lateral  Location: Leg  Primary Wound Type: Venous Ulcer   Wound Image    Dressing Appearance Intact   Drainage Amount Scant   Drainage Characteristics/Odor Serous   Appearance Red   Tissue loss description Full thickness   Periwound Area Intact   Wound Length (cm) 1 cm   Wound Width (cm) 1 cm   Wound Depth (cm) 0.2 cm   Wound Volume (cm^3) 0.2 cm^3   Wound Surface Area (cm^2) 1 cm^2   Care Cleansed with:;Sterile normal saline;Applied:;Skin Barrier   Dressing Foam;Changed   Dressing Change Due 09/08/23       Recommendations made to primary team for wound care, pressureinjury prevention interventions . Orders placed.     09/05/2023

## 2023-09-05 NOTE — PLAN OF CARE
OT eval completed.  Pt requires total care and is bedbound at baseline.  Pt is at PLOF and does not require skilled acute OT services at this time.  Discharge from OT.

## 2023-09-06 NOTE — ASSESSMENT & PLAN NOTE
- blood cx unrevealing, urine with e coli, remains on rocephin  - continues to require levo, weaning as able  - increasing midodrine to 10 mg TID

## 2023-09-06 NOTE — PLAN OF CARE
Pt's VS stable throughout shift. Remains on levo drip for BP support. Rate controlled Afibb via monitor. 4L nasal cannula; no SOB reported. May; adequate UOP. Flexiseal in place. Pt refusing to turn every 2 hours despite being educated on the importance of turning for wound healing and skin integrity; heel boots are on and sacral foam dressing in place. Pt and pt's wife updated on status and plan of care.

## 2023-09-06 NOTE — SUBJECTIVE & OBJECTIVE
ROS complete and negative unless stated in the interval HPI      Objective:     Vital Signs (Most Recent):  Temp: 98.3 °F (36.8 °C) (09/06/23 0706)  Pulse: 69 (09/06/23 1030)  Resp: (!) 25 (09/06/23 1030)  BP: 128/61 (09/06/23 1000)  SpO2: 97 % (09/06/23 1030) Vital Signs (24h Range):  Temp:  [95.5 °F (35.3 °C)-98.3 °F (36.8 °C)] 98.3 °F (36.8 °C)  Pulse:  [45-77] 69  Resp:  [10-34] 25  SpO2:  [92 %-100 %] 97 %  BP: ()/() 128/61  Arterial Line BP: (113-187)/(40-73) 118/42     Weight: (!) 187.8 kg (414 lb)  Body mass index is 57.74 kg/m².      Intake/Output Summary (Last 24 hours) at 9/6/2023 1058  Last data filed at 9/6/2023 1000  Gross per 24 hour   Intake 3625.8 ml   Output 1270 ml   Net 2355.8 ml        Physical Exam  Vitals reviewed.   Constitutional:       General: He is awake. He is not in acute distress.     Appearance: He is morbidly obese. He is ill-appearing.      Comments: Chronically ill appearing male lying in bed on NC in NAD   Cardiovascular:      Rate and Rhythm: Normal rate. Rhythm regularly irregular.      Comments: Afib on the monitor   Pulmonary:      Breath sounds: Decreased breath sounds present. No wheezing or rhonchi.      Comments: Poor inspiratory effort   Abdominal:      General: There is no distension.      Palpations: Abdomen is soft.      Comments: Morbidly obese abd   Skin:     General: Skin is warm and dry.   Neurological:      General: No focal deficit present.      Mental Status: He is alert.   Psychiatric:         Behavior: Behavior is cooperative.               Vents:  Oxygen Concentration (%): 40 (09/06/23 0344)    Lines/Drains/Airways       Peripherally Inserted Central Catheter Line  Duration             PICC Double Lumen 09/02/23 0800 right brachial 4 days              Drain  Duration                  Urethral Catheter 09/02/23 0900 Silicone 16 Fr. 4 days         Rectal Tube 09/02/23 1430 rectal tube w/ balloon (indicate number of mLs) 3 days              Arterial  Line  Duration             Arterial Line 09/05/23 1250 Right Radial <1 day                    Significant Labs:    CBC/Anemia Profile:  Recent Labs   Lab 09/05/23  0406 09/06/23  0448   WBC 13.72* 11.71   HGB 9.6* 9.9*   HCT 30.9* 31.6*    225   MCV 98 97   RDW 16.5* 16.6*        Chemistries:  Recent Labs   Lab 09/05/23  0406 09/05/23  1321 09/06/23  0448   * 139 141   K 3.2* 3.2* 3.0*   CL 95 97 99   CO2 29 28 28   BUN 50* 52* 51*   CREATININE 2.0* 2.0* 2.0*   CALCIUM 7.4* 7.6* 7.9*   ALBUMIN 1.9* 1.8* 1.9*   PROT 5.6* 5.7* 5.9*   BILITOT 0.7 0.6 0.5   ALKPHOS 51* 54* 62   ALT 16 15 18   AST 26 26 27   MG 2.1 2.2 2.7*       All pertinent labs within the past 24 hours have been reviewed.    Significant Imaging:  I have reviewed all pertinent imaging results/findings within the past 24 hours.

## 2023-09-06 NOTE — PROGRESS NOTES
[facility] Care ([facility])  Critical Care Medicine  Progress Note    Patient Name: Gene Rothman  MRN: [id number]  Admission Date: 9/2/2023  Hospital Length of Stay: 4 days  Code Status: Full Code  Attending Provider: Rodrigo Josue MD  Primary Care Provider: Ami Zarate DO   Principal Problem: Septic shock    Subjective:     HPI:  Mr Rothman is a 76 y/o morbidly obese man with COPD with OHS/DENISHA (noncompliant with PAP therapies), chronic hypoxic respiratory failure (4L, prefers to use simple mask), Afib on Eliquis, pHTN, diastolic CHF, RV dysfxn, and chronic lymphedema who presented to the ER today due to abdominal pain that started last night.  He reports diffuse abdominal pain and tenderness.  Deneis N/V, diarrhea, dyspnea, cough, sputum, F/C, sick contacts, chest pain, change in bowel/bladder.      Work-up in the ER showed mild distal descending colon diverticulitis, LA 2.8, WBC 16.33, Cr 1.8 (b/l around 1.0), Bicarb 38.  He was given Vanc/Zosyn.  Required initiation of Levo due to hypotension after he recieved his 30cc/kg of fluids.  He is being admitted to the ICU for septic shock, presumably with diverticulitis as the source.      Hospital/ICU Course:  9/2 - admitted to ICU on Levo for septic shock.    9/3 - no acute events.  Remains on Levo.  Abdominal pain improving.  9/4: no new issues or c/o noted, remains on levo and vaso - weaning as able, remains on venti mask per his choice and not NC  9/5: no new issues or c/o overnight, pt awake and alert on venti mask (his choice), weaning pressors s/t pt's mentation given inconsistencies in BP readings given body habitus, adding midodrine  9/6: remains on levo, increasing midodrine, no new issues or c/o on exam this AM, with some indigestion, no family at bedside       ROS complete and negative unless stated in the interval HPI      Objective:     Vital Signs (Most Recent):  Temp: 98.3 °F (36.8 °C) (09/06/23 0706)  Pulse: 69 (09/06/23 1030)  Resp: (!)  25 (09/06/23 1030)  BP: 128/61 (09/06/23 1000)  SpO2: 97 % (09/06/23 1030) Vital Signs (24h Range):  Temp:  [95.5 °F (35.3 °C)-98.3 °F (36.8 °C)] 98.3 °F (36.8 °C)  Pulse:  [45-77] 69  Resp:  [10-34] 25  SpO2:  [92 %-100 %] 97 %  BP: ()/() 128/61  Arterial Line BP: (113-187)/(40-73) 118/42     Weight: (!) 187.8 kg (414 lb)  Body mass index is 57.74 kg/m².      Intake/Output Summary (Last 24 hours) at 9/6/2023 1058  Last data filed at 9/6/2023 1000  Gross per 24 hour   Intake 3625.8 ml   Output 1270 ml   Net 2355.8 ml        Physical Exam  Vitals reviewed.   Constitutional:       General: He is awake. He is not in acute distress.     Appearance: He is morbidly obese. He is ill-appearing.      Comments: Chronically ill appearing male lying in bed on NC in NAD   Cardiovascular:      Rate and Rhythm: Normal rate. Rhythm regularly irregular.      Comments: Afib on the monitor   Pulmonary:      Breath sounds: Decreased breath sounds present. No wheezing or rhonchi.      Comments: Poor inspiratory effort   Abdominal:      General: There is no distension.      Palpations: Abdomen is soft.      Comments: Morbidly obese abd   Skin:     General: Skin is warm and dry.   Neurological:      General: No focal deficit present.      Mental Status: He is alert.   Psychiatric:         Behavior: Behavior is cooperative.               Vents:  Oxygen Concentration (%): 40 (09/06/23 0344)    Lines/Drains/Airways       Peripherally Inserted Central Catheter Line  Duration             PICC Double Lumen 09/02/23 0800 right brachial 4 days              Drain  Duration                  Urethral Catheter 09/02/23 0900 Silicone 16 Fr. 4 days         Rectal Tube 09/02/23 1430 rectal tube w/ balloon (indicate number of mLs) 3 days              Arterial Line  Duration             Arterial Line 09/05/23 1250 Right Radial <1 day                    Significant Labs:    CBC/Anemia Profile:  Recent Labs   Lab 09/05/23  0406 09/06/23  0448    WBC 13.72* 11.71   HGB 9.6* 9.9*   HCT 30.9* 31.6*    225   MCV 98 97   RDW 16.5* 16.6*        Chemistries:  Recent Labs   Lab 09/05/23  0406 09/05/23  1321 09/06/23  0448   * 139 141   K 3.2* 3.2* 3.0*   CL 95 97 99   CO2 29 28 28   BUN 50* 52* 51*   CREATININE 2.0* 2.0* 2.0*   CALCIUM 7.4* 7.6* 7.9*   ALBUMIN 1.9* 1.8* 1.9*   PROT 5.6* 5.7* 5.9*   BILITOT 0.7 0.6 0.5   ALKPHOS 51* 54* 62   ALT 16 15 18   AST 26 26 27   MG 2.1 2.2 2.7*       All pertinent labs within the past 24 hours have been reviewed.    Significant Imaging:  I have reviewed all pertinent imaging results/findings within the past 24 hours.      ABG  Recent Labs   Lab 09/05/23  1307   PH 7.276*   PO2 68*   PCO2 75.3*   HCO3 35.0*   BE 8     Assessment/Plan:     Pulmonary  Chronic obstructive pulmonary disease with acute exacerbation  - not in acute exacerbation  - continue with bronchodilators PRN  - goal SpO2 > 88%    Cardiac/Vascular  Atrial fibrillation  - rate controlled, continue telemetry monitoring   - continue Eliquis    Venous stasis dermatitis of both lower extremities  - chronic and longstanding  - supportive care  - PT/OT, OOB as able     Renal/  BETY (acute kidney injury)  - baseline serum creatinine around 1.0, remains at 2 this admit (? new baseline), good UOP  - will continue IVF and hemodynamic support with pressors  - renally dose meds and avoid nephrotoxins  - monitor UOP, lytes, and RFP    ID  * Septic shock  - blood cx unrevealing, urine with e coli, remains on rocephin  - continues to require levo, weaning as able  - increasing midodrine to 10 mg TID    Endocrine  Morbid obesity with BMI of 60.0-69.9, adult  - affecting medical decision making and complicating the above   - pt would benefit greatly from weight loss and lifestyle modifications   - refuses NIPPV, reports he wore it last night some   - this is the pt's largest barrier to improving his health     Hypothyroidism (acquired)  -  synthroid    GI  Diverticulitis  - mild findings on CT, no abdominal pain  - felexiseal in place   - monitor     Other  Chronic acquired lymphedema  - chronic and longstanding  - supportive care    Sleep apnea  - contributing to and complicating the above  - encourage use of NIPPV qHS and PRN naps    Prophylaxis Measures:  GI ppx: Famotidine  VTE ppx: Apixaban  Glucose control: Monitor blood glucose    Code Status: Full Code        Critical Care Time: 36 minutes  Critical secondary to Patient has a condition that poses threat to life and bodily function: septic shock on pressors     Critical care was time spent personally by me on the following activities: development of treatment plan with patient or surrogate and bedside caregivers, discussions with consultants, evaluation of patient's response to treatment, examination of patient, ordering and performing treatments and interventions, ordering and review of laboratory studies, ordering and review of radiographic studies, pulse oximetry, re-evaluation of patient's condition. This critical care time did not overlap with that of any other provider or involve time for any procedures.     Prabhu French NP  Critical Care Medicine  [facility] - [facility] ([facility])

## 2023-09-06 NOTE — ASSESSMENT & PLAN NOTE
- affecting medical decision making and complicating the above   - pt would benefit greatly from weight loss and lifestyle modifications   - refuses NIPPV, reports he wore it last night some   - this is the pt's largest barrier to improving his health

## 2023-09-06 NOTE — PLAN OF CARE
Patient AAO x 4. A fib on tele monitor, 4 L nasal cannula while awake, CPAP at night. Levophed infusing to maintain MAPs > 70. PICC and Art line intact. May and flexi remain in place. Adequate urine output per hour. Safety measures I place.

## 2023-09-06 NOTE — ASSESSMENT & PLAN NOTE
- baseline serum creatinine around 1.0, remains at 2 this admit (? new baseline), good UOP  - will continue IVF and hemodynamic support with pressors  - renally dose meds and avoid nephrotoxins  - monitor UOP, lytes, and RFP

## 2023-09-07 NOTE — ASSESSMENT & PLAN NOTE
- contributing to and complicating the above  - encourage use of NIPPV qHS and PRN naps, documentation noted that pt refusing NIPPV

## 2023-09-07 NOTE — SUBJECTIVE & OBJECTIVE
ROS complete and negative unless stated in the interval HPI    Objective:     Vital Signs (Most Recent):  Temp: 97.7 °F (36.5 °C) (09/07/23 0700)  Pulse: 69 (09/07/23 0700)  Resp: (!) 23 (09/07/23 0700)  BP: (!) 117/56 (09/07/23 0700)  SpO2: 95 % (09/07/23 0700) Vital Signs (24h Range):  Temp:  [97.3 °F (36.3 °C)-98.7 °F (37.1 °C)] 97.7 °F (36.5 °C)  Pulse:  [] 69  Resp:  [12-48] 23  SpO2:  [68 %-100 %] 95 %  BP: ()/(48-75) 117/56  Arterial Line BP: ()/(36-59) 105/54     Weight: (!) 202 kg (445 lb 5.3 oz)  Body mass index is 62.11 kg/m².      Intake/Output Summary (Last 24 hours) at 9/7/2023 0809  Last data filed at 9/7/2023 0700  Gross per 24 hour   Intake 2749.13 ml   Output 1180 ml   Net 1569.13 ml        Physical Exam  Vitals reviewed.   Constitutional:       General: He is sleeping. He is not in acute distress.     Appearance: He is morbidly obese.      Comments: Chronically ill appearing male lying flat in bed on NC in NAD   Cardiovascular:      Rate and Rhythm: Normal rate.      Comments: Afib on the monitor   Pulmonary:      Breath sounds: Normal breath sounds. No wheezing or rhonchi.      Comments: On NC  Abdominal:      General: There is no distension.      Palpations: Abdomen is soft.      Comments: Obese abd    Skin:     General: Skin is warm and dry.   Neurological:      General: No focal deficit present.      Mental Status: He is easily aroused.   Psychiatric:         Behavior: Behavior is cooperative.               Vents:  Oxygen Concentration (%): 40 (09/07/23 0636)    Lines/Drains/Airways       Peripherally Inserted Central Catheter Line  Duration             PICC Double Lumen 09/02/23 0800 right brachial 5 days              Drain  Duration                  Rectal Tube 09/02/23 1430 rectal tube w/ balloon (indicate number of mLs) 4 days         Urethral Catheter 09/02/23 0900 Silicone 16 Fr. 4 days              Arterial Line  Duration             Arterial Line 09/05/23 1250 Right  Radial 1 day                    Significant Labs:    CBC/Anemia Profile:  Recent Labs   Lab 09/06/23  0448 09/07/23  0354   WBC 11.71 7.59   HGB 9.9* 8.9*   HCT 31.6* 28.8*    178   MCV 97 100*   RDW 16.6* 17.0*        Chemistries:  Recent Labs   Lab 09/05/23  1321 09/06/23  0448 09/06/23  1256 09/07/23  0354    141  --  142   K 3.2* 3.0* 3.2* 3.2*   CL 97 99  --  104   CO2 28 28  --  28   BUN 52* 51*  --  55*   CREATININE 2.0* 2.0*  --  2.0*   CALCIUM 7.6* 7.9*  --  7.3*   ALBUMIN 1.8* 1.9*  --  1.8*   PROT 5.7* 5.9*  --  4.8*   BILITOT 0.6 0.5  --  0.3   ALKPHOS 54* 62  --  50*   ALT 15 18  --  15   AST 26 27  --  23   MG 2.2 2.7*  --  2.6       All pertinent labs within the past 24 hours have been reviewed.    Significant Imaging:  I have reviewed all pertinent imaging results/findings within the past 24 hours.

## 2023-09-07 NOTE — PLAN OF CARE
-Neuro intact.  -Noncompliant with CPAP, HOB (always puts it flat), refuses to turn. Educated frequently on importance of turning for pressure injury/wound prevention and importance of CPAP and HOB for breathing. Pt still refusing  -Able to wean Levo to 0.04-0.06mcg/kg/min  -1 BM around Flexi. Checked frequently throughout shift. Linen change performed  -Flexi with 75ml quantifiable output.  Currently at 150  -May in place. UOP 365ml for shift  -40mEq potassium PO given for K 3.2. More to follow  -Earnestine on 4L NC and Call light in reach

## 2023-09-07 NOTE — PLAN OF CARE
Patient resting quietly in bed with no signs of distress. VSS at this time on low dose levophed gtt. Attempted multiple times today to wean off levophed but unsuccessful. Informed Dr Altamirano and Prabhu SHELBY. Patient with poor appetite, ate breakfast for family but refused lunch. Patient refuses to sit up in bed and only agrees to minimal turning side ti side with wedge. Flexiseal site assessed per routine, no abnormalities noted. Artline and PICC line sites WNL. Patient with oliguria, Informed Prabhu SHELBY. Spouse and son visited today, update given, POC discussed.

## 2023-09-07 NOTE — ASSESSMENT & PLAN NOTE
- blood cx unrevealing, urine with e coli, remains on rocephin with improved WBC  - continues to require levo though doses weaning  - increasing midodrine to 15 mg TID

## 2023-09-07 NOTE — ASSESSMENT & PLAN NOTE
- mild findings on CT on admit, no abdominal pain  - felexiseal in place, will discuss d/c today with nursing on rounds  - monitor

## 2023-09-07 NOTE — ASSESSMENT & PLAN NOTE
- creatinine holding steady at 2 this admit (? new baseline), adequate UOP  - will continue IVF and hemodynamic support with pressors, encourage PO intake   - renally dose meds and avoid nephrotoxins  - monitor UOP, lytes, and RFP

## 2023-09-07 NOTE — PROGRESS NOTES
[facility] ([facility])  Critical Care Medicine  Progress Note    Patient Name: Gene Rothman  MRN: [id number]  Admission Date: 9/2/2023  Hospital Length of Stay: 5 days  Code Status: Full Code  Attending Provider: Rodrigo Josue MD  Primary Care Provider: Ami Zarate DO   Principal Problem: Septic shock    Subjective:     HPI:  Mr Rothman is a 78 y/o morbidly obese man with COPD with OHS/DENISHA (noncompliant with PAP therapies), chronic hypoxic respiratory failure (4L, prefers to use simple mask), Afib on Eliquis, pHTN, diastolic CHF, RV dysfxn, and chronic lymphedema who presented to the ER today due to abdominal pain that started last night.  He reports diffuse abdominal pain and tenderness.  Deneis N/V, diarrhea, dyspnea, cough, sputum, F/C, sick contacts, chest pain, change in bowel/bladder.      Work-up in the ER showed mild distal descending colon diverticulitis, LA 2.8, WBC 16.33, Cr 1.8 (b/l around 1.0), Bicarb 38.  He was given Vanc/Zosyn.  Required initiation of Levo due to hypotension after he recieved his 30cc/kg of fluids.  He is being admitted to the ICU for septic shock, presumably with diverticulitis as the source.      Hospital/ICU Course:  9/2 - admitted to ICU on Levo for septic shock.    9/3 - no acute events.  Remains on Levo.  Abdominal pain improving.  9/4: no new issues or c/o noted, remains on levo and vaso - weaning as able, remains on venti mask per his choice and not NC  9/5: no new issues or c/o overnight, pt awake and alert on venti mask (his choice), weaning pressors s/t pt's mentation given inconsistencies in BP readings given body habitus, adding midodrine  9/6: remains on levo, increasing midodrine, no new issues or c/o on exam this AM, with some indigestion, no family at bedside   9/7: levo requirements decreasing, increasing midodrine to 15mg TID, no issues or n/o on exam, nursing reports pt continues to refuse many parts of his POC (ie: turning, NIPPV,  etc.)      ROS complete and negative unless stated in the interval HPI    Objective:     Vital Signs (Most Recent):  Temp: 97.7 °F (36.5 °C) (09/07/23 0700)  Pulse: 69 (09/07/23 0700)  Resp: (!) 23 (09/07/23 0700)  BP: (!) 117/56 (09/07/23 0700)  SpO2: 95 % (09/07/23 0700) Vital Signs (24h Range):  Temp:  [97.3 °F (36.3 °C)-98.7 °F (37.1 °C)] 97.7 °F (36.5 °C)  Pulse:  [] 69  Resp:  [12-48] 23  SpO2:  [68 %-100 %] 95 %  BP: ()/(48-75) 117/56  Arterial Line BP: ()/(36-59) 105/54     Weight: (!) 202 kg (445 lb 5.3 oz)  Body mass index is 62.11 kg/m².      Intake/Output Summary (Last 24 hours) at 9/7/2023 0809  Last data filed at 9/7/2023 0700  Gross per 24 hour   Intake 2749.13 ml   Output 1180 ml   Net 1569.13 ml        Physical Exam  Vitals reviewed.   Constitutional:       General: He is sleeping. He is not in acute distress.     Appearance: He is morbidly obese.      Comments: Chronically ill appearing male lying flat in bed on NC in NAD   Cardiovascular:      Rate and Rhythm: Normal rate.      Comments: Afib on the monitor   Pulmonary:      Breath sounds: Normal breath sounds. No wheezing or rhonchi.      Comments: On NC  Abdominal:      General: There is no distension.      Palpations: Abdomen is soft.      Comments: Obese abd    Skin:     General: Skin is warm and dry.   Neurological:      General: No focal deficit present.      Mental Status: He is easily aroused.   Psychiatric:         Behavior: Behavior is cooperative.               Vents:  Oxygen Concentration (%): 40 (09/07/23 0636)    Lines/Drains/Airways       Peripherally Inserted Central Catheter Line  Duration             PICC Double Lumen 09/02/23 0800 right brachial 5 days              Drain  Duration                  Rectal Tube 09/02/23 1430 rectal tube w/ balloon (indicate number of mLs) 4 days         Urethral Catheter 09/02/23 0900 Silicone 16 Fr. 4 days              Arterial Line  Duration             Arterial Line 09/05/23  1250 Right Radial 1 day                    Significant Labs:    CBC/Anemia Profile:  Recent Labs   Lab 09/06/23  0448 09/07/23  0354   WBC 11.71 7.59   HGB 9.9* 8.9*   HCT 31.6* 28.8*    178   MCV 97 100*   RDW 16.6* 17.0*        Chemistries:  Recent Labs   Lab 09/05/23  1321 09/06/23  0448 09/06/23  1256 09/07/23  0354    141  --  142   K 3.2* 3.0* 3.2* 3.2*   CL 97 99  --  104   CO2 28 28  --  28   BUN 52* 51*  --  55*   CREATININE 2.0* 2.0*  --  2.0*   CALCIUM 7.6* 7.9*  --  7.3*   ALBUMIN 1.8* 1.9*  --  1.8*   PROT 5.7* 5.9*  --  4.8*   BILITOT 0.6 0.5  --  0.3   ALKPHOS 54* 62  --  50*   ALT 15 18  --  15   AST 26 27  --  23   MG 2.2 2.7*  --  2.6       All pertinent labs within the past 24 hours have been reviewed.    Significant Imaging:  I have reviewed all pertinent imaging results/findings within the past 24 hours.      ABG  Recent Labs   Lab 09/05/23  1307   PH 7.276*   PO2 68*   PCO2 75.3*   HCO3 35.0*   BE 8     Assessment/Plan:     Pulmonary  Chronic obstructive pulmonary disease with acute exacerbation  - not in acute exacerbation  - continue with bronchodilators PRN  - goal SpO2 > 88%    Cardiac/Vascular  Atrial fibrillation  - rate controlled, continue telemetry monitoring   - continue Eliquis    Venous stasis dermatitis of both lower extremities  - chronic and longstanding  - supportive care  - PT/OT, OOB as able     Renal/  BETY (acute kidney injury)  - creatinine holding steady at 2 this admit (? new baseline), adequate UOP  - will continue IVF and hemodynamic support with pressors, encourage PO intake   - renally dose meds and avoid nephrotoxins  - monitor UOP, lytes, and RFP    ID  * Septic shock  - blood cx unrevealing, urine with e coli, remains on rocephin with improved WBC  - continues to require levo though doses weaning  - increasing midodrine to 15 mg TID    Endocrine  Morbid obesity with BMI of 60.0-69.9, adult  - affecting medical decision making and complicating the  above   - pt would benefit greatly from weight loss and lifestyle modifications   - refusing NIPPV  - this is the pt's largest barrier to improving his health     Hypothyroidism (acquired)  - synthroid    GI  Diverticulitis  - mild findings on CT on admit, no abdominal pain  - felexiseal in place, will discuss d/c today with nursing on rounds  - monitor     Other  Chronic acquired lymphedema  - chronic and longstanding  - supportive care    Sleep apnea  - contributing to and complicating the above  - encourage use of NIPPV qHS and PRN naps, documentation noted that pt refusing NIPPV      Prophylaxis Measures:  GI ppx: Famotidine  VTE ppx: Apixaban  Glucose control: Monitor blood glucose    Code Status: Full Code    Critical Care Time: 32 minutes  Critical secondary to Patient has a condition that poses threat to life and bodily function: septic shock on pressors     Critical care was time spent personally by me on the following activities: development of treatment plan with patient or surrogate and bedside caregivers, discussions with consultants, evaluation of patient's response to treatment, examination of patient, ordering and performing treatments and interventions, ordering and review of laboratory studies, ordering and review of radiographic studies, pulse oximetry, re-evaluation of patient's condition. This critical care time did not overlap with that of any other provider or involve time for any procedures.     Prabhu French NP  Critical Care Medicine  [facility] ([facility])

## 2023-09-07 NOTE — ASSESSMENT & PLAN NOTE
- affecting medical decision making and complicating the above   - pt would benefit greatly from weight loss and lifestyle modifications   - refusing NIPPV  - this is the pt's largest barrier to improving his health

## 2023-09-08 PROBLEM — L60.3 DYSTROPHIC NAIL: Status: ACTIVE | Noted: 2023-01-01

## 2023-09-08 NOTE — PROGRESS NOTES
Pharmacist Renal Dose Adjustment Note    Gene Rothman is a 77 y.o. male being treated with the medication famotidine.     Patient Data:    Vital Signs (Most Recent):  Temp: 98.4 °F (36.9 °C) (09/08/23 0700)  Pulse: 68 (09/08/23 0830)  Resp: (!) 22 (09/08/23 0830)  BP: (!) 92/55 (09/08/23 0830)  SpO2: 97 % (09/08/23 0830) Vital Signs (72h Range):  Temp:  [95.5 °F (35.3 °C)-98.7 °F (37.1 °C)]   Pulse:  []   Resp:  [10-48]   BP: ()/()   SpO2:  [68 %-100 %]   Arterial Line BP: ()/(29-73)      Recent Labs   Lab 09/06/23  0448 09/07/23  0354 09/08/23 0418   CREATININE 2.0* 2.0* 2.3*     Serum creatinine: 2.3 mg/dL (H) 09/08/23 0418  Estimated creatinine clearance: 46.1 mL/min (A)    Per protocol for CrCl < 50 ml/min, dose will be reduced from 20 mg PO twice daily to 20 mg PO once daily.     Pharmacist's Name: Katherine E Mcardle  Pharmacist's Extension: [phone]

## 2023-09-08 NOTE — HOSPITAL COURSE
Admitted to ICU on 9/2 for septic shock with pressors. Remained in ICU from 9/2/2023 through 9/7/2023. Pressors weans and discontinued on 9/8/2023 and transferred to floor.     Hospital medicine consulted and accepted care.   Podiatry consulted for thickened, distrophic toenails and longstanding lymphedema  Wife at bedside.

## 2023-09-08 NOTE — ASSESSMENT & PLAN NOTE
Presented with abdominal pain, CT findings on arrival suggestive of mild diverticulitis   Received empiric antibiotics   Currently denied acute pain/nausea, vomiting

## 2023-09-08 NOTE — CONSULTS
New consult noted on Mr. Rothman. He was originally seen in consult on 9/5 for multiple present on admission wounds including toenails with dried blood and fungal infection, thick, long nails. One nail with partial avulsion also noted at that visit to left foot. Recommended cleansing well with saline, may paint with betadine, f/u with podiatry for nail care after discharge. Now new consult due to feet/nails. Patient also with chronic lymphedema changes to BLE - not a candidate for compression therapy during this acute illness/hospitalization. Podiatry also consulted regarding feet, will defer care to their service. Discussed with Virgilio Wallace.

## 2023-09-08 NOTE — CONSULTS
[facility] ([facility])  Nephrology  Consult Note    Patient Name: Gene Rothman  MRN: [id number]  Admission Date: 9/2/2023  Hospital Length of Stay: 6 days  Attending Provider: Rodrigo Josue MD   Primary Care Physician: Ami Zarate DO  Principal Problem:Septic shock    Inpatient consult to Nephrology  Consult performed by: Дмитрий Pelaez MD  Consult ordered by: Prabhu French NP  Reason for consult: BETY        Subjective:     HPI:  77-year-old male admitted on 09/02/2023 with abdominal pain secondary to diverticulitis complicated by septic shock.  Noted to have mildly elevated creatinine over baseline.  Nephrology has been consulted for evaluation.  Patient was seen in the intensive care unit.  His in bed resting comfortably.  No acute distress noted.  No complaints this morning.    Chart reviewed.  He does not have history of chronic kidney disease however he does have persistent proteinuria on UAs for the past several years.    Past Medical History:   Diagnosis Date    Acute hypoxemic respiratory failure 9/17/2022    Arthritis     Atrial fibrillation 3/1/2023    CHF (congestive heart failure)     Chronic kidney disease     Coronary artery disease     Depression     Hypertension     Hypertensive heart disease with heart failure 9/16/2022    Hypothyroidism     Lymphedema of lower extremity     Nephrolithiasis     Obesity     Peripheral vascular disease     Pneumonia 9/17/2022    Recurrent cellulitis of lower leg     Sleep apnea     can't stand the CPAP , sleeps elevated in hospital bed or chair    Tobacco dependence     resolved       Past Surgical History:   Procedure Laterality Date    ADENOIDECTOMY  1961    BACK SURGERY  1975;  1976    x2 for disc; scar tissue removed    debridement of bilateral leg ulcers Bilateral 01/01/2017    Dr Hernandez    INNER EAR SURGERY Bilateral 1961    separate - pinnaplasty , new eardrms constructed    KIDNEY STONE SURGERY Left 1976 approx    open     TONSILLECTOMY         Review of patient's allergies indicates:   Allergen Reactions    Sulfamethoxazole-trimethoprim Itching and Shortness Of Breath    Sulfamethoxazole      Current Facility-Administered Medications   Medication Frequency    0.9%  NaCl infusion Continuous    acetaminophen tablet 650 mg Q6H PRN    apixaban tablet 5 mg BID    aspirin chewable tablet 81 mg Daily    atorvastatin tablet 40 mg Daily    cefTRIAXone (ROCEPHIN) 2 g in dextrose 5 % in water (D5W) 100 mL IVPB (MB+) Q24H    dextrose 10% bolus 125 mL 125 mL PRN    dextrose 10% bolus 250 mL 250 mL PRN    famotidine tablet 20 mg BID    glucagon (human recombinant) injection 1 mg PRN    glucose chewable tablet 16 g PRN    glucose chewable tablet 24 g PRN    HYDROcodone-acetaminophen 5-325 mg per tablet 1 tablet Q6H PRN    insulin aspart U-100 pen 0-5 Units QID (AC + HS) PRN    levothyroxine tablet 50 mcg Before breakfast    midodrine tablet 15 mg TID    mupirocin 2 % ointment BID    ondansetron injection 4 mg Q6H PRN    sodium chloride 0.9% flush 10 mL PRN     Family History       Problem Relation (Age of Onset)    Alcohol abuse Maternal Grandfather    Alzheimer's disease Brother    Asthma Daughter    Cancer Mother, Father, Brother    Diabetes Mother    Heart disease Father, Brother    Hypertension Mother          Tobacco Use    Smoking status: Former     Current packs/day: 0.00     Average packs/day: 1.5 packs/day for 1 year (1.5 ttl pk-yrs)     Types: Cigarettes     Start date:      Quit date:      Years since quittin.7    Smokeless tobacco: Never   Substance and Sexual Activity    Alcohol use: Not Currently    Drug use: Never    Sexual activity: Never     Review of Systems   Constitutional: Negative.    HENT: Negative.     Respiratory: Negative.     Cardiovascular:  Positive for leg swelling.   Gastrointestinal: Negative.    Genitourinary: Negative.    Musculoskeletal: Negative.    Skin: Negative.      Objective:     Vital Signs  (Most Recent):  Temp: 98.4 °F (36.9 °C) (09/08/23 0700)  Pulse: 68 (09/08/23 0830)  Resp: (!) 22 (09/08/23 0830)  BP: (!) 92/55 (09/08/23 0830)  SpO2: 97 % (09/08/23 0830) Vital Signs (24h Range):  Temp:  [97.4 °F (36.3 °C)-98.4 °F (36.9 °C)] 98.4 °F (36.9 °C)  Pulse:  [60-76] 68  Resp:  [11-29] 22  SpO2:  [79 %-100 %] 97 %  BP: ()/(31-64) 92/55  Arterial Line BP: ()/(29-51) 117/42     Weight: (!) 200 kg (440 lb 14.7 oz) (09/08/23 0600)  Body mass index is 61.5 kg/m².  Body surface area is 3.17 meters squared.    I/O last 3 completed shifts:  In: 4300.7 [P.O.:500; I.V.:3585.7; NG/GT:115; IV Piggyback:100]  Out: 1175 [Urine:850; Stool:325]    Physical Exam  Constitutional:       Appearance: Normal appearance. He is obese.   HENT:      Head: Normocephalic and atraumatic.   Eyes:      General: No scleral icterus.     Extraocular Movements: Extraocular movements intact.      Pupils: Pupils are equal, round, and reactive to light.   Cardiovascular:      Rate and Rhythm: Normal rate. Rhythm irregular.   Pulmonary:      Effort: Pulmonary effort is normal.      Breath sounds: Normal breath sounds.   Musculoskeletal:      Right lower leg: Edema present.      Left lower leg: Edema present.      Comments: Generalized edema.   Skin:     General: Skin is warm and dry.   Neurological:      General: No focal deficit present.      Mental Status: He is alert and oriented to person, place, and time.   Psychiatric:         Mood and Affect: Mood normal.         Behavior: Behavior normal.         Significant Labs:  BMP:   Recent Labs   Lab 09/08/23 0418   GLU 76      K 4.1      CO2 23   BUN 59*   CREATININE 2.3*   CALCIUM 7.1*   MG 2.3     CMP:   Recent Labs   Lab 09/08/23 0418   GLU 76   CALCIUM 7.1*   ALBUMIN 1.8*   PROT 4.7*      K 4.1   CO2 23      BUN 59*   CREATININE 2.3*   ALKPHOS 56   ALT 15   AST 20   BILITOT 0.3     All labs within the past 24 hours have been reviewed.    Significant  Imaging:  Labs: Reviewed      Assessment/Plan:     Active Diagnoses:    Diagnosis Date Noted POA    PRINCIPAL PROBLEM:  Septic shock [A41.9, R65.21] 12/03/2021 Yes    Diverticulitis [K57.92] 09/02/2023 Yes    BETY (acute kidney injury) [N17.9] 09/02/2023 Yes    Chronic obstructive pulmonary disease with acute exacerbation [J44.1] 03/08/2023 Yes    Atrial fibrillation [I48.91] 03/01/2023 Yes    Venous stasis dermatitis of both lower extremities [I87.2] 09/29/2017 Yes     Chronic    Chronic acquired lymphedema [I89.0] 12/30/2016 Yes     Chronic    Sleep apnea [G47.30] 04/08/2016 Yes     Chronic    Morbid obesity with BMI of 60.0-69.9, adult [E66.01, Z68.44] 12/19/2014 Not Applicable     Chronic    Hypothyroidism (acquired) [E03.9] 12/19/2014 Yes      Problems Resolved During this Admission:       1. Acute kidney injury:  Creatinine has remained relatively stable with slight increase since admission.  Over the past 7 days he is gone from 1.7-2.3.  Cause of acute renal failure is multifactorial in nature.  Consistent with nonoliguric ATN.    Of concern however is it appears that his urine output has been dropping over the past several days.  Yesterday those only 420 cc recorded.    Will obtain urine studies for completeness.    2. Hypotension:  Blood pressure continues to run on the low side.  Currently managed by critical care medicine.  He is on oral midodrine.  There have been several times over the past 24-48 hours where his mean arterial pressure fell below 60.  There may also be a component of ischemic ATN.    3. CKD 2: Baseline creatinine runs between about 0.8 and 1.3.  He has had persistent proteinuria on urinalysis for the past several years.    4. Generalized edema:  He is chronically hypoalbuminemic on laboratory studies for the past several months.  This could be manifestation of nephrotic syndrome.  As per above will check urine studies for completeness.    Thank you for your consult.     Дмитрий Pelaez,  MD  Nephrology  [facility] ([facility])

## 2023-09-08 NOTE — NURSING
Performed a complete bed linen change with assist of 2 RNs. Partial bath given. Barrier cream applied. Flushed flexiseal with 50ml water, good results obtained. Changed flexiseal bag.

## 2023-09-08 NOTE — SUBJECTIVE & OBJECTIVE
ROS complete and negative unless stated in the interval HPI    Objective:     Vital Signs (Most Recent):  Temp: 98.4 °F (36.9 °C) (09/08/23 0700)  Pulse: 68 (09/08/23 0830)  Resp: (!) 22 (09/08/23 0830)  BP: (!) 92/55 (09/08/23 0830)  SpO2: 97 % (09/08/23 0830) Vital Signs (24h Range):  Temp:  [97.4 °F (36.3 °C)-98.4 °F (36.9 °C)] 98.4 °F (36.9 °C)  Pulse:  [60-76] 68  Resp:  [11-29] 22  SpO2:  [79 %-100 %] 97 %  BP: ()/(31-64) 92/55  Arterial Line BP: ()/(29-51) 117/42     Weight: (!) 200 kg (440 lb 14.7 oz)  Body mass index is 61.5 kg/m².      Intake/Output Summary (Last 24 hours) at 9/8/2023 1036  Last data filed at 9/8/2023 0900  Gross per 24 hour   Intake 2688.86 ml   Output 640 ml   Net 2048.86 ml        Physical Exam  Vitals reviewed.   Constitutional:       General: He is awake.      Appearance: He is morbidly obese.      Comments: Chronically ill appearing morbidly obese male lying in bed on 4L NC in NAD   Cardiovascular:      Rate and Rhythm: Normal rate.      Comments: Extensive lymphedema to BLE   Pulmonary:      Breath sounds: Decreased breath sounds present. No wheezing or rhonchi.      Comments: On 4L NC, poor effort, refuses NIPPV  Abdominal:      General: There is no distension.      Palpations: Abdomen is soft.      Comments: Obese abd   Musculoskeletal:      Right lower leg: Edema present.      Left lower leg: Edema present.   Skin:     General: Skin is warm and dry.   Neurological:      General: No focal deficit present.      Mental Status: He is alert.   Psychiatric:         Behavior: Behavior is cooperative.             Vents:  Oxygen Concentration (%): (S) 36 (09/08/23 0822)    Lines/Drains/Airways       Peripherally Inserted Central Catheter Line  Duration             PICC Double Lumen 09/02/23 0800 right brachial 6 days              Drain  Duration                  Urethral Catheter 09/02/23 0900 Silicone 16 Fr. 6 days         Rectal Tube 09/02/23 1430 rectal tube w/ balloon  (indicate number of mLs) 5 days                    Significant Labs:    CBC/Anemia Profile:  Recent Labs   Lab 09/07/23  0354 09/08/23  0418   WBC 7.59 8.94   HGB 8.9* 8.8*   HCT 28.8* 28.7*    195   * 100*   RDW 17.0* 17.3*        Chemistries:  Recent Labs   Lab 09/07/23  0354 09/07/23  1309 09/08/23 0418     --  141   K 3.2* 3.5 4.1     --  108   CO2 28  --  23   BUN 55*  --  59*   CREATININE 2.0*  --  2.3*   CALCIUM 7.3*  --  7.1*   ALBUMIN 1.8*  --  1.8*   PROT 4.8*  --  4.7*   BILITOT 0.3  --  0.3   ALKPHOS 50*  --  56   ALT 15  --  15   AST 23  --  20   MG 2.6  --  2.3       All pertinent labs within the past 24 hours have been reviewed.    Significant Imaging:  I have reviewed all pertinent imaging results/findings within the past 24 hours.

## 2023-09-08 NOTE — HOSPITAL COURSE
Mr Rothman is a 78 y/o morbidly obese man with COPD with OHS/DENISHA (noncompliant with PAP therapies), chronic hypoxic respiratory failure (4L, prefers to use simple mask), Afib on Eliquis, pHTN, diastolic CHF, RV dysfxn, and chronic lymphedema who presented to the ER ton 9/2 due to abdominal pain, got admitted to ICU for septic shock likely secondary to diverticulitis/UTI, has been started on Levophed 9/3, vasopressin added to Levo to stabilize blood pressure.  On 09/04, vaso discontinued, continued on Levophed, midodrine added; patient off of Levophed, continued on midodrine 15 mg t.i.d., blood pressure stabilizing;   Also on arrival, patient has been started on vanc, Zosyn, subsequently vanc discontinued, on 09/05, antibiotics changed from Zosyn to Rocephin based on culture results.    Has been on Ventimask per patient's choice.      On 09/08, patient deemed stable for downgrade per ICU, hospital medicine consulted to assume care.    Continue antibiotics, midodrine tapering, IV fluids.    Noted to have elevated creatinine, decreased urine output, nephrology on board.    Acute on chronic paronychia, chronic lymphedema, ordered wound care, US arterial LE- did not show acute findings/ hemodynamic sign stenosis; podiatry eval and stated- No need for nail avulsion in-patient as there are no signs of acute infection and currently on Eliquis, recommended nail trim/debridement in out patient setting in clinic or home visit with NP managing his at home care;  Echo as of 9/5-     Left Ventricle: The left ventricle is normal in size. Mildly increased wall thickness. There is concentric remodeling. Normal wall motion. Septal motion is normal. There is normal systolic function with a visually estimated ejection fraction of 55 - 70%. There is normal diastolic function.    Left Atrium: Left atrium is mildly dilated.    Right Ventricle: Mild right ventricular enlargement. Wall thickness is normal. Right ventricle wall motion  is  normal. Systolic function is normal.    Aortic Valve: The aortic valve is a trileaflet valve. There is moderate aortic valve sclerosis.    Tricuspid Valve: There is moderate regurgitation with a centrally directed jet.    Pulmonary Artery: The estimated pulmonary artery systolic pressure is 57 mmHg.      Per discussion with wife at bedside, patient chronically bed-bound for past 2 years due to general deterioration in condition.  Does not follow up with doctors outside, sometimes attempts virtual hospital follow up, stated that he gets refills from NP visit at home; non compliant with NIPPV at home, ? Compliance with meds/ home oxygen;   Had palliative evaluation during recent hospitalizations in 7/2023- prefers to continue full code, discussed again today, patient/wife at bedside expressed to continue full code; On 9/9- patient lying comfortably, not in distress.  Hemodynamically stable.  Stated that he does not want to answer questions.  Oliguric with approximately 100-200 cc urine output over past 24 hours, pending repeat labs this morning.  Nephrology on board. On 9/10- patient resting comfortably in bed, not in acute distress.  Currently saturating about 92 on 4 L nasal cannula, oriented to self, place, refuses to answer questions.  Noted to have slight improvement in urine output, creatinine remained stable at 2.9 as yesterday.  We will continue to monitor improvement in renal function, follow up with Nephrology.   -On 9/11/23, BP improved with Midodrine and IV antibiotics continued. Kidney function mildly improved. Nephrology following. On 9/12/23, H/H 8/26.4. BIPAP use adjusted as needed and nightly.  BUN/Cr 61/2.5. BP maintained with Midodrine continued. K replaced. Case discussed with Pulmonology and evaluation for nocturnal home PAP therapy discussed. Pt to be discharge to home with home health once appropriate. O2 sats 97%-98% on 4 L NC. Palliative Care consulted and all services declined per pt/wife.  Poor prognosis discussed with wife and understanding verbalized. Repeat KUB showed mild gaseous distention of the transverse and sigmoid colon.  No radiographic evidence of bowel obstruction. On 9/13/23, pt refusing cardiac monitor and BIPAP overnight. Pt/wife counseled on importance with cardiac monitor and BIPAP replaced. ABG performed with CO2 54.6 (hx of chronic elevation). O2 sats of 94%-99% maintained on 4L- BIPAP when sleeping. Cr increased from 2.5 to 2.9. Case discussed with Nephrology with repeat lab analysis in am. Discharge anticipated on tomorrow pending stability. Case management to assist with discharge planning, home health, wound care, and home ventilator system. On 9/14/23, Cr trending down with patient evaluated by Nephrology and cleared for discharge with Florinef given. Bradycardia noted to monitor. Pt asymptomatic with bradycardia chronic in nature per chart review. Cardiology consulted and patient evaluated with outpatient Cards/EP follow up recommended. Pt has intermittent hypotension on Midodrine and Florinef for BP support. Home ventilator for PAP therapy delivered to bedside prior to discharge for respiratory support due to hypoventilation syndrome. Physical debility due to morbid obesity with bed bound status x 2 years noted. Pt expressed desire for discharge to home. Home health with wound care ordered. Pt seen and examined and transferred to home via EMS. Pt/family instructed to follow up with PCP, Cardiology, Pulmonology, Podiatry, and Nephrology upon discharge for further evaluation.

## 2023-09-08 NOTE — SUBJECTIVE & OBJECTIVE
Past Medical History:   Diagnosis Date    Acute hypoxemic respiratory failure 9/17/2022    Arthritis     Atrial fibrillation 3/1/2023    CHF (congestive heart failure)     Chronic kidney disease     Coronary artery disease     Depression     Hypertension     Hypertensive heart disease with heart failure 9/16/2022    Hypothyroidism     Lymphedema of lower extremity     Nephrolithiasis     Obesity     Peripheral vascular disease     Pneumonia 9/17/2022    Recurrent cellulitis of lower leg     Sleep apnea     can't stand the CPAP , sleeps elevated in hospital bed or chair    Tobacco dependence     resolved       Past Surgical History:   Procedure Laterality Date    ADENOIDECTOMY  1961    BACK SURGERY  1975;  1976    x2 for disc; scar tissue removed    debridement of bilateral leg ulcers Bilateral 01/01/2017    Dr Hernandez    INNER EAR SURGERY Bilateral 1961    separate - pinnaplasty , new eardrms constructed    KIDNEY STONE SURGERY Left 1976 approx    open    TONSILLECTOMY  1961       Review of patient's allergies indicates:   Allergen Reactions    Sulfamethoxazole-trimethoprim Itching and Shortness Of Breath    Sulfamethoxazole        No current facility-administered medications on file prior to encounter.     Current Outpatient Medications on File Prior to Encounter   Medication Sig    acetaminophen (TYLENOL) 500 MG tablet Take 500 mg by mouth every 6 (six) hours as needed for Pain.    apixaban (ELIQUIS) 5 mg Tab Take 1 tablet (5 mg total) by mouth 2 (two) times daily.    aspirin 81 MG Chew Take 1 tablet (81 mg total) by mouth once daily.    atorvastatin (LIPITOR) 40 MG tablet Take 1 tablet (40 mg total) by mouth once daily.    cefadroxil (DURICEF) 500 MG Cap Take 1 capsule (500 mg total) by mouth every 12 (twelve) hours. for 7 days    furosemide (LASIX) 40 MG tablet Take 1 tablet (40 mg total) by mouth once daily.    levalbuterol (XOPENEX) 1.25 mg/3 mL nebulizer solution Take 3 mLs (1.25 mg total) by nebulization every  4 (four) hours as needed for Wheezing. Rescue    levothyroxine (SYNTHROID) 50 MCG tablet Take 1 tablet (50 mcg total) by mouth before breakfast.    mupirocin (BACTROBAN) 2 % ointment Apply topically 3 (three) times daily. for 7 days    nebulizer and compressor (HOME NEBULIZER PLUS SIDESTREAM) Diana use as directed    senna-docusate 8.6-50 mg (PERICOLACE) 8.6-50 mg per tablet Take 1 tablet by mouth once daily.    tiotropium (SPIRIVA) 18 mcg inhalation capsule Inhale 1 capsule (18 mcg total) into the lungs once daily. Controller     Family History       Problem Relation (Age of Onset)    Alcohol abuse Maternal Grandfather    Alzheimer's disease Brother    Asthma Daughter    Cancer Mother, Father, Brother    Diabetes Mother    Heart disease Father, Brother    Hypertension Mother          Tobacco Use    Smoking status: Former     Current packs/day: 0.00     Average packs/day: 1.5 packs/day for 1 year (1.5 ttl pk-yrs)     Types: Cigarettes     Start date:      Quit date:      Years since quittin.7    Smokeless tobacco: Never   Substance and Sexual Activity    Alcohol use: Not Currently    Drug use: Never    Sexual activity: Never     Review of Systems      Constitutional: Negative.    HENT: Negative.     Respiratory: Negative.     Cardiovascular:  Positive for leg swelling.   Gastrointestinal: Negative.    Genitourinary: Negative.    Musculoskeletal: Negative.    Skin: Negative.     Objective:     Vital Signs (Most Recent):  Temp: 98.7 °F (37.1 °C) (23 1100)  Pulse: 65 (23 1400)  Resp: 20 (23 1400)  BP: (!) 86/45 (23 1400)  SpO2: 97 % (23 1400) Vital Signs (24h Range):  Temp:  [97.4 °F (36.3 °C)-98.7 °F (37.1 °C)] 98.7 °F (37.1 °C)  Pulse:  [61-76] 65  Resp:  [11-29] 20  SpO2:  [85 %-100 %] 97 %  BP: ()/(31-69) 86/45  Arterial Line BP: ()/(29-51) 117/42     Weight: (!) 200 kg (440 lb 14.7 oz)  Body mass index is 61.5 kg/m².     Physical Exam       Constitutional:        General: He is awake.      Appearance: He is morbidly obese.      Comments: Chronically ill appearing morbidly obese male lying in bed on 4L NC in NAD   Cardiovascular:      Rate and Rhythm: Normal rate.      Comments: Extensive lymphedema to BLE   Pulmonary:      Breath sounds: Decreased breath sounds present. No wheezing or rhonchi.      Comments: On 4L NC, poor effort, refuses NIPPV  Abdominal:      General: There is no distension.      Palpations: Abdomen is soft.      Comments: Obese abd   Musculoskeletal:      Right lower leg: Edema present.      Left lower leg: Edema present.   Skin:     General: Skin is warm and dry.   Neurological:      General: No focal deficit present.      Mental Status: He is alert.   Psychiatric:         Behavior: Behavior is cooperative.      Significant Labs: All pertinent labs within the past 24 hours have been reviewed.  CBC:   Recent Labs   Lab 09/07/23  0354 09/08/23 0418   WBC 7.59 8.94   HGB 8.9* 8.8*   HCT 28.8* 28.7*    195     CMP:   Recent Labs   Lab 09/07/23  0354 09/07/23  1309 09/08/23  0418     --  141   K 3.2* 3.5 4.1     --  108   CO2 28  --  23   GLU 86  --  76   BUN 55*  --  59*   CREATININE 2.0*  --  2.3*   CALCIUM 7.3*  --  7.1*   PROT 4.8*  --  4.7*   ALBUMIN 1.8*  --  1.8*   BILITOT 0.3  --  0.3   ALKPHOS 50*  --  56   AST 23  --  20   ALT 15  --  15   ANIONGAP 10  --  10       Significant Imaging:   Imaging Results              X-Ray Chest AP Portable (Final result)  Result time 09/02/23 08:20:03      Final result by Live Duckworth III, MD (09/02/23 08:20:03)                   Impression:      Well-positioned PICC line.      Electronically signed by: Live Duckworth MD  Date:    09/02/2023  Time:    08:20               Narrative:    EXAMINATION:  XR CHEST AP PORTABLE    CLINICAL HISTORY:  Encounter for adjustment and management of vascular access device    FINDINGS:  Comparison is made with the most recent prior chest x-ray.  Right arm  PICC line is well positioned in the SVC..  Stable cardiomegaly, pulmonary vascular congestion and trace bilateral pleural effusions..  No acute airspace consolidation or pneumothorax identified.                                       CT Abdomen Pelvis  Without Contrast (Final result)  Result time 09/02/23 07:13:05      Final result by Live Duckworth III, MD (09/02/23 07:13:05)                   Impression:      Mild distal descending colonic diverticulitis suspected with possible associated postinflammatory stricture causing a partial obstruction.  Possible rectal fecal impaction.  No free air or abscess.  Stable chronic findings, as above.    Note: All CT scans at this facility are performed  using dose modulation techniques as appropriate to performed exam including the following:  automated exposure control; adjustment of mA and/or kV according to the patients size (this includes techniques or standardized protocols for targeted exams where dose is matched to indication/reason for exam: i.e. extremities or head);  iterative reconstruction technique.      Electronically signed by: Live Duckworth MD  Date:    09/02/2023  Time:    07:13               Narrative:    EXAMINATION:  CT ABDOMEN PELVIS WITHOUT CONTRAST    CLINICAL HISTORY:  Abdominal abscess/infection suspected;    TECHNIQUE:  Routine CT abdomen and pelvis performed without IV contrast.  Coronal and sagittal reformatted images obtained.    COMPARISON:  07/15/2023    FINDINGS:  Lung bases: No acute findings.  Bibasilar subsegmental atelectasis.    Bones: No acute osseous abnormality or suspicious bone lesions.    Kidneys and ureters: Stable bilateral renal atrophy, renal cysts, and prominent staghorn calculi in the right renal pelvis and calices.  No ureterolithiasis or hydronephrosis.    Stomach and bowel: Mild acute diverticulitis of the distal descending colon with luminal narrowing, upstream colonic dilation with air-fluid levels, possibly a post  inflammatory stricture in partial obstruction versus spasm/peristalsis.  There is a large amount of stool in the rectum which is mildly distended in could represent fecal impaction.  No small bowel dilation.    Appendix: No evidence of appendicitis.    Liver: Unremarkable for noncontrast technique.    Gallbladder and bile ducts: Cholelithiasis without acute cholecystitis.    Pancreas: Unremarkable for noncontrast technique.    Spleen: Unremarkable for noncontrast technique.    Adrenals: Unremarkable.    Bladder: Unremarkable.    Aorta: No aneurysm.    Reproductive organs: Within normal limits.    Miscellaneous: No free intraperitoneal fluid, free air or abscess identified. No pathologic lymphadenopathy.                                       X-Ray Chest 1 View (Final result)  Result time 09/02/23 08:03:02      Final result by Live Duckworth III, MD (09/02/23 08:03:02)                   Impression:      No acute findings.      Electronically signed by: Live Duckworth MD  Date:    09/02/2023  Time:    08:03               Narrative:    EXAMINATION:  XR CHEST 1 VIEW    CLINICAL HISTORY:  Shortness of breath    FINDINGS:  Comparison is made with the most recent prior chest x-ray.  Stable cardiomegaly and aortic atherosclerosis..  The lungs appear clear of active disease. No acute appearing infiltrate, pleural effusion or pneumothorax identified.                        Wet Read by Kahlil Linton Jr., MD (09/02/23 07:59:06, [facility] - Emergency Dept., Emergency Medicine)    No acute findings

## 2023-09-08 NOTE — ASSESSMENT & PLAN NOTE
- creatinine holding steady at 2 this admit, slight bump this AM, nephrology consulted   - will continue IVF   - off pressors with midodrine TID  - renally dose meds and avoid nephrotoxins  - monitor UOP, lytes, and RFP

## 2023-09-08 NOTE — ASSESSMENT & PLAN NOTE
Known h/o CKD stage 2 with baseline Cr 0.8 to 1.3  BETY multifactorial likely secondary to intravascular volume depletion/ UTI/shock- ?  ATN   Creatinine during hospital stay has trended up from 1.7-2.3   Continue IV fluids, avoid nephrotoxins, nephrology on board   Renal dose meds   Monitor urine output

## 2023-09-08 NOTE — PLAN OF CARE
Transferred pt from ICU to the floor. Flexiseal and bowman in place, CDI and patent. PICC line CDI, patent, and infusing IVF. Updated patient on plan of care. Instructed patient to use call light for assistance, call light in reach. Heel boots on, bariatric bed in use. Hourly rounding performed. Vitals q4 hours. Pt disoriented to place and situation, MD Sweeney notified. Education provided, questions answered/encouraged. Chart check complete. A fib controlled rate on tele box #[id number]    Problem: Adult Inpatient Plan of Care  Goal: Plan of Care Review  Outcome: Ongoing, Progressing

## 2023-09-08 NOTE — ASSESSMENT & PLAN NOTE
Body mass index is 61.5 kg/m². Morbid obesity complicates all aspects of disease management from diagnostic modalities to treatment. Weight loss encouraged and health benefits explained to patient.   Bed-bound for past 2 years   Poor cooperation/effort   Questionable compliance

## 2023-09-08 NOTE — NURSING
Called report to Ale bell on Tele. Called and notified patient's son and spouse of new room number [id number]. Transferred patient via specialty bed with all personal care supplies to new room. Patient's son and spouse already in room waiting.

## 2023-09-08 NOTE — CONSULTS
[facility] - Telemetry ([facility])  Podiatry  Consult Note    Patient Name: Gene Rothman  MRN: [id number]  Admission Date: 9/2/2023  Hospital Length of Stay: 6 days  Attending Physician: Kelsey Sweeney,*  Primary Care Provider: Ami Zarate DO     Inpatient consult to Podiatry  Consult performed by: Karo Forrester DPM  Consult ordered by: Kelsey Sweeney MD  Reason for consult: thickend toenails  Assessment/Recommendations: Outpatient management         Subjective:     History of Present Illness:  77 year old male admitted for septic shock due to diverticulitis was admitted to the ED on 9/2/2023. Does have history of morbid obesity with BMI of +60 kg/m, COPD, Chronic respiratory failure, longstanding lymphedema, PVD, HTN, long term anticoagulation therapy. Admitted to the ICU due to septic shock. On admission, WBC 16.33, Cr 1.8, Lactic 2.8      Scheduled Meds:   apixaban  5 mg Oral BID    aspirin  81 mg Oral Daily    atorvastatin  40 mg Oral Daily    cefTRIAXone (ROCEPHIN) IVPB  2 g Intravenous Q24H    [START ON 9/9/2023] famotidine  20 mg Oral Daily    levothyroxine  50 mcg Oral Before breakfast    LIDOcaine (PF) 10 mg/ml (1%)  6 mL Intradermal Once    midodrine  15 mg Oral TID    mupirocin   Nasal BID     Continuous Infusions:   sodium chloride 0.9% 100 mL/hr at 09/08/23 1500     PRN Meds:acetaminophen, dextrose 10%, dextrose 10%, glucagon (human recombinant), glucose, glucose, HYDROcodone-acetaminophen, insulin aspart U-100, ondansetron, sodium chloride 0.9%    Review of patient's allergies indicates:   Allergen Reactions    Sulfamethoxazole-trimethoprim Itching and Shortness Of Breath    Sulfamethoxazole         Past Medical History:   Diagnosis Date    Acute hypoxemic respiratory failure 9/17/2022    Arthritis     Atrial fibrillation 3/1/2023    CHF (congestive heart failure)     Chronic kidney disease     Coronary artery disease     Depression     Hypertension      Hypertensive heart disease with heart failure 2022    Hypothyroidism     Lymphedema of lower extremity     Nephrolithiasis     Obesity     Peripheral vascular disease     Pneumonia 2022    Recurrent cellulitis of lower leg     Sleep apnea     can't stand the CPAP , sleeps elevated in hospital bed or chair    Tobacco dependence     resolved     Past Surgical History:   Procedure Laterality Date    ADENOIDECTOMY      BACK SURGERY  ;  1976    x2 for disc; scar tissue removed    debridement of bilateral leg ulcers Bilateral 2017    Dr Hernandez    INNER EAR SURGERY Bilateral     separate - pinnaplasty , new eardrms constructed    KIDNEY STONE SURGERY Left  approx    open    TONSILLECTOMY         Family History       Problem Relation (Age of Onset)    Alcohol abuse Maternal Grandfather    Alzheimer's disease Brother    Asthma Daughter    Cancer Mother, Father, Brother    Diabetes Mother    Heart disease Father, Brother    Hypertension Mother          Tobacco Use    Smoking status: Former     Current packs/day: 0.00     Average packs/day: 1.5 packs/day for 1 year (1.5 ttl pk-yrs)     Types: Cigarettes     Start date:      Quit date:      Years since quittin.7    Smokeless tobacco: Never   Substance and Sexual Activity    Alcohol use: Not Currently    Drug use: Never    Sexual activity: Never     Review of Systems   Constitutional:  Positive for fatigue. Negative for fever.   Respiratory:  Positive for shortness of breath. Negative for cough and stridor.    Cardiovascular:  Positive for leg swelling.   Gastrointestinal:  Negative for nausea and vomiting.   Musculoskeletal:  Positive for gait problem.        Morbidly obese. bedbound   Skin:  Positive for color change. Negative for pallor, rash and wound.   Psychiatric/Behavioral:  Positive for confusion and decreased concentration.      Objective:     Vital Signs (Most Recent):  Temp: 97.4 °F (36.3 °C)  "(09/08/23 1538)  Pulse: 62 (09/08/23 1538)  Resp: 18 (09/08/23 1538)  BP: (!) 91/44 (09/08/23 1538)  SpO2: (!) 91 % (09/08/23 1538) Vital Signs (24h Range):  Temp:  [97.4 °F (36.3 °C)-98.7 °F (37.1 °C)] 97.4 °F (36.3 °C)  Pulse:  [61-76] 62  Resp:  [11-29] 18  SpO2:  [85 %-100 %] 91 %  BP: ()/(31-69) 91/44  Arterial Line BP: ()/(31-48) 117/42     Weight: (!) 200 kg (440 lb 14.7 oz)  Body mass index is 61.5 kg/m².    Foot Exam    CONSTITUTIONAL:  Patient is awake at times. Decreased concentration. Morbidly obese.  Not well groomed. Oxygen in place    PULMONARY:  Breathing is mildly labored.  Oxygen in place    VASCULAR:  Right DP 1/4. Right PT 0/4. Left DP 1/4 .Left PT 1/4 Lymphedema to the bilateral lower extremities. Significant swelling bilateral. Cap refil intact    NEUROLOGICAL:  unable to accurately assess     DERMATOLOGICAL:  Nails are elongated, thickend, discolored. Nails firmly attached to the nail bed. Dried blood noted to the left 3rd and 4th nails. No evidence of acute paronychia, ingrown, abscess, drainage, fluctuance. Bilateral lymphedema.     MUSCULOSKELETAL: Weakness to the bilateral lower extremities. No evidence of amputations.     Laboratory:  A1C: No results for input(s): "HGBA1C" in the last 4320 hours.  Blood Cultures: No results for input(s): "LABBLOO" in the last 48 hours.  BMP:   Recent Labs   Lab 09/08/23 0418   GLU 76      K 4.1      CO2 23   BUN 59*   CREATININE 2.3*   CALCIUM 7.1*   MG 2.3     Cardiac Markers: No results for input(s): "CKMB", "TROPONINT", "MYOGLOBIN" in the last 168 hours.  CBC:   Recent Labs   Lab 09/08/23 0418   WBC 8.94   RBC 2.86*   HGB 8.8*   HCT 28.7*      *   MCH 30.8   MCHC 30.7*     CPS: {:LNK,LABRCNTIP[  CRP: No results for input(s): "CRP" in the last 168 hours.  ESR: No results for input(s): "SEDRATE" in the last 168 hours.  All pertinent labs reviewed within the last 24 hours.    Diagnostic Results:  None    Clinical " Findings:  Lymphedema and dystrophic toenails without acute signs of infection    Assessment/Plan:     Derm  Dystrophic nail  Has history of long term anticoagulation therapy. Nondiabetic. May benefit from nail trim/debridement in an out patient setting in clinic or home visit with NP managing his at home care. No signs of acute cellulitis, abscess, paronychia. No need for nail avulsion in-patient as there are no signs of acute infection and currently on Eliquis    Endocrine  Morbid obesity with BMI of 60.0-69.9, adult  Needs appropriate diet and exercise. High risk of developing decubitus ulcerations    Other  Chronic acquired lymphedema  Needs compressive therapy or referral to lymphedema clinic outpatient.        Thank you for your consult. I will sign off. Please contact us if you have any additional questions.    Karo Forrester DPM  Podiatry  [facility] - Telemetry ([facility])

## 2023-09-08 NOTE — PROGRESS NOTES
[facility] ([facility])  Critical Care Medicine  Progress Note    Patient Name: Gene Rothman  MRN: [id number]  Admission Date: 9/2/2023  Hospital Length of Stay: 6 days  Code Status: Full Code  Attending Provider: Rodrigo Josue MD  Primary Care Provider: Ami Zarate DO   Principal Problem: Septic shock    Subjective:     HPI:  Mr Rothman is a 76 y/o morbidly obese man with COPD with OHS/DENISHA (noncompliant with PAP therapies), chronic hypoxic respiratory failure (4L, prefers to use simple mask), Afib on Eliquis, pHTN, diastolic CHF, RV dysfxn, and chronic lymphedema who presented to the ER today due to abdominal pain that started last night.  He reports diffuse abdominal pain and tenderness.  Deneis N/V, diarrhea, dyspnea, cough, sputum, F/C, sick contacts, chest pain, change in bowel/bladder.      Work-up in the ER showed mild distal descending colon diverticulitis, LA 2.8, WBC 16.33, Cr 1.8 (b/l around 1.0), Bicarb 38.  He was given Vanc/Zosyn.  Required initiation of Levo due to hypotension after he recieved his 30cc/kg of fluids.  He is being admitted to the ICU for septic shock, presumably with diverticulitis as the source.      Hospital/ICU Course:  9/2 - admitted to ICU on Levo for septic shock.    9/3 - no acute events.  Remains on Levo.  Abdominal pain improving.  9/4: no new issues or c/o noted, remains on levo and vaso - weaning as able, remains on venti mask per his choice and not NC  9/5: no new issues or c/o overnight, pt awake and alert on venti mask (his choice), weaning pressors s/t pt's mentation given inconsistencies in BP readings given body habitus, adding midodrine  9/6: remains on levo, increasing midodrine, no new issues or c/o on exam this AM, with some indigestion, no family at bedside   9/7: levo requirements decreasing, increasing midodrine to 15mg TID, no issues or n/o on exam, nursing reports pt continues to refuse many parts of his POC (ie: turning, NIPPV, etc.)  9/8:  off levo since 2am, UOP remains low, crt bumped this AM to 2.3 - consulting nephro for assistance, stable for transfer of the ICU      ROS complete and negative unless stated in the interval HPI    Objective:     Vital Signs (Most Recent):  Temp: 98.4 °F (36.9 °C) (09/08/23 0700)  Pulse: 68 (09/08/23 0830)  Resp: (!) 22 (09/08/23 0830)  BP: (!) 92/55 (09/08/23 0830)  SpO2: 97 % (09/08/23 0830) Vital Signs (24h Range):  Temp:  [97.4 °F (36.3 °C)-98.4 °F (36.9 °C)] 98.4 °F (36.9 °C)  Pulse:  [60-76] 68  Resp:  [11-29] 22  SpO2:  [79 %-100 %] 97 %  BP: ()/(31-64) 92/55  Arterial Line BP: ()/(29-51) 117/42     Weight: (!) 200 kg (440 lb 14.7 oz)  Body mass index is 61.5 kg/m².      Intake/Output Summary (Last 24 hours) at 9/8/2023 1036  Last data filed at 9/8/2023 0900  Gross per 24 hour   Intake 2688.86 ml   Output 640 ml   Net 2048.86 ml        Physical Exam  Vitals reviewed.   Constitutional:       General: He is awake.      Appearance: He is morbidly obese.      Comments: Chronically ill appearing morbidly obese male lying in bed on 4L NC in NAD   Cardiovascular:      Rate and Rhythm: Normal rate.      Comments: Extensive lymphedema to BLE   Pulmonary:      Breath sounds: Decreased breath sounds present. No wheezing or rhonchi.      Comments: On 4L NC, poor effort, refuses NIPPV  Abdominal:      General: There is no distension.      Palpations: Abdomen is soft.      Comments: Obese abd   Musculoskeletal:      Right lower leg: Edema present.      Left lower leg: Edema present.   Skin:     General: Skin is warm and dry.   Neurological:      General: No focal deficit present.      Mental Status: He is alert.   Psychiatric:         Behavior: Behavior is cooperative.             Vents:  Oxygen Concentration (%): (S) 36 (09/08/23 0822)    Lines/Drains/Airways       Peripherally Inserted Central Catheter Line  Duration             PICC Double Lumen 09/02/23 0800 right brachial 6 days              Drain   Duration                  Urethral Catheter 09/02/23 0900 Silicone 16 Fr. 6 days         Rectal Tube 09/02/23 1430 rectal tube w/ balloon (indicate number of mLs) 5 days                    Significant Labs:    CBC/Anemia Profile:  Recent Labs   Lab 09/07/23  0354 09/08/23  0418   WBC 7.59 8.94   HGB 8.9* 8.8*   HCT 28.8* 28.7*    195   * 100*   RDW 17.0* 17.3*        Chemistries:  Recent Labs   Lab 09/07/23  0354 09/07/23  1309 09/08/23  0418     --  141   K 3.2* 3.5 4.1     --  108   CO2 28  --  23   BUN 55*  --  59*   CREATININE 2.0*  --  2.3*   CALCIUM 7.3*  --  7.1*   ALBUMIN 1.8*  --  1.8*   PROT 4.8*  --  4.7*   BILITOT 0.3  --  0.3   ALKPHOS 50*  --  56   ALT 15  --  15   AST 23  --  20   MG 2.6  --  2.3       All pertinent labs within the past 24 hours have been reviewed.    Significant Imaging:  I have reviewed all pertinent imaging results/findings within the past 24 hours.      ABG  Recent Labs   Lab 09/05/23  1307   PH 7.276*   PO2 68*   PCO2 75.3*   HCO3 35.0*   BE 8     Assessment/Plan:     Pulmonary  Chronic obstructive pulmonary disease with acute exacerbation  - not in acute exacerbation  - continue with bronchodilators PRN  - goal SpO2 > 88%    Cardiac/Vascular  Atrial fibrillation  - rate controlled, continue telemetry monitoring   - continue Eliquis    Venous stasis dermatitis of both lower extremities  - chronic and longstanding  - supportive care  - PT/OT, OOB as able     Renal/  BETY (acute kidney injury)  - creatinine holding steady at 2 this admit, slight bump this AM, nephrology consulted   - will continue IVF   - off pressors with midodrine TID  - renally dose meds and avoid nephrotoxins  - monitor UOP, lytes, and RFP    ID  * Septic shock  - off pressors since 0200 9/8  - continue midodrine  - monitor hemodynamics     Endocrine  Morbid obesity with BMI of 60.0-69.9, adult  - affecting medical decision making and complicating the above   - pt would benefit  greatly from weight loss and lifestyle modifications   - refusing NIPPV  - this is the pt's largest barrier to improving his health     Hypothyroidism (acquired)  - synthroid    GI  Diverticulitis  - mild findings on CT on admit, no abdominal pain  - monitor     Other  Chronic acquired lymphedema  - chronic and longstanding  - supportive care  - ? need for podiatry to eval feet / toes    Sleep apnea  - contributing to and complicating the above  - encourage use of NIPPV qHS and PRN naps, documentation noted that pt refusing NIPPV    Prophylaxis Measures:  GI ppx: Famotidine  VTE ppx: Apixaban  Glucose control: Monitor blood glucose    Code Status: Full Code    Patient stable for care outside of the ICU setting. [facility] consulted. Critical Care team will sign off at this time as patient's critical care issues have resolved and patient is appropriate for ongoing management outside of the ICU setting. Please call if the patient's condition should change and warrant reevaluation.      Prabhu French NP  Critical Care Medicine  [facility] [facility] ([facility])

## 2023-09-08 NOTE — ASSESSMENT & PLAN NOTE
Noncompliant with NIPPV, refuses to wear   Prefers Ventimask   Noncompliant with home oxygen   Encouraged

## 2023-09-08 NOTE — CONSULTS
[facility] [facility] ([facility])  [facility] Medicine  Consult Note    Patient Name: Gene Rothman  MRN: [id number]  Admission Date: 9/2/2023  Hospital Length of Stay: 6 days  Attending Physician: Kelsey Sweeney,*   Primary Care Provider: Ami Zarate DO           Patient information was obtained from patient/ spouse/ documentation     Consults  Subjective:     Principal Problem: Septic shock    Chief Complaint:   Chief Complaint   Patient presents with    Abdominal Pain     Abdominal pain after taking abx on empty stomach.        HPI: Mr Rothman is a 76 y/o morbidly obese man with COPD with OHS/DENISHA (noncompliant with PAP therapies), chronic hypoxic respiratory failure (4L, prefers to use simple mask), Afib on Eliquis, pHTN, diastolic CHF, RV dysfxn, and chronic lymphedema who presented to the ER today due to abdominal pain that started last night.  He reports diffuse abdominal pain and tenderness.  Deneis N/V, diarrhea, dyspnea, cough, sputum, F/C, sick contacts, chest pain, change in bowel/bladder.       Work-up in the ER showed mild distal descending colon diverticulitis, LA 2.8, WBC 16.33, Cr 1.8 (b/l around 1.0), Bicarb 38.  He was given Vanc/Zosyn.  Required initiation of Levo due to hypotension after he recieved his 30cc/kg of fluids.  He is being admitted to the ICU for septic shock, presumably with diverticulitis/ UTI as the source.         Hospital course      Mr Rothman is a 76 y/o morbidly obese man with COPD with OHS/DENISHA (noncompliant with PAP therapies), chronic hypoxic respiratory failure (4L, prefers to use simple mask), Afib on Eliquis, pHTN, diastolic CHF, RV dysfxn, and chronic lymphedema who presented to the ER ton 9/2 due to abdominal pain, got admitted to ICU for septic shock likely secondary to diverticulitis/UTI, has been started on Levophed 9/3, vasopressin added to Levo to stabilize blood pressure.  On 09/04, vaso discontinued, continued on Levophed, midodrine added; patient off  of Levophed, continued on midodrine 15 mg t.i.d., blood pressure stabilizing;   Also on arrival, patient has been started on vanc, Zosyn, subsequently vanc discontinued, on 09/05, antibiotics changed from Zosyn to Rocephin based on culture results.    Has been on Ventimask per patient's choice.      On 09/08, patient deemed stable for downgrade per ICU, hospital medicine consulted to assume care.    Continue antibiotics, midodrine tapering, IV fluids.    Noted to have elevated creatinine, decreased urine output, nephrology on board.    Acute on chronic paronychia, chronic lymphedema, we will follow up with wound care, podiatry consult; f/u on US arterial LE;       Per discussion with wife at bedside, patient chronically bed-bound for past 2 years due to general deterioration in condition.  Does not follow up with doctors outside, sometimes attempts virtual hospital follow up, stated that he gets refills from NP visit at home; non compliant with NIPPV at home, ? Compliance with meds/ home oxygen;   Had palliative evaluation during recent hospitalizations in 7/2023- prefers to continue full code, discussed again today, patient/wife at bedside expressed to continue full code;           Past Medical History:   Diagnosis Date    Acute hypoxemic respiratory failure 9/17/2022    Arthritis     Atrial fibrillation 3/1/2023    CHF (congestive heart failure)     Chronic kidney disease     Coronary artery disease     Depression     Hypertension     Hypertensive heart disease with heart failure 9/16/2022    Hypothyroidism     Lymphedema of lower extremity     Nephrolithiasis     Obesity     Peripheral vascular disease     Pneumonia 9/17/2022    Recurrent cellulitis of lower leg     Sleep apnea     can't stand the CPAP , sleeps elevated in hospital bed or chair    Tobacco dependence     resolved       Past Surgical History:   Procedure Laterality Date    ADENOIDECTOMY  1961    BACK SURGERY  1975;  1976    x2  for disc; scar tissue removed    debridement of bilateral leg ulcers Bilateral 01/01/2017    Dr Hernandez    INNER EAR SURGERY Bilateral 1961    separate - pinnaplasty , new eardrms constructed    KIDNEY STONE SURGERY Left 1976 approx    open    TONSILLECTOMY  1961       Review of patient's allergies indicates:   Allergen Reactions    Sulfamethoxazole-trimethoprim Itching and Shortness Of Breath    Sulfamethoxazole        No current facility-administered medications on file prior to encounter.     Current Outpatient Medications on File Prior to Encounter   Medication Sig    acetaminophen (TYLENOL) 500 MG tablet Take 500 mg by mouth every 6 (six) hours as needed for Pain.    apixaban (ELIQUIS) 5 mg Tab Take 1 tablet (5 mg total) by mouth 2 (two) times daily.    aspirin 81 MG Chew Take 1 tablet (81 mg total) by mouth once daily.    atorvastatin (LIPITOR) 40 MG tablet Take 1 tablet (40 mg total) by mouth once daily.    cefadroxil (DURICEF) 500 MG Cap Take 1 capsule (500 mg total) by mouth every 12 (twelve) hours. for 7 days    furosemide (LASIX) 40 MG tablet Take 1 tablet (40 mg total) by mouth once daily.    levalbuterol (XOPENEX) 1.25 mg/3 mL nebulizer solution Take 3 mLs (1.25 mg total) by nebulization every 4 (four) hours as needed for Wheezing. Rescue    levothyroxine (SYNTHROID) 50 MCG tablet Take 1 tablet (50 mcg total) by mouth before breakfast.    mupirocin (BACTROBAN) 2 % ointment Apply topically 3 (three) times daily. for 7 days    nebulizer and compressor (HOME NEBULIZER PLUS SIDESTREAM) Diana use as directed    senna-docusate 8.6-50 mg (PERICOLACE) 8.6-50 mg per tablet Take 1 tablet by mouth once daily.    tiotropium (SPIRIVA) 18 mcg inhalation capsule Inhale 1 capsule (18 mcg total) into the lungs once daily. Controller     Family History       Problem Relation (Age of Onset)    Alcohol abuse Maternal Grandfather    Alzheimer's disease Brother    Asthma Daughter    Cancer Mother, Father,  Brother    Diabetes Mother    Heart disease Father, Brother    Hypertension Mother          Tobacco Use    Smoking status: Former     Current packs/day: 0.00     Average packs/day: 1.5 packs/day for 1 year (1.5 ttl pk-yrs)     Types: Cigarettes     Start date:      Quit date:      Years since quittin.7    Smokeless tobacco: Never   Substance and Sexual Activity    Alcohol use: Not Currently    Drug use: Never    Sexual activity: Never     Review of Systems      Constitutional: Negative.    HENT: Negative.     Respiratory: Negative.     Cardiovascular:  Positive for leg swelling.   Gastrointestinal: Negative.    Genitourinary: Negative.    Musculoskeletal: Negative.    Skin: Negative.     Objective:     Vital Signs (Most Recent):  Temp: 98.7 °F (37.1 °C) (23 1100)  Pulse: 65 (23 1400)  Resp: 20 (23 1400)  BP: (!) 86/45 (23 1400)  SpO2: 97 % (23 1400) Vital Signs (24h Range):  Temp:  [97.4 °F (36.3 °C)-98.7 °F (37.1 °C)] 98.7 °F (37.1 °C)  Pulse:  [61-76] 65  Resp:  [11-29] 20  SpO2:  [85 %-100 %] 97 %  BP: ()/(31-69) 86/45  Arterial Line BP: ()/(29-51) 117/42     Weight: (!) 200 kg (440 lb 14.7 oz)  Body mass index is 61.5 kg/m².     Physical Exam       Constitutional:       General: He is awake.      Appearance: He is morbidly obese.      Comments: Chronically ill appearing morbidly obese male lying in bed on 4L NC in NAD   Cardiovascular:      Rate and Rhythm: Normal rate.      Comments: Extensive lymphedema to BLE   Pulmonary:      Breath sounds: Decreased breath sounds present. No wheezing or rhonchi.      Comments: On 4L NC, poor effort, refuses NIPPV  Abdominal:      General: There is no distension.      Palpations: Abdomen is soft.      Comments: Obese abd   Musculoskeletal:      Right lower leg: Edema present.      Left lower leg: Edema present.   Skin:     General: Skin is warm and dry.   Neurological:      General: No focal deficit present.       Mental Status: He is alert.   Psychiatric:         Behavior: Behavior is cooperative.      Significant Labs: All pertinent labs within the past 24 hours have been reviewed.  CBC:   Recent Labs   Lab 09/07/23  0354 09/08/23 0418   WBC 7.59 8.94   HGB 8.9* 8.8*   HCT 28.8* 28.7*    195     CMP:   Recent Labs   Lab 09/07/23  0354 09/07/23  1309 09/08/23  0418     --  141   K 3.2* 3.5 4.1     --  108   CO2 28  --  23   GLU 86  --  76   BUN 55*  --  59*   CREATININE 2.0*  --  2.3*   CALCIUM 7.3*  --  7.1*   PROT 4.8*  --  4.7*   ALBUMIN 1.8*  --  1.8*   BILITOT 0.3  --  0.3   ALKPHOS 50*  --  56   AST 23  --  20   ALT 15  --  15   ANIONGAP 10  --  10       Significant Imaging:   Imaging Results              X-Ray Chest AP Portable (Final result)  Result time 09/02/23 08:20:03      Final result by Live Duckworth III, MD (09/02/23 08:20:03)                   Impression:      Well-positioned PICC line.      Electronically signed by: Live Duckworth MD  Date:    09/02/2023  Time:    08:20               Narrative:    EXAMINATION:  XR CHEST AP PORTABLE    CLINICAL HISTORY:  Encounter for adjustment and management of vascular access device    FINDINGS:  Comparison is made with the most recent prior chest x-ray.  Right arm PICC line is well positioned in the SVC..  Stable cardiomegaly, pulmonary vascular congestion and trace bilateral pleural effusions..  No acute airspace consolidation or pneumothorax identified.                                       CT Abdomen Pelvis  Without Contrast (Final result)  Result time 09/02/23 07:13:05      Final result by Live Duckworth III, MD (09/02/23 07:13:05)                   Impression:      Mild distal descending colonic diverticulitis suspected with possible associated postinflammatory stricture causing a partial obstruction.  Possible rectal fecal impaction.  No free air or abscess.  Stable chronic findings, as above.    Note: All CT scans at this facility are  performed  using dose modulation techniques as appropriate to performed exam including the following:  automated exposure control; adjustment of mA and/or kV according to the patients size (this includes techniques or standardized protocols for targeted exams where dose is matched to indication/reason for exam: i.e. extremities or head);  iterative reconstruction technique.      Electronically signed by: Live Duckworth MD  Date:    09/02/2023  Time:    07:13               Narrative:    EXAMINATION:  CT ABDOMEN PELVIS WITHOUT CONTRAST    CLINICAL HISTORY:  Abdominal abscess/infection suspected;    TECHNIQUE:  Routine CT abdomen and pelvis performed without IV contrast.  Coronal and sagittal reformatted images obtained.    COMPARISON:  07/15/2023    FINDINGS:  Lung bases: No acute findings.  Bibasilar subsegmental atelectasis.    Bones: No acute osseous abnormality or suspicious bone lesions.    Kidneys and ureters: Stable bilateral renal atrophy, renal cysts, and prominent staghorn calculi in the right renal pelvis and calices.  No ureterolithiasis or hydronephrosis.    Stomach and bowel: Mild acute diverticulitis of the distal descending colon with luminal narrowing, upstream colonic dilation with air-fluid levels, possibly a post inflammatory stricture in partial obstruction versus spasm/peristalsis.  There is a large amount of stool in the rectum which is mildly distended in could represent fecal impaction.  No small bowel dilation.    Appendix: No evidence of appendicitis.    Liver: Unremarkable for noncontrast technique.    Gallbladder and bile ducts: Cholelithiasis without acute cholecystitis.    Pancreas: Unremarkable for noncontrast technique.    Spleen: Unremarkable for noncontrast technique.    Adrenals: Unremarkable.    Bladder: Unremarkable.    Aorta: No aneurysm.    Reproductive organs: Within normal limits.    Miscellaneous: No free intraperitoneal fluid, free air or abscess identified. No pathologic  lymphadenopathy.                                       X-Ray Chest 1 View (Final result)  Result time 09/02/23 08:03:02      Final result by Live Duckworth III, MD (09/02/23 08:03:02)                   Impression:      No acute findings.      Electronically signed by: Live Duckworth MD  Date:    09/02/2023  Time:    08:03               Narrative:    EXAMINATION:  XR CHEST 1 VIEW    CLINICAL HISTORY:  Shortness of breath    FINDINGS:  Comparison is made with the most recent prior chest x-ray.  Stable cardiomegaly and aortic atherosclerosis..  The lungs appear clear of active disease. No acute appearing infiltrate, pleural effusion or pneumothorax identified.                        Wet Read by Kahlil Linton Jr., MD (09/02/23 07:59:06, [facility] Emergency Medicine)    No acute findings                                     Assessment/Plan:     * Septic shock  Likely secondary to diverticulitis/UTI   started on Levophed 9/3, vasopressin added to Levo to stabilize blood pressure.    On 09/04, vaso discontinued, continued on Levophed, midodrine added; patient off of Levophed, continued on midodrine 15 mg t.i.d., blood pressure stabilizing; taper midodrine as tolerated         BETY (acute kidney injury)  Known h/o CKD stage 2 with baseline Cr 0.8 to 1.3  BETY multifactorial likely secondary to intravascular volume depletion/ UTI/shock- ?  ATN   Creatinine during hospital stay has trended up from 1.7-2.3   Continue IV fluids, avoid nephrotoxins, nephrology on board   Renal dose meds   Monitor urine output        Diverticulitis  Presented with abdominal pain, CT findings on arrival suggestive of mild diverticulitis   Received empiric antibiotics   Currently denied acute pain/nausea, vomiting        Chronic obstructive pulmonary disease with acute exacerbation  Currently not in acute exacerbation, continue bronchodilators p.r.n., target SpO2 more than 88%      Atrial fibrillation  Currently rate control    Continue current plan, continue Eliquis       Venous stasis dermatitis of both lower extremities  Chronic  US LE  Podiatry/ wound care, PT/OT    Chronic acquired lymphedema  Chronic  F/u US LE  Podiatry, wound care f/u           Sleep apnea  Noncompliant with NIPPV, refuses to wear   Prefers Ventimask   Noncompliant with home oxygen   Encouraged          Morbid obesity with BMI of 60.0-69.9, adult  Body mass index is 61.5 kg/m². Morbid obesity complicates all aspects of disease management from diagnostic modalities to treatment. Weight loss encouraged and health benefits explained to patient.   Bed-bound for past 2 years   Poor cooperation/effort   Questionable compliance        Hypothyroidism (acquired)  Home medication         VTE Risk Mitigation (From admission, onward)         Ordered     apixaban tablet 5 mg  2 times daily         09/02/23 1105     IP VTE HIGH RISK PATIENT  Once         09/02/23 0853     Place sequential compression device  Until discontinued         09/02/23 0853                Critical care time spent on the evaluation and treatment of severe organ dysfunction, review of pertinent labs and imaging studies, discussions with consulting providers and discussions with patient/family: 82 minutes.    Thank you for your consult. I will follow-up with patient. Please contact us if you have any additional questions.    Kelsey Sweeney MD  Department of Hospital Medicine   [facility] ([facility])

## 2023-09-08 NOTE — HPI
Mr Rothman is a 78 y/o morbidly obese man with COPD with OHS/DENISHA (noncompliant with PAP therapies), chronic hypoxic respiratory failure (4L, prefers to use simple mask), Afib on Eliquis, pHTN, diastolic CHF, RV dysfxn, and chronic lymphedema who presented to the ER today due to abdominal pain that started last night.  He reports diffuse abdominal pain and tenderness.  Deneis N/V, diarrhea, dyspnea, cough, sputum, F/C, sick contacts, chest pain, change in bowel/bladder.       Work-up in the ER showed mild distal descending colon diverticulitis, LA 2.8, WBC 16.33, Cr 1.8 (b/l around 1.0), Bicarb 38.  He was given Vanc/Zosyn.  Required initiation of Levo due to hypotension after he recieved his 30cc/kg of fluids.  He is being admitted to the ICU for septic shock, presumably with diverticulitis/ UTI as the source.

## 2023-09-08 NOTE — HPI
77 year old male admitted for septic shock due to diverticulitis was admitted to the ED on 9/2/2023. Does have history of morbid obesity with BMI of +60 kg/m, COPD, Chronic respiratory failure, longstanding lymphedema, PVD, HTN, long term anticoagulation therapy. Admitted to the ICU due to septic shock. On admission, WBC 16.33, Cr 1.8, Lactic 2.8

## 2023-09-08 NOTE — ASSESSMENT & PLAN NOTE
Has history of long term anticoagulation therapy. Nondiabetic. May benefit from nail trim/debridement in an out patient setting in clinic or home visit with NP managing his at home care. No signs of acute cellulitis, abscess, paronychia. No need for nail avulsion in-patient as there are no signs of acute infection and currently on Eliquis

## 2023-09-08 NOTE — PLAN OF CARE
"-VSS. Remains in afib.  Off levophed infusion since 0145 and maintaining -120s  -constantly on call light or calling for nursing staff to reposition him. Educated that he would be turned at minimum every 2 hours and he was- sometimes more often for comfort.  -Frequent complaints of pain and education about pain medication and management provided. Pt received 5mg Norco q6 hours.  -2 large liquid BM around flexiseal resulted in 2 complete bed baths and linen changes. Flexi evaluated and still patent as long as pt's leg doesn't occlude it.    -Flexi with 250ml output.  -UOP marginal in [place]: 10-30ml/hr  -NS infusing @100ml per hour  -Pt no longer assisting nurse with turns but stating his "butt itches" and asking for hemorroid cream. Informed him we would turn again when dayshift RN reported to shift (in about an hour) and would turn and apply more cream then as he has been turned q2 hours already.  -Pt now frequently calling out for nurse but when nurse enters room, pt won't state anything specific that he needs assistance with  -Pt received a lot of education throughout shift.  -Refused CPAP all shift. 5L NC used and pt places in mouth.  -Call light within reach.   -Blisters on BLE noted to be weeping but no changes or further skin breakdown noted  "

## 2023-09-08 NOTE — SUBJECTIVE & OBJECTIVE
Scheduled Meds:   apixaban  5 mg Oral BID    aspirin  81 mg Oral Daily    atorvastatin  40 mg Oral Daily    cefTRIAXone (ROCEPHIN) IVPB  2 g Intravenous Q24H    [START ON 9/9/2023] famotidine  20 mg Oral Daily    levothyroxine  50 mcg Oral Before breakfast    LIDOcaine (PF) 10 mg/ml (1%)  6 mL Intradermal Once    midodrine  15 mg Oral TID    mupirocin   Nasal BID     Continuous Infusions:   sodium chloride 0.9% 100 mL/hr at 09/08/23 1500     PRN Meds:acetaminophen, dextrose 10%, dextrose 10%, glucagon (human recombinant), glucose, glucose, HYDROcodone-acetaminophen, insulin aspart U-100, ondansetron, sodium chloride 0.9%    Review of patient's allergies indicates:   Allergen Reactions    Sulfamethoxazole-trimethoprim Itching and Shortness Of Breath    Sulfamethoxazole         Past Medical History:   Diagnosis Date    Acute hypoxemic respiratory failure 9/17/2022    Arthritis     Atrial fibrillation 3/1/2023    CHF (congestive heart failure)     Chronic kidney disease     Coronary artery disease     Depression     Hypertension     Hypertensive heart disease with heart failure 9/16/2022    Hypothyroidism     Lymphedema of lower extremity     Nephrolithiasis     Obesity     Peripheral vascular disease     Pneumonia 9/17/2022    Recurrent cellulitis of lower leg     Sleep apnea     can't stand the CPAP , sleeps elevated in hospital bed or chair    Tobacco dependence     resolved     Past Surgical History:   Procedure Laterality Date    ADENOIDECTOMY  1961    BACK SURGERY  1975;  1976    x2 for disc; scar tissue removed    debridement of bilateral leg ulcers Bilateral 01/01/2017    Dr Hernandez    INNER EAR SURGERY Bilateral 1961    separate - pinnaplasty , new eardrms constructed    KIDNEY STONE SURGERY Left 1976 approx    open    TONSILLECTOMY  1961       Family History       Problem Relation (Age of Onset)    Alcohol abuse Maternal Grandfather    Alzheimer's disease Brother    Asthma Daughter    Cancer Mother, Father,  Brother    Diabetes Mother    Heart disease Father, Brother    Hypertension Mother          Tobacco Use    Smoking status: Former     Current packs/day: 0.00     Average packs/day: 1.5 packs/day for 1 year (1.5 ttl pk-yrs)     Types: Cigarettes     Start date:      Quit date:      Years since quittin.7    Smokeless tobacco: Never   Substance and Sexual Activity    Alcohol use: Not Currently    Drug use: Never    Sexual activity: Never     Review of Systems   Constitutional:  Positive for fatigue. Negative for fever.   Respiratory:  Positive for shortness of breath. Negative for cough and stridor.    Cardiovascular:  Positive for leg swelling.   Gastrointestinal:  Negative for nausea and vomiting.   Musculoskeletal:  Positive for gait problem.        Morbidly obese. bedbound   Skin:  Positive for color change. Negative for pallor, rash and wound.   Psychiatric/Behavioral:  Positive for confusion and decreased concentration.      Objective:     Vital Signs (Most Recent):  Temp: 97.4 °F (36.3 °C) (23 1538)  Pulse: 62 (23 1538)  Resp: 18 (23 1538)  BP: (!) 91/44 (23 1538)  SpO2: (!) 91 % (23 1538) Vital Signs (24h Range):  Temp:  [97.4 °F (36.3 °C)-98.7 °F (37.1 °C)] 97.4 °F (36.3 °C)  Pulse:  [61-76] 62  Resp:  [11-29] 18  SpO2:  [85 %-100 %] 91 %  BP: ()/(31-69) 91/44  Arterial Line BP: ()/(31-48) 117/42     Weight: (!) 200 kg (440 lb 14.7 oz)  Body mass index is 61.5 kg/m².    Foot Exam    CONSTITUTIONAL:  Patient is awake at times. Decreased concentration. Morbidly obese.  Not well groomed. Oxygen in place    PULMONARY:  Breathing is mildly labored.  Oxygen in place    VASCULAR:  Right DP 1/4. Right PT 0/4. Left DP 1/4 .Left PT 1/4 Lymphedema to the bilateral lower extremities. Significant swelling bilateral. Cap refil intact    NEUROLOGICAL:  unable to accurately assess     DERMATOLOGICAL:  Nails are elongated, thickend, discolored. Nails firmly attached to the  "nail bed. Dried blood noted to the left 3rd and 4th nails. No evidence of acute paronychia, ingrown, abscess, drainage, fluctuance. Bilateral lymphedema.     MUSCULOSKELETAL: Weakness to the bilateral lower extremities. No evidence of amputations.     Laboratory:  A1C: No results for input(s): "HGBA1C" in the last 4320 hours.  Blood Cultures: No results for input(s): "LABBLOO" in the last 48 hours.  BMP:   Recent Labs   Lab 09/08/23 0418   GLU 76      K 4.1      CO2 23   BUN 59*   CREATININE 2.3*   CALCIUM 7.1*   MG 2.3     Cardiac Markers: No results for input(s): "CKMB", "TROPONINT", "MYOGLOBIN" in the last 168 hours.  CBC:   Recent Labs   Lab 09/08/23 0418   WBC 8.94   RBC 2.86*   HGB 8.8*   HCT 28.7*      *   MCH 30.8   MCHC 30.7*     CPS: {:LNK,LABRCNTIP[  CRP: No results for input(s): "CRP" in the last 168 hours.  ESR: No results for input(s): "SEDRATE" in the last 168 hours.  All pertinent labs reviewed within the last 24 hours.    Diagnostic Results:  None    Clinical Findings:  Lymphedema and dystrophic toenails without acute signs of infection  "

## 2023-09-08 NOTE — ASSESSMENT & PLAN NOTE
Likely secondary to diverticulitis/UTI   started on Levophed 9/3, vasopressin added to Levo to stabilize blood pressure.    On 09/04, vaso discontinued, continued on Levophed, midodrine added; patient off of Levophed, continued on midodrine 15 mg t.i.d., blood pressure stabilizing; taper midodrine as tolerated

## 2023-09-09 PROBLEM — E44.0 MODERATE MALNUTRITION: Status: ACTIVE | Noted: 2023-01-01

## 2023-09-09 NOTE — PROGRESS NOTES
[facility] Medicine  Progress Note    Patient Name: Gene Rothman  MRN: [id number]  Patient Class: IP- Inpatient   Admission Date: 9/2/2023  Length of Stay: 7 days  Attending Physician: Kelsey Sweeney,*  Primary Care Provider: Ami Zarate DO        Subjective:     Principal Problem:Septic shock        HPI:  Mr Rothman is a 78 y/o morbidly obese man with COPD with OHS/DENISHA (noncompliant with PAP therapies), chronic hypoxic respiratory failure (4L, prefers to use simple mask), Afib on Eliquis, pHTN, diastolic CHF, RV dysfxn, and chronic lymphedema who presented to the ER today due to abdominal pain that started last night.  He reports diffuse abdominal pain and tenderness.  Deneis N/V, diarrhea, dyspnea, cough, sputum, F/C, sick contacts, chest pain, change in bowel/bladder.       Work-up in the ER showed mild distal descending colon diverticulitis, LA 2.8, WBC 16.33, Cr 1.8 (b/l around 1.0), Bicarb 38.  He was given Vanc/Zosyn.  Required initiation of Levo due to hypotension after he recieved his 30cc/kg of fluids.  He is being admitted to the ICU for septic shock, presumably with diverticulitis/ UTI as the source.         Overview/Hospital Course:  Mr Rothman is a 78 y/o morbidly obese man with COPD with OHS/DENISHA (noncompliant with PAP therapies), chronic hypoxic respiratory failure (4L, prefers to use simple mask), Afib on Eliquis, pHTN, diastolic CHF, RV dysfxn, and chronic lymphedema who presented to the ER ton 9/2 due to abdominal pain, got admitted to ICU for septic shock likely secondary to diverticulitis/UTI, has been started on Levophed 9/3, vasopressin added to Levo to stabilize blood pressure.  On 09/04, vaso discontinued, continued on Levophed, midodrine added; patient off of Levophed, continued on midodrine 15 mg t.i.d., blood pressure stabilizing;   Also on arrival, patient has been started on vanc, Zosyn, subsequently vanc discontinued, on 09/05, antibiotics changed  from Zosyn to Rocephin based on culture results.    Has been on Ventimask per patient's choice.      On 09/08, patient deemed stable for downgrade per ICU, hospital medicine consulted to assume care.    Continue antibiotics, midodrine tapering, IV fluids.    Noted to have elevated creatinine, decreased urine output, nephrology on board.    Acute on chronic paronychia, chronic lymphedema, we will follow up with wound care, podiatry consult; f/u on US arterial LE;       Per discussion with wife at bedside, patient chronically bed-bound for past 2 years due to general deterioration in condition.  Does not follow up with doctors outside, sometimes attempts virtual hospital follow up, stated that he gets refills from NP visit at home; non compliant with NIPPV at home, ? Compliance with meds/ home oxygen;   Had palliative evaluation during recent hospitalizations in 7/2023- prefers to continue full code, discussed again today, patient/wife at bedside expressed to continue full code;       On 9/9- patient lying comfortably, not in distress.  Hemodynamically stable.  Stated that he does not want to answer questions.  Oliguric with approximately 100-200 cc urine output over past 24 hours, pending repeat labs this morning.  Nephrology on board.               No new subjective & objective note has been filed under this hospital service since the last note was generated.      Assessment/Plan:      * Septic shock  Likely secondary to diverticulitis/UTI   started on Levophed 9/3, vasopressin added to Levo to stabilize blood pressure.    On 09/04, vaso discontinued, continued on Levophed, midodrine added; patient off of Levophed, continued on midodrine 15 mg t.i.d., blood pressure stabilizing; taper midodrine as tolerated         BETY (acute kidney injury)  Known h/o CKD stage 2 with baseline Cr 0.8 to 1.3  BETY multifactorial likely secondary to intravascular volume depletion/ UTI/shock- ?  ATN   Creatinine during hospital stay has  trended up from 1.7-2.3   Continue IV fluids, avoid nephrotoxins, nephrology on board   Renal dose meds   Monitor urine output        Diverticulitis  Presented with abdominal pain, CT findings on arrival suggestive of mild diverticulitis   Received empiric antibiotics   Currently denied acute pain/nausea, vomiting        Chronic obstructive pulmonary disease with acute exacerbation  Currently not in acute exacerbation, continue bronchodilators p.r.n., target SpO2 more than 88%      Atrial fibrillation  Currently rate control   Continue current plan, continue Eliquis       Venous stasis dermatitis of both lower extremities  Chronic  US LE  Podiatry/ wound care, PT/OT    Chronic acquired lymphedema  Chronic  F/u US LE  Podiatry, wound care f/u           Sleep apnea  Noncompliant with NIPPV, refuses to wear   Prefers Ventimask   Noncompliant with home oxygen   Encouraged          Morbid obesity with BMI of 60.0-69.9, adult  Body mass index is 61.5 kg/m². Morbid obesity complicates all aspects of disease management from diagnostic modalities to treatment. Weight loss encouraged and health benefits explained to patient.   Bed-bound for past 2 years   Poor cooperation/effort   Questionable compliance        Hypothyroidism (acquired)  Home medication         VTE Risk Mitigation (From admission, onward)         Ordered     apixaban tablet 5 mg  2 times daily         09/02/23 1105     IP VTE HIGH RISK PATIENT  Once         09/02/23 0853     Place sequential compression device  Until discontinued         09/02/23 0853                Discharge Planning   JAMES:      Code Status: Full Code   Is the patient medically ready for discharge?:     Reason for patient still in hospital (select all that apply): Patient trending condition and Consult recommendations  Discharge Plan A: Home Health                  [facility] Kenney Sweeney MD  Department of Hospital Medicine   [facility] - Telemetry ([facility])

## 2023-09-09 NOTE — AI DETERIORATION ALERT
Artificial Intelligence Notification  [facility]  [id number] [place] [address]  Phone: [phone]    This documentation was triggered by an Artificial Intelligence Notification:    Admit Date: 2023   LOS: 7  Code Status: Full Code  : 1945  Age: 77 y.o.  Weight:   Wt Readings from Last 1 Encounters:   23 (!) 200 kg (440 lb 14.7 oz)        Sex: male  Bed: A232/A232 A  MRN: [id number]  Attending Physician: Kelsey Sweeney,*     Date of Alert: 2023  Time AI Alert Received: 6:20 pm            Vitals:    23 1612   BP: (!) 91/44   Pulse: 61   Resp: 16   Temp: 98.6 °F (37 °C)     SpO2: 96 %          Patient Condition:     Examination of patient done at bedside; appeared at baseline; vitals stable; will closely monitor;

## 2023-09-09 NOTE — ASSESSMENT & PLAN NOTE
Malnutrition Type:  Context: chronic illness  Level: moderate    Related to (etiology):   Decreased appetite, limited ability to perform nutrition related ADLs, diverticulitis, increased needs d/t COPD and wounds    Signs and Symptoms (as evidenced by):   Sacral ulcer and venous leg ulcers    Malnutrition Characteristic Summary:  Energy Intake (Malnutrition): less than 75% for greater than or equal to 1 month  Fluid Accumulation (Malnutrition): moderate to severe (3-4+ lymphadema)      Interventions/Recommendations (treatment strategy):  K/Phos/Na modified diet, feeding assistance, and nutrition supplement therapy  1)Change diet to renal, low fiber 2) Add Suplena BID and Jm BID 3) total feed-encourage PO intakes 40 If PO intakes not improving to consistent > 505 of meals and supplements in < 3-4 days consider starting supplemental nutrition support 6) treat hypoglycemia    Nutrition Diagnosis Status:   New

## 2023-09-09 NOTE — CONSULTS
[facility] - Intensive Care (Hospital)  Nephrology  Consult Note    Patient Name: Gene Rothman  MRN: [id number]  Admission Date: 9/2/2023  Hospital Length of Stay: 7 days  Attending Provider: Kelsey Sweeney,*   Primary Care Physician: Ami Zarate DO  Principal Problem:Septic shock    Consults  Subjective:     HPI:  77-year-old male admitted on 09/02/2023 with abdominal pain secondary to diverticulitis complicated by septic shock.  Noted to have mildly elevated creatinine over baseline.  Nephrology has been consulted for evaluation.  Patient was seen in the intensive care unit.  His in bed resting comfortably.  No acute distress noted.  No complaints this morning.    Chart reviewed.  He does not have history of chronic kidney disease however he does have persistent proteinuria on UAs for the past several years.    09/09/2023:  Patient was step-down from ICU to the floor yesterday.  Seen in his hospital room.  In bed resting comfortably.  No acute distress noted.    Past Medical History:   Diagnosis Date    Acute hypoxemic respiratory failure 9/17/2022    Arthritis     Atrial fibrillation 3/1/2023    CHF (congestive heart failure)     Chronic kidney disease     Coronary artery disease     Depression     Hypertension     Hypertensive heart disease with heart failure 9/16/2022    Hypothyroidism     Lymphedema of lower extremity     Nephrolithiasis     Obesity     Peripheral vascular disease     Pneumonia 9/17/2022    Recurrent cellulitis of lower leg     Sleep apnea     can't stand the CPAP , sleeps elevated in hospital bed or chair    Tobacco dependence     resolved       Past Surgical History:   Procedure Laterality Date    ADENOIDECTOMY  1961    BACK SURGERY  1975;  1976    x2 for disc; scar tissue removed    debridement of bilateral leg ulcers Bilateral 01/01/2017    Dr Hernandez    INNER EAR SURGERY Bilateral 1961    separate - pinnaplasty , new eardrms constructed    KIDNEY STONE SURGERY Left 1976 approx     open    TONSILLECTOMY         Review of patient's allergies indicates:   Allergen Reactions    Sulfamethoxazole-trimethoprim Itching and Shortness Of Breath    Sulfamethoxazole      Current Facility-Administered Medications   Medication Frequency    0.9%  NaCl infusion Continuous    acetaminophen tablet 650 mg Q6H PRN    apixaban tablet 5 mg BID    aspirin chewable tablet 81 mg Daily    atorvastatin tablet 40 mg Daily    dextrose 10% bolus 125 mL 125 mL PRN    dextrose 10% bolus 250 mL 250 mL PRN    famotidine tablet 20 mg Daily    glucagon (human recombinant) injection 1 mg PRN    glucose chewable tablet 16 g PRN    glucose chewable tablet 24 g PRN    HYDROcodone-acetaminophen 5-325 mg per tablet 1 tablet Q6H PRN    insulin aspart U-100 pen 0-5 Units QID (AC + HS) PRN    levothyroxine tablet 50 mcg Before breakfast    midodrine tablet 15 mg TID    mupirocin 2 % ointment BID    ondansetron injection 4 mg Q6H PRN    sodium chloride 0.9% flush 10 mL PRN     Family History       Problem Relation (Age of Onset)    Alcohol abuse Maternal Grandfather    Alzheimer's disease Brother    Asthma Daughter    Cancer Mother, Father, Brother    Diabetes Mother    Heart disease Father, Brother    Hypertension Mother          Tobacco Use    Smoking status: Former     Current packs/day: 0.00     Average packs/day: 1.5 packs/day for 1 year (1.5 ttl pk-yrs)     Types: Cigarettes     Start date:      Quit date:      Years since quittin.7    Smokeless tobacco: Never   Substance and Sexual Activity    Alcohol use: Not Currently    Drug use: Never    Sexual activity: Never     Review of Systems   Constitutional: Negative.    HENT: Negative.     Respiratory: Negative.     Cardiovascular:  Positive for leg swelling.   Gastrointestinal: Negative.    Genitourinary: Negative.    Musculoskeletal: Negative.    Skin: Negative.      Objective:     Vital Signs (Most Recent):  Temp: 98.2 °F (36.8 °C) (23 0739)  Pulse: 61  (09/09/23 0739)  Resp: 17 (09/09/23 0739)  BP: (!) 95/46 (09/09/23 0739)  SpO2: 98 % (09/09/23 0739) Vital Signs (24h Range):  Temp:  [97.4 °F (36.3 °C)-98.7 °F (37.1 °C)] 98.2 °F (36.8 °C)  Pulse:  [38-72] 61  Resp:  [17-27] 17  SpO2:  [91 %-99 %] 98 %  BP: ()/(44-69) 95/46     Weight: (!) 200 kg (440 lb 14.7 oz) (09/08/23 0600)  Body mass index is 61.5 kg/m².  Body surface area is 3.17 meters squared.    I/O last 3 completed shifts:  In: 1404.6 [P.O.:120; I.V.:1204.6; NG/GT:80]  Out: 1255 [Urine:305; Stool:950]    Physical Exam  Constitutional:       Appearance: Normal appearance. He is obese.   HENT:      Head: Normocephalic and atraumatic.   Eyes:      General: No scleral icterus.     Extraocular Movements: Extraocular movements intact.      Pupils: Pupils are equal, round, and reactive to light.   Cardiovascular:      Rate and Rhythm: Normal rate. Rhythm irregular.   Pulmonary:      Effort: Pulmonary effort is normal.      Breath sounds: Normal breath sounds.   Musculoskeletal:      Right lower leg: Edema present.      Left lower leg: Edema present.      Comments: Generalized edema.   Skin:     General: Skin is warm and dry.   Neurological:      General: No focal deficit present.      Mental Status: He is alert and oriented to person, place, and time.   Psychiatric:         Mood and Affect: Mood normal.         Behavior: Behavior normal.         Significant Labs:  BMP:   Recent Labs   Lab 09/08/23 0418   GLU 76      K 4.1      CO2 23   BUN 59*   CREATININE 2.3*   CALCIUM 7.1*   MG 2.3       CMP:   Recent Labs   Lab 09/08/23 0418   GLU 76   CALCIUM 7.1*   ALBUMIN 1.8*   PROT 4.7*      K 4.1   CO2 23      BUN 59*   CREATININE 2.3*   ALKPHOS 56   ALT 15   AST 20   BILITOT 0.3       All labs within the past 24 hours have been reviewed.    Significant Imaging:  Labs: Reviewed      Assessment/Plan:     Active Diagnoses:    Diagnosis Date Noted POA    PRINCIPAL PROBLEM:  Septic shock  [A41.9, R65.21] 12/03/2021 Yes    Dystrophic nail [L60.3] 09/08/2023 Yes    Diverticulitis [K57.92] 09/02/2023 Yes    BETY (acute kidney injury) [N17.9] 09/02/2023 Yes    Chronic obstructive pulmonary disease with acute exacerbation [J44.1] 03/08/2023 Yes    Atrial fibrillation [I48.91] 03/01/2023 Yes    Venous stasis dermatitis of both lower extremities [I87.2] 09/29/2017 Yes     Chronic    Chronic acquired lymphedema [I89.0] 12/30/2016 Yes     Chronic    Sleep apnea [G47.30] 04/08/2016 Yes     Chronic    Morbid obesity with BMI of 60.0-69.9, adult [E66.01, Z68.44] 12/19/2014 Not Applicable     Chronic    Hypothyroidism (acquired) [E03.9] 12/19/2014 Yes      Problems Resolved During this Admission:       1. Acute kidney injury:  There are no new labs this morning for examination.  His fractional excretion of sodium calculates greater 2% indicative of ATN.    He remains oliguric with only 100 cc urine output reported for the past 24 hours and 65 cc reported the day before that.    2. Hypotension:  Blood pressure continues to run on the low side.  He is on oral midodrine.  There have been several times over the past 24-48 hours where his mean arterial pressure fell below 60.  There may also be a component of ischemic ATN.    3. CKD 2: Baseline creatinine runs between about 0.8 and 1.3.  He has had persistent proteinuria on urinalysis for the past several years.    4. Generalized edema:  He is chronically hypoalbuminemic on laboratory studies for the past several months.  This could be manifestation of nephrotic syndrome.  As per above will check urine studies for completeness.    Approximately 50 minutes was spent in patient examination, chart review and coordination care with other providers.    Thank you for your consult.     Дмитрий Pelaez MD  Nephrology  [facility] - Intensive Care ([facility])

## 2023-09-09 NOTE — PROGRESS NOTES
[facility] - Telemetry (Hospital)  Adult Nutrition  Progress Note    SUMMARY       Recommendations  1)Change diet to renal, low fiber   2) Add Suplena BID and Jm BID   3) total feed-encourage PO intakes   4) If PO intakes not improving to consistent > 50% of meals and supplements in < 3-4 days consider starting supplemental nutrition support PPN if medically able D 5 AA 4.25 @ 85 ml/hr + standard lipids ( 1192 kcal, 86 g protein)  6) treat hypoglycemia    Goals: 1) PO intakes > 50% of meals and supplements at f/u  Nutrition Goal Status: new  Communication of RD Recs:  (POC, sticky note)    Assessment and Plan    Moderate malnutrition  Malnutrition Type:  Context: chronic illness  Level: moderate    Related to (etiology):   Decreased appetite, limited ability to perform nutrition related ADLs, diverticulitis, increased needs d/t COPD and wounds    Signs and Symptoms (as evidenced by):   Sacral ulcer and venous leg ulcers    Malnutrition Characteristic Summary:  Energy Intake (Malnutrition): less than 75% for greater than or equal to 1 month  Fluid Accumulation (Malnutrition): moderate to severe (3-4+ lymphadema)      Interventions/Recommendations (treatment strategy):  K/Phos/Na modified diet, feeding assistance, and nutrition supplement therapy  1)Change diet to renal, low fiber 2) Add Suplena BID and Jm BID 3) total feed-encourage PO intakes 40 If PO intakes not improving to consistent > 505 of meals and supplements in < 3-4 days consider starting supplemental nutrition support 6) treat hypoglycemia    Nutrition Diagnosis Status:   New      Possibly severe     Malnutrition Assessment  Malnutrition Context: chronic illness  Malnutrition Level: moderate  Skin (Micronutrient):  (Chan  = 12, spinal ulcer + venous leg ulcers)  Teeth (Micronutrient):  (missing some)   Micronutrient Evaluation Summary: suspected deficiency, suspected toxicity  Micronutrient Evaluation Comments: check iron/ K and Phos toxicity   Energy  "Intake (Malnutrition): less than 75% for greater than or equal to 1 month  Fluid Accumulation (Malnutrition): moderate to severe (3-4+ lymphadema)                         Reason for Assessment    Reason For Assessment: identified at risk by screening criteria  Diagnosis:  (septic shock)  Relevant Medical History: COPD, DENISHA, CHF, lymphadema  Interdisciplinary Rounds: did not attend (remote)    General Information Comments: 78 y/o male admitted with septic shock, BETY, diverticulitis and UTI. K/Phos elevated. Glucose and albumin depleted. Noted to be total feed, poor appetite, refusing meals at times. NFPE to be done by on-site RD at f/u, noted with BMI > 40 kg/m2. No significant wt loss per chart review.    Nutrition Discharge Planning: To be determined- Cardiac, low sodium diet prosource or liquacel BID    Nutrition Risk Screen    Nutrition Risk Screen: large or nonhealing wound, burn or pressure injury, reduced oral intake over the last month    Nutrition/Diet History    Spiritual, Cultural Beliefs, Methodist Practices, Values that Affect Care: no  Food Allergies: NKFA  Factors Affecting Nutritional Intake: decreased appetite, inability to feed self, altered gastrointestinal function    Anthropometrics    Temp: 98.1 °F (36.7 °C)  Height Method: Stated  Height: 5' 11" (180.3 cm)  Height (inches): 71 in  Weight Method: Bed Scale  Weight: (!) 200 kg (440 lb 14.7 oz)  Weight (lb): (!) 440.92 lb  Ideal Body Weight (IBW), Male: 172 lb  % Ideal Body Weight, Male (lb): 256.35 %  BMI (Calculated): 61.5  BMI Grade: greater than 40 - morbid obesity       Lab/Procedures/Meds    Pertinent Labs Reviewed: reviewed  BMP  Lab Results   Component Value Date     09/09/2023     09/09/2023    K 5.9 (H) 09/09/2023    K 5.9 (H) 09/09/2023     (H) 09/09/2023     (H) 09/09/2023    CO2 17 (L) 09/09/2023    CO2 17 (L) 09/09/2023    BUN 66 (H) 09/09/2023    BUN 66 (H) 09/09/2023    CREATININE 2.9 (H) 09/09/2023    " CREATININE 2.9 (H) 09/09/2023    CALCIUM 7.7 (L) 09/09/2023    CALCIUM 7.7 (L) 09/09/2023    ANIONGAP 14 09/09/2023    ANIONGAP 14 09/09/2023    EGFRNORACEVR 21 (A) 09/09/2023    EGFRNORACEVR 21 (A) 09/09/2023     Lab Results   Component Value Date    CALCIUM 7.7 (L) 09/09/2023    CALCIUM 7.7 (L) 09/09/2023    PHOS 4.6 (H) 09/09/2023     Lab Results   Component Value Date    ALBUMIN 1.7 (L) 09/09/2023    ALBUMIN 1.7 (L) 09/09/2023     Lab Results   Component Value Date    IRON 12 (L) 12/12/2022    TIBC 211 12/12/2022      Recent Labs   Lab 09/09/23  1240   POCTGLUCOSE 67*     Lab Results   Component Value Date    HGBA1C 5.6 01/03/2022       Pertinent Medications Reviewed: reviewed  Pertinent Medications Comments: statin, insulin, zofran, NS @ 100 ml/hr    Estimated/Assessed Needs    Weight Used For Calorie Calculations: (!) 200 kg (440 lb 14.7 oz)  Energy Calorie Requirements (kcal): MSJ ( no AF) = 2735 kcal ( - 500 for intentional wt loss OK 2200 kcal)  Energy Need Method: Celena Stokes  Protein Requirements: 0.8-1 g protein/kg ( obesity vs. wounds/sepsis) = 160-200 g  Weight Used For Protein Calculations: (!) 200 kg (440 lb 14.7 oz)  Fluid Requirements (mL): 2200-2700ml or per MD  Estimated Fluid Requirement Method: other (see comments)  CHO Requirement: N/A      Nutrition Prescription Ordered    Current Diet Order: cardiac    Evaluation of Received Nutrient/Fluid Intake    Energy Calories Required: not meeting needs  Protein Required: not meeting needs  Fluid Required: meeting needs  Total Fluid Intake (mL/kg): IVF @ 100 ml/hr + PO  Tolerance: tolerating     Intake/Output Summary (Last 24 hours) at 9/9/2023 1609  Last data filed at 9/9/2023 0704  Gross per 24 hour   Intake --   Output 100 ml   Net -100 ml       % Intake of Estimated Energy Needs: most 0-25%  % Meal Intake: 0-50%    Nutrition Risk    Level of Risk/Frequency of Follow-up:  (2 x weekly)     Monitor and Evaluation    Food and Nutrient Intake:  energy intake, food and beverage intake  Food and Nutrient Adminstration: diet order, enteral and parenteral nutrition administration  Physical Activity and Function: nutrition-related ADLs and IADLs  Anthropometric Measurements: weight  Biochemical Data, Medical Tests and Procedures: electrolyte and renal panel, gastrointestinal profile, glucose/endocrine profile  Nutrition-Focused Physical Findings: overall appearance, skin, extremities, muscles and bones     Nutrition Follow-Up    RD Follow-up?: Yes

## 2023-09-09 NOTE — PLAN OF CARE
Recommendations  1)Change diet to renal, low fiber   2) Add Suplena BID and Jm BID   3) total feed-encourage PO intakes   4) If PO intakes not improving to consistent > 50% of meals and supplements in < 3-4 days consider starting supplemental nutrition support PPN if medically able D 5 AA 4.25 @ 85 ml/hr + standard lipids ( 1192 kcal, 86 g protein)  6) treat hypoglycemia    Goals: 1) PO intakes > 50% of meals and supplements at f/u  Nutrition Goal Status: new  Communication of RD Recs:  (POC, sticky note)

## 2023-09-10 PROBLEM — N39.0 UTI (URINARY TRACT INFECTION): Status: ACTIVE | Noted: 2023-01-01

## 2023-09-10 NOTE — SUBJECTIVE & OBJECTIVE
Interval History:     Acute events overnight   Denied headache, dizziness, chest tightness, shortness of breath.          Review of Systems    Constitutional: Negative.    HENT: Negative.     Respiratory: Negative.     Cardiovascular:  Positive for leg swelling.   Gastrointestinal: Negative.    Genitourinary: Negative.    Musculoskeletal: Negative.    Skin: Negative.       Objective:     Vital Signs (Most Recent):  Temp: 98.5 °F (36.9 °C) (09/10/23 0819)  Pulse: 61 (09/10/23 0819)  Resp: 16 (09/10/23 0819)  BP: (!) 96/45 (09/10/23 0819)  SpO2: 97 % (09/10/23 0819) Vital Signs (24h Range):  Temp:  [97.8 °F (36.6 °C)-98.6 °F (37 °C)] 98.5 °F (36.9 °C)  Pulse:  [57-67] 61  Resp:  [16-21] 16  SpO2:  [95 %-100 %] 97 %  BP: ()/(44-55) 96/45     Weight: (!) 200 kg (440 lb 14.7 oz)  Body mass index is 61.5 kg/m².    Intake/Output Summary (Last 24 hours) at 9/10/2023 1139  Last data filed at 9/10/2023 0950  Gross per 24 hour   Intake 120 ml   Output 350 ml   Net -230 ml         Physical Exam      Constitutional:       General: He is awake.      Appearance: He is morbidly obese.      Comments: Chronically ill appearing morbidly obese male lying in bed on 4L NC in NAD   Cardiovascular:      Rate and Rhythm: Normal rate.      Comments: Extensive lymphedema to BLE   Pulmonary:      Breath sounds: Decreased breath sounds present. No wheezing or rhonchi.      Comments: On 4L NC, poor effort, refuses NIPPV intermittently  Abdominal:      General: There is no distension.      Palpations: Abdomen is soft.      Comments: Obese abd   Musculoskeletal:      Right lower leg: Edema present.      Left lower leg: Edema present.   Skin:     General: Skin is warm and dry.   Neurological:      General: No focal deficit present.      Mental Status: He is alert.   Psychiatric:         Behavior: Behavior is cooperative.   Significant Labs: All pertinent labs within the past 24 hours have been reviewed.  CBC:   Recent Labs   Lab  09/09/23  1458 09/10/23  0801   WBC 14.84* 11.66   HGB 9.8* 8.8*   HCT 31.7* 28.7*    249     CMP:   Recent Labs   Lab 09/09/23  1458 09/10/23  0801     142 142   K 5.9*  5.9* 4.0   *  111* 110   CO2 17*  17* 20*   GLU 63*  63* 66*   BUN 66*  66* 66*   CREATININE 2.9*  2.9* 2.9*   CALCIUM 7.7*  7.7* 7.6*   PROT 5.5*  --    ALBUMIN 1.7*  1.7*  --    BILITOT 0.3  --    ALKPHOS 69  --    AST 26  --    ALT 16  --    ANIONGAP 14  14 12       Significant Imaging:   Imaging Results              X-Ray Chest AP Portable (Final result)  Result time 09/02/23 08:20:03      Final result by Live Duckworth III, MD (09/02/23 08:20:03)                   Impression:      Well-positioned PICC line.      Electronically signed by: Live Duckworth MD  Date:    09/02/2023  Time:    08:20               Narrative:    EXAMINATION:  XR CHEST AP PORTABLE    CLINICAL HISTORY:  Encounter for adjustment and management of vascular access device    FINDINGS:  Comparison is made with the most recent prior chest x-ray.  Right arm PICC line is well positioned in the SVC..  Stable cardiomegaly, pulmonary vascular congestion and trace bilateral pleural effusions..  No acute airspace consolidation or pneumothorax identified.                                       CT Abdomen Pelvis  Without Contrast (Final result)  Result time 09/02/23 07:13:05      Final result by Live Duckworth III, MD (09/02/23 07:13:05)                   Impression:      Mild distal descending colonic diverticulitis suspected with possible associated postinflammatory stricture causing a partial obstruction.  Possible rectal fecal impaction.  No free air or abscess.  Stable chronic findings, as above.    Note: All CT scans at this facility are performed  using dose modulation techniques as appropriate to performed exam including the following:  automated exposure control; adjustment of mA and/or kV according to the patients size (this includes techniques  or standardized protocols for targeted exams where dose is matched to indication/reason for exam: i.e. extremities or head);  iterative reconstruction technique.      Electronically signed by: Live Duckworth MD  Date:    09/02/2023  Time:    07:13               Narrative:    EXAMINATION:  CT ABDOMEN PELVIS WITHOUT CONTRAST    CLINICAL HISTORY:  Abdominal abscess/infection suspected;    TECHNIQUE:  Routine CT abdomen and pelvis performed without IV contrast.  Coronal and sagittal reformatted images obtained.    COMPARISON:  07/15/2023    FINDINGS:  Lung bases: No acute findings.  Bibasilar subsegmental atelectasis.    Bones: No acute osseous abnormality or suspicious bone lesions.    Kidneys and ureters: Stable bilateral renal atrophy, renal cysts, and prominent staghorn calculi in the right renal pelvis and calices.  No ureterolithiasis or hydronephrosis.    Stomach and bowel: Mild acute diverticulitis of the distal descending colon with luminal narrowing, upstream colonic dilation with air-fluid levels, possibly a post inflammatory stricture in partial obstruction versus spasm/peristalsis.  There is a large amount of stool in the rectum which is mildly distended in could represent fecal impaction.  No small bowel dilation.    Appendix: No evidence of appendicitis.    Liver: Unremarkable for noncontrast technique.    Gallbladder and bile ducts: Cholelithiasis without acute cholecystitis.    Pancreas: Unremarkable for noncontrast technique.    Spleen: Unremarkable for noncontrast technique.    Adrenals: Unremarkable.    Bladder: Unremarkable.    Aorta: No aneurysm.    Reproductive organs: Within normal limits.    Miscellaneous: No free intraperitoneal fluid, free air or abscess identified. No pathologic lymphadenopathy.                                       X-Ray Chest 1 View (Final result)  Result time 09/02/23 08:03:02      Final result by Live Duckworth III, MD (09/02/23 08:03:02)                   Impression:       No acute findings.      Electronically signed by: Live Duckworth MD  Date:    09/02/2023  Time:    08:03               Narrative:    EXAMINATION:  XR CHEST 1 VIEW    CLINICAL HISTORY:  Shortness of breath    FINDINGS:  Comparison is made with the most recent prior chest x-ray.  Stable cardiomegaly and aortic atherosclerosis..  The lungs appear clear of active disease. No acute appearing infiltrate, pleural effusion or pneumothorax identified.                        Wet Read by Kahlil Linton Jr., MD (09/02/23 07:59:06, [facility] - Emergency Dept., Emergency Medicine)    No acute findings

## 2023-09-10 NOTE — ASSESSMENT & PLAN NOTE
[facility] as of 9/2 positive for E coli   An empiric antibiotics   Blood culture x2 negative to date

## 2023-09-10 NOTE — PROGRESS NOTES
[facility] Medicine  Progress Note    Patient Name: Gene Rothman  MRN: [id number]  Patient Class: IP- Inpatient   Admission Date: 9/2/2023  Length of Stay: 8 days  Attending Physician: Kelsey Sweeney,*  Primary Care Provider: Ami Zarate DO        Subjective:     Principal Problem:Septic shock        HPI:  Mr Rothman is a 78 y/o morbidly obese man with COPD with OHS/DENISHA (noncompliant with PAP therapies), chronic hypoxic respiratory failure (4L, prefers to use simple mask), Afib on Eliquis, pHTN, diastolic CHF, RV dysfxn, and chronic lymphedema who presented to the ER today due to abdominal pain that started last night.  He reports diffuse abdominal pain and tenderness.  Deneis N/V, diarrhea, dyspnea, cough, sputum, F/C, sick contacts, chest pain, change in bowel/bladder.       Work-up in the ER showed mild distal descending colon diverticulitis, LA 2.8, WBC 16.33, Cr 1.8 (b/l around 1.0), Bicarb 38.  He was given Vanc/Zosyn.  Required initiation of Levo due to hypotension after he recieved his 30cc/kg of fluids.  He is being admitted to the ICU for septic shock, presumably with diverticulitis/ UTI as the source.         Overview/Hospital Course:  Mr Rothman is a 78 y/o morbidly obese man with COPD with OHS/DENISHA (noncompliant with PAP therapies), chronic hypoxic respiratory failure (4L, prefers to use simple mask), Afib on Eliquis, pHTN, diastolic CHF, RV dysfxn, and chronic lymphedema who presented to the ER ton 9/2 due to abdominal pain, got admitted to ICU for septic shock likely secondary to diverticulitis/UTI, has been started on Levophed 9/3, vasopressin added to Levo to stabilize blood pressure.  On 09/04, vaso discontinued, continued on Levophed, midodrine added; patient off of Levophed, continued on midodrine 15 mg t.i.d., blood pressure stabilizing;   Also on arrival, patient has been started on vanc, Zosyn, subsequently vanc discontinued, on 09/05, antibiotics changed  from Zosyn to Rocephin based on culture results.    Has been on Ventimask per patient's choice.      On 09/08, patient deemed stable for downgrade per ICU, hospital medicine consulted to assume care.    Continue antibiotics, midodrine tapering, IV fluids.    Noted to have elevated creatinine, decreased urine output, nephrology on board.    Acute on chronic paronychia, chronic lymphedema, ordered wound care, US arterial LE- did not show acute findings/ hemodynamic sign stenosis; podiatry eval and stated- No need for nail avulsion in-patient as there are no signs of acute infection and currently on Eliquis, recommended nail trim/debridement in out patient setting in clinic or home visit with NP managing his at home care;  Echo as of 9/5-     Left Ventricle: The left ventricle is normal in size. Mildly increased wall thickness. There is concentric remodeling. Normal wall motion. Septal motion is normal. There is normal systolic function with a visually estimated ejection fraction of 55 - 70%. There is normal diastolic function.    Left Atrium: Left atrium is mildly dilated.    Right Ventricle: Mild right ventricular enlargement. Wall thickness is normal. Right ventricle wall motion  is normal. Systolic function is normal.    Aortic Valve: The aortic valve is a trileaflet valve. There is moderate aortic valve sclerosis.    Tricuspid Valve: There is moderate regurgitation with a centrally directed jet.    Pulmonary Artery: The estimated pulmonary artery systolic pressure is 57 mmHg.      Per discussion with wife at bedside, patient chronically bed-bound for past 2 years due to general deterioration in condition.  Does not follow up with doctors outside, sometimes attempts virtual hospital follow up, stated that he gets refills from NP visit at home; non compliant with NIPPV at home, ? Compliance with meds/ home oxygen;   Had palliative evaluation during recent hospitalizations in 7/2023- prefers to continue full  code, discussed again today, patient/wife at bedside expressed to continue full code;       On 9/9- patient lying comfortably, not in distress.  Hemodynamically stable.  Stated that he does not want to answer questions.  Oliguric with approximately 100-200 cc urine output over past 24 hours, pending repeat labs this morning.  Nephrology on board.    9/10- patient resting comfortably in bed, not in acute distress.  Currently saturating about 92 on 4 L nasal cannula, oriented to self, place, refuses to answer questions.    Noted to have slight improvement in urine output, creatinine remained stable at 2.9 as yesterday.  We will continue to monitor improvement in renal function, follow up with Nephrology.                Interval History:     Acute events overnight   Denied headache, dizziness, chest tightness, shortness of breath.          Review of Systems    Constitutional: Negative.    HENT: Negative.     Respiratory: Negative.     Cardiovascular:  Positive for leg swelling.   Gastrointestinal: Negative.    Genitourinary: Negative.    Musculoskeletal: Negative.    Skin: Negative.       Objective:     Vital Signs (Most Recent):  Temp: 98.5 °F (36.9 °C) (09/10/23 0819)  Pulse: 61 (09/10/23 0819)  Resp: 16 (09/10/23 0819)  BP: (!) 96/45 (09/10/23 0819)  SpO2: 97 % (09/10/23 0819) Vital Signs (24h Range):  Temp:  [97.8 °F (36.6 °C)-98.6 °F (37 °C)] 98.5 °F (36.9 °C)  Pulse:  [57-67] 61  Resp:  [16-21] 16  SpO2:  [95 %-100 %] 97 %  BP: ()/(44-55) 96/45     Weight: (!) 200 kg (440 lb 14.7 oz)  Body mass index is 61.5 kg/m².    Intake/Output Summary (Last 24 hours) at 9/10/2023 1139  Last data filed at 9/10/2023 0950  Gross per 24 hour   Intake 120 ml   Output 350 ml   Net -230 ml         Physical Exam      Constitutional:       General: He is awake.      Appearance: He is morbidly obese.      Comments: Chronically ill appearing morbidly obese male lying in bed on 4L NC in NAD   Cardiovascular:      Rate and Rhythm:  Normal rate.      Comments: Extensive lymphedema to BLE   Pulmonary:      Breath sounds: Decreased breath sounds present. No wheezing or rhonchi.      Comments: On 4L NC, poor effort, refuses NIPPV intermittently  Abdominal:      General: There is no distension.      Palpations: Abdomen is soft.      Comments: Obese abd   Musculoskeletal:      Right lower leg: Edema present.      Left lower leg: Edema present.   Skin:     General: Skin is warm and dry.   Neurological:      General: No focal deficit present.      Mental Status: He is alert.   Psychiatric:         Behavior: Behavior is cooperative.   Significant Labs: All pertinent labs within the past 24 hours have been reviewed.  CBC:   Recent Labs   Lab 09/09/23  1458 09/10/23  0801   WBC 14.84* 11.66   HGB 9.8* 8.8*   HCT 31.7* 28.7*    249     CMP:   Recent Labs   Lab 09/09/23  1458 09/10/23  0801     142 142   K 5.9*  5.9* 4.0   *  111* 110   CO2 17*  17* 20*   GLU 63*  63* 66*   BUN 66*  66* 66*   CREATININE 2.9*  2.9* 2.9*   CALCIUM 7.7*  7.7* 7.6*   PROT 5.5*  --    ALBUMIN 1.7*  1.7*  --    BILITOT 0.3  --    ALKPHOS 69  --    AST 26  --    ALT 16  --    ANIONGAP 14  14 12       Significant Imaging:   Imaging Results              X-Ray Chest AP Portable (Final result)  Result time 09/02/23 08:20:03      Final result by Live Duckworth III, MD (09/02/23 08:20:03)                   Impression:      Well-positioned PICC line.      Electronically signed by: Live Duckworth MD  Date:    09/02/2023  Time:    08:20               Narrative:    EXAMINATION:  XR CHEST AP PORTABLE    CLINICAL HISTORY:  Encounter for adjustment and management of vascular access device    FINDINGS:  Comparison is made with the most recent prior chest x-ray.  Right arm PICC line is well positioned in the SVC..  Stable cardiomegaly, pulmonary vascular congestion and trace bilateral pleural effusions..  No acute airspace consolidation or pneumothorax  identified.                                       CT Abdomen Pelvis  Without Contrast (Final result)  Result time 09/02/23 07:13:05      Final result by Live Duckworth III, MD (09/02/23 07:13:05)                   Impression:      Mild distal descending colonic diverticulitis suspected with possible associated postinflammatory stricture causing a partial obstruction.  Possible rectal fecal impaction.  No free air or abscess.  Stable chronic findings, as above.    Note: All CT scans at this facility are performed  using dose modulation techniques as appropriate to performed exam including the following:  automated exposure control; adjustment of mA and/or kV according to the patients size (this includes techniques or standardized protocols for targeted exams where dose is matched to indication/reason for exam: i.e. extremities or head);  iterative reconstruction technique.      Electronically signed by: Live Duckworth MD  Date:    09/02/2023  Time:    07:13               Narrative:    EXAMINATION:  CT ABDOMEN PELVIS WITHOUT CONTRAST    CLINICAL HISTORY:  Abdominal abscess/infection suspected;    TECHNIQUE:  Routine CT abdomen and pelvis performed without IV contrast.  Coronal and sagittal reformatted images obtained.    COMPARISON:  07/15/2023    FINDINGS:  Lung bases: No acute findings.  Bibasilar subsegmental atelectasis.    Bones: No acute osseous abnormality or suspicious bone lesions.    Kidneys and ureters: Stable bilateral renal atrophy, renal cysts, and prominent staghorn calculi in the right renal pelvis and calices.  No ureterolithiasis or hydronephrosis.    Stomach and bowel: Mild acute diverticulitis of the distal descending colon with luminal narrowing, upstream colonic dilation with air-fluid levels, possibly a post inflammatory stricture in partial obstruction versus spasm/peristalsis.  There is a large amount of stool in the rectum which is mildly distended in could represent fecal impaction.  No  small bowel dilation.    Appendix: No evidence of appendicitis.    Liver: Unremarkable for noncontrast technique.    Gallbladder and bile ducts: Cholelithiasis without acute cholecystitis.    Pancreas: Unremarkable for noncontrast technique.    Spleen: Unremarkable for noncontrast technique.    Adrenals: Unremarkable.    Bladder: Unremarkable.    Aorta: No aneurysm.    Reproductive organs: Within normal limits.    Miscellaneous: No free intraperitoneal fluid, free air or abscess identified. No pathologic lymphadenopathy.                                       X-Ray Chest 1 View (Final result)  Result time 09/02/23 08:03:02      Final result by Live Duckworth III, MD (09/02/23 08:03:02)                   Impression:      No acute findings.      Electronically signed by: Live Duckworth MD  Date:    09/02/2023  Time:    08:03               Narrative:    EXAMINATION:  XR CHEST 1 VIEW    CLINICAL HISTORY:  Shortness of breath    FINDINGS:  Comparison is made with the most recent prior chest x-ray.  Stable cardiomegaly and aortic atherosclerosis..  The lungs appear clear of active disease. No acute appearing infiltrate, pleural effusion or pneumothorax identified.                        Wet Read by Kahlil Linton Jr., MD (09/02/23 07:59:06, [facility] - Emergency Dept., Emergency Medicine)    No acute findings                                       Assessment/Plan:      * Septic shock  Likely secondary to diverticulitis/UTI   started on Levophed 9/3, vasopressin added to Levo to stabilize blood pressure.    On 09/04, vaso discontinued, continued on Levophed, midodrine added; patient off of Levophed, continued on midodrine 15 mg t.i.d., blood pressure stabilizing; taper midodrine as tolerated         UTI (urinary tract infection)  UC as of 9/2 positive for E coli   An empiric antibiotics   Blood culture x2 negative to date         BETY (acute kidney injury)  Known h/o CKD stage 2 with baseline Cr 0.8 to 1.3  BETY  multifactorial likely secondary to intravascular volume depletion/ UTI/shock- ?  ATN   Creatinine during hospital stay has trended up from 1.7-2.3   Continue IV fluids, avoid nephrotoxins, nephrology on board   Renal dose meds   Monitor urine output        Diverticulitis  Presented with abdominal pain, CT findings on arrival suggestive of mild diverticulitis   Received empiric antibiotics   Currently denied acute pain/nausea, vomiting        Chronic obstructive pulmonary disease with acute exacerbation  Currently not in acute exacerbation, continue bronchodilators p.r.n., target SpO2 more than 88%      Atrial fibrillation  Currently rate control   Continue current plan, continue Eliquis       Physical debility  Chronic  PT/OT      Venous stasis dermatitis of both lower extremities  Chronic  US LE  Podiatry/ wound care, PT/OT    Chronic acquired lymphedema  Chronic  Podiatry, wound care f/u     ordered wound care, US arterial LE- did not show acute findings/ hemodynamic sign stenosis; podiatry eval and stated- No need for nail avulsion in-patient as there are no signs of acute infection and currently on Eliquis, recommended nail trim/debridement in out patient setting in clinic or home visit with NP managing his at home care;  Echo as of 9/5-     Left Ventricle: The left ventricle is normal in size. Mildly increased wall thickness. There is concentric remodeling. Normal wall motion. Septal motion is normal. There is normal systolic function with a visually estimated ejection fraction of 55 - 70%. There is normal diastolic function.    Left Atrium: Left atrium is mildly dilated.    Right Ventricle: Mild right ventricular enlargement. Wall thickness is normal. Right ventricle wall motion  is normal. Systolic function is normal.    Aortic Valve: The aortic valve is a trileaflet valve. There is moderate aortic valve sclerosis.    Tricuspid Valve: There is moderate regurgitation with a centrally directed jet.     Pulmonary Artery: The estimated pulmonary artery systolic pressure is 57 mmHg.                Sleep apnea  Noncompliant with NIPPV, refuses to wear   Prefers Ventimask   Noncompliant with home oxygen   Encouraged          Morbid obesity with BMI of 60.0-69.9, adult  Body mass index is 61.5 kg/m². Morbid obesity complicates all aspects of disease management from diagnostic modalities to treatment. Weight loss encouraged and health benefits explained to patient.   Bed-bound for past 2 years   Poor cooperation/effort   Questionable compliance        Hypothyroidism (acquired)  Home medication         VTE Risk Mitigation (From admission, onward)         Ordered     apixaban tablet 5 mg  2 times daily         09/02/23 1105     IP VTE HIGH RISK PATIENT  Once         09/02/23 0853     Place sequential compression device  Until discontinued         09/02/23 0853                Discharge Planning   JAMES:      Code Status: Full Code   Is the patient medically ready for discharge?:     Reason for patient still in hospital (select all that apply): Patient trending condition, Consult recommendations, PT / OT recommendations and Pending disposition  Discharge Plan A: Home Health                  [facility] Kenney Sweeney MD  Department of Hospital Medicine   [facility] - Telemetry ([facility])

## 2023-09-10 NOTE — CONSULTS
[facility] - Intensive Care (Hospital)  Nephrology  Consult Note    Patient Name: Gene Rothman  MRN: [id number]  Admission Date: 9/2/2023  Hospital Length of Stay: 8 days  Attending Provider: Kelsey Sweeney,*   Primary Care Physician: Ami Zarate DO  Principal Problem:Septic shock    Consults  Subjective:     HPI:  77-year-old male admitted on 09/02/2023 with abdominal pain secondary to diverticulitis complicated by septic shock.  Noted to have mildly elevated creatinine over baseline.  Nephrology has been consulted for evaluation.  Patient was seen in the intensive care unit.  His in bed resting comfortably.  No acute distress noted.  No complaints this morning.    Chart reviewed.  He does not have history of chronic kidney disease however he does have persistent proteinuria on UAs for the past several years.    09/09/2023:  Patient was step-down from ICU to the floor yesterday.  Seen in his hospital room.  In bed resting comfortably.  No acute distress noted.    09/10/2023: Patient was seen in his hospital room.  In bed resting comfortably.  No acute distress noted.  His wife was at the bedside.  All nephrology related questions were answered to their satisfaction.    Past Medical History:   Diagnosis Date    Acute hypoxemic respiratory failure 9/17/2022    Arthritis     Atrial fibrillation 3/1/2023    CHF (congestive heart failure)     Chronic kidney disease     Coronary artery disease     Depression     Hypertension     Hypertensive heart disease with heart failure 9/16/2022    Hypothyroidism     Lymphedema of lower extremity     Nephrolithiasis     Obesity     Peripheral vascular disease     Pneumonia 9/17/2022    Recurrent cellulitis of lower leg     Sleep apnea     can't stand the CPAP , sleeps elevated in hospital bed or chair    Tobacco dependence     resolved       Past Surgical History:   Procedure Laterality Date    ADENOIDECTOMY  1961    BACK SURGERY  1975;  1976    x2 for disc; scar tissue  removed    debridement of bilateral leg ulcers Bilateral 2017    Dr Hernandez    INNER EAR SURGERY Bilateral     separate - pinnaplasty , new eardrms constructed    KIDNEY STONE SURGERY Left  approx    open    TONSILLECTOMY         Review of patient's allergies indicates:   Allergen Reactions    Sulfamethoxazole-trimethoprim Itching and Shortness Of Breath    Sulfamethoxazole      Current Facility-Administered Medications   Medication Frequency    0.9%  NaCl infusion Continuous    acetaminophen tablet 650 mg Q6H PRN    apixaban tablet 5 mg BID    aspirin chewable tablet 81 mg Daily    atorvastatin tablet 40 mg Daily    cefTRIAXone (ROCEPHIN) 1 g in dextrose 5 % in water (D5W) 100 mL IVPB (MB+) Q24H    dextrose 10% bolus 125 mL 125 mL PRN    dextrose 10% bolus 250 mL 250 mL PRN    famotidine tablet 20 mg Daily    glucagon (human recombinant) injection 1 mg PRN    glucose chewable tablet 16 g PRN    glucose chewable tablet 24 g PRN    HYDROcodone-acetaminophen 5-325 mg per tablet 1 tablet Q6H PRN    insulin aspart U-100 pen 0-5 Units QID (AC + HS) PRN    levothyroxine tablet 50 mcg Before breakfast    midodrine tablet 15 mg TID    ondansetron injection 4 mg Q6H PRN    sodium chloride 0.9% flush 10 mL PRN    sodium zirconium cyclosilicate packet 5 g BID     Family History       Problem Relation (Age of Onset)    Alcohol abuse Maternal Grandfather    Alzheimer's disease Brother    Asthma Daughter    Cancer Mother, Father, Brother    Diabetes Mother    Heart disease Father, Brother    Hypertension Mother          Tobacco Use    Smoking status: Former     Current packs/day: 0.00     Average packs/day: 1.5 packs/day for 1 year (1.5 ttl pk-yrs)     Types: Cigarettes     Start date:      Quit date:      Years since quittin.7    Smokeless tobacco: Never   Substance and Sexual Activity    Alcohol use: Not Currently    Drug use: Never    Sexual activity: Never     Review of Systems   Constitutional:  Negative.    HENT: Negative.     Respiratory: Negative.     Cardiovascular:  Positive for leg swelling.   Gastrointestinal: Negative.    Genitourinary: Negative.    Musculoskeletal: Negative.    Skin: Negative.      Objective:     Vital Signs (Most Recent):  Temp: 98.5 °F (36.9 °C) (09/10/23 0819)  Pulse: 61 (09/10/23 0819)  Resp: 16 (09/10/23 0819)  BP: (!) 96/45 (09/10/23 0819)  SpO2: 97 % (09/10/23 0819) Vital Signs (24h Range):  Temp:  [97.8 °F (36.6 °C)-98.6 °F (37 °C)] 98.5 °F (36.9 °C)  Pulse:  [57-67] 61  Resp:  [16-21] 16  SpO2:  [95 %-100 %] 97 %  BP: ()/(44-55) 96/45     Weight: (!) 200 kg (440 lb 14.7 oz) (09/09/23 1555)  Body mass index is 61.5 kg/m².  Body surface area is 3.17 meters squared.    I/O last 3 completed shifts:  In: -   Out: 100 [Urine:100]    Physical Exam  Constitutional:       Appearance: Normal appearance. He is obese.   HENT:      Head: Normocephalic and atraumatic.   Eyes:      General: No scleral icterus.     Extraocular Movements: Extraocular movements intact.      Pupils: Pupils are equal, round, and reactive to light.   Cardiovascular:      Rate and Rhythm: Normal rate. Rhythm irregular.   Pulmonary:      Effort: Pulmonary effort is normal.      Breath sounds: Normal breath sounds.   Musculoskeletal:      Right lower leg: Edema present.      Left lower leg: Edema present.      Comments: Generalized edema.   Skin:     General: Skin is warm and dry.   Neurological:      General: No focal deficit present.      Mental Status: He is alert and oriented to person, place, and time.   Psychiatric:         Mood and Affect: Mood normal.         Behavior: Behavior normal.         Significant Labs:  BMP:   Recent Labs   Lab 09/10/23  0801   GLU 66*      K 4.0      CO2 20*   BUN 66*   CREATININE 2.9*   CALCIUM 7.6*   MG 2.5       CMP:   Recent Labs   Lab 09/09/23  1458 09/10/23  0801   GLU 63*  63* 66*   CALCIUM 7.7*  7.7* 7.6*   ALBUMIN 1.7*  1.7*  --    PROT 5.5*  --       142 142   K 5.9*  5.9* 4.0   CO2 17*  17* 20*   *  111* 110   BUN 66*  66* 66*   CREATININE 2.9*  2.9* 2.9*   ALKPHOS 69  --    ALT 16  --    AST 26  --    BILITOT 0.3  --        All labs within the past 24 hours have been reviewed.    Significant Imaging:  Labs: Reviewed      Assessment/Plan:     Active Diagnoses:    Diagnosis Date Noted POA    PRINCIPAL PROBLEM:  Septic shock [A41.9, R65.21] 12/03/2021 Yes    Moderate malnutrition [E44.0] 09/09/2023 Yes    Dystrophic nail [L60.3] 09/08/2023 Yes    Diverticulitis [K57.92] 09/02/2023 Yes    BETY (acute kidney injury) [N17.9] 09/02/2023 Yes    Chronic obstructive pulmonary disease with acute exacerbation [J44.1] 03/08/2023 Yes    Atrial fibrillation [I48.91] 03/01/2023 Yes    Venous stasis dermatitis of both lower extremities [I87.2] 09/29/2017 Yes     Chronic    Chronic acquired lymphedema [I89.0] 12/30/2016 Yes     Chronic    Sleep apnea [G47.30] 04/08/2016 Yes     Chronic    Morbid obesity with BMI of 60.0-69.9, adult [E66.01, Z68.44] 12/19/2014 Not Applicable     Chronic    Hypothyroidism (acquired) [E03.9] 12/19/2014 Yes      Problems Resolved During this Admission:       1. Acute kidney injury:  Serum creatinine is stable from yesterday at 2.9.  Urine output appears to have picked up slightly this morning with more urine in the ureter.  Will continue to monitor.    2. Hypotension:  Blood pressure continues to run on the low side.  He is on oral midodrine.  There have been several times over the past 24-48 hours where his mean arterial pressure fell below 60.  There may also be a component of ischemic ATN.    3. CKD 2: Baseline creatinine runs between about 0.8 and 1.3.  He has had persistent proteinuria on urinalysis for the past several years.    4. Generalized edema:  Has only 1 g protein by PC ratio.  Not consistent with nephrosis.    Approximately 40 minutes was spent in patient examination, chart review and coordination care with other  providers.    Thank you for your consult.     Дмитрий Pelaez MD  Nephrology  [facility] - Intensive Care ([facility])

## 2023-09-10 NOTE — PLAN OF CARE
Problem: Adult Inpatient Plan of Care  Goal: Plan of Care Review  Outcome: Ongoing, Progressing  Goal: Patient-Specific Goal (Individualized)  Outcome: Ongoing, Progressing  Goal: Absence of Hospital-Acquired Illness or Injury  Outcome: Ongoing, Progressing  Goal: Optimal Comfort and Wellbeing  Outcome: Ongoing, Progressing  Goal: Readiness for Transition of Care  Outcome: Ongoing, Progressing     Problem: Bariatric Environmental Safety  Goal: Safety Maintained with Care  Outcome: Ongoing, Progressing     Problem: Infection  Goal: Absence of Infection Signs and Symptoms  Outcome: Ongoing, Progressing     Problem: Impaired Wound Healing  Goal: Optimal Wound Healing  Outcome: Ongoing, Progressing     Problem: Skin Injury Risk Increased  Goal: Skin Health and Integrity  Outcome: Ongoing, Progressing     Problem: Adjustment to Illness (Sepsis/Septic Shock)  Goal: Optimal Coping  Outcome: Ongoing, Progressing     Problem: Bleeding (Sepsis/Septic Shock)  Goal: Absence of Bleeding  Outcome: Ongoing, Progressing     Problem: Glycemic Control Impaired (Sepsis/Septic Shock)  Goal: Blood Glucose Level Within Desired Range  Outcome: Ongoing, Progressing     Problem: Infection Progression (Sepsis/Septic Shock)  Goal: Absence of Infection Signs and Symptoms  Outcome: Ongoing, Progressing     Problem: Nutrition Impaired (Sepsis/Septic Shock)  Goal: Optimal Nutrition Intake  Outcome: Ongoing, Progressing     Problem: Fluid and Electrolyte Imbalance (Acute Kidney Injury/Impairment)  Goal: Fluid and Electrolyte Balance  Outcome: Ongoing, Progressing     Problem: Oral Intake Inadequate (Acute Kidney Injury/Impairment)  Goal: Optimal Nutrition Intake  Outcome: Ongoing, Progressing     Problem: Renal Function Impairment (Acute Kidney Injury/Impairment)  Goal: Effective Renal Function  Outcome: Ongoing, Progressing     Problem: Fall Injury Risk  Goal: Absence of Fall and Fall-Related Injury  Outcome: Ongoing, Progressing     Problem:  Malnutrition  Goal: Improved Nutritional Intake  Outcome: Ongoing, Progressing

## 2023-09-11 NOTE — AI DETERIORATION ALERT
Artificial Intelligence Notification  [facility]  [address] [place] [address]  Phone: [phone]    This documentation was triggered by an Artificial Intelligence Notification:    Admit Date: 2023   LOS: 8  Code Status: Full Code  : 1945  Age: 77 y.o.  Weight:   Wt Readings from Last 1 Encounters:   23 (!) 200 kg (440 lb 14.7 oz)        Sex: male  Bed: A232/A232 A  MRN: [id number]  Attending Physician: Kelsey Sweeney,Charlene     Date of Alert: 09/10/2023  Time AI Alert Received:             Vitals:    09/10/23 1953   BP:    Pulse: 64   Resp: (!) 22   Temp:      SpO2: 98 %      Artificial Intelligence alert discussed with Provider:     Name: Dr. Peñaloza   Date/Time of Provider Notification:       Patient Condition: pt seen and examined. He is Awake and Alert. Spouse at bedside. BP 78/45. respirations even and unlabored. Sepsis improving. Will give night time Midodrine dose now. Instructed nurse to recheck BP in one hour after dose.

## 2023-09-11 NOTE — ASSESSMENT & PLAN NOTE
Chronic  -US LE showed   -Podiatry/ wound care  -PT/OT- pt to discharge to home and bed bound at baseline

## 2023-09-11 NOTE — ASSESSMENT & PLAN NOTE
Known h/o CKD stage 2 with baseline Cr 0.8 to 1.3  BETY multifactorial likely secondary to intravascular volume depletion/ UTI/shock- ?  ATN   Creatinine during hospital stay has trended up from 1.7-2.8  Continue IV fluids, avoid nephrotoxins, nephrology on board   Renal dose meds   Monitor urine output  -Nephrology consulted

## 2023-09-11 NOTE — CONSULTS
[facility] - Intensive Care (Hospital)  Nephrology  Consult Note    Patient Name: Gene Rothman  MRN: [id number]  Admission Date: 9/2/2023  Hospital Length of Stay: 9 days  Attending Provider: Jenna Carrillo MD   Primary Care Physician: Ami Zarate DO  Principal Problem:Septic shock    Consults  Subjective:     HPI:  77-year-old male admitted on 09/02/2023 with abdominal pain secondary to diverticulitis complicated by septic shock.  Noted to have mildly elevated creatinine over baseline.  Nephrology has been consulted for evaluation.  Patient was seen in the intensive care unit.  His in bed resting comfortably.  No acute distress noted.  No complaints this morning.    Chart reviewed.  He does not have history of chronic kidney disease however he does have persistent proteinuria on UAs for the past several years.    09/09/2023:  Patient was step-down from ICU to the floor yesterday.  Seen in his hospital room.  In bed resting comfortably.  No acute distress noted.    09/10/2023: Patient was seen in his hospital room.  In bed resting comfortably.  No acute distress noted.  His wife was at the bedside.  All nephrology related questions were answered to their satisfaction.    09/11/2023: Patient was seen in his hospital room.  In bed resting comfortably.  No acute distress noted.    Past Medical History:   Diagnosis Date    Acute hypoxemic respiratory failure 9/17/2022    Arthritis     Atrial fibrillation 3/1/2023    CHF (congestive heart failure)     Chronic kidney disease     Coronary artery disease     Depression     Hypertension     Hypertensive heart disease with heart failure 9/16/2022    Hypothyroidism     Lymphedema of lower extremity     Nephrolithiasis     Obesity     Peripheral vascular disease     Pneumonia 9/17/2022    Recurrent cellulitis of lower leg     Sleep apnea     can't stand the CPAP , sleeps elevated in hospital bed or chair    Tobacco dependence     resolved       Past Surgical History:   Procedure  Laterality Date    ADENOIDECTOMY      BACK SURGERY  ;  1976    x2 for disc; scar tissue removed    debridement of bilateral leg ulcers Bilateral 2017    Dr Hernandez    INNER EAR SURGERY Bilateral     separate - pinnaplasty , new eardrms constructed    KIDNEY STONE SURGERY Left  approx    open    TONSILLECTOMY         Review of patient's allergies indicates:   Allergen Reactions    Sulfamethoxazole-trimethoprim Itching and Shortness Of Breath    Sulfamethoxazole      Current Facility-Administered Medications   Medication Frequency    0.9%  NaCl infusion Continuous    acetaminophen tablet 650 mg Q6H PRN    apixaban tablet 5 mg BID    aspirin chewable tablet 81 mg Daily    atorvastatin tablet 40 mg Daily    cefTRIAXone (ROCEPHIN) 1 g in dextrose 5 % in water (D5W) 100 mL IVPB (MB+) Q24H    dextrose 10% bolus 125 mL 125 mL PRN    dextrose 10% bolus 250 mL 250 mL PRN    famotidine tablet 20 mg Daily    glucagon (human recombinant) injection 1 mg PRN    glucose chewable tablet 16 g PRN    glucose chewable tablet 24 g PRN    HYDROcodone-acetaminophen 5-325 mg per tablet 1 tablet Q6H PRN    insulin aspart U-100 pen 0-5 Units QID (AC + HS) PRN    levothyroxine tablet 50 mcg Before breakfast    midodrine tablet 15 mg TID    ondansetron injection 4 mg Q6H PRN    sodium chloride 0.9% flush 10 mL PRN     Family History       Problem Relation (Age of Onset)    Alcohol abuse Maternal Grandfather    Alzheimer's disease Brother    Asthma Daughter    Cancer Mother, Father, Brother    Diabetes Mother    Heart disease Father, Brother    Hypertension Mother          Tobacco Use    Smoking status: Former     Current packs/day: 0.00     Average packs/day: 1.5 packs/day for 1 year (1.5 ttl pk-yrs)     Types: Cigarettes     Start date:      Quit date:      Years since quittin.7    Smokeless tobacco: Never   Substance and Sexual Activity    Alcohol use: Not Currently    Drug use: Never    Sexual activity:  Never     Review of Systems   Constitutional: Negative.    HENT: Negative.     Respiratory: Negative.     Cardiovascular:  Positive for leg swelling.   Gastrointestinal: Negative.    Genitourinary: Negative.    Musculoskeletal: Negative.    Skin: Negative.      Objective:     Vital Signs (Most Recent):  Temp: 98.1 °F (36.7 °C) (09/11/23 0738)  Pulse: (!) 59 (09/11/23 0832)  Resp: (!) 22 (09/11/23 0738)  BP: 95/63 (09/11/23 0832)  SpO2: 98 % (09/11/23 0738) Vital Signs (24h Range):  Temp:  [97.6 °F (36.4 °C)-98.6 °F (37 °C)] 98.1 °F (36.7 °C)  Pulse:  [50-75] 59  Resp:  [16-22] 22  SpO2:  [92 %-100 %] 98 %  BP: ()/(35-63) 95/63     Weight: (!) 200 kg (440 lb 14.7 oz) (09/09/23 1555)  Body mass index is 61.5 kg/m².  Body surface area is 3.17 meters squared.    I/O last 3 completed shifts:  In: 1262.2 [P.O.:120; I.V.:900; NG/GT:100; IV Piggyback:142.2]  Out: 450 [Urine:450]    Physical Exam  Constitutional:       Appearance: Normal appearance. He is obese.   HENT:      Head: Normocephalic and atraumatic.   Eyes:      General: No scleral icterus.     Extraocular Movements: Extraocular movements intact.      Pupils: Pupils are equal, round, and reactive to light.   Cardiovascular:      Rate and Rhythm: Normal rate. Rhythm irregular.   Pulmonary:      Effort: Pulmonary effort is normal.      Breath sounds: Normal breath sounds.   Musculoskeletal:      Right lower leg: Edema present.      Left lower leg: Edema present.      Comments: Generalized edema.   Skin:     General: Skin is warm and dry.   Neurological:      General: No focal deficit present.      Mental Status: He is alert and oriented to person, place, and time.   Psychiatric:         Mood and Affect: Mood normal.         Behavior: Behavior normal.         Significant Labs:  BMP:   Recent Labs   Lab 09/11/23  1038   GLU 79      K 3.4*   *   CO2 20*   BUN 64*   CREATININE 2.8*   CALCIUM 7.5*   MG 2.3       CMP:   Recent Labs   Lab 09/09/23  9291  09/10/23  0801 09/11/23  1038   GLU 63*  63*   < > 79   CALCIUM 7.7*  7.7*   < > 7.5*   ALBUMIN 1.7*  1.7*  --   --    PROT 5.5*  --   --      142   < > 143   K 5.9*  5.9*   < > 3.4*   CO2 17*  17*   < > 20*   *  111*   < > 112*   BUN 66*  66*   < > 64*   CREATININE 2.9*  2.9*   < > 2.8*   ALKPHOS 69  --   --    ALT 16  --   --    AST 26  --   --    BILITOT 0.3  --   --     < > = values in this interval not displayed.       All labs within the past 24 hours have been reviewed.    Significant Imaging:  Labs: Reviewed      Assessment/Plan:     Active Diagnoses:    Diagnosis Date Noted POA    PRINCIPAL PROBLEM:  Septic shock [A41.9, R65.21] 12/03/2021 Yes    UTI (urinary tract infection) [N39.0] 09/10/2023 Unknown    Moderate malnutrition [E44.0] 09/09/2023 Yes    Dystrophic nail [L60.3] 09/08/2023 Yes    Diverticulitis [K57.92] 09/02/2023 Yes    BETY (acute kidney injury) [N17.9] 09/02/2023 Yes    Chronic obstructive pulmonary disease with acute exacerbation [J44.1] 03/08/2023 Yes    Atrial fibrillation [I48.91] 03/01/2023 Yes    Physical debility [R53.81] 05/26/2022 Yes    Venous stasis dermatitis of both lower extremities [I87.2] 09/29/2017 Yes     Chronic    Chronic acquired lymphedema [I89.0] 12/30/2016 Yes     Chronic    Sleep apnea [G47.30] 04/08/2016 Yes     Chronic    Morbid obesity with BMI of 60.0-69.9, adult [E66.01, Z68.44] 12/19/2014 Not Applicable     Chronic    Hypothyroidism (acquired) [E03.9] 12/19/2014 Yes      Problems Resolved During this Admission:       1. Acute kidney injury:  Serum creatinine has remained essentially stable from yesterday at 2.8.  His ureter was full this morning during my visit.  Unfortunately I's and O's have not been monitor.  Will reorder.    2. Hypotension:  Blood pressure continues to run on the low side.  He is on oral midodrine.  There have been several times over the past 24-48 hours where his mean arterial pressure fell below 60.  There may also be  a component of ischemic ATN.    3. CKD 2: Baseline creatinine runs between about 0.8 and 1.3.  He has had persistent proteinuria on urinalysis for the past several years.    4. Generalized edema:  Has only 1 g protein by PC ratio.  Not consistent with nephrosis.    Approximately 40 minutes was spent in patient examination, chart review and coordination care with other providers.    Thank you for your consult.     Дмитрий Pelaez MD  Nephrology  [facility] - Intensive Care ([facility])

## 2023-09-11 NOTE — ASSESSMENT & PLAN NOTE
Chronic  Podiatry, wound care f/u     ordered wound care, US arterial LE- did not show acute findings/ hemodynamic sign stenosis; podiatry eval and stated- No need for nail avulsion in-patient as there are no signs of acute infection and currently on Eliquis, recommended nail trim/debridement in out patient setting in clinic or home visit with NP managing his at home care;  Echo as of 9/5-     Left Ventricle: The left ventricle is normal in size. Mildly increased wall thickness. There is concentric remodeling. Normal wall motion. Septal motion is normal. There is normal systolic function with a visually estimated ejection fraction of 55 - 70%. There is normal diastolic function.    Left Atrium: Left atrium is mildly dilated.    Right Ventricle: Mild right ventricular enlargement. Wall thickness is normal. Right ventricle wall motion  is normal. Systolic function is normal.    Aortic Valve: The aortic valve is a trileaflet valve. There is moderate aortic valve sclerosis.    Tricuspid Valve: There is moderate regurgitation with a centrally directed jet.    Pulmonary Artery: The estimated pulmonary artery systolic pressure is 57 mmHg.

## 2023-09-11 NOTE — ASSESSMENT & PLAN NOTE
Nutrition consulted. Most recent weight and BMI monitored-     Measurements:  Wt Readings from Last 1 Encounters:   09/09/23 (!) 200 kg (440 lb 14.7 oz)   Body mass index is 61.5 kg/m².    Patient has been screened and assessed by RD.    Malnutrition Type:  Context: chronic illness  Level: moderate    Malnutrition Characteristic Summary:  Energy Intake (Malnutrition): less than 75% for greater than or equal to 1 month  Fluid Accumulation (Malnutrition): moderate to severe (3-4+ lymphadema)    Interventions/Recommendations (treatment strategy):  1)Change diet to renal, low fiber 2) Add Suplena BID and Jm BID 3) total feed-encourage PO intakes 40 If PO intakes not improving to consistent > 505 of meals and supplements in < 3-4 days consider starting supplemental nutrition support 6) treat hypoglycemia

## 2023-09-11 NOTE — ASSESSMENT & PLAN NOTE
Noncompliant with NIPPV, refuses to wear   Prefers Ventimask   Noncompliant with home oxygen   Compliance encouraged with understanding verbalized

## 2023-09-11 NOTE — PROGRESS NOTES
[facility]  Progress Note    Patient Name: Gene Rothman  MRN: [id number]  Patient Class: IP- Inpatient   Admission Date: 9/2/2023  Length of Stay: 9 days  Attending Physician: Jenna Carrillo MD  Primary Care Provider: Ami Zarate DO        Subjective:     Principal Problem:Septic shock        HPI:  Mr Rothman is a 78 y/o morbidly obese man with COPD with OHS/DENISHA (noncompliant with PAP therapies), chronic hypoxic respiratory failure (4L, prefers to use simple mask), Afib on Eliquis, pHTN, diastolic CHF, RV dysfxn, and chronic lymphedema who presented to the ER today due to abdominal pain that started last night.  He reports diffuse abdominal pain and tenderness.  Deneis N/V, diarrhea, dyspnea, cough, sputum, F/C, sick contacts, chest pain, change in bowel/bladder.       Work-up in the ER showed mild distal descending colon diverticulitis, LA 2.8, WBC 16.33, Cr 1.8 (b/l around 1.0), Bicarb 38.  He was given Vanc/Zosyn.  Required initiation of Levo due to hypotension after he recieved his 30cc/kg of fluids.  He is being admitted to the ICU for septic shock, presumably with diverticulitis/ UTI as the source.         Overview/Hospital Course:  Mr Rothman is a 78 y/o morbidly obese man with COPD with OHS/DENISHA (noncompliant with PAP therapies), chronic hypoxic respiratory failure (4L, prefers to use simple mask), Afib on Eliquis, pHTN, diastolic CHF, RV dysfxn, and chronic lymphedema who presented to the ER ton 9/2 due to abdominal pain, got admitted to ICU for septic shock likely secondary to diverticulitis/UTI, has been started on Levophed 9/3, vasopressin added to Levo to stabilize blood pressure.  On 09/04, vaso discontinued, continued on Levophed, midodrine added; patient off of Levophed, continued on midodrine 15 mg t.i.d., blood pressure stabilizing;   Also on arrival, patient has been started on vanc, Zosyn, subsequently vanc discontinued, on 09/05, antibiotics changed from  Zosyn to Rocephin based on culture results.    Has been on Ventimask per patient's choice.      On 09/08, patient deemed stable for downgrade per ICU, hospital medicine consulted to assume care.    Continue antibiotics, midodrine tapering, IV fluids.    Noted to have elevated creatinine, decreased urine output, nephrology on board.    Acute on chronic paronychia, chronic lymphedema, ordered wound care, US arterial LE- did not show acute findings/ hemodynamic sign stenosis; podiatry eval and stated- No need for nail avulsion in-patient as there are no signs of acute infection and currently on Eliquis, recommended nail trim/debridement in out patient setting in clinic or home visit with NP managing his at home care;  Echo as of 9/5-     Left Ventricle: The left ventricle is normal in size. Mildly increased wall thickness. There is concentric remodeling. Normal wall motion. Septal motion is normal. There is normal systolic function with a visually estimated ejection fraction of 55 - 70%. There is normal diastolic function.    Left Atrium: Left atrium is mildly dilated.    Right Ventricle: Mild right ventricular enlargement. Wall thickness is normal. Right ventricle wall motion  is normal. Systolic function is normal.    Aortic Valve: The aortic valve is a trileaflet valve. There is moderate aortic valve sclerosis.    Tricuspid Valve: There is moderate regurgitation with a centrally directed jet.    Pulmonary Artery: The estimated pulmonary artery systolic pressure is 57 mmHg.      Per discussion with wife at bedside, patient chronically bed-bound for past 2 years due to general deterioration in condition.  Does not follow up with doctors outside, sometimes attempts virtual hospital follow up, stated that he gets refills from NP visit at home; non compliant with NIPPV at home, ? Compliance with meds/ home oxygen;   Had palliative evaluation during recent hospitalizations in 7/2023- prefers to continue full code,  discussed again today, patient/wife at bedside expressed to continue full code;       On 9/9- patient lying comfortably, not in distress.  Hemodynamically stable.  Stated that he does not want to answer questions.  Oliguric with approximately 100-200 cc urine output over past 24 hours, pending repeat labs this morning.  Nephrology on board.    9/10- patient resting comfortably in bed, not in acute distress.  Currently saturating about 92 on 4 L nasal cannula, oriented to self, place, refuses to answer questions.    Noted to have slight improvement in urine output, creatinine remained stable at 2.9 as yesterday.  We will continue to monitor improvement in renal function, follow up with Nephrology.   -On 9/11/23, BP improved with Midodrine and IV antibiotics continued. Kidney function mildly improved. Nephrology following.                Interval History: pt in bed upon exam and with decreased breath sounds bibasilar secondary to habitus. Pt reports intermittent shortness of breath with exertion. H/H trending down. K replaced. BUN 64/Cr 2.8. Calcium corrected to 8.9.     Review of Systems   Constitutional:  Positive for activity change. Negative for appetite change and chills.   HENT:  Negative for congestion, postnasal drip, sore throat and trouble swallowing.    Respiratory:  Positive for shortness of breath. Negative for cough.    Cardiovascular:  Positive for leg swelling.   Gastrointestinal:  Negative for abdominal distention, abdominal pain, nausea and vomiting.   Genitourinary:  Negative for difficulty urinating, frequency and urgency.   Musculoskeletal:  Positive for gait problem and myalgias. Negative for arthralgias and back pain.   Skin:  Positive for color change and wound.   Neurological:  Positive for weakness. Negative for dizziness and headaches.   Psychiatric/Behavioral:  Negative for agitation and sleep disturbance.      Objective:     Vital Signs (Most Recent):  Temp: 98.6 °F (37 °C) (09/11/23  1535)  Pulse: 63 (09/11/23 1535)  Resp: 18 (09/11/23 1535)  BP: (!) 113/51 (09/11/23 1535)  SpO2: 98 % (09/11/23 1535) Vital Signs (24h Range):  Temp:  [97.6 °F (36.4 °C)-98.6 °F (37 °C)] 98.6 °F (37 °C)  Pulse:  [50-75] 63  Resp:  [18-22] 18  SpO2:  [92 %-100 %] 98 %  BP: ()/(35-63) 113/51     Weight: (!) 200 kg (440 lb 14.7 oz)  Body mass index is 61.5 kg/m².    Intake/Output Summary (Last 24 hours) at 9/11/2023 1638  Last data filed at 9/10/2023 1803  Gross per 24 hour   Intake 965.24 ml   Output 100 ml   Net 865.24 ml         Physical Exam  Constitutional:       Appearance: He is obese. He is ill-appearing.   HENT:      Nose: Nose normal.   Cardiovascular:      Rate and Rhythm: Normal rate and regular rhythm.      Pulses: Normal pulses.      Heart sounds: Normal heart sounds.   Pulmonary:      Breath sounds: Examination of the right-lower field reveals decreased breath sounds. Examination of the left-lower field reveals decreased breath sounds. Decreased breath sounds present.   Abdominal:      General: Bowel sounds are normal. There is no distension.      Palpations: Abdomen is soft.      Tenderness: There is no abdominal tenderness.   Genitourinary:     Comments: May catheter and Flexiseal   Musculoskeletal:         General: Swelling and tenderness present.      Cervical back: Normal range of motion.   Skin:     Comments: Discoloration and excoriation noted to BLE   Neurological:      Mental Status: He is alert and oriented to person, place, and time.   Psychiatric:         Mood and Affect: Mood normal.         Behavior: Behavior normal.             Significant Labs: All pertinent labs within the past 24 hours have been reviewed.  CBC:   Recent Labs   Lab 09/10/23  0801 09/11/23  1038   WBC 11.66 9.57   HGB 8.8* 8.1*   HCT 28.7* 26.7*    237     CMP:   Recent Labs   Lab 09/10/23  0801 09/11/23  1038    143   K 4.0 3.4*    112*   CO2 20* 20*   GLU 66* 79   BUN 66* 64*   CREATININE  2.9* 2.8*   CALCIUM 7.6* 7.5*   ANIONGAP 12 11       Significant Imaging: I have reviewed all pertinent imaging results/findings within the past 24 hours.      Assessment/Plan:      * Septic shock  Likely secondary to diverticulitis/UTI   started on Levophed 9/3, vasopressin added to Levo to stabilize blood pressure.    On 09/04, vaso discontinued, continued on Levophed, midodrine added; patient off of Levophed, continued on midodrine 15 mg t.i.d., blood pressure stabilizing; taper midodrine as tolerated         UTI (urinary tract infection)  UC as of 9/2 positive for E coli   -UA on 9/8/23 consistent with infectious process    antibiotics continued    Blood culture x2 negative to date         Moderate malnutrition  Nutrition consulted. Most recent weight and BMI monitored-     Measurements:  Wt Readings from Last 1 Encounters:   09/09/23 (!) 200 kg (440 lb 14.7 oz)   Body mass index is 61.5 kg/m².    Patient has been screened and assessed by RD.    Malnutrition Type:  Context: chronic illness  Level: moderate    Malnutrition Characteristic Summary:  Energy Intake (Malnutrition): less than 75% for greater than or equal to 1 month  Fluid Accumulation (Malnutrition): moderate to severe (3-4+ lymphadema)    Interventions/Recommendations (treatment strategy):  1)Change diet to renal, low fiber 2) Add Suplena BID and Jm BID 3) total feed-encourage PO intakes 40 If PO intakes not improving to consistent > 505 of meals and supplements in < 3-4 days consider starting supplemental nutrition support 6) treat hypoglycemia    Dystrophic nail  - Podiatry consulted   -No signs of acute cellulitis, abscess, paronychia. No need for nail avulsion in-patient as there are no signs of acute infection and currently on Eliquis per Podiatry   -outpatient follow up recommended     BETY (acute kidney injury)  Known h/o CKD stage 2 with baseline Cr 0.8 to 1.3  BETY multifactorial likely secondary to intravascular volume depletion/  UTI/shock- ?  ATN   Creatinine during hospital stay has trended up from 1.7-2.8  Continue IV fluids, avoid nephrotoxins, nephrology on board   Renal dose meds   Monitor urine output  -Nephrology consulted      Diverticulitis  Presented with abdominal pain, CT findings on arrival suggestive of mild diverticulitis   Received empiric antibiotics   Currently denied acute pain/nausea, vomiting  -antiemetics as needed         Chronic obstructive pulmonary disease with acute exacerbation  Currently not in acute exacerbation, continue bronchodilators p.r.n., target SpO2 more than 88%      Atrial fibrillation  Currently rate control   Continue current plan, continue Eliquis       Physical debility  Chronic  PT/OT- pt is bed boud at baseline and will discharge back to home       Venous stasis dermatitis of both lower extremities  Chronic  -US LE showed   -Podiatry/ wound care  -PT/OT- pt to discharge to home and bed bound at baseline     Chronic acquired lymphedema  Chronic  Podiatry, wound care f/u     ordered wound care, US arterial LE- did not show acute findings/ hemodynamic sign stenosis; podiatry eval and stated- No need for nail avulsion in-patient as there are no signs of acute infection and currently on Eliquis, recommended nail trim/debridement in out patient setting in clinic or home visit with NP managing his at home care;  Echo as of 9/5-     Left Ventricle: The left ventricle is normal in size. Mildly increased wall thickness. There is concentric remodeling. Normal wall motion. Septal motion is normal. There is normal systolic function with a visually estimated ejection fraction of 55 - 70%. There is normal diastolic function.    Left Atrium: Left atrium is mildly dilated.    Right Ventricle: Mild right ventricular enlargement. Wall thickness is normal. Right ventricle wall motion  is normal. Systolic function is normal.    Aortic Valve: The aortic valve is a trileaflet valve. There is moderate aortic valve  sclerosis.    Tricuspid Valve: There is moderate regurgitation with a centrally directed jet.    Pulmonary Artery: The estimated pulmonary artery systolic pressure is 57 mmHg.                Sleep apnea  Noncompliant with NIPPV, refuses to wear   Prefers Ventimask   Noncompliant with home oxygen   Compliance encouraged with understanding verbalized           Morbid obesity with BMI of 60.0-69.9, adult  Body mass index is 61.5 kg/m². Morbid obesity complicates all aspects of disease management from diagnostic modalities to treatment. Weight loss encouraged and health benefits explained to patient.   Bed-bound for past 2 years   Poor cooperation/effort   Questionable compliance        Hypothyroidism (acquired)  Home medication         VTE Risk Mitigation (From admission, onward)         Ordered     apixaban tablet 5 mg  2 times daily         09/02/23 1105     IP VTE HIGH RISK PATIENT  Once         09/02/23 0853     Place sequential compression device  Until discontinued         09/02/23 0853                Discharge Planning   JAMES:      Code Status: Full Code   Is the patient medically ready for discharge?:     Reason for patient still in hospital (select all that apply): Patient trending condition, Laboratory test and Treatment  Discharge Plan A: Home Health                  Linda Cano NP  Department of Hospital Medicine   [facility] Tele[facility] ([facility])

## 2023-09-11 NOTE — ASSESSMENT & PLAN NOTE
Presented with abdominal pain, CT findings on arrival suggestive of mild diverticulitis   Received empiric antibiotics   Currently denied acute pain/nausea, vomiting  -antiemetics as needed

## 2023-09-11 NOTE — SIGNIFICANT EVENT
BP remained low despite 15mg Midodrine. BP 76/41. Pt asymptomatic. No change in assessment. 500ml RL bolus ordered and BP improved to 91/50. Oxygenation remained stable.

## 2023-09-11 NOTE — ASSESSMENT & PLAN NOTE
UC as of 9/2 positive for E coli   -UA on 9/8/23 consistent with infectious process    antibiotics continued    Blood culture x2 negative to date

## 2023-09-11 NOTE — SUBJECTIVE & OBJECTIVE
Interval History: pt in bed upon exam and with decreased breath sounds bibasilar secondary to habitus. Pt reports intermittent shortness of breath with exertion. H/H trending down. K replaced. BUN 64/Cr 2.8. Calcium corrected to 8.9.     Review of Systems   Constitutional:  Positive for activity change. Negative for appetite change and chills.   HENT:  Negative for congestion, postnasal drip, sore throat and trouble swallowing.    Respiratory:  Positive for shortness of breath. Negative for cough.    Cardiovascular:  Positive for leg swelling.   Gastrointestinal:  Negative for abdominal distention, abdominal pain, nausea and vomiting.   Genitourinary:  Negative for difficulty urinating, frequency and urgency.   Musculoskeletal:  Positive for gait problem and myalgias. Negative for arthralgias and back pain.   Skin:  Positive for color change and wound.   Neurological:  Positive for weakness. Negative for dizziness and headaches.   Psychiatric/Behavioral:  Negative for agitation and sleep disturbance.      Objective:     Vital Signs (Most Recent):  Temp: 98.6 °F (37 °C) (09/11/23 1535)  Pulse: 63 (09/11/23 1535)  Resp: 18 (09/11/23 1535)  BP: (!) 113/51 (09/11/23 1535)  SpO2: 98 % (09/11/23 1535) Vital Signs (24h Range):  Temp:  [97.6 °F (36.4 °C)-98.6 °F (37 °C)] 98.6 °F (37 °C)  Pulse:  [50-75] 63  Resp:  [18-22] 18  SpO2:  [92 %-100 %] 98 %  BP: ()/(35-63) 113/51     Weight: (!) 200 kg (440 lb 14.7 oz)  Body mass index is 61.5 kg/m².    Intake/Output Summary (Last 24 hours) at 9/11/2023 1638  Last data filed at 9/10/2023 1803  Gross per 24 hour   Intake 965.24 ml   Output 100 ml   Net 865.24 ml         Physical Exam  Constitutional:       Appearance: He is obese. He is ill-appearing.   HENT:      Nose: Nose normal.   Cardiovascular:      Rate and Rhythm: Normal rate and regular rhythm.      Pulses: Normal pulses.      Heart sounds: Normal heart sounds.   Pulmonary:      Breath sounds: Examination of the  right-lower field reveals decreased breath sounds. Examination of the left-lower field reveals decreased breath sounds. Decreased breath sounds present.   Abdominal:      General: Bowel sounds are normal. There is no distension.      Palpations: Abdomen is soft.      Tenderness: There is no abdominal tenderness.   Genitourinary:     Comments: May catheter and Flexiseal   Musculoskeletal:         General: Swelling and tenderness present.      Cervical back: Normal range of motion.   Skin:     Comments: Discoloration and excoriation noted to BLE   Neurological:      Mental Status: He is alert and oriented to person, place, and time.   Psychiatric:         Mood and Affect: Mood normal.         Behavior: Behavior normal.             Significant Labs: All pertinent labs within the past 24 hours have been reviewed.  CBC:   Recent Labs   Lab 09/10/23  0801 09/11/23  1038   WBC 11.66 9.57   HGB 8.8* 8.1*   HCT 28.7* 26.7*    237     CMP:   Recent Labs   Lab 09/10/23  0801 09/11/23  1038    143   K 4.0 3.4*    112*   CO2 20* 20*   GLU 66* 79   BUN 66* 64*   CREATININE 2.9* 2.8*   CALCIUM 7.6* 7.5*   ANIONGAP 12 11       Significant Imaging: I have reviewed all pertinent imaging results/findings within the past 24 hours.

## 2023-09-11 NOTE — AI DETERIORATION ALERT
Artificial Intelligence Notification  [facility]  [address] [place] [address]  Phone: [phone]    This documentation was triggered by an Artificial Intelligence Notification:    Admit Date: 2023   LOS: 9  Code Status: Full Code  : 1945  Age: 77 y.o.  Weight:   Wt Readings from Last 1 Encounters:   23 (!) 200 kg (440 lb 14.7 oz)        Sex: male  Bed: A232/A232 A  MRN: [id number]  Attending Physician: Jenna Carrillo MD     Date of Alert: 2023  Time AI Alert Received: 746            Vitals:    23 0832   BP: 95/63   Pulse: (!) 59   Resp:    Temp:      SpO2: 98 %      Artificial Intelligence alert discussed with Provider:     Name: Lidna Cano    Date/Time of Provider Notification: 23 0747      Patient Condition: BP of 71/35 reported with history of hypotension noted -IV hydration continued and Midodrine scheduled. Pt stable upon exam with supplemental oxygen in place and no signs of acute distress. Morning medications given and repeat BP of 95/63 obtained. Will continue to monitor and current level of care to be maintained.

## 2023-09-12 NOTE — ASSESSMENT & PLAN NOTE
Currently not in acute exacerbation, continue bronchodilators p.r.n., target SpO2 more than 88%  -respiratory support via supplemental oxygen at home dose of 4 L   -BIPAP support as needed

## 2023-09-12 NOTE — PROGRESS NOTES
[facility], [facility]  Pulmonology Progress Note    Patient Name: Gene Rothman   MRN: [id number]  Admission Date: 9/2/2023   Hospital Length of Stay: 10 days  Code Status: Full Code    Attending Provider: Jenna Carrillo MD  Principal Problem: Septic shock  Subjective:   History of present illness:  Mr Rothman is a 78 y/o morbidly obese man with COPD with OHS/DENISHA (noncompliant with PAP therapies), chronic hypoxic respiratory failure (4L, prefers to use simple mask), Afib on Eliquis, pHTN, diastolic CHF, RV dysfxn, and chronic lymphedema who presented to the ER today due to abdominal pain that started last night.  He reports diffuse abdominal pain and tenderness.  Deneis N/V, diarrhea, dyspnea, cough, sputum, F/C, sick contacts, chest pain, change in bowel/bladder.      Work-up in the ER showed mild distal descending colon diverticulitis, LA 2.8, WBC 16.33, Cr 1.8 (b/l around 1.0), Bicarb 38.  He was given Vanc/Zosyn.  Required initiation of Levo due to hypotension after he recieved his 30cc/kg of fluids.  He is being admitted to the ICU for septic shock, presumably with diverticulitis as the source.    Interval history:   9/2 - admitted to ICU on Levo for septic shock.     9/3 - no acute events.  Remains on Levo.  Abdominal pain improving.   9/4: no new issues or c/o noted, remains on levo and vaso - weaning as able, remains on venti mask per his choice and not NC   9/5: no new issues or c/o overnight, pt awake and alert on venti mask (his choice), weaning pressors s/t pt's mentation given inconsistencies in BP readings given body habitus, adding midodrine   9/6: remains on levo, increasing midodrine, no new issues or c/o on exam this AM, with some indigestion, no family at bedside    9/7: levo requirements decreasing, increasing midodrine to 15mg TID, no issues or n/o on exam, nursing reports pt continues to refuse many parts of his POC (ie: turning, NIPPV, etc.)   9/8: off levo  since 2am, UOP remains low, crt bumped this AM to 2.3 - consulting nephro for assistance, stable for transfer of the ICU   9/12: Patient has been tolerant with nocturnal PAP therapy. Case discussed with primary team and requested evaluation for nocturnal home PAP therapy    Objective:     Vital Signs (Most Recent):  Temp: 97.6 °F (36.4 °C) (09/12/23 1529)  Pulse: 65 (09/12/23 1529)  Resp: 18 (09/12/23 1529)  BP: (!) 81/44 (09/12/23 1529)  SpO2: 97 % (09/12/23 1529) Vital Signs (24h Range):  Temp:  [96.9 °F (36.1 °C)-98 °F (36.7 °C)] 97.6 °F (36.4 °C)  Pulse:  [56-67] 65  Resp:  [16-24] 18  SpO2:  [92 %-99 %] 97 %  BP: ()/(44-53) 81/44   Weight: (!) 200 kg (440 lb 14.7 oz);  Body mass index is 61.5 kg/m².    No intake or output data in the 24 hours ending 09/12/23 1632     Physical Exam    Vitals and nursing note reviewed.   Constitutional:       General: He is not in acute distress.     Appearance: He is obese. He is ill-appearing.   HENT:      Head: Normocephalic.   Eyes:      Conjunctiva/sclera: Conjunctivae normal.   Cardiovascular:      Rate and Rhythm: Normal rate.   Pulmonary:      Breath sounds: No wheezing.      Comments: Diminished throughout  Abdominal:      Palpations: Abdomen is soft.   Musculoskeletal:      Cervical back: Normal range of motion and neck supple.      Right lower leg: Edema present.      Left lower leg: Edema present. Necrotic appearing 3rd and 4th toe  Skin:     General: BLE venous ulcer wounds  Neurological:      Mental Status: Mental status is at baseline. Encephalopathic with mild confusion.  Psychiatric:         Mood and Affect: Mood normal.     Review of Systems: Negative except as indicated in HPI    Significant Labs:  CBC/Anemia Profile:  Recent Labs   Lab 09/11/23  1038 09/12/23  0616 09/12/23  1406   WBC 9.57 10.69  --    HGB 8.1* 7.9* 8.0*   HCT 26.7* 25.9* 26.4*    249  --    * 100*  --    RDW 17.4* 17.7*  --    Chemistries:  Recent Labs   Lab  09/11/23  1038 09/12/23  0628    142   K 3.4* 3.3*   * 113*   CO2 20* 20*   BUN 64* 61*   CREATININE 2.8* 2.5*   CALCIUM 7.5* 7.4*   MG 2.3 2.3     Arterial Blood Gases:  ABG (9/8/2023 at 1636): pH 7.242, pCO2: 55.0, paO2: 95, and HCO3: 23.7  ABG (9/5/2023 at 1307):  PH 7.276, pCO2: 75.3, paO2: 68, and HCO3: 35.0  ABGs in March 2023: pH 7.228, pCO2 123.5, paO2 123, and HCO3 51.5.   Assessment/Plan:   Chronic respiratory failure with hypoxia and hypercapnia  Chronic obstructive pulmonary disease with acute exacerbation  Functional quadriplegia  Sleep apnea  Patient with acute on chronic hypercapnic and hypoxic respiratory failure who is on chronic home oxygen at 5l/NC. Patient is currently on supplemental oxygen at 4L/NC with nocturnal BiPAP ordered. Signs/symptoms of respiratory failure included increased work of breathing and respiratory distress. Contributing diagnoses included chronic diastolic heart failure, sepsis, COPD, and obesity hypoventilation/DENISHA. Patient has a recent ABG which has been reviewed.     Will treat underlying causes and adjust management of respiratory failure as follows:  · DENISHA/OHS contributes and complicates his multiple co-morbidities  · Continue supplemental oxygen to maintain saturations 88% or greater  · Diurese as needed  · Patient has previously refused nocturnal CPAP/BiPAP. Patient and family currently understanding of the lifesaving need of nocturnal ventilation for this patient and maintain they will use nocturnal PAP  · Tolerating and maintain compliance with nocturnal ventilation while inpatient  Ordering nocturnal volume ventilator. Daytime use to reduce risk of exacerbation.   · Home BiPAP insufficient due to severity of condition. Absence of respiratory support would be life threatening.   · Follow chest imaging  · Monitor for decompensated heart failure and diurese as needed  · COPD not in acute exacerbation  · PRN bronchodilators      Judi Kaur,  NP  Pulmonary and Critical Care  [facility], [facility]

## 2023-09-12 NOTE — ASSESSMENT & PLAN NOTE
Known h/o CKD stage 2 with baseline Cr 0.8 to 1.3  BETY multifactorial likely secondary to intravascular volume depletion/ UTI/shock- ?  ATN   Creatinine during hospital stay has trended up from 1.7-2.5  Continue IV fluids, avoid nephrotoxins, nephrology on board   Renal dose meds   Monitor urine output  -Nephrology consulted

## 2023-09-12 NOTE — PROGRESS NOTES
[facility]  Progress Note    Patient Name: Gene Rothman  MRN: [id number]  Patient Class: IP- Inpatient   Admission Date: 9/2/2023  Length of Stay: 10 days  Attending Physician: Jenna Carrillo MD  Primary Care Provider: Ami Zarate DO        Subjective:     Principal Problem:Septic shock        HPI:  Mr Rothman is a 78 y/o morbidly obese man with COPD with OHS/DENISHA (noncompliant with PAP therapies), chronic hypoxic respiratory failure (4L, prefers to use simple mask), Afib on Eliquis, pHTN, diastolic CHF, RV dysfxn, and chronic lymphedema who presented to the ER today due to abdominal pain that started last night.  He reports diffuse abdominal pain and tenderness.  Deneis N/V, diarrhea, dyspnea, cough, sputum, F/C, sick contacts, chest pain, change in bowel/bladder.       Work-up in the ER showed mild distal descending colon diverticulitis, LA 2.8, WBC 16.33, Cr 1.8 (b/l around 1.0), Bicarb 38.  He was given Vanc/Zosyn.  Required initiation of Levo due to hypotension after he recieved his 30cc/kg of fluids.  He is being admitted to the ICU for septic shock, presumably with diverticulitis/ UTI as the source.         Overview/Hospital Course:  Mr Rothman is a 78 y/o morbidly obese man with COPD with OHS/DENISHA (noncompliant with PAP therapies), chronic hypoxic respiratory failure (4L, prefers to use simple mask), Afib on Eliquis, pHTN, diastolic CHF, RV dysfxn, and chronic lymphedema who presented to the ER ton 9/2 due to abdominal pain, got admitted to ICU for septic shock likely secondary to diverticulitis/UTI, has been started on Levophed 9/3, vasopressin added to Levo to stabilize blood pressure.  On 09/04, vaso discontinued, continued on Levophed, midodrine added; patient off of Levophed, continued on midodrine 15 mg t.i.d., blood pressure stabilizing;   Also on arrival, patient has been started on vanc, Zosyn, subsequently vanc discontinued, on 09/05, antibiotics changed from  Zosyn to Rocephin based on culture results.    Has been on Ventimask per patient's choice.      On 09/08, patient deemed stable for downgrade per ICU, hospital medicine consulted to assume care.    Continue antibiotics, midodrine tapering, IV fluids.    Noted to have elevated creatinine, decreased urine output, nephrology on board.    Acute on chronic paronychia, chronic lymphedema, ordered wound care, US arterial LE- did not show acute findings/ hemodynamic sign stenosis; podiatry eval and stated- No need for nail avulsion in-patient as there are no signs of acute infection and currently on Eliquis, recommended nail trim/debridement in out patient setting in clinic or home visit with NP managing his at home care;  Echo as of 9/5-     Left Ventricle: The left ventricle is normal in size. Mildly increased wall thickness. There is concentric remodeling. Normal wall motion. Septal motion is normal. There is normal systolic function with a visually estimated ejection fraction of 55 - 70%. There is normal diastolic function.    Left Atrium: Left atrium is mildly dilated.    Right Ventricle: Mild right ventricular enlargement. Wall thickness is normal. Right ventricle wall motion  is normal. Systolic function is normal.    Aortic Valve: The aortic valve is a trileaflet valve. There is moderate aortic valve sclerosis.    Tricuspid Valve: There is moderate regurgitation with a centrally directed jet.    Pulmonary Artery: The estimated pulmonary artery systolic pressure is 57 mmHg.      Per discussion with wife at bedside, patient chronically bed-bound for past 2 years due to general deterioration in condition.  Does not follow up with doctors outside, sometimes attempts virtual hospital follow up, stated that he gets refills from NP visit at home; non compliant with NIPPV at home, ? Compliance with meds/ home oxygen;   Had palliative evaluation during recent hospitalizations in 7/2023- prefers to continue full code,  discussed again today, patient/wife at bedside expressed to continue full code;       On 9/9- patient lying comfortably, not in distress.  Hemodynamically stable.  Stated that he does not want to answer questions.  Oliguric with approximately 100-200 cc urine output over past 24 hours, pending repeat labs this morning.  Nephrology on board.    9/10- patient resting comfortably in bed, not in acute distress.  Currently saturating about 92 on 4 L nasal cannula, oriented to self, place, refuses to answer questions.    Noted to have slight improvement in urine output, creatinine remained stable at 2.9 as yesterday.  We will continue to monitor improvement in renal function, follow up with Nephrology.   -On 9/11/23, BP improved with Midodrine and IV antibiotics continued. Kidney function mildly improved. Nephrology following. On 9/12/23, H/H 8/26.4. BIPAP use adjusted as needed and nightly.  BUN/Cr 61/2.5. BP maintained with Midodrine continued. K replaced. Case discussed with Pulmonology and evaluation for nocturnal home PAP therapy discussed. Pt to be discharge to home with home health once appropriate. O2 sats 97%-98% on 4 L NC. Palliative Care consulted and all services declined per pt/wife.                Interval History: pt improving with BIPAP as needed and nightly. H/H declined- will monitor and transfuse as needed. K replaced. BUN/Cr 61/2.5. Social Work consulted to assist with discharge planning and home health. Pulmonology to assist with home ventilator.     Review of Systems   Constitutional:  Positive for activity change. Negative for appetite change and chills.   HENT:  Negative for congestion, postnasal drip, sore throat and trouble swallowing.    Respiratory:  Positive for shortness of breath. Negative for cough.    Cardiovascular:  Positive for leg swelling.   Gastrointestinal:  Negative for abdominal distention, abdominal pain, nausea and vomiting.   Genitourinary:  Negative for difficulty urinating,  frequency and urgency.   Musculoskeletal:  Positive for gait problem and myalgias. Negative for arthralgias and back pain.   Skin:  Positive for color change and wound.   Neurological:  Positive for weakness. Negative for dizziness and headaches.   Psychiatric/Behavioral:  Negative for agitation and sleep disturbance.      Objective:     Vital Signs (Most Recent):  Temp: 97.6 °F (36.4 °C) (09/12/23 1529)  Pulse: 65 (09/12/23 1529)  Resp: 18 (09/12/23 1529)  BP: (!) 81/44 (09/12/23 1529)  SpO2: 97 % (09/12/23 1529) Vital Signs (24h Range):  Temp:  [96.9 °F (36.1 °C)-98 °F (36.7 °C)] 97.6 °F (36.4 °C)  Pulse:  [56-67] 65  Resp:  [16-24] 18  SpO2:  [92 %-99 %] 97 %  BP: ()/(44-53) 81/44     Weight: (!) 200 kg (440 lb 14.7 oz)  Body mass index is 61.5 kg/m².  No intake or output data in the 24 hours ending 09/12/23 1703      Physical Exam  Constitutional:       Appearance: He is obese. He is ill-appearing.   HENT:      Nose: Nose normal.   Cardiovascular:      Rate and Rhythm: Normal rate and regular rhythm.      Pulses: Normal pulses.      Heart sounds: Normal heart sounds.   Pulmonary:      Effort: Accessory muscle usage present.      Breath sounds: Examination of the right-lower field reveals decreased breath sounds. Examination of the left-lower field reveals decreased breath sounds. Decreased breath sounds present.   Abdominal:      General: Bowel sounds are normal. There is no distension.      Palpations: Abdomen is soft.      Tenderness: There is no abdominal tenderness.   Genitourinary:     Comments: May catheter and Flexiseal   Musculoskeletal:         General: Swelling (lymphedema to BLE) and tenderness present.      Cervical back: Normal range of motion.   Skin:     General: Skin is warm and dry.      Comments: Discoloration and excoriation with venous stasis dermatitis noted to BLE/toes    Neurological:      Mental Status: He is alert and oriented to person, place, and time.   Psychiatric:          Mood and Affect: Mood normal.         Behavior: Behavior normal.             Significant Labs: All pertinent labs within the past 24 hours have been reviewed.  CBC:   Recent Labs   Lab 09/11/23  1038 09/12/23  0616 09/12/23  1406   WBC 9.57 10.69  --    HGB 8.1* 7.9* 8.0*   HCT 26.7* 25.9* 26.4*    249  --      CMP:   Recent Labs   Lab 09/11/23  1038 09/12/23  0628    142   K 3.4* 3.3*   * 113*   CO2 20* 20*   GLU 79 83   BUN 64* 61*   CREATININE 2.8* 2.5*   CALCIUM 7.5* 7.4*   ANIONGAP 11 9       Significant Imaging: I have reviewed all pertinent imaging results/findings within the past 24 hours.      Assessment/Plan:      * Septic shock  Likely secondary to diverticulitis/UTI   started on Levophed 9/3, vasopressin added to Levo to stabilize blood pressure.    On 09/04, vaso discontinued, continued on Levophed, midodrine added; patient off of Levophed, continued on midodrine 15 mg t.i.d., blood pressure stabilizing; taper midodrine as tolerated         UTI (urinary tract infection)  UC as of 9/2 positive for E coli   -UA on 9/8/23 consistent with infectious process    antibiotics continued    Blood culture x2 negative to date         Moderate malnutrition  Nutrition consulted. Most recent weight and BMI monitored-     Measurements:  Wt Readings from Last 1 Encounters:   09/09/23 (!) 200 kg (440 lb 14.7 oz)   Body mass index is 61.5 kg/m².    Patient has been screened and assessed by RD.    Malnutrition Type:  Context: chronic illness  Level: moderate    Malnutrition Characteristic Summary:  Energy Intake (Malnutrition): less than 75% for greater than or equal to 1 month  Fluid Accumulation (Malnutrition): moderate to severe (3-4+ lymphadema)    Interventions/Recommendations (treatment strategy):  1)Change diet to renal, low fiber 2) Add Suplena BID and Jm BID 3) total feed-encourage PO intakes 40 If PO intakes not improving to consistent > 505 of meals and supplements in < 3-4 days consider  starting supplemental nutrition support 6) treat hypoglycemia    Dystrophic nail  - Podiatry consulted   -No signs of acute cellulitis, abscess, paronychia. No need for nail avulsion in-patient as there are no signs of acute infection and currently on Eliquis per Podiatry   -outpatient follow up recommended     BETY (acute kidney injury)  Known h/o CKD stage 2 with baseline Cr 0.8 to 1.3  BETY multifactorial likely secondary to intravascular volume depletion/ UTI/shock- ?  ATN   Creatinine during hospital stay has trended up from 1.7-2.5  Continue IV fluids, avoid nephrotoxins, nephrology on board   Renal dose meds   Monitor urine output  -Nephrology consulted      Diverticulitis  Presented with abdominal pain, CT findings on arrival suggestive of mild diverticulitis   Received empiric antibiotics   Currently denied acute pain/nausea, vomiting  -antiemetics as needed         Chronic obstructive pulmonary disease with acute exacerbation  Currently not in acute exacerbation, continue bronchodilators p.r.n., target SpO2 more than 88%  -respiratory support via supplemental oxygen at home dose of 4 L   -BIPAP support as needed       Atrial fibrillation  Currently rate control   Continue current plan, continue Eliquis       Chronic respiratory failure with hypoxia and hypercapnia  Patient with Hypercapnic Respiratory failure which is Acute on chronic.  he is on home oxygen at 4 LPM. Supplemental oxygen was provided and noted- Oxygen Concentration (%):  [36-40] 36    .   Signs/symptoms of respiratory failure include- increased work of breathing. Contributing diagnoses includes - Hypoventilation Syndrome  and increased work of breathing. Labs and images were reviewed. Patient Has recent ABG, which has been reviewed. Will treat underlying causes and adjust management of respiratory failure as follows- BIPAP and supplemental oxygen support - discussed nocturnal PAP therapy with Pulmonology      Physical  debility  Chronic  PT/OT- pt is bed boud at baseline and will discharge back to home   -home health with NP at home to be resumed       Functional quadriplegia  -pt is bed bound x 2 years at home in the setting of morbid obesity       Venous stasis dermatitis of both lower extremities  Chronic  -US LE showed   -Podiatry/ wound care  -PT/OT- pt to discharge to home and bed bound at baseline   -home health and wound care to be established for continued care outpatient    Chronic acquired lymphedema  Chronic  Podiatry, wound care f/u     ordered wound care, US arterial LE- did not show acute findings/ hemodynamic sign stenosis; podiatry eval and stated- No need for nail avulsion in-patient as there are no signs of acute infection and currently on Eliquis, recommended nail trim/debridement in out patient setting in clinic or home visit with NP managing his at home care;  Echo as of 9/5-     Left Ventricle: The left ventricle is normal in size. Mildly increased wall thickness. There is concentric remodeling. Normal wall motion. Septal motion is normal. There is normal systolic function with a visually estimated ejection fraction of 55 - 70%. There is normal diastolic function.    Left Atrium: Left atrium is mildly dilated.    Right Ventricle: Mild right ventricular enlargement. Wall thickness is normal. Right ventricle wall motion  is normal. Systolic function is normal.    Aortic Valve: The aortic valve is a trileaflet valve. There is moderate aortic valve sclerosis.    Tricuspid Valve: There is moderate regurgitation with a centrally directed jet.    Pulmonary Artery: The estimated pulmonary artery systolic pressure is 57 mmHg.                Sleep apnea  Noncompliant with NIPPV, refuses to wear   Prefers Ventimask   Noncompliant with home oxygen   Compliance encouraged with understanding verbalized   - supplemental oxygen at 4 L NC in place- O2 sats 97-98%. BIPAP at bedside and adjusted to be worn as needed and  nightly   -Case discussed with Pulmonology- qualification for home ventilator to be attempted         Morbid obesity with BMI of 60.0-69.9, adult  Body mass index is 61.5 kg/m². Morbid obesity complicates all aspects of disease management from diagnostic modalities to treatment. Weight loss encouraged and health benefits explained to patient.   Bed-bound for past 2 years   Poor cooperation/effort   Questionable compliance        Hypothyroidism (acquired)  Home medication         VTE Risk Mitigation (From admission, onward)         Ordered     apixaban tablet 5 mg  2 times daily         09/02/23 1105     IP VTE HIGH RISK PATIENT  Once         09/02/23 0853     Place sequential compression device  Until discontinued         09/02/23 0853                Discharge Planning   JAMES:      Code Status: Full Code   Is the patient medically ready for discharge?:     Reason for patient still in hospital (select all that apply): Patient trending condition, Laboratory test, Treatment and Consult recommendations  Discharge Plan A: Home Health                  Linda Cano NP  Department of Hospital Medicine   [facility] Tele[facility] ([facility])

## 2023-09-12 NOTE — SUBJECTIVE & OBJECTIVE
Mr Rothman is a 78 y/o morbidly obese man with COPD with OHS/DENISHA (noncompliant with PAP therapies), chronic hypoxic respiratory failure (4L, prefers to use simple mask), Afib on Eliquis, pHTN, diastolic CHF, RV dysfxn, and chronic lymphedema who presented to the ER today due to abdominal pain that started last night.  He reports diffuse abdominal pain and tenderness.  Deneis N/V, diarrhea, dyspnea, cough, sputum, F/C, sick contacts, chest pain, change in bowel/bladder.      Work-up in the ER showed mild distal descending colon diverticulitis, LA 2.8, WBC 16.33, Cr 1.8 (b/l around 1.0), Bicarb 38.  He was given Vanc/Zosyn.  Required initiation of Levo due to hypotension after he recieved his 30cc/kg of fluids.  He is being admitted to the ICU for septic shock, presumably with diverticulitis as the source.    Interval history:  9/2 - admitted to ICU on Levo for septic shock.    9/3 - no acute events.  Remains on Levo.  Abdominal pain improving.  9/4: no new issues or c/o noted, remains on levo and vaso - weaning as able, remains on venti mask per his choice and not NC  9/5: no new issues or c/o overnight, pt awake and alert on venti mask (his choice), weaning pressors s/t pt's mentation given inconsistencies in BP readings given body habitus, adding midodrine  9/6: remains on levo, increasing midodrine, no new issues or c/o on exam this AM, with some indigestion, no family at bedside   9/7: levo requirements decreasing, increasing midodrine to 15mg TID, no issues or n/o on exam, nursing reports pt continues to refuse many parts of his POC (ie: turning, NIPPV, etc.)  9/8: off levo since 2am, UOP remains low, crt bumped this AM to 2.3 - consulting nephro for assistance, stable for transfer of the ICU  9/12: Patient has been tolerant with nocturnal PAP therapy. Case discussed with primary team and requested evaluation for nocturnal home PAP therapy    Objective:     Vital Signs (Most Recent):  Temp: 97.6 °F (36.4 °C)  (09/12/23 1529)  Pulse: 65 (09/12/23 1529)  Resp: 18 (09/12/23 1529)  BP: (!) 81/44 (09/12/23 1529)  SpO2: 97 % (09/12/23 1529) Vital Signs (24h Range):  Temp:  [96.9 °F (36.1 °C)-98 °F (36.7 °C)] 97.6 °F (36.4 °C)  Pulse:  [56-67] 65  Resp:  [16-24] 18  SpO2:  [92 %-99 %] 97 %  BP: ()/(44-53) 81/44   Weight: (!) 200 kg (440 lb 14.7 oz);  Body mass index is 61.5 kg/m².    No intake or output data in the 24 hours ending 09/12/23 1632     Physical Exam      Review of Systems: Negative except as indicated in HPI    Significant Labs:  CBC/Anemia Profile:  Recent Labs   Lab 09/11/23  1038 09/12/23  0616 09/12/23  1406   WBC 9.57 10.69  --    HGB 8.1* 7.9* 8.0*   HCT 26.7* 25.9* 26.4*    249  --    * 100*  --    RDW 17.4* 17.7*  --    Chemistries:  Recent Labs   Lab 09/11/23  1038 09/12/23  0628    142   K 3.4* 3.3*   * 113*   CO2 20* 20*   BUN 64* 61*   CREATININE 2.8* 2.5*   CALCIUM 7.5* 7.4*   MG 2.3 2.3

## 2023-09-12 NOTE — ASSESSMENT & PLAN NOTE
Patient with acute on chronic hypercapnic and hypoxic respiratory failure who is on chronic home oxygen at 5l/NC. Patient is currently on supplemental oxygen at 4L/NC with nocturnal BiPAP ordered. Signs/symptoms of respiratory failure included increased work of breathing and respiratory distress. Contributing diagnoses included chronic diastolic heart failure, sepsis, COPD, and obesity hypoventilation/DENISHA. Patient has a recent ABG which has been reviewed.     Will treat underlying causes and adjust management of respiratory failure as follows:  · Continue supplemental oxygen to maintain saturations 88% or greater  · Diurese as able  Ordering nocturnal volume ventilator. Daytime use to reduce risk of exacerbation.   · Home BiPAP insufficient due to severity of condition. Absence of respiratory support would be life threatening.   · Follow chest imaging  · Monitor for decompensated heart failure and diurese as needed

## 2023-09-12 NOTE — ASSESSMENT & PLAN NOTE
- contributing to and complicating the above  - patient has previously refused nocturnal CPAP/BiPAP. Patient and family currently understanding of the lifesaving need of nocturnal ventilation for this patient and maintain they will use nocturnal PAP  - tolerating and maintaining compliance with nocturnal PAP therapy  - Ordering nocturnal volume ventilator. Daytime use to reduce risk of exacerbation.   - Home BiPAP insufficient due to severity of condition. Absence of respiratory support would be life threatening.

## 2023-09-12 NOTE — ASSESSMENT & PLAN NOTE
Chronic  PT/OT- pt is bed boud at baseline and will discharge back to home   -home health with NP at home to be resumed

## 2023-09-12 NOTE — PROGRESS NOTES
"   09/12/23 1013        Altered Skin Integrity 09/05/23 Left lower;lateral Leg Venous Ulcer   Date First Assessed: 09/05/23   Altered Skin Integrity Present on Admission - Did Patient arrive to the hospital with altered skin?: yes  Side: Left  Orientation: lower;lateral  Location: Leg  Primary Wound Type: Venous Ulcer   Dressing Appearance Intact   Drainage Amount Moderate   Drainage Characteristics/Odor Serous   Appearance Red;Pink;Moist   Tissue loss description Partial thickness   Periwound Area Swelling   Wound Length (cm) 1 cm   Wound Width (cm) 1 cm   Wound Depth (cm) 0.1 cm   Wound Volume (cm^3) 0.1 cm^3   Wound Surface Area (cm^2) 1 cm^2   Care Cleansed with:;Sterile normal saline;Applied:;Skin Barrier   Dressing Foam;Changed   Dressing Change Due 09/15/23     F/U visit with Mr. Rothman. He is awake and alert, lying flat and supine on specialty bariatric LO bed, c/o pain to BLE with movement.   Left lateral lower leg venous leg ulcer again noted, wound bed moist and pink. Cleansed with saline and silicone foam drssing applied.   BLE dry flaky scaly skin - cleansed with bath wipes, moisturizer applied. Right lower leg with increased edema, weeping. Abd pad applied over areas of weeping. BLE then wrapped with 6" ACE. Discussed need for compression therapy with patient/wife. Wife reports he had that in the past and it was a bad experience, reports there was a horrible odor and the skin just sloughed off the leg, and she doesn't think he will be willing to try that again. Also discussed at home lyphedema pumps. She report she thinks they have those but arent sure where they are right now. I recommend he f/u with OP lymphedema specialist. Will contoinue to follow.   "

## 2023-09-12 NOTE — ASSESSMENT & PLAN NOTE
Chronic  -[facility] showed   -Podiatry/ wound care  -PT/OT- pt to discharge to home and bed bound at baseline   -home health and wound care to be established for continued care outpatient

## 2023-09-12 NOTE — ASSESSMENT & PLAN NOTE
Patient with Hypercapnic Respiratory failure which is Acute on chronic.  he is on home oxygen at 4 LPM. Supplemental oxygen was provided and noted- Oxygen Concentration (%):  [36-40] 36    .   Signs/symptoms of respiratory failure include- increased work of breathing. Contributing diagnoses includes - Hypoventilation Syndrome  and increased work of breathing. Labs and images were reviewed. Patient Has recent ABG, which has been reviewed. Will treat underlying causes and adjust management of respiratory failure as follows- BIPAP and supplemental oxygen support - discussed nocturnal PAP therapy with Pulmonology

## 2023-09-12 NOTE — ASSESSMENT & PLAN NOTE
Noncompliant with NIPPV, refuses to wear   Prefers Ventimask   Noncompliant with home oxygen   Compliance encouraged with understanding verbalized   - supplemental oxygen at 4 L NC in place- O2 sats 97-98%. BIPAP at bedside and adjusted to be worn as needed and nightly   -Case discussed with Pulmonology- qualification for home ventilator to be attempted

## 2023-09-12 NOTE — CONSULTS
[facility] - Intensive Care (Hospital)  Nephrology  Consult Note    Patient Name: Gene Rothman  MRN: [id number]  Admission Date: 9/2/2023  Hospital Length of Stay: 10 days  Attending Provider: Jenna Carrillo MD   Primary Care Physician: Ami Zarate DO  Principal Problem:Septic shock    Consults  Subjective:     HPI:  77-year-old male admitted on 09/02/2023 with abdominal pain secondary to diverticulitis complicated by septic shock.  Noted to have mildly elevated creatinine over baseline.  Nephrology has been consulted for evaluation.  Patient was seen in the intensive care unit.  His in bed resting comfortably.  No acute distress noted.  No complaints this morning.    Chart reviewed.  He does not have history of chronic kidney disease however he does have persistent proteinuria on UAs for the past several years.    09/09/2023:  Patient was step-down from ICU to the floor yesterday.  Seen in his hospital room.  In bed resting comfortably.  No acute distress noted.    09/10/2023: Patient was seen in his hospital room.  In bed resting comfortably.  No acute distress noted.  His wife was at the bedside.  All nephrology related questions were answered to their satisfaction.    09/11/2023: Patient was seen in his hospital room.  In bed resting comfortably.  No acute distress noted.    09/12/2023:  Patient was seen in his hospital room.  In bed resting comfortably.  No acute distress noted.    Past Medical History:   Diagnosis Date    Acute hypoxemic respiratory failure 9/17/2022    Arthritis     Atrial fibrillation 3/1/2023    CHF (congestive heart failure)     Chronic kidney disease     Coronary artery disease     Depression     Hypertension     Hypertensive heart disease with heart failure 9/16/2022    Hypothyroidism     Lymphedema of lower extremity     Nephrolithiasis     Obesity     Peripheral vascular disease     Pneumonia 9/17/2022    Recurrent cellulitis of lower leg     Sleep apnea     can't stand the CPAP , sleeps  elevated in hospital bed or chair    Tobacco dependence     resolved       Past Surgical History:   Procedure Laterality Date    ADENOIDECTOMY      BACK SURGERY  ;  1976    x2 for disc; scar tissue removed    debridement of bilateral leg ulcers Bilateral 2017    Dr Hernandez    INNER EAR SURGERY Bilateral     separate - pinnaplasty , new eardrms constructed    KIDNEY STONE SURGERY Left  approx    open    TONSILLECTOMY         Review of patient's allergies indicates:   Allergen Reactions    Sulfamethoxazole-trimethoprim Itching and Shortness Of Breath    Sulfamethoxazole      Current Facility-Administered Medications   Medication Frequency    0.9%  NaCl infusion Continuous    acetaminophen tablet 650 mg Q6H PRN    apixaban tablet 5 mg BID    aspirin chewable tablet 81 mg Daily    atorvastatin tablet 40 mg Daily    cefTRIAXone (ROCEPHIN) 1 g in dextrose 5 % in water (D5W) 100 mL IVPB (MB+) Q24H    dextrose 10% bolus 125 mL 125 mL PRN    dextrose 10% bolus 250 mL 250 mL PRN    famotidine tablet 20 mg Daily    glucagon (human recombinant) injection 1 mg PRN    glucose chewable tablet 16 g PRN    glucose chewable tablet 24 g PRN    HYDROcodone-acetaminophen 5-325 mg per tablet 1 tablet Q6H PRN    insulin aspart U-100 pen 0-5 Units QID (AC + HS) PRN    levothyroxine tablet 50 mcg Before breakfast    midodrine tablet 15 mg TID    ondansetron injection 4 mg Q6H PRN    sodium chloride 0.9% flush 10 mL PRN     Family History       Problem Relation (Age of Onset)    Alcohol abuse Maternal Grandfather    Alzheimer's disease Brother    Asthma Daughter    Cancer Mother, Father, Brother    Diabetes Mother    Heart disease Father, Brother    Hypertension Mother          Tobacco Use    Smoking status: Former     Current packs/day: 0.00     Average packs/day: 1.5 packs/day for 1 year (1.5 ttl pk-yrs)     Types: Cigarettes     Start date:      Quit date:      Years since quittin.7    Smokeless  tobacco: Never   Substance and Sexual Activity    Alcohol use: Not Currently    Drug use: Never    Sexual activity: Never     Review of Systems   Constitutional: Negative.    HENT: Negative.     Respiratory: Negative.     Cardiovascular:  Positive for leg swelling.   Gastrointestinal: Negative.    Genitourinary: Negative.    Musculoskeletal: Negative.    Skin: Negative.      Objective:     Vital Signs (Most Recent):  Temp: 97.7 °F (36.5 °C) (09/12/23 0748)  Pulse: 66 (09/12/23 0800)  Resp: 16 (09/12/23 0800)  BP: (!) 111/50 (09/12/23 0748)  SpO2: 98 % (09/12/23 0800) Vital Signs (24h Range):  Temp:  [96.9 °F (36.1 °C)-98.6 °F (37 °C)] 97.7 °F (36.5 °C)  Pulse:  [54-67] 66  Resp:  [16-24] 16  SpO2:  [92 %-100 %] 98 %  BP: ()/(48-53) 111/50     Weight: (!) 200 kg (440 lb 14.7 oz) (09/09/23 1555)  Body mass index is 61.5 kg/m².  Body surface area is 3.17 meters squared.    No intake/output data recorded.    Physical Exam  Constitutional:       Appearance: Normal appearance. He is obese.   HENT:      Head: Normocephalic and atraumatic.   Eyes:      General: No scleral icterus.     Extraocular Movements: Extraocular movements intact.      Pupils: Pupils are equal, round, and reactive to light.   Cardiovascular:      Rate and Rhythm: Normal rate. Rhythm irregular.   Pulmonary:      Effort: Pulmonary effort is normal.      Breath sounds: Normal breath sounds.   Musculoskeletal:      Right lower leg: Edema present.      Left lower leg: Edema present.      Comments: Generalized edema.   Skin:     General: Skin is warm and dry.   Neurological:      General: No focal deficit present.      Mental Status: He is alert and oriented to person, place, and time.   Psychiatric:         Mood and Affect: Mood normal.         Behavior: Behavior normal.         Significant Labs:  BMP:   Recent Labs   Lab 09/12/23  0628   GLU 83      K 3.3*   *   CO2 20*   BUN 61*   CREATININE 2.5*   CALCIUM 7.4*   MG 2.3       CMP:    Recent Labs   Lab 09/09/23  1458 09/10/23  0801 09/12/23  0628   GLU 63*  63*   < > 83   CALCIUM 7.7*  7.7*   < > 7.4*   ALBUMIN 1.7*  1.7*  --   --    PROT 5.5*  --   --      142   < > 142   K 5.9*  5.9*   < > 3.3*   CO2 17*  17*   < > 20*   *  111*   < > 113*   BUN 66*  66*   < > 61*   CREATININE 2.9*  2.9*   < > 2.5*   ALKPHOS 69  --   --    ALT 16  --   --    AST 26  --   --    BILITOT 0.3  --   --     < > = values in this interval not displayed.       All labs within the past 24 hours have been reviewed.    Significant Imaging:  Labs: Reviewed      Assessment/Plan:     Active Diagnoses:    Diagnosis Date Noted POA    PRINCIPAL PROBLEM:  Septic shock [A41.9, R65.21] 12/03/2021 Yes    UTI (urinary tract infection) [N39.0] 09/10/2023 Unknown    Moderate malnutrition [E44.0] 09/09/2023 Yes    Dystrophic nail [L60.3] 09/08/2023 Yes    Diverticulitis [K57.92] 09/02/2023 Yes    BETY (acute kidney injury) [N17.9] 09/02/2023 Yes    Chronic obstructive pulmonary disease with acute exacerbation [J44.1] 03/08/2023 Yes    Atrial fibrillation [I48.91] 03/01/2023 Yes    Physical debility [R53.81] 05/26/2022 Yes    Venous stasis dermatitis of both lower extremities [I87.2] 09/29/2017 Yes     Chronic    Chronic acquired lymphedema [I89.0] 12/30/2016 Yes     Chronic    Sleep apnea [G47.30] 04/08/2016 Yes     Chronic    Morbid obesity with BMI of 60.0-69.9, adult [E66.01, Z68.44] 12/19/2014 Not Applicable     Chronic    Hypothyroidism (acquired) [E03.9] 12/19/2014 Yes      Problems Resolved During this Admission:       1. Acute kidney injury:  Serum creatinine has has improved to 2.5 from 2.8.  Unfortunately I's and O's are still not being recorded.  Will reorder them today.    2. Hypotension:  Blood pressure continues to run on the low side.  He is on oral midodrine.  There have been several times over the past 24-48 hours where his mean arterial pressure fell below 60.  There may also be a component of  ischemic ATN.    3. CKD 2: Baseline creatinine runs between about 0.8 and 1.3.  He has had persistent proteinuria on urinalysis for the past several years.    4. Generalized edema:  Has only 1 g protein by PC ratio.  Not consistent with nephrosis.    Approximately 40 minutes was spent in patient examination, chart review and coordination care with other providers.    Thank you for your consult.     Дмитрий Pelaez MD  Nephrology  [facility] - Intensive Care ([facility])

## 2023-09-12 NOTE — CONSULTS
"Advance Care Planning   [facility] - Telemetry ([facility])  Palliative Care       Patient Name: Gene Rothman  MRN: [id number]  Admission Date: 9/2/2023  Hospital Length of Stay: 10 days  Code Status: Full Code   Attending Provider: Jenna Carrillo MD  Palliative Care Provider: Tanja Ceja NP  Primary Care Physician: Ami Zarate DO  Principal Problem:Septic shock    Met with pt along with Neha Wei RN-CHPN per PM consult. Pt is known the PM from previous admission. Upon entering the room and introducing ourselves, pt's wife stated she knew who we were and did not want to discuss anything with us as their willingness to discuss goals of care and opinion on hospice has not changed. Pt's wife became tearful recalling when her son was placed on hospice. Support offered and reassurance was given that we were not here to change their minds, only to see how we can best support them. RICKEY Solomon did express concern that pt has declined further since our last visit with him and prepared pt's wife that other providers may continue to have these difficult conversation with them. Attempted to engage pt's wife further in conversation, but she responded "you don't know me at all" and "I'm not crazy". Pt did not participate as he was drowsy and mumbling in his sleep during visit. We discussed the possibility of being consulted again if pt continues to decline. However, pt's wife made it clear she did not want to speak with us so future PM consults would not be beneficial.       ISHAN Diaz, [facility]  Palliative Medicine  [phone]    "

## 2023-09-12 NOTE — PLAN OF CARE
Problem: Adult Inpatient Plan of Care  Goal: Plan of Care Review  Outcome: Ongoing, Progressing  Goal: Patient-Specific Goal (Individualized)  Outcome: Ongoing, Progressing  Goal: Absence of Hospital-Acquired Illness or Injury  Outcome: Ongoing, Progressing  Goal: Optimal Comfort and Wellbeing  Outcome: Ongoing, Progressing  Goal: Readiness for Transition of Care  Outcome: Ongoing, Progressing     Problem: Impaired Wound Healing  Goal: Optimal Wound Healing  Outcome: Ongoing, Progressing     Problem: Skin Injury Risk Increased  Goal: Skin Health and Integrity  Outcome: Ongoing, Progressing     Problem: Glycemic Control Impaired (Sepsis/Septic Shock)  Goal: Blood Glucose Level Within Desired Range  Outcome: Ongoing, Progressing     Problem: Fall Injury Risk  Goal: Absence of Fall and Fall-Related Injury  Outcome: Ongoing, Progressing

## 2023-09-12 NOTE — CARE UPDATE
"Advance Care Planning   [facility] - Telemetry ([facility])  Palliative Care RN      Patient Name: Gene Rothman  MRN: [id number]  Admission Date: 2023  Hospital Length of Stay: 10 days  Code Status: Full Code   Attending Provider: Jenna Carrillo MD  Palliative Care Provider: DANIEL Canseco  Primary Care Physician: Ami Zarate DO  Principal Problem:Septic shock    Accompanied Dusty Alfaro MSW, LCSW-BACS to bedside to speak with patient and wife Emy. Patient was lying awake, supine in bed. When asked direct questions, patient seemed confused and answers were nonsensical (asked to raise bed, then lower bed, then raise bed, mumbled responses). Directed our conversation to patient's wife Emy due to patient's inability to participate. Patient and wife are known to us from previous admission  during which patient's GOC were for all life-prolonging medical treatment no matter the burden to extend his life. During previous encounter patient stated he wanted healthcare providers to "jump on his chest" if he suffered cardiopulmonary arrest. Upon introduction, Mrs. Rothman stated, "And I don't want to talk to you. There is nothing additional you can help with". We explained we respect those wishes, and she allowed us to review patient's GOC, which have not changed. Mrs. Quevedo is aware that things are progressing with worsening renal function and hypotension and we discussed what this means in regard to overall condition and concern that more difficult decisions and discussions are on the horizon. Mrs. Quevedo became tearful and stated they have lost two sons, reviewing the experience they had with one of their sons who she admitted was dying in the hospital and not long after signing consents for hospice to home he  in the hospital. This is very impactful on the decision for patient to remain a full code. Mrs. Quevedo expressed that this conversation was depressing, and we allowed silence while she processed " "our concerns of what is to come. When asked what her thoughts were, she stated, "You don't know me. I'm not crazy though". We thanked her for her time and she declined any additional needs. We did prepare Mrs. Rothman that there will be conversations in the future discussing these difficult topics.     GOC are for all life-prolonging medical treatment no matter the burden with goal to extend patient's life.    It is important that these discussion are ongoing amongst patient's care team; however, unless patient and or/family are open to palliative support, I do not feel it will be therapeutic to re-consult in the future.       Neha Wei RN-PN, Palliative Medicine   [phone]   "

## 2023-09-12 NOTE — ASSESSMENT & PLAN NOTE
- Podiatry consulted   -No signs of acute cellulitis, abscess, paronychia. No need for nail avulsion in-patient as there are no signs of acute infection and currently on Eliquis per Podiatry   -outpatient follow up recommended

## 2023-09-12 NOTE — SUBJECTIVE & OBJECTIVE
Interval History: pt improving with BIPAP as needed and nightly. H/H declined- will monitor and transfuse as needed. K replaced. BUN/Cr 61/2.5. Social Work consulted to assist with discharge planning and home health. Pulmonology to assist with home ventilator.     Review of Systems   Constitutional:  Positive for activity change. Negative for appetite change and chills.   HENT:  Negative for congestion, postnasal drip, sore throat and trouble swallowing.    Respiratory:  Positive for shortness of breath. Negative for cough.    Cardiovascular:  Positive for leg swelling.   Gastrointestinal:  Negative for abdominal distention, abdominal pain, nausea and vomiting.   Genitourinary:  Negative for difficulty urinating, frequency and urgency.   Musculoskeletal:  Positive for gait problem and myalgias. Negative for arthralgias and back pain.   Skin:  Positive for color change and wound.   Neurological:  Positive for weakness. Negative for dizziness and headaches.   Psychiatric/Behavioral:  Negative for agitation and sleep disturbance.      Objective:     Vital Signs (Most Recent):  Temp: 97.6 °F (36.4 °C) (09/12/23 1529)  Pulse: 65 (09/12/23 1529)  Resp: 18 (09/12/23 1529)  BP: (!) 81/44 (09/12/23 1529)  SpO2: 97 % (09/12/23 1529) Vital Signs (24h Range):  Temp:  [96.9 °F (36.1 °C)-98 °F (36.7 °C)] 97.6 °F (36.4 °C)  Pulse:  [56-67] 65  Resp:  [16-24] 18  SpO2:  [92 %-99 %] 97 %  BP: ()/(44-53) 81/44     Weight: (!) 200 kg (440 lb 14.7 oz)  Body mass index is 61.5 kg/m².  No intake or output data in the 24 hours ending 09/12/23 1703      Physical Exam  Constitutional:       Appearance: He is obese. He is ill-appearing.   HENT:      Nose: Nose normal.   Cardiovascular:      Rate and Rhythm: Normal rate and regular rhythm.      Pulses: Normal pulses.      Heart sounds: Normal heart sounds.   Pulmonary:      Effort: Accessory muscle usage present.      Breath sounds: Examination of the right-lower field reveals decreased  breath sounds. Examination of the left-lower field reveals decreased breath sounds. Decreased breath sounds present.   Abdominal:      General: Bowel sounds are normal. There is no distension.      Palpations: Abdomen is soft.      Tenderness: There is no abdominal tenderness.   Genitourinary:     Comments: May catheter and Flexiseal   Musculoskeletal:         General: Swelling (lymphedema to BLE) and tenderness present.      Cervical back: Normal range of motion.   Skin:     General: Skin is warm and dry.      Comments: Discoloration and excoriation with venous stasis dermatitis noted to BLE/toes    Neurological:      Mental Status: He is alert and oriented to person, place, and time.   Psychiatric:         Mood and Affect: Mood normal.         Behavior: Behavior normal.             Significant Labs: All pertinent labs within the past 24 hours have been reviewed.  CBC:   Recent Labs   Lab 09/11/23  1038 09/12/23  0616 09/12/23  1406   WBC 9.57 10.69  --    HGB 8.1* 7.9* 8.0*   HCT 26.7* 25.9* 26.4*    249  --      CMP:   Recent Labs   Lab 09/11/23  1038 09/12/23  0628    142   K 3.4* 3.3*   * 113*   CO2 20* 20*   GLU 79 83   BUN 64* 61*   CREATININE 2.8* 2.5*   CALCIUM 7.5* 7.4*   ANIONGAP 11 9       Significant Imaging: I have reviewed all pertinent imaging results/findings within the past 24 hours.

## 2023-09-12 NOTE — ASSESSMENT & PLAN NOTE
- affecting medical decision making and complicating the above   - patient would benefit greatly from weight loss and lifestyle modifications

## 2023-09-13 NOTE — DISCHARGE SUMMARY
[facility] - Telemetry ([facility] Medicine  Discharge Summary      Patient Name: Gene Rothman  MRN: [id number]  [facility]: [id number]  Patient Class: IP- Inpatient  Admission Date: 9/2/2023  Hospital Length of Stay: 12 days  Discharge Date and Time: 9/14/2023  6:34 PM  Attending Physician: Dr. Jenna Carrillo  Discharging Provider: Linda Cano NP  Primary Care Provider: Ami Zarate DO    Primary Care Team: [facility] MEDICINE A    HPI:   Mr Rothman is a 78 y/o morbidly obese man with COPD with OHS/DENISHA (noncompliant with PAP therapies), chronic hypoxic respiratory failure (4L, prefers to use simple mask), Afib on Eliquis, pHTN, diastolic CHF, RV dysfxn, and chronic lymphedema who presented to the ER today due to abdominal pain that started last night.  He reports diffuse abdominal pain and tenderness.  Deneis N/V, diarrhea, dyspnea, cough, sputum, F/C, sick contacts, chest pain, change in bowel/bladder.       Work-up in the ER showed mild distal descending colon diverticulitis, LA 2.8, WBC 16.33, Cr 1.8 (b/l around 1.0), Bicarb 38.  He was given Vanc/Zosyn.  Required initiation of Levo due to hypotension after he recieved his 30cc/kg of fluids.  He is being admitted to the ICU for septic shock, presumably with diverticulitis/ UTI as the source.         * No surgery found *      Hospital Course:   Mr Rothman is a 78 y/o morbidly obese man with COPD with OHS/DENISHA (noncompliant with PAP therapies), chronic hypoxic respiratory failure (4L, prefers to use simple mask), Afib on Eliquis, pHTN, diastolic CHF, RV dysfxn, and chronic lymphedema who presented to the ER ton 9/2 due to abdominal pain, got admitted to ICU for septic shock likely secondary to diverticulitis/UTI, has been started on Levophed 9/3, vasopressin added to Levo to stabilize blood pressure.  On 09/04, vaso discontinued, continued on Levophed, midodrine added; patient off of Levophed, continued on midodrine 15 mg t.i.d., blood pressure stabilizing. Also on  arrival, patient has been started on vanc, Zosyn, subsequently vanc discontinued, on 09/05, antibiotics changed from Zosyn to Rocephin based on culture results.    Has been on Ventimask per patient's choice.  On 09/08, patient deemed stable for downgrade per ICU, hospital medicine consulted to assume care.    Continue antibiotics, midodrine tapering, IV fluids.    Noted to have elevated creatinine, decreased urine output, nephrology on board.    Acute on chronic paronychia, chronic lymphedema, ordered wound care, US arterial LE- did not show acute findings/ hemodynamic sign stenosis; podiatry eval and stated- No need for nail avulsion in-patient as there are no signs of acute infection and currently on Eliquis, recommended nail trim/debridement in out patient setting in clinic or home visit with NP managing his at home care;  Echo as of 9/5-     Left Ventricle: The left ventricle is normal in size. Mildly increased wall thickness. There is concentric remodeling. Normal wall motion. Septal motion is normal. There is normal systolic function with a visually estimated ejection fraction of 55 - 70%. There is normal diastolic function.    Left Atrium: Left atrium is mildly dilated.    Right Ventricle: Mild right ventricular enlargement. Wall thickness is normal. Right ventricle wall motion  is normal. Systolic function is normal.    Aortic Valve: The aortic valve is a trileaflet valve. There is moderate aortic valve sclerosis.    Tricuspid Valve: There is moderate regurgitation with a centrally directed jet.    Pulmonary Artery: The estimated pulmonary artery systolic pressure is 57 mmHg.    Per discussion with wife at bedside, patient chronically bed-bound for past 2 years due to general deterioration in condition.  Does not follow up with doctors outside, sometimes attempts virtual hospital follow up, stated that he gets refills from NP visit at home; non compliant with NIPPV at home, ? Compliance with meds/ home  oxygen;   Had palliative evaluation during recent hospitalizations in 7/2023- prefers to continue full code, discussed again today, patient/wife at bedside expressed to continue full code; On 9/9- patient lying comfortably, not in distress.  Hemodynamically stable.  Stated that he does not want to answer questions.  Oliguric with approximately 100-200 cc urine output over past 24 hours, pending repeat labs this morning.  Nephrology on board. On 9/10- patient resting comfortably in bed, not in acute distress.  Currently saturating about 92 on 4 L nasal cannula, oriented to self, place, refuses to answer questions.  Noted to have slight improvement in urine output, creatinine remained stable at 2.9 as yesterday.  We will continue to monitor improvement in renal function, follow up with Nephrology.   -On 9/11/23, BP improved with Midodrine and IV antibiotics continued. Kidney function mildly improved. Nephrology following. On 9/12/23, H/H 8/26.4. BIPAP use adjusted as needed and nightly.  BUN/Cr 61/2.5. BP maintained with Midodrine continued. K replaced. Case discussed with Pulmonology and evaluation for nocturnal home PAP therapy discussed. Pt to be discharge to home with home health once appropriate. O2 sats 97%-98% on 4 L NC. Palliative Care consulted and all services declined per pt/wife. Poor prognosis discussed with wife and understanding verbalized. Repeat KUB showed mild gaseous distention of the transverse and sigmoid colon.  No radiographic evidence of bowel obstruction. On 9/13/23, pt refusing cardiac monitor and BIPAP overnight. Pt/wife counseled on importance with cardiac monitor and BIPAP replaced. ABG performed with CO2 54.6 (hx of chronic elevation). O2 sats of 94%-99% maintained on 4L- BIPAP when sleeping. Cr increased from 2.5 to 2.9. Case discussed with Nephrology with repeat lab analysis in am. Discharge anticipated on tomorrow pending stability. Case management to assist with discharge planning,  home health, wound care, and home ventilator system. On 9/14/23, Cr trending down with patient evaluated by Nephrology and cleared for discharge with Florinef given. Bradycardia noted to monitor. Pt asymptomatic with bradycardia chronic in nature per chart review. Cardiology consulted and patient evaluated with outpatient Cards/EP follow up recommended. Pt has intermittent hypotension on Midodrine and Florinef for BP support. Home ventilator for PAP therapy delivered to bedside prior to discharge for respiratory support due to hypoventilation syndrome. Pt has chronic illness compounded by physical debility due to morbid obesity with bed bound status x 2 years noted. Pt expressed desire for discharge to home. Home health with wound care ordered. Pt seen and examined and transferred to home via EMS. Pt/family instructed to follow up with PCP, Cardiology, Pulmonology, Podiatry, and Nephrology upon discharge for further evaluation.        Goals of Care Treatment Preferences:  Code Status: Full Code          What is most important right now is to focus on extending life as long as possible, even it it means sacrificing quality, curative/life-prolongation (regardless of treatment burdens).  Accordingly, we have decided that the best plan to meet the patient's goals includes continuing with treatment.      Consults:   Consults (From admission, onward)        Status Ordering Provider     Inpatient consult to Cardiology  Once        Provider:  Henry Pimentel MD    Completed RADHA LAINEZ     Inpatient consult to Social Work  Once        Provider:  (Not yet assigned)    Completed JUAN RETANA     Inpatient consult to Social Work/Case Management  Once        Provider:  (Not yet assigned)    Completed RADHA LAINEZ     Inpatient consult to Palliative Care  Once        Provider:  (Not yet assigned)    Completed RADHA LAINEZ     Inpatient consult to Podiatry  Once        Provider:  (Not yet assigned)    Completed PATRICA SEGURA  JASSON DE LA TORRE     Inpatient consult to Nephrology  Once        Provider:  (Not yet assigned)    ISABELLE Alves          Final Active Diagnoses:    Diagnosis Date Noted POA    PRINCIPAL PROBLEM:  Septic shock [A41.9, R65.21] 12/03/2021 Yes    UTI (urinary tract infection) [N39.0] 09/10/2023 Unknown    Moderate malnutrition [E44.0] 09/09/2023 Yes    Dystrophic nail [L60.3] 09/08/2023 Yes    Diverticulitis [K57.92] 09/02/2023 Yes    BETY (acute kidney injury) [N17.9] 09/02/2023 Yes    Chronic obstructive pulmonary disease with acute exacerbation [J44.1] 03/08/2023 Yes    Atrial fibrillation [I48.91] 03/01/2023 Yes    Chronic respiratory failure with hypoxia and hypercapnia [J96.11, J96.12] 01/10/2023 Yes    Physical debility [R53.81] 05/26/2022 Yes    Functional quadriplegia [R53.2] 01/02/2022 Yes    Venous stasis dermatitis of both lower extremities [I87.2] 09/29/2017 Yes     Chronic    Chronic acquired lymphedema [I89.0] 12/30/2016 Yes     Chronic    Sleep apnea [G47.30] 04/08/2016 Yes     Chronic    Morbid obesity with BMI of 60.0-69.9, adult [E66.01, Z68.44] 12/19/2014 Not Applicable     Chronic    Hypothyroidism (acquired) [E03.9] 12/19/2014 Yes      Problems Resolved During this Admission:       Discharged Condition: stable    Disposition: Home-Health Care [facility]    Follow Up:   Follow-up Information     Ami Zarate DO Follow up in 1 week(s).    Specialty: Internal Medicine  Why: -hospital follow up  Contact information:  [address] [place] [address] [phone]             Juan Antonio Hernandez MD Follow up in 2 week(s).    Specialties: Critical Care Medicine, Pulmonary Disease  Why: -hospital follow up  Contact information:  [address]  [place] [address] [phone]             Дмитрий Pelaez MD Follow up in 2 week(s).    Specialty: Nephrology  Why: -hospital follow up and repeat kidney function  Contact information:  [address]  [address]  [facility] NEPHROLOGY  [facility] [address]  [phone]             Karo Forrester DPM Follow up in 1 month(s).    Specialty: Podiatry  Why: -hospital follow up  Contact information:  [address]  [place] [address]  [phone]             Didier Dodson MD Follow up in 2 week(s).    Specialties: Interventional Cardiology, Cardiology  Why: -hospital follow up and as needed  Contact information:  [address]  [place] [address]  [phone]             Cam العلي MD Follow up in 2 week(s).    Specialties: Electrophysiology, Cardiology  Why: -hospital follow up  Contact information:  [address] [facility] [address]  [place] [address]  [phone]                       Patient Instructions:      VENTILATOR FOR HOME USE     Order Specific Question Answer Comments   Height: 5' 11" (1.803 m)    Weight: 200 kg (440 lb 14.7 oz)    Does patient have medical equipment at home? hospital bed TRAPEZE. pt has but does not use RW, rollator, or w/c / TRAPEZE. pt has but does not use RW, rollator, or w/c / TRAPEZE. pt has but does not use RW, rollator, or w/c   Does patient have medical equipment at home? oxygen    Does patient have medical equipment at home? other (see comments)    Length of need (1-99 months): 99    Type (): Non-invasive    Interface needed: Nasal mask    Mode: AVAPS    PEEP - EPAP 10.0 CM/H20    Humidifier needed? Yes      Ambulatory referral/consult to Home Health   Standing Status: Future   Referral Priority: Routine Referral Type: Home Health   Referral Reason: Specialty Services Required   Requested Specialty: Home Health Services   Number of Visits Requested: 1     Ambulatory referral/consult to Physical/Occupational Therapy   Standing Status: Future   Referral Priority: Routine Referral Type: Physical Medicine   Referral Reason: Specialty Services Required   Number of Visits Requested: 1     Diet renal   Order Comments: Renal - Low sodium- Low fiber "     Notify your health care provider if you experience any of the following:  temperature >100.4     Notify your health care provider if you experience any of the following:  persistent nausea and vomiting or diarrhea     Notify your health care provider if you experience any of the following:  increased confusion or weakness     Notify your health care provider if you experience any of the following:  persistent dizziness, light-headedness, or visual disturbances     Notify your health care provider if you experience any of the following:  severe persistent headache     Notify your health care provider if you experience any of the following:  difficulty breathing or increased cough     Notify your health care provider if you experience any of the following:  severe uncontrolled pain     Notify your health care provider if you experience any of the following:  redness, tenderness, or signs of infection (pain, swelling, redness, odor or green/yellow discharge around incision site)     Activity as tolerated       Significant Diagnostic Studies: Labs: All labs within the past 24 hours have been reviewed    Pending Diagnostic Studies:     None         Medications:  Reconciled Home Medications:      Medication List      START taking these medications    fludrocortisone 0.1 mg Tab  Commonly known as: FLORINEF  Take 1 tablet (100 mcg total) by mouth 2 (two) times daily.     metronidazole 1% 1 % Gel  Commonly known as: METROGEL  Apply topically once daily.     miconazole nitrate 2% 2 % Oint  Commonly known as: MICOTIN  Apply topically 2 (two) times daily.     midodrine 5 MG Tab  Commonly known as: PROAMATINE  Take 3 tablets (15 mg total) by mouth 3 (three) times daily.        CHANGE how you take these medications    STOOL SOFTENER-LAXATIVE 8.6-50 mg per tablet  Generic drug: senna-docusate 8.6-50 mg  Take 1 tablet by mouth daily as needed for Constipation.  What changed:   · when to take this  · reasons to take this         CONTINUE taking these medications    acetaminophen 500 MG tablet  Commonly known as: TYLENOL  Take 500 mg by mouth every 6 (six) hours as needed for Pain.     apixaban 5 mg Tab  Commonly known as: ELIQUIS  Take 1 tablet (5 mg total) by mouth 2 (two) times daily.     aspirin 81 MG Chew  Take 1 tablet (81 mg total) by mouth once daily.     atorvastatin 40 MG tablet  Commonly known as: LIPITOR  Take 1 tablet (40 mg total) by mouth once daily.     furosemide 40 MG tablet  Commonly known as: LASIX  Take 1 tablet (40 mg total) by mouth once daily.     levalbuterol 1.25 mg/3 mL nebulizer solution  Commonly known as: XOPENEX  Take 3 mLs (1.25 mg total) by nebulization every 4 (four) hours as needed for Wheezing. Rescue     levothyroxine 50 MCG tablet  Commonly known as: SYNTHROID  Take 1 tablet (50 mcg total) by mouth before breakfast.     nebulizer and compressor Diana  Commonly known as: HOME NEBULIZER PLUS SIDESTREAM  use as directed     tiotropium 18 mcg inhalation capsule  Commonly known as: SPIRIVA  Inhale 1 capsule (18 mcg total) into the lungs once daily. Controller        STOP taking these medications    cefadroxil 500 MG Cap  Commonly known as: DURICEF     mupirocin 2 % ointment  Commonly known as: BACTROBAN            Indwelling Lines/Drains at time of discharge:   Lines/Drains/Airways     Peripherally Inserted Central Catheter Line  Duration           PICC Double Lumen 09/02/23 0800 right brachial 11 days          Drain  Duration                Urethral Catheter 09/02/23 0900 Silicone 16 Fr. 11 days         Rectal Tube 09/02/23 1430 rectal tube w/ balloon (indicate number of mLs) 10 days                Time spent on the discharge of patient: > 45 minutes         Linda Cano NP  Department of Hospital Medicine  [facility] - Telemetry ([facility])

## 2023-09-13 NOTE — PLAN OF CARE
Nutrition recommendation 9/13:  1. Recommend modify pt diet to Renal, Low fiber, Soft and bite-sized (IDDSI Level 6) diet   2. Recommend Suplena TID to assist filling nutritional gaps   3. Recommend continue Jm BID x 2 weeks for wound healing   4. Feeding assistance for all meals and snacks   5. Encourage PO and supplement intake   6. If PO intakes not improving to consistent > 50% of meals and supplements in < 3-4 days consider starting supplemental nutrition support PPN if medically able D 5 AA 4.25 @ 85 ml/hr + standard lipids ( 1192 kcal, 86 g protein)  7. Daily weights   8. Collaboration of care with medical providers     Goals:   1.  PO intakes > 50% of meals and supplements at f/u  2. Pt will consume Jm BID prior to RD follow up     Rena Kwong, Registration Eligible, Provisional LDN

## 2023-09-13 NOTE — ASSESSMENT & PLAN NOTE
Currently not in acute exacerbation, continue bronchodilators p.r.n., target SpO2 more than 88%  -respiratory support via supplemental oxygen at home dose of 4 L   -BIPAP support when sleeping and nightly with compliance encouraged

## 2023-09-13 NOTE — PROGRESS NOTES
[facility] Medicine  Progress Note    Patient Name: Gene Rothman  MRN: [id number]  Patient Class: IP- Inpatient   Admission Date: 9/2/2023  Length of Stay: 11 days  Attending Physician: Jenna Carrillo MD  Primary Care Provider: Ami Zarate DO        Subjective:     Principal Problem:Septic shock        HPI:  Mr Rothman is a 76 y/o morbidly obese man with COPD with OHS/DENISHA (noncompliant with PAP therapies), chronic hypoxic respiratory failure (4L, prefers to use simple mask), Afib on Eliquis, pHTN, diastolic CHF, RV dysfxn, and chronic lymphedema who presented to the ER today due to abdominal pain that started last night.  He reports diffuse abdominal pain and tenderness.  Deneis N/V, diarrhea, dyspnea, cough, sputum, F/C, sick contacts, chest pain, change in bowel/bladder.       Work-up in the ER showed mild distal descending colon diverticulitis, LA 2.8, WBC 16.33, Cr 1.8 (b/l around 1.0), Bicarb 38.  He was given Vanc/Zosyn.  Required initiation of Levo due to hypotension after he recieved his 30cc/kg of fluids.  He is being admitted to the ICU for septic shock, presumably with diverticulitis/ UTI as the source.         Overview/Hospital Course:  Mr Rothman is a 76 y/o morbidly obese man with COPD with OHS/DENISHA (noncompliant with PAP therapies), chronic hypoxic respiratory failure (4L, prefers to use simple mask), Afib on Eliquis, pHTN, diastolic CHF, RV dysfxn, and chronic lymphedema who presented to the ER ton 9/2 due to abdominal pain, got admitted to ICU for septic shock likely secondary to diverticulitis/UTI, has been started on Levophed 9/3, vasopressin added to Levo to stabilize blood pressure.  On 09/04, vaso discontinued, continued on Levophed, midodrine added; patient off of Levophed, continued on midodrine 15 mg t.i.d., blood pressure stabilizing;   Also on arrival, patient has been started on vanc, Zosyn, subsequently vanc discontinued, on 09/05, antibiotics changed from  Zosyn to Rocephin based on culture results.    Has been on Ventimask per patient's choice.      On 09/08, patient deemed stable for downgrade per ICU, hospital medicine consulted to assume care.    Continue antibiotics, midodrine tapering, IV fluids.    Noted to have elevated creatinine, decreased urine output, nephrology on board.    Acute on chronic paronychia, chronic lymphedema, ordered wound care, US arterial LE- did not show acute findings/ hemodynamic sign stenosis; podiatry eval and stated- No need for nail avulsion in-patient as there are no signs of acute infection and currently on Eliquis, recommended nail trim/debridement in out patient setting in clinic or home visit with NP managing his at home care;  Echo as of 9/5-     Left Ventricle: The left ventricle is normal in size. Mildly increased wall thickness. There is concentric remodeling. Normal wall motion. Septal motion is normal. There is normal systolic function with a visually estimated ejection fraction of 55 - 70%. There is normal diastolic function.    Left Atrium: Left atrium is mildly dilated.    Right Ventricle: Mild right ventricular enlargement. Wall thickness is normal. Right ventricle wall motion  is normal. Systolic function is normal.    Aortic Valve: The aortic valve is a trileaflet valve. There is moderate aortic valve sclerosis.    Tricuspid Valve: There is moderate regurgitation with a centrally directed jet.    Pulmonary Artery: The estimated pulmonary artery systolic pressure is 57 mmHg.      Per discussion with wife at bedside, patient chronically bed-bound for past 2 years due to general deterioration in condition.  Does not follow up with doctors outside, sometimes attempts virtual hospital follow up, stated that he gets refills from NP visit at home; non compliant with NIPPV at home, ? Compliance with meds/ home oxygen;   Had palliative evaluation during recent hospitalizations in 7/2023- prefers to continue full code,  discussed again today, patient/wife at bedside expressed to continue full code;       On 9/9- patient lying comfortably, not in distress.  Hemodynamically stable.  Stated that he does not want to answer questions.  Oliguric with approximately 100-200 cc urine output over past 24 hours, pending repeat labs this morning.  Nephrology on board.    9/10- patient resting comfortably in bed, not in acute distress.  Currently saturating about 92 on 4 L nasal cannula, oriented to self, place, refuses to answer questions.    Noted to have slight improvement in urine output, creatinine remained stable at 2.9 as yesterday.  We will continue to monitor improvement in renal function, follow up with Nephrology.   -On 9/11/23, BP improved with Midodrine and IV antibiotics continued. Kidney function mildly improved. Nephrology following. On 9/12/23, H/H 8/26.4. BIPAP use adjusted as needed and nightly.  BUN/Cr 61/2.5. BP maintained with Midodrine continued. K replaced. Case discussed with Pulmonology and evaluation for nocturnal home PAP therapy discussed. Pt to be discharge to home with home health once appropriate. O2 sats 97%-98% on 4 L NC. Palliative Care consulted and all services declined per pt/wife. On 9/13/23, pt refusing cardiac monitor and BIPAP overnight. Pt/wife counseled on importance with cardiac monitor and BIPAP replaced. ABG performed with CO2 54.6 (hx of chronic elevation). O2 sats of 94%-99% maintained on 4L- BIPAP when sleeping. Cr increased from 2.5 to 2.9. Case discussed with Nephrology with repeat lab analysis in am. Discharge anticipated on tomorrow pending stability. Case management to assist with discharge planning, home health, wound care, and home ventilator system.                Interval History: pt in bed with BIPAP and cardiac monitor in place post compliance counseling. Cr. 2.5>2.9. H/H stable. Case discussed with Nephrology with discharge likely tomorrow pending  Home Health orders placed.      Review of Systems   Constitutional:  Positive for activity change. Negative for appetite change and chills.   HENT:  Negative for congestion, postnasal drip, sore throat and trouble swallowing.    Respiratory:  Positive for shortness of breath. Negative for cough.    Cardiovascular:  Positive for leg swelling (lymphedema).   Gastrointestinal:  Negative for abdominal distention, abdominal pain, nausea and vomiting.   Genitourinary:  Negative for difficulty urinating, frequency and urgency.   Musculoskeletal:  Positive for gait problem (bed bound) and myalgias. Negative for arthralgias and back pain.   Skin:  Positive for color change and wound.   Neurological:  Positive for weakness (bed bound). Negative for dizziness and headaches.   Psychiatric/Behavioral:  Negative for agitation and sleep disturbance.      Objective:     Vital Signs (Most Recent):  Temp: 97.6 °F (36.4 °C) (09/13/23 1202)  Pulse: 63 (09/13/23 1202)  Resp: 20 (09/13/23 1202)  BP: 102/64 (09/13/23 1202)  SpO2: 99 % (09/13/23 1202) Vital Signs (24h Range):  Temp:  [97.1 °F (36.2 °C)-98.4 °F (36.9 °C)] 97.6 °F (36.4 °C)  Pulse:  [54-66] 63  Resp:  [16-21] 20  SpO2:  [93 %-99 %] 99 %  BP: ()/(41-64) 102/64     Weight: (!) 200 kg (440 lb 14.7 oz)  Body mass index is 61.5 kg/m².    Intake/Output Summary (Last 24 hours) at 9/13/2023 1317  Last data filed at 9/13/2023 0655  Gross per 24 hour   Intake 466.26 ml   Output 1100 ml   Net -633.74 ml         Physical Exam  Constitutional:       Appearance: He is obese. He is ill-appearing.   HENT:      Nose: Nose normal.   Cardiovascular:      Rate and Rhythm: Normal rate. Rhythm irregular.      Pulses: Normal pulses.      Heart sounds: Normal heart sounds.   Pulmonary:      Effort: Accessory muscle usage present.      Breath sounds: Examination of the right-lower field reveals decreased breath sounds. Examination of the left-lower field reveals decreased breath sounds. Decreased breath sounds present.    Abdominal:      General: Bowel sounds are normal. There is no distension.      Palpations: Abdomen is soft.      Tenderness: There is no abdominal tenderness.   Genitourinary:     Comments: May catheter and Flexiseal   Musculoskeletal:         General: Swelling (lymphedema to BLE) and tenderness present.      Cervical back: Normal range of motion.   Skin:     General: Skin is warm and dry.      Comments: Discoloration and excoriation with venous stasis dermatitis noted to BLE/toes    Neurological:      Mental Status: He is alert and oriented to person, place, and time.   Psychiatric:         Mood and Affect: Mood normal.         Behavior: Behavior normal.             Significant Labs: All pertinent labs within the past 24 hours have been reviewed.  CBC:   Recent Labs   Lab 09/12/23  0616 09/12/23  1406 09/13/23  0700   WBC 10.69  --  11.40   HGB 7.9* 8.0* 8.0*   HCT 25.9* 26.4* 26.3*     --  250     CMP:   Recent Labs   Lab 09/12/23  0628 09/13/23  0700    144   K 3.3* 3.4*   * 115*   CO2 20* 19*   GLU 83 81   BUN 61* 62*   CREATININE 2.5* 2.9*   CALCIUM 7.4* 7.4*   ALBUMIN  --  1.4*   ANIONGAP 9 10     POCT Glucose:   Recent Labs   Lab 09/12/23  2122 09/13/23  0658 09/13/23  1031   POCTGLUCOSE 86 72 90       Significant Imaging: I have reviewed all pertinent imaging results/findings within the past 24 hours.      Assessment/Plan:      * Septic shock  Likely secondary to diverticulitis/UTI   started on Levophed 9/3, vasopressin added to Levo to stabilize blood pressure.    On 09/04, vaso discontinued, continued on Levophed, midodrine added; patient off of Levophed, continued on midodrine 15 mg t.i.d., blood pressure stabilizing; taper midodrine as tolerated         UTI (urinary tract infection)  UC as of 9/2 positive for E coli   -UA on 9/8/23 consistent with infectious process    antibiotics continued    Blood culture x2 negative to date         Moderate malnutrition  Nutrition consulted.  Most recent weight and BMI monitored-     Measurements:  Wt Readings from Last 1 Encounters:   09/09/23 (!) 200 kg (440 lb 14.7 oz)   Body mass index is 61.5 kg/m².    Patient has been screened and assessed by RD.    Malnutrition Type:  Context: chronic illness  Level: moderate    Malnutrition Characteristic Summary:  Energy Intake (Malnutrition): less than 75% for greater than or equal to 1 month  Fluid Accumulation (Malnutrition): moderate to severe (lymphedema)    Interventions/Recommendations (treatment strategy):  1)Change diet to renal, low fiber 2) Add Suplena BID and Jm BID 3) total feed-encourage PO intakes 40 If PO intakes not improving to consistent > 505 of meals and supplements in < 3-4 days consider starting supplemental nutrition support 6) treat hypoglycemia    Dystrophic nail  - Podiatry consulted   -No signs of acute cellulitis, abscess, paronychia. No need for nail avulsion in-patient as there are no signs of acute infection and currently on Eliquis per Podiatry   -outpatient follow up recommended     BETY (acute kidney injury)  Known h/o CKD stage 2 with baseline Cr 0.8 to 1.3  BETY multifactorial likely secondary to intravascular volume depletion/ UTI/shock- ?  ATN   Creatinine during hospital stay has trended up from 1.7-2.9  Continue IV fluids, avoid nephrotoxins, nephrology on board   Renal dose meds   Monitor urine output  -Nephrology consulted      Diverticulitis  Presented with abdominal pain, CT findings on arrival suggestive of mild diverticulitis   Received empiric antibiotics   Currently denied acute pain/nausea, vomiting  -antiemetics as needed         Chronic obstructive pulmonary disease with acute exacerbation  Currently not in acute exacerbation, continue bronchodilators p.r.n., target SpO2 more than 88%  -respiratory support via supplemental oxygen at home dose of 4 L   -BIPAP support when sleeping and nightly with compliance encouraged       Atrial fibrillation  Currently rate  control   Continue current plan, continue Eliquis       Chronic respiratory failure with hypoxia and hypercapnia  Patient with Hypercapnic Respiratory failure which is Acute on chronic.  he is on home oxygen at 4 LPM. Supplemental oxygen was provided and noted- Oxygen Concentration (%):  [36-40] 40    .   Signs/symptoms of respiratory failure include- increased work of breathing. Contributing diagnoses includes - Hypoventilation Syndrome  and increased work of breathing. Labs and images were reviewed. Patient Has recent ABG, which has been reviewed. Will treat underlying causes and adjust management of respiratory failure as follows- BIPAP and supplemental oxygen support - discussed nocturnal PAP therapy with Pulmonology - home PAP therapy in progress     Physical debility  Chronic  PT/OT- pt is bed boud at baseline and will discharge back to home   -home health with NP at home to be resumed       Functional quadriplegia  -pt is bed bound x 2 years at home in the setting of morbid obesity       Venous stasis dermatitis of both lower extremities  Chronic  -US LE showed   -Podiatry/ wound care  -PT/OT- pt to discharge to home and bed bound at baseline   -home health and wound care to be established for continued care outpatient    Chronic acquired lymphedema  Chronic  Podiatry, wound care f/u     ordered wound care, US arterial LE- did not show acute findings/ hemodynamic sign stenosis; podiatry eval and stated- No need for nail avulsion in-patient as there are no signs of acute infection and currently on Eliquis, recommended nail trim/debridement in out patient setting in clinic or home visit with NP managing his at home care;  Echo as of 9/5-     Left Ventricle: The left ventricle is normal in size. Mildly increased wall thickness. There is concentric remodeling. Normal wall motion. Septal motion is normal. There is normal systolic function with a visually estimated ejection fraction of 55 - 70%. There is normal  diastolic function.    Left Atrium: Left atrium is mildly dilated.    Right Ventricle: Mild right ventricular enlargement. Wall thickness is normal. Right ventricle wall motion  is normal. Systolic function is normal.    Aortic Valve: The aortic valve is a trileaflet valve. There is moderate aortic valve sclerosis.    Tricuspid Valve: There is moderate regurgitation with a centrally directed jet.    Pulmonary Artery: The estimated pulmonary artery systolic pressure is 57 mmHg.                Sleep apnea  Noncompliant with NIPPV, refuses to wear   Prefers Ventimask   Noncompliant with home oxygen   Compliance encouraged with understanding verbalized   - supplemental oxygen at 4 L NC in place- O2 sats 97-98%. BIPAP at bedside and adjusted to be worn as needed and nightly   -Case discussed with Pulmonology- qualification for home ventilator in progress         Morbid obesity with BMI of 60.0-69.9, adult  Body mass index is 61.5 kg/m². Morbid obesity complicates all aspects of disease management from diagnostic modalities to treatment. Weight loss encouraged and health benefits explained to patient.   Bed-bound for past 2 years   Poor cooperation/effort   Questionable compliance        Hypothyroidism (acquired)  Home medication         VTE Risk Mitigation (From admission, onward)         Ordered     apixaban tablet 5 mg  2 times daily         09/02/23 1105     IP VTE HIGH RISK PATIENT  Once         09/02/23 0853     Place sequential compression device  Until discontinued         09/02/23 0853                Discharge Planning   JAMES: 9/13/2023     Code Status: Full Code   Is the patient medically ready for discharge?:     Reason for patient still in hospital (select all that apply): Patient trending condition, Laboratory test, Treatment and Consult recommendations  Discharge Plan A: Home Health                  Linda Cano NP  Department of Hospital Medicine   [facility] - Telemetry ([facility])

## 2023-09-13 NOTE — ASSESSMENT & PLAN NOTE
Patient with Hypercapnic Respiratory failure which is Acute on chronic.  he is on home oxygen at 4 LPM. Supplemental oxygen was provided and noted- Oxygen Concentration (%):  [36-40] 40    .   Signs/symptoms of respiratory failure include- increased work of breathing. Contributing diagnoses includes - Hypoventilation Syndrome  and increased work of breathing. Labs and images were reviewed. Patient Has recent ABG, which has been reviewed. Will treat underlying causes and adjust management of respiratory failure as follows- BIPAP and supplemental oxygen support - discussed nocturnal PAP therapy with Pulmonology - home PAP therapy in progress

## 2023-09-13 NOTE — SUBJECTIVE & OBJECTIVE
Interval History: pt in bed with BIPAP and cardiac monitor in place post compliance counseling. Cr. 2.5>2.9. H/H stable. Case discussed with Nephrology with discharge likely tomorrow pending  Home Health orders placed.     Review of Systems   Constitutional:  Positive for activity change. Negative for appetite change and chills.   HENT:  Negative for congestion, postnasal drip, sore throat and trouble swallowing.    Respiratory:  Positive for shortness of breath. Negative for cough.    Cardiovascular:  Positive for leg swelling (lymphedema).   Gastrointestinal:  Negative for abdominal distention, abdominal pain, nausea and vomiting.   Genitourinary:  Negative for difficulty urinating, frequency and urgency.   Musculoskeletal:  Positive for gait problem (bed bound) and myalgias. Negative for arthralgias and back pain.   Skin:  Positive for color change and wound.   Neurological:  Positive for weakness (bed bound). Negative for dizziness and headaches.   Psychiatric/Behavioral:  Negative for agitation and sleep disturbance.      Objective:     Vital Signs (Most Recent):  Temp: 97.6 °F (36.4 °C) (09/13/23 1202)  Pulse: 63 (09/13/23 1202)  Resp: 20 (09/13/23 1202)  BP: 102/64 (09/13/23 1202)  SpO2: 99 % (09/13/23 1202) Vital Signs (24h Range):  Temp:  [97.1 °F (36.2 °C)-98.4 °F (36.9 °C)] 97.6 °F (36.4 °C)  Pulse:  [54-66] 63  Resp:  [16-21] 20  SpO2:  [93 %-99 %] 99 %  BP: ()/(41-64) 102/64     Weight: (!) 200 kg (440 lb 14.7 oz)  Body mass index is 61.5 kg/m².    Intake/Output Summary (Last 24 hours) at 9/13/2023 1317  Last data filed at 9/13/2023 0655  Gross per 24 hour   Intake 466.26 ml   Output 1100 ml   Net -633.74 ml         Physical Exam  Constitutional:       Appearance: He is obese. He is ill-appearing.   HENT:      Nose: Nose normal.   Cardiovascular:      Rate and Rhythm: Normal rate. Rhythm irregular.      Pulses: Normal pulses.      Heart sounds: Normal heart sounds.   Pulmonary:      Effort:  Accessory muscle usage present.      Breath sounds: Examination of the right-lower field reveals decreased breath sounds. Examination of the left-lower field reveals decreased breath sounds. Decreased breath sounds present.   Abdominal:      General: Bowel sounds are normal. There is no distension.      Palpations: Abdomen is soft.      Tenderness: There is no abdominal tenderness.   Genitourinary:     Comments: May catheter and Flexiseal   Musculoskeletal:         General: Swelling (lymphedema to BLE) and tenderness present.      Cervical back: Normal range of motion.   Skin:     General: Skin is warm and dry.      Comments: Discoloration and excoriation with venous stasis dermatitis noted to BLE/toes    Neurological:      Mental Status: He is alert and oriented to person, place, and time.   Psychiatric:         Mood and Affect: Mood normal.         Behavior: Behavior normal.             Significant Labs: All pertinent labs within the past 24 hours have been reviewed.  CBC:   Recent Labs   Lab 09/12/23  0616 09/12/23  1406 09/13/23  0700   WBC 10.69  --  11.40   HGB 7.9* 8.0* 8.0*   HCT 25.9* 26.4* 26.3*     --  250     CMP:   Recent Labs   Lab 09/12/23  0628 09/13/23  0700    144   K 3.3* 3.4*   * 115*   CO2 20* 19*   GLU 83 81   BUN 61* 62*   CREATININE 2.5* 2.9*   CALCIUM 7.4* 7.4*   ALBUMIN  --  1.4*   ANIONGAP 9 10     POCT Glucose:   Recent Labs   Lab 09/12/23  2122 09/13/23  0658 09/13/23  1031   POCTGLUCOSE 86 72 90       Significant Imaging: I have reviewed all pertinent imaging results/findings within the past 24 hours.

## 2023-09-13 NOTE — PROGRESS NOTES
[facility] - Telemetry ([facility])  Adult Nutrition  Progress Note    SUMMARY       Recommendations  1. Recommend modify pt diet to Renal, Low fiber, Soft and bite-sized (IDDSI Level 6) diet   2. Recommend Suplena TID to assist filling nutritional gaps   3. Recommend continue Jm BID x 2 weeks for wound healing   4. Feeding assistance for all meals and snacks   5. Encourage PO and supplement intake   6. If PO intakes not improving to consistent > 50% of meals and supplements in < 3-4 days consider starting supplemental nutrition support PPN if medically able D 5 AA 4.25 @ 85 ml/hr + standard lipids ( 1192 kcal, 86 g protein)  7. Daily weights     Goals:   1.  PO intakes > 50% of meals and supplements at f/u  2. Pt will consume Jm BID prior to RD follow up   Nutrition Goal Status: continues/ new  Communication of RD Recs:  (POC, sticky note)    Assessment and Plan    Moderate malnutrition  Malnutrition Type:  Context: chronic illness  Level: moderate    Related to (etiology):   Decreased appetite, limited ability to perform nutrition related ADLs, diverticulitis, increased needs d/t COPD and wounds    Signs and Symptoms (as evidenced by):   Sacral ulcer and venous leg ulcers    Malnutrition Characteristic Summary:  Energy Intake (Malnutrition): less than 75% for greater than or equal to 1 month  Fluid Accumulation (Malnutrition): moderate to severe (3-4+ lymphadema)      Interventions/Recommendations (treatment strategy):  1. Mineral, Fiber, Soft bite sized food Level six Blue Texture modified diet  2. Commercial beverage  3. Wound healing medical food supplement therapy  4. Feeding assistance  5. Collaboration of care with medical providers     Nutrition Diagnosis Status:   Continues       Possibly severe     Malnutrition Assessment  Malnutrition Context: chronic illness  Malnutrition Level: moderate  Skin (Micronutrient): cyanosis, dry, bruised, wounds unhealed, edema (Chan score = 8 (very high risk)  Teeth  "(Micronutrient):  (missing some)   Micronutrient Evaluation Summary: suspected deficiency, suspected toxicity  Micronutrient Evaluation Comments: check iron/ K and Phos toxicity   Energy Intake (Malnutrition): less than 75% for greater than or equal to 1 month  Fluid Accumulation (Malnutrition): moderate to severe (lymphedema)                         Reason for Assessment    Reason For Assessment: identified at risk by screening criteria  Diagnosis:  (septic shock)  Relevant Medical History: COPD, DENISHA, CHF, lymphadema  Interdisciplinary Rounds: did not attend (remote)    General Information Comments: 78 y/o male admitted with septic shock, BETY, diverticulitis and UTI. K/Phos elevated. Glucose and albumin depleted. Noted to be total feed, poor appetite, refusing meals at times. NFPE to be done by on-site RD at f/u, noted with BMI > 40 kg/m2. No significant wt loss per chart review.    Nutrition Discharge Planning: Renal, Low fiber, Soft and bite-sized (IDDSI Level 6) diet + Feeding assistance + Jm BID x 2 weeks + Suplena TID or alternative means of nutrition if PO intake continues 25%     Follow up:  9/13: RD follow up. Hospital Medicine NP noted that the pt is chronically bed-bound x 2 yrs d/t general deterioration in condition, on 9/12 noted that the pt's kidney function is mildly improved, BiPAP as needed and nightly, K replaced and pt to d/c home with home health, wife denied Palliative Care services. Visited pt at bedside, pt sleeping w/ BiPAP, pt wife present. Pt wife reported that the pt's PO intake is improving but remains at 25%, stated pt consumed juice, fruit and grits, confirmed no N/V, stated she believes he is experiencing diarrhea, stated he does have some difficulties chewing/swallowing certain foods, tolerates soft foods. Discussed protein/calorie benefits of Suplena, stated she believes pt would be receptive to try. Pt wife requested diet information, provided "Chronic Kidney Disease Stages 3-5 " "Nutrition Therapy" and "Low Fiber (8 gram) Nutrition Therapy" per [facility], included RD contact information for any future questions/concerns. Pt wife stated pt needs to loose weight for chronic kidney stone surgery, attached "Weight Loss Diet" nutrition education to pt's discharge papers. Note pt has not received Jm, insured will begin receiving at dinner. Reviewed chart: 25-50% PO intake; LBM 9/12 (loose, liquid, black); Skin: mottled, dusky, cyanotic, maroon/purple, dry, flaky, abrasion, bruised, scab, excoriation; Altered skin: sacral spine partial thickness tissue loss, L lower lateral leg venous ulcer; Chan score: 8 (very high risk); Edema: 4+ severe bilateral foot/ankle/knee/leg, 3+ moderate generalized. Labs, meds, weight reviewed. Weight charted 3/7 398 lbs, 9/9 440 lbs (BMI 61.50, Morbid obesity), +42 lb wt gain x 6 months, edema noted, updated weight for accuracy warranted. No NFPE warranted at this time, pt appeared well nourished, BMI > 60. RD will continue to monitor.     Nutrition Risk Screen    Nutrition Risk Screen: reduced oral intake over the last month    Nutrition/Diet History    Spiritual, Cultural Beliefs, Orthodoxy Practices, Values that Affect Care: no  Food Allergies: NKFA  Factors Affecting Nutritional Intake: decreased appetite, inability to feed self, altered gastrointestinal function    Anthropometrics    Temp: 97.6 °F (36.4 °C)  Height Method: Stated  Height: 5' 11" (180.3 cm)  Height (inches): 71 in  Weight Method: Bed Scale  Weight: (!) 200 kg (440 lb 14.7 oz)  Weight (lb): (!) 440.92 lb  Ideal Body Weight (IBW), Male: 172 lb  % Ideal Body Weight, Male (lb): 256.35 %  BMI (Calculated): 61.5  BMI Grade: greater than 40 - morbid obesity     Wt Readings from Last 15 Encounters:   09/09/23 (!) 200 kg (440 lb 14.7 oz)   08/01/23 (!) 178.6 kg (393 lb 11.9 oz)   07/17/23 (!) 176 kg (388 lb)   03/07/23 (!) 180.7 kg (398 lb 5.9 oz)   02/20/23 (!) 154.7 kg (341 lb)   02/04/23 (!) 186 kg (410 " lb)   12/11/22 (!) 192.5 kg (424 lb 6.4 oz)   09/19/22 (!) 188.2 kg (415 lb)   08/21/22 (!) 195 kg (429 lb 14.4 oz)   06/13/22 (!) 218.7 kg (482 lb 2.3 oz)   01/05/22 (!) 181.9 kg (401 lb 0.3 oz)   12/19/21 (!) 194.4 kg (428 lb 9.2 oz)   12/09/21 (!) 202.3 kg (446 lb)   08/10/21 (!) 208.7 kg (460 lb)   10/13/17 (!) 198.8 kg (438 lb 4.4 oz)     Lab/Procedures/Meds    Pertinent Labs Reviewed: reviewed  BMP  Lab Results   Component Value Date     09/13/2023    K 3.4 (L) 09/13/2023     (H) 09/13/2023    CO2 19 (L) 09/13/2023    BUN 62 (H) 09/13/2023    CREATININE 2.9 (H) 09/13/2023    CALCIUM 7.4 (L) 09/13/2023    ANIONGAP 10 09/13/2023    EGFRNORACEVR 21 (A) 09/13/2023       Lab Results   Component Value Date    ALBUMIN 1.4 (L) 09/13/2023        Recent Labs   Lab 09/13/23  1031   POCTGLUCOSE 90     Lab Results   Component Value Date    HGBA1C 5.6 01/03/2022       Pertinent Medications Reviewed: reviewed  Pertinent Medications Comments: statin, insulin, zofran, NS @ 100 ml/hr  Scheduled Meds:   apixaban  5 mg Oral BID    aspirin  81 mg Oral Daily    atorvastatin  40 mg Oral Daily    cefTRIAXone (ROCEPHIN) IVPB  1 g Intravenous Q24H    famotidine  20 mg Oral Daily    levothyroxine  50 mcg Oral Before breakfast    midodrine  15 mg Oral TID    sodium bicarbonate  1,300 mg Oral TID     Continuous Infusions:   sodium chloride 0.9% 50 mL/hr at 09/12/23 2116     PRN Meds:.acetaminophen, dextrose 10%, dextrose 10%, glucagon (human recombinant), glucose, glucose, insulin aspart U-100, levalbuterol, ondansetron, sodium chloride 0.9%      Estimated/Assessed Needs    Weight Used For Calorie Calculations: (!) 200 kg (440 lb 14.7 oz)  Energy Calorie Requirements (kcal): 2737 kcals (MSJ no AF (Morbid obese, BMI 61.50, -500 kcals for wt loss, 2200 kcals ok))  Energy Need Method: Celena Stokes  Protein Requirements:  g (0.8-1.0 g/kg Adj BW (BEYT, no dialysis, Obese 256.35% IBW)  Weight Used For Protein Calculations:  108.6 kg (239 lb 6.7 oz) (Adj BW)  Fluid Requirements (mL): 500 mL + total output (BETY)  Estimated Fluid Requirement Method: other (see comments)  CHO Requirement: 275-342 g (5246-8916 kcals/8)      Nutrition Prescription Ordered    Current Diet Order: Renal, Low sodium 2 gm, Low fiber diet  Current Nutrition Supplement Order: Jm BID     Evaluation of Received Nutrient/Fluid Intake  I/O: Net since admit:  9/13: +64217.3 mL     Energy Calories Required: not meeting needs  Protein Required: not meeting needs  Fluid Required: not meeting needs  Total Fluid Intake (mL): 466.3  Tolerance: tolerating   % Intake of Estimated Energy Needs: 0-25%  % Meal Intake: 25-50%    Nutrition Risk    Level of Risk/Frequency of Follow-up: High (F/u x 2 weekly)     Monitor and Evaluation    Food and Nutrient Intake: energy intake, food and beverage intake  Food and Nutrient Adminstration: diet order, enteral and parenteral nutrition administration  Physical Activity and Function: nutrition-related ADLs and IADLs  Anthropometric Measurements: weight  Biochemical Data, Medical Tests and Procedures: electrolyte and renal panel, gastrointestinal profile, glucose/endocrine profile  Nutrition-Focused Physical Findings: overall appearance, skin, extremities, muscles and bones     Nutrition Follow-Up    RD Follow-up?: Yes  Rena Kwong, Registration Eligible, Provisional LDN

## 2023-09-13 NOTE — ASSESSMENT & PLAN NOTE
Noncompliant with NIPPV, refuses to wear   Prefers Ventimask   Noncompliant with home oxygen   Compliance encouraged with understanding verbalized   - supplemental oxygen at 4 L NC in place- O2 sats 97-98%. BIPAP at bedside and adjusted to be worn as needed and nightly   -Case discussed with Pulmonology- qualification for home ventilator in progress

## 2023-09-13 NOTE — ASSESSMENT & PLAN NOTE
Nutrition consulted. Most recent weight and BMI monitored-     Measurements:  Wt Readings from Last 1 Encounters:   09/09/23 (!) 200 kg (440 lb 14.7 oz)   Body mass index is 61.5 kg/m².    Patient has been screened and assessed by RD.    Malnutrition Type:  Context: chronic illness  Level: moderate    Malnutrition Characteristic Summary:  Energy Intake (Malnutrition): less than 75% for greater than or equal to 1 month  Fluid Accumulation (Malnutrition): moderate to severe (lymphedema)    Interventions/Recommendations (treatment strategy):  1)Change diet to renal, low fiber 2) Add Suplena BID and Jm BID 3) total feed-encourage PO intakes 40 If PO intakes not improving to consistent > 505 of meals and supplements in < 3-4 days consider starting supplemental nutrition support 6) treat hypoglycemia

## 2023-09-13 NOTE — ASSESSMENT & PLAN NOTE
Chronic  -[facility] [place] showed   -Podiatry/ wound care  -PT/OT- pt to discharge to home and bed bound at baseline   -home health and wound care to be established for continued care outpatient

## 2023-09-13 NOTE — CONSULTS
[facility] - Intensive Care (Hospital)  Nephrology  Consult Note    Patient Name: Gene Rothman  MRN: [id number]  Admission Date: 9/2/2023  Hospital Length of Stay: 11 days  Attending Provider: Jenna Carrillo MD   Primary Care Physician: Ami Zarate DO  Principal Problem:Septic shock    Consults  Subjective:     HPI:  77-year-old male admitted on 09/02/2023 with abdominal pain secondary to diverticulitis complicated by septic shock.  Noted to have mildly elevated creatinine over baseline.  Nephrology has been consulted for evaluation.  Patient was seen in the intensive care unit.  His in bed resting comfortably.  No acute distress noted.  No complaints this morning.    Chart reviewed.  He does not have history of chronic kidney disease however he does have persistent proteinuria on UAs for the past several years.    09/09/2023:  Patient was step-down from ICU to the floor yesterday.  Seen in his hospital room.  In bed resting comfortably.  No acute distress noted.    09/10/2023: Patient was seen in his hospital room.  In bed resting comfortably.  No acute distress noted.  His wife was at the bedside.  All nephrology related questions were answered to their satisfaction.    09/11/2023: Patient was seen in his hospital room.  In bed resting comfortably.  No acute distress noted.    09/12/2023:  Patient was seen in his hospital room.  In bed resting comfortably.  No acute distress noted.    09/13/2023:  Patient was seen in his hospital room.  In bed resting comfortably.  No acute distress noted.  His wife was at the bedside.  All nephrology related questions were answered to her satisfaction.    Past Medical History:   Diagnosis Date    Acute hypoxemic respiratory failure 9/17/2022    Arthritis     Atrial fibrillation 3/1/2023    CHF (congestive heart failure)     Chronic kidney disease     Coronary artery disease     Depression     Hypertension     Hypertensive heart disease with heart failure 9/16/2022    Hypothyroidism      Lymphedema of lower extremity     Nephrolithiasis     Obesity     Peripheral vascular disease     Pneumonia 9/17/2022    Recurrent cellulitis of lower leg     Sleep apnea     can't stand the CPAP , sleeps elevated in hospital bed or chair    Tobacco dependence     resolved       Past Surgical History:   Procedure Laterality Date    ADENOIDECTOMY  1961    BACK SURGERY  1975;  1976    x2 for disc; scar tissue removed    debridement of bilateral leg ulcers Bilateral 01/01/2017    Dr Hernandez    INNER EAR SURGERY Bilateral 1961    separate - pinnaplasty , new eardrms constructed    KIDNEY STONE SURGERY Left 1976 approx    open    TONSILLECTOMY  1961       Review of patient's allergies indicates:   Allergen Reactions    Sulfamethoxazole-trimethoprim Itching and Shortness Of Breath    Sulfamethoxazole      Current Facility-Administered Medications   Medication Frequency    0.9%  NaCl infusion Continuous    acetaminophen tablet 650 mg Q6H PRN    apixaban tablet 5 mg BID    aspirin chewable tablet 81 mg Daily    atorvastatin tablet 40 mg Daily    cefTRIAXone (ROCEPHIN) 1 g in dextrose 5 % in water (D5W) 100 mL IVPB (MB+) Q24H    dextrose 10% bolus 125 mL 125 mL PRN    dextrose 10% bolus 250 mL 250 mL PRN    famotidine tablet 20 mg Daily    glucagon (human recombinant) injection 1 mg PRN    glucose chewable tablet 16 g PRN    glucose chewable tablet 24 g PRN    HYDROcodone-acetaminophen 5-325 mg per tablet 1 tablet Q6H PRN    insulin aspart U-100 pen 0-5 Units QID (AC + HS) PRN    levothyroxine tablet 50 mcg Before breakfast    midodrine tablet 15 mg TID    ondansetron injection 4 mg Q6H PRN    sodium chloride 0.9% flush 10 mL PRN     Family History       Problem Relation (Age of Onset)    Alcohol abuse Maternal Grandfather    Alzheimer's disease Brother    Asthma Daughter    Cancer Mother, Father, Brother    Diabetes Mother    Heart disease Father, Brother    Hypertension Mother          Tobacco Use    Smoking status:  Former     Current packs/day: 0.00     Average packs/day: 1.5 packs/day for 1 year (1.5 ttl pk-yrs)     Types: Cigarettes     Start date:      Quit date:      Years since quittin.7    Smokeless tobacco: Never   Substance and Sexual Activity    Alcohol use: Not Currently    Drug use: Never    Sexual activity: Never     Review of Systems   Constitutional: Negative.    HENT: Negative.     Respiratory: Negative.     Cardiovascular:  Positive for leg swelling.   Gastrointestinal: Negative.    Genitourinary: Negative.    Musculoskeletal: Negative.    Skin: Negative.      Objective:     Vital Signs (Most Recent):  Temp: 97.1 °F (36.2 °C) (23 07)  Pulse: (!) 54 (23 08)  Resp: (!) 21 (23 08)  BP: 101/62 (23 07)  SpO2: (!) 94 % (23 08) Vital Signs (24h Range):  Temp:  [97.1 °F (36.2 °C)-98.4 °F (36.9 °C)] 97.1 °F (36.2 °C)  Pulse:  [54-66] 54  Resp:  [16-21] 21  SpO2:  [93 %-97 %] 94 %  BP: ()/(41-64) 101/62     Weight: (!) 200 kg (440 lb 14.7 oz) (23 1555)  Body mass index is 61.5 kg/m².  Body surface area is 3.17 meters squared.    I/O last 3 completed shifts:  In: 1427.4 [P.O.:450; I.V.:861.9; IV Piggyback:115.5]  Out: 1100 [Urine:850; Stool:250]    Physical Exam  Constitutional:       Appearance: Normal appearance. He is obese.   HENT:      Head: Normocephalic and atraumatic.   Eyes:      General: No scleral icterus.     Extraocular Movements: Extraocular movements intact.      Pupils: Pupils are equal, round, and reactive to light.   Cardiovascular:      Rate and Rhythm: Normal rate. Rhythm irregular.   Pulmonary:      Effort: Pulmonary effort is normal.      Breath sounds: Normal breath sounds.   Musculoskeletal:      Right lower leg: Edema present.      Left lower leg: Edema present.      Comments: Generalized edema.   Skin:     General: Skin is warm and dry.   Neurological:      General: No focal deficit present.      Mental Status: He is alert and  oriented to person, place, and time.   Psychiatric:         Mood and Affect: Mood normal.         Behavior: Behavior normal.         Significant Labs:  BMP:   Recent Labs   Lab 09/13/23  0700   GLU 81      K 3.4*   *   CO2 19*   BUN 62*   CREATININE 2.9*   CALCIUM 7.4*   MG 2.3       CMP:   Recent Labs   Lab 09/09/23  1458 09/10/23  0801 09/13/23  0700   GLU 63*  63*   < > 81   CALCIUM 7.7*  7.7*   < > 7.4*   ALBUMIN 1.7*  1.7*  --  1.4*   PROT 5.5*  --   --      142   < > 144   K 5.9*  5.9*   < > 3.4*   CO2 17*  17*   < > 19*   *  111*   < > 115*   BUN 66*  66*   < > 62*   CREATININE 2.9*  2.9*   < > 2.9*   ALKPHOS 69  --   --    ALT 16  --   --    AST 26  --   --    BILITOT 0.3  --   --     < > = values in this interval not displayed.       All labs within the past 24 hours have been reviewed.    Significant Imaging:  Labs: Reviewed      Assessment/Plan:     Active Diagnoses:    Diagnosis Date Noted POA    PRINCIPAL PROBLEM:  Septic shock [A41.9, R65.21] 12/03/2021 Yes    UTI (urinary tract infection) [N39.0] 09/10/2023 Unknown    Moderate malnutrition [E44.0] 09/09/2023 Yes    Dystrophic nail [L60.3] 09/08/2023 Yes    Diverticulitis [K57.92] 09/02/2023 Yes    BETY (acute kidney injury) [N17.9] 09/02/2023 Yes    Chronic obstructive pulmonary disease with acute exacerbation [J44.1] 03/08/2023 Yes    Atrial fibrillation [I48.91] 03/01/2023 Yes    Chronic respiratory failure with hypoxia and hypercapnia [J96.11, J96.12] 01/10/2023 Yes    Physical debility [R53.81] 05/26/2022 Yes    Functional quadriplegia [R53.2] 01/02/2022 Yes    Venous stasis dermatitis of both lower extremities [I87.2] 09/29/2017 Yes     Chronic    Chronic acquired lymphedema [I89.0] 12/30/2016 Yes     Chronic    Sleep apnea [G47.30] 04/08/2016 Yes     Chronic    Morbid obesity with BMI of 60.0-69.9, adult [E66.01, Z68.44] 12/19/2014 Not Applicable     Chronic    Hypothyroidism (acquired) [E03.9] 12/19/2014 Yes       Problems Resolved During this Admission:       1. Acute kidney injury:  Creatinine is continuing to fluctuate, he is ranging between about 2.5 and 2.9.  He is nonoliguric with over 1100 cc urine output.    2. Hypotension:  Blood pressure continues to run on the low side.  He is on oral midodrine.  There have been several times over the past 24-48 hours where his mean arterial pressure fell below 60.  There may also be a component of ischemic ATN.    3. CKD 2: Baseline creatinine runs between about 0.8 and 1.3.  He has had persistent proteinuria on urinalysis for the past several years.    4. Generalized edema:  Has only 1 g protein by PC ratio.  Not consistent with nephrosis.    Approximately 40 minutes was spent in patient examination, chart review and coordination care with other providers.    Thank you for your consult.     Дмитрий Pelaez MD  Nephrology  [facility] - Intensive Care ([facility])

## 2023-09-13 NOTE — ASSESSMENT & PLAN NOTE
Known h/o CKD stage 2 with baseline Cr 0.8 to 1.3  BETY multifactorial likely secondary to intravascular volume depletion/ UTI/shock- ?  ATN   Creatinine during hospital stay has trended up from 1.7-2.9  Continue IV fluids, avoid nephrotoxins, nephrology on board   Renal dose meds   Monitor urine output  -Nephrology consulted

## 2023-09-14 NOTE — PLAN OF CARE
Problem: Adult Inpatient Plan of Care  Goal: Plan of Care Review  Outcome: Ongoing, Progressing  Goal: Patient-Specific Goal (Individualized)  Outcome: Ongoing, Progressing  Goal: Absence of Hospital-Acquired Illness or Injury  Outcome: Ongoing, Progressing  Goal: Optimal Comfort and Wellbeing  Outcome: Ongoing, Progressing  Goal: Readiness for Transition of Care  Outcome: Ongoing, Progressing     Problem: Bariatric Environmental Safety  Goal: Safety Maintained with Care  Outcome: Ongoing, Progressing     Problem: Infection  Goal: Absence of Infection Signs and Symptoms  Outcome: Ongoing, Progressing     Problem: Impaired Wound Healing  Goal: Optimal Wound Healing  Outcome: Ongoing, Progressing     Problem: Skin Injury Risk Increased  Goal: Skin Health and Integrity  Outcome: Ongoing, Progressing     Problem: Adjustment to Illness (Sepsis/Septic Shock)  Goal: Optimal Coping  Outcome: Ongoing, Progressing     Problem: Bleeding (Sepsis/Septic Shock)  Goal: Absence of Bleeding  Outcome: Ongoing, Progressing     Problem: Glycemic Control Impaired (Sepsis/Septic Shock)  Goal: Blood Glucose Level Within Desired Range  Outcome: Ongoing, Progressing     Problem: Infection Progression (Sepsis/Septic Shock)  Goal: Absence of Infection Signs and Symptoms  Outcome: Ongoing, Progressing     Problem: Nutrition Impaired (Sepsis/Septic Shock)  Goal: Optimal Nutrition Intake  Outcome: Ongoing, Progressing     Problem: Fluid and Electrolyte Imbalance (Acute Kidney Injury/Impairment)  Goal: Fluid and Electrolyte Balance  Outcome: Ongoing, Progressing     Problem: Oral Intake Inadequate (Acute Kidney Injury/Impairment)  Goal: Optimal Nutrition Intake  Outcome: Ongoing, Progressing     Problem: Renal Function Impairment (Acute Kidney Injury/Impairment)  Goal: Effective Renal Function  Outcome: Ongoing, Progressing     Problem: Fall Injury Risk  Goal: Absence of Fall and Fall-Related Injury  Outcome: Ongoing, Progressing     Problem:  Malnutrition  Goal: Improved Nutritional Intake  Outcome: Ongoing, Progressing     Problem: Coping Ineffective  Goal: Effective Coping  Outcome: Ongoing, Progressing

## 2023-09-14 NOTE — PLAN OF CARE
POC dicussed with family at bedside. Pt currently on continues IVF and BiPAP tolerating well.Comfort measures and safety measures addressed.   Problem: Adult Inpatient Plan of Care  Goal: Plan of Care Review  Outcome: Ongoing, Progressing  Goal: Patient-Specific Goal (Individualized)  Outcome: Ongoing, Progressing  Goal: Absence of Hospital-Acquired Illness or Injury  Outcome: Ongoing, Progressing  Goal: Optimal Comfort and Wellbeing  Outcome: Ongoing, Progressing  Goal: Readiness for Transition of Care  Outcome: Ongoing, Progressing     Problem: Bariatric Environmental Safety  Goal: Safety Maintained with Care  Outcome: Ongoing, Progressing     Problem: Infection  Goal: Absence of Infection Signs and Symptoms  Outcome: Ongoing, Progressing     Problem: Impaired Wound Healing  Goal: Optimal Wound Healing  Outcome: Ongoing, Progressing     Problem: Skin Injury Risk Increased  Goal: Skin Health and Integrity  Outcome: Ongoing, Progressing     Problem: Adjustment to Illness (Sepsis/Septic Shock)  Goal: Optimal Coping  Outcome: Ongoing, Progressing     Problem: Bleeding (Sepsis/Septic Shock)  Goal: Absence of Bleeding  Outcome: Ongoing, Progressing     Problem: Glycemic Control Impaired (Sepsis/Septic Shock)  Goal: Blood Glucose Level Within Desired Range  Outcome: Ongoing, Progressing     Problem: Infection Progression (Sepsis/Septic Shock)  Goal: Absence of Infection Signs and Symptoms  Outcome: Ongoing, Progressing     Problem: Nutrition Impaired (Sepsis/Septic Shock)  Goal: Optimal Nutrition Intake  Outcome: Ongoing, Progressing     Problem: Fluid and Electrolyte Imbalance (Acute Kidney Injury/Impairment)  Goal: Fluid and Electrolyte Balance  Outcome: Ongoing, Progressing     Problem: Oral Intake Inadequate (Acute Kidney Injury/Impairment)  Goal: Optimal Nutrition Intake  Outcome: Ongoing, Progressing     Problem: Renal Function Impairment (Acute Kidney Injury/Impairment)  Goal: Effective Renal Function  Outcome:  Ongoing, Progressing     Problem: Fall Injury Risk  Goal: Absence of Fall and Fall-Related Injury  Outcome: Ongoing, Progressing     Problem: Malnutrition  Goal: Improved Nutritional Intake  Outcome: Ongoing, Progressing     Problem: Coping Ineffective  Goal: Effective Coping  Outcome: Ongoing, Progressing

## 2023-09-14 NOTE — SUBJECTIVE & OBJECTIVE
Past Medical History:   Diagnosis Date    Acute hypoxemic respiratory failure 9/17/2022    Arthritis     Atrial fibrillation 3/1/2023    CHF (congestive heart failure)     Chronic kidney disease     Coronary artery disease     Depression     Hypertension     Hypertensive heart disease with heart failure 9/16/2022    Hypothyroidism     Lymphedema of lower extremity     Nephrolithiasis     Obesity     Peripheral vascular disease     Pneumonia 9/17/2022    Recurrent cellulitis of lower leg     Sleep apnea     can't stand the CPAP , sleeps elevated in hospital bed or chair    Tobacco dependence     resolved       Past Surgical History:   Procedure Laterality Date    ADENOIDECTOMY  1961    BACK SURGERY  1975;  1976    x2 for disc; scar tissue removed    debridement of bilateral leg ulcers Bilateral 01/01/2017    Dr Hernandez    INNER EAR SURGERY Bilateral 1961    separate - pinnaplasty , new eardrms constructed    KIDNEY STONE SURGERY Left 1976 approx    open    TONSILLECTOMY  1961       Review of patient's allergies indicates:   Allergen Reactions    Sulfamethoxazole-trimethoprim Itching and Shortness Of Breath    Sulfamethoxazole        No current facility-administered medications on file prior to encounter.     Current Outpatient Medications on File Prior to Encounter   Medication Sig    acetaminophen (TYLENOL) 500 MG tablet Take 500 mg by mouth every 6 (six) hours as needed for Pain.    apixaban (ELIQUIS) 5 mg Tab Take 1 tablet (5 mg total) by mouth 2 (two) times daily.    aspirin 81 MG Chew Take 1 tablet (81 mg total) by mouth once daily.    atorvastatin (LIPITOR) 40 MG tablet Take 1 tablet (40 mg total) by mouth once daily.    furosemide (LASIX) 40 MG tablet Take 1 tablet (40 mg total) by mouth once daily.    levalbuterol (XOPENEX) 1.25 mg/3 mL nebulizer solution Take 3 mLs (1.25 mg total) by nebulization every 4 (four) hours as needed for Wheezing. Rescue    levothyroxine (SYNTHROID) 50 MCG tablet Take 1 tablet (50  mcg total) by mouth before breakfast.    nebulizer and compressor (HOME NEBULIZER PLUS SIDESTREAM) Diana use as directed    tiotropium (SPIRIVA) 18 mcg inhalation capsule Inhale 1 capsule (18 mcg total) into the lungs once daily. Controller     Family History       Problem Relation (Age of Onset)    Alcohol abuse Maternal Grandfather    Alzheimer's disease Brother    Asthma Daughter    Cancer Mother, Father, Brother    Diabetes Mother    Heart disease Father, Brother    Hypertension Mother          Tobacco Use    Smoking status: Former     Current packs/day: 0.00     Average packs/day: 1.5 packs/day for 1 year (1.5 ttl pk-yrs)     Types: Cigarettes     Start date:      Quit date:      Years since quittin.7    Smokeless tobacco: Never   Substance and Sexual Activity    Alcohol use: Not Currently    Drug use: Never    Sexual activity: Never     Review of Systems   Unable to perform ROS: Other (patient lethargic/sleeping)     Objective:     Vital Signs (Most Recent):  Temp: 98.4 °F (36.9 °C) (23 1207)  Pulse: 63 (23 1315)  Resp: 18 (23 1207)  BP: (!) 112/55 (23 1207)  SpO2: (!) 92 % (23 1207) Vital Signs (24h Range):  Temp:  [97.7 °F (36.5 °C)-98.8 °F (37.1 °C)] 98.4 °F (36.9 °C)  Pulse:  [31-64] 63  Resp:  [15-20] 18  SpO2:  [90 %-100 %] 92 %  BP: ()/(39-55) 112/55     Weight: (!) 200 kg (440 lb 14.7 oz)  Body mass index is 61.5 kg/m².    SpO2: (!) 92 %         Intake/Output Summary (Last 24 hours) at 2023 1343  Last data filed at 2023 0524  Gross per 24 hour   Intake 1545.02 ml   Output 1175 ml   Net 370.02 ml       Lines/Drains/Airways       Peripherally Inserted Central Catheter Line  Duration             PICC Double Lumen 23 0800 right brachial 12 days              Drain  Duration                  Rectal Tube 23 1430 rectal tube w/ balloon (indicate number of mLs) 11 days                     Physical Exam  Vitals and nursing note reviewed.  "  Constitutional:       Appearance: He is obese. He is ill-appearing.      Comments: On bipap     Cardiovascular:      Rate and Rhythm: Bradycardia present. Rhythm irregularly irregular.      Heart sounds: Normal heart sounds, S1 normal and S2 normal.   Pulmonary:      Comments: Diminished BS at bases  Abdominal:      Palpations: Abdomen is soft.   Musculoskeletal:      Right lower leg: Edema present.      Left lower leg: Edema present.   Skin:     Comments: CVI BLE    Neurological:      Comments: Sleeping          Significant Labs: CMP   Recent Labs   Lab 09/13/23  0700 09/14/23  0423    145   K 3.4* 3.4*   * 115*   CO2 19* 20*   GLU 81 82   BUN 62* 63*   CREATININE 2.9* 2.8*   CALCIUM 7.4* 7.3*   ALBUMIN 1.4* 1.4*   ANIONGAP 10 10   , CBC   Recent Labs   Lab 09/12/23  1406 09/13/23  0700   WBC  --  11.40   HGB 8.0* 8.0*   HCT 26.4* 26.3*   PLT  --  250   , Troponin No results for input(s): "TROPONINI" in the last 48 hours., and All pertinent lab results from the last 24 hours have been reviewed.    Significant Imaging: Echocardiogram: Transthoracic echo (TTE) complete (Cupid Only):   Results for orders placed or performed during the hospital encounter of 09/02/23   Echo   Result Value Ref Range    BSA 3.07 m2    LVOT stroke volume 89.42 cm3    LVIDd 5.67 3.5 - 6.0 cm    LV Systolic Volume 62.34 mL    LV Systolic Volume Index 21.7 mL/m2    LVIDs 3.81 2.1 - 4.0 cm    LV Diastolic Volume 158.42 mL    LV Diastolic Volume Index 55.20 mL/m2    IVS 1.27 (A) 0.6 - 1.1 cm    LVOT diameter 1.92 cm    LVOT area 2.9 cm2    FS 33 28 - 44 %    Left Ventricle Relative Wall Thickness 0.46 cm    Posterior Wall 1.29 (A) 0.6 - 1.1 cm    LV mass 312.73 g    LV Mass Index 109 g/m2    TR Max Rodolfo 3.67 m/s    IVRT 68.51 msec    LVOT peak rodolfo 1.40 m/s    Left Ventricular Outflow Tract Mean Velocity 1.02 cm/s    Left Ventricular Outflow Tract Mean Gradient 4.56 mmHg    LA size 4.29 cm    Left Atrium Major Axis 5.65 cm    Left " Atrium Minor Axis 5.51 cm    RVDD 4.37 cm    RVOT peak VTI 24.9 cm    RA Major Axis 6.13 cm    AV mean gradient 7 mmHg    AV peak gradient 13 mmHg    Ao peak trae 1.78 m/s    Ao VTI 33.70 cm    LVOT peak VTI 30.90 cm    AV valve area 2.65 cm²    AV Velocity Ratio 0.79     AV index (prosthetic) 0.92     UMESH by Velocity Ratio 2.28 cm²    Triscuspid Valve Regurgitation Peak Gradient 54 mmHg    PV mean gradient 4 mmHg    PV PEAK VELOCITY 1.56 m/s    PV peak gradient 10 mmHg    Pulmonary Valve Mean Velocity 1.00 m/s    RVOT peak trae 1.37 m/s    Ao root annulus 3.93 cm    STJ 2.63 cm    Ascending aorta 3.24 cm    IVC diameter 1.87 cm    ZLVIDS -15.81     ZLVIDD -21.70     LA Volume Index 35.4 mL/m2    LA volume 101.72 cm3    LA WIDTH 5.0 cm    TAPSE 1.40 cm    RA Width 4.5 cm    TV resting pulmonary artery pressure 57 mmHg    RV TB RVSP 7 mmHg    Est. RA pres 3 mmHg    Narrative      Left Ventricle: The left ventricle is normal in size. Mildly increased   wall thickness. There is concentric remodeling. Normal wall motion. Septal   motion is normal. There is normal systolic function with a visually   estimated ejection fraction of 55 - 70%. There is normal diastolic   function.    Left Atrium: Left atrium is mildly dilated.    Right Ventricle: Mild right ventricular enlargement. Wall thickness is   normal. Right ventricle wall motion  is normal. Systolic function is   normal.    Aortic Valve: The aortic valve is a trileaflet valve. There is moderate   aortic valve sclerosis.    Tricuspid Valve: There is moderate regurgitation with a centrally   directed jet.    Pulmonary Artery: The estimated pulmonary artery systolic pressure is   57 mmHg.    IVC/SVC: Normal venous pressure at 3 mmHg.     , EKG: Reviewed, and X-Ray: CXR: X-Ray Chest 1 View (CXR):   Results for orders placed or performed during the hospital encounter of 09/02/23   X-Ray Chest 1 View    Narrative    EXAMINATION:  XR CHEST 1 VIEW    CLINICAL  HISTORY:  Shortness of breath    FINDINGS:  Comparison is made with the most recent prior chest x-ray.  Stable cardiomegaly and aortic atherosclerosis..  The lungs appear clear of active disease. No acute appearing infiltrate, pleural effusion or pneumothorax identified.      Impression    No acute findings.      Electronically signed by: Live Duckworth MD  Date:    09/02/2023  Time:    08:03    and X-Ray Chest PA and Lateral (CXR): No results found for this visit on 09/02/23.

## 2023-09-14 NOTE — ASSESSMENT & PLAN NOTE
-HR stable during exam  -Pauses < 3 seconds, likely during sleep  -Patient asymptomatic  -Continue to monitor  -No indication for PPM at present time  -Continue Eliquis as tolerated (monitor H/H)

## 2023-09-14 NOTE — AI DETERIORATION ALERT
Artificial Intelligence Notification  [facility]  [id number] [place] [address] Dr Reanna CALLOWAY [id number]  Phone: [phone]    This documentation was triggered by an Artificial Intelligence Notification:    Admit Date: 2023   LOS: 11  Code Status: Full Code  : 1945  Age: 77 y.o.  Weight:   Wt Readings from Last 1 Encounters:   23 (!) 200 kg (440 lb 14.7 oz)        Sex: male  Bed: A232/A232 A  MRN: [id number]  Attending Physician: Jenna Carrillo MD     Date of Alert: 2023  Time AI Alert Received: 173            Vitals:    23 1744   BP:    Pulse: 64   Resp: 20   Temp:      SpO2: 95 %      Artificial Intelligence alert discussed with Provider:     Name: Linda Rachael   Date/Time of Provider Notification: 23      Patient Condition: Pt stable upon exam with BIPAP in place. O2 sat 95%. No signs of acute distress witnessed or reported. Pt seen and examined and remains appropriate for current level of care.

## 2023-09-14 NOTE — CONSULTS
[facility] - Intensive Care (Hospital)  Nephrology  Consult Note    Patient Name: Gene Rothman  MRN: [id number]  Admission Date: 9/2/2023  Hospital Length of Stay: 12 days  Attending Provider: Jenna Carrillo MD   Primary Care Physician: Ami Zarate DO  Principal Problem:Septic shock    Consults  Subjective:     HPI:  77-year-old male admitted on 09/02/2023 with abdominal pain secondary to diverticulitis complicated by septic shock.  Noted to have mildly elevated creatinine over baseline.  Nephrology has been consulted for evaluation.  Patient was seen in the intensive care unit.  His in bed resting comfortably.  No acute distress noted.  No complaints this morning.    Chart reviewed.  He does not have history of chronic kidney disease however he does have persistent proteinuria on UAs for the past several years.    09/09/2023:  Patient was step-down from ICU to the floor yesterday.  Seen in his hospital room.  In bed resting comfortably.  No acute distress noted.    09/10/2023: Patient was seen in his hospital room.  In bed resting comfortably.  No acute distress noted.  His wife was at the bedside.  All nephrology related questions were answered to their satisfaction.    09/11/2023: Patient was seen in his hospital room.  In bed resting comfortably.  No acute distress noted.    09/12/2023:  Patient was seen in his hospital room.  In bed resting comfortably.  No acute distress noted.    09/13/2023:  Patient was seen in his hospital room.  In bed resting comfortably.  No acute distress noted.  His wife was at the bedside.  All nephrology related questions were answered to her satisfaction.    09/14/2023: Patient was seen in his hospital room.  In bed resting comfortably.  BiPAP in place.  He is more alert this morning.  His wife was also at the bedside.  He was complaining of discomfort from the BiPAP.  He keeps trying to remove it.  His wife is making sure that it stays in place.  He refuses to answer any questions  from me.  All nephrology related questions were answered to his wife satisfaction.    Past Medical History:   Diagnosis Date    Acute hypoxemic respiratory failure 9/17/2022    Arthritis     Atrial fibrillation 3/1/2023    CHF (congestive heart failure)     Chronic kidney disease     Coronary artery disease     Depression     Hypertension     Hypertensive heart disease with heart failure 9/16/2022    Hypothyroidism     Lymphedema of lower extremity     Nephrolithiasis     Obesity     Peripheral vascular disease     Pneumonia 9/17/2022    Recurrent cellulitis of lower leg     Sleep apnea     can't stand the CPAP , sleeps elevated in hospital bed or chair    Tobacco dependence     resolved       Past Surgical History:   Procedure Laterality Date    ADENOIDECTOMY  1961    BACK SURGERY  1975;  1976    x2 for disc; scar tissue removed    debridement of bilateral leg ulcers Bilateral 01/01/2017    Dr Hernandez    INNER EAR SURGERY Bilateral 1961    separate - pinnaplasty , new eardrms constructed    KIDNEY STONE SURGERY Left 1976 approx    open    TONSILLECTOMY  1961       Review of patient's allergies indicates:   Allergen Reactions    Sulfamethoxazole-trimethoprim Itching and Shortness Of Breath    Sulfamethoxazole      Current Facility-Administered Medications   Medication Frequency    acetaminophen tablet 650 mg Q6H PRN    apixaban tablet 5 mg BID    aspirin chewable tablet 81 mg Daily    atorvastatin tablet 40 mg Daily    cefTRIAXone (ROCEPHIN) 1 g in dextrose 5 % in water (D5W) 100 mL IVPB (MB+) Q24H    dextrose 10% bolus 125 mL 125 mL PRN    dextrose 10% bolus 250 mL 250 mL PRN    famotidine tablet 20 mg Daily    fludrocortisone tablet 100 mcg BID    glucagon (human recombinant) injection 1 mg PRN    glucose chewable tablet 16 g PRN    glucose chewable tablet 24 g PRN    insulin aspart U-100 pen 0-5 Units QID (AC + HS) PRN    lactated ringers infusion Continuous    levalbuterol nebulizer solution 1.2495 mg Q6H PRN     levothyroxine tablet 50 mcg Before breakfast    midodrine tablet 15 mg TID    ondansetron injection 4 mg Q6H PRN    potassium bicarbonate disintegrating tablet 20 mEq Once    sodium chloride 0.9% flush 10 mL PRN     Family History       Problem Relation (Age of Onset)    Alcohol abuse Maternal Grandfather    Alzheimer's disease Brother    Asthma Daughter    Cancer Mother, Father, Brother    Diabetes Mother    Heart disease Father, Brother    Hypertension Mother          Tobacco Use    Smoking status: Former     Current packs/day: 0.00     Average packs/day: 1.5 packs/day for 1 year (1.5 ttl pk-yrs)     Types: Cigarettes     Start date:      Quit date:      Years since quittin.7    Smokeless tobacco: Never   Substance and Sexual Activity    Alcohol use: Not Currently    Drug use: Never    Sexual activity: Never     Review of Systems   Constitutional: Negative.    HENT: Negative.     Respiratory: Negative.     Cardiovascular:  Positive for leg swelling.   Gastrointestinal: Negative.    Genitourinary: Negative.    Musculoskeletal: Negative.    Skin: Negative.      Objective:     Vital Signs (Most Recent):  Temp: 97.7 °F (36.5 °C) (23 0454)  Pulse: 63 (23 0956)  Resp: 18 (23 0732)  BP: (!) 98/52 (23 0956)  SpO2: 95 % (23 0732) Vital Signs (24h Range):  Temp:  [97.6 °F (36.4 °C)-98.8 °F (37.1 °C)] 97.7 °F (36.5 °C)  Pulse:  [31-67] 63  Resp:  [15-23] 18  SpO2:  [90 %-100 %] 95 %  BP: ()/(39-64) 98/52     Weight: (!) 200 kg (440 lb 14.7 oz) (23 1555)  Body mass index is 61.5 kg/m².  Body surface area is 3.17 meters squared.    I/O last 3 completed shifts:  In: 2191.6 [P.O.:595; I.V.:1423.4; NG/GT:60; IV Piggyback:113.2]  Out:  [Urine:1325; Stool:550]    Physical Exam  Constitutional:       Appearance: Normal appearance. He is obese.   HENT:      Head: Normocephalic and atraumatic.   Eyes:      General: No scleral icterus.     Extraocular Movements: Extraocular  movements intact.      Pupils: Pupils are equal, round, and reactive to light.   Cardiovascular:      Rate and Rhythm: Normal rate. Rhythm irregular.   Pulmonary:      Effort: Pulmonary effort is normal.      Breath sounds: Normal breath sounds.   Musculoskeletal:      Right lower leg: Edema present.      Left lower leg: Edema present.      Comments: Generalized edema.   Skin:     General: Skin is warm and dry.   Neurological:      General: No focal deficit present.      Mental Status: He is alert and oriented to person, place, and time.   Psychiatric:         Mood and Affect: Mood normal.         Behavior: Behavior normal.         Significant Labs:  BMP:   Recent Labs   Lab 09/14/23 0423   GLU 82      K 3.4*   *   CO2 20*   BUN 63*   CREATININE 2.8*   CALCIUM 7.3*   MG 2.2       CMP:   Recent Labs   Lab 09/09/23  1458 09/10/23  0801 09/14/23 0423   GLU 63*  63*   < > 82   CALCIUM 7.7*  7.7*   < > 7.3*   ALBUMIN 1.7*  1.7*   < > 1.4*   PROT 5.5*  --   --      142   < > 145   K 5.9*  5.9*   < > 3.4*   CO2 17*  17*   < > 20*   *  111*   < > 115*   BUN 66*  66*   < > 63*   CREATININE 2.9*  2.9*   < > 2.8*   ALKPHOS 69  --   --    ALT 16  --   --    AST 26  --   --    BILITOT 0.3  --   --     < > = values in this interval not displayed.       All labs within the past 24 hours have been reviewed.    Significant Imaging:  Labs: Reviewed      Assessment/Plan:     Active Diagnoses:    Diagnosis Date Noted POA    PRINCIPAL PROBLEM:  Septic shock [A41.9, R65.21] 12/03/2021 Yes    UTI (urinary tract infection) [N39.0] 09/10/2023 Unknown    Moderate malnutrition [E44.0] 09/09/2023 Yes    Dystrophic nail [L60.3] 09/08/2023 Yes    Diverticulitis [K57.92] 09/02/2023 Yes    BETY (acute kidney injury) [N17.9] 09/02/2023 Yes    Chronic obstructive pulmonary disease with acute exacerbation [J44.1] 03/08/2023 Yes    Atrial fibrillation [I48.91] 03/01/2023 Yes    Chronic respiratory failure with hypoxia  and hypercapnia [J96.11, J96.12] 01/10/2023 Yes    Physical debility [R53.81] 05/26/2022 Yes    Functional quadriplegia [R53.2] 01/02/2022 Yes    Venous stasis dermatitis of both lower extremities [I87.2] 09/29/2017 Yes     Chronic    Chronic acquired lymphedema [I89.0] 12/30/2016 Yes     Chronic    Sleep apnea [G47.30] 04/08/2016 Yes     Chronic    Morbid obesity with BMI of 60.0-69.9, adult [E66.01, Z68.44] 12/19/2014 Not Applicable     Chronic    Hypothyroidism (acquired) [E03.9] 12/19/2014 Yes      Problems Resolved During this Admission:       1. Acute kidney injury:  Creatinine has remained relatively stable overnight at 2.8.  He is ranged between about 2.5 and 2.9 for the past several days.  He is nonoliguric with over 1175 cc urine output reported.    2. Hypotension:  Blood pressure continues to run on the low side.  He is on oral midodrine.  Given his habitus is difficult to know what his true blood pressure is.  Fludrocortisone can also be added to help support his blood pressure.  However with this medication 1 will need to monitor his potassium closely as it will tend to drop.    3. CKD 2: Baseline creatinine runs between about 0.8 and 1.3.  He has had persistent proteinuria on urinalysis for the past several years.    4. Generalized edema:  Has only 1 g protein by PC ratio.  Not consistent with nephrosis.    Approximately 40 minutes was spent in patient examination, chart review and coordination care with other providers.    Thank you for your consult.     Дмитрий Pelaez MD  Nephrology  [facility] - Intensive Care ([facility]

## 2023-09-14 NOTE — PLAN OF CARE
Patient current with [facility].  [facility] [place] Orders in chart.       09/14/23 2148   Post-Acute Status   Post-Acute Authorization Home [facility]   Home Health Status Set-up Complete/Auth obtained   Discharge Plan   Discharge Plan A [facility]

## 2023-09-14 NOTE — HPI
HPI obtained from chart and patient's wife at bedside as patient was sleeping during exam    Mr. Rothman is a 77 year old male patient whose current medical conditions include chronic hypoxic respiratory failure (on 4L), afib (on Eliquis), PHTN, diastolic CHF, RV dysfunction, chronic lymphedema, and OHS/DENISHA (non-compliant with PAP therapies) who initially presented to [facility] ED on 9/2/23 and was diagnosed with septic shock in setting of diverticulitis/UTI. Overnight on telemetry monitoring, patient was noted to have sinus pauses, and cardiology was consulted to assist with management. Patient seen and examined today, sleeping in bed with Bipap on. Wife reports he is bed bound/non-ambulatory and has been that way for past 2 years. No history of near syncope or syncope. No complaints of LH, dizziness, CP, or SOB. Patient not on any AV robert agents at home. Reviewed telemetry monitor--pauses all less than 3 seconds.  Echo 9/5/23 showed normal EF, DD, mod TR, pulmonary HTN.        No lesions; no rash

## 2023-09-14 NOTE — NURSING
Notified by monitor tech that pt 12 beat run of vtach. Pt HR also dropped to 39. Dr. Colunga and Linda SHELBY are aware.

## 2023-09-14 NOTE — AI DETERIORATION ALERT
Artificial Intelligence Notification  [facility]  [id number] [place] [address] Dr Reanna CALLOWAY [address]  Phone: [phone]    This documentation was triggered by an Artificial Intelligence Notification:    Admit Date: 2023   LOS: 12  Code Status: Full Code  : 1945  Age: 77 y.o.  Weight:   Wt Readings from Last 1 Encounters:   23 (!) 200 kg (440 lb 14.7 oz)        Sex: male  Bed: A232/A232 A  MRN: [id number]  Attending Physician: Jenna Carrillo MD     Date of Alert: 2023  Time AI Alert Received: 1640             Vitals:    23 1644   BP: (!) 93/52   Pulse: (!) 53   Resp: 18   Temp:      SpO2: 96 %      Artificial Intelligence alert discussed with Provider:     Name: Linda Cano NP    Date/Time of Provider Notification: 23 1640      Patient Condition: pt in bed upon exam with BIPAP in place with no signs of acute distress. Vital signs repeated with HR 53 and BP 93/52 noted. Pt stable and to remain at current level of care.

## 2023-09-14 NOTE — DISCHARGE INSTRUCTIONS
"Left lateral lower leg venous leg ulcer again noted, wound bed moist and pink. Cleansed with saline and silicone foam drssing applied.   BLE dry flaky scaly skin - cleansed with bath wipes, moisturizer applied. Right lower leg with increased edema, weeping. Abd pad applied over areas of weeping. BLE then wrapped with 6" ACE. Discussed need for compression therapy with patient/wife. Also discussed at home lyphedema pumps. She report she thinks they have those but arent sure where they are right now. I recommend he f/u with OP lymphedema specialist.   "

## 2023-09-14 NOTE — NURSING
Rectal tube removed. PICC line removed. Tele monitor removed and returned to monitor room. Discharged to home by [facility].

## 2023-09-14 NOTE — CONSULTS
[facility] - Telemetry (Hospital)  Cardiology  Consult Note    Patient Name: Gene Rothman  MRN: [id number]  Admission Date: 9/2/2023  Hospital Length of Stay: 12 days  Code Status: Full Code   Attending Provider: Jenna Carrillo MD   Consulting Provider: Rubina Cheng PA-C  Primary Care Physician: Ami Zarate DO  Principal Problem:Septic shock    Patient information was obtained from patient, past medical records and ER records.     Inpatient consult to Cardiology  Consult performed by: Rubina Cheng PA-C  Consult ordered by: Linda Cano NP        Subjective:     Chief Complaint: Septic shock    HPI:   HPI obtained from chart and patient's wife at bedside as patient was sleeping during exam    Mr. Rothman is a 77 year old male patient whose current medical conditions include chronic hypoxic respiratory failure (on 4L), afib (on Eliquis), PHTN, diastolic CHF, RV dysfunction, chronic lymphedema, and OHS/DENISHA (non-compliant with PAP therapies) who initially presented to [facility] ED on 9/2/23 and was diagnosed with septic shock in setting of diverticulitis/UTI. Overnight on telemetry monitoring, patient was noted to have sinus pauses, and cardiology was consulted to assist with management. Patient seen and examined today, sleeping in bed with Bipap on. Wife reports he is bed bound/non-ambulatory and has been that way for past 2 years. No history of near syncope or syncope. No complaints of LH, dizziness, CP, or SOB. Patient not on any AV robert agents at home. Reviewed telemetry monitor--pauses all less than 3 seconds.  Echo 9/5/23 showed normal EF, DD, mod TR, pulmonary HTN.           Past Medical History:   Diagnosis Date    Acute hypoxemic respiratory failure 9/17/2022    Arthritis     Atrial fibrillation 3/1/2023    CHF (congestive heart failure)     Chronic kidney disease     Coronary artery disease     Depression     Hypertension     Hypertensive heart disease with heart failure 9/16/2022     Hypothyroidism     Lymphedema of lower extremity     Nephrolithiasis     Obesity     Peripheral vascular disease     Pneumonia 9/17/2022    Recurrent cellulitis of lower leg     Sleep apnea     can't stand the CPAP , sleeps elevated in hospital bed or chair    Tobacco dependence     resolved       Past Surgical History:   Procedure Laterality Date    ADENOIDECTOMY  1961    BACK SURGERY  1975;  1976    x2 for disc; scar tissue removed    debridement of bilateral leg ulcers Bilateral 01/01/2017    Dr Hernandez    INNER EAR SURGERY Bilateral 1961    separate - pinnaplasty , new eardrms constructed    KIDNEY STONE SURGERY Left 1976 approx    open    TONSILLECTOMY  1961       Review of patient's allergies indicates:   Allergen Reactions    Sulfamethoxazole-trimethoprim Itching and Shortness Of Breath    Sulfamethoxazole        No current facility-administered medications on file prior to encounter.     Current Outpatient Medications on File Prior to Encounter   Medication Sig    acetaminophen (TYLENOL) 500 MG tablet Take 500 mg by mouth every 6 (six) hours as needed for Pain.    apixaban (ELIQUIS) 5 mg Tab Take 1 tablet (5 mg total) by mouth 2 (two) times daily.    aspirin 81 MG Chew Take 1 tablet (81 mg total) by mouth once daily.    atorvastatin (LIPITOR) 40 MG tablet Take 1 tablet (40 mg total) by mouth once daily.    furosemide (LASIX) 40 MG tablet Take 1 tablet (40 mg total) by mouth once daily.    levalbuterol (XOPENEX) 1.25 mg/3 mL nebulizer solution Take 3 mLs (1.25 mg total) by nebulization every 4 (four) hours as needed for Wheezing. Rescue    levothyroxine (SYNTHROID) 50 MCG tablet Take 1 tablet (50 mcg total) by mouth before breakfast.    nebulizer and compressor (HOME NEBULIZER PLUS SIDESTREAM) Diana use as directed    tiotropium (SPIRIVA) 18 mcg inhalation capsule Inhale 1 capsule (18 mcg total) into the lungs once daily. Controller     Family History       Problem Relation (Age of  Onset)    Alcohol abuse Maternal Grandfather    Alzheimer's disease Brother    Asthma Daughter    Cancer Mother, Father, Brother    Diabetes Mother    Heart disease Father, Brother    Hypertension Mother          Tobacco Use    Smoking status: Former     Current packs/day: 0.00     Average packs/day: 1.5 packs/day for 1 year (1.5 ttl pk-yrs)     Types: Cigarettes     Start date:      Quit date:      Years since quittin.7    Smokeless tobacco: Never   Substance and Sexual Activity    Alcohol use: Not Currently    Drug use: Never    Sexual activity: Never     Review of Systems   Unable to perform ROS: Other (patient lethargic/sleeping)     Objective:     Vital Signs (Most Recent):  Temp: 98.4 °F (36.9 °C) (23 1207)  Pulse: 63 (23 1315)  Resp: 18 (23 1207)  BP: (!) 112/55 (23 1207)  SpO2: (!) 92 % (23 1207) Vital Signs (24h Range):  Temp:  [97.7 °F (36.5 °C)-98.8 °F (37.1 °C)] 98.4 °F (36.9 °C)  Pulse:  [31-64] 63  Resp:  [15-20] 18  SpO2:  [90 %-100 %] 92 %  BP: ()/(39-55) 112/55     Weight: (!) 200 kg (440 lb 14.7 oz)  Body mass index is 61.5 kg/m².    SpO2: (!) 92 %         Intake/Output Summary (Last 24 hours) at 2023 1343  Last data filed at 2023 0524  Gross per 24 hour   Intake 1545.02 ml   Output 1175 ml   Net 370.02 ml       Lines/Drains/Airways       Peripherally Inserted Central Catheter Line  Duration             PICC Double Lumen 23 0800 right brachial 12 days              Drain  Duration                  Rectal Tube 23 1430 rectal tube w/ balloon (indicate number of mLs) 11 days                     Physical Exam  Vitals and nursing note reviewed.   Constitutional:       Appearance: He is obese. He is ill-appearing.      Comments: On bipap     Cardiovascular:      Rate and Rhythm: Bradycardia present. Rhythm irregularly irregular.      Heart sounds: Normal heart sounds, S1 normal and S2 normal.   Pulmonary:      Comments: Diminished  "BS at bases  Abdominal:      Palpations: Abdomen is soft.   Musculoskeletal:      Right lower leg: Edema present.      Left lower leg: Edema present.   Skin:     Comments: CVI BLE    Neurological:      Comments: Sleeping          Significant Labs: CMP   Recent Labs   Lab 09/13/23  0700 09/14/23  0423    145   K 3.4* 3.4*   * 115*   CO2 19* 20*   GLU 81 82   BUN 62* 63*   CREATININE 2.9* 2.8*   CALCIUM 7.4* 7.3*   ALBUMIN 1.4* 1.4*   ANIONGAP 10 10   , CBC   Recent Labs   Lab 09/12/23  1406 09/13/23  0700   WBC  --  11.40   HGB 8.0* 8.0*   HCT 26.4* 26.3*   PLT  --  250   , Troponin No results for input(s): "TROPONINI" in the last 48 hours., and All pertinent lab results from the last 24 hours have been reviewed.    Significant Imaging: Echocardiogram: Transthoracic echo (TTE) complete (Cupid Only):   Results for orders placed or performed during the hospital encounter of 09/02/23   Echo   Result Value Ref Range    BSA 3.07 m2    LVOT stroke volume 89.42 cm3    LVIDd 5.67 3.5 - 6.0 cm    LV Systolic Volume 62.34 mL    LV Systolic Volume Index 21.7 mL/m2    LVIDs 3.81 2.1 - 4.0 cm    LV Diastolic Volume 158.42 mL    LV Diastolic Volume Index 55.20 mL/m2    IVS 1.27 (A) 0.6 - 1.1 cm    LVOT diameter 1.92 cm    LVOT area 2.9 cm2    FS 33 28 - 44 %    Left Ventricle Relative Wall Thickness 0.46 cm    Posterior Wall 1.29 (A) 0.6 - 1.1 cm    LV mass 312.73 g    LV Mass Index 109 g/m2    TR Max Rodolfo 3.67 m/s    IVRT 68.51 msec    LVOT peak rodolfo 1.40 m/s    Left Ventricular Outflow Tract Mean Velocity 1.02 cm/s    Left Ventricular Outflow Tract Mean Gradient 4.56 mmHg    LA size 4.29 cm    Left Atrium Major Axis 5.65 cm    Left Atrium Minor Axis 5.51 cm    RVDD 4.37 cm    RVOT peak VTI 24.9 cm    RA Major Axis 6.13 cm    AV mean gradient 7 mmHg    AV peak gradient 13 mmHg    Ao peak rodolfo 1.78 m/s    Ao VTI 33.70 cm    LVOT peak VTI 30.90 cm    AV valve area 2.65 cm²    AV Velocity Ratio 0.79     AV index " (prosthetic) 0.92     UMESH by Velocity Ratio 2.28 cm²    Triscuspid Valve Regurgitation Peak Gradient 54 mmHg    PV mean gradient 4 mmHg    PV PEAK VELOCITY 1.56 m/s    PV peak gradient 10 mmHg    Pulmonary Valve Mean Velocity 1.00 m/s    RVOT peak trae 1.37 m/s    Ao root annulus 3.93 cm    STJ 2.63 cm    Ascending aorta 3.24 cm    IVC diameter 1.87 cm    ZLVIDS -15.81     ZLVIDD -21.70     LA Volume Index 35.4 mL/m2    LA volume 101.72 cm3    LA WIDTH 5.0 cm    TAPSE 1.40 cm    RA Width 4.5 cm    TV resting pulmonary artery pressure 57 mmHg    RV TB RVSP 7 mmHg    Est. RA pres 3 mmHg    Narrative      Left Ventricle: The left ventricle is normal in size. Mildly increased   wall thickness. There is concentric remodeling. Normal wall motion. Septal   motion is normal. There is normal systolic function with a visually   estimated ejection fraction of 55 - 70%. There is normal diastolic   function.    Left Atrium: Left atrium is mildly dilated.    Right Ventricle: Mild right ventricular enlargement. Wall thickness is   normal. Right ventricle wall motion  is normal. Systolic function is   normal.    Aortic Valve: The aortic valve is a trileaflet valve. There is moderate   aortic valve sclerosis.    Tricuspid Valve: There is moderate regurgitation with a centrally   directed jet.    Pulmonary Artery: The estimated pulmonary artery systolic pressure is   57 mmHg.    IVC/SVC: Normal venous pressure at 3 mmHg.     , EKG: Reviewed, and X-Ray: CXR: X-Ray Chest 1 View (CXR):   Results for orders placed or performed during the hospital encounter of 09/02/23   X-Ray Chest 1 View    Narrative    EXAMINATION:  XR CHEST 1 VIEW    CLINICAL HISTORY:  Shortness of breath    FINDINGS:  Comparison is made with the most recent prior chest x-ray.  Stable cardiomegaly and aortic atherosclerosis..  The lungs appear clear of active disease. No acute appearing infiltrate, pleural effusion or pneumothorax identified.      Impression     No acute findings.      Electronically signed by: Live Duckworth MD  Date:    09/02/2023  Time:    08:03    and X-Ray Chest PA and Lateral (CXR): No results found for this visit on 09/02/23.    Assessment and Plan:   Patient who presents with debility/septic shock. Noted to be bradycardiac with pauses. All pauses < 3 seconds and likely during sleep. Patient asymptomatic. Continue to monitor. No PPM warranted at present time.     * Septic shock  -Mgmt as per hospital medicine    UTI (urinary tract infection)  -Mgmt as per hospital medicine, on abx    Atrial fibrillation  -HR stable during exam  -Pauses < 3 seconds, likely during sleep  -Patient asymptomatic  -Continue to monitor  -No indication for PPM at present time  -Continue Eliquis as tolerated (monitor H/H)    Sleep apnea  -Needs to be compliant with CPAP    Morbid obesity with BMI of 60.0-69.9, adult  -Weight loss        VTE Risk Mitigation (From admission, onward)         Ordered     apixaban tablet 5 mg  2 times daily         09/02/23 1105     IP VTE HIGH RISK PATIENT  Once         09/02/23 0853     Place sequential compression device  Until discontinued         09/02/23 0853                Thank you for your consult. I will follow-up with patient. Please contact us if you have any additional questions.    Rubina Cheng PA-C  Cardiology   [facility] - Telemetry ([facility])

## 2023-09-14 NOTE — PLAN OF CARE
Trilogy delivered to patient.       09/14/23 1334   Post-Acute Status   Post-Acute Authorization HME   [facility] Status Set-up Complete/Auth obtained   Discharge Plan   Discharge Plan A Home Health

## 2023-09-15 PROBLEM — I95.9 HYPOTENSION: Status: ACTIVE | Noted: 2023-01-01

## 2023-09-15 PROBLEM — I50.9 CHF (CONGESTIVE HEART FAILURE): Status: ACTIVE | Noted: 2023-01-01

## 2023-09-15 NOTE — HPI
Gene Rothman is 78 yo male with significant past medical history of chronic respiratory failure with hypercapnea, morbid obesity, hypoventilation syndrome, atrial fibrillation, CHF, CKD, HTN, Depression, HLD, Hypothyroid, Lymphedema, PVD, Sleep apnea, and noncompliance who presented to ED for further evaluation of hypotension. Pt admitted from 9/2/23 to 9/14/23 and treated for Septic Shock in the setting of UTI. Pt was referred to ED today after being seen by home health staff. Son reports pt had not taken all of his medications and continues to adjust to wearing the home ventilator. Hospital Medicine contacted and pt evaluated by NP/MD at bedside in ED. Vital signs normalized and /82 and O2 sat of 100% noted. Pt alert and oriented upon exam and requesting to discharge back to home with wife/son at bedside in agreement. ER work up showed H/H 8.3/27.5, BUN 61/Cr 2.7, , Troponin 0.027, CO2 48.4. Chest xray showed mild pulmonary edema.  IV Lasix x 1 dose given to patient. Pt has improved upon exam and remains at baseline- no signs of acute distress witnessed or reported. Case discussed with ED and pt/family with pt expressing that he is not sure why his here and made multiple requests to discharge back to home. All parties in agreement for discharge. Due to pt stability and desire for discharge pt transported back to home per EMS.

## 2023-09-15 NOTE — PROGRESS NOTES
C3 nurse attempted to contact Gene Rothman for a TCC post hospital discharge follow up call. No answer. Left voicemail with callback information. The patient does not have a scheduled HOSFU appointment. Message sent to PCP staff for assistance with scheduling visit with patient.

## 2023-09-15 NOTE — PROGRESS NOTES
C3 nurse spoke with Gene Rothman's wife for a TCC post hospital discharge follow up call. The patient has a scheduled [facility] appointment with Cheko Ho NP  on 09/22/23 @ 0800.

## 2023-09-15 NOTE — ED PROVIDER NOTES
SCRIBE #1 NOTE: I, Shaka Tovar, am scribing for, and in the presence of, Lynda Holly MD. I have scribed the entire note.       History     Chief Complaint   Patient presents with    Shortness of Breath     Shortness of breath requiring BiPap in field per EMS.       Review of patient's allergies indicates:   Allergen Reactions    Sulfamethoxazole-trimethoprim Itching and Shortness Of Breath    Sulfamethoxazole          History of Present Illness     HPI    Limited HPI and ROS due to pt's severe respiratory distress.    9/15/2023, 5:26 PM  History obtained from the EMS      History of Present Illness: Gene Rothman is a 77 y.o. male patient with a PMHx of HTN, CKD, CHF, AFIB, pneumonia, CAD, hypoxemic respiratory failure, PVD, tobacco abuse, hypothyroidism, and depression who presents to the Emergency Department for evaluation of SOB which onset gradually today. Pt has had multiple episodes of sepsis within the past few months, resulting in similar presentation. Pt's oxygen saturation was 67% RA per home nurse from [facility]. Pt was placed on CPAP by nurse which improved stats to 85%. Upon EMS arrival, pt was placed on BiPAP which improved oxygen saturation to 100%.  Associated sxs include low BP.     Arrival mode: EMS    PCP: Ami Zarate DO        Past Medical History:  Past Medical History:   Diagnosis Date    Acute hypoxemic respiratory failure 9/17/2022    Arthritis     Atrial fibrillation 3/1/2023    CHF (congestive heart failure)     Chronic kidney disease     Coronary artery disease     Depression     Hypertension     Hypertensive heart disease with heart failure 9/16/2022    Hypothyroidism     Lymphedema of lower extremity     Nephrolithiasis     Obesity     Peripheral vascular disease     Pneumonia 9/17/2022    Recurrent cellulitis of lower leg     Sleep apnea     can't stand the CPAP , sleeps elevated in hospital bed or chair    Tobacco dependence     resolved       Past Surgical History:  Past  Surgical History:   Procedure Laterality Date    ADENOIDECTOMY      BACK SURGERY  ;  1976    x2 for disc; scar tissue removed    debridement of bilateral leg ulcers Bilateral 2017    Dr Hernandez    INNER EAR SURGERY Bilateral     separate - pinnaplasty , new eardrms constructed    KIDNEY STONE SURGERY Left  approx    open    TONSILLECTOMY           Family History:  Family History   Problem Relation Age of Onset    Cancer Mother         ? abd also    Diabetes Mother     Hypertension Mother     Cancer Father         ? abdonimal origin stomach/liver or pancreas    Heart disease Father         pacer    Cancer Brother         skin-part of ext ear removed    Heart disease Brother         CABG3    Alzheimer's disease Brother     Asthma Daughter          26 w/ asthma    Alcohol abuse Maternal Grandfather     Stroke Neg Hx     COPD Neg Hx     Mental illness Neg Hx     Mental retardation Neg Hx     Kidney disease Neg Hx        Social History:  Social History     Tobacco Use    Smoking status: Former     Current packs/day: 0.00     Average packs/day: 1.5 packs/day for 1 year (1.5 ttl pk-yrs)     Types: Cigarettes     Start date:      Quit date:      Years since quittin.7    Smokeless tobacco: Never   Substance and Sexual Activity    Alcohol use: Not Currently    Drug use: Never    Sexual activity: Never        Review of Systems     Review of Systems   Unable to perform ROS: Severe respiratory distress   Respiratory:  Positive for shortness of breath.    Cardiovascular:         (+) Low BP        Physical Exam     Initial Vitals   BP Pulse Resp Temp SpO2   09/15/23 1701 09/15/23 1701 09/15/23 1701 09/15/23 1730 09/15/23 1701   (!) 93/51 72 (!) 25 97.7 °F (36.5 °C) 100 %      MAP       --                 Physical Exam  Nursing Notes and Vital Signs Reviewed.  Constitutional: Patient is in no acute distress. Super morbid. Pale and ill-appearing.  Head: Atraumatic. Normocephalic.  Eyes:  PERRL. EOM intact. Conjunctivae are not pale. No scleral icterus.  ENT: Mucous membranes are moist. Oropharynx is clear and symmetric.    Neck: Supple. Full ROM. No lymphadenopathy.  Cardiovascular: Regular rate. Regular rhythm. No murmurs, rubs, or gallops. Distal pulses are 2+ and symmetric.  Pulmonary/Chest: Tachypnea. Pt on CPAP.  Abdominal: Soft and non-distended.  There is no tenderness.  No rebound, guarding, or rigidity. Good bowel sounds.  Genitourinary: No CVA tenderness  Musculoskeletal: Moves all extremities. No obvious deformities. 4+ BLE edema. No calf tenderness.  Skin: Warm and dry. Ace bandaging on extremities, covering venious stasis wounds.  Neurological:  Alert, awake, and appropriate.  Normal speech.  No acute focal neurological deficits are appreciated. GCS 15.  Psychiatric: Normal affect. Good eye contact. Appropriate in content.     ED Course   Critical Care    Date/Time: 9/15/2023 5:30 PM    Performed by: Lynda Holly MD  Authorized by: Lynda Holly MD  Direct patient critical care time: 8 minutes  Additional history critical care time: 7 minutes  Ordering / reviewing critical care time: 8 minutes  Documentation critical care time: 5 minutes  Consulting other physicians critical care time: 6 minutes  Consult with family critical care time: 4 minutes  Total critical care time (exclusive of procedural time) : 38 minutes  Critical care time was exclusive of separately billable procedures and treating other patients and teaching time.  Critical care was necessary to treat or prevent imminent or life-threatening deterioration of the following conditions: respiratory failure.  Critical care was time spent personally by me on the following activities: development of treatment plan with patient or surrogate, discussions with consultants, interpretation of cardiac output measurements, examination of patient, evaluation of patient's response to treatment, obtaining history from patient  "or surrogate, ordering and performing treatments and interventions, ordering and review of laboratory studies, ordering and review of radiographic studies, pulse oximetry, re-evaluation of patient's condition, review of old charts and blood draw for specimens.        ED Vital Signs:  Vitals:    09/15/23 1701 09/15/23 1715 09/15/23 1729 09/15/23 1730   BP: (!) 93/51 100/73 117/63    Pulse: 72 67 63 64   Resp: (!) 25 18 17 13   Temp:    97.7 °F (36.5 °C)   TempSrc:    Oral   SpO2: 100% 99% 100% 100%   Weight: (!) 192.2 kg (423 lb 11.6 oz)      Height: 6' 1" (1.854 m)       09/15/23 1738 09/15/23 1748 09/15/23 2000 09/15/23 2103   BP: 117/63  (!) 115/53 (!) 126/58   Pulse: 63 67 62 67   Resp: 18  14 14   Temp:       TempSrc:       SpO2: 97%  95% 98%   Weight:       Height:        09/15/23 2151   BP: 131/62   Pulse: 71   Resp: 17   Temp: 98 °F (36.7 °C)   TempSrc:    SpO2: (!) 94%   Weight:    Height:        Abnormal Lab Results:  Labs Reviewed   CBC W/ AUTO DIFFERENTIAL - Abnormal; Notable for the following components:       Result Value    RBC 2.78 (*)     Hemoglobin 8.3 (*)     Hematocrit 27.5 (*)     MCV 99 (*)     MCHC 30.2 (*)     RDW 17.8 (*)     MPV 9.1 (*)     All other components within normal limits   COMPREHENSIVE METABOLIC PANEL - Abnormal; Notable for the following components:    Sodium 146 (*)     Chloride 116 (*)     CO2 18 (*)     BUN 61 (*)     Creatinine 2.7 (*)     Calcium 7.6 (*)     Albumin 1.6 (*)     eGFR 23 (*)     All other components within normal limits   B-TYPE NATRIURETIC PEPTIDE - Abnormal; Notable for the following components:     (*)     All other components within normal limits   TROPONIN I - Abnormal; Notable for the following components:    Troponin I 0.027 (*)     All other components within normal limits   PROCALCITONIN - Abnormal; Notable for the following components:    Procalcitonin 0.43 (*)     All other components within normal limits   ISTAT PROCEDURE - Abnormal; " Notable for the following components:    POC PH 7.249 (*)     POC PCO2 48.4 (*)     POC PO2 162 (*)     POC HCO3 21.2 (*)     All other components within normal limits   CULTURE, BLOOD    Narrative:     Aerobic and anaerobic   CULTURE, BLOOD    Narrative:     Aerobic and anaerobic   INFLUENZA A & B BY MOLECULAR   LACTIC ACID, PLASMA        All Lab Results:  Results for orders placed or performed during the hospital encounter of 09/15/23   Blood culture x two cultures. Draw prior to antibiotics.    Specimen: Peripheral, Upper Arm, Right; Blood   Result Value Ref Range    Blood Culture, Routine No Growth to date     Blood Culture, Routine No Growth to date     Blood Culture, Routine No Growth to date    Blood culture x two cultures. Draw prior to antibiotics.    Specimen: Peripheral, Upper Arm, Left; Blood   Result Value Ref Range    Blood Culture, Routine No Growth to date     Blood Culture, Routine No Growth to date     Blood Culture, Routine No Growth to date    Influenza A & B by Molecular    Specimen: Nasopharyngeal Swab   Result Value Ref Range    Influenza A, Molecular Negative Negative    Influenza B, Molecular Negative Negative    Flu A & B Source Nasal swab    CBC auto differential   Result Value Ref Range    WBC 8.45 3.90 - 12.70 K/uL    RBC 2.78 (L) 4.60 - 6.20 M/uL    Hemoglobin 8.3 (L) 14.0 - 18.0 g/dL    Hematocrit 27.5 (L) 40.0 - 54.0 %    MCV 99 (H) 82 - 98 fL    MCH 29.9 27.0 - 31.0 pg    MCHC 30.2 (L) 32.0 - 36.0 g/dL    RDW 17.8 (H) 11.5 - 14.5 %    Platelets 261 150 - 450 K/uL    MPV 9.1 (L) 9.2 - 12.9 fL    Immature Granulocytes 0.5 0.0 - 0.5 %    Gran # (ANC) 4.4 1.8 - 7.7 K/uL    Immature Grans (Abs) 0.04 0.00 - 0.04 K/uL    Lymph # 2.8 1.0 - 4.8 K/uL    Mono # 1.0 0.3 - 1.0 K/uL    Eos # 0.1 0.0 - 0.5 K/uL    Baso # 0.03 0.00 - 0.20 K/uL    nRBC 0 0 /100 WBC    Gran % 52.5 38.0 - 73.0 %    Lymph % 33.4 18.0 - 48.0 %    Mono % 11.7 4.0 - 15.0 %    Eosinophil % 1.5 0.0 - 8.0 %    Basophil % 0.4  0.0 - 1.9 %    Differential Method Automated    Comprehensive metabolic panel   Result Value Ref Range    Sodium 146 (H) 136 - 145 mmol/L    Potassium 3.6 3.5 - 5.1 mmol/L    Chloride 116 (H) 95 - 110 mmol/L    CO2 18 (L) 23 - 29 mmol/L    Glucose 93 70 - 110 mg/dL    BUN 61 (H) 8 - 23 mg/dL    Creatinine 2.7 (H) 0.5 - 1.4 mg/dL    Calcium 7.6 (L) 8.7 - 10.5 mg/dL    Total Protein 6.0 6.0 - 8.4 g/dL    Albumin 1.6 (L) 3.5 - 5.2 g/dL    Total Bilirubin 0.3 0.1 - 1.0 mg/dL    Alkaline Phosphatase 69 55 - 135 U/L    AST 17 10 - 40 U/L    ALT 10 10 - 44 U/L    eGFR 23 (A) >60 mL/min/1.73 m^2    Anion Gap 12 8 - 16 mmol/L   Lactic acid, plasma #1   Result Value Ref Range    Lactate (Lactic Acid) 1.0 0.5 - 2.2 mmol/L   Brain natriuretic peptide   Result Value Ref Range     (H) 0 - 99 pg/mL   Troponin I   Result Value Ref Range    Troponin I 0.027 (H) 0.000 - 0.026 ng/mL   Procalcitonin   Result Value Ref Range    Procalcitonin 0.43 (H) <0.25 ng/mL   ISTAT PROCEDURE   Result Value Ref Range    POC PH 7.249 (LL) 7.35 - 7.45    POC PCO2 48.4 (H) 35 - 45 mmHg    POC PO2 162 (H) 80 - 100 mmHg    POC HCO3 21.2 (L) 24 - 28 mmol/L    POC BE -6 -2 to 2 mmol/L    POC SATURATED O2 99 95 - 100 %    Rate 16     Sample ARTERIAL     Site LR     Allens Test Pass     DelSys CPAP/BiPAP     Mode BiPAP     FiO2 50     Spont Rate 18     Sp02 97     IP 14     EP 8         Imaging Results:  Imaging Results              X-Ray Chest AP Portable (Final result)  Result time 09/15/23 17:24:16      Final result by Brannon Orellana MD (09/15/23 17:24:16)                   Impression:      As above      Electronically signed by: Brannon Orellana  Date:    09/15/2023  Time:    17:24               Narrative:    EXAMINATION:  XR CHEST AP PORTABLE    CLINICAL HISTORY:  Sepsis;    TECHNIQUE:  Single frontal view of the chest was performed.    COMPARISON:  None    FINDINGS:  Cardiomegaly.  Atherosclerotic.  Suggestion of mild pulmonary edema.  Correlate  clinically to CHF.  Retrocardiac opacity not excluded.    Bones are intact.                                       The EKG was ordered, reviewed, and independently interpreted by the ED provider.  Interpretation time: 17:29  Rate: 68 BPM  Rhythm: atrial fibrillation  Interpretation: Low voltage QRS. Nonspecific ST and T wave abnormality. No STEMI.             The Emergency Provider reviewed the vital signs and test results, which are outlined above.     ED Discussion       6:29 PM: Reassessed pt at this time. Discussed with pt all pertinent ED information and results. Discussed pt dx and plan of tx. Gave pt all f/u and return to the ED instructions. All questions and concerns were addressed at this time. Pt expresses understanding of information and instructions, and is comfortable with plan to discharge. Pt is stable for discharge.    I discussed with patient and/or family/caretaker that evaluation in the ED does not suggest any emergent or life threatening medical conditions requiring immediate intervention beyond what was provided in the ED, and I believe patient is safe for discharge.  Regardless, an unremarkable evaluation in the ED does not preclude the development or presence of a serious of life threatening condition. As such, patient was instructed to return immediately for any worsening or change in current symptoms.        Medical Decision Making  Amount and/or Complexity of Data Reviewed  Labs: ordered. Decision-making details documented in ED Course.  Radiology: ordered. Decision-making details documented in ED Course.  ECG/medicine tests:  Decision-making details documented in ED Course.    Risk  Prescription drug management.                ED Medication(s):  Medications   furosemide injection 40 mg (40 mg Intravenous Given 9/15/23 1822)       Discharge Medication List as of 9/15/2023  9:00 PM           Follow-up Information       Schedule an appointment as soon as possible for a visit  with Elliot  DO Ami.    Specialty: Internal Medicine  Why: As needed  Contact information:  [id number] [place] [address] DR Reanna [place]AY [phone]                                 Scribe Attestation:   Scribe #1: I performed the above scribed service and the documentation accurately describes the services I performed. I attest to the accuracy of the note.     Attending:   Physician Attestation Statement for Scribe #1: I, Lynda Holly MD, personally performed the services described in this documentation, as scribed by Shaka Tovar, in my presence, and it is both accurate and complete.           Clinical Impression       ICD-10-CM ICD-9-CM   1. SOB (shortness of breath)  R06.02 786.05   2. Hypotension  I95.9 458.9       Disposition:   Disposition: Discharged  Condition: Stable        Lynda Holly MD  09/18/23 [id number]

## 2023-09-15 NOTE — SUBJECTIVE & OBJECTIVE
Past Medical History:   Diagnosis Date    Acute hypoxemic respiratory failure 9/17/2022    Arthritis     Atrial fibrillation 3/1/2023    CHF (congestive heart failure)     Chronic kidney disease     Coronary artery disease     Depression     Hypertension     Hypertensive heart disease with heart failure 9/16/2022    Hypothyroidism     Lymphedema of lower extremity     Nephrolithiasis     Obesity     Peripheral vascular disease     Pneumonia 9/17/2022    Recurrent cellulitis of lower leg     Sleep apnea     can't stand the CPAP , sleeps elevated in hospital bed or chair    Tobacco dependence     resolved       Past Surgical History:   Procedure Laterality Date    ADENOIDECTOMY  1961    BACK SURGERY  1975;  1976    x2 for disc; scar tissue removed    debridement of bilateral leg ulcers Bilateral 01/01/2017    Dr Hernandez    INNER EAR SURGERY Bilateral 1961    separate - pinnaplasty , new eardrms constructed    KIDNEY STONE SURGERY Left 1976 approx    open    TONSILLECTOMY  1961       Review of patient's allergies indicates:   Allergen Reactions    Sulfamethoxazole-trimethoprim Itching and Shortness Of Breath    Sulfamethoxazole        Current Facility-Administered Medications on File Prior to Encounter   Medication    [DISCONTINUED] acetaminophen tablet 650 mg    [DISCONTINUED] apixaban tablet 5 mg    [DISCONTINUED] aspirin chewable tablet 81 mg    [DISCONTINUED] atorvastatin tablet 40 mg    [DISCONTINUED] cefTRIAXone (ROCEPHIN) 1 g in dextrose 5 % in water (D5W) 100 mL IVPB (MB+)    [DISCONTINUED] dextrose 10% bolus 125 mL 125 mL    [DISCONTINUED] dextrose 10% bolus 250 mL 250 mL    [DISCONTINUED] famotidine tablet 20 mg    [DISCONTINUED] fludrocortisone tablet 100 mcg    [DISCONTINUED] glucagon (human recombinant) injection 1 mg    [DISCONTINUED] glucose chewable tablet 16 g    [DISCONTINUED] glucose chewable tablet 24 g    [DISCONTINUED] insulin aspart U-100 pen 0-5 Units    [DISCONTINUED] lactated ringers infusion     [DISCONTINUED] levalbuterol nebulizer solution 1.2495 mg    [DISCONTINUED] levothyroxine tablet 50 mcg    [DISCONTINUED] metronidazole 1% gel    [DISCONTINUED] miconazole nitrate 2% ointment    [DISCONTINUED] midodrine tablet 15 mg    [DISCONTINUED] ondansetron injection 4 mg    [DISCONTINUED] sodium chloride 0.9% flush 10 mL     Current Outpatient Medications on File Prior to Encounter   Medication Sig    acetaminophen (TYLENOL) 500 MG tablet Take 500 mg by mouth every 6 (six) hours as needed for Pain.    apixaban (ELIQUIS) 5 mg Tab Take 1 tablet (5 mg total) by mouth 2 (two) times daily.    aspirin 81 MG Chew Take 1 tablet (81 mg total) by mouth once daily.    atorvastatin (LIPITOR) 40 MG tablet Take 1 tablet (40 mg total) by mouth once daily.    fludrocortisone (FLORINEF) 0.1 mg Tab Take 1 tablet (100 mcg total) by mouth 2 (two) times daily. (Patient not taking: Reported on 9/15/2023)    furosemide (LASIX) 40 MG tablet Take 1 tablet (40 mg total) by mouth once daily.    levalbuterol (XOPENEX) 1.25 mg/3 mL nebulizer solution Take 3 mLs (1.25 mg total) by nebulization every 4 (four) hours as needed for Wheezing. Rescue    levothyroxine (SYNTHROID) 50 MCG tablet Take 1 tablet (50 mcg total) by mouth before breakfast.    metronidazole 1% (METROGEL) 1 % Gel Apply topically once daily.    miconazole nitrate 2% (MICOTIN) 2 % Oint Apply topically 2 (two) times daily. (Patient not taking: Reported on 9/15/2023)    midodrine (PROAMATINE) 5 MG Tab Take 3 tablets (15 mg total) by mouth 3 (three) times daily.    nebulizer and compressor (HOME NEBULIZER PLUS SIDESTREAM) Diana use as directed    senna-docusate 8.6-50 mg (PERICOLACE) 8.6-50 mg per tablet Take 1 tablet by mouth daily as needed for Constipation.    tiotropium (SPIRIVA) 18 mcg inhalation capsule Inhale 1 capsule (18 mcg total) into the lungs once daily. Controller     Family History       Problem Relation (Age of Onset)    Alcohol abuse Maternal Grandfather     Alzheimer's disease Brother    Asthma Daughter    Cancer Mother, Father, Brother    Diabetes Mother    Heart disease Father, Brother    Hypertension Mother          Tobacco Use    Smoking status: Former     Current packs/day: 0.00     Average packs/day: 1.5 packs/day for 1 year (1.5 ttl pk-yrs)     Types: Cigarettes     Start date:      Quit date:      Years since quittin.7    Smokeless tobacco: Never   Substance and Sexual Activity    Alcohol use: Not Currently    Drug use: Never    Sexual activity: Never     Review of Systems   Constitutional:  Negative for activity change, appetite change and fatigue.   HENT:  Negative for congestion, sinus pressure, sinus pain and trouble swallowing.    Respiratory:  Positive for shortness of breath. Negative for wheezing.    Cardiovascular:  Positive for leg swelling (lymphedema). Negative for chest pain.   Gastrointestinal:  Negative for abdominal pain, nausea and vomiting.   Genitourinary:  Negative for difficulty urinating, dysuria, flank pain, frequency and urgency.   Musculoskeletal:  Positive for gait problem.   Skin:  Positive for color change and wound.   Neurological:  Positive for weakness. Negative for dizziness and headaches.   Hematological:  Bruises/bleeds easily.   Psychiatric/Behavioral:  Negative for confusion and sleep disturbance. The patient is not nervous/anxious.      Objective:     Vital Signs (Most Recent):  Temp: 97.7 °F (36.5 °C) (09/15/23 1730)  Pulse: 67 (09/15/23 1748)  Resp: 18 (09/15/23 1738)  BP: 117/63 (09/15/23 1738)  SpO2: 97 % (09/15/23 1738) Vital Signs (24h Range):  Temp:  [97.7 °F (36.5 °C)] 97.7 °F (36.5 °C)  Pulse:  [63-72] 67  Resp:  [13-25] 18  SpO2:  [97 %-100 %] 97 %  BP: ()/(51-73) 117/63     Weight: (!) 192.2 kg (423 lb 11.6 oz)  Body mass index is 55.9 kg/m².     Physical Exam  Constitutional:       Appearance: He is obese.   HENT:      Head: Normocephalic.      Nose: Nose normal.   Cardiovascular:      Rate and  Rhythm: Bradycardia present. Rhythm irregular.      Pulses: Normal pulses.      Heart sounds: Normal heart sounds.   Pulmonary:      Breath sounds: Examination of the right-upper field reveals decreased breath sounds. Examination of the left-upper field reveals decreased breath sounds. Examination of the right-lower field reveals decreased breath sounds. Examination of the left-lower field reveals decreased breath sounds. Decreased breath sounds present.   Abdominal:      General: Bowel sounds are normal.      Palpations: Abdomen is soft.      Comments: Morbidly obese    Genitourinary:     Comments: Deferred   Musculoskeletal:      Cervical back: Normal range of motion.      Right lower leg: Edema present.      Left lower leg: Edema present.      Comments: Lymphedema to BLE    Skin:     General: Skin is warm and dry.      Findings: Bruising present.      Comments: ACE bandage to BLE   Neurological:      Mental Status: He is alert and oriented to person, place, and time. Mental status is at baseline.   Psychiatric:         Mood and Affect: Mood normal.         Behavior: Behavior normal.          Significant Labs: All pertinent labs within the past 24 hours have been reviewed.  CBC:   Recent Labs   Lab 09/15/23  1706   WBC 8.45   HGB 8.3*   HCT 27.5*        CMP:   Recent Labs   Lab 09/14/23  0423 09/15/23  1706    146*   K 3.4* 3.6   * 116*   CO2 20* 18*   GLU 82 93   BUN 63* 61*   CREATININE 2.8* 2.7*   CALCIUM 7.3* 7.6*   PROT  --  6.0   ALBUMIN 1.4* 1.6*   BILITOT  --  0.3   ALKPHOS  --  69   AST  --  17   ALT  --  10   ANIONGAP 10 12     Lactic Acid:   Recent Labs   Lab 09/15/23  1706   LACTATE 1.0     POCT Glucose:   Recent Labs   Lab 09/13/23  2303 09/14/23  0520   POCTGLUCOSE 82 85       Significant Imaging: I have reviewed all pertinent imaging results/findings within the past 24 hours.

## 2023-09-16 NOTE — ASSESSMENT & PLAN NOTE
Body mass index is 55.9 kg/m². Morbid obesity complicates all aspects of disease management from diagnostic modalities to treatment. Weight loss encouraged and health benefits explained to patient.

## 2023-09-16 NOTE — CONSULTS
[facility] - Emergency Dept.  Hospital Medicine  Consult Note    Patient Name: Gene Rothman  MRN: [id number]  Admission Date: 9/15/2023  Hospital Length of Stay: 0 days  Attending Physician: Lynda Holly MD   Primary Care Provider: Ami Zarate DO           Patient information was obtained from patient, spouse/SO, past medical records and ER records.     Consults  Subjective:     Principal Problem: Chronic Respiratory Failure     Chief Complaint:   Chief Complaint   Patient presents with    Shortness of Breath     Shortness of breath requiring BiPap in field per EMS.          HPI: Gene Rothman is 78 yo male with significant past medical history of chronic respiratory failure with hypercapnea, morbid obesity, hypoventilation syndrome, atrial fibrillation, CHF, CKD, HTN, Depression, HLD, Hypothyroid, Lymphedema, PVD, Sleep apnea, and noncompliance who presented to ED for further evaluation of hypotension. Pt admitted from 9/2/23 to 9/14/23 and treated for Septic Shock in the setting of UTI. Pt was referred to ED today after being seen by home health staff. Son reports pt had not taken all of his medications today and continues to adjust to wearing the home ventilator. Hospital Medicine contacted and pt evaluated by NP/MD at bedside in ED. Vital signs normalized and /82 and O2 sat of 100% noted on BIPAP. Pt alert and oriented upon exam and requesting to discharge back to home with wife/son at bedside in agreement. ER work up showed H/H 8.3/27.5, BUN 61/Cr 2.7, , Troponin 0.027, CO2 48.4. Chest xray showed mild pulmonary edema.  IV Lasix x 1 dose given and BIPAP in place ED. Pt stable upon exam and remains at baseline- no signs of acute distress witnessed or reported. Case discussed with ED and pt/family with pt expressing that he is not sure why his here and made multiple requests to discharge back to home. All parties in agreement for discharge. Due to pt stability and desire for discharge pt  transported back to home per EMS.       Past Medical History:   Diagnosis Date    Acute hypoxemic respiratory failure 9/17/2022    Arthritis     Atrial fibrillation 3/1/2023    CHF (congestive heart failure)     Chronic kidney disease     Coronary artery disease     Depression     Hypertension     Hypertensive heart disease with heart failure 9/16/2022    Hypothyroidism     Lymphedema of lower extremity     Nephrolithiasis     Obesity     Peripheral vascular disease     Pneumonia 9/17/2022    Recurrent cellulitis of lower leg     Sleep apnea     can't stand the CPAP , sleeps elevated in hospital bed or chair    Tobacco dependence     resolved       Past Surgical History:   Procedure Laterality Date    ADENOIDECTOMY  1961    BACK SURGERY  1975;  1976    x2 for disc; scar tissue removed    debridement of bilateral leg ulcers Bilateral 01/01/2017    Dr Hernandez    INNER EAR SURGERY Bilateral 1961    separate - pinnaplasty , new eardrms constructed    KIDNEY STONE SURGERY Left 1976 approx    open    TONSILLECTOMY  1961       Review of patient's allergies indicates:   Allergen Reactions    Sulfamethoxazole-trimethoprim Itching and Shortness Of Breath    Sulfamethoxazole        Current Facility-Administered Medications on File Prior to Encounter   Medication    [DISCONTINUED] acetaminophen tablet 650 mg    [DISCONTINUED] apixaban tablet 5 mg    [DISCONTINUED] aspirin chewable tablet 81 mg    [DISCONTINUED] atorvastatin tablet 40 mg    [DISCONTINUED] cefTRIAXone (ROCEPHIN) 1 g in dextrose 5 % in water (D5W) 100 mL IVPB (MB+)    [DISCONTINUED] dextrose 10% bolus 125 mL 125 mL    [DISCONTINUED] dextrose 10% bolus 250 mL 250 mL    [DISCONTINUED] famotidine tablet 20 mg    [DISCONTINUED] fludrocortisone tablet 100 mcg    [DISCONTINUED] glucagon (human recombinant) injection 1 mg    [DISCONTINUED] glucose chewable tablet 16 g    [DISCONTINUED] glucose chewable tablet 24 g    [DISCONTINUED]  insulin aspart U-100 pen 0-5 Units    [DISCONTINUED] lactated ringers infusion    [DISCONTINUED] levalbuterol nebulizer solution 1.2495 mg    [DISCONTINUED] levothyroxine tablet 50 mcg    [DISCONTINUED] metronidazole 1% gel    [DISCONTINUED] miconazole nitrate 2% ointment    [DISCONTINUED] midodrine tablet 15 mg    [DISCONTINUED] ondansetron injection 4 mg    [DISCONTINUED] sodium chloride 0.9% flush 10 mL     Current Outpatient Medications on File Prior to Encounter   Medication Sig    acetaminophen (TYLENOL) 500 MG tablet Take 500 mg by mouth every 6 (six) hours as needed for Pain.    apixaban (ELIQUIS) 5 mg Tab Take 1 tablet (5 mg total) by mouth 2 (two) times daily.    aspirin 81 MG Chew Take 1 tablet (81 mg total) by mouth once daily.    atorvastatin (LIPITOR) 40 MG tablet Take 1 tablet (40 mg total) by mouth once daily.    fludrocortisone (FLORINEF) 0.1 mg Tab Take 1 tablet (100 mcg total) by mouth 2 (two) times daily. (Patient not taking: Reported on 9/15/2023)    furosemide (LASIX) 40 MG tablet Take 1 tablet (40 mg total) by mouth once daily.    levalbuterol (XOPENEX) 1.25 mg/3 mL nebulizer solution Take 3 mLs (1.25 mg total) by nebulization every 4 (four) hours as needed for Wheezing. Rescue    levothyroxine (SYNTHROID) 50 MCG tablet Take 1 tablet (50 mcg total) by mouth before breakfast.    metronidazole 1% (METROGEL) 1 % Gel Apply topically once daily.    miconazole nitrate 2% (MICOTIN) 2 % Oint Apply topically 2 (two) times daily. (Patient not taking: Reported on 9/15/2023)    midodrine (PROAMATINE) 5 MG Tab Take 3 tablets (15 mg total) by mouth 3 (three) times daily.    nebulizer and compressor (HOME NEBULIZER PLUS SIDESTREAM) Diana use as directed    senna-docusate 8.6-50 mg (PERICOLACE) 8.6-50 mg per tablet Take 1 tablet by mouth daily as needed for Constipation.    tiotropium (SPIRIVA) 18 mcg inhalation capsule Inhale 1 capsule (18 mcg total) into the lungs once daily. Controller      Family History       Problem Relation (Age of Onset)    Alcohol abuse Maternal Grandfather    Alzheimer's disease Brother    Asthma Daughter    Cancer Mother, Father, Brother    Diabetes Mother    Heart disease Father, Brother    Hypertension Mother          Tobacco Use    Smoking status: Former     Current packs/day: 0.00     Average packs/day: 1.5 packs/day for 1 year (1.5 ttl pk-yrs)     Types: Cigarettes     Start date:      Quit date:      Years since quittin.7    Smokeless tobacco: Never   Substance and Sexual Activity    Alcohol use: Not Currently    Drug use: Never    Sexual activity: Never     Review of Systems   Constitutional:  Negative for activity change, appetite change and fatigue.   HENT:  Negative for congestion, sinus pressure, sinus pain and trouble swallowing.    Respiratory:  Positive for shortness of breath. Negative for wheezing.    Cardiovascular:  Positive for leg swelling (lymphedema). Negative for chest pain.   Gastrointestinal:  Negative for abdominal pain, nausea and vomiting.   Genitourinary:  Negative for difficulty urinating, dysuria, flank pain, frequency and urgency.   Musculoskeletal:  Positive for gait problem.   Skin:  Positive for color change and wound.   Neurological:  Positive for weakness. Negative for dizziness and headaches.   Hematological:  Bruises/bleeds easily.   Psychiatric/Behavioral:  Negative for confusion and sleep disturbance. The patient is not nervous/anxious.      Objective:     Vital Signs (Most Recent):  Temp: 97.7 °F (36.5 °C) (09/15/23 1730)  Pulse: 67 (09/15/23 1748)  Resp: 18 (09/15/23 1738)  BP: 117/63 (09/15/23 1738)  SpO2: 97 % (09/15/23 1738) Vital Signs (24h Range):  Temp:  [97.7 °F (36.5 °C)] 97.7 °F (36.5 °C)  Pulse:  [63-72] 67  Resp:  [13-25] 18  SpO2:  [97 %-100 %] 97 %  BP: ()/(51-73) 117/63     Weight: (!) 192.2 kg (423 lb 11.6 oz)  Body mass index is 55.9 kg/m².     Physical Exam  Constitutional:       Appearance:  He is obese.   HENT:      Head: Normocephalic.      Nose: Nose normal.   Cardiovascular:      Rate and Rhythm: Regular-Rhythm irregular.      Pulses: Normal pulses.      Heart sounds: Normal heart sounds.   Pulmonary:      Breath sounds: Examination of the right-upper field reveals decreased breath sounds. Examination of the left-upper field reveals decreased breath sounds. Examination of the right-lower field reveals decreased breath sounds. Examination of the left-lower field reveals decreased breath sounds. Decreased breath sounds present.   Abdominal:      General: Bowel sounds are normal.      Palpations: Abdomen is soft.      Comments: Morbidly obese    Genitourinary:     Comments: Deferred   Musculoskeletal:      Cervical back: Normal range of motion.      Right lower leg: Edema present.      Left lower leg: Edema present.      Comments: Lymphedema to BLE    Skin:     General: Skin is warm and dry.      Findings: Bruising present.      Comments: ACE bandage to BLE   Neurological:      Mental Status: He is alert and oriented to person, place, and time. Mental status is at baseline.   Psychiatric:         Mood and Affect: Mood normal.         Behavior: Behavior normal.          Significant Labs: All pertinent labs within the past 24 hours have been reviewed.  CBC:   Recent Labs   Lab 09/15/23  1706   WBC 8.45   HGB 8.3*   HCT 27.5*        CMP:   Recent Labs   Lab 09/14/23  0423 09/15/23  1706    146*   K 3.4* 3.6   * 116*   CO2 20* 18*   GLU 82 93   BUN 63* 61*   CREATININE 2.8* 2.7*   CALCIUM 7.3* 7.6*   PROT  --  6.0   ALBUMIN 1.4* 1.6*   BILITOT  --  0.3   ALKPHOS  --  69   AST  --  17   ALT  --  10   ANIONGAP 10 12     Lactic Acid:   Recent Labs   Lab 09/15/23  1706   LACTATE 1.0     POCT Glucose:   Recent Labs   Lab 09/13/23  2303 09/14/23  0520   POCTGLUCOSE 82 85       Significant Imaging: I have reviewed all pertinent imaging results/findings within the past 24  hours.    Assessment/Plan:     Hypotension  -chronic   - at baseline in ED with 93/51 (67)  -spontaneously improved in ED to 117/63   -pt takes Midodrine and Florinef outpatient- [facility] pt has not received his home medications when evaluated by home health staff. Son gave pt medications prior to arriving to ED.       CHF (congestive heart failure)  Patient is identified as having Diastolic (HFpEF) heart failure that is Chronic. CHF is currently stable. Latest ECHO performed and demonstrates- Results for orders placed during the hospital encounter of 09/02/23    Echo    Interpretation Summary    Left Ventricle: The left ventricle is normal in size. Mildly increased wall thickness. There is concentric remodeling. Normal wall motion. Septal motion is normal. There is normal systolic function with a visually estimated ejection fraction of 55 - 70%. There is normal diastolic function.    Left Atrium: Left atrium is mildly dilated.    Right Ventricle: Mild right ventricular enlargement. Wall thickness is normal. Right ventricle wall motion  is normal. Systolic function is normal.    Aortic Valve: The aortic valve is a trileaflet valve. There is moderate aortic valve sclerosis.    Tricuspid Valve: There is moderate regurgitation with a centrally directed jet.    Pulmonary Artery: The estimated pulmonary artery systolic pressure is 57 mmHg.    IVC/SVC: Normal venous pressure at 3 mmHg.  . Continue Furosemide and monitor clinical status closely. Monitor on telemetry. Patient is off CHF pathway.  Monitor strict Is&Os and daily weights.  Place on fluid restriction of 1.5 L. Cardiology has been consulted. Continue to stress to patient importance of self efficacy and  on diet for CHF. Last BNP reviewed- and noted below   Recent Labs   Lab 09/15/23  1706   *   .chest xray showed mild pulmonary edema and IV Lasix x 1 dose given in ED  -oral administration continued outpatient      Chronic respiratory  failure with hypercapnia  Patient with Hypercapnic Respiratory failure which is Chronic.  he is on home oxygen at 4 LPM. Supplemental oxygen was provided and noted- Oxygen Concentration (%):  [50] 50    .   Signs/symptoms of respiratory failure include- tachypnea. Contributing diagnoses includes - CHF, COPD and Obesity Hypoventilation Labs and images were reviewed. Patient Has recent ABG, which has been reviewed. Will treat underlying causes and adjust management of respiratory failure as follows- BIPAP support - IV Lasix x 1 dose.   -maintained per home ventilator- pCO2 48.4- chronic -improved from baseline     Physical debility  -chronic   -bed bound x 2 years       Chronic acquired lymphedema  - pt seen by wound care during recent hospitalization   -wound care via home health and referral to lymphedema clinic placed prior to discharge       CKD (chronic kidney disease) stage 3, GFR 30-59 ml/min  -improving- Cr 2.7  -outpatient Nephrology follow up recommended       Nocturnal hypoxia  -home ventilatory for PAP at bedside/home      Morbid obesity with BMI of 60.0-69.9, adult  Body mass index is 55.9 kg/m². Morbid obesity complicates all aspects of disease management from diagnostic modalities to treatment. Weight loss encouraged and health benefits explained to patient.           VTE Risk Mitigation (From admission, onward)    None              Thank you for your consult. I will sign off. Please contact us if you have any additional questions.    Linda Cano NP  Department of Hospital Medicine   [facility] - Emergency Dept.

## 2023-09-16 NOTE — ASSESSMENT & PLAN NOTE
-chronic   - at baseline in ED with 93/51 (67)  -spontaneously improved in ED to 117/63   -pt takes Midodrine and Florinef outpatient- [facility] pt has not received his home medications when evaluated by home health staff. Son gave pt medications prior to arriving to ED.

## 2023-09-16 NOTE — ASSESSMENT & PLAN NOTE
- pt seen by wound care during recent hospitalization   -wound care via home health and referral to lymphedema clinic placed prior to discharge

## 2023-09-16 NOTE — ASSESSMENT & PLAN NOTE
Patient with Hypercapnic Respiratory failure which is Chronic.  he is on home oxygen at 4 LPM. Supplemental oxygen was provided and noted- Oxygen Concentration (%):  [50] 50    .   Signs/symptoms of respiratory failure include- tachypnea. Contributing diagnoses includes - CHF, COPD and Obesity Hypoventilation Labs and images were reviewed. Patient Has recent ABG, which has been reviewed. Will treat underlying causes and adjust management of respiratory failure as follows- BIPAP support - IV Lasix x 1 dose

## 2023-09-16 NOTE — ASSESSMENT & PLAN NOTE
Patient is identified as having Diastolic (HFpEF) heart failure that is Chronic. CHF is currently stable. Latest ECHO performed and demonstrates- Results for orders placed during the hospital encounter of 09/02/23    Echo    Interpretation Summary    Left Ventricle: The left ventricle is normal in size. Mildly increased wall thickness. There is concentric remodeling. Normal wall motion. Septal motion is normal. There is normal systolic function with a visually estimated ejection fraction of 55 - 70%. There is normal diastolic function.    Left Atrium: Left atrium is mildly dilated.    Right Ventricle: Mild right ventricular enlargement. Wall thickness is normal. Right ventricle wall motion  is normal. Systolic function is normal.    Aortic Valve: The aortic valve is a trileaflet valve. There is moderate aortic valve sclerosis.    Tricuspid Valve: There is moderate regurgitation with a centrally directed jet.    Pulmonary Artery: The estimated pulmonary artery systolic pressure is 57 mmHg.    IVC/SVC: Normal venous pressure at 3 mmHg.  . Continue Furosemide and monitor clinical status closely. Monitor on telemetry. Patient is off CHF pathway.  Monitor strict Is&Os and daily weights.  Place on fluid restriction of 1.5 L. Cardiology has been consulted. Continue to stress to patient importance of self efficacy and  on diet for CHF. Last BNP reviewed- and noted below   Recent Labs   Lab 09/15/23  1706   *   .chest xray showed mild pulmonary edema and IV Lasix x 1 dose given in ED  -oral administration continued outpatient

## 2023-09-18 NOTE — PROGRESS NOTES
Pt already has a HH appointment for 9/22/2023 with Cheko Ho NP at 8:00 a.m. Pt will be reminded on 9/21.    Rox Riojas  ED Navigator  [phone]

## 2023-09-20 PROBLEM — R65.21 SEPTIC SHOCK: Status: RESOLVED | Noted: 2021-12-03 | Resolved: 2023-01-01

## 2023-09-20 PROBLEM — A41.9 SEPTIC SHOCK: Status: RESOLVED | Noted: 2021-12-03 | Resolved: 2023-01-01

## 2023-09-20 PROBLEM — B37.2 CANDIDAL DERMATITIS: Status: RESOLVED | Noted: 2021-12-04 | Resolved: 2023-01-01

## 2023-09-20 NOTE — ED PROVIDER NOTES
"SCRIBE #1 NOTE: I, Bertha Rothman, am scribing for, and in the presence of, Santos Thurman MD. I have scribed the entire note.      History      Chief Complaint   Patient presents with    Altered Mental Status     Pt home nurse reports possible sepsis, increased co2, hypotensive, and altered mental status x 1 day.        Review of patient's allergies indicates:   Allergen Reactions    Sulfamethoxazole-trimethoprim Itching and Shortness Of Breath    Sulfamethoxazole         HPI   HPI    9/20/2023, 3:12 PM   History obtained from the patient      History of Present Illness: Gene Rothman is a 77 y.o. male patient with a PMHx of acute hypoxemic respiratory failure, atrial fibrillation, CHF, CKF, CAD, HTN, hypothyroidism, peripheral vascular disease, and sleep apnea who presents to the Emergency Department from home for AMS. Per EMS, the pt's home health nurse called them because the pt had low blood pressure, low O2 saturations, high CO2 levels, and is altered. Pt keeps repeating, "I am cold."    Per family, patient has been dozing off all day, not as responsive as usual to them. They have been having issues at home with his  CPAP machine.       Arrival mode: EMS    PCP: Ami Zarate DO       Past Medical History:  Past Medical History:   Diagnosis Date    Acute hypoxemic respiratory failure 9/17/2022    Arthritis     Atrial fibrillation 3/1/2023    CHF (congestive heart failure)     Chronic kidney disease     Coronary artery disease     Depression     Hypertension     Hypertensive heart disease with heart failure 9/16/2022    Hypothyroidism     Lymphedema of lower extremity     Nephrolithiasis     Obesity     Peripheral vascular disease     Pneumonia 9/17/2022    Recurrent cellulitis of lower leg     Sleep apnea     can't stand the CPAP , sleeps elevated in hospital bed or chair    Tobacco dependence     resolved       Past Surgical History:  Past Surgical History:   Procedure Laterality Date    ADENOIDECTOMY  1961 "    BACK SURGERY  ;  1976    x2 for disc; scar tissue removed    debridement of bilateral leg ulcers Bilateral 2017    Dr Hernandez    INNER EAR SURGERY Bilateral     separate - pinnaplasty , new eardrms constructed    KIDNEY STONE SURGERY Left  approx    open    TONSILLECTOMY           Family History:  Family History   Problem Relation Age of Onset    Cancer Mother         ? abd also    Diabetes Mother     Hypertension Mother     Cancer Father         ? abdonimal origin stomach/liver or pancreas    Heart disease Father         pacer    Cancer Brother         skin-part of ext ear removed    Heart disease Brother         CABG3    Alzheimer's disease Brother     Asthma Daughter          26 w/ asthma    Alcohol abuse Maternal Grandfather     Stroke Neg Hx     COPD Neg Hx     Mental illness Neg Hx     Mental retardation Neg Hx     Kidney disease Neg Hx        Social History:  Social History     Tobacco Use    Smoking status: Former     Current packs/day: 0.00     Average packs/day: 1.5 packs/day for 1 year (1.5 ttl pk-yrs)     Types: Cigarettes     Start date:      Quit date:      Years since quittin.7    Smokeless tobacco: Never   Substance and Sexual Activity    Alcohol use: Not Currently    Drug use: Never    Sexual activity: Never       ROS   Review of Systems   Unable to perform ROS: Mental status change       Physical Exam      Initial Vitals   BP Pulse Resp Temp SpO2   23 1458 23 1458 23 1458 23 1542 23 1458   (!) 78/38 60 (!) 24 97.5 °F (36.4 °C) (!) 91 %      MAP       --                 Physical Exam  Nursing Notes and Vital Signs Reviewed.  Constitutional: Patient is in mild distress. Morbidly obese.  Head: Atraumatic. Normocephalic.  Eyes: PERRL. EOM intact. Conjunctivae are not pale. No scleral icterus.  ENT: Mucous membranes are moist. Oropharynx is clear and symmetric.    Neck: Supple. Full ROM. No lymphadenopathy.  Cardiovascular: Regular  rate. Regular rhythm. No murmurs, rubs, or gallops. Distal pulses are 2+ and symmetric.  Pulmonary/Chest: No respiratory distress. Decreased breath sounds and crackles on the L.   Abdominal: Soft and non-distended.  There is no tenderness.  No rebound, guarding, or rigidity.   Musculoskeletal: Moves all extremities. No obvious deformities. 2+ BLE edema.   Skin: There are leg wrappings on the BLE.  Neurological:  Alert and awake. Pt is not oriented, appears confused. Not following commands.  Normal speech.      ED Course    Critical Care    Date/Time: 9/20/2023 4:30 PM    Performed by: Santos Thurman MD  Authorized by: Santos Thurman MD  Direct patient critical care time: 21 minutes  Additional history critical care time: 10 minutes  Ordering / reviewing critical care time: 8 minutes  Documentation critical care time: 9 minutes  Consulting other physicians critical care time: 12 minutes  Total critical care time (exclusive of procedural time) : 60 minutes  Critical care time was exclusive of separately billable procedures and treating other patients and teaching time.  Critical care was necessary to treat or prevent imminent or life-threatening deterioration of the following conditions: respiratory failure.  Critical care was time spent personally by me on the following activities: blood draw for specimens, development of treatment plan with patient or surrogate, discussions with consultants, interpretation of cardiac output measurements, evaluation of patient's response to treatment, examination of patient, obtaining history from patient or surrogate, ordering and performing treatments and interventions, ordering and review of laboratory studies, ordering and review of radiographic studies, pulse oximetry, re-evaluation of patient's condition and review of old charts.        ED Vital Signs:  Vitals:    09/20/23 1458 09/20/23 1514 09/20/23 1539 09/20/23 1542   BP: (!) 78/38 (!) 99/48     Pulse: 60 67 63 62   Resp:  "(!) 24 17  (!) 21   Temp:    97.5 °F (36.4 °C)   TempSrc:    Oral   SpO2: (!) 91% 95%  (!) 94%   Weight:    (!) 194.5 kg (428 lb 12.7 oz)   Height:    6' 1" (1.854 m)    09/20/23 1604   BP: (!) 119/56   Pulse: 62   Resp: (!) 22   Temp:    TempSrc:    SpO2: (!) 92%   Weight:    Height:        Abnormal Lab Results:  Labs Reviewed   CBC W/ AUTO DIFFERENTIAL - Abnormal; Notable for the following components:       Result Value    RBC 2.47 (*)     Hemoglobin 7.5 (*)     Hematocrit 25.2 (*)      (*)     MCHC 29.8 (*)     RDW 18.3 (*)     All other components within normal limits   COMPREHENSIVE METABOLIC PANEL - Abnormal; Notable for the following components:    Potassium 3.3 (*)     Chloride 112 (*)     CO2 22 (*)     BUN 65 (*)     Creatinine 2.9 (*)     Calcium 7.2 (*)     Albumin 1.6 (*)     ALT 8 (*)     eGFR 21 (*)     All other components within normal limits   B-TYPE NATRIURETIC PEPTIDE - Abnormal; Notable for the following components:     (*)     All other components within normal limits   TROPONIN I - Abnormal; Notable for the following components:    Troponin I 0.033 (*)     All other components within normal limits   PROCALCITONIN - Abnormal; Notable for the following components:    Procalcitonin 0.41 (*)     All other components within normal limits   PROTIME-INR - Abnormal; Notable for the following components:    Prothrombin Time 15.2 (*)     INR 1.5 (*)     All other components within normal limits   INFLUENZA A & B BY MOLECULAR   CULTURE, BLOOD   CULTURE, BLOOD   LACTIC ACID, PLASMA   SARS-COV-2 RNA AMPLIFICATION, QUAL   MAGNESIUM   MAGNESIUM   URINALYSIS, REFLEX TO URINE CULTURE   LACTIC ACID, PLASMA        All Lab Results:  Results for orders placed or performed during the hospital encounter of 09/20/23   Influenza A & B by Molecular    Specimen: Nasopharyngeal Swab   Result Value Ref Range    Influenza A, Molecular Negative Negative    Influenza B, Molecular Negative Negative    Flu A & B " Source Nasal swab    CBC auto differential   Result Value Ref Range    WBC 7.95 3.90 - 12.70 K/uL    RBC 2.47 (L) 4.60 - 6.20 M/uL    Hemoglobin 7.5 (L) 14.0 - 18.0 g/dL    Hematocrit 25.2 (L) 40.0 - 54.0 %     (H) 82 - 98 fL    MCH 30.4 27.0 - 31.0 pg    MCHC 29.8 (L) 32.0 - 36.0 g/dL    RDW 18.3 (H) 11.5 - 14.5 %    Platelets 190 150 - 450 K/uL    MPV 9.6 9.2 - 12.9 fL    Immature Granulocytes 0.4 0.0 - 0.5 %    Gran # (ANC) 4.6 1.8 - 7.7 K/uL    Immature Grans (Abs) 0.03 0.00 - 0.04 K/uL    Lymph # 2.1 1.0 - 4.8 K/uL    Mono # 0.9 0.3 - 1.0 K/uL    Eos # 0.2 0.0 - 0.5 K/uL    Baso # 0.04 0.00 - 0.20 K/uL    nRBC 0 0 /100 WBC    Gran % 58.4 38.0 - 73.0 %    Lymph % 26.8 18.0 - 48.0 %    Mono % 11.8 4.0 - 15.0 %    Eosinophil % 2.1 0.0 - 8.0 %    Basophil % 0.5 0.0 - 1.9 %    Differential Method Automated    Comprehensive metabolic panel   Result Value Ref Range    Sodium 145 136 - 145 mmol/L    Potassium 3.3 (L) 3.5 - 5.1 mmol/L    Chloride 112 (H) 95 - 110 mmol/L    CO2 22 (L) 23 - 29 mmol/L    Glucose 94 70 - 110 mg/dL    BUN 65 (H) 8 - 23 mg/dL    Creatinine 2.9 (H) 0.5 - 1.4 mg/dL    Calcium 7.2 (L) 8.7 - 10.5 mg/dL    Total Protein 6.2 6.0 - 8.4 g/dL    Albumin 1.6 (L) 3.5 - 5.2 g/dL    Total Bilirubin 0.3 0.1 - 1.0 mg/dL    Alkaline Phosphatase 65 55 - 135 U/L    AST 13 10 - 40 U/L    ALT 8 (L) 10 - 44 U/L    eGFR 21 (A) >60 mL/min/1.73 m^2    Anion Gap 11 8 - 16 mmol/L   Lactic acid, plasma #1   Result Value Ref Range    Lactate (Lactic Acid) 0.9 0.5 - 2.2 mmol/L   Brain natriuretic peptide   Result Value Ref Range     (H) 0 - 99 pg/mL   Troponin I   Result Value Ref Range    Troponin I 0.033 (H) 0.000 - 0.026 ng/mL   Procalcitonin   Result Value Ref Range    Procalcitonin 0.41 (H) <0.25 ng/mL   Protime-INR   Result Value Ref Range    Prothrombin Time 15.2 (H) 9.0 - 12.5 sec    INR 1.5 (H) 0.8 - 1.2   COVID-19 Rapid Screening   Result Value Ref Range    SARS-CoV-2 RNA, Amplification, Qual  Negative Negative   Magnesium   Result Value Ref Range    Magnesium 2.1 1.6 - 2.6 mg/dL         Imaging Results:  Imaging Results              X-Ray Chest AP Portable (Final result)  Result time 09/20/23 16:45:32      Final result by Cy Ureña MD (09/20/23 16:45:32)                   Impression:      Cardiomegaly without definite acute abnormality.      Electronically signed by: Cy Ureña  Date:    09/20/2023  Time:    16:45               Narrative:    EXAMINATION:  XR CHEST AP PORTABLE    CLINICAL HISTORY:  Sepsis;    TECHNIQUE:  Single frontal view of the chest was performed.    COMPARISON:  Multiple priors.    FINDINGS:  The lungs are clear, with normal appearance of pulmonary vasculature and no pleural effusion or pneumothorax.    The cardiac silhouette is enlarged.  The hilar and mediastinal contours are unremarkable.    Severe degenerative change of the shoulders.                                     The EKG was ordered, reviewed, and independently interpreted by the ED provider.  Interpretation time: 15:24  Rate: 66 BPM  Rhythm: atrial fibrillation  Interpretation: Nonspecific ST and T wave abnormality. No STEMI.           The Emergency Provider reviewed the vital signs and test results, which are outlined above.    ED Discussion     5:32 PM: Discussed case with Dr. Altamirano (Critical Care Medicine). Dr. Altamirano agrees with current care and management of pt and accepts admission.   Admitting Service: Critical Care Medicine  Admitting Physician: Dr. Altamirano  Admit to: ICU    5:33 PM: Re-evaluated pt. I have discussed test results, shared treatment plan, and the need for admission with patient and family at bedside. Pt and family express understanding at this time and agree with all information. All questions answered. Pt and family have no further questions or concerns at this time. Pt is ready for admit.      ED Course as of 09/20/23 1733   Wed Sep 20, 2023   1733 ABG   1618     pH        7.25  Pco2    50.1  Po2      58  Hco3    22.0  BE        -5  Sao2      85        Right Radial      On Room Air [BA]      ED Course User Index  [BA] Santos Thurman MD       ED Medication(s):  Medications   lactated ringers bolus 500 mL (has no administration in time range)   midodrine tablet 15 mg (15 mg Oral Given 9/20/23 1481)           New Prescriptions    No medications on file         Medical Decision Making    Medical Decision Making  DDx is broad but includes Sepsis, Hypoventilation syndromes, HF, UTI, PNA    Amount and/or Complexity of Data Reviewed  Labs: ordered. Decision-making details documented in ED Course.  Radiology: ordered. Decision-making details documented in ED Course.  ECG/medicine tests: ordered. Decision-making details documented in ED Course.    Risk  Prescription drug management.  Decision regarding hospitalization.                Scribe Attestation:   Scribe #1: I performed the above scribed service and the documentation accurately describes the services I performed. I attest to the accuracy of the note.    Attending:   Physician Attestation Statement for Scribe #1: I, Santos Thurman MD, personally performed the services described in this documentation, as scribed by Bertha Rothman, in my presence, and it is both accurate and complete.          Clinical Impression       ICD-10-CM ICD-9-CM   1. Acute hypercapnic respiratory failure  J96.02 518.81   2. Hypotension  I95.9 458.9   3. Chronic respiratory failure with hypoxia  J96.11 518.83     799.02   4. Anemia, unspecified type  D64.9 285.9       Disposition:   Disposition: Admitted  Condition: Fair         Santos Thurman MD  09/20/23 5475

## 2023-09-20 NOTE — HPI
"77yr old known to most hospital services presents on his 61st wedding anniversary per his wife who is at the bedside because of his inappropriate response to her questions. She said he was ignoring her and was inappropriate. She thinks there is something physiologically wrong with him or he would not be acting like that on this day.   She knows he has chronic hypotension but was concerned about hypercapnia. In ED he is on NIPPV and is acting like he always does which makes his wife more comfortable since "he is acting like himself" Son is also at the bedside.   He was given midodrine by ED and his BP has improved. Other complaints per family are "lack of appetite and low urine output"    Hx of COPD, OSAS / OHS / Bed bound for 2 years, A fib, On chronic anticoagulation, pul HTN (PAP 57mmHg) CHF, Lymphedema, chronic hypotension, HFpEF, Moderate MR / TR, chronic resp failure on home oxygen and chronic RV dysfunction. He is non compliant with his NIPPV although he was given a new machine during his last admission. He resist change in position and has acquired some wounds.   "

## 2023-09-20 NOTE — ASSESSMENT & PLAN NOTE
Midodrine was given.  BNP elevated but unremarkable CxR.  Will give careful hydration  Serial labs   Empiric Abx / sepsis syndrome workup initiated in ED.

## 2023-09-20 NOTE — SUBJECTIVE & OBJECTIVE
Past Medical History:   Diagnosis Date    Acute hypoxemic respiratory failure 2022    Arthritis     Atrial fibrillation 3/1/2023    CHF (congestive heart failure)     Chronic kidney disease     Coronary artery disease     Depression     Hypertension     Hypertensive heart disease with heart failure 2022    Hypothyroidism     Lymphedema of lower extremity     Nephrolithiasis     Obesity     Peripheral vascular disease     Pneumonia 2022    Recurrent cellulitis of lower leg     Sleep apnea     can't stand the CPAP , sleeps elevated in hospital bed or chair    Tobacco dependence     resolved       Past Surgical History:   Procedure Laterality Date    ADENOIDECTOMY      BACK SURGERY  ;  1976    x2 for disc; scar tissue removed    debridement of bilateral leg ulcers Bilateral 2017    Dr Hernandez    INNER EAR SURGERY Bilateral     separate - pinnaplasty , new eardrms constructed    KIDNEY STONE SURGERY Left  approx    open    TONSILLECTOMY         Review of patient's allergies indicates:   Allergen Reactions    Sulfamethoxazole-trimethoprim Itching and Shortness Of Breath    Sulfamethoxazole        Family History       Problem Relation (Age of Onset)    Alcohol abuse Maternal Grandfather    Alzheimer's disease Brother    Asthma Daughter    Cancer Mother, Father, Brother    Diabetes Mother    Heart disease Father, Brother    Hypertension Mother          Tobacco Use    Smoking status: Former     Current packs/day: 0.00     Average packs/day: 1.5 packs/day for 1 year (1.5 ttl pk-yrs)     Types: Cigarettes     Start date:      Quit date:      Years since quittin.7    Smokeless tobacco: Never   Substance and Sexual Activity    Alcohol use: Not Currently    Drug use: Never    Sexual activity: Never         Review of Systems   Constitutional: Negative.    HENT: Negative.     Eyes: Negative.    Respiratory:  Positive for apnea. Negative for cough, choking, chest tightness,  shortness of breath, wheezing and stridor.    Cardiovascular:  Positive for leg swelling. Negative for chest pain and palpitations.   Gastrointestinal: Negative.    Genitourinary:  Positive for difficulty urinating.   Neurological: Negative.    Psychiatric/Behavioral:  Positive for agitation, behavioral problems and dysphoric mood.    All other systems reviewed and are negative.    Objective:     Vital Signs (Most Recent):  Temp: 97.5 °F (36.4 °C) (09/20/23 1542)  Pulse: 62 (09/20/23 1604)  Resp: (!) 22 (09/20/23 1604)  BP: (!) 119/56 (09/20/23 1604)  SpO2: (!) 92 % (09/20/23 1604) Vital Signs (24h Range):  Temp:  [97.5 °F (36.4 °C)] 97.5 °F (36.4 °C)  Pulse:  [60-67] 62  Resp:  [17-24] 22  SpO2:  [91 %-95 %] 92 %  BP: ()/(38-56) 119/56     Weight: (!) 194.5 kg (428 lb 12.7 oz)  Body mass index is 56.57 kg/m².    No intake or output data in the 24 hours ending 09/20/23 1756     Physical Exam  Constitutional:       General: He is not in acute distress.     Appearance: He is not ill-appearing, toxic-appearing or diaphoretic.   HENT:      Head: Normocephalic.   Eyes:      Pupils: Pupils are equal, round, and reactive to light.   Cardiovascular:      Rate and Rhythm: Rhythm irregular.   Pulmonary:      Effort: No respiratory distress.      Breath sounds: No stridor. Rhonchi present. No wheezing.   Chest:      Chest wall: No tenderness.   Abdominal:      General: Bowel sounds are normal.   Musculoskeletal:      Right lower leg: Edema present.      Left lower leg: Edema present.   Skin:     Comments: Left flank scab / mild erythema  Left post thigh erythema  Chronic LE xt lymphedema   Neurological:      General: No focal deficit present.      Mental Status: He is alert.          Vents:       Lines/Drains/Airways       Peripheral Intravenous Line  Duration                  Peripheral IV - Single Lumen 09/20/23 1550 18 G;1 3/4 in Anterior;Distal;Right Upper Arm <1 day         Peripheral IV - Single Lumen 09/20/23 1550  18 G;1 3/4 in Anterior;Proximal;Right Upper Arm <1 day                    Significant Labs:    CBC/Anemia Profile:  Recent Labs   Lab 09/20/23  1541   WBC 7.95   HGB 7.5*   HCT 25.2*      *   RDW 18.3*        Chemistries:  Recent Labs   Lab 09/20/23  1541      K 3.3*   *   CO2 22*   BUN 65*   CREATININE 2.9*   CALCIUM 7.2*   ALBUMIN 1.6*   PROT 6.2   BILITOT 0.3   ALKPHOS 65   ALT 8*   AST 13   MG 2.1       All pertinent labs within the past 24 hours have been reviewed.    Significant Imaging:   I have reviewed all pertinent imaging results/findings within the past 24 hours.

## 2023-09-20 NOTE — ASSESSMENT & PLAN NOTE
Patient with Hypercapnic Respiratory failure which is Acute on chronic.  he is on home oxygen at 4 LPM. Supplemental oxygen was provided and noted-      .   Signs/symptoms of respiratory failure include- increased work of breathing. Contributing diagnoses includes - Obesity Hypoventilation and OSAS Labs and images were reviewed. Patient Has recent ABG, which has been reviewed. Will treat underlying causes and adjust management of respiratory failure as follows-   Currently on BIPAP  Nebs prn

## 2023-09-20 NOTE — H&P
"[facility] - Emergency Dept.  Critical Care Medicine  History & Physical    Patient Name: Gene Rothman  MRN: [id number]  Admission Date: 9/20/2023  Hospital Length of Stay: 0 days  Code Status: Full Code  Attending Physician: Cristiano Altamirano MD   Primary Care Provider: Ami Zarate DO   Principal Problem: Chronic respiratory failure with hypercapnia    Subjective:     HPI:  77yr old known to most hospital services presents on his 61st wedding anniversary per his wife who is at the bedside because of his inappropriate response to her questions. She said he was ignoring her and was inappropriate. She thinks there is something physiologically wrong with him or he would not be acting like that on this day.   She knows he has chronic hypotension but was concerned about hypercapnia. In ED he is on NIPPV and is acting like he always does which makes his wife more comfortable since "he is acting like himself" Son is also at the bedside.   He was given midodrine by ED and his BP has improved. Other complaints per family are "lack of appetite and low urine output"    Hx of COPD, OSAS / OHS / Bed bound for 2 years, A fib, On chronic anticoagulation, pul HTN (PAP 57mmHg) CHF, Lymphedema, chronic hypotension, HFpEF, Moderate MR / TR, chronic resp failure on home oxygen and chronic RV dysfunction. He is non compliant with his NIPPV although he was given a new machine during his last admission. He resist change in position and has acquired some wounds.       Hospital/ICU Course:  No notes on file     Past Medical History:   Diagnosis Date    Acute hypoxemic respiratory failure 9/17/2022    Arthritis     Atrial fibrillation 3/1/2023    CHF (congestive heart failure)     Chronic kidney disease     Coronary artery disease     Depression     Hypertension     Hypertensive heart disease with heart failure 9/16/2022    Hypothyroidism     Lymphedema of lower extremity     Nephrolithiasis     Obesity     Peripheral " vascular disease     Pneumonia 2022    Recurrent cellulitis of lower leg     Sleep apnea     can't stand the CPAP , sleeps elevated in hospital bed or chair    Tobacco dependence     resolved       Past Surgical History:   Procedure Laterality Date    ADENOIDECTOMY      BACK SURGERY  ;  1976    x2 for disc; scar tissue removed    debridement of bilateral leg ulcers Bilateral 2017    Dr Hernandez    INNER EAR SURGERY Bilateral     separate - pinnaplasty , new eardrms constructed    KIDNEY STONE SURGERY Left  approx    open    TONSILLECTOMY         Review of patient's allergies indicates:   Allergen Reactions    Sulfamethoxazole-trimethoprim Itching and Shortness Of Breath    Sulfamethoxazole        Family History       Problem Relation (Age of Onset)    Alcohol abuse Maternal Grandfather    Alzheimer's disease Brother    Asthma Daughter    Cancer Mother, Father, Brother    Diabetes Mother    Heart disease Father, Brother    Hypertension Mother          Tobacco Use    Smoking status: Former     Current packs/day: 0.00     Average packs/day: 1.5 packs/day for 1 year (1.5 ttl pk-yrs)     Types: Cigarettes     Start date:      Quit date:      Years since quittin.7    Smokeless tobacco: Never   Substance and Sexual Activity    Alcohol use: Not Currently    Drug use: Never    Sexual activity: Never         Review of Systems   Constitutional: Negative.    HENT: Negative.     Eyes: Negative.    Respiratory:  Positive for apnea. Negative for cough, choking, chest tightness, shortness of breath, wheezing and stridor.    Cardiovascular:  Positive for leg swelling. Negative for chest pain and palpitations.   Gastrointestinal: Negative.    Genitourinary:  Positive for difficulty urinating.   Neurological: Negative.    Psychiatric/Behavioral:  Positive for agitation, behavioral problems and dysphoric mood.    All other systems reviewed and are negative.    Objective:      Vital Signs (Most Recent):  Temp: 97.5 °F (36.4 °C) (09/20/23 1542)  Pulse: 62 (09/20/23 1604)  Resp: (!) 22 (09/20/23 1604)  BP: (!) 119/56 (09/20/23 1604)  SpO2: (!) 92 % (09/20/23 1604) Vital Signs (24h Range):  Temp:  [97.5 °F (36.4 °C)] 97.5 °F (36.4 °C)  Pulse:  [60-67] 62  Resp:  [17-24] 22  SpO2:  [91 %-95 %] 92 %  BP: ()/(38-56) 119/56     Weight: (!) 194.5 kg (428 lb 12.7 oz)  Body mass index is 56.57 kg/m².    No intake or output data in the 24 hours ending 09/20/23 1756     Physical Exam  Constitutional:       General: He is not in acute distress.     Appearance: He is not ill-appearing, toxic-appearing or diaphoretic.   HENT:      Head: Normocephalic.   Eyes:      Pupils: Pupils are equal, round, and reactive to light.   Cardiovascular:      Rate and Rhythm: Rhythm irregular.   Pulmonary:      Effort: No respiratory distress.      Breath sounds: No stridor. Rhonchi present. No wheezing.   Chest:      Chest wall: No tenderness.   Abdominal:      General: Bowel sounds are normal.   Musculoskeletal:      Right lower leg: Edema present.      Left lower leg: Edema present.   Skin:     Comments: Left flank scab / mild erythema  Left post thigh erythema  Chronic LE xt lymphedema   Neurological:      General: No focal deficit present.      Mental Status: He is alert.          Vents:       Lines/Drains/Airways       Peripheral Intravenous Line  Duration                  Peripheral IV - Single Lumen 09/20/23 1550 18 G;1 3/4 in Anterior;Distal;Right Upper Arm <1 day         Peripheral IV - Single Lumen 09/20/23 1550 18 G;1 3/4 in Anterior;Proximal;Right Upper Arm <1 day                    Significant Labs:    CBC/Anemia Profile:  Recent Labs   Lab 09/20/23  1541   WBC 7.95   HGB 7.5*   HCT 25.2*      *   RDW 18.3*        Chemistries:  Recent Labs   Lab 09/20/23  1541      K 3.3*   *   CO2 22*   BUN 65*   CREATININE 2.9*   CALCIUM 7.2*   ALBUMIN 1.6*   PROT 6.2   BILITOT 0.3    ALKPHOS 65   ALT 8*   AST 13   MG 2.1       All pertinent labs within the past 24 hours have been reviewed.    Significant Imaging:   I have reviewed all pertinent imaging results/findings within the past 24 hours.    Assessment/Plan:     Pulmonary  * Chronic respiratory failure with hypercapnia  Patient with Hypercapnic Respiratory failure which is Acute on chronic.  he is on home oxygen at 4 LPM. Supplemental oxygen was provided and noted-      .   Signs/symptoms of respiratory failure include- increased work of breathing. Contributing diagnoses includes - Obesity Hypoventilation and OSAS Labs and images were reviewed. Patient Has recent ABG, which has been reviewed. Will treat underlying causes and adjust management of respiratory failure as follows-   Currently on BIPAP  Nebs prn      Cardiac/Vascular  CHF (congestive heart failure)  Patient is identified as having Diastolic (HFpEF) heart failure that is Chronic. CHF is currently controlled. Latest ECHO performed and demonstrates- Results for orders placed during the hospital encounter of 09/02/23    Echo    Interpretation Summary    Left Ventricle: The left ventricle is normal in size. Mildly increased wall thickness. There is concentric remodeling. Normal wall motion. Septal motion is normal. There is normal systolic function with a visually estimated ejection fraction of 55 - 70%. There is normal diastolic function.    Left Atrium: Left atrium is mildly dilated.    Right Ventricle: Mild right ventricular enlargement. Wall thickness is normal. Right ventricle wall motion  is normal. Systolic function is normal.    Aortic Valve: The aortic valve is a trileaflet valve. There is moderate aortic valve sclerosis.    Tricuspid Valve: There is moderate regurgitation with a centrally directed jet.    Pulmonary Artery: The estimated pulmonary artery systolic pressure is 57 mmHg.    IVC/SVC: Normal venous pressure at 3 mmHg.  . Continue Furosemide and monitor  clinical status closely. Monitor on telemetry. Patient is off CHF pathway.  Monitor strict Is&Os and daily weights.  Place on fluid restriction of 1 L. Cardiology has not been any consulted. Continue to stress to patient importance of self efficacy and  on diet for CHF. Last BNP reviewed- and noted below   Recent Labs   Lab 09/20/23  1541   *   .    Venous stasis dermatitis of both lower extremities  Chronic lymphedema  Some post left thgh erythema  Will give Abx  Skin assessment when in ICU    Hypotension  Midodrine was given.  BNP elevated but unremarkable CxR.  Will give careful hydration  Serial labs   Empiric Abx / sepsis syndrome workup initiated in ED.     Renal/  CKD (chronic kidney disease) stage 3, GFR 30-59 ml/min  Careful hydration  Serial labs    ID  Cellulitis  Wound care  Abx.   Serial exam    Endocrine  Morbid obesity with BMI of 60.0-69.9, adult  Functional quad.  Not a candidate for surgery   Educated / counseling    Hypothyroidism (acquired)  Continue home medication    Other  Lymphedema  Wound care   Dressing in place    Sleep apnea  NIPPV initiated.   Repeat ABG in 2 hours        Critical Care Daily Checklist:    A: Awake: RASS Goal/Actual Goal:    Actual:     B: Spontaneous Breathing Trial Performed?     C: SAT & SBT Coordinated?  N/A                      D: Delirium: CAM-ICU     E: Early Mobility Performed? Yes   F: Feeding Goal:    Status:     Current Diet Order   No orders of the defined types were placed in this encounter.      AS: Analgesia/Sedation N/A   T: Thromboembolic Prophylaxis YES   H: HOB > 300 Yes   U: Stress Ulcer Prophylaxis (if needed) YES   G: Glucose Control YES   B: Bowel Function     I: Indwelling Catheter (Lines & May) Necessity N/A   D: De-escalation of Antimicrobials/Pharmacotherapies YES    Plan for the day/ETD YES    Code Status:  Family/Goals of Care: Full Code  YES     Critical Care Time: 48 minutes  Critical secondary to Patient has a condition  that poses threat to life and bodily function: Acute Renal Failure     Critical care was time spent personally by me on the following activities: development of treatment plan with patient or surrogate and bedside caregivers, discussions with consultants, evaluation of patient's response to treatment, examination of patient, ordering and performing treatments and interventions, ordering and review of laboratory studies, ordering and review of radiographic studies, pulse oximetry, re-evaluation of patient's condition. This critical care time did not overlap with that of any other provider or involve time for any procedures.     Cristiano Altamirano MD, FACP, [facility]  Critical Care Medicine  [facility] - Emergency Dept.

## 2023-09-20 NOTE — ASSESSMENT & PLAN NOTE
Patient is identified as having Diastolic (HFpEF) heart failure that is Chronic. CHF is currently controlled. Latest ECHO performed and demonstrates- Results for orders placed during the hospital encounter of 09/02/23    Echo    Interpretation Summary    Left Ventricle: The left ventricle is normal in size. Mildly increased wall thickness. There is concentric remodeling. Normal wall motion. Septal motion is normal. There is normal systolic function with a visually estimated ejection fraction of 55 - 70%. There is normal diastolic function.    Left Atrium: Left atrium is mildly dilated.    Right Ventricle: Mild right ventricular enlargement. Wall thickness is normal. Right ventricle wall motion  is normal. Systolic function is normal.    Aortic Valve: The aortic valve is a trileaflet valve. There is moderate aortic valve sclerosis.    Tricuspid Valve: There is moderate regurgitation with a centrally directed jet.    Pulmonary Artery: The estimated pulmonary artery systolic pressure is 57 mmHg.    IVC/SVC: Normal venous pressure at 3 mmHg.  . Continue Furosemide and monitor clinical status closely. Monitor on telemetry. Patient is off CHF pathway.  Monitor strict Is&Os and daily weights.  Place on fluid restriction of 1 L. Cardiology has not been any consulted. Continue to stress to patient importance of self efficacy and  on diet for CHF. Last BNP reviewed- and noted below   Recent Labs   Lab 09/20/23  1541   *   .

## 2023-09-20 NOTE — PROGRESS NOTES
ABG   1618    pH       7.25  Pco2    50.1  Po2      58  Hco3    22.0  BE        -5  Sao2      85      Right Radial     On Room Air

## 2023-09-21 PROBLEM — R33.9 URINARY RETENTION: Status: ACTIVE | Noted: 2023-01-01

## 2023-09-21 PROBLEM — R00.1 BRADYCARDIA: Status: ACTIVE | Noted: 2023-01-01

## 2023-09-21 NOTE — ASSESSMENT & PLAN NOTE
Patient is identified as having Diastolic (HFpEF) heart failure that is Chronic. CHF is currently controlled. Latest ECHO performed and demonstrates- Results for orders placed during the hospital encounter of 09/02/23    Echo    Interpretation Summary    Left Ventricle: The left ventricle is normal in size. Mildly increased wall thickness. There is concentric remodeling. Normal wall motion. Septal motion is normal. There is normal systolic function with a visually estimated ejection fraction of 55 - 70%. There is normal diastolic function.    Left Atrium: Left atrium is mildly dilated.    Right Ventricle: Mild right ventricular enlargement. Wall thickness is normal. Right ventricle wall motion  is normal. Systolic function is normal.    Aortic Valve: The aortic valve is a trileaflet valve. There is moderate aortic valve sclerosis.    Tricuspid Valve: There is moderate regurgitation with a centrally directed jet.    Pulmonary Artery: The estimated pulmonary artery systolic pressure is 57 mmHg.    IVC/SVC: Normal venous pressure at 3 mmHg.  . Continue Furosemide and monitor clinical status closely. Monitor on telemetry. Patient is off CHF pathway.  Monitor strict Is&Os and daily weights.  Place on fluid restriction of 1 L. Cardiology has not been any consulted. Continue to stress to patient importance of self efficacy and  on diet for CHF. Last BNP reviewed- and noted below   Recent Labs   Lab 09/20/23  1541   *   .  9/21. Will transfuse and give bumex times 1

## 2023-09-21 NOTE — PLAN OF CARE
Problem: Impaired Wound Healing  Goal: Optimal Wound Healing  Outcome: Ongoing, Progressing   Turning Q 2 hrs, heels elevated with heel boots at all times.  Problem: Bariatric Environmental Safety  Goal: Safety Maintained with Care  Outcome: Ongoing, Progressing   Two care givers used with patient care.    VSS. Doing well on 2 L NC. No issues

## 2023-09-21 NOTE — SUBJECTIVE & OBJECTIVE
Objective:     Vital Signs (Most Recent):  Temp: 97.7 °F (36.5 °C) (09/21/23 0800)  Pulse: (!) 43 (09/21/23 1015)  Resp: 20 (09/21/23 1015)  BP: (!) 94/44 (09/21/23 1015)  SpO2: (!) 93 % (09/21/23 1015) Vital Signs (24h Range):  Temp:  [97 °F (36.1 °C)-97.8 °F (36.6 °C)] 97.7 °F (36.5 °C)  Pulse:  [37-67] 43  Resp:  [9-24] 20  SpO2:  [91 %-100 %] 93 %  BP: ()/(31-88) 94/44     Weight: (!) 196.4 kg (433 lb)  Body mass index is 57.13 kg/m².      Intake/Output Summary (Last 24 hours) at 9/21/2023 1126  Last data filed at 9/21/2023 0000  Gross per 24 hour   Intake 1038.88 ml   Output 0 ml   Net 1038.88 ml        Physical Exam  Constitutional:       General: He is not in acute distress.     Appearance: He is obese. He is ill-appearing. He is not toxic-appearing or diaphoretic.   HENT:      Head: Normocephalic.      Nose: Nose normal.   Eyes:      Extraocular Movements: Extraocular movements intact.      Pupils: Pupils are equal, round, and reactive to light.   Pulmonary:      Effort: No respiratory distress.      Breath sounds: Rhonchi present. No wheezing.   Abdominal:      General: There is distension.   Skin:     General: Skin is warm.   Neurological:      General: No focal deficit present.      Mental Status: He is alert.           Review of Systems    Vents:  Oxygen Concentration (%): 30 (09/21/23 0744)    Lines/Drains/Airways       Drain  Duration             Female External Urinary Catheter 09/20/23 1852 <1 day              Peripheral Intravenous Line  Duration                  Peripheral IV - Single Lumen 09/20/23 1550 18 G;1 3/4 in Anterior;Distal;Right Upper Arm <1 day         Peripheral IV - Single Lumen 09/20/23 1550 18 G;1 3/4 in Anterior;Proximal;Right Upper Arm <1 day                    Significant Labs:    CBC/Anemia Profile:  Recent Labs   Lab 09/20/23  1541 09/21/23  0334   WBC 7.95 6.32   HGB 7.5* 7.0*   HCT 25.2* 23.3*    173   * 100*   RDW 18.3* 18.4*         Chemistries:  Recent Labs   Lab 09/20/23  1541 09/21/23  0334    143   K 3.3* 3.6   * 113*   CO2 22* 17*   BUN 65* 66*   CREATININE 2.9* 2.8*   CALCIUM 7.2* 7.0*   ALBUMIN 1.6* 1.5*   PROT 6.2  --    BILITOT 0.3  --    ALKPHOS 65  --    ALT 8*  --    AST 13  --    MG 2.1 1.9   PHOS  --  6.0*       All pertinent labs within the past 24 hours have been reviewed.    Significant Imaging:  I have reviewed all pertinent imaging results/findings within the past 24 hours.

## 2023-09-21 NOTE — PROGRESS NOTES
"[facility] - [facility] Care ([facility]  Critical Care Medicine  Progress Note    Patient Name: Gene Rothman  MRN: [id number]  Admission Date: 9/20/2023  Hospital Length of Stay: 1 days  Code Status: Full Code  Attending Provider: Cristiano Altamirano MD  Primary Care Provider: Ami Zarate DO   Principal Problem: Chronic respiratory failure with hypercapnia    Subjective:     HPI:  77yr old known to most hospital services presents on his 61st wedding anniversary per his wife who is at the bedside because of his inappropriate response to her questions. She said he was ignoring her and was inappropriate. She thinks there is something physiologically wrong with him or he would not be acting like that on this day.   She knows he has chronic hypotension but was concerned about hypercapnia. In ED he is on NIPPV and is acting like he always does which makes his wife more comfortable since "he is acting like himself" Son is also at the bedside.   He was given midodrine by ED and his BP has improved. Other complaints per family are "lack of appetite and low urine output"    Hx of COPD, OSAS / OHS / Bed bound for 2 years, A fib, On chronic anticoagulation, pul HTN (PAP 57mmHg) CHF, Lymphedema, chronic hypotension, HFpEF, Moderate MR / TR, chronic resp failure on home oxygen and chronic RV dysfunction. He is non compliant with his NIPPV although he was given a new machine during his last admission. He resist change in position and has acquired some wounds.       Hospital/ICU Course:  9/21.  Awake and follows commands on bipap.   Had episode of bradycardia with HR in 30s overnight.   Remained hemodynamically stable.           Objective:     Vital Signs (Most Recent):  Temp: 97.7 °F (36.5 °C) (09/21/23 0800)  Pulse: (!) 43 (09/21/23 1015)  Resp: 20 (09/21/23 1015)  BP: (!) 94/44 (09/21/23 1015)  SpO2: (!) 93 % (09/21/23 1015) Vital Signs (24h Range):  Temp:  [97 °F (36.1 °C)-97.8 °F (36.6 °C)] 97.7 °F (36.5 °C)  Pulse:  [37-67] " 43  Resp:  [9-24] 20  SpO2:  [91 %-100 %] 93 %  BP: ()/(31-88) 94/44     Weight: (!) 196.4 kg (433 lb)  Body mass index is 57.13 kg/m².      Intake/Output Summary (Last 24 hours) at 9/21/2023 1126  Last data filed at 9/21/2023 0000  Gross per 24 hour   Intake 1038.88 ml   Output 0 ml   Net 1038.88 ml        Physical Exam  Constitutional:       General: He is not in acute distress.     Appearance: He is obese. He is ill-appearing. He is not toxic-appearing or diaphoretic.   HENT:      Head: Normocephalic.      Nose: Nose normal.   Eyes:      Extraocular Movements: Extraocular movements intact.      Pupils: Pupils are equal, round, and reactive to light.   Pulmonary:      Effort: No respiratory distress.      Breath sounds: Rhonchi present. No wheezing.   Abdominal:      General: There is distension.   Skin:     General: Skin is warm.   Neurological:      General: No focal deficit present.      Mental Status: He is alert.           Review of Systems    Vents:  Oxygen Concentration (%): 30 (09/21/23 0744)    Lines/Drains/Airways       Drain  Duration             Female External Urinary Catheter 09/20/23 1852 <1 day              Peripheral Intravenous Line  Duration                  Peripheral IV - Single Lumen 09/20/23 1550 18 G;1 3/4 in Anterior;Distal;Right Upper Arm <1 day         Peripheral IV - Single Lumen 09/20/23 1550 18 G;1 3/4 in Anterior;Proximal;Right Upper Arm <1 day                    Significant Labs:    CBC/Anemia Profile:  Recent Labs   Lab 09/20/23  1541 09/21/23  0334   WBC 7.95 6.32   HGB 7.5* 7.0*   HCT 25.2* 23.3*    173   * 100*   RDW 18.3* 18.4*        Chemistries:  Recent Labs   Lab 09/20/23  1541 09/21/23  0334    143   K 3.3* 3.6   * 113*   CO2 22* 17*   BUN 65* 66*   CREATININE 2.9* 2.8*   CALCIUM 7.2* 7.0*   ALBUMIN 1.6* 1.5*   PROT 6.2  --    BILITOT 0.3  --    ALKPHOS 65  --    ALT 8*  --    AST 13  --    MG 2.1 1.9   PHOS  --  6.0*       All pertinent labs  within the past 24 hours have been reviewed.    Significant Imaging:  I have reviewed all pertinent imaging results/findings within the past 24 hours.      ABG  Recent Labs   Lab 09/21/23  0502   PH 7.274*   PO2 103*   PCO2 47.3*   HCO3 21.9   BE -5     Assessment/Plan:     Pulmonary  * Chronic respiratory failure with hypercapnia  Patient with Hypercapnic Respiratory failure which is Acute on chronic.  he is on home oxygen at 4 LPM. Supplemental oxygen was provided and noted- Oxygen Concentration (%):  [30-35] 30    .   Signs/symptoms of respiratory failure include- increased work of breathing. Contributing diagnoses includes - Obesity Hypoventilation and OSAS Labs and images were reviewed. Patient Has recent ABG, which has been reviewed. Will treat underlying causes and adjust management of respiratory failure as follows-   Currently on BIPAP  Nebs prn    9/21. Continue NIPPV. NC when awake and oriented as tolerated.      Cardiac/Vascular  CHF (congestive heart failure)  Patient is identified as having Diastolic (HFpEF) heart failure that is Chronic. CHF is currently controlled. Latest ECHO performed and demonstrates- Results for orders placed during the hospital encounter of 09/02/23    Echo    Interpretation Summary    Left Ventricle: The left ventricle is normal in size. Mildly increased wall thickness. There is concentric remodeling. Normal wall motion. Septal motion is normal. There is normal systolic function with a visually estimated ejection fraction of 55 - 70%. There is normal diastolic function.    Left Atrium: Left atrium is mildly dilated.    Right Ventricle: Mild right ventricular enlargement. Wall thickness is normal. Right ventricle wall motion  is normal. Systolic function is normal.    Aortic Valve: The aortic valve is a trileaflet valve. There is moderate aortic valve sclerosis.    Tricuspid Valve: There is moderate regurgitation with a centrally directed jet.    Pulmonary Artery: The  estimated pulmonary artery systolic pressure is 57 mmHg.    IVC/SVC: Normal venous pressure at 3 mmHg.  . Continue Furosemide and monitor clinical status closely. Monitor on telemetry. Patient is off CHF pathway.  Monitor strict Is&Os and daily weights.  Place on fluid restriction of 1 L. Cardiology has not been any consulted. Continue to stress to patient importance of self efficacy and  on diet for CHF. Last BNP reviewed- and noted below   Recent Labs   Lab 09/20/23  1541   *   .  9/21. Will transfuse and give bumex times 1    Venous stasis dermatitis of both lower extremities  Chronic lymphedema  Some post left thgh erythema  Will give Abx  Skin assessment when in ICU    Hypotension  Midodrine was given.  BNP elevated but unremarkable CxR.  Will give careful hydration  Serial labs   Empiric Abx / sepsis syndrome workup initiated in ED.     9/21. Will give additional fluid with bicarb for metabolic acidosis. Still on BIPAP    Renal/  CKD (chronic kidney disease) stage 3, GFR 30-59 ml/min  Careful hydration  Serial labs    ID  Cellulitis  Wound care  Abx.   Serial exam    Endocrine  Morbid obesity with BMI of 60.0-69.9, adult  Functional quad.  Not a candidate for surgery   Educated / counseling    Hypothyroidism (acquired)  Continue home medication    Other  Lymphedema  Wound care   Dressing in place    Sleep apnea  NIPPV initiated.          Critical Care Daily Checklist:    A: Awake: RASS Goal/Actual Goal: RASS Goal: 0-->alert and calm  Actual:     B: Spontaneous Breathing Trial Performed?     C: SAT & SBT Coordinated?  n/a                      D: Delirium: CAM-ICU Overall CAM-ICU: Negative   E: Early Mobility Performed? Yes   F: Feeding Goal:    Status:     Current Diet Order   No orders of the defined types were placed in this encounter.      AS: Analgesia/Sedation n/a   T: Thromboembolic Prophylaxis yes   H: HOB > 300 Yes   U: Stress Ulcer Prophylaxis (if needed) yes   G: Glucose Control yes    B: Bowel Function Stool Occurrence: 1   I: Indwelling Catheter (Lines & May) Necessity yes   D: De-escalation of Antimicrobials/Pharmacotherapies yes    Plan for the day/ETD yes    Code Status:  Family/Goals of Care: Full Code  yes     Critical Care Time: 44 minutes  Critical secondary to Patient has a condition that poses threat to life and bodily function: Acute Renal Failure      Critical care was time spent personally by me on the following activities: development of treatment plan with patient or surrogate and bedside caregivers, discussions with consultants, evaluation of patient's response to treatment, examination of patient, ordering and performing treatments and interventions, ordering and review of laboratory studies, ordering and review of radiographic studies, pulse oximetry, re-evaluation of patient's condition. This critical care time did not overlap with that of any other provider or involve time for any procedures.     Cristiano Altamirano MD, FACP, FCCP  Critical Care Medicine  [facility] - Intensive Care \[facility] [place] [facility]\"")

## 2023-09-21 NOTE — PLAN OF CARE
[facility] - [facility] Care (Hospital)  Initial Discharge Assessment       Primary Care Provider: Ami Zarate DO    Admission Diagnosis: Chronic respiratory failure with hypoxia [J96.11]  Hypotension [I95.9]  Acute hypercapnic respiratory failure [J96.02]  Anemia, unspecified type [D64.9]    Admission Date: 9/20/2023  Expected Discharge Date:     Transition of Care Barriers: Does not adhere to care plan, Bariatric    Payor: HU[employer] MANAGED MEDICARE / Plan: HUMANA TOTAL CARE ADVANTAGE / Product Type: Medicare Advantage /     Extended Emergency Contact Information  Primary Emergency Contact: Solo Rothman  Address: [address] [address] [place]  Mobile Phone: [phone]  Relation: Son  Secondary Emergency Contact: Alise Rothman  Address: [address] [address] [facility]  Home Phone: [phone]  Relation: Daughter    Discharge Plan A: [facility] [address]  [address] [address]  Phone: [phone] Fax: [phone]    [facility]  [address] [place] [address]  Phone: [phone] Fax: [phone]    [facility]  [address] [facility] [address]  [address] [facility]   [place] [address]  Phone: [phone] Fax: [phone]      Initial Assessment (most recent)       Adult Discharge Assessment - 09/21/23 0840          Discharge Assessment    Assessment Type Discharge Planning Assessment     Confirmed/corrected address, phone number and insurance Yes     Confirmed Demographics Correct on Facesheet     Source of Information health record;family     Communicated JAMES with patient/caregiver Date not available/Unable to determine     Reason For Admission Chronic respiratory failure with hypercapnia     People in Home child(fouzia), adult;spouse     Facility Arrived From: home     Do you expect to  return to your current living situation? Yes     Do you have help at home or someone to help you manage your care at home? Yes     Who are your caregiver(s) and their phone number(s)? adult children     Prior to hospitilization cognitive status: Alert/Oriented     Current cognitive status: Unable to Assess   on bipap    Walking or Climbing Stairs ambulation difficulty, dependent;stair climbing difficulty, dependent;transferring difficulty, dependent     Mobility Management bed bound     Dressing/Bathing bathing difficulty, dependent;dressing difficulty, dependent     Dressing/Bathing Management family assists     Home Accessibility wheelchair accessible     Home Layout Able to live on 1st floor     Equipment Currently Used at Home other (see comments);hospital bed;oxygen   Trilogy    Readmission within 30 days? Yes     Patient currently being followed by outpatient case management? No     Do you currently have service(s) that help you manage your care at home? Yes     Name and Contact number of agency [facility]     Is the pt/caregiver preference to resume services with current agency Yes     Do you take prescription medications? Yes     Do you have prescription coverage? Yes     Coverage MCR     Do you have any problems affording any of your prescribed medications? No     Is the patient taking medications as prescribed? yes     Who is going to help you get home at discharge? will need ambulance transport     How do you get to doctors appointments? other (see comments)   home bound    Are you on dialysis? No     Do you take coumadin? No     DME Needed Upon Discharge  none     Discharge Plan discussed with: Adult children;Spouse/sig other     Transition of Care Barriers Does not adhere to care plan;Bariatric     Discharge Plan A Home Health        Physical Activity    On average, how many days per week do you engage in moderate to strenuous exercise (like a brisk walk)? 0 days     On average, how many  minutes do you engage in exercise at this level? 0 min        Financial Resource Strain    How hard is it for you to pay for the very basics like food, housing, medical care, and heating? Hard        Housing Stability    In the last 12 months, was there a time when you were not able to pay the mortgage or rent on time? No     In the last 12 months, how many places have you lived? 1     In the last 12 months, was there a time when you did not have a steady place to sleep or slept in a shelter (including now)? No        Transportation Needs    In the past 12 months, has lack of transportation kept you from medical appointments or from getting medications? Yes     In the past 12 months, has lack of transportation kept you from meetings, work, or from getting things needed for daily living? Yes        Food Insecurity    Within the past 12 months, you worried that your food would run out before you got the money to buy more. Never true     Within the past 12 months, the food you bought just didn't last and you didn't have money to get more. Never true                     Readmission Assessment (most recent)       Readmission Assessment - 09/21/23 0845          Readmission    Why were you hospitalized in the last 30 days? septic shock     Why were you readmitted? New medical problem     When you left the hospital how did you feel? ok     When you left the hospital where did you go? Home with Home Health     Did patient/caregiver refused recommended DC plan? No     Tell me about what happened between when you left the hospital and the day you returned. pt became confused     Did you try to manage your symptoms your self? No     Did you call anyone? Yes     Who did you call? Other (comments)   EMS    Did you try to see or did see a doctor or nurse before you came? No     Did you have  a follow-up appointment on discharge? Yes   scheduled for 9/22    Was this a planned readmission? No                    Anticipated DC dispo:  home health - current with [facility]   Prior Level of Function: dependent with ADL, bed bound   People in home: spouse and adult son and daughter     Comments:  Chart review completed for discharge planning. Pt is known to this CM from previous admission.  Adult son and daughter will be help at home. Patient will need ambulance transport at time of discharge. Confirmed demographics, insurance, and emergency contacts. CM discharge needs depends on hospital progress. CM will continue following to assist with other needs.

## 2023-09-21 NOTE — PLAN OF CARE
"-Pt arrived from ED requiring continuous BiPAP  -Transferred to bariatric bed upon admission to ICU.  -Skin integrity unchanged since he was here last week: skin tear to sacrum/gluteal fold, BLE lymphadema and ulcers, scattered bruising  -Answers orientation questions correctly  -Afib and bradycardic throughout shift. 22-60bpm. Very labile. Provider aware and was consulted during episodes of 20s and 30bpms  -BP trending down towards end of shift. 15mg midodrine given  -Remains on continuous bipap and compliant  -Pt tolerated water well  -3 liquid mucoid BM this shift  -External catheter in place with no quantifiable output. 0200 bladder scanner revealed ~120ml  -No complaints of pain. Pt's only complaint is "I'm cold. I'm freezing to death." Multiple blankets provided and room temp increased. Pt slept comfortably, in between care, all shift.   -Afebrile  -Call light in reach  "

## 2023-09-21 NOTE — CONSULTS
[facility])  Cardiology  Consult Note    Patient Name: Gene Rothman  MRN: [id number]  Admission Date: 9/20/2023  Hospital Length of Stay: 1 days  Code Status: Full Code   Attending Provider: Cristiano Altamirano MD   Consulting Provider: Blue Cabrera MD  Primary Care Physician: Ami Zarate DO  Principal Problem:Chronic respiratory failure with hypercapnia    Patient information was obtained from patient and ER records.     Inpatient consult to Cardiology  Consult performed by: Blue Cabrera MD  Consult ordered by: Cristiano Altamirano MD        Subjective:       HPI:   Cardiology consult for afib and bradycardia  PMH chronic afib, obesity bed bound, chronic hypoxic respiratory failure (on 4L), PHTN, diastolic CHF, RV dysfunction, chronic lymphedema, and OHS/DENISHA (non-compliant with PAP therapies)   Admitted for Patient with Hypercapnic Respiratory failure which is Acute on chronic.  he is on home oxygen at 4 LPM. Supplemental oxygen was provided and noted-    His HR dropped to 20 to 30's when he fell asleep  Denied chest pain dizziness  HGB 7  Cr 2.8    Troponin 0.027 --> 0.033  Ekg afib  Echo 09/23  Left Ventricle: The left ventricle is normal in size. Mildly increased wall thickness. There is concentric remodeling. Normal wall motion. Septal motion is normal. There is normal systolic function with a visually estimated ejection fraction of 55 - 70%. There is normal diastolic function.    Left Atrium: Left atrium is mildly dilated.    Right Ventricle: Mild right ventricular enlargement. Wall thickness is normal. Right ventricle wall motion  is normal. Systolic function is normal.    Aortic Valve: The aortic valve is a trileaflet valve. There is moderate aortic valve sclerosis.    Tricuspid Valve: There is moderate regurgitation with a centrally directed jet.    Pulmonary Artery: The estimated pulmonary artery systolic pressure is 57 mmHg.    IVC/SVC: Normal venous pressure at 3  mmHg.         Past Medical History:   Diagnosis Date    Acute hypoxemic respiratory failure 9/17/2022    Arthritis     Atrial fibrillation 3/1/2023    CHF (congestive heart failure)     Chronic kidney disease     Coronary artery disease     Depression     Hypertension     Hypertensive heart disease with heart failure 9/16/2022    Hypothyroidism     Lymphedema of lower extremity     Nephrolithiasis     Obesity     Peripheral vascular disease     Pneumonia 9/17/2022    Recurrent cellulitis of lower leg     Sleep apnea     can't stand the CPAP , sleeps elevated in hospital bed or chair    Tobacco dependence     resolved       Past Surgical History:   Procedure Laterality Date    ADENOIDECTOMY  1961    BACK SURGERY  1975;  1976    x2 for disc; scar tissue removed    debridement of bilateral leg ulcers Bilateral 01/01/2017    Dr Hernandez    INNER EAR SURGERY Bilateral 1961    separate - pinnaplasty , new eardrms constructed    KIDNEY STONE SURGERY Left 1976 approx    open    TONSILLECTOMY  1961       Review of patient's allergies indicates:   Allergen Reactions    Sulfamethoxazole-trimethoprim Itching and Shortness Of Breath    Sulfamethoxazole        No current facility-administered medications on file prior to encounter.     Current Outpatient Medications on File Prior to Encounter   Medication Sig    apixaban (ELIQUIS) 5 mg Tab Take 1 tablet (5 mg total) by mouth 2 (two) times daily.    aspirin 81 MG Chew Take 1 tablet (81 mg total) by mouth once daily.    acetaminophen (TYLENOL) 500 MG tablet Take 500 mg by mouth every 6 (six) hours as needed for Pain.    atorvastatin (LIPITOR) 40 MG tablet Take 1 tablet (40 mg total) by mouth once daily.    fludrocortisone (FLORINEF) 0.1 mg Tab Take 1 tablet (100 mcg total) by mouth 2 (two) times daily. (Patient not taking: Reported on 9/15/2023)    furosemide (LASIX) 40 MG tablet Take 1 tablet (40 mg total) by mouth once daily.    levalbuterol  (XOPENEX) 1.25 mg/3 mL nebulizer solution Take 3 mLs (1.25 mg total) by nebulization every 4 (four) hours as needed for Wheezing. Rescue    levothyroxine (SYNTHROID) 50 MCG tablet Take 1 tablet (50 mcg total) by mouth before breakfast.    metronidazole 1% (METROGEL) 1 % Gel Apply topically once daily.    miconazole nitrate 2% (MICOTIN) 2 % Oint Apply topically 2 (two) times daily. (Patient not taking: Reported on 9/15/2023)    midodrine (PROAMATINE) 5 MG Tab Take 3 tablets (15 mg total) by mouth 3 (three) times daily.    nebulizer and compressor (HOME NEBULIZER PLUS SIDESTREAM) Diana use as directed    senna-docusate 8.6-50 mg (PERICOLACE) 8.6-50 mg per tablet Take 1 tablet by mouth daily as needed for Constipation.    tiotropium (SPIRIVA) 18 mcg inhalation capsule Inhale 1 capsule (18 mcg total) into the lungs once daily. Controller     Family History       Problem Relation (Age of Onset)    Alcohol abuse Maternal Grandfather    Alzheimer's disease Brother    Asthma Daughter    Cancer Mother, Father, Brother    Diabetes Mother    Heart disease Father, Brother    Hypertension Mother          Tobacco Use    Smoking status: Former     Current packs/day: 0.00     Average packs/day: 1.5 packs/day for 1 year (1.5 ttl pk-yrs)     Types: Cigarettes     Start date:      Quit date:      Years since quittin.7    Smokeless tobacco: Never   Substance and Sexual Activity    Alcohol use: Not Currently    Drug use: Never    Sexual activity: Never     Review of Systems   Cardiovascular:  Positive for irregular heartbeat and leg swelling.   Respiratory:  Positive for shortness of breath.      Objective:     Vital Signs (Most Recent):  Temp: 97.4 °F (36.3 °C) (23 1200)  Pulse: 64 (23 1400)  Resp: (!) 22 (23 1400)  BP: (!) 118/58 (23 1400)  SpO2: 98 % (23 1400) Vital Signs (24h Range):  Temp:  [97 °F (36.1 °C)-97.8 °F (36.6 °C)] 97.4 °F (36.3 °C)  Pulse:  [37-67] 64  Resp:  [9-24]  22  SpO2:  [91 %-100 %] 98 %  BP: ()/(31-88) 118/58     Weight: (!) 196.4 kg (432 lb 15.7 oz)  Body mass index is 57.13 kg/m².    SpO2: 98 %         Intake/Output Summary (Last 24 hours) at 9/21/2023 1450  Last data filed at 9/21/2023 1400  Gross per 24 hour   Intake 1038.88 ml   Output 1050 ml   Net -11.12 ml       Lines/Drains/Airways       Drain  Duration                  Urethral Catheter 09/21/23 1130 Silicone 16 Fr. <1 day              Peripheral Intravenous Line  Duration                  Peripheral IV - Single Lumen 09/20/23 1550 18 G;1 3/4 in Anterior;Distal;Right Upper Arm <1 day         Peripheral IV - Single Lumen 09/20/23 1550 18 G;1 3/4 in Anterior;Proximal;Right Upper Arm <1 day                     Physical Exam  HENT:      Head: Normocephalic.   Eyes:      Pupils: Pupils are equal, round, and reactive to light.   Neck:      Thyroid: No thyromegaly.      Vascular: Normal carotid pulses. No carotid bruit or JVD.   Cardiovascular:      Rate and Rhythm: Normal rate. Rhythm irregularly irregular. No extrasystoles are present.     Chest Wall: PMI is not displaced.      Pulses: Normal pulses.      Heart sounds: Normal heart sounds. No murmur heard.     No gallop. No S3 sounds.   Pulmonary:      Effort: No respiratory distress.      Breath sounds: Normal breath sounds. No stridor.   Abdominal:      General: Bowel sounds are normal.      Palpations: Abdomen is soft.      Tenderness: There is no abdominal tenderness. There is no rebound.   Musculoskeletal:         General: Swelling present.   Skin:     Findings: No rash.   Neurological:      Mental Status: He is alert and oriented to person, place, and time.   Psychiatric:         Behavior: Behavior normal.          Significant Labs: ABG:   Recent Labs   Lab 09/20/23  2121 09/21/23  0502   PH 7.267* 7.274*   PCO2 49.9* 47.3*   HCO3 22.8 21.9   POCSATURATED 98 97   BE -4 -5   , Blood Culture:   Recent Labs   Lab 09/20/23  1756 09/20/23  1800   LABBLOO No  "Growth to date No Growth to date   , BMP:   Recent Labs   Lab 09/20/23  1541 09/21/23  0334   GLU 94 76    143   K 3.3* 3.6   * 113*   CO2 22* 17*   BUN 65* 66*   CREATININE 2.9* 2.8*   CALCIUM 7.2* 7.0*   MG 2.1 1.9   , CMP   Recent Labs   Lab 09/20/23  1541 09/21/23  0334    143   K 3.3* 3.6   * 113*   CO2 22* 17*   GLU 94 76   BUN 65* 66*   CREATININE 2.9* 2.8*   CALCIUM 7.2* 7.0*   PROT 6.2  --    ALBUMIN 1.6* 1.5*   BILITOT 0.3  --    ALKPHOS 65  --    AST 13  --    ALT 8*  --    ANIONGAP 11 13   , CBC   Recent Labs   Lab 09/20/23  1541 09/21/23  0334   WBC 7.95 6.32   HGB 7.5* 7.0*   HCT 25.2* 23.3*    173   , INR   Recent Labs   Lab 09/20/23  1541   INR 1.5*   , Lipid Panel No results for input(s): "CHOL", "HDL", "LDLCALC", "TRIG", "CHOLHDL" in the last 48 hours., and Troponin   Recent Labs   Lab 09/20/23  1541 09/20/23 1951   TROPONINI 0.033* 0.027*       Significant Imaging: EKG:      Assessment and Plan:     Bradycardia  afib with bradycardia  Morbid obesity bed bound  Copd   CKD III    --HR normal when he was awake. Advise ventilation support  --avoid negative inotropic meds  --no indication of PPM   -continue eliquis    Cellulitis  Continue bumex    Venous stasis dermatitis of both lower extremities  continue bumex    Morbid obesity with BMI of 60.0-69.9, adult  Weight control        VTE Risk Mitigation (From admission, onward)         Ordered     apixaban tablet 5 mg  2 times daily         09/20/23 1754     IP VTE HIGH RISK PATIENT  Once         09/20/23 1744     Place sequential compression device  Until discontinued         09/20/23 1744                Thank you for your consult. I will follow-up with patient. Please contact us if you have any additional questions.    Blue Cabrera MD  Cardiology   [facility])    "

## 2023-09-21 NOTE — ASSESSMENT & PLAN NOTE
Patient with Hypercapnic Respiratory failure which is Acute on chronic.  he is on home oxygen at 4 LPM. Supplemental oxygen was provided and noted- Oxygen Concentration (%):  [30-35] 30    .   Signs/symptoms of respiratory failure include- increased work of breathing. Contributing diagnoses includes - Obesity Hypoventilation and OSAS Labs and images were reviewed. Patient Has recent ABG, which has been reviewed. Will treat underlying causes and adjust management of respiratory failure as follows-   Currently on BIPAP  Nebs prn    9/21. Continue NIPPV. NC when awake and oriented as tolerated.

## 2023-09-21 NOTE — PROCEDURES
"Gene Rothman is a 77 y.o. male patient.    Temp: 97.4 °F (36.3 °C) (09/21/23 1200)  Pulse: 60 (09/21/23 1500)  Resp: (!) 21 (09/21/23 1500)  BP: (!) 107/53 (09/21/23 1500)  SpO2: 97 % (09/21/23 1500)  Weight: (!) 196.4 kg (432 lb 15.7 oz) (09/21/23 1336)  Height: 6' 1" (185.4 cm) (09/21/23 1336)    PICC  Date/Time: 9/21/2023 3:30 PM  Performed by: Migue Olvera RN  Consent Done: Yes  Time out: Immediately prior to procedure a time out was called to verify the correct patient, procedure, equipment, support staff and site/side marked as required  Indications: med administration and vascular access  Anesthesia: local infiltration  Local anesthetic: lidocaine 1% without epinephrine  Anesthetic Total (mL): 2  Preparation: skin prepped with chlorhexidine (without alcohol)  Skin prep agent dried: skin prep agent completely dried prior to procedure  Sterile barriers: all five maximum sterile barriers used - cap, mask, sterile gown, sterile gloves, and large sterile sheet  Hand hygiene: hand hygiene performed prior to central venous catheter insertion  Location details: right brachial  Catheter type: double lumen  Catheter size: 4 Fr  Catheter Length: 43cm    Ultrasound guidance: yes  Vessel Caliber: medium and patent, compressibility normal  Needle advanced into vessel with real time Ultrasound guidance.  Guidewire confirmed in vessel.  Sterile sheath used.  Number of attempts: 1  Post-procedure: blood return through all ports, chlorhexidine patch and sterile dressing applied  Specimens: No  Implants: No  Comments: Awaiting xray for confirmation of placement           Migue Olvera RN  9/21/2023    "

## 2023-09-21 NOTE — ASSESSMENT & PLAN NOTE
Midodrine was given.  BNP elevated but unremarkable CxR.  Will give careful hydration  Serial labs   Empiric Abx / sepsis syndrome workup initiated in ED.     9/21. Will give additional fluid with bicarb for metabolic acidosis. Still on BIPAP

## 2023-09-21 NOTE — ASSESSMENT & PLAN NOTE
afib with bradycardia  Morbid obesity bed bound  Copd   CKD III    --HR normal when he was awake. Advise ventilation support  --avoid negative inotropic meds  --no indication of PPM   -continue eliquis

## 2023-09-21 NOTE — PLAN OF CARE
Nutrition Recs: 9/21  1. Recommend pt diet order be modified to Cardiac; Renal diet when medically appropriate.   2. Recommend pt receives [employer] Source Renal TID if PO intake <75%.   3. Recommend pt receives Jm BID to assist with wound healing.   4. Recommend to consider alternative nutrition support if pt diet order can not be advanced within x 4 days.   5. Weigh daily.    Goals:   1. Pt diet order will be modified within x 4 days.   2. Pt will consume >50% of Jm prior to RD follow up.   3. Pt will consume >50% of EEN and EPN prior to RD follow up.  Nutrition Goal Status: new  Communication of RD Recs: other (comment) (POC: Sticky Note)  Lj Mcgovern, Registration Eligible, Provisional LDN

## 2023-09-21 NOTE — CONSULTS
[facility] (Hospital)  Wound Care    Patient Name:  Gene Rothman   MRN:  [id number]  Date: 2023  Diagnosis: Chronic respiratory failure with hypercapnia    History:     Past Medical History:   Diagnosis Date    Acute hypoxemic respiratory failure 2022    Arthritis     Atrial fibrillation 3/1/2023    CHF (congestive heart failure)     Chronic kidney disease     Coronary artery disease     Depression     Hypertension     Hypertensive heart disease with heart failure 2022    Hypothyroidism     Lymphedema of lower extremity     Nephrolithiasis     Obesity     Peripheral vascular disease     Pneumonia 2022    Recurrent cellulitis of lower leg     Sleep apnea     can't stand the CPAP , sleeps elevated in hospital bed or chair    Tobacco dependence     resolved       Social History     Socioeconomic History    Marital status:    Tobacco Use    Smoking status: Former     Current packs/day: 0.00     Average packs/day: 1.5 packs/day for 1 year (1.5 ttl pk-yrs)     Types: Cigarettes     Start date:      Quit date:      Years since quittin.7    Smokeless tobacco: Never   Substance and Sexual Activity    Alcohol use: Not Currently    Drug use: Never    Sexual activity: Never   Social History Narrative    ** Merged History Encounter **          Lives with wife and 2 sons and a daughter. No longer drives. Quit in  because couldn't hold foot on pedal. 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 Uses a Rolator at wo    rk and wheelchair at home. No caffeine. Does not have a Living Will or Advanced Directive.       Social Determinants of Health     Financial Resource Strain: High Risk (2023)    Overall Financial Resource Strain (CARDIA)     Difficulty of Paying Living Expenses: Hard   Food Insecurity: No Food Insecurity (2023)    Hunger Vital Sign     Worried About Running Out of Food in the  Last Year: Never true     Ran Out of Food in the Last Year: Never true   Transportation Needs: Unmet Transportation Needs (9/21/2023)    PRAPARE - Transportation     Lack of Transportation (Medical): Yes     Lack of Transportation (Non-Medical): Yes   Physical Activity: Inactive (9/21/2023)    Exercise Vital Sign     Days of Exercise per Week: 0 days     Minutes of Exercise per Session: 0 min   Stress: Stress Concern Present (2/20/2023)    [employer] - Occupational Stress Questionnaire     Feeling of Stress : To some extent   Social Connections: Socially Isolated (2/20/2023)    Social Connection and Isolation Panel [NHANES]     Frequency of Communication with Friends and Family: Never     Frequency of Social Gatherings with Friends and Family: Once a week     Attends Shinto Services: Never     Active Member of Clubs or Organizations: No     Attends Club or Organization Meetings: Never     Marital Status:    Housing Stability: Low Risk  (9/21/2023)    Housing Stability Vital Sign     Unable to Pay for Housing in the Last Year: No     Number of Places Lived in the Last Year: 1     Unstable Housing in the Last Year: No       Precautions:     Allergies as of 09/20/2023 - Reviewed 09/20/2023   Allergen Reaction Noted    Sulfamethoxazole-trimethoprim Itching and Shortness Of Breath 08/29/2014    Sulfamethoxazole  07/17/2023       WO Assessment Details/Treatment     Consulted on this 78 y/o M patient due to present on admission altered skin integrity. Patient is awake and alert, seen in ICU on BIPAP. He is already on specialty bariatric LO bed, heel offloading boots in place, turning with foam wedge.  Skin assessment completed.   ACE bandages noted to BLE, removed. BLE with chronic lymphedema and chronic venous stasis changes noted including dry flaky skin and hemosiderin staining, edematous. Multiple DTPIs noted caused by ACE bandages (as detailed below).  Left lateral lower leg  venous ulcer with full thickness tissue loss, measures 1.5x4x0.1cm, moist red and yellow wound bed  Left lateral calf DTPI noted that measures 6x3cm, purple discoloration, defined edges.  Right lateral foot evolving DTPI noted with partial thickness itssue loss, measures 4x2x0.1cm  Right posteriorlateral ankle DTPI noted that measures 4x12cm, purple discoloration  Right lateral lower leg venous ulcerations noted, measures 3x2x0.1cm  Right calf venous ulcer noted measures 2x4x0.1cm with moist red and yellow subcutaneous tissue to wound bed.  These wounds were cleansed with vashe and patted dry. Open wounds covered with aquacel ag advantage, and large foam dressings applied to cover all. Compression and ACE wraps avoided due to DTPIs.  Hypertrophic toe nails noted, right 3rd toe with open blood blister, foam dressing applied.  Left lateral hip stage 2 PI noted, measures 2x1cm, cleansed with vashe and foam dressing applied.  Right medial thigh DTPI measures 1x3cm, purple discoloration, moisture barrier paste in use  MASD to bilateral buttock, tahir rectal, bilateral posterior/medial thighs and ischium: cleansed with no rinse foaming cleanser, moisture barrier paste applied.  Left mid upper back DTPIs noted with multiple linear purple discolorations, foam dressings applied.  Stage 3 PI to coccyx/sacral region noted with full thickness tissue loss, moist red and yellow subcutaneous tissue to wound bed. Cleansed with vashe and patted dry. Z guard moisture barrier paste applied.     09/21/23 1045   WOCN Assessment   WOCN Total Time (mins) 90   Visit Date 09/21/23   Visit Time 1045   Consult Type New   WOCN Speciality Wound   Wound pressure;deep tissue injury;venous;moisture;At risk for pressure Injury;other   Intervention assessed;applied;chart review;coordination of care;team conference;orders   Teaching on-going;discharge;complication   Pressure Injury Prevention    Check Moisture Management Pad Done   Sacral Foam  Dressing Remove   Heel protection technique Heel boot   Heel preventative measures Peel back dressing/boot, assess skin and reapply   Check Medical Devices Done        Altered Skin Integrity 09/02/23 0900 upper Sacral spine Full thickness tissue loss. Subcutaneous fat may be visible but bone, tendon or muscle are not exposed   Date First Assessed/Time First Assessed: 09/02/23 0900   Altered Skin Integrity Present on Admission - Did Patient arrive to the hospital with altered skin?: yes  Orientation: upper  Location: Sacral spine  Is this injury device related?: No  Descript...   Wound Image    Description of Altered Skin Integrity Full thickness tissue loss. Subcutaneous fat may be visible but bone, tendon or muscle are not exposed   Dressing Appearance Open to air   Drainage Amount Scant   Drainage Characteristics/Odor Serosanguineous   Appearance Red;Yellow;Adipose;Moist   Tissue loss description Full thickness   Red (%), Wound Tissue Color 25 %   Yellow (%), Wound Tissue Color 75 %   Periwound Area Maroon;Intact   Wound Edges Defined   Wound Length (cm) 4 cm   Wound Width (cm) 2 cm   Wound Depth (cm) 0.2 cm   Wound Volume (cm^3) 1.6 cm^3   Wound Surface Area (cm^2) 8 cm^2   Care Cleansed with:;Wound cleanser;Applied:;Skin Barrier  (z guard paste)        Altered Skin Integrity 09/05/23 Left lower;lateral Leg Venous Ulcer   Date First Assessed: 09/05/23   Altered Skin Integrity Present on Admission - Did Patient arrive to the hospital with altered skin?: yes  Side: Left  Orientation: lower;lateral  Location: Leg  Primary Wound Type: Venous Ulcer   Wound Image    Dressing Appearance Moist drainage;Intact   Drainage Amount Small   Drainage Characteristics/Odor Serosanguineous   Appearance Red;Yellow;Moist;Slough;Adipose   Tissue loss description Full thickness   Red (%), Wound Tissue Color 25 %   Yellow (%), Wound Tissue Color 75 %   Wound Edges Defined   Wound Length (cm) 1.5 cm   Wound Width (cm) 4 cm   Wound Depth  (cm) 0.2 cm   Wound Volume (cm^3) 1.2 cm^3   Wound Surface Area (cm^2) 6 cm^2   Care Cleansed with:;Wound cleanser;Applied:;Skin Barrier   Dressing Hydrofiber;Silver;Foam   Dressing Change Due 09/25/23        Altered Skin Integrity 09/21/23 Left lateral Calf Purple or maroon localized area of discolored intact skin or non-intact skin or a blood-filled blister.   Date First Assessed: 09/21/23   Altered Skin Integrity Present on Admission - Did Patient arrive to the hospital with altered skin?: yes  Side: Left  Orientation: lateral  Location: Calf  Description of Altered Skin Integrity: Purple or maroon localiz...   Wound Image   (see Left lateral lower leg venous ulcer LDA for picture)   Description of Altered Skin Integrity Purple or maroon localized area of discolored intact skin or non-intact skin or a blood-filled blister.   Dressing Appearance Clean;Intact   Drainage Amount None   Appearance Purple;Maroon   Tissue loss description Not applicable   Wound Edges Defined   Wound Length (cm) 6 cm   Wound Width (cm) 3 cm   Wound Surface Area (cm^2) 18 cm^2   Care Cleansed with:;Wound cleanser;Applied:;Skin Barrier   Dressing Foam;Applied   Dressing Change Due 09/25/23        Altered Skin Integrity 09/21/23 Right lateral Foot Purple or maroon localized area of discolored intact skin or non-intact skin or a blood-filled blister.   Date First Assessed: 09/21/23   Altered Skin Integrity Present on Admission - Did Patient arrive to the hospital with altered skin?: yes  Side: Right  Orientation: lateral  Location: Foot  Description of Altered Skin Integrity: Purple or maroon locali...   Wound Image    Description of Altered Skin Integrity Purple or maroon localized area of discolored intact skin or non-intact skin or a blood-filled blister.   Dressing Appearance Intact;Moist drainage   Drainage Amount Small   Drainage Characteristics/Odor Serosanguineous   Appearance Maroon;Red;Moist   Tissue loss description Partial  thickness   Periwound Area Redness   Wound Edges Irregular   Wound Length (cm) 4 cm   Wound Width (cm) 2 cm   Wound Depth (cm) 0.1 cm   Wound Volume (cm^3) 0.8 cm^3   Wound Surface Area (cm^2) 8 cm^2   Care Cleansed with:;Sterile normal saline;Applied:;Skin Barrier   Dressing Hydrofiber;Silver;Foam   Dressing Change Due 09/25/23        Altered Skin Integrity 09/21/23 Right posterior;lateral Ankle Purple or maroon localized area of discolored intact skin or non-intact skin or a blood-filled blister.   Date First Assessed: 09/21/23   Altered Skin Integrity Present on Admission - Did Patient arrive to the hospital with altered skin?: yes  Side: Right  Orientation: posterior;lateral  Location: Ankle  Description of Altered Skin Integrity: Purple or ma...   Wound Image     Description of Altered Skin Integrity Purple or maroon localized area of discolored intact skin or non-intact skin or a blood-filled blister.   Dressing Appearance Open to air   Drainage Amount None   Appearance Purple   Tissue loss description Not applicable   Periwound Area Intact   Wound Edges Defined   Wound Length (cm) 4 cm   Wound Width (cm) 12 cm   Wound Surface Area (cm^2) 48 cm^2   Care Cleansed with:;Wound cleanser;Applied:;Skin Barrier   Dressing Foam;Applied   Dressing Change Due 09/25/23        Altered Skin Integrity 09/21/23 Right Calf Venous Ulcer   Date First Assessed: 09/21/23   Altered Skin Integrity Present on Admission - Did Patient arrive to the hospital with altered skin?: yes  Side: Right  Location: Calf  Primary Wound Type: Venous Ulcer   Wound Image    Dressing Appearance Intact;Moist drainage   Drainage Amount Small   Drainage Characteristics/Odor Serosanguineous   Appearance Red;Yellow;Moist   Tissue loss description Full thickness   Red (%), Wound Tissue Color 50 %   Yellow (%), Wound Tissue Color 50 %   Periwound Area Redness;Swelling   Wound Edges Open   Wound Length (cm) 2 cm   Wound Width (cm) 4 cm   Wound Depth (cm) 0.2  cm   Wound Volume (cm^3) 1.6 cm^3   Wound Surface Area (cm^2) 8 cm^2   Care Cleansed with:;Wound cleanser;Applied:;Skin Barrier   Dressing Hydrofiber;Silver;Foam   Dressing Change Due 09/25/23        Altered Skin Integrity 09/21/23 Right lower;lateral Leg Venous Ulcer   Date First Assessed: 09/21/23   Altered Skin Integrity Present on Admission - Did Patient arrive to the hospital with altered skin?: yes  Side: Right  Orientation: lower;lateral  Location: Leg  Primary Wound Type: Venous Ulcer   Wound Image    Dressing Appearance Intact;Moist drainage   Drainage Amount Small   Drainage Characteristics/Odor Serosanguineous   Appearance Red;Yellow;Moist   Tissue loss description Full thickness   Red (%), Wound Tissue Color 50 %   Yellow (%), Wound Tissue Color 50 %   Periwound Area Redness;Swelling   Wound Edges Open   Wound Length (cm) 3 cm   Wound Width (cm) 2 cm   Wound Depth (cm) 0.1 cm   Wound Volume (cm^3) 0.6 cm^3   Wound Surface Area (cm^2) 6 cm^2   Care Cleansed with:;Wound cleanser;Applied:;Skin Barrier   Dressing Hydrofiber;Silver;Foam   Dressing Change Due 09/25/23        Altered Skin Integrity 09/21/23 Left lateral Greater trochanter Partial thickness tissue loss. Shallow open ulcer with a red or pink wound bed, without slough. Intact or Open/Ruptured Serum-filled blister.   Date First Assessed: 09/21/23   Altered Skin Integrity Present on Admission - Did Patient arrive to the hospital with altered skin?: yes  Side: Left  Orientation: lateral  Location: Greater trochanter  Description of Altered Skin Integrity: Partial th...   Wound Image    Description of Altered Skin Integrity Partial thickness tissue loss. Shallow open ulcer with a red or pink wound bed, without slough. Intact or Open/Ruptured Serum-filled blister.   Dressing Appearance Open to air   Drainage Amount Small   Drainage Characteristics/Odor Serosanguineous   Appearance Red;Moist   Tissue loss description Partial thickness   Periwound Area  Ecchymotic   Wound Edges Open;Irregular   Wound Length (cm) 2 cm   Wound Width (cm) 1 cm   Wound Depth (cm) 0.1 cm   Wound Volume (cm^3) 0.2 cm^3   Wound Surface Area (cm^2) 2 cm^2   Care Cleansed with:;Wound cleanser;Applied:;Skin Barrier   Dressing Foam;Applied   Dressing Change Due 09/25/23        Altered Skin Integrity 09/21/23 Right medial Thigh Purple or maroon localized area of discolored intact skin or non-intact skin or a blood-filled blister.   Date First Assessed: 09/21/23   Altered Skin Integrity Present on Admission - Did Patient arrive to the hospital with altered skin?: yes  Side: Right  Orientation: medial  Location: Thigh  Description of Altered Skin Integrity: Purple or maroon locali...   Wound Image    Description of Altered Skin Integrity Purple or maroon localized area of discolored intact skin or non-intact skin or a blood-filled blister.   Dressing Appearance Open to air   Drainage Amount None   Appearance Purple   Tissue loss description Not applicable   Periwound Area Intact   Wound Edges Defined   Wound Length (cm) 1 cm   Wound Width (cm) 3 cm   Wound Surface Area (cm^2) 3 cm^2   Care Cleansed with:;Wound cleanser;Applied:;Skin Barrier  (z guard)        Altered Skin Integrity 09/21/23  Buttocks Moisture associated dermatitis   Date First Assessed: 09/21/23   Altered Skin Integrity Present on Admission - Did Patient arrive to the hospital with altered skin?: yes  Side: (c)   Location: (c) Buttocks  Primary Wound Type: Moisture associated dermatitis   Wound Image    Dressing Appearance Open to air   Drainage Amount None   Appearance Red   Tissue loss description Partial thickness   Care Cleansed with:;Soap and water;Applied:;Skin Barrier  (z guard)        Altered Skin Integrity 09/21/23 Left upper Back Purple or maroon localized area of discolored intact skin or non-intact skin or a blood-filled blister.   Date First Assessed: 09/21/23   Altered Skin Integrity Present on Admission - Did  Patient arrive to the hospital with altered skin?: yes  Side: Left  Orientation: upper  Location: Back  Description of Altered Skin Integrity: Purple or maroon localized...   Wound Image    Description of Altered Skin Integrity Purple or maroon localized area of discolored intact skin or non-intact skin or a blood-filled blister.   Dressing Appearance Open to air   Drainage Amount None   Appearance Purple;Maroon   Tissue loss description Not applicable   Wound Edges Defined   Dressing Applied;Foam   Dressing Change Due 09/25/23       Recommendations made to primary team for wound care, moisture management, pressure injury prevention, speicalty LO bariatric bed (in use) . Orders placed.     09/21/2023

## 2023-09-21 NOTE — CONSULTS
[facility] ([facility])  Adult Nutrition  Consult Note    SUMMARY     Recommendations  1. Recommend pt diet order be modified to Cardiac; Renal diet when medically appropriate.   2. Recommend pt receives Nova Source Renal TID if PO intake <75%.   3. Recommend pt receives Jm BID to assist with wound healing.   4. Recommend to consider alternative nutrition support if pt diet order can not be advanced within x 4 days.   5. Weigh daily.    Goals:   1. Pt diet order will be modified within x 4 days.   2. Pt will consume >50% of Jm prior to RD follow up.   3. Pt will consume >50% of EEN and EPN prior to RD follow up.  Nutrition Goal Status: new  Communication of RD Recs: other (comment) (POC: Sticky Note)    Assessment and Plan      Nutrition Problem  Inadequate PO intake    Related to (etiology):   Decreased ability to consume sufficient nutrition    Signs and Symptoms (as evidenced by):   BiPAP, NPO    Interventions(treatment strategy):  1. Low fat/chol/sodium modified diet  2. Commercial Beverage  3. Mineral supplement therapy for wound healing  4. Collaboration with other providers.      Nutrition Diagnosis Status:   New       Malnutrition Assessment     Skin (Micronutrient): dry, pallor, turgor reduced, wounds unhealed (Chan = 16)                                 Reason for Assessment    Reason For Assessment: consult  Diagnosis: pulmonary disease (Chronic respiratory failure with hypercapnia)  Relevant Medical History: CHF, CKD, CAD, HTN, Lymphedema of BLE, Sleep apnea (Chronic), Urinary retention  Interdisciplinary Rounds: did not attend  General Information Comments:   9/21: 78 y/o male admitted w current principal problem of Chronic respiratory failure with hypercapnia. Pt currently has NPO diet order 2/2 being on continuous BiPAP. The pt RN suspects pt will be taken of BiPAP soon. NFPE not performed. RD will assess need for NFPE during follow up. The is currently charted to weigh 433 lb, BMI  "57.13 (Morbidly obese). Per EMR pt has skin tears to sacrum/ gluteal and BLE ulcers. The pt LBM was 9/19. Per family members the pt has had a poor appetite and poor urein output. RD will continue to follow  Nutrition Discharge Planning: Cardiac diet.    Wt Readings from Last 30 Encounters:   09/21/23 (!) 196.4 kg (432 lb 15.7 oz)   09/15/23 (!) 192.2 kg (423 lb 11.6 oz)   09/09/23 (!) 200 kg (440 lb 14.7 oz)   08/01/23 (!) 178.6 kg (393 lb 11.9 oz)   07/17/23 (!) 176 kg (388 lb)   03/07/23 (!) 180.7 kg (398 lb 5.9 oz)   02/20/23 (!) 154.7 kg (341 lb)   02/04/23 (!) 186 kg (410 lb)   12/11/22 (!) 192.5 kg (424 lb 6.4 oz)   09/19/22 (!) 188.2 kg (415 lb)   08/21/22 (!) 195 kg (429 lb 14.4 oz)   06/13/22 (!) 218.7 kg (482 lb 2.3 oz)   01/05/22 (!) 181.9 kg (401 lb 0.3 oz)   12/19/21 (!) 194.4 kg (428 lb 9.2 oz)   12/09/21 (!) 202.3 kg (446 lb)   08/10/21 (!) 208.7 kg (460 lb)   10/13/17 (!) 198.8 kg (438 lb 4.4 oz)   09/29/17 (!) 202.7 kg (446 lb 14 oz)   02/20/17 (!) 200.7 kg (442 lb 7.4 oz)   02/06/17 (!) 207.2 kg (456 lb 12.7 oz)   12/30/16 (!) 217.7 kg (480 lb)   12/30/16 (!) 226.8 kg (500 lb)   09/22/16 (!) 215.9 kg (476 lb)   09/16/16 (!) 214.6 kg (473 lb 1.7 oz)   04/08/16 (!) 211.2 kg (465 lb 9.8 oz)   04/08/16 (!) 211.2 kg (465 lb 9.8 oz)   02/05/16 (!) 221.1 kg (487 lb 7 oz)   12/18/15 (!) 225.9 kg (498 lb 0.3 oz)   05/08/15 (!) 231 kg (509 lb 4.8 oz)   04/06/15 (!) 231.8 kg (511 lb)   ]    Nutrition Risk Screen    Nutrition Risk Screen: large or nonhealing wound, burn or pressure injury (BiPAP)    Nutrition/Diet History    Spiritual, Cultural Beliefs, Religion Practices, Values that Affect Care:  (GUMARO)  Food Allergies: NKFA  Factors Affecting Nutritional Intake: abdominal distension, NPO (BiPAP)    Anthropometrics    Temp: 97.4 °F (36.3 °C)  Height Method: Estimated  Height: 6' 1" (185.4 cm)  Height (inches): 73 in  Weight Method: Bed Scale  Weight: (!) 196.4 kg (432 lb 15.7 oz)  Weight (lb): (!) 432.99 " lb  Ideal Body Weight (IBW), Male: 184 lb  % Ideal Body Weight, Male (lb): 235.32 %  BMI (Calculated): 57.1  BMI Grade: greater than 40 - morbid obesity       Lab/Procedures/Meds  BMP  Lab Results   Component Value Date     09/21/2023    K 3.6 09/21/2023     (H) 09/21/2023    CO2 17 (L) 09/21/2023    BUN 66 (H) 09/21/2023    CREATININE 2.8 (H) 09/21/2023    CALCIUM 7.0 (L) 09/21/2023    ANIONGAP 13 09/21/2023    EGFRNORACEVR 22 (A) 09/21/2023     Lab Results   Component Value Date    CALCIUM 7.0 (L) 09/21/2023    PHOS 6.0 (H) 09/21/2023     Recent Labs   Lab 09/21/23 0213   POCTGLUCOSE 150*     Lab Results   Component Value Date    ALT 8 (L) 09/20/2023    AST 13 09/20/2023    ALKPHOS 65 09/20/2023    BILITOT 0.3 09/20/2023       Pertinent Labs Reviewed: reviewed  Pertinent Medications Reviewed: reviewed  Scheduled Meds:   apixaban  5 mg Oral BID    aspirin  81 mg Oral Daily    atorvastatin  40 mg Oral Daily    cefTRIAXone (ROCEPHIN) IVPB  1 g Intravenous Q24H    fludrocortisone  100 mcg Oral BID    ipratropium  0.5 mg Nebulization Q6H    levothyroxine  50 mcg Oral Before breakfast    midodrine  15 mg Oral Q8H    mupirocin   Nasal BID     Continuous Infusions:   sodium bicarbonate 100 mEq in dextrose 5 % (D5W) 1,100 mL infusion 75 mL/hr at 09/21/23 1340     PRN Meds:.0.9%  NaCl infusion (for blood administration), influenza 65up-adj, senna-docusate 8.6-50 mg, sodium chloride 0.9%    Estimated/Assessed Needs    Weight Used For Calorie Calculations: 111.8 kg (246 lb 8.3 oz) (Adj BW used per IBW% >120%)  Energy Calorie Requirements (kcal): 1887- 2264 (MSJ; No AF - 1.2 per Obese w/o vent vs possible wounds)  Energy Need Method: Celena Stokes  Protein Requirements:  (0.8-1.0 g/kg per possible BETY)  Weight Used For Protein Calculations: 111.8 kg (246 lb 8.3 oz)  Fluid Requirements (mL): 500 + total output     RDA Method (mL): 1887  CHO Requirement: 236- 283      Nutrition Prescription  Ordered    Current Diet Order: None    Evaluation of Received Nutrient/Fluid Intake    I/O: -11.1 Since admit  Energy Calories Required: not meeting needs  Protein Required: not meeting needs  Fluid Required: not meeting needs  Tolerance: not tolerating  % Intake of Estimated Energy Needs: 0 - 25 %  % Meal Intake: NPO Continuous BiPAP     Nutrition Risk    Level of Risk/Frequency of Follow-up: high (x2 weekly)       Monitor and Evaluation    Food and Nutrient Intake: energy intake, food and beverage intake  Food and Nutrient Adminstration: diet order  Physical Activity and Function: nutrition-related ADLs and IADLs, factors affecting access to physical activity  Anthropometric Measurements: weight, body mass index  Biochemical Data, Medical Tests and Procedures: electrolyte and renal panel, glucose/endocrine profile, inflammatory profile, lipid profile  Nutrition-Focused Physical Findings: overall appearance, skin       Nutrition Follow-Up    RD Follow-up?: Yes  Lj Mcgovern, Registration Eligible, Provisional LDN

## 2023-09-21 NOTE — HPI
Cardiology consult for afib and bradycardia  PMH chronic afib, obesity bed bound, chronic hypoxic respiratory failure (on 4L), PHTN, diastolic CHF, RV dysfunction, chronic lymphedema, and OHS/DENISHA (non-compliant with PAP therapies)   Admitted for Patient with Hypercapnic Respiratory failure which is Acute on chronic.  he is on home oxygen at 4 LPM. Supplemental oxygen was provided and noted-    His HR dropped to 20 to 30's when he fell asleep  Denied chest pain dizziness  HGB 7  Cr 2.8    Troponin 0.027 --> 0.033  Ekg afib  Echo 09/23  Left Ventricle: The left ventricle is normal in size. Mildly increased wall thickness. There is concentric remodeling. Normal wall motion. Septal motion is normal. There is normal systolic function with a visually estimated ejection fraction of 55 - 70%. There is normal diastolic function.    Left Atrium: Left atrium is mildly dilated.    Right Ventricle: Mild right ventricular enlargement. Wall thickness is normal. Right ventricle wall motion  is normal. Systolic function is normal.    Aortic Valve: The aortic valve is a trileaflet valve. There is moderate aortic valve sclerosis.    Tricuspid Valve: There is moderate regurgitation with a centrally directed jet.    Pulmonary Artery: The estimated pulmonary artery systolic pressure is 57 mmHg.    IVC/SVC: Normal venous pressure at 3 mmHg.

## 2023-09-21 NOTE — PROGRESS NOTES
78 yo male w/ PMHx COPD on Home O2, DENISHA and OHS, bed bound for 2 yrs, CKD, AF on apixaban, Pul HTN, CHF, Chronic hypotension, HFpEF, Chronic hypoxic respiratory failure now here w/ AMS noted to be acute on chronic type 2 respiratory failure.   Now placed on BIPAP     7.27/50/125    COVID negative    LA x 2 negative    WBC 7.95    Was hypotensive in the ED, responded to IVF and midodrine   S/p cx, is on ceftriaxone.     F/up UOP    Detailed H&P from Pulm/ CC noted.   Plan as per bedside team.   Please call us for further assistance.

## 2023-09-21 NOTE — HOSPITAL COURSE
9/21.  Awake and follows commands on bipap.   Had episode of bradycardia with HR in 30s overnight.   Remained hemodynamically stable.     9/22. Awake and alert. Off NIPPV. Tolerating NC. Not on pressors. K low. No chest pain or abdominal discomfort. No family at the bedside. Flat affect and not interested in a conversation.     9/28 - transferred back to ICU due to melanic stools and hypotension.  9/29 - 3 BM since arrival to the ICU.  Wife refused endoscopy yesterday.  Low dose Levo this morning. Cr uptrending.  9/30 - No BM overnight.  H/H stable, but worsening pressor requirement and renal failure.  Minimal UOP.  Remains alert and conversant, though confused.  9/31 - Cr slightly improved, remains oliguric.  Lactic remains elevated.  Oxygenation stable and easily arousable.    10/2. Last nights events noted. Unresponsive, on NIPPV, On multiple pressors. PRBC ongoing. Wife at the bedside.     10/3. Went from sinus rhythm to asystole  CPR was done for 1 min and wife at the bedside asked to discontinue   He was on two max pressors  Fixed dilated pupils  No corneals  No spont respiration  Asystole  Patient was pronounced at 11:04 am

## 2023-09-21 NOTE — SUBJECTIVE & OBJECTIVE
Past Medical History:   Diagnosis Date    Acute hypoxemic respiratory failure 9/17/2022    Arthritis     Atrial fibrillation 3/1/2023    CHF (congestive heart failure)     Chronic kidney disease     Coronary artery disease     Depression     Hypertension     Hypertensive heart disease with heart failure 9/16/2022    Hypothyroidism     Lymphedema of lower extremity     Nephrolithiasis     Obesity     Peripheral vascular disease     Pneumonia 9/17/2022    Recurrent cellulitis of lower leg     Sleep apnea     can't stand the CPAP , sleeps elevated in hospital bed or chair    Tobacco dependence     resolved       Past Surgical History:   Procedure Laterality Date    ADENOIDECTOMY  1961    BACK SURGERY  1975;  1976    x2 for disc; scar tissue removed    debridement of bilateral leg ulcers Bilateral 01/01/2017    Dr Hernandez    INNER EAR SURGERY Bilateral 1961    separate - pinnaplasty , new eardrms constructed    KIDNEY STONE SURGERY Left 1976 approx    open    TONSILLECTOMY  1961       Review of patient's allergies indicates:   Allergen Reactions    Sulfamethoxazole-trimethoprim Itching and Shortness Of Breath    Sulfamethoxazole        No current facility-administered medications on file prior to encounter.     Current Outpatient Medications on File Prior to Encounter   Medication Sig    apixaban (ELIQUIS) 5 mg Tab Take 1 tablet (5 mg total) by mouth 2 (two) times daily.    aspirin 81 MG Chew Take 1 tablet (81 mg total) by mouth once daily.    acetaminophen (TYLENOL) 500 MG tablet Take 500 mg by mouth every 6 (six) hours as needed for Pain.    atorvastatin (LIPITOR) 40 MG tablet Take 1 tablet (40 mg total) by mouth once daily.    fludrocortisone (FLORINEF) 0.1 mg Tab Take 1 tablet (100 mcg total) by mouth 2 (two) times daily. (Patient not taking: Reported on 9/15/2023)    furosemide (LASIX) 40 MG tablet Take 1 tablet (40 mg total) by mouth once daily.    levalbuterol (XOPENEX) 1.25 mg/3 mL nebulizer solution Take 3 mLs  (1.25 mg total) by nebulization every 4 (four) hours as needed for Wheezing. Rescue    levothyroxine (SYNTHROID) 50 MCG tablet Take 1 tablet (50 mcg total) by mouth before breakfast.    metronidazole 1% (METROGEL) 1 % Gel Apply topically once daily.    miconazole nitrate 2% (MICOTIN) 2 % Oint Apply topically 2 (two) times daily. (Patient not taking: Reported on 9/15/2023)    midodrine (PROAMATINE) 5 MG Tab Take 3 tablets (15 mg total) by mouth 3 (three) times daily.    nebulizer and compressor (HOME NEBULIZER PLUS SIDESTREAM) Diana use as directed    senna-docusate 8.6-50 mg (PERICOLACE) 8.6-50 mg per tablet Take 1 tablet by mouth daily as needed for Constipation.    tiotropium (SPIRIVA) 18 mcg inhalation capsule Inhale 1 capsule (18 mcg total) into the lungs once daily. Controller     Family History       Problem Relation (Age of Onset)    Alcohol abuse Maternal Grandfather    Alzheimer's disease Brother    Asthma Daughter    Cancer Mother, Father, Brother    Diabetes Mother    Heart disease Father, Brother    Hypertension Mother          Tobacco Use    Smoking status: Former     Current packs/day: 0.00     Average packs/day: 1.5 packs/day for 1 year (1.5 ttl pk-yrs)     Types: Cigarettes     Start date:      Quit date:      Years since quittin.7    Smokeless tobacco: Never   Substance and Sexual Activity    Alcohol use: Not Currently    Drug use: Never    Sexual activity: Never     Review of Systems   Cardiovascular:  Positive for irregular heartbeat and leg swelling.   Respiratory:  Positive for shortness of breath.      Objective:     Vital Signs (Most Recent):  Temp: 97.4 °F (36.3 °C) (23 1200)  Pulse: 64 (23 1400)  Resp: (!) 22 (23 1400)  BP: (!) 118/58 (23 1400)  SpO2: 98 % (23 1400) Vital Signs (24h Range):  Temp:  [97 °F (36.1 °C)-97.8 °F (36.6 °C)] 97.4 °F (36.3 °C)  Pulse:  [37-67] 64  Resp:  [9-24] 22  SpO2:  [91 %-100 %] 98 %  BP: ()/(31-88) 118/58      Weight: (!) 196.4 kg (432 lb 15.7 oz)  Body mass index is 57.13 kg/m².    SpO2: 98 %         Intake/Output Summary (Last 24 hours) at 9/21/2023 1450  Last data filed at 9/21/2023 1400  Gross per 24 hour   Intake 1038.88 ml   Output 1050 ml   Net -11.12 ml       Lines/Drains/Airways       Drain  Duration                  Urethral Catheter 09/21/23 1130 Silicone 16 Fr. <1 day              Peripheral Intravenous Line  Duration                  Peripheral IV - Single Lumen 09/20/23 1550 18 G;1 3/4 in Anterior;Distal;Right Upper Arm <1 day         Peripheral IV - Single Lumen 09/20/23 1550 18 G;1 3/4 in Anterior;Proximal;Right Upper Arm <1 day                     Physical Exam  HENT:      Head: Normocephalic.   Eyes:      Pupils: Pupils are equal, round, and reactive to light.   Neck:      Thyroid: No thyromegaly.      Vascular: Normal carotid pulses. No carotid bruit or JVD.   Cardiovascular:      Rate and Rhythm: Normal rate. Rhythm irregularly irregular. No extrasystoles are present.     Chest Wall: PMI is not displaced.      Pulses: Normal pulses.      Heart sounds: Normal heart sounds. No murmur heard.     No gallop. No S3 sounds.   Pulmonary:      Effort: No respiratory distress.      Breath sounds: Normal breath sounds. No stridor.   Abdominal:      General: Bowel sounds are normal.      Palpations: Abdomen is soft.      Tenderness: There is no abdominal tenderness. There is no rebound.   Musculoskeletal:         General: Swelling present.   Skin:     Findings: No rash.   Neurological:      Mental Status: He is alert and oriented to person, place, and time.   Psychiatric:         Behavior: Behavior normal.          Significant Labs: ABG:   Recent Labs   Lab 09/20/23  2121 09/21/23  0502   PH 7.267* 7.274*   PCO2 49.9* 47.3*   HCO3 22.8 21.9   POCSATURATED 98 97   BE -4 -5   , Blood Culture:   Recent Labs   Lab 09/20/23  1756 09/20/23  1800   LABBLOO No Growth to date No Growth to date   , BMP:   Recent Labs  "  Lab 09/20/23  1541 09/21/23  0334   GLU 94 76    143   K 3.3* 3.6   * 113*   CO2 22* 17*   BUN 65* 66*   CREATININE 2.9* 2.8*   CALCIUM 7.2* 7.0*   MG 2.1 1.9   , CMP   Recent Labs   Lab 09/20/23  1541 09/21/23  0334    143   K 3.3* 3.6   * 113*   CO2 22* 17*   GLU 94 76   BUN 65* 66*   CREATININE 2.9* 2.8*   CALCIUM 7.2* 7.0*   PROT 6.2  --    ALBUMIN 1.6* 1.5*   BILITOT 0.3  --    ALKPHOS 65  --    AST 13  --    ALT 8*  --    ANIONGAP 11 13   , CBC   Recent Labs   Lab 09/20/23  1541 09/21/23  0334   WBC 7.95 6.32   HGB 7.5* 7.0*   HCT 25.2* 23.3*    173   , INR   Recent Labs   Lab 09/20/23  1541   INR 1.5*   , Lipid Panel No results for input(s): "CHOL", "HDL", "LDLCALC", "TRIG", "CHOLHDL" in the last 48 hours., and Troponin   Recent Labs   Lab 09/20/23  1541 09/20/23  1951   TROPONINI 0.033* 0.027*       Significant Imaging: EKG:    "

## 2023-09-22 NOTE — PROGRESS NOTES
"[facility] - Intensive Care ([facility])  Critical Care Medicine  Progress Note    Patient Name: Gene Rothman  MRN: [id number]  Admission Date: 9/20/2023  Hospital Length of Stay: 2 days  Code Status: Full Code  Attending Provider: Cristiano Altamirano MD  Primary Care Provider: Ami Zarate DO   Principal Problem: Chronic respiratory failure with hypercapnia    Subjective:     HPI:  77yr old known to most hospital services presents on his 61st wedding anniversary per his wife who is at the bedside because of his inappropriate response to her questions. She said he was ignoring her and was inappropriate. She thinks there is something physiologically wrong with him or he would not be acting like that on this day.   She knows he has chronic hypotension but was concerned about hypercapnia. In ED he is on NIPPV and is acting like he always does which makes his wife more comfortable since "he is acting like himself" Son is also at the bedside.   He was given midodrine by ED and his BP has improved. Other complaints per family are "lack of appetite and low urine output"    Hx of COPD, OSAS / OHS / Bed bound for 2 years, A fib, On chronic anticoagulation, pul HTN (PAP 57mmHg) CHF, Lymphedema, chronic hypotension, HFpEF, Moderate MR / TR, chronic resp failure on home oxygen and chronic RV dysfunction. He is non compliant with his NIPPV although he was given a new machine during his last admission. He resist change in position and has acquired some wounds.       Hospital/ICU Course:  9/21.  Awake and follows commands on bipap.   Had episode of bradycardia with HR in 30s overnight.   Remained hemodynamically stable.     9/22. Awake and alert. Off NIPPV. Tolerating NC. Not on pressors. K low. No chest pain or abdominal discomfort. No family at the bedside. Flat affect and not interested in a conversation.           Objective:     Vital Signs (Most Recent):  Temp: 97.6 °F (36.4 °C) (09/22/23 0715)  Pulse: 70 (09/22/23 1045)  Resp: " 14 (09/22/23 1045)  BP: (!) 82/38 (09/22/23 1030)  SpO2: 97 % (09/22/23 1045) Vital Signs (24h Range):  Temp:  [97.4 °F (36.3 °C)-98.5 °F (36.9 °C)] 97.6 °F (36.4 °C)  Pulse:  [43-70] 70  Resp:  [14-22] 14  SpO2:  [76 %-100 %] 97 %  BP: ()/(37-84) 82/38     Weight: (!) 196.4 kg (432 lb 15.7 oz)  Body mass index is 57.13 kg/m².      Intake/Output Summary (Last 24 hours) at 9/22/2023 1117  Last data filed at 9/22/2023 1100  Gross per 24 hour   Intake 2480.72 ml   Output 3880 ml   Net -1399.28 ml        Physical Exam  HENT:      Head: Normocephalic.   Eyes:      Pupils: Pupils are equal, round, and reactive to light.   Cardiovascular:      Rate and Rhythm: Tachycardia present.   Pulmonary:      Effort: No respiratory distress.      Breath sounds: Rales present. No wheezing.   Abdominal:      General: There is distension.   Skin:     Capillary Refill: Capillary refill takes 2 to 3 seconds.   Neurological:      General: No focal deficit present.      Mental Status: He is alert.           Review of Systems    Vents:  Oxygen Concentration (%): 28 (09/22/23 0815)    Lines/Drains/Airways       Peripherally Inserted Central Catheter Line  Duration             PICC Double Lumen 09/21/23 1530 right basilic <1 day              Drain  Duration                  Urethral Catheter 09/21/23 1130 Silicone 16 Fr. <1 day              Peripheral Intravenous Line  Duration                  Peripheral IV - Single Lumen 09/20/23 1550 18 G;1 3/4 in Anterior;Distal;Right Upper Arm 1 day         Peripheral IV - Single Lumen 09/20/23 1550 18 G;1 3/4 in Anterior;Proximal;Right Upper Arm 1 day                    Significant Labs:    CBC/Anemia Profile:  Recent Labs   Lab 09/20/23  1541 09/21/23  0334 09/22/23  0423   WBC 7.95 6.32 5.80  5.80   HGB 7.5* 7.0* 7.3*  7.3*   HCT 25.2* 23.3* 22.9*  22.9*    173 158  158   * 100* 93  93   RDW 18.3* 18.4* 18.0*  18.0*        Chemistries:  Recent Labs   Lab 09/20/23  9311  09/21/23  0334 09/22/23  0423    143 145  145   K 3.3* 3.6 2.6*  2.6*   * 113* 111*  111*   CO2 22* 17* 23  23   BUN 65* 66* 60*  60*   CREATININE 2.9* 2.8* 2.2*  2.2*   CALCIUM 7.2* 7.0* 6.7*  6.7*   ALBUMIN 1.6* 1.5* 1.3*   PROT 6.2  --   --    BILITOT 0.3  --   --    ALKPHOS 65  --   --    ALT 8*  --   --    AST 13  --   --    MG 2.1 1.9 1.8   PHOS  --  6.0* 4.6*       All pertinent labs within the past 24 hours have been reviewed.    Significant Imaging:  I have reviewed all pertinent imaging results/findings within the past 24 hours.      ABG  Recent Labs   Lab 09/21/23  0502   PH 7.274*   PO2 103*   PCO2 47.3*   HCO3 21.9   BE -5     Assessment/Plan:     Pulmonary  * Chronic respiratory failure with hypercapnia  Patient with Hypercapnic Respiratory failure which is Acute on chronic.  he is on home oxygen at 4 LPM. Supplemental oxygen was provided and noted- Oxygen Concentration (%):  [28-30] 28    .   Signs/symptoms of respiratory failure include- increased work of breathing. Contributing diagnoses includes - Obesity Hypoventilation and OSAS Labs and images were reviewed. Patient Has recent ABG, which has been reviewed. Will treat underlying causes and adjust management of respiratory failure as follows-   Currently on BIPAP  Nebs prn    Continue NIPPV qhs and prn   NC when awake and oriented as tolerated.      Cardiac/Vascular  Bradycardia  No repeat episodes. May have been from electrolyte abn.  Cardiology following  Not a candidate for pacemaker.  Does not have chronotropic incompetence. When awake HR in 70-90s    CHF (congestive heart failure)  Patient is identified as having Diastolic (HFpEF) heart failure that is Chronic. CHF is currently controlled. Latest ECHO performed and demonstrates- Results for orders placed during the hospital encounter of 09/02/23    Echo    Interpretation Summary    Left Ventricle: The left ventricle is normal in size. Mildly increased wall thickness. There  is concentric remodeling. Normal wall motion. Septal motion is normal. There is normal systolic function with a visually estimated ejection fraction of 55 - 70%. There is normal diastolic function.    Left Atrium: Left atrium is mildly dilated.    Right Ventricle: Mild right ventricular enlargement. Wall thickness is normal. Right ventricle wall motion  is normal. Systolic function is normal.    Aortic Valve: The aortic valve is a trileaflet valve. There is moderate aortic valve sclerosis.    Tricuspid Valve: There is moderate regurgitation with a centrally directed jet.    Pulmonary Artery: The estimated pulmonary artery systolic pressure is 57 mmHg.    IVC/SVC: Normal venous pressure at 3 mmHg.  . Continue Furosemide and monitor clinical status closely. Monitor on telemetry. Patient is off CHF pathway.  Monitor strict Is&Os and daily weights.  Place on fluid restriction of 1 L. Cardiology has not been any consulted. Continue to stress to patient importance of self efficacy and  on diet for CHF. Last BNP reviewed- and noted below   Recent Labs   Lab 09/20/23  1541   *   .  9/21. Will transfuse and give bumex times 1  9/22. Serial labs. On eliquis. Tolerating NC at rest. May restart diuretics. Cr at baseline.     Venous stasis dermatitis of both lower extremities  Chronic lymphedema  Some post left thgh erythema  Will give Abx  Skin assessment     Hypotension  Midodrine was given.  BNP elevated but unremarkable CxR.  S/p careful hydration  Serial labs - improved  Empiric Abx / sepsis syndrome workup initiated in ED.       Renal/  Urinary retention  Catheter in place.    CKD (chronic kidney disease) stage 3, GFR 30-59 ml/min  Stable  Serial labs    ID  Cellulitis  Wound care  Abx.   Serial exam    Endocrine  Morbid obesity with BMI of 60.0-69.9, adult  Functional quad.  Not a candidate for surgery   Educated / counseling    Hypothyroidism (acquired)  Continue home  medication    Other  Lymphedema  Wound care   Dressing in place    Sleep apnea  NIPPV qhs and prn         Critical Care Daily Checklist:    A: Awake: RASS Goal/Actual Goal: RASS Goal: 0-->alert and calm  Actual:     B: Spontaneous Breathing Trial Performed?     C: SAT & SBT Coordinated?  n/a                      D: Delirium: CAM-ICU Overall CAM-ICU: Negative   E: Early Mobility Performed? Yes   F: Feeding Goal: Goals: 1. Pt diet order will be modified within x 4 days. 2. Pt will consume >50% of Jm prior to RD follow up. 3. Pt will consume >50% of EEN and EPN prior to RD follow up.  Status: Nutrition Goal Status: new   Current Diet Order   Procedures    Diet Cardiac      AS: Analgesia/Sedation n/a   T: Thromboembolic Prophylaxis yes   H: HOB > 300 Yes   U: Stress Ulcer Prophylaxis (if needed) yes   G: Glucose Control yes   B: Bowel Function Stool Occurrence: 1   I: Indwelling Catheter (Lines & May) Necessity yes   D: De-escalation of Antimicrobials/Pharmacotherapies yes    Plan for the day/ETD yes    Code Status:  Family/Goals of Care: Full Code  yes     Critical Care Time: 35 minutes  Critical secondary to Patient has a condition that poses threat to life and bodily function: Acute Renal Failure      Critical care was time spent personally by me on the following activities: development of treatment plan with patient or surrogate and bedside caregivers, discussions with consultants, evaluation of patient's response to treatment, examination of patient, ordering and performing treatments and interventions, ordering and review of laboratory studies, ordering and review of radiographic studies, pulse oximetry, re-evaluation of patient's condition. This critical care time did not overlap with that of any other provider or involve time for any procedures.     Cristiano Altamirano MD  Critical Care Medicine  [facility] - Intensive Care ([facility])

## 2023-09-22 NOTE — HPI
"A 77-year-old gentleman, familiar to several hospital services, with a background of multiple chronic health issues, including COPD, sleep apnea, A fib, pulmonary hypertension, CHF, lymphedema, bed-bound status for two years, and chronic respiratory failure requiring home oxygen, was brought in by his wife on their 61st wedding anniversary due to an unusual and unresponsive behavior, which she believed was out of character for such a significant day. Upon arrival to the ED, he was placed on NIPPV, with subsequent improvement in his behavior, making him appear "more like himself" to his wife. It was later revealed that patient was non-compliant with his NIPPV, evident from a new machine provided during a previous admission. His blood pressure improved following administration of midodrine, but MAP was noticeably still low. Admitted to ICU for close monitoring.    During this admission, on 9/21, he experienced an episode of bradycardia with heart rates in the 30s but remained hemodynamically stable. By 9/22, he was alert and off NIPPV, tolerating nasal cannula oxygen without the need for pressors. Cardiology evaluated him, noting his heart rate was regular when awake and recommended ventilation support, avoidance of negative inotropic medications, and continuation of eliquis. Despite an elevated BNP suggestive of CHF exacerbation, his heart failure medication was temporarily held due to hypotension. He was deemed stable for transfer to a telemetry unit on 9/22/23. Hospital medicine consulted for assumption of care.  "

## 2023-09-22 NOTE — ASSESSMENT & PLAN NOTE
--O2 requirements: NC at the time of this encounter, saturating in the mid-to-high 90s  --confirmed that he's on chronic O2  --Pulmonology following: recommends cont NIPPV qhs and prn  --NC when awake and oriented as tolerated.  --recent CXR unremarkable  --continue IV Rocephin, procalcitonin in the AM  --follow up pulmonology recs

## 2023-09-22 NOTE — ASSESSMENT & PLAN NOTE
--last TSH on file: elevated, >7.0; has been uptrending  --Home synthroid dose: 50 mcg  --repeat TSH as add-on  --ok to continue home synthroid for now

## 2023-09-22 NOTE — SUBJECTIVE & OBJECTIVE
Past Medical History:   Diagnosis Date    Acute hypoxemic respiratory failure 9/17/2022    Arthritis     Atrial fibrillation 3/1/2023    CHF (congestive heart failure)     Chronic kidney disease     Coronary artery disease     Depression     Hypertension     Hypertensive heart disease with heart failure 9/16/2022    Hypothyroidism     Lymphedema of lower extremity     Nephrolithiasis     Obesity     Peripheral vascular disease     Pneumonia 9/17/2022    Recurrent cellulitis of lower leg     Sleep apnea     can't stand the CPAP , sleeps elevated in hospital bed or chair    Tobacco dependence     resolved       Past Surgical History:   Procedure Laterality Date    ADENOIDECTOMY  1961    BACK SURGERY  1975;  1976    x2 for disc; scar tissue removed    debridement of bilateral leg ulcers Bilateral 01/01/2017    Dr Hernandez    INNER EAR SURGERY Bilateral 1961    separate - pinnaplasty , new eardrms constructed    KIDNEY STONE SURGERY Left 1976 approx    open    TONSILLECTOMY  1961       Review of patient's allergies indicates:   Allergen Reactions    Sulfamethoxazole-trimethoprim Itching and Shortness Of Breath    Sulfamethoxazole        No current facility-administered medications on file prior to encounter.     Current Outpatient Medications on File Prior to Encounter   Medication Sig    apixaban (ELIQUIS) 5 mg Tab Take 1 tablet (5 mg total) by mouth 2 (two) times daily.    aspirin 81 MG Chew Take 1 tablet (81 mg total) by mouth once daily.    acetaminophen (TYLENOL) 500 MG tablet Take 500 mg by mouth every 6 (six) hours as needed for Pain.    atorvastatin (LIPITOR) 40 MG tablet Take 1 tablet (40 mg total) by mouth once daily.    fludrocortisone (FLORINEF) 0.1 mg Tab Take 1 tablet (100 mcg total) by mouth 2 (two) times daily. (Patient not taking: Reported on 9/15/2023)    furosemide (LASIX) 40 MG tablet Take 1 tablet (40 mg total) by mouth once daily.    levalbuterol (XOPENEX) 1.25 mg/3 mL nebulizer solution Take 3 mLs  (1.25 mg total) by nebulization every 4 (four) hours as needed for Wheezing. Rescue    levothyroxine (SYNTHROID) 50 MCG tablet Take 1 tablet (50 mcg total) by mouth before breakfast.    metronidazole 1% (METROGEL) 1 % Gel Apply topically once daily.    miconazole nitrate 2% (MICOTIN) 2 % Oint Apply topically 2 (two) times daily. (Patient not taking: Reported on 9/15/2023)    midodrine (PROAMATINE) 5 MG Tab Take 3 tablets (15 mg total) by mouth 3 (three) times daily.    nebulizer and compressor (HOME NEBULIZER PLUS SIDESTREAM) Diana use as directed    senna-docusate 8.6-50 mg (PERICOLACE) 8.6-50 mg per tablet Take 1 tablet by mouth daily as needed for Constipation.    tiotropium (SPIRIVA) 18 mcg inhalation capsule Inhale 1 capsule (18 mcg total) into the lungs once daily. Controller     Family History       Problem Relation (Age of Onset)    Alcohol abuse Maternal Grandfather    Alzheimer's disease Brother    Asthma Daughter    Cancer Mother, Father, Brother    Diabetes Mother    Heart disease Father, Brother    Hypertension Mother          Tobacco Use    Smoking status: Former     Current packs/day: 0.00     Average packs/day: 1.5 packs/day for 1 year (1.5 ttl pk-yrs)     Types: Cigarettes     Start date:      Quit date:      Years since quittin.7    Smokeless tobacco: Never   Substance and Sexual Activity    Alcohol use: Not Currently    Drug use: Never    Sexual activity: Never     Review of Systems   Unable to perform ROS: Other   Minimal cooperation, flat affect  Objective:     Vital Signs (Most Recent):  Temp: 97.9 °F (36.6 °C) (23 1115)  Pulse: (!) 52 (23 1400)  Resp: 18 (23 1400)  BP: (!) 133/54 (23 1330)  SpO2: 97 % (23 1400) Vital Signs (24h Range):  Temp:  [97.4 °F (36.3 °C)-98.5 °F (36.9 °C)] 97.9 °F (36.6 °C)  Pulse:  [43-70] 52  Resp:  [6-29] 18  SpO2:  [76 %-100 %] 97 %  BP: ()/(35-84) 133/54     Weight: (!) 196.4 kg (432 lb 15.7 oz)  Body mass index is  57.13 kg/m².     Physical Exam      Vitals Reviewed  GEN: No acute distress, pleasant, body habitus morbid obese  HEENT: atraumatic and normocephalic  CARDS: bradycardia, no m/g, pulses palpable in LE  PULM: breathing comfortably on room air, chest symmetric, nonlabored, no abnormal breath sounds on auscultation  ABD: nontender, nondistended, soft, no organomegaly, BS+  Neuro: Alert and seems oriented, but doesn't follow directions or answer questions appropriately    Significant Labs: BMP:   Recent Labs   Lab 09/22/23  0423   GLU 83  83     145   K 2.6*  2.6*   *  111*   CO2 23  23   BUN 60*  60*   CREATININE 2.2*  2.2*   CALCIUM 6.7*  6.7*   MG 1.8     CBC:   Recent Labs   Lab 09/20/23  1541 09/21/23  0334 09/22/23  0423   WBC 7.95 6.32 5.80  5.80   HGB 7.5* 7.0* 7.3*  7.3*   HCT 25.2* 23.3* 22.9*  22.9*    173 158  158     CMP:   Recent Labs   Lab 09/20/23  1541 09/21/23  0334 09/22/23  0423    143 145  145   K 3.3* 3.6 2.6*  2.6*   * 113* 111*  111*   CO2 22* 17* 23  23   GLU 94 76 83  83   BUN 65* 66* 60*  60*   CREATININE 2.9* 2.8* 2.2*  2.2*   CALCIUM 7.2* 7.0* 6.7*  6.7*   PROT 6.2  --   --    ALBUMIN 1.6* 1.5* 1.3*   BILITOT 0.3  --   --    ALKPHOS 65  --   --    AST 13  --   --    ALT 8*  --   --    ANIONGAP 11 13 11  11     Cardiac Markers:   Recent Labs   Lab 09/20/23  1541   *     Coagulation:   Recent Labs   Lab 09/20/23  1541   INR 1.5*     Lactic Acid:   Recent Labs   Lab 09/20/23  1541 09/20/23  1906   LACTATE 0.9 1.5     Magnesium:   Recent Labs   Lab 09/20/23  1541 09/21/23  0334 09/22/23  0423   MG 2.1 1.9 1.8     Troponin:   Recent Labs   Lab 09/20/23  1541 09/20/23  1951   TROPONINI 0.033* 0.027*     TSH:   Recent Labs   Lab 09/02/23  0349   TSH 7.137*     Urine Studies:   Recent Labs   Lab 09/21/23  1130   COLORU Yellow   APPEARANCEUA Hazy*   PHUR 5.0   SPECGRAV 1.010   PROTEINUA 1+*   GLUCUA Negative   KETONESU Negative   BILIRUBINUA  Negative   OCCULTUA 2+*   NITRITE Negative   UROBILINOGEN Negative   LEUKOCYTESUR 3+*   RBCUA 21*   WBCUA 79*   BACTERIA Rare   SQUAMEPITHEL 2   HYALINECASTS 0         Significant Imaging: I have reviewed all pertinent imaging results/findings within the past 24 hours.  Imaging Results              X-Ray Chest AP Portable (Final result)  Result time 09/20/23 16:45:32      Final result by Cy Ureña MD (09/20/23 16:45:32)                   Impression:      Cardiomegaly without definite acute abnormality.      Electronically signed by: Cy Ureña  Date:    09/20/2023  Time:    16:45               Narrative:    EXAMINATION:  XR CHEST AP PORTABLE    CLINICAL HISTORY:  Sepsis;    TECHNIQUE:  Single frontal view of the chest was performed.    COMPARISON:  Multiple priors.    FINDINGS:  The lungs are clear, with normal appearance of pulmonary vasculature and no pleural effusion or pneumothorax.    The cardiac silhouette is enlarged.  The hilar and mediastinal contours are unremarkable.    Severe degenerative change of the shoulders.

## 2023-09-22 NOTE — HOSPITAL COURSE
Cardiology consult for afib and bradycardia  PMH chronic afib, obesity bed bound, chronic hypoxic respiratory failure (on 4L), PHTN, diastolic CHF, RV dysfunction, chronic lymphedema, and OHS/DENISHA (non-compliant with PAP therapies)   Admitted for Patient with Hypercapnic Respiratory failure which is Acute on chronic.  he is on home oxygen at 4 LPM. Supplemental oxygen was provided and noted-    His HR dropped to 20 to 30's when he fell asleep  Denied chest pain dizziness  HGB 7  Cr 2.8    Troponin 0.027 --> 0.033  Ekg afib  Echo 09/23  Left Ventricle: The left ventricle is normal in size. Mildly increased wall thickness. There is concentric remodeling. Normal wall motion. Septal motion is normal. There is normal systolic function with a visually estimated ejection fraction of 55 - 70%. There is normal diastolic function.    Left Atrium: Left atrium is mildly dilated.    Right Ventricle: Mild right ventricular enlargement. Wall thickness is normal. Right ventricle wall motion  is normal. Systolic function is normal.    Aortic Valve: The aortic valve is a trileaflet valve. There is moderate aortic valve sclerosis.    Tricuspid Valve: There is moderate regurgitation with a centrally directed jet.    Pulmonary Artery: The estimated pulmonary artery systolic pressure is 57 mmHg.    IVC/SVC: Normal venous pressure at 3 mmHg.     9/22/23 pt seen and examined today, denies any CP at this time, HR stable not on any pressors. Labs reviewed, Crt 2.2 H/H 7.3 and 22.9    09/23/23. At daytime HR at 47 yo 60 bpm and dropped to 30 's at sleep, no pause. Pt denied chest pain dyspnea. Pt is bed bound due to body habitus      09/24 pause 2.5 seconds at sleep. No chest pain dizziness. Pt is bed bound

## 2023-09-22 NOTE — ASSESSMENT & PLAN NOTE
Patient is identified as having Diastolic (HFpEF) heart failure that is Chronic. CHF is currently controlled. Latest ECHO performed and demonstrates- Results for orders placed during the hospital encounter of 09/02/23    Echo    Interpretation Summary    Left Ventricle: The left ventricle is normal in size. Mildly increased wall thickness. There is concentric remodeling. Normal wall motion. Septal motion is normal. There is normal systolic function with a visually estimated ejection fraction of 55 - 70%. There is normal diastolic function.    Left Atrium: Left atrium is mildly dilated.    Right Ventricle: Mild right ventricular enlargement. Wall thickness is normal. Right ventricle wall motion  is normal. Systolic function is normal.    Aortic Valve: The aortic valve is a trileaflet valve. There is moderate aortic valve sclerosis.    Tricuspid Valve: There is moderate regurgitation with a centrally directed jet.    Pulmonary Artery: The estimated pulmonary artery systolic pressure is 57 mmHg.    IVC/SVC: Normal venous pressure at 3 mmHg.  . Continue Furosemide and monitor clinical status closely. Monitor on telemetry. Patient is off CHF pathway.  Monitor strict Is&Os and daily weights.  Place on fluid restriction of 1 L. Cardiology has not been any consulted. Continue to stress to patient importance of self efficacy and  on diet for CHF. Last BNP reviewed- and noted below   Recent Labs   Lab 09/20/23  1541   *   .  9/21. Will transfuse and give bumex times 1  9/22. Serial labs. On eliquis. Tolerating NC at rest. May restart diuretics. Cr at baseline.

## 2023-09-22 NOTE — ASSESSMENT & PLAN NOTE
Body mass index is 57.13 kg/m². Morbid obesity complicates all aspects of disease management from diagnostic modalities to treatment. Weight loss encouraged and health benefits explained to patient.

## 2023-09-22 NOTE — ASSESSMENT & PLAN NOTE
Midodrine was given.  BNP elevated but unremarkable CxR.  S/p careful hydration  Serial labs - improved  Empiric Abx / sepsis syndrome workup initiated in ED.

## 2023-09-22 NOTE — PLAN OF CARE
Problem: Adult Inpatient Plan of Care  Goal: Plan of Care Review  Outcome: Ongoing, Progressing  Goal: Patient-Specific Goal (Individualized)  Outcome: Ongoing, Progressing  Goal: Absence of Hospital-Acquired Illness or Injury  Outcome: Ongoing, Progressing  Goal: Optimal Comfort and Wellbeing  Outcome: Ongoing, Progressing  Goal: Readiness for Transition of Care  Outcome: Ongoing, Progressing     Problem: Bariatric Environmental Safety  Goal: Safety Maintained with Care  Outcome: Ongoing, Progressing     Problem: Fluid and Electrolyte Imbalance (Acute Kidney Injury/Impairment)  Goal: Fluid and Electrolyte Balance  Outcome: Ongoing, Progressing     Problem: Oral Intake Inadequate (Acute Kidney Injury/Impairment)  Goal: Optimal Nutrition Intake  Outcome: Ongoing, Progressing     Problem: Renal Function Impairment (Acute Kidney Injury/Impairment)  Goal: Effective Renal Function  Outcome: Ongoing, Progressing     Problem: Impaired Wound Healing  Goal: Optimal Wound Healing  Outcome: Ongoing, Progressing     Problem: Fall Injury Risk  Goal: Absence of Fall and Fall-Related Injury  Outcome: Ongoing, Progressing     Problem: Skin Injury Risk Increased  Goal: Skin Health and Integrity  Outcome: Ongoing, Progressing

## 2023-09-22 NOTE — ASSESSMENT & PLAN NOTE
--Last ECHO from 9/3/23 confirm EF 55 %  --acute on chronic edema on exam, but no evidence of vascular congestion on CXR  --holding diuretics due to low BP- defer restart to Cards  --monitor electrolytes, UOP, fluid restrictions  --strict I&O and daily weights ordered

## 2023-09-22 NOTE — ASSESSMENT & PLAN NOTE
Patient with Hypercapnic Respiratory failure which is Acute on chronic.  he is on home oxygen at 4 LPM. Supplemental oxygen was provided and noted- Oxygen Concentration (%):  [28-30] 28    .   Signs/symptoms of respiratory failure include- increased work of breathing. Contributing diagnoses includes - Obesity Hypoventilation and OSAS Labs and images were reviewed. Patient Has recent ABG, which has been reviewed. Will treat underlying causes and adjust management of respiratory failure as follows-   Currently on BIPAP  Nebs prn    Continue NIPPV qhs and prn   NC when awake and oriented as tolerated.

## 2023-09-22 NOTE — ASSESSMENT & PLAN NOTE
No repeat episodes. May have been from electrolyte abn.  Cardiology following  Not a candidate for pacemaker.  Does not have chronotropic incompetence. When awake HR in 70-90s

## 2023-09-22 NOTE — SUBJECTIVE & OBJECTIVE
Review of Systems   Constitutional: Positive for malaise/fatigue.   HENT: Negative.     Eyes: Negative.    Cardiovascular: Negative.    Respiratory: Negative.     Skin: Negative.    Musculoskeletal: Negative.    Gastrointestinal: Negative.    Genitourinary: Negative.    Neurological: Negative.    Psychiatric/Behavioral: Negative.       Objective:     Vital Signs (Most Recent):  Temp: 97.6 °F (36.4 °C) (09/22/23 0715)  Pulse: 70 (09/22/23 1045)  Resp: 14 (09/22/23 1045)  BP: (!) 82/38 (09/22/23 1030)  SpO2: 97 % (09/22/23 1045) Vital Signs (24h Range):  Temp:  [97.4 °F (36.3 °C)-98.5 °F (36.9 °C)] 97.6 °F (36.4 °C)  Pulse:  [43-70] 70  Resp:  [14-22] 14  SpO2:  [76 %-100 %] 97 %  BP: ()/(37-84) 82/38     Weight: (!) 196.4 kg (432 lb 15.7 oz)  Body mass index is 57.13 kg/m².     SpO2: 97 %         Intake/Output Summary (Last 24 hours) at 9/22/2023 1232  Last data filed at 9/22/2023 1100  Gross per 24 hour   Intake 2480.72 ml   Output 3230 ml   Net -749.28 ml       Lines/Drains/Airways       Peripherally Inserted Central Catheter Line  Duration             PICC Double Lumen 09/21/23 1530 right basilic <1 day              Drain  Duration                  Urethral Catheter 09/21/23 1130 Silicone 16 Fr. 1 day              Peripheral Intravenous Line  Duration                  Peripheral IV - Single Lumen 09/20/23 1550 18 G;1 3/4 in Anterior;Distal;Right Upper Arm 1 day         Peripheral IV - Single Lumen 09/20/23 1550 18 G;1 3/4 in Anterior;Proximal;Right Upper Arm 1 day                       Physical Exam  Vitals and nursing note reviewed.   Constitutional:       Appearance: He is obese.   HENT:      Head: Normocephalic and atraumatic.   Eyes:      General:         Right eye: No discharge.         Left eye: No discharge.      Pupils: Pupils are equal, round, and reactive to light.   Cardiovascular:      Rate and Rhythm: Normal rate and regular rhythm.      Heart sounds: S1 normal and S2 normal. No murmur  "heard.     No friction rub.   Pulmonary:      Effort: Pulmonary effort is normal. No respiratory distress.      Breath sounds: Rales present.   Abdominal:      Palpations: Abdomen is soft.      Tenderness: There is no abdominal tenderness.   Musculoskeletal:      Cervical back: Neck supple.      Right lower leg: No edema.      Left lower leg: No edema.   Skin:     General: Skin is warm and dry.   Neurological:      General: No focal deficit present.      Mental Status: He is alert and oriented to person, place, and time.   Psychiatric:         Mood and Affect: Mood normal.         Behavior: Behavior normal.         Thought Content: Thought content normal.            Significant Labs: BMP:   Recent Labs   Lab 09/20/23  1541 09/21/23  0334 09/22/23  0423   GLU 94 76 83  83    143 145  145   K 3.3* 3.6 2.6*  2.6*   * 113* 111*  111*   CO2 22* 17* 23  23   BUN 65* 66* 60*  60*   CREATININE 2.9* 2.8* 2.2*  2.2*   CALCIUM 7.2* 7.0* 6.7*  6.7*   MG 2.1 1.9 1.8   , CMP   Recent Labs   Lab 09/20/23  1541 09/21/23  0334 09/22/23  0423    143 145  145   K 3.3* 3.6 2.6*  2.6*   * 113* 111*  111*   CO2 22* 17* 23  23   GLU 94 76 83  83   BUN 65* 66* 60*  60*   CREATININE 2.9* 2.8* 2.2*  2.2*   CALCIUM 7.2* 7.0* 6.7*  6.7*   PROT 6.2  --   --    ALBUMIN 1.6* 1.5* 1.3*   BILITOT 0.3  --   --    ALKPHOS 65  --   --    AST 13  --   --    ALT 8*  --   --    ANIONGAP 11 13 11  11   , CBC   Recent Labs   Lab 09/20/23  1541 09/21/23  0334 09/22/23  0423   WBC 7.95 6.32 5.80  5.80   HGB 7.5* 7.0* 7.3*  7.3*   HCT 25.2* 23.3* 22.9*  22.9*    173 158  158   , INR   Recent Labs   Lab 09/20/23  1541   INR 1.5*   , Lipid Panel No results for input(s): "CHOL", "HDL", "LDLCALC", "TRIG", "CHOLHDL" in the last 48 hours., Troponin   Recent Labs   Lab 09/20/23  1541 09/20/23  1951   TROPONINI 0.033* 0.027*   , and All pertinent lab results from the last 24 hours have been reviewed.    Significant " Imaging: Cardiac Cath: reviewed, Echocardiogram: Transthoracic echo (TTE) complete (Cupid Only):   Results for orders placed or performed during the hospital encounter of 09/02/23   Echo   Result Value Ref Range    BSA 3.07 m2    LVOT stroke volume 89.42 cm3    LVIDd 5.67 3.5 - 6.0 cm    LV Systolic Volume 62.34 mL    LV Systolic Volume Index 21.7 mL/m2    LVIDs 3.81 2.1 - 4.0 cm    LV Diastolic Volume 158.42 mL    LV Diastolic Volume Index 55.20 mL/m2    IVS 1.27 (A) 0.6 - 1.1 cm    LVOT diameter 1.92 cm    LVOT area 2.9 cm2    FS 33 28 - 44 %    Left Ventricle Relative Wall Thickness 0.46 cm    Posterior Wall 1.29 (A) 0.6 - 1.1 cm    LV mass 312.73 g    LV Mass Index 109 g/m2    TR Max Rodolfo 3.67 m/s    IVRT 68.51 msec    LVOT peak rodolfo 1.40 m/s    Left Ventricular Outflow Tract Mean Velocity 1.02 cm/s    Left Ventricular Outflow Tract Mean Gradient 4.56 mmHg    LA size 4.29 cm    Left Atrium Major Axis 5.65 cm    Left Atrium Minor Axis 5.51 cm    RVDD 4.37 cm    RVOT peak VTI 24.9 cm    RA Major Axis 6.13 cm    AV mean gradient 7 mmHg    AV peak gradient 13 mmHg    Ao peak rodolfo 1.78 m/s    Ao VTI 33.70 cm    LVOT peak VTI 30.90 cm    AV valve area 2.65 cm²    AV Velocity Ratio 0.79     AV index (prosthetic) 0.92     UMESH by Velocity Ratio 2.28 cm²    Triscuspid Valve Regurgitation Peak Gradient 54 mmHg    PV mean gradient 4 mmHg    PV PEAK VELOCITY 1.56 m/s    PV peak gradient 10 mmHg    Pulmonary Valve Mean Velocity 1.00 m/s    RVOT peak rodolfo 1.37 m/s    Ao root annulus 3.93 cm    STJ 2.63 cm    Ascending aorta 3.24 cm    IVC diameter 1.87 cm    ZLVIDS -15.81     ZLVIDD -21.70     LA Volume Index 35.4 mL/m2    LA volume 101.72 cm3    LA WIDTH 5.0 cm    TAPSE 1.40 cm    RA Width 4.5 cm    TV resting pulmonary artery pressure 57 mmHg    RV TB RVSP 7 mmHg    Est. RA pres 3 mmHg    Narrative      Left Ventricle: The left ventricle is normal in size. Mildly increased   wall thickness. There is concentric remodeling. Normal  wall motion. Septal   motion is normal. There is normal systolic function with a visually   estimated ejection fraction of 55 - 70%. There is normal diastolic   function.    Left Atrium: Left atrium is mildly dilated.    Right Ventricle: Mild right ventricular enlargement. Wall thickness is   normal. Right ventricle wall motion  is normal. Systolic function is   normal.    Aortic Valve: The aortic valve is a trileaflet valve. There is moderate   aortic valve sclerosis.    Tricuspid Valve: There is moderate regurgitation with a centrally   directed jet.    Pulmonary Artery: The estimated pulmonary artery systolic pressure is   57 mmHg.    IVC/SVC: Normal venous pressure at 3 mmHg.     , EKG: reviewed, Stress Test: reviewed, and X-Ray: CXR: X-Ray Chest 1 View (CXR): No results found for this visit on 09/20/23.

## 2023-09-22 NOTE — SUBJECTIVE & OBJECTIVE
Objective:     Vital Signs (Most Recent):  Temp: 97.6 °F (36.4 °C) (09/22/23 0715)  Pulse: 70 (09/22/23 1045)  Resp: 14 (09/22/23 1045)  BP: (!) 82/38 (09/22/23 1030)  SpO2: 97 % (09/22/23 1045) Vital Signs (24h Range):  Temp:  [97.4 °F (36.3 °C)-98.5 °F (36.9 °C)] 97.6 °F (36.4 °C)  Pulse:  [43-70] 70  Resp:  [14-22] 14  SpO2:  [76 %-100 %] 97 %  BP: ()/(37-84) 82/38     Weight: (!) 196.4 kg (432 lb 15.7 oz)  Body mass index is 57.13 kg/m².      Intake/Output Summary (Last 24 hours) at 9/22/2023 1117  Last data filed at 9/22/2023 1100  Gross per 24 hour   Intake 2480.72 ml   Output 3880 ml   Net -1399.28 ml        Physical Exam  HENT:      Head: Normocephalic.   Eyes:      Pupils: Pupils are equal, round, and reactive to light.   Cardiovascular:      Rate and Rhythm: Tachycardia present.   Pulmonary:      Effort: No respiratory distress.      Breath sounds: Rales present. No wheezing.   Abdominal:      General: There is distension.   Skin:     Capillary Refill: Capillary refill takes 2 to 3 seconds.   Neurological:      General: No focal deficit present.      Mental Status: He is alert.           Review of Systems    Vents:  Oxygen Concentration (%): 28 (09/22/23 0815)    Lines/Drains/Airways       Peripherally Inserted Central Catheter Line  Duration             PICC Double Lumen 09/21/23 1530 right basilic <1 day              Drain  Duration                  Urethral Catheter 09/21/23 1130 Silicone 16 Fr. <1 day              Peripheral Intravenous Line  Duration                  Peripheral IV - Single Lumen 09/20/23 1550 18 G;1 3/4 in Anterior;Distal;Right Upper Arm 1 day         Peripheral IV - Single Lumen 09/20/23 1550 18 G;1 3/4 in Anterior;Proximal;Right Upper Arm 1 day                    Significant Labs:    CBC/Anemia Profile:  Recent Labs   Lab 09/20/23  1541 09/21/23  0334 09/22/23  0423   WBC 7.95 6.32 5.80  5.80   HGB 7.5* 7.0* 7.3*  7.3*   HCT 25.2* 23.3* 22.9*  22.9*    173 158   158   * 100* 93  93   RDW 18.3* 18.4* 18.0*  18.0*        Chemistries:  Recent Labs   Lab 09/20/23  1541 09/21/23  0334 09/22/23  0423    143 145  145   K 3.3* 3.6 2.6*  2.6*   * 113* 111*  111*   CO2 22* 17* 23  23   BUN 65* 66* 60*  60*   CREATININE 2.9* 2.8* 2.2*  2.2*   CALCIUM 7.2* 7.0* 6.7*  6.7*   ALBUMIN 1.6* 1.5* 1.3*   PROT 6.2  --   --    BILITOT 0.3  --   --    ALKPHOS 65  --   --    ALT 8*  --   --    AST 13  --   --    MG 2.1 1.9 1.8   PHOS  --  6.0* 4.6*       All pertinent labs within the past 24 hours have been reviewed.    Significant Imaging:  I have reviewed all pertinent imaging results/findings within the past 24 hours.

## 2023-09-22 NOTE — ASSESSMENT & PLAN NOTE
--Cr bumped from baseline (1.0), but is steadily improving   --cont holding home nephrotoxic agents while hospitalized  --renally dose all meds, renal diet  --daily BMP to monitor renal fxn

## 2023-09-22 NOTE — PROGRESS NOTES
[facility] ([facility])  Cardiology  Progress Note    Patient Name: Gene Rothman  MRN: [id number]  Admission Date: 9/20/2023  Hospital Length of Stay: 2 days  Code Status: Full Code   Attending Physician: Cristiano Altamirano MD   Primary Care Physician: Ami Zarate DO  Expected Discharge Date:   Principal Problem:Chronic respiratory failure with hypercapnia    Subjective:     Hospital Course:   9/22/23 pt seen and examined today, denies any CP at this time, HR stable not on any pressors. Labs reviewed, Crt 2.2 H/H 7.3 and 22.9            Review of Systems   Constitutional: Positive for malaise/fatigue.   HENT: Negative.     Eyes: Negative.    Cardiovascular: Negative.    Respiratory: Negative.     Skin: Negative.    Musculoskeletal: Negative.    Gastrointestinal: Negative.    Genitourinary: Negative.    Neurological: Negative.    Psychiatric/Behavioral: Negative.       Objective:     Vital Signs (Most Recent):  Temp: 97.6 °F (36.4 °C) (09/22/23 0715)  Pulse: 70 (09/22/23 1045)  Resp: 14 (09/22/23 1045)  BP: (!) 82/38 (09/22/23 1030)  SpO2: 97 % (09/22/23 1045) Vital Signs (24h Range):  Temp:  [97.4 °F (36.3 °C)-98.5 °F (36.9 °C)] 97.6 °F (36.4 °C)  Pulse:  [43-70] 70  Resp:  [14-22] 14  SpO2:  [76 %-100 %] 97 %  BP: ()/(37-84) 82/38     Weight: (!) 196.4 kg (432 lb 15.7 oz)  Body mass index is 57.13 kg/m².     SpO2: 97 %         Intake/Output Summary (Last 24 hours) at 9/22/2023 1232  Last data filed at 9/22/2023 1100  Gross per 24 hour   Intake 2480.72 ml   Output 3230 ml   Net -749.28 ml       Lines/Drains/Airways       Peripherally Inserted Central Catheter Line  Duration             PICC Double Lumen 09/21/23 1530 right basilic <1 day              Drain  Duration                  Urethral Catheter 09/21/23 1130 Silicone 16 Fr. 1 day              Peripheral Intravenous Line  Duration                  Peripheral IV - Single Lumen 09/20/23 1550 18 G;1 3/4 in Anterior;Distal;Right Upper Arm 1  day         Peripheral IV - Single Lumen 09/20/23 1550 18 G;1 3/4 in Anterior;Proximal;Right Upper Arm 1 day                       Physical Exam  Vitals and nursing note reviewed.   Constitutional:       Appearance: He is obese.   HENT:      Head: Normocephalic and atraumatic.   Eyes:      General:         Right eye: No discharge.         Left eye: No discharge.      Pupils: Pupils are equal, round, and reactive to light.   Cardiovascular:      Rate and Rhythm: Normal rate and regular rhythm.      Heart sounds: S1 normal and S2 normal. No murmur heard.     No friction rub.   Pulmonary:      Effort: Pulmonary effort is normal. No respiratory distress.      Breath sounds: Rales present.   Abdominal:      Palpations: Abdomen is soft.      Tenderness: There is no abdominal tenderness.   Musculoskeletal:      Cervical back: Neck supple.      Right lower leg: No edema.      Left lower leg: No edema.   Skin:     General: Skin is warm and dry.   Neurological:      General: No focal deficit present.      Mental Status: He is alert and oriented to person, place, and time.   Psychiatric:         Mood and Affect: Mood normal.         Behavior: Behavior normal.         Thought Content: Thought content normal.            Significant Labs: BMP:   Recent Labs   Lab 09/20/23  1541 09/21/23  0334 09/22/23  0423   GLU 94 76 83  83    143 145  145   K 3.3* 3.6 2.6*  2.6*   * 113* 111*  111*   CO2 22* 17* 23 23   BUN 65* 66* 60*  60*   CREATININE 2.9* 2.8* 2.2*  2.2*   CALCIUM 7.2* 7.0* 6.7*  6.7*   MG 2.1 1.9 1.8   , CMP   Recent Labs   Lab 09/20/23  1541 09/21/23  0334 09/22/23  0423    143 145  145   K 3.3* 3.6 2.6*  2.6*   * 113* 111*  111*   CO2 22* 17* 23  23   GLU 94 76 83  83   BUN 65* 66* 60*  60*   CREATININE 2.9* 2.8* 2.2*  2.2*   CALCIUM 7.2* 7.0* 6.7*  6.7*   PROT 6.2  --   --    ALBUMIN 1.6* 1.5* 1.3*   BILITOT 0.3  --   --    ALKPHOS 65  --   --    AST 13  --   --    ALT 8*  --    "--    ANIONGAP 11 13 11  11   , CBC   Recent Labs   Lab 09/20/23  1541 09/21/23  0334 09/22/23  0423   WBC 7.95 6.32 5.80  5.80   HGB 7.5* 7.0* 7.3*  7.3*   HCT 25.2* 23.3* 22.9*  22.9*    173 158  158   , INR   Recent Labs   Lab 09/20/23  1541   INR 1.5*   , Lipid Panel No results for input(s): "CHOL", "HDL", "LDLCALC", "TRIG", "CHOLHDL" in the last 48 hours., Troponin   Recent Labs   Lab 09/20/23  1541 09/20/23  1951   TROPONINI 0.033* 0.027*   , and All pertinent lab results from the last 24 hours have been reviewed.    Significant Imaging: Cardiac Cath: reviewed, Echocardiogram: Transthoracic echo (TTE) complete (Cupid Only):   Results for orders placed or performed during the hospital encounter of 09/02/23   Echo   Result Value Ref Range    BSA 3.07 m2    LVOT stroke volume 89.42 cm3    LVIDd 5.67 3.5 - 6.0 cm    LV Systolic Volume 62.34 mL    LV Systolic Volume Index 21.7 mL/m2    LVIDs 3.81 2.1 - 4.0 cm    LV Diastolic Volume 158.42 mL    LV Diastolic Volume Index 55.20 mL/m2    IVS 1.27 (A) 0.6 - 1.1 cm    LVOT diameter 1.92 cm    LVOT area 2.9 cm2    FS 33 28 - 44 %    Left Ventricle Relative Wall Thickness 0.46 cm    Posterior Wall 1.29 (A) 0.6 - 1.1 cm    LV mass 312.73 g    LV Mass Index 109 g/m2    TR Max Rodolfo 3.67 m/s    IVRT 68.51 msec    LVOT peak rodolfo 1.40 m/s    Left Ventricular Outflow Tract Mean Velocity 1.02 cm/s    Left Ventricular Outflow Tract Mean Gradient 4.56 mmHg    LA size 4.29 cm    Left Atrium Major Axis 5.65 cm    Left Atrium Minor Axis 5.51 cm    RVDD 4.37 cm    RVOT peak VTI 24.9 cm    RA Major Axis 6.13 cm    AV mean gradient 7 mmHg    AV peak gradient 13 mmHg    Ao peak rodolfo 1.78 m/s    Ao VTI 33.70 cm    LVOT peak VTI 30.90 cm    AV valve area 2.65 cm²    AV Velocity Ratio 0.79     AV index (prosthetic) 0.92     UMESH by Velocity Ratio 2.28 cm²    Triscuspid Valve Regurgitation Peak Gradient 54 mmHg    PV mean gradient 4 mmHg    PV PEAK VELOCITY 1.56 m/s    PV peak " gradient 10 mmHg    Pulmonary Valve Mean Velocity 1.00 m/s    RVOT peak trae 1.37 m/s    Ao root annulus 3.93 cm    STJ 2.63 cm    Ascending aorta 3.24 cm    IVC diameter 1.87 cm    ZLVIDS -15.81     ZLVIDD -21.70     LA Volume Index 35.4 mL/m2    LA volume 101.72 cm3    LA WIDTH 5.0 cm    TAPSE 1.40 cm    RA Width 4.5 cm    TV resting pulmonary artery pressure 57 mmHg    RV TB RVSP 7 mmHg    Est. RA pres 3 mmHg    Narrative      Left Ventricle: The left ventricle is normal in size. Mildly increased   wall thickness. There is concentric remodeling. Normal wall motion. Septal   motion is normal. There is normal systolic function with a visually   estimated ejection fraction of 55 - 70%. There is normal diastolic   function.    Left Atrium: Left atrium is mildly dilated.    Right Ventricle: Mild right ventricular enlargement. Wall thickness is   normal. Right ventricle wall motion  is normal. Systolic function is   normal.    Aortic Valve: The aortic valve is a trileaflet valve. There is moderate   aortic valve sclerosis.    Tricuspid Valve: There is moderate regurgitation with a centrally   directed jet.    Pulmonary Artery: The estimated pulmonary artery systolic pressure is   57 mmHg.    IVC/SVC: Normal venous pressure at 3 mmHg.     , EKG: reviewed, Stress Test: reviewed, and X-Ray: CXR: X-Ray Chest 1 View (CXR): No results found for this visit on 09/20/23.    Assessment and Plan:       Bradycardia  afib with bradycardia  Morbid obesity bed bound  Copd   CKD III    --HR normal when he was awake. Advise ventilation support  --avoid negative inotropic meds  --no indication of PPM   -continue eliquis    CHF (congestive heart failure)    Med held for now, hypotensive      Cellulitis  Diuretics held pt hypotensive      Morbid obesity with BMI of 60.0-69.9, adult  Weight control        VTE Risk Mitigation (From admission, onward)         Ordered     apixaban tablet 5 mg  2 times daily         09/20/23 5901      IP VTE HIGH RISK PATIENT  Once         09/20/23 1744     Place sequential compression device  Until discontinued         09/20/23 1744                Tiffany Melendez NP  Cardiology  [facility] ([facility])

## 2023-09-22 NOTE — PLAN OF CARE
Patient alert and oriented. Vital signs stable, 2L NC in place, BIPAP at HS. Safety precautions in place. Call light within reach. Plan of care reviewed with patient.     Problem: Bariatric Environmental Safety  Goal: Safety Maintained with Care  Outcome: Ongoing, Progressing  Intervention: Promote Safety and Comfort  Flowsheets (Taken 9/22/2023 0748)  Bariatric Safety: specialty bed utilized     Problem: Fall Injury Risk  Goal: Absence of Fall and Fall-Related Injury  Outcome: Ongoing, Progressing  Intervention: Identify and Manage Contributors  Flowsheets (Taken 9/22/2023 0748)  Self-Care Promotion: safe use of adaptive equipment encouraged  Medication Review/Management:   medications reviewed   high-risk medications identified  Intervention: Promote Injury-Free Environment  Flowsheets (Taken 9/22/2023 0748)  Safety Promotion/Fall Prevention:   side rails raised x 3   bed alarm set

## 2023-09-22 NOTE — CONSULTS
"[facility]  [place] of Care Note    Patient Name: Gene Rothman  MRN: [id number]  Admission Date: 9/20/2023  Hospital Length of Stay: 2 days  Attending Physician: Donald Smyth MD   Primary Care Provider: Ami Zarate DO           Patient information was obtained from patient, past medical records and ER records.     Consults  Subjective:     Principal Problem: Chronic respiratory failure with hypercapnia    Chief Complaint:   Chief Complaint   Patient presents with    Altered Mental Status     Pt home nurse reports possible sepsis, increased co2, hypotensive, and altered mental status x 1 day.         HPI: A 77-year-old gentleman, familiar to several hospital services, with a background of multiple chronic health issues, including COPD, sleep apnea, A fib, pulmonary hypertension, CHF, lymphedema, bed-bound status for two years, and chronic respiratory failure requiring home oxygen, was brought in by his wife on their 61st wedding anniversary due to an unusual and unresponsive behavior, which she believed was out of character for such a significant day. Upon arrival to the ED, he was placed on NIPPV, with subsequent improvement in his behavior, making him appear "more like himself" to his wife. It was later revealed that patient was non-compliant with his NIPPV, evident from a new machine provided during a previous admission. His blood pressure improved following administration of midodrine, but MAP was noticeably still low. Admitted to ICU for close monitoring.    During this admission, on 9/21, he experienced an episode of bradycardia with heart rates in the 30s but remained hemodynamically stable. By 9/22, he was alert and off NIPPV, tolerating nasal cannula oxygen without the need for pressors. Cardiology evaluated him, noting his heart rate was regular when awake and recommended ventilation support, avoidance of negative inotropic medications, and continuation " of eliquis. Despite an elevated BNP suggestive of CHF exacerbation, his heart failure medication was temporarily held due to hypotension. He was deemed stable for transfer to a telemetry unit on 9/22/23. Hospital medicine consulted for assumption of care.      Past Medical History:   Diagnosis Date    Acute hypoxemic respiratory failure 9/17/2022    Arthritis     Atrial fibrillation 3/1/2023    CHF (congestive heart failure)     Chronic kidney disease     Coronary artery disease     Depression     Hypertension     Hypertensive heart disease with heart failure 9/16/2022    Hypothyroidism     Lymphedema of lower extremity     Nephrolithiasis     Obesity     Peripheral vascular disease     Pneumonia 9/17/2022    Recurrent cellulitis of lower leg     Sleep apnea     can't stand the CPAP , sleeps elevated in hospital bed or chair    Tobacco dependence     resolved       Past Surgical History:   Procedure Laterality Date    ADENOIDECTOMY  1961    BACK SURGERY  1975;  1976    x2 for disc; scar tissue removed    debridement of bilateral leg ulcers Bilateral 01/01/2017    Dr Hernandez    INNER EAR SURGERY Bilateral 1961    separate - pinnaplasty , new eardrms constructed    KIDNEY STONE SURGERY Left 1976 approx    open    TONSILLECTOMY  1961       Review of patient's allergies indicates:   Allergen Reactions    Sulfamethoxazole-trimethoprim Itching and Shortness Of Breath    Sulfamethoxazole        No current facility-administered medications on file prior to encounter.     Current Outpatient Medications on File Prior to Encounter   Medication Sig    apixaban (ELIQUIS) 5 mg Tab Take 1 tablet (5 mg total) by mouth 2 (two) times daily.    aspirin 81 MG Chew Take 1 tablet (81 mg total) by mouth once daily.    acetaminophen (TYLENOL) 500 MG tablet Take 500 mg by mouth every 6 (six) hours as needed for Pain.    atorvastatin (LIPITOR) 40 MG tablet Take 1 tablet (40 mg total) by mouth once daily.     fludrocortisone (FLORINEF) 0.1 mg Tab Take 1 tablet (100 mcg total) by mouth 2 (two) times daily. (Patient not taking: Reported on 9/15/2023)    furosemide (LASIX) 40 MG tablet Take 1 tablet (40 mg total) by mouth once daily.    levalbuterol (XOPENEX) 1.25 mg/3 mL nebulizer solution Take 3 mLs (1.25 mg total) by nebulization every 4 (four) hours as needed for Wheezing. Rescue    levothyroxine (SYNTHROID) 50 MCG tablet Take 1 tablet (50 mcg total) by mouth before breakfast.    metronidazole 1% (METROGEL) 1 % Gel Apply topically once daily.    miconazole nitrate 2% (MICOTIN) 2 % Oint Apply topically 2 (two) times daily. (Patient not taking: Reported on 9/15/2023)    midodrine (PROAMATINE) 5 MG Tab Take 3 tablets (15 mg total) by mouth 3 (three) times daily.    nebulizer and compressor (HOME NEBULIZER PLUS SIDESTREAM) Diana use as directed    senna-docusate 8.6-50 mg (PERICOLACE) 8.6-50 mg per tablet Take 1 tablet by mouth daily as needed for Constipation.    tiotropium (SPIRIVA) 18 mcg inhalation capsule Inhale 1 capsule (18 mcg total) into the lungs once daily. Controller     Family History       Problem Relation (Age of Onset)    Alcohol abuse Maternal Grandfather    Alzheimer's disease Brother    Asthma Daughter    Cancer Mother, Father, Brother    Diabetes Mother    Heart disease Father, Brother    Hypertension Mother          Tobacco Use    Smoking status: Former     Current packs/day: 0.00     Average packs/day: 1.5 packs/day for 1 year (1.5 ttl pk-yrs)     Types: Cigarettes     Start date:      Quit date:      Years since quittin.7    Smokeless tobacco: Never   Substance and Sexual Activity    Alcohol use: Not Currently    Drug use: Never    Sexual activity: Never     Review of Systems   Unable to perform ROS: Other   Minimal cooperation, flat affect  Objective:     Vital Signs (Most Recent):  Temp: 97.9 °F (36.6 °C) (23 1115)  Pulse: (!) 52 (23 1400)  Resp: 18 (23  1400)  BP: (!) 133/54 (09/22/23 1330)  SpO2: 97 % (09/22/23 1400) Vital Signs (24h Range):  Temp:  [97.4 °F (36.3 °C)-98.5 °F (36.9 °C)] 97.9 °F (36.6 °C)  Pulse:  [43-70] 52  Resp:  [6-29] 18  SpO2:  [76 %-100 %] 97 %  BP: ()/(35-84) 133/54     Weight: (!) 196.4 kg (432 lb 15.7 oz)  Body mass index is 57.13 kg/m².     Physical Exam      Vitals Reviewed  GEN: No acute distress, pleasant, body habitus morbid obese  HEENT: atraumatic and normocephalic  CARDS: bradycardia, no m/g, pulses palpable in LE  PULM: breathing comfortably on room air, chest symmetric, nonlabored, no abnormal breath sounds on auscultation  ABD: nontender, nondistended, soft, no organomegaly, BS+  Neuro: Alert and seems oriented, but doesn't follow directions or answer questions appropriately    Significant Labs: BMP:   Recent Labs   Lab 09/22/23  0423   GLU 83  83     145   K 2.6*  2.6*   *  111*   CO2 23 23   BUN 60*  60*   CREATININE 2.2*  2.2*   CALCIUM 6.7*  6.7*   MG 1.8     CBC:   Recent Labs   Lab 09/20/23  1541 09/21/23  0334 09/22/23  0423   WBC 7.95 6.32 5.80  5.80   HGB 7.5* 7.0* 7.3*  7.3*   HCT 25.2* 23.3* 22.9*  22.9*    173 158  158     CMP:   Recent Labs   Lab 09/20/23  1541 09/21/23  0334 09/22/23  0423    143 145  145   K 3.3* 3.6 2.6*  2.6*   * 113* 111*  111*   CO2 22* 17* 23 23   GLU 94 76 83  83   BUN 65* 66* 60*  60*   CREATININE 2.9* 2.8* 2.2*  2.2*   CALCIUM 7.2* 7.0* 6.7*  6.7*   PROT 6.2  --   --    ALBUMIN 1.6* 1.5* 1.3*   BILITOT 0.3  --   --    ALKPHOS 65  --   --    AST 13  --   --    ALT 8*  --   --    ANIONGAP 11 13 11  11     Cardiac Markers:   Recent Labs   Lab 09/20/23  1541   *     Coagulation:   Recent Labs   Lab 09/20/23  1541   INR 1.5*     Lactic Acid:   Recent Labs   Lab 09/20/23  1541 09/20/23  1906   LACTATE 0.9 1.5     Magnesium:   Recent Labs   Lab 09/20/23  1541 09/21/23  0334 09/22/23  0423   MG 2.1 1.9 1.8     Troponin:   Recent  Labs   Lab 09/20/23  1541 09/20/23  1951   TROPONINI 0.033* 0.027*     TSH:   Recent Labs   Lab 09/02/23  0349   TSH 7.137*     Urine Studies:   Recent Labs   Lab 09/21/23  1130   COLORU Yellow   APPEARANCEUA Hazy*   PHUR 5.0   SPECGRAV 1.010   PROTEINUA 1+*   GLUCUA Negative   KETONESU Negative   BILIRUBINUA Negative   OCCULTUA 2+*   NITRITE Negative   UROBILINOGEN Negative   LEUKOCYTESUR 3+*   RBCUA 21*   WBCUA 79*   BACTERIA Rare   SQUAMEPITHEL 2   HYALINECASTS 0         Significant Imaging: I have reviewed all pertinent imaging results/findings within the past 24 hours.  Imaging Results              X-Ray Chest AP Portable (Final result)  Result time 09/20/23 16:45:32      Final result by Cy Ureña MD (09/20/23 16:45:32)                   Impression:      Cardiomegaly without definite acute abnormality.      Electronically signed by: Cy Ureña  Date:    09/20/2023  Time:    16:45               Narrative:    EXAMINATION:  XR CHEST AP PORTABLE    CLINICAL HISTORY:  Sepsis;    TECHNIQUE:  Single frontal view of the chest was performed.    COMPARISON:  Multiple priors.    FINDINGS:  The lungs are clear, with normal appearance of pulmonary vasculature and no pleural effusion or pneumothorax.    The cardiac silhouette is enlarged.  The hilar and mediastinal contours are unremarkable.    Severe degenerative change of the shoulders.                                        Assessment/Plan:     * Chronic respiratory failure with hypercapnia  --O2 requirements: NC at the time of this encounter, saturating in the mid-to-high 90s  --confirmed that he's on chronic O2  --Pulmonology following: recommends cont NIPPV qhs and prn  --NC when awake and oriented as tolerated.  --recent CXR unremarkable  --continue IV Rocephin, procalcitonin in the AM  --follow up pulmonology recs      Bradycardia  --cards evaluated, not candidate for PM  --recommend monitoring clinically for now      CHF (congestive heart  failure)  --Last ECHO from 9/3/23 confirm EF 55 %  --acute on chronic edema on exam, but no evidence of vascular congestion on CXR  --holding diuretics due to low BP- defer restart to Cards  --monitor electrolytes, UOP, fluid restrictions  --strict I&O and daily weights ordered    Venous thromboembolism  --hx of unprovoked  --on Eliquis, continued upon admission      GERD (gastroesophageal reflux disease)  --stable  --continue PPI therapy      Hyperlipidemia  --continue statin therapy on admission      Cellulitis  treated    Venous stasis dermatitis of both lower extremities  Diuretics held duet to low blood pressures  Defer restart to Cardiology      CKD (chronic kidney disease) stage 3, GFR 30-59 ml/min  --Cr bumped from baseline (1.0), but is steadily improving   --cont holding home nephrotoxic agents while hospitalized  --renally dose all meds, renal diet  --daily BMP to monitor renal fxn       Morbid obesity with BMI of 60.0-69.9, adult  Body mass index is 57.13 kg/m². Morbid obesity complicates all aspects of disease management from diagnostic modalities to treatment. Weight loss encouraged and health benefits explained to patient.         Hypothyroidism (acquired)  --last TSH on file: elevated, >7.0; has been uptrending  --Home synthroid dose: 50 mcg  --repeat TSH as add-on  --ok to continue home synthroid for now        VTE Risk Mitigation (From admission, onward)         Ordered     apixaban tablet 5 mg  2 times daily         09/20/23 1754     IP VTE HIGH RISK PATIENT  Once         09/20/23 1744     Place sequential compression device  Until discontinued         09/20/23 1744                    Thank you for your consult. I will follow-up with patient. Please contact us if you have any additional questions.    Donald Smyth MD  Department of Hospital Medicine   [facility] ([facility])

## 2023-09-22 NOTE — H&P (VIEW-ONLY)
"[facility] - Intensive Care [facility] Medicine  [place] of Care Note    Patient Name: Gene Rothman  MRN: [id number]  Admission Date: 9/20/2023  Hospital Length of Stay: 2 days  Attending Physician: Donald Smyth MD   Primary Care Provider: Ami Zarate DO           Patient information was obtained from patient, past medical records and ER records.     Consults  Subjective:     Principal Problem: Chronic respiratory failure with hypercapnia    Chief Complaint:   Chief Complaint   Patient presents with    Altered Mental Status     Pt home nurse reports possible sepsis, increased co2, hypotensive, and altered mental status x 1 day.         HPI: A 77-year-old gentleman, familiar to several hospital services, with a background of multiple chronic health issues, including COPD, sleep apnea, A fib, pulmonary hypertension, CHF, lymphedema, bed-bound status for two years, and chronic respiratory failure requiring home oxygen, was brought in by his wife on their 61st wedding anniversary due to an unusual and unresponsive behavior, which she believed was out of character for such a significant day. Upon arrival to the ED, he was placed on NIPPV, with subsequent improvement in his behavior, making him appear "more like himself" to his wife. It was later revealed that patient was non-compliant with his NIPPV, evident from a new machine provided during a previous admission. His blood pressure improved following administration of midodrine, but MAP was noticeably still low. Admitted to ICU for close monitoring.    During this admission, on 9/21, he experienced an episode of bradycardia with heart rates in the 30s but remained hemodynamically stable. By 9/22, he was alert and off NIPPV, tolerating nasal cannula oxygen without the need for pressors. Cardiology evaluated him, noting his heart rate was regular when awake and recommended ventilation support, avoidance of negative inotropic medications, and continuation " of eliquis. Despite an elevated BNP suggestive of CHF exacerbation, his heart failure medication was temporarily held due to hypotension. He was deemed stable for transfer to a telemetry unit on 9/22/23. Hospital medicine consulted for assumption of care.      Past Medical History:   Diagnosis Date    Acute hypoxemic respiratory failure 9/17/2022    Arthritis     Atrial fibrillation 3/1/2023    CHF (congestive heart failure)     Chronic kidney disease     Coronary artery disease     Depression     Hypertension     Hypertensive heart disease with heart failure 9/16/2022    Hypothyroidism     Lymphedema of lower extremity     Nephrolithiasis     Obesity     Peripheral vascular disease     Pneumonia 9/17/2022    Recurrent cellulitis of lower leg     Sleep apnea     can't stand the CPAP , sleeps elevated in hospital bed or chair    Tobacco dependence     resolved       Past Surgical History:   Procedure Laterality Date    ADENOIDECTOMY  1961    BACK SURGERY  1975;  1976    x2 for disc; scar tissue removed    debridement of bilateral leg ulcers Bilateral 01/01/2017    Dr Hernandez    INNER EAR SURGERY Bilateral 1961    separate - pinnaplasty , new eardrms constructed    KIDNEY STONE SURGERY Left 1976 approx    open    TONSILLECTOMY  1961       Review of patient's allergies indicates:   Allergen Reactions    Sulfamethoxazole-trimethoprim Itching and Shortness Of Breath    Sulfamethoxazole        No current facility-administered medications on file prior to encounter.     Current Outpatient Medications on File Prior to Encounter   Medication Sig    apixaban (ELIQUIS) 5 mg Tab Take 1 tablet (5 mg total) by mouth 2 (two) times daily.    aspirin 81 MG Chew Take 1 tablet (81 mg total) by mouth once daily.    acetaminophen (TYLENOL) 500 MG tablet Take 500 mg by mouth every 6 (six) hours as needed for Pain.    atorvastatin (LIPITOR) 40 MG tablet Take 1 tablet (40 mg total) by mouth once daily.     fludrocortisone (FLORINEF) 0.1 mg Tab Take 1 tablet (100 mcg total) by mouth 2 (two) times daily. (Patient not taking: Reported on 9/15/2023)    furosemide (LASIX) 40 MG tablet Take 1 tablet (40 mg total) by mouth once daily.    levalbuterol (XOPENEX) 1.25 mg/3 mL nebulizer solution Take 3 mLs (1.25 mg total) by nebulization every 4 (four) hours as needed for Wheezing. Rescue    levothyroxine (SYNTHROID) 50 MCG tablet Take 1 tablet (50 mcg total) by mouth before breakfast.    metronidazole 1% (METROGEL) 1 % Gel Apply topically once daily.    miconazole nitrate 2% (MICOTIN) 2 % Oint Apply topically 2 (two) times daily. (Patient not taking: Reported on 9/15/2023)    midodrine (PROAMATINE) 5 MG Tab Take 3 tablets (15 mg total) by mouth 3 (three) times daily.    nebulizer and compressor (HOME NEBULIZER PLUS SIDESTREAM) Diana use as directed    senna-docusate 8.6-50 mg (PERICOLACE) 8.6-50 mg per tablet Take 1 tablet by mouth daily as needed for Constipation.    tiotropium (SPIRIVA) 18 mcg inhalation capsule Inhale 1 capsule (18 mcg total) into the lungs once daily. Controller     Family History       Problem Relation (Age of Onset)    Alcohol abuse Maternal Grandfather    Alzheimer's disease Brother    Asthma Daughter    Cancer Mother, Father, Brother    Diabetes Mother    Heart disease Father, Brother    Hypertension Mother          Tobacco Use    Smoking status: Former     Current packs/day: 0.00     Average packs/day: 1.5 packs/day for 1 year (1.5 ttl pk-yrs)     Types: Cigarettes     Start date:      Quit date:      Years since quittin.7    Smokeless tobacco: Never   Substance and Sexual Activity    Alcohol use: Not Currently    Drug use: Never    Sexual activity: Never     Review of Systems   Unable to perform ROS: Other   Minimal cooperation, flat affect  Objective:     Vital Signs (Most Recent):  Temp: 97.9 °F (36.6 °C) (23 1115)  Pulse: (!) 52 (23 1400)  Resp: 18 (23  1400)  BP: (!) 133/54 (09/22/23 1330)  SpO2: 97 % (09/22/23 1400) Vital Signs (24h Range):  Temp:  [97.4 °F (36.3 °C)-98.5 °F (36.9 °C)] 97.9 °F (36.6 °C)  Pulse:  [43-70] 52  Resp:  [6-29] 18  SpO2:  [76 %-100 %] 97 %  BP: ()/(35-84) 133/54     Weight: (!) 196.4 kg (432 lb 15.7 oz)  Body mass index is 57.13 kg/m².     Physical Exam      Vitals Reviewed  GEN: No acute distress, pleasant, body habitus morbid obese  HEENT: atraumatic and normocephalic  CARDS: bradycardia, no m/g, pulses palpable in LE  PULM: breathing comfortably on room air, chest symmetric, nonlabored, no abnormal breath sounds on auscultation  ABD: nontender, nondistended, soft, no organomegaly, BS+  Neuro: Alert and seems oriented, but doesn't follow directions or answer questions appropriately    Significant Labs: BMP:   Recent Labs   Lab 09/22/23  0423   GLU 83  83     145   K 2.6*  2.6*   *  111*   CO2 23 23   BUN 60*  60*   CREATININE 2.2*  2.2*   CALCIUM 6.7*  6.7*   MG 1.8     CBC:   Recent Labs   Lab 09/20/23  1541 09/21/23  0334 09/22/23  0423   WBC 7.95 6.32 5.80  5.80   HGB 7.5* 7.0* 7.3*  7.3*   HCT 25.2* 23.3* 22.9*  22.9*    173 158  158     CMP:   Recent Labs   Lab 09/20/23  1541 09/21/23  0334 09/22/23  0423    143 145  145   K 3.3* 3.6 2.6*  2.6*   * 113* 111*  111*   CO2 22* 17* 23 23   GLU 94 76 83  83   BUN 65* 66* 60*  60*   CREATININE 2.9* 2.8* 2.2*  2.2*   CALCIUM 7.2* 7.0* 6.7*  6.7*   PROT 6.2  --   --    ALBUMIN 1.6* 1.5* 1.3*   BILITOT 0.3  --   --    ALKPHOS 65  --   --    AST 13  --   --    ALT 8*  --   --    ANIONGAP 11 13 11  11     Cardiac Markers:   Recent Labs   Lab 09/20/23  1541   *     Coagulation:   Recent Labs   Lab 09/20/23  1541   INR 1.5*     Lactic Acid:   Recent Labs   Lab 09/20/23  1541 09/20/23  1906   LACTATE 0.9 1.5     Magnesium:   Recent Labs   Lab 09/20/23  1541 09/21/23  0334 09/22/23  0423   MG 2.1 1.9 1.8     Troponin:   Recent  Labs   Lab 09/20/23  1541 09/20/23  1951   TROPONINI 0.033* 0.027*     TSH:   Recent Labs   Lab 09/02/23  0349   TSH 7.137*     Urine Studies:   Recent Labs   Lab 09/21/23  1130   COLORU Yellow   APPEARANCEUA Hazy*   PHUR 5.0   SPECGRAV 1.010   PROTEINUA 1+*   GLUCUA Negative   KETONESU Negative   BILIRUBINUA Negative   OCCULTUA 2+*   NITRITE Negative   UROBILINOGEN Negative   LEUKOCYTESUR 3+*   RBCUA 21*   WBCUA 79*   BACTERIA Rare   SQUAMEPITHEL 2   HYALINECASTS 0         Significant Imaging: I have reviewed all pertinent imaging results/findings within the past 24 hours.  Imaging Results              X-Ray Chest AP Portable (Final result)  Result time 09/20/23 16:45:32      Final result by Cy Ureña MD (09/20/23 16:45:32)                   Impression:      Cardiomegaly without definite acute abnormality.      Electronically signed by: Cy Ureña  Date:    09/20/2023  Time:    16:45               Narrative:    EXAMINATION:  XR CHEST AP PORTABLE    CLINICAL HISTORY:  Sepsis;    TECHNIQUE:  Single frontal view of the chest was performed.    COMPARISON:  Multiple priors.    FINDINGS:  The lungs are clear, with normal appearance of pulmonary vasculature and no pleural effusion or pneumothorax.    The cardiac silhouette is enlarged.  The hilar and mediastinal contours are unremarkable.    Severe degenerative change of the shoulders.                                        Assessment/Plan:     * Chronic respiratory failure with hypercapnia  --O2 requirements: NC at the time of this encounter, saturating in the mid-to-high 90s  --confirmed that he's on chronic O2  --Pulmonology following: recommends cont NIPPV qhs and prn  --NC when awake and oriented as tolerated.  --recent CXR unremarkable  --continue IV Rocephin, procalcitonin in the AM  --follow up pulmonology recs      Bradycardia  --cards evaluated, not candidate for PM  --recommend monitoring clinically for now      CHF (congestive heart  failure)  --Last ECHO from 9/3/23 confirm EF 55 %  --acute on chronic edema on exam, but no evidence of vascular congestion on CXR  --holding diuretics due to low BP- defer restart to Cards  --monitor electrolytes, UOP, fluid restrictions  --strict I&O and daily weights ordered    Venous thromboembolism  --hx of unprovoked  --on Eliquis, continued upon admission      GERD (gastroesophageal reflux disease)  --stable  --continue PPI therapy      Hyperlipidemia  --continue statin therapy on admission      Cellulitis  treated    Venous stasis dermatitis of both lower extremities  Diuretics held duet to low blood pressures  Defer restart to Cardiology      CKD (chronic kidney disease) stage 3, GFR 30-59 ml/min  --Cr bumped from baseline (1.0), but is steadily improving   --cont holding home nephrotoxic agents while hospitalized  --renally dose all meds, renal diet  --daily BMP to monitor renal fxn       Morbid obesity with BMI of 60.0-69.9, adult  Body mass index is 57.13 kg/m². Morbid obesity complicates all aspects of disease management from diagnostic modalities to treatment. Weight loss encouraged and health benefits explained to patient.         Hypothyroidism (acquired)  --last TSH on file: elevated, >7.0; has been uptrending  --Home synthroid dose: 50 mcg  --repeat TSH as add-on  --ok to continue home synthroid for now        VTE Risk Mitigation (From admission, onward)         Ordered     apixaban tablet 5 mg  2 times daily         09/20/23 1754     IP VTE HIGH RISK PATIENT  Once         09/20/23 1744     Place sequential compression device  Until discontinued         09/20/23 1744                    Thank you for your consult. I will follow-up with patient. Please contact us if you have any additional questions.    Donald Smyth MD  Department of Hospital Medicine   [facility] - [facility]ensive [facility] ([facility])

## 2023-09-22 NOTE — HOSPITAL COURSE
09/22/2023: no family at bedside at the time of this encounter. Patient resting comfortably, but easily awaken. Still with flat affect and minimal cooperation with exam.   09/23/2023: Family at bedside this morning. Expressed concern regarding patient's CPAP that was given to them at last hospitalization will be taken away soon by insurance(?) Asked if there was a way for them to have the CPAP indefinitely. SW consulted.    09/24/2023: Discussed with SW. Patient's non-compliance with CPAP is likely issue insurance refusing coverage. Discussed case with family this morning. Reports having difficulties with machine at home the first day, but when they called the number they was given, no one answered since it was the weekend. Patient was then readmitted to the hospitals multiple times since that time, and family states that THIS is the reason for patient's non-compliance with CPAP.     09/25: Pt seen and examined with wife at bedside. Wife states that after prior discharge they tried to use CPAP machine but it was not working correctly and when they tried to call number they were given, there was no answer. Unclear if this was user error or machine error. Per nursing, wife took patient off CPAP multiple times overnight and was unable to place him back on it. Pt has no complaints this morning other than nausea. Denies vomiting.     09/26: Per case management, pt was sent home with [facility] on last discharge but was not approved by insurance at the time. Have requested wife to bring in insurance denial letter so that denial can be appealed. NAEON. PT did not wear BIPAP due to nausea. Reports continued nausea this AM, no vomiting. + flatus, no BM. No abd pain, CP, SOB.     09/27: Pt seen with wife and RN at bedside. BP remains low, pt asymptomatic. Transfusing 2u PRBC for Hgb 6.4. No overt s/s of bleeding. Did not wear BIPAP last night. Denies vomiting, abd pain, CP, SOB, cough. Was able to have BM yesterday      09/28: NAEON. Hgb 7.6 despite 2u PRBC. Per nursing, pt had large melanotic bowel movement this AM. BP in the 80s systolic. PT evaluated, he is awake, alert, oriented x 3. Denies CP, SOB, dizziness or abd pain. Pt made NPO, Eliquis and ASA discontinued. GI consulted and will eval, recommend PPI bolus. Will transfuse additional 1u pRBC,  transfer to ICU for closer monitoring.

## 2023-09-23 NOTE — ASSESSMENT & PLAN NOTE
afib with bradycardia  Morbid obesity bed bound  Copd   CKD III    --HR normal when he was awake. Advise ventilation support  --avoid negative inotropic meds  --no indication of PPM   -continue eliquis    09/23  Asymptomatic janice, mainly at sleep  Weight control

## 2023-09-23 NOTE — PROGRESS NOTES
[facility] - Telemetry ([facility])  Cardiology  Progress Note    Patient Name: Gene Rothman  MRN: [id number]  Admission Date: 9/20/2023  Hospital Length of Stay: 3 days  Code Status: Full Code   Attending Physician: Donald Smyth MD   Primary Care Physician: Ami Zarate DO  Expected Discharge Date:   Principal Problem:Chronic respiratory failure with hypercapnia    Subjective:     Hospital Course:   Cardiology consult for afib and bradycardia  PMH chronic afib, obesity bed bound, chronic hypoxic respiratory failure (on 4L), PHTN, diastolic CHF, RV dysfunction, chronic lymphedema, and OHS/DENISHA (non-compliant with PAP therapies)   Admitted for Patient with Hypercapnic Respiratory failure which is Acute on chronic.  he is on home oxygen at 4 LPM. Supplemental oxygen was provided and noted-    His HR dropped to 20 to 30's when he fell asleep  Denied chest pain dizziness  HGB 7  Cr 2.8    Troponin 0.027 --> 0.033  Ekg afib  Echo 09/23  Left Ventricle: The left ventricle is normal in size. Mildly increased wall thickness. There is concentric remodeling. Normal wall motion. Septal motion is normal. There is normal systolic function with a visually estimated ejection fraction of 55 - 70%. There is normal diastolic function.    Left Atrium: Left atrium is mildly dilated.    Right Ventricle: Mild right ventricular enlargement. Wall thickness is normal. Right ventricle wall motion  is normal. Systolic function is normal.    Aortic Valve: The aortic valve is a trileaflet valve. There is moderate aortic valve sclerosis.    Tricuspid Valve: There is moderate regurgitation with a centrally directed jet.    Pulmonary Artery: The estimated pulmonary artery systolic pressure is 57 mmHg.    IVC/SVC: Normal venous pressure at 3 mmHg.     9/22/23 pt seen and examined today, denies any CP at this time, HR stable not on any pressors. Labs reviewed, Crt 2.2 H/H 7.3 and 22.9    09/23/23. At daytime HR at 47 yo 60 bpm and  dropped to 30 's at sleep, no pause. Pt denied chest pain dyspnea. Pt is bed bound due to body habitus        ROS  Objective:     Vital Signs (Most Recent):  Temp: 97.4 °F (36.3 °C) (09/23/23 1530)  Pulse: (!) 58 (09/23/23 1530)  Resp: 17 (09/23/23 1530)  BP: (!) 113/53 (09/23/23 1530)  SpO2: 99 % (09/23/23 1530) Vital Signs (24h Range):  Temp:  [97.3 °F (36.3 °C)-99.2 °F (37.3 °C)] 97.4 °F (36.3 °C)  Pulse:  [56-87] 58  Resp:  [12-21] 17  SpO2:  [91 %-99 %] 99 %  BP: ()/(50-59) 113/53     Weight: (!) 196.4 kg (432 lb 15.7 oz)  Body mass index is 57.13 kg/m².     SpO2: 99 %         Intake/Output Summary (Last 24 hours) at 9/23/2023 1530  Last data filed at 9/23/2023 0724  Gross per 24 hour   Intake --   Output 1300 ml   Net -1300 ml       Lines/Drains/Airways       Peripherally Inserted Central Catheter Line  Duration             PICC Double Lumen 09/21/23 1530 right basilic 2 days              Drain  Duration                  Urethral Catheter 09/21/23 1130 Silicone 16 Fr. 2 days              Peripheral Intravenous Line  Duration                  Peripheral IV - Single Lumen 09/20/23 1550 18 G;1 3/4 in Anterior;Distal;Right Upper Arm 2 days         Peripheral IV - Single Lumen 09/20/23 1550 18 G;1 3/4 in Anterior;Proximal;Right Upper Arm 2 days                       Physical Exam  Vitals and nursing note reviewed.   Constitutional:       Appearance: He is obese.   HENT:      Head: Normocephalic and atraumatic.   Eyes:      General:         Right eye: No discharge.         Left eye: No discharge.      Pupils: Pupils are equal, round, and reactive to light.   Cardiovascular:      Rate and Rhythm: Normal rate. Rhythm irregularly irregular.      Heart sounds: S1 normal and S2 normal. No murmur heard.     No friction rub.   Pulmonary:      Effort: Pulmonary effort is normal. No respiratory distress.      Breath sounds: Rales present.   Abdominal:      Palpations: Abdomen is soft.      Tenderness: There is no  "abdominal tenderness.   Musculoskeletal:      Cervical back: Neck supple.      Right lower leg: No edema.      Left lower leg: No edema.   Skin:     General: Skin is warm and dry.   Neurological:      General: No focal deficit present.      Mental Status: He is alert and oriented to person, place, and time.   Psychiatric:         Mood and Affect: Mood normal.         Behavior: Behavior normal.         Thought Content: Thought content normal.            Significant Labs: ABG: No results for input(s): "PH", "PCO2", "HCO3", "POCSATURATED", "BE" in the last 48 hours., Blood Culture: No results for input(s): "LABBLOO" in the last 48 hours., BMP:   Recent Labs   Lab 09/22/23  0423 09/23/23  0432   GLU 83  83 82     145 144   K 2.6*  2.6* 3.0*   *  111* 112*   CO2 23  23 21*   BUN 60*  60* 57*   CREATININE 2.2*  2.2* 1.9*   CALCIUM 6.7*  6.7* 6.7*   MG 1.8 1.7   , CMP   Recent Labs   Lab 09/22/23  0423 09/23/23  0432     145 144   K 2.6*  2.6* 3.0*   *  111* 112*   CO2 23 23 21*   GLU 83  83 82   BUN 60*  60* 57*   CREATININE 2.2*  2.2* 1.9*   CALCIUM 6.7*  6.7* 6.7*   ALBUMIN 1.3* 1.3*   ANIONGAP 11  11 11   , CBC   Recent Labs   Lab 09/22/23  0423 09/23/23  0432   WBC 5.80  5.80 7.02   HGB 7.3*  7.3* 7.5*   HCT 22.9*  22.9* 23.7*     158 133*   , INR No results for input(s): "INR", "PROTIME" in the last 48 hours., Lipid Panel No results for input(s): "CHOL", "HDL", "LDLCALC", "TRIG", "CHOLHDL" in the last 48 hours., and Troponin No results for input(s): "TROPONINI" in the last 48 hours.    Significant Imaging: EKG:      Assessment and Plan:       Bradycardia  afib with bradycardia  Morbid obesity bed bound  Copd   CKD III    --HR normal when he was awake. Advise ventilation support  --avoid negative inotropic meds  --no indication of PPM   -continue eliquis    09/23  Asymptomatic janice, mainly at sleep  Weight control    CHF (congestive heart failure)    Med " held for now, hypotensive      Cellulitis  Continue bumex    Venous stasis dermatitis of both lower extremities  continue bumex    Morbid obesity with BMI of 60.0-69.9, adult  Weight control        VTE Risk Mitigation (From admission, onward)         Ordered     apixaban tablet 5 mg  2 times daily         09/20/23 1754     IP VTE HIGH RISK PATIENT  Once         09/20/23 1744     Place sequential compression device  Until discontinued         09/20/23 1744                Blue Cabrera MD  Cardiology  [facility] - Telemetry ([facility])

## 2023-09-23 NOTE — NURSING
Received lying in bed with family at bedside. Assessment per flowsheet. May intact, denies any pain or discomfort. Plan of care discussed. Pt verbalized understanding. Will continue to monitor.

## 2023-09-23 NOTE — NURSING
Patient's HR continuing to drop to 30s and return to 50s with a 2.5 second pause per monitor room. Patient asymptomatic. MD and cardiology notified; no further action needed if patient is asymptomatic per cardiology.

## 2023-09-23 NOTE — NURSING
Received 2 calls from monitor room stating that patient's HR dropped to upper 30s then came back up to upper 50s per monitor tech. MD notified; cardiology added to message by MD.

## 2023-09-23 NOTE — SUBJECTIVE & OBJECTIVE
"Interval History: NAEON. Saturating well on 2LNC which is home rate.     Review of Systems  Objective:     Vital Signs (Most Recent):  Temp: 98.1 °F (36.7 °C) (09/23/23 1120)  Pulse: (!) 56 (09/23/23 1120)  Resp: 17 (09/23/23 1120)  BP: (!) 103/50 (09/23/23 1120)  SpO2: 97 % (09/23/23 1120) Vital Signs (24h Range):  Temp:  [97.3 °F (36.3 °C)-99.2 °F (37.3 °C)] 98.1 °F (36.7 °C)  Pulse:  [51-87] 56  Resp:  [12-29] 17  SpO2:  [91 %-100 %] 97 %  BP: ()/(35-59) 103/50     Weight: (!) 196.4 kg (432 lb 15.7 oz)  Body mass index is 57.13 kg/m².    Intake/Output Summary (Last 24 hours) at 9/23/2023 1135  Last data filed at 9/23/2023 0724  Gross per 24 hour   Intake 120 ml   Output 1495 ml   Net -1375 ml         Physical Exam        Significant Labs: All pertinent labs within the past 24 hours have been reviewed.  BMP:   Recent Labs   Lab 09/23/23  0432   GLU 82      K 3.0*   *   CO2 21*   BUN 57*   CREATININE 1.9*   CALCIUM 6.7*   MG 1.7     CBC:   Recent Labs   Lab 09/22/23  0423 09/23/23  0432   WBC 5.80  5.80 7.02   HGB 7.3*  7.3* 7.5*   HCT 22.9*  22.9* 23.7*     158 133*     CMP:   Recent Labs   Lab 09/22/23  0423 09/23/23  0432     145 144   K 2.6*  2.6* 3.0*   *  111* 112*   CO2 23 23 21*   GLU 83  83 82   BUN 60*  60* 57*   CREATININE 2.2*  2.2* 1.9*   CALCIUM 6.7*  6.7* 6.7*   ALBUMIN 1.3* 1.3*   ANIONGAP 11  11 11     Cardiac Markers: No results for input(s): "CKMB", "MYOGLOBIN", "BNP", "TROPISTAT" in the last 48 hours.  Coagulation: No results for input(s): "PT", "INR", "APTT" in the last 48 hours.  Lactic Acid: No results for input(s): "LACTATE" in the last 48 hours.  Magnesium:   Recent Labs   Lab 09/22/23  0423 09/23/23  0432   MG 1.8 1.7     Troponin: No results for input(s): "TROPONINI", "TROPONINIHS" in the last 48 hours.  TSH:   Recent Labs   Lab 09/22/23  1733   TSH 3.345     Urine Studies:   No results for input(s): "COLORU", "APPEARANCEUA", "PHUR", " ""SPECGRAV", "PROTEINUA", "GLUCUA", "KETONESU", "BILIRUBINUA", "OCCULTUA", "NITRITE", "UROBILINOGEN", "LEUKOCYTESUR", "RBCUA", "WBCUA", "BACTERIA", "SQUAMEPITHEL", "HYALINECASTS" in the last 48 hours.    Invalid input(s): "WRIGHTSUR"    Significant Imaging: I have reviewed all pertinent imaging results/findings within the past 24 hours.    Imaging Results              X-Ray Chest AP Portable (Final result)  Result time 09/20/23 16:45:32      Final result by Cy Ureña MD (09/20/23 16:45:32)                   Impression:      Cardiomegaly without definite acute abnormality.      Electronically signed by: Cy Ureña  Date:    09/20/2023  Time:    16:45               Narrative:    EXAMINATION:  XR CHEST AP PORTABLE    CLINICAL HISTORY:  Sepsis;    TECHNIQUE:  Single frontal view of the chest was performed.    COMPARISON:  Multiple priors.    FINDINGS:  The lungs are clear, with normal appearance of pulmonary vasculature and no pleural effusion or pneumothorax.    The cardiac silhouette is enlarged.  The hilar and mediastinal contours are unremarkable.    Severe degenerative change of the shoulders.                                      "

## 2023-09-23 NOTE — PROGRESS NOTES
"[place] - Telemetry ([facility])  [facility] Medicine  Progress Note    Patient Name: Gene Rothman  MRN: [id number]  Patient Class: IP- Inpatient   Admission Date: 9/20/2023  Length of Stay: 3 days  Attending Physician: Donald Smyth MD  Primary Care Provider: Ami Zarate DO        Subjective:     Principal Problem:Chronic respiratory failure with hypercapnia        HPI:  A 77-year-old gentleman, familiar to several hospital services, with a background of multiple chronic health issues, including COPD, sleep apnea, A fib, pulmonary hypertension, CHF, lymphedema, bed-bound status for two years, and chronic respiratory failure requiring home oxygen, was brought in by his wife on their 61st wedding anniversary due to an unusual and unresponsive behavior, which she believed was out of character for such a significant day. Upon arrival to the ED, he was placed on NIPPV, with subsequent improvement in his behavior, making him appear "more like himself" to his wife. It was later revealed that patient was non-compliant with his NIPPV, evident from a new machine provided during a previous admission. His blood pressure improved following administration of midodrine, but MAP was noticeably still low. Admitted to ICU for close monitoring.    During this admission, on 9/21, he experienced an episode of bradycardia with heart rates in the 30s but remained hemodynamically stable. By 9/22, he was alert and off NIPPV, tolerating nasal cannula oxygen without the need for pressors. Cardiology evaluated him, noting his heart rate was regular when awake and recommended ventilation support, avoidance of negative inotropic medications, and continuation of eliquis. Despite an elevated BNP suggestive of CHF exacerbation, his heart failure medication was temporarily held due to hypotension. He was deemed stable for transfer to a telemetry unit on 9/22/23. Hospital medicine consulted for assumption of care.      Overview/Hospital " "Course:  09/22/2023: no family at bedside at the time of this encounter. Patient resting comfortably, but easily awaken. Still with flat affect and minimal cooperation with exam.   09/23/2023: Family at bedside this morning. Expressed concern regarding patient's CPAP that was given to them at last hospitalization will be taken away soon by insurance(?) Asked if there was a way for them to have the CPAP indefinitely. SW consulted      Interval History: NAEON. Saturating well on 2LNC which is home rate.     Review of Systems  Objective:     Vital Signs (Most Recent):  Temp: 98.1 °F (36.7 °C) (09/23/23 1120)  Pulse: (!) 56 (09/23/23 1120)  Resp: 17 (09/23/23 1120)  BP: (!) 103/50 (09/23/23 1120)  SpO2: 97 % (09/23/23 1120) Vital Signs (24h Range):  Temp:  [97.3 °F (36.3 °C)-99.2 °F (37.3 °C)] 98.1 °F (36.7 °C)  Pulse:  [51-87] 56  Resp:  [12-29] 17  SpO2:  [91 %-100 %] 97 %  BP: ()/(35-59) 103/50     Weight: (!) 196.4 kg (432 lb 15.7 oz)  Body mass index is 57.13 kg/m².    Intake/Output Summary (Last 24 hours) at 9/23/2023 1135  Last data filed at 9/23/2023 0724  Gross per 24 hour   Intake 120 ml   Output 1495 ml   Net -1375 ml         Physical Exam        Significant Labs: All pertinent labs within the past 24 hours have been reviewed.  BMP:   Recent Labs   Lab 09/23/23  0432   GLU 82      K 3.0*   *   CO2 21*   BUN 57*   CREATININE 1.9*   CALCIUM 6.7*   MG 1.7     CBC:   Recent Labs   Lab 09/22/23  0423 09/23/23  0432   WBC 5.80  5.80 7.02   HGB 7.3*  7.3* 7.5*   HCT 22.9*  22.9* 23.7*     158 133*     CMP:   Recent Labs   Lab 09/22/23  0423 09/23/23  0432     145 144   K 2.6*  2.6* 3.0*   *  111* 112*   CO2 23 23 21*   GLU 83  83 82   BUN 60*  60* 57*   CREATININE 2.2*  2.2* 1.9*   CALCIUM 6.7*  6.7* 6.7*   ALBUMIN 1.3* 1.3*   ANIONGAP 11  11 11     Cardiac Markers: No results for input(s): "CKMB", "MYOGLOBIN", "BNP", "TROPISTAT" in the last 48 hours.  Coagulation: " "No results for input(s): "PT", "INR", "APTT" in the last 48 hours.  Lactic Acid: No results for input(s): "LACTATE" in the last 48 hours.  Magnesium:   Recent Labs   Lab 09/22/23  0423 09/23/23  0432   MG 1.8 1.7     Troponin: No results for input(s): "TROPONINI", "TROPONINIHS" in the last 48 hours.  TSH:   Recent Labs   Lab 09/22/23  1733   TSH 3.345     Urine Studies:   No results for input(s): "COLORU", "APPEARANCEUA", "PHUR", "SPECGRAV", "PROTEINUA", "GLUCUA", "KETONESU", "BILIRUBINUA", "OCCULTUA", "NITRITE", "UROBILINOGEN", "LEUKOCYTESUR", "RBCUA", "WBCUA", "BACTERIA", "SQUAMEPITHEL", "HYALINECASTS" in the last 48 hours.    Invalid input(s): "WRIGHTSUR"    Significant Imaging: I have reviewed all pertinent imaging results/findings within the past 24 hours.    Imaging Results              X-Ray Chest AP Portable (Final result)  Result time 09/20/23 16:45:32      Final result by Cy Ureña MD (09/20/23 16:45:32)                   Impression:      Cardiomegaly without definite acute abnormality.      Electronically signed by: Cy Ureña  Date:    09/20/2023  Time:    16:45               Narrative:    EXAMINATION:  XR CHEST AP PORTABLE    CLINICAL HISTORY:  Sepsis;    TECHNIQUE:  Single frontal view of the chest was performed.    COMPARISON:  Multiple priors.    FINDINGS:  The lungs are clear, with normal appearance of pulmonary vasculature and no pleural effusion or pneumothorax.    The cardiac silhouette is enlarged.  The hilar and mediastinal contours are unremarkable.    Severe degenerative change of the shoulders.                                          Assessment/Plan:      * Chronic respiratory failure with hypercapnia  --O2 requirements: NC at the time of this encounter, saturating in the mid-to-high 90s  --confirmed that he's on chronic O2  --Pulmonology following: recommends cont NIPPV qhs and prn  --NC when awake and oriented as tolerated.  --recent CXR unremarkable  --continue IV Rocephin, " procalcitonin in the AM  --follow up pulmonology recs    09/23/2023  -SW consulted to assist with CPAP   -If ok with pulm, then possible discharge once this has been arranged    Bradycardia  --cards evaluated, not candidate for PM  --recommend monitoring clinically for now    09/23/2023  Controlled      CHF (congestive heart failure)  --Last ECHO from 9/3/23 confirm EF 55 %  --acute on chronic edema on exam, but no evidence of vascular congestion on CXR  --holding diuretics due to low BP- defer restart to Cards  --monitor electrolytes, UOP, fluid restrictions  --strict I&O and daily weights ordered    Venous thromboembolism  --hx of unprovoked  --on Eliquis, continued upon admission      GERD (gastroesophageal reflux disease)  --stable  --continue PPI therapy      Hyperlipidemia  --continue statin therapy on admission      Cellulitis  treated    Venous stasis dermatitis of both lower extremities  Diuretics held duet to low blood pressures  Defer restart to Cardiology      CKD (chronic kidney disease) stage 3, GFR 30-59 ml/min  --Cr bumped from baseline (1.0), but is steadily improving   --cont holding home nephrotoxic agents while hospitalized  --renally dose all meds, renal diet  --daily BMP to monitor renal fxn       Morbid obesity with BMI of 60.0-69.9, adult  Body mass index is 57.13 kg/m². Morbid obesity complicates all aspects of disease management from diagnostic modalities to treatment. Weight loss encouraged and health benefits explained to patient.         Hypothyroidism (acquired)  --last TSH on file: elevated, >7.0; has been uptrending  --Home synthroid dose: 50 mcg  --repeat TSH as add-on  --ok to continue home synthroid for now        VTE Risk Mitigation (From admission, onward)         Ordered     apixaban tablet 5 mg  2 times daily         09/20/23 1754     IP VTE HIGH RISK PATIENT  Once         09/20/23 1744     Place sequential compression device  Until discontinued         09/20/23 1744                 Discharge Planning   JAMES:      Code Status: Full Code   Is the patient medically ready for discharge?:     Reason for patient still in hospital (select all that apply): Pending disposition and Other (specify) CPAP acquisition  Discharge Plan A: Home Health                  Donald Smyth MD  Department of Hospital Medicine   '[place] - Telemetry ([facility])

## 2023-09-23 NOTE — SUBJECTIVE & OBJECTIVE
ROS  Objective:     Vital Signs (Most Recent):  Temp: 97.4 °F (36.3 °C) (09/23/23 1530)  Pulse: (!) 58 (09/23/23 1530)  Resp: 17 (09/23/23 1530)  BP: (!) 113/53 (09/23/23 1530)  SpO2: 99 % (09/23/23 1530) Vital Signs (24h Range):  Temp:  [97.3 °F (36.3 °C)-99.2 °F (37.3 °C)] 97.4 °F (36.3 °C)  Pulse:  [56-87] 58  Resp:  [12-21] 17  SpO2:  [91 %-99 %] 99 %  BP: ()/(50-59) 113/53     Weight: (!) 196.4 kg (432 lb 15.7 oz)  Body mass index is 57.13 kg/m².     SpO2: 99 %         Intake/Output Summary (Last 24 hours) at 9/23/2023 1530  Last data filed at 9/23/2023 0724  Gross per 24 hour   Intake --   Output 1300 ml   Net -1300 ml       Lines/Drains/Airways       Peripherally Inserted Central Catheter Line  Duration             PICC Double Lumen 09/21/23 1530 right basilic 2 days              Drain  Duration                  Urethral Catheter 09/21/23 1130 Silicone 16 Fr. 2 days              Peripheral Intravenous Line  Duration                  Peripheral IV - Single Lumen 09/20/23 1550 18 G;1 3/4 in Anterior;Distal;Right Upper Arm 2 days         Peripheral IV - Single Lumen 09/20/23 1550 18 G;1 3/4 in Anterior;Proximal;Right Upper Arm 2 days                       Physical Exam  Vitals and nursing note reviewed.   Constitutional:       Appearance: He is obese.   HENT:      Head: Normocephalic and atraumatic.   Eyes:      General:         Right eye: No discharge.         Left eye: No discharge.      Pupils: Pupils are equal, round, and reactive to light.   Cardiovascular:      Rate and Rhythm: Normal rate. Rhythm irregularly irregular.      Heart sounds: S1 normal and S2 normal. No murmur heard.     No friction rub.   Pulmonary:      Effort: Pulmonary effort is normal. No respiratory distress.      Breath sounds: Rales present.   Abdominal:      Palpations: Abdomen is soft.      Tenderness: There is no abdominal tenderness.   Musculoskeletal:      Cervical back: Neck supple.      Right lower leg: No edema.      Left  "lower leg: No edema.   Skin:     General: Skin is warm and dry.   Neurological:      General: No focal deficit present.      Mental Status: He is alert and oriented to person, place, and time.   Psychiatric:         Mood and Affect: Mood normal.         Behavior: Behavior normal.         Thought Content: Thought content normal.            Significant Labs: ABG: No results for input(s): "PH", "PCO2", "HCO3", "POCSATURATED", "BE" in the last 48 hours., Blood Culture: No results for input(s): "LABBLOO" in the last 48 hours., BMP:   Recent Labs   Lab 09/22/23  0423 09/23/23  0432   GLU 83  83 82     145 144   K 2.6*  2.6* 3.0*   *  111* 112*   CO2 23  23 21*   BUN 60*  60* 57*   CREATININE 2.2*  2.2* 1.9*   CALCIUM 6.7*  6.7* 6.7*   MG 1.8 1.7   , CMP   Recent Labs   Lab 09/22/23  0423 09/23/23  0432     145 144   K 2.6*  2.6* 3.0*   *  111* 112*   CO2 23  23 21*   GLU 83  83 82   BUN 60*  60* 57*   CREATININE 2.2*  2.2* 1.9*   CALCIUM 6.7*  6.7* 6.7*   ALBUMIN 1.3* 1.3*   ANIONGAP 11  11 11   , CBC   Recent Labs   Lab 09/22/23  0423 09/23/23  0432   WBC 5.80  5.80 7.02   HGB 7.3*  7.3* 7.5*   HCT 22.9*  22.9* 23.7*     158 133*   , INR No results for input(s): "INR", "PROTIME" in the last 48 hours., Lipid Panel No results for input(s): "CHOL", "HDL", "LDLCALC", "TRIG", "CHOLHDL" in the last 48 hours., and Troponin No results for input(s): "TROPONINI" in the last 48 hours.    Significant Imaging: EKG:    "

## 2023-09-23 NOTE — PLAN OF CARE
[id number]/[id number] RAZIA Rothman is a 77 y.o.male admitted on 9/20/2023 for Chronic respiratory failure with hypercapnia   Code Status: Full Code MRN: [id number]   Review of patient's allergies indicates:   Allergen Reactions    Sulfamethoxazole-trimethoprim Itching and Shortness Of Breath    Sulfamethoxazole      Past Medical History:   Diagnosis Date    Acute hypoxemic respiratory failure 9/17/2022    Arthritis     Atrial fibrillation 3/1/2023    CHF (congestive heart failure)     Chronic kidney disease     Coronary artery disease     Depression     Hypertension     Hypertensive heart disease with heart failure 9/16/2022    Hypothyroidism     Lymphedema of lower extremity     Nephrolithiasis     Obesity     Peripheral vascular disease     Pneumonia 9/17/2022    Recurrent cellulitis of lower leg     Sleep apnea     can't stand the CPAP , sleeps elevated in hospital bed or chair    Tobacco dependence     resolved      PRN meds    0.9%  NaCl infusion (for blood administration), , Q24H PRN  sodium chloride 0.9%, , PRN  influenza 65up-adj, 0.5 mL, vaccine x 1 dose  potassium chloride in water, 40 mEq, PRN   And  potassium chloride in water, 60 mEq, PRN  senna-docusate 8.6-50 mg, 1 tablet, Daily PRN  sodium chloride 0.9%, 10 mL, PRN      Chart check completed. Will continue plan of care.      Orientation: oriented x 4  [place] Coma Scale Score: 15     Lead Monitored: Lead II Rhythm: normal sinus rhythm Frequency/Ectopy: rare, PVCs  Cardiac/Telemetry Box Number: [id number]  VTE Required Core Measure: Pharmacological prophylaxis initiated/maintained Last Bowel Movement: 09/22/23  Diet Cardiac [facility]; Renal; Fluid - 1200mL  Voiding Characteristics: urethral catheter (bladder)  Chan Score: 14  Fall Risk Score: 13  Accucheck []   Freq?      Lines/Drains/Airways       Peripherally Inserted Central Catheter Line  Duration             PICC Double Lumen 09/21/23 1530 right basilic 1 day              Drain  Duration                   Urethral Catheter 09/21/23 1130 Silicone 16 Fr. 2 days              Peripheral Intravenous Line  Duration                  Peripheral IV - Single Lumen 09/20/23 1550 18 G;1 3/4 in Anterior;Distal;Right Upper Arm 2 days         Peripheral IV - Single Lumen 09/20/23 1550 18 G;1 3/4 in Anterior;Proximal;Right Upper Arm 2 days

## 2023-09-24 NOTE — SUBJECTIVE & OBJECTIVE
ROS  Objective:     Vital Signs (Most Recent):  Temp: 98.8 °F (37.1 °C) (09/24/23 1156)  Pulse: 61 (09/24/23 1406)  Resp: 18 (09/24/23 1406)  BP: (!) 102/50 (09/24/23 1156)  SpO2: 96 % (09/24/23 1406) Vital Signs (24h Range):  Temp:  [97.4 °F (36.3 °C)-99 °F (37.2 °C)] 98.8 °F (37.1 °C)  Pulse:  [26-61] 61  Resp:  [16-19] 18  SpO2:  [96 %-100 %] 96 %  BP: ()/(50-54) 102/50     Weight: (!) 195 kg (430 lb)  Body mass index is 56.73 kg/m².     SpO2: 96 %         Intake/Output Summary (Last 24 hours) at 9/24/2023 1414  Last data filed at 9/24/2023 0435  Gross per 24 hour   Intake 1004.95 ml   Output 525 ml   Net 479.95 ml       Lines/Drains/Airways       Peripherally Inserted Central Catheter Line  Duration             PICC Double Lumen 09/21/23 1530 right basilic 2 days              Drain  Duration                  Urethral Catheter 09/21/23 1130 Silicone 16 Fr. 3 days              Peripheral Intravenous Line  Duration                  Peripheral IV - Single Lumen 09/20/23 1550 18 G;1 3/4 in Anterior;Distal;Right Upper Arm 3 days         Peripheral IV - Single Lumen 09/20/23 1550 18 G;1 3/4 in Anterior;Proximal;Right Upper Arm 3 days                       Physical Exam  Vitals and nursing note reviewed.   Constitutional:       Appearance: He is obese.   HENT:      Head: Normocephalic and atraumatic.   Eyes:      General:         Right eye: No discharge.         Left eye: No discharge.      Pupils: Pupils are equal, round, and reactive to light.   Cardiovascular:      Rate and Rhythm: Normal rate. Rhythm irregularly irregular.      Heart sounds: S1 normal and S2 normal. No murmur heard.     No friction rub.   Pulmonary:      Effort: Pulmonary effort is normal. No respiratory distress.      Breath sounds: Rales present.   Abdominal:      Palpations: Abdomen is soft.      Tenderness: There is no abdominal tenderness.   Musculoskeletal:      Cervical back: Neck supple.      Right lower leg: No edema.      Left lower  "leg: No edema.   Skin:     General: Skin is warm and dry.   Neurological:      General: No focal deficit present.      Mental Status: He is alert and oriented to person, place, and time.   Psychiatric:         Mood and Affect: Mood normal.         Behavior: Behavior normal.         Thought Content: Thought content normal.            Significant Labs: ABG: No results for input(s): "PH", "PCO2", "HCO3", "POCSATURATED", "BE" in the last 48 hours., Blood Culture: No results for input(s): "LABBLOO" in the last 48 hours., BMP:   Recent Labs   Lab 09/23/23  0432 09/24/23  0557   GLU 82 81    142   K 3.0* 3.1*   * 112*   CO2 21* 20*   BUN 57* 56*   CREATININE 1.9* 1.9*   CALCIUM 6.7* 6.8*   MG 1.7 2.0   , CMP   Recent Labs   Lab 09/23/23  0432 09/24/23  0557    142   K 3.0* 3.1*   * 112*   CO2 21* 20*   GLU 82 81   BUN 57* 56*   CREATININE 1.9* 1.9*   CALCIUM 6.7* 6.8*   ALBUMIN 1.3* 1.3*   ANIONGAP 11 10   , CBC   Recent Labs   Lab 09/23/23  0432 09/24/23  0557   WBC 7.02 6.99   HGB 7.5* 7.5*   HCT 23.7* 24.2*   * 118*   , INR No results for input(s): "INR", "PROTIME" in the last 48 hours., Lipid Panel No results for input(s): "CHOL", "HDL", "LDLCALC", "TRIG", "CHOLHDL" in the last 48 hours., and Troponin No results for input(s): "TROPONINI" in the last 48 hours.    Significant Imaging: EKG:    "

## 2023-09-24 NOTE — PLAN OF CARE
A228/A228 RAZIA Rothman is a 77 y.o.male admitted on 9/20/2023 for Chronic respiratory failure with hypercapnia   Code Status: Full Code MRN: [id number]   Review of patient's allergies indicates:   Allergen Reactions    Sulfamethoxazole-trimethoprim Itching and Shortness Of Breath    Sulfamethoxazole      Past Medical History:   Diagnosis Date    Acute hypoxemic respiratory failure 9/17/2022    Arthritis     Atrial fibrillation 3/1/2023    CHF (congestive heart failure)     Chronic kidney disease     Coronary artery disease     Depression     Hypertension     Hypertensive heart disease with heart failure 9/16/2022    Hypothyroidism     Lymphedema of lower extremity     Nephrolithiasis     Obesity     Peripheral vascular disease     Pneumonia 9/17/2022    Recurrent cellulitis of lower leg     Sleep apnea     can't stand the CPAP , sleeps elevated in hospital bed or chair    Tobacco dependence     resolved      PRN meds    0.9%  NaCl infusion (for blood administration), , Q24H PRN  sodium chloride 0.9%, , PRN  influenza 65up-adj, 0.5 mL, vaccine x 1 dose  ondansetron, 4 mg, Q6H PRN  potassium chloride in water, 40 mEq, PRN   And  potassium chloride in water, 60 mEq, PRN  senna-docusate 8.6-50 mg, 1 tablet, Daily PRN  sodium chloride 0.9%, 10 mL, PRN      Chart check completed. Will continue plan of care.      Orientation: oriented x 4  [place] Coma Scale Score: 15     Lead Monitored: Lead II Rhythm: normal sinus rhythm Frequency/Ectopy: rare, PVCs  Cardiac/Telemetry Box Number: [id number]  VTE Required Core Measure: Pharmacological prophylaxis initiated/maintained Last Bowel Movement: 09/24/23  Diet Cardiac [facility]; Renal; Fluid - 1200mL  Voiding Characteristics: urethral catheter (bladder)  Chan Score: 13  Fall Risk Score: 15  Accucheck []   Freq?      Lines/Drains/Airways       Peripherally Inserted Central Catheter Line  Duration             PICC Double Lumen 09/21/23 1530 right basilic 2 days               Drain  Duration                  Urethral Catheter 09/21/23 1130 Silicone 16 Fr. 3 days              Peripheral Intravenous Line  Duration                  Peripheral IV - Single Lumen 09/20/23 1550 18 G;1 3/4 in Anterior;Distal;Right Upper Arm 3 days         Peripheral IV - Single Lumen 09/20/23 1550 18 G;1 3/4 in Anterior;Proximal;Right Upper Arm 3 days                        Problem: Adult Inpatient Plan of Care  Goal: Plan of Care Review  Outcome: Ongoing, Progressing  Goal: Patient-Specific Goal (Individualized)  Outcome: Ongoing, Progressing  Goal: Absence of Hospital-Acquired Illness or Injury  Outcome: Ongoing, Progressing  Goal: Optimal Comfort and Wellbeing  Outcome: Ongoing, Progressing  Goal: Readiness for Transition of Care  Outcome: Ongoing, Progressing     Problem: Bariatric Environmental Safety  Goal: Safety Maintained with Care  Outcome: Ongoing, Progressing     Problem: Fluid and Electrolyte Imbalance (Acute Kidney Injury/Impairment)  Goal: Fluid and Electrolyte Balance  Outcome: Ongoing, Progressing     Problem: Oral Intake Inadequate (Acute Kidney Injury/Impairment)  Goal: Optimal Nutrition Intake  Outcome: Ongoing, Progressing     Problem: Renal Function Impairment (Acute Kidney Injury/Impairment)  Goal: Effective Renal Function  Outcome: Ongoing, Progressing     Problem: Impaired Wound Healing  Goal: Optimal Wound Healing  Outcome: Ongoing, Progressing     Problem: Fall Injury Risk  Goal: Absence of Fall and Fall-Related Injury  Outcome: Ongoing, Progressing     Problem: Skin Injury Risk Increased  Goal: Skin Health and Integrity  Outcome: Ongoing, Progressing     Problem: Infection  Goal: Absence of Infection Signs and Symptoms  Outcome: Ongoing, Progressing

## 2023-09-24 NOTE — ASSESSMENT & PLAN NOTE
afib with bradycardia  Morbid obesity bed bound  Copd   CKD III    --HR normal when he was awake. Advise ventilation support  --avoid negative inotropic meds  --no indication of PPM   -continue eliquis    09/23  Asymptomatic janice, mainly at sleep  Weight control    09/24 Continue sleep breathing support.

## 2023-09-24 NOTE — PROGRESS NOTES
"[facility] ([facility])  [facility] Medicine  Progress Note     Patient Name: Gene Rothman  MRN: [id number]  Patient Class: IP- Inpatient     Admission Date: 9/20/2023  Length of Stay: 3 days  Attending Physician: Donald Smyth MD  Primary Care Provider: Ami Zarate DO           Subjective:      Principal Problem:Chronic respiratory failure with hypercapnia           HPI:  A 77-year-old gentleman, familiar to several hospital services, with a background of multiple chronic health issues, including COPD, sleep apnea, A fib, pulmonary hypertension, CHF, lymphedema, bed-bound status for two years, and chronic respiratory failure requiring home oxygen, was brought in by his wife on their 61st wedding anniversary due to an unusual and unresponsive behavior, which she believed was out of character for such a significant day. Upon arrival to the ED, he was placed on NIPPV, with subsequent improvement in his behavior, making him appear "more like himself" to his wife. It was later revealed that patient was non-compliant with his NIPPV, evident from a new machine provided during a previous admission. His blood pressure improved following administration of midodrine, but MAP was noticeably still low. Admitted to ICU for close monitoring.     During this admission, on 9/21, he experienced an episode of bradycardia with heart rates in the 30s but remained hemodynamically stable. By 9/22, he was alert and off NIPPV, tolerating nasal cannula oxygen without the need for pressors. Cardiology evaluated him, noting his heart rate was regular when awake and recommended ventilation support, avoidance of negative inotropic medications, and continuation of eliquis. Despite an elevated BNP suggestive of CHF exacerbation, his heart failure medication was temporarily held due to hypotension. He was deemed stable for transfer to a telemetry unit on 9/22/23. Hospital medicine consulted for assumption of care.      " "  Overview/Hospital Course:  09/22/2023: no family at bedside at the time of this encounter. Patient resting comfortably, but easily awaken. Still with flat affect and minimal cooperation with exam.   09/23/2023: Family at bedside this morning. Expressed concern regarding patient's CPAP that was given to them at last hospitalization will be taken away soon by insurance(?) Asked if there was a way for them to have the CPAP indefinitely. SW consulted.    09/24/2023: Discussed with SW. Patient's non-compliance with CPAP is likely issue insurance refusing coverage. Discussed case with family this morning. Reports having difficulties with machine at home the first day, but when they called the number they was given, no one answered since it was the weekend. Patient was then readmitted to the hospitals multiple times since that time, and family states that THIS is the reason for patient's non-compliance with CPAP.      Disposition: Can safely discharge home once coverage for CPAP machine is confirmed. Follow up case management on Monday.    Interval History  Episode of bradycardia with HR in the 20s overnight while sleeping. Otherwise asymptomatic. Promptly rebounded to the high 40s minutes later. Cardiology following closely.     Objective  BP (!) 94/51 (Patient Position: Lying) Comment: MD notified  Pulse (!) 59   Temp 97.4 °F (36.3 °C) (Oral)   Resp 16   Ht 6' 1" (1.854 m)   Wt (!) 195 kg (430 lb)   SpO2 96%   BMI 56.73 kg/m²     Intake/Output Summary (Last 24 hours) at 9/24/2023 0832  Last data filed at 9/24/2023 0435  Gross per 24 hour   Intake 1004.95 ml   Output 525 ml   Net 479.95 ml       PHYSICAL EXAM  GEN: No acute distress, pleasant, body habitus morbid obese  HEENT: atraumatic and normocephalic  CARDS: bradycardia, no m/g, pulses palpable in LE  PULM: breathing comfortably on 2LNC, chest symmetric, nonlabored, no abnormal breath sounds on auscultation  ABD: nontender, nondistended, soft, no " "organomegaly, BS+  Neuro: Alert and seems oriented to baseline, but doesn't follow directions or answer questions appropriately      BMP:   Recent Labs   Lab 09/24/23  0557   GLU 81      K 3.1*   *   CO2 20*   BUN 56*   CREATININE 1.9*   CALCIUM 6.8*   MG 2.0     CBC:   Recent Labs   Lab 09/23/23  0432 09/24/23  0557   WBC 7.02 6.99   HGB 7.5* 7.5*   HCT 23.7* 24.2*   * 118*     CMP:   Recent Labs   Lab 09/23/23  0432 09/24/23  0557    142   K 3.0* 3.1*   * 112*   CO2 21* 20*   GLU 82 81   BUN 57* 56*   CREATININE 1.9* 1.9*   CALCIUM 6.7* 6.8*   ALBUMIN 1.3* 1.3*   ANIONGAP 11 10     Cardiac Markers: No results for input(s): "CKMB", "MYOGLOBIN", "BNP", "TROPISTAT" in the last 48 hours.  Coagulation: No results for input(s): "PT", "INR", "APTT" in the last 48 hours.  Lactic Acid: No results for input(s): "LACTATE" in the last 48 hours.  Magnesium:   Recent Labs   Lab 09/23/23  0432 09/24/23  0557   MG 1.7 2.0     Troponin: No results for input(s): "TROPONINI", "TROPONINIHS" in the last 48 hours.  TSH:   Recent Labs   Lab 09/22/23  1733   TSH 3.345     Urine Studies:   No results for input(s): "COLORU", "APPEARANCEUA", "PHUR", "SPECGRAV", "PROTEINUA", "GLUCUA", "KETONESU", "BILIRUBINUA", "OCCULTUA", "NITRITE", "UROBILINOGEN", "LEUKOCYTESUR", "RBCUA", "WBCUA", "BACTERIA", "SQUAMEPITHEL", "HYALINECASTS" in the last 48 hours.    Invalid input(s): "WRIGHTSUR"    Imaging Results              X-Ray Chest AP Portable (Final result)  Result time 09/20/23 16:45:32      Final result by Cy Ureña MD (09/20/23 16:45:32)                   Impression:      Cardiomegaly without definite acute abnormality.      Electronically signed by: Cy Ureña  Date:    09/20/2023  Time:    16:45               Narrative:    EXAMINATION:  XR CHEST AP PORTABLE    CLINICAL HISTORY:  Sepsis;    TECHNIQUE:  Single frontal view of the chest was performed.    COMPARISON:  Multiple priors.    FINDINGS:  The " lungs are clear, with normal appearance of pulmonary vasculature and no pleural effusion or pneumothorax.    The cardiac silhouette is enlarged.  The hilar and mediastinal contours are unremarkable.    Severe degenerative change of the shoulders.                                      Assessment/Plan:      * Chronic respiratory failure with hypercapnia  --O2 requirements: NC at the time of this encounter, saturating in the mid-to-high 90s  --confirmed that he's on chronic O2  --Pulmonology following: recommends cont NIPPV qhs and prn  --NC when awake and oriented as tolerated.  --recent CXR unremarkable  --continue IV Rocephin, procalcitonin in the AM  --follow up pulmonology recs     09/23/2023  -SW consulted to assist with CPAP   -possible discharge once this has been arranged    09/24/2023  -discussed with LEANNE, plan to ask CM to assist in CPAP coverage on Monday. Once this is done, patient cleared for discharge back home.      Bradycardia  --cards evaluated, not candidate for PM  --recommend monitoring clinically for now     09/23/2023  Controlled, intermittent bouts of low 30s, but overall asymptomatic        CHF (congestive heart failure)  --Last ECHO from 9/3/23 confirm EF 55 %  --acute on chronic edema on exam, but no evidence of vascular congestion on CXR  --holding diuretics due to low BP- defer restart to Cards  --monitor electrolytes, UOP, fluid restrictions  --strict I&O and daily weights ordered     Venous thromboembolism  --hx of unprovoked  --on Eliquis, continued upon admission        GERD (gastroesophageal reflux disease)  --stable  --continue PPI therapy        Hyperlipidemia  --continue statin therapy on admission        Cellulitis  treated     Venous stasis dermatitis of both lower extremities  Diuretics held duet to low blood pressures  Defer restart to Cardiology        CKD (chronic kidney disease) stage 3, GFR 30-59 ml/min  --Cr bumped from baseline (1.0), but is steadily improving   --cont  holding home nephrotoxic agents while hospitalized  --renally dose all meds, renal diet  --daily BMP to monitor renal fxn         Morbid obesity with BMI of 60.0-69.9, adult  Body mass index is 57.13 kg/m². Morbid obesity complicates all aspects of disease management from diagnostic modalities to treatment. Weight loss encouraged and health benefits explained to patient.                   Hypothyroidism (acquired)  --last TSH on file: elevated, >7.0; has been uptrending  --Home synthroid dose: 50 mcg  --repeat TSH as add-on  --ok to continue home synthroid for now               VTE Risk Mitigation (From admission, onward)           Ordered       apixaban tablet 5 mg  2 times daily         09/20/23 1754       IP VTE HIGH RISK PATIENT  Once         09/20/23 1744       Place sequential compression device  Until discontinued         09/20/23 1744                    Discharge Planning   JAMES:      Code Status: Full Code   Is the patient medically ready for discharge?:     Reason for patient still in hospital (select all that apply): Pending disposition and Other (specify) CPAP acquisition  Discharge Plan A: Home Health     Disposition pending CPAP coverage. Once this has been arranged, patient can discharge home.      Donald Smyth MD  Department of Hospital Medicine   [facility] - Tele[facility] ([facility])

## 2023-09-24 NOTE — PROGRESS NOTES
[facility] ([facility])  Cardiology  Progress Note    Patient Name: Gene Rothman  MRN: [id number]  Admission Date: 9/20/2023  Hospital Length of Stay: 4 days  Code Status: Full Code   Attending Physician: Donald Smyth MD   Primary Care Physician: Ami Zarate DO  Expected Discharge Date:   Principal Problem:Chronic respiratory failure with hypercapnia    Subjective:     Hospital Course:   Cardiology consult for afib and bradycardia  PMH chronic afib, obesity bed bound, chronic hypoxic respiratory failure (on 4L), PHTN, diastolic CHF, RV dysfunction, chronic lymphedema, and OHS/DENISHA (non-compliant with PAP therapies)   Admitted for Patient with Hypercapnic Respiratory failure which is Acute on chronic.  he is on home oxygen at 4 LPM. Supplemental oxygen was provided and noted-    His HR dropped to 20 to 30's when he fell asleep  Denied chest pain dizziness  HGB 7  Cr 2.8    Troponin 0.027 --> 0.033  Ekg afib  Echo 09/23  Left Ventricle: The left ventricle is normal in size. Mildly increased wall thickness. There is concentric remodeling. Normal wall motion. Septal motion is normal. There is normal systolic function with a visually estimated ejection fraction of 55 - 70%. There is normal diastolic function.    Left Atrium: Left atrium is mildly dilated.    Right Ventricle: Mild right ventricular enlargement. Wall thickness is normal. Right ventricle wall motion  is normal. Systolic function is normal.    Aortic Valve: The aortic valve is a trileaflet valve. There is moderate aortic valve sclerosis.    Tricuspid Valve: There is moderate regurgitation with a centrally directed jet.    Pulmonary Artery: The estimated pulmonary artery systolic pressure is 57 mmHg.    IVC/SVC: Normal venous pressure at 3 mmHg.     9/22/23 pt seen and examined today, denies any CP at this time, HR stable not on any pressors. Labs reviewed, Crt 2.2 H/H 7.3 and 22.9    09/23/23. At daytime HR at 49 yo 60 bpm and  dropped to 30 's at sleep, no pause. Pt denied chest pain dyspnea. Pt is bed bound due to body habitus      09/24 pause 2.5 seconds at sleep. No chest pain dizziness. Pt is bed bound      ROS  Objective:     Vital Signs (Most Recent):  Temp: 98.8 °F (37.1 °C) (09/24/23 1156)  Pulse: 61 (09/24/23 1406)  Resp: 18 (09/24/23 1406)  BP: (!) 102/50 (09/24/23 1156)  SpO2: 96 % (09/24/23 1406) Vital Signs (24h Range):  Temp:  [97.4 °F (36.3 °C)-99 °F (37.2 °C)] 98.8 °F (37.1 °C)  Pulse:  [26-61] 61  Resp:  [16-19] 18  SpO2:  [96 %-100 %] 96 %  BP: ()/(50-54) 102/50     Weight: (!) 195 kg (430 lb)  Body mass index is 56.73 kg/m².     SpO2: 96 %         Intake/Output Summary (Last 24 hours) at 9/24/2023 1414  Last data filed at 9/24/2023 0435  Gross per 24 hour   Intake 1004.95 ml   Output 525 ml   Net 479.95 ml       Lines/Drains/Airways       Peripherally Inserted Central Catheter Line  Duration             PICC Double Lumen 09/21/23 1530 right basilic 2 days              Drain  Duration                  Urethral Catheter 09/21/23 1130 Silicone 16 Fr. 3 days              Peripheral Intravenous Line  Duration                  Peripheral IV - Single Lumen 09/20/23 1550 18 G;1 3/4 in Anterior;Distal;Right Upper Arm 3 days         Peripheral IV - Single Lumen 09/20/23 1550 18 G;1 3/4 in Anterior;Proximal;Right Upper Arm 3 days                       Physical Exam  Vitals and nursing note reviewed.   Constitutional:       Appearance: He is obese.   HENT:      Head: Normocephalic and atraumatic.   Eyes:      General:         Right eye: No discharge.         Left eye: No discharge.      Pupils: Pupils are equal, round, and reactive to light.   Cardiovascular:      Rate and Rhythm: Normal rate. Rhythm irregularly irregular.      Heart sounds: S1 normal and S2 normal. No murmur heard.     No friction rub.   Pulmonary:      Effort: Pulmonary effort is normal. No respiratory distress.      Breath sounds: Rales present.  "  Abdominal:      Palpations: Abdomen is soft.      Tenderness: There is no abdominal tenderness.   Musculoskeletal:      Cervical back: Neck supple.      Right lower leg: No edema.      Left lower leg: No edema.   Skin:     General: Skin is warm and dry.   Neurological:      General: No focal deficit present.      Mental Status: He is alert and oriented to person, place, and time.   Psychiatric:         Mood and Affect: Mood normal.         Behavior: Behavior normal.         Thought Content: Thought content normal.            Significant Labs: ABG: No results for input(s): "PH", "PCO2", "HCO3", "POCSATURATED", "BE" in the last 48 hours., Blood Culture: No results for input(s): "LABBLOO" in the last 48 hours., BMP:   Recent Labs   Lab 09/23/23  0432 09/24/23  0557   GLU 82 81    142   K 3.0* 3.1*   * 112*   CO2 21* 20*   BUN 57* 56*   CREATININE 1.9* 1.9*   CALCIUM 6.7* 6.8*   MG 1.7 2.0   , CMP   Recent Labs   Lab 09/23/23  0432 09/24/23  0557    142   K 3.0* 3.1*   * 112*   CO2 21* 20*   GLU 82 81   BUN 57* 56*   CREATININE 1.9* 1.9*   CALCIUM 6.7* 6.8*   ALBUMIN 1.3* 1.3*   ANIONGAP 11 10   , CBC   Recent Labs   Lab 09/23/23  0432 09/24/23  0557   WBC 7.02 6.99   HGB 7.5* 7.5*   HCT 23.7* 24.2*   * 118*   , INR No results for input(s): "INR", "PROTIME" in the last 48 hours., Lipid Panel No results for input(s): "CHOL", "HDL", "LDLCALC", "TRIG", "CHOLHDL" in the last 48 hours., and Troponin No results for input(s): "TROPONINI" in the last 48 hours.    Significant Imaging: EKG:      Assessment and Plan:     Bradycardia  afib with bradycardia  Morbid obesity bed bound  Copd   CKD III    --HR normal when he was awake. Advise ventilation support  --avoid negative inotropic meds  --no indication of PPM   -continue eliquis    09/23  Asymptomatic janice, mainly at sleep  Weight control    09/24 Continue sleep breathing support.     CHF (congestive heart failure)    Med held for now, " hypotensive      Cellulitis  Continue bumex    Venous stasis dermatitis of both lower extremities  continue bumex    Morbid obesity with BMI of 60.0-69.9, adult  Weight control        VTE Risk Mitigation (From admission, onward)         Ordered     apixaban tablet 5 mg  2 times daily         09/20/23 1754     IP VTE HIGH RISK PATIENT  Once         09/20/23 1744     Place sequential compression device  Until discontinued         09/20/23 1744                Blue Cabrera MD  Cardiology  [facility] ([facility])

## 2023-09-24 NOTE — NURSING
Received lying in bed awake and alert talking with family. Assessment per flowsheet, plan of care discussed pt verbalized understanding. Will continue to monitor.

## 2023-09-25 PROBLEM — L89.153 STAGE III PRESSURE ULCER OF SACRAL REGION: Status: ACTIVE | Noted: 2023-01-01

## 2023-09-25 PROBLEM — L03.90 CELLULITIS: Status: RESOLVED | Noted: 2021-12-03 | Resolved: 2023-01-01

## 2023-09-25 NOTE — PROGRESS NOTES
[facility] ([facility])  Adult Nutrition  Progress Note    SUMMARY       Recommendations    Recommendation/Intervention:   1. Recommend pt continue on Cardiac; Renal diet when medically apropriate  2. Recommend pt receives Suplena TID to assist filling nutritional gaps   3. Recommend pt recieves Jm BID x 2 weeks to assist with wound healing  4. Recommend Banatrol (Banana flakes) TID for diarrhea or BID for loose stools   5. Recommend continue antiemetic to assist with nausea  6. Encourage PO and supplement intake   7. Daily weights     Goals:   1. Pt diet order will be modified within x 4 days. (Resolved)   2. Pt will consume >50% of Jm prior to RD follow up.   3. Pt will consume >50% of EEN and EPN prior to RD follow up.  4. Pt's diarrhea/loose stools will improve by RD follow up   5. Pt's nausea will improve prior to RD follow up   Nutrition Goal Status: resolved/ continues/ new  Communication of RD Recs: other (comment) (POC: Sticky Note)    Assessment and Plan    Nutrition Problem  Inadequate PO intake  Altered GI function   Increased protein needs      Related to (etiology):   Decreased ability to consume sufficient nutrition  Alteration in GI tract structure and/or function   Increased demand for nutrition      Signs and Symptoms (as evidenced by):   Nausea, 0% PO intake x 1 day   Diarrhea/ Loose stools   Decubitus ulcers, multiple wound healing needs      Interventions(treatment strategy):  1. Sodium and Fat, Mineral modified diet   2. Commercial Beverage  3. Medical food and mineral supplement therapy for wound healing  4. Commercial food   5. Prescription medication   6. Collaboration with other providers     Nutrition Diagnosis Status:   Continues/ New     Malnutrition Assessment     Skin (Micronutrient): bruised, edema, wounds unhealed       Fluid Accumulation (Malnutrition): moderate                         Reason for Assessment    Reason For Assessment: consult  Diagnosis: pulmonary disease  (Chronic respiratory failure with hypercapnia)  Relevant Medical History: CHF, CKD, CAD, HTN, Lymphedema of BLE, Sleep apnea (Chronic), Urinary retention  Interdisciplinary Rounds: did not attend  General Information Comments:   76 y/o male admitted w current principal problem of Chronic respiratory failure with hypercapnia. Pt currently has NPO diet order 2/2 being on continueous BiPAP. The pt RN supects pt will be taken of BiPAP soon. NFPE not performed. RD will assess need for NFPE during follow up. The is currently charted to weigh 433 lb, BMI 57.13 (Morbidly obese). Per EMR pt has skin tears to sacrum/ gluteal and BLE ulcers. Awaiting wound care assessment. The pt LBM was 9/19. Per family members the pt has had a poor appetite and poor urien output. RD will continue to follow    Nutrition Discharge Planning: Cardiac diet + Jm BID x 2 weeks + Suplena as warranted     Follow up:   9/25/23: RD follow up. Pt currently on Cardiac, Renal, 1200 mL Fluid Restricted diet. Visited pt at bedside, pt working with RN. Spoke to RN, stated pt has been experiencing diarrhea and nausea w/out vomiting, confirmed only consumed apple juice today with pt d/t nausea. Wound care noted on 9/21 multiple DTPIs noted caused by ACE bandages, stage 2 PI to L lateral hip, R medial thigh DTPI, MASD to bilateral buttocks, L mid upper back DTPI's and stage 3 PI to coccyx/sacral region. Reviewed chart: LBM 9/25 (loose); Skin: bruised, wounds; Chan score: 13 (moderate risk); Edema: 3+ moderate L foot/ bilateral ankle/leg, generalized. Labs, meds, weight reviewed. Note PRN ondansetron. Weight charted 9/21 432 lbs, 9/23 430 lbs (BMI 56.73, Morbid obesity), -2 lb wt loss x 2 days. No NFPE warranted at this time, BMI > 40. RD will continue to monitor.     Nutrition Risk Screen    Nutrition Risk Screen: large or nonhealing wound, burn or pressure injury    Nutrition/Diet History    Spiritual, Cultural Beliefs, [employer] Practices, Values that  "Affect Care:  (GUMARO)  Food Allergies: NKFA  Factors Affecting Nutritional Intake: abdominal distension, NPO (BiPAP)    Anthropometrics    Temp: 97.9 °F (36.6 °C)  Height Method: Estimated  Height: 6' 1" (185.4 cm)  Height (inches): 73 in  Weight Method: Bed Scale  Weight: (!) 195 kg (430 lb)  Weight (lb): (!) 430 lb  Ideal Body Weight (IBW), Male: 184 lb  % Ideal Body Weight, Male (lb): 235.32 %  BMI (Calculated): 56.7  BMI Grade: greater than 40 - morbid obesity     Wt Readings from Last 15 Encounters:   09/23/23 (!) 195 kg (430 lb)   09/15/23 (!) 192.2 kg (423 lb 11.6 oz)   09/09/23 (!) 200 kg (440 lb 14.7 oz)   08/01/23 (!) 178.6 kg (393 lb 11.9 oz)   07/17/23 (!) 176 kg (388 lb)   03/07/23 (!) 180.7 kg (398 lb 5.9 oz)   02/20/23 (!) 154.7 kg (341 lb)   02/04/23 (!) 186 kg (410 lb)   12/11/22 (!) 192.5 kg (424 lb 6.4 oz)   09/19/22 (!) 188.2 kg (415 lb)   08/21/22 (!) 195 kg (429 lb 14.4 oz)   06/13/22 (!) 218.7 kg (482 lb 2.3 oz)   01/05/22 (!) 181.9 kg (401 lb 0.3 oz)   12/19/21 (!) 194.4 kg (428 lb 9.2 oz)   12/09/21 (!) 202.3 kg (446 lb)     Lab/Procedures/Meds    Pertinent Labs Reviewed: reviewed  Pertinent Medications Reviewed: reviewed  BMP  Lab Results   Component Value Date     09/25/2023    K 3.2 (L) 09/25/2023     (H) 09/25/2023    CO2 21 (L) 09/25/2023    BUN 58 (H) 09/25/2023    CREATININE 2.0 (H) 09/25/2023    CALCIUM 7.1 (L) 09/25/2023    ANIONGAP 10 09/25/2023    EGFRNORACEVR 33 (A) 09/25/2023     Lab Results   Component Value Date    CALCIUM 7.1 (L) 09/25/2023    PHOS 4.7 (H) 09/25/2023     Lab Results   Component Value Date    ALBUMIN 1.3 (L) 09/25/2023     Scheduled Meds:   acetaminophen  650 mg Oral TID    apixaban  5 mg Oral BID    aspirin  81 mg Oral Daily    atorvastatin  40 mg Oral Daily    fludrocortisone  100 mcg Oral BID    ipratropium  0.5 mg Nebulization Q6H    levothyroxine  50 mcg Oral Before breakfast    midodrine  15 mg Oral Q8H    mupirocin   Nasal BID    pantoprazole  " 40 mg Oral BID     Continuous Infusions:  PRN Meds:.0.9%  NaCl infusion (for blood administration), sodium chloride 0.9%, influenza 65up-adj, ondansetron, senna-docusate 8.6-50 mg, sodium chloride 0.9%    Physical Findings/Assessment         Estimated/Assessed Needs    Weight Used For Calorie Calculations: 111.5 kg (245 lb 13 oz) (Adj BW)  Energy Calorie Requirements (kcal): 5201-2400 kcals (MSJ Adj BW no AF-1.2 AF (Morbid obese, BMI 56.73 w/ wounds)  Energy Need Method: Arthur-St Jeor  Protein Requirements: 61-67 g (0.55-0.6 g/kg Adj BW (CKD 3, non dialysis)  Weight Used For Protein Calculations: 111.5 kg (245 lb 13 oz) (Adj BW)  Fluid Requirements (mL): 1200 mL per MD/NP (CHF)  Estimated Fluid Requirement Method: other (see comments)  RDA Method (mL): 1883  CHO Requirement: 235-283 g (2723-8273 kcals/8)      Nutrition Prescription Ordered    Current Diet Order: Cardiac, Renal, 1200 mL Fluid Restriction diet     Evaluation of Received Nutrient/Fluid Intake  I/O: (Net since admit)   9/21: -11.1 mL   9/25: -1257.3 mL    Energy Calories Required: not meeting needs  Protein Required: not meeting needs  Fluid Required: not meeting needs  Total Fluid Intake (mL): 448.2  Tolerance: not tolerating  % Intake of Estimated Energy Needs: 0 - 25 %  % Meal Intake: 0 - 25 %    Nutrition Risk    Level of Risk/Frequency of Follow-up: high (x2 weekly)     Monitor and Evaluation    Food and Nutrient Intake: energy intake, food and beverage intake  Food and Nutrient Adminstration: diet order  Physical Activity and Function: nutrition-related ADLs and IADLs, factors affecting access to physical activity  Anthropometric Measurements: weight, body mass index  Biochemical Data, Medical Tests and Procedures: electrolyte and renal panel, glucose/endocrine profile, inflammatory profile, lipid profile  Nutrition-Focused Physical Findings: overall appearance, skin     Nutrition Follow-Up    RD Follow-up?: Yes  Rena Kwong, Registration  Eligible, Provisional LDN

## 2023-09-25 NOTE — SUBJECTIVE & OBJECTIVE
Interval History: See Hospital Course     Review of Systems  Objective:     Vital Signs (Most Recent):  Temp: 97.6 °F (36.4 °C) (09/25/23 0808)  Pulse: (!) 57 (09/25/23 0810)  Resp: 18 (09/25/23 0808)  BP: (!) 100/55 (09/25/23 0810)  SpO2: 99 % (09/25/23 0810) Vital Signs (24h Range):  Temp:  [97.6 °F (36.4 °C)-98.8 °F (37.1 °C)] 97.6 °F (36.4 °C)  Pulse:  [47-64] 57  Resp:  [18-22] 18  SpO2:  [93 %-100 %] 99 %  BP: ()/(50-84) 100/55     Weight: (!) 195 kg (430 lb)  Body mass index is 56.73 kg/m².    Intake/Output Summary (Last 24 hours) at 9/25/2023 1051  Last data filed at 9/25/2023 0942  Gross per 24 hour   Intake 448.18 ml   Output 450 ml   Net -1.82 ml         Physical Exam  Vitals and nursing note reviewed.   Constitutional:       General: He is not in acute distress.     Appearance: He is obese. He is ill-appearing (chronically ill appearing).   Cardiovascular:      Rate and Rhythm: Regular rhythm. Bradycardia present.      Heart sounds: No murmur heard.     No friction rub. No gallop.   Pulmonary:      Effort: Pulmonary effort is normal.      Breath sounds: Normal breath sounds. No wheezing, rhonchi or rales.      Comments: On 2L NC O2   Abdominal:      General: Bowel sounds are normal. There is no distension.      Palpations: Abdomen is soft.      Tenderness: There is no abdominal tenderness. There is no guarding or rebound.   Musculoskeletal:      Right lower leg: Edema present.      Left lower leg: Edema present.   Skin:     Comments: Multiple wounds, chronic venous stasis changes to BLE    Neurological:      Mental Status: He is alert and oriented to person, place, and time. Mental status is at baseline.             Significant Labs: All pertinent labs within the past 24 hours have been reviewed.    Significant Imaging: I have reviewed all pertinent imaging results/findings within the past 24 hours.

## 2023-09-25 NOTE — CONSULTS
Spoke with [facility].  States [employer] has denied the Trilogy.  All [facility] stated was [employer] stated he did not meet criteria.  Request wife bring the [employer] denial so we can appeal decision.  Discussed with Dr. Hammond.

## 2023-09-25 NOTE — PLAN OF CARE
Nutrition Recommendations 9/25:  1. Recommend pt continue on Cardiac; Renal diet when medically apropriate  2. Recommend pt receives Suplena TID to assist filling nutritional gaps   3. Recommend pt recieves Jm BID x 2 weeks to assist with wound healing  4. Recommend Banatrol (Banana flakes) TID for diarrhea or BID for loose stools   5. Recommend continue antiemetic to assist with nausea  6. Encourage PO and supplement intake   7. Daily weights   8. Collaboration with other providers     Goals:   1. Pt diet order will be modified within x 4 days. (Resolved)   2. Pt will consume >50% of Jm prior to RD follow up.   3. Pt will consume >50% of EEN and EPN prior to RD follow up.  4. Pt's diarrhea/loose stools will improve by RD follow up   5. Pt's nausea will improve prior to RD follow up     Rena Kwong, Registration Eligible, Provisional LDN

## 2023-09-25 NOTE — PROGRESS NOTES
"[facility] - Telemetry ([facility])  [facility] Medicine  Progress Note    Patient Name: Gene Rothman  MRN: [id number]  Patient Class: IP- Inpatient   Admission Date: 9/20/2023  Length of Stay: 5 days  Attending Physician: Inna Hammond MD  Primary Care Provider: Ami Zarate DO        Subjective:     Principal Problem:Chronic respiratory failure with hypercapnia        HPI:  A 77-year-old gentleman, familiar to several hospital services, with a background of multiple chronic health issues, including COPD, sleep apnea, A fib, pulmonary hypertension, CHF, lymphedema, bed-bound status for two years, and chronic respiratory failure requiring home oxygen, was brought in by his wife on their 61st wedding anniversary due to an unusual and unresponsive behavior, which she believed was out of character for such a significant day. Upon arrival to the ED, he was placed on NIPPV, with subsequent improvement in his behavior, making him appear "more like himself" to his wife. It was later revealed that patient was non-compliant with his NIPPV, evident from a new machine provided during a previous admission. His blood pressure improved following administration of midodrine, but MAP was noticeably still low. Admitted to ICU for close monitoring.    During this admission, on 9/21, he experienced an episode of bradycardia with heart rates in the 30s but remained hemodynamically stable. By 9/22, he was alert and off NIPPV, tolerating nasal cannula oxygen without the need for pressors. Cardiology evaluated him, noting his heart rate was regular when awake and recommended ventilation support, avoidance of negative inotropic medications, and continuation of eliquis. Despite an elevated BNP suggestive of CHF exacerbation, his heart failure medication was temporarily held due to hypotension. He was deemed stable for transfer to a telemetry unit on 9/22/23. Hospital medicine consulted for assumption of care.      Overview/Hospital " Course:  09/22/2023: no family at bedside at the time of this encounter. Patient resting comfortably, but easily awaken. Still with flat affect and minimal cooperation with exam.   09/23/2023: Family at bedside this morning. Expressed concern regarding patient's CPAP that was given to them at last hospitalization will be taken away soon by insurance(?) Asked if there was a way for them to have the CPAP indefinitely. SW consulted.    09/24/2023: Discussed with SW. Patient's non-compliance with CPAP is likely issue insurance refusing coverage. Discussed case with family this morning. Reports having difficulties with machine at home the first day, but when they called the number they was given, no one answered since it was the weekend. Patient was then readmitted to the hospitals multiple times since that time, and family states that THIS is the reason for patient's non-compliance with CPAP.     09/25: Pt seen and examined with wife at bedside. Wife states that after prior discharge they tried to use CPAP machine but it was not working correctly and when they tried to call number they were given, there was no answer. Unclear if this was user error or machine error. Per nursing, wife took patient off CPAP multiple times overnight and was unable to place him back on it. Pt has no complaints this morning other than nausea. Denies vomiting.       Interval History: See Hospital Course     Review of Systems  Objective:     Vital Signs (Most Recent):  Temp: 97.6 °F (36.4 °C) (09/25/23 0808)  Pulse: (!) 57 (09/25/23 0810)  Resp: 18 (09/25/23 0808)  BP: (!) 100/55 (09/25/23 0810)  SpO2: 99 % (09/25/23 0810) Vital Signs (24h Range):  Temp:  [97.6 °F (36.4 °C)-98.8 °F (37.1 °C)] 97.6 °F (36.4 °C)  Pulse:  [47-64] 57  Resp:  [18-22] 18  SpO2:  [93 %-100 %] 99 %  BP: ()/(50-84) 100/55     Weight: (!) 195 kg (430 lb)  Body mass index is 56.73 kg/m².    Intake/Output Summary (Last 24 hours) at 9/25/2023 1056  Last data filed at  9/25/2023 0942  Gross per 24 hour   Intake 448.18 ml   Output 450 ml   Net -1.82 ml         Physical Exam  Vitals and nursing note reviewed.   Constitutional:       General: He is not in acute distress.     Appearance: He is obese. He is ill-appearing (chronically ill appearing).   Cardiovascular:      Rate and Rhythm: Regular rhythm. Bradycardia present.      Heart sounds: No murmur heard.     No friction rub. No gallop.   Pulmonary:      Effort: Pulmonary effort is normal.      Breath sounds: Normal breath sounds. No wheezing, rhonchi or rales.      Comments: On 2L NC O2   Abdominal:      General: Bowel sounds are normal. There is no distension.      Palpations: Abdomen is soft.      Tenderness: There is no abdominal tenderness. There is no guarding or rebound.   Musculoskeletal:      Right lower leg: Edema present.      Left lower leg: Edema present.   Skin:     Comments: Multiple wounds, chronic venous stasis changes to BLE    Neurological:      Mental Status: He is alert and oriented to person, place, and time. Mental status is at baseline.             Significant Labs: All pertinent labs within the past 24 hours have been reviewed.    Significant Imaging: I have reviewed all pertinent imaging results/findings within the past 24 hours.      Assessment/Plan:      * Chronic respiratory failure with hypercapnia  -- recurrent hospital admissions, likely due to noncompliance with home BIPAP/CPAP   --Pulmonology following: recommends cont NIPPV qhs and prn  - continue home NC O2; maintain sats > 90%   - Procal wnl, stop IV Rocephin as no S/S of resp infection at this time   - SW consulted to look into CPAP issues         Stage III pressure ulcer of sacral region  - POA   - management per wound care recs       Urinary retention  - discontinue bowman today   - monitor UOP       Bradycardia  --cards consulted; defer management   - Cardiology rec no indication for PPM placement at this time, recommend avoidance of neg  inotropic meds and continued resp support  - tele monitoring       Hypotension  - pt asymptomatic  - diuretics held   - continue midodrine, fludrocortisone       CHF (congestive heart failure)  --Last ECHO from 9/3/23 confirm EF 55 %  --acute on chronic BLE edema on exam, but no evidence of vascular congestion on CXR.  - Cardiology consulted and following   --holding diuretics due to low BP- defer resumption to Cards  --monitor electrolytes, UOP, fluid restrictions  --strict I&O and daily weights ordered    Venous thromboembolism  --hx of unprovoked  --on Eliquis, continued upon admission      GERD (gastroesophageal reflux disease)  --stable  --continue PPI therapy      Hyperlipidemia  --continue statin        Venous stasis dermatitis of both lower extremities  Diuretics held duet to low blood pressures  Continue wound care per wound care RN recommendations     CKD (chronic kidney disease) stage 3, GFR 30-59 ml/min  - Cr baseline approx 1 but has been closer to 2 over last month   - Renally dose meds; avoid nephrotoxic agents  - Monitor UOP   - discontinue bowman       Morbid obesity with BMI of 60.0-69.9, adult  Body mass index is 57.13 kg/m². Morbid obesity complicates all aspects of disease management from diagnostic modalities to treatment. Weight loss encouraged and health benefits explained to patient.         Hypothyroidism (acquired)  - TSH wnl   - continue home synthroid         VTE Risk Mitigation (From admission, onward)         Ordered     apixaban tablet 5 mg  2 times daily         09/20/23 1754     IP VTE HIGH RISK PATIENT  Once         09/20/23 1744     Place sequential compression device  Until discontinued         09/20/23 1744                Discharge Planning   JAMES:      Code Status: Full Code   Is the patient medically ready for discharge?: Yes    Reason for patient still in hospital (select all that apply): Patient trending condition and Pending dispositionpending home CPAP arrangements    Discharge Plan A: Home Health                  Inna Hammond MD  Department of Hospital Medicine   [facility] - Telemetry ([facility])

## 2023-09-25 NOTE — ASSESSMENT & PLAN NOTE
--Last ECHO from 9/3/23 confirm EF 55 %  --acute on chronic BLE edema on exam, but no evidence of vascular congestion on CXR.  - Cardiology consulted and following   --holding diuretics due to low BP- defer resumption to Cards  --monitor electrolytes, UOP, fluid restrictions  --strict I&O and daily weights ordered

## 2023-09-25 NOTE — ASSESSMENT & PLAN NOTE
-- recurrent hospital admissions, likely due to noncompliance with home BIPAP/CPAP   --Pulmonology following: recommends cont NIPPV qhs and prn  - continue home NC O2; maintain sats > 90%   - Procal wnl, stop IV Rocephin as no S/S of resp infection at this time   - SW consulted to look into CPAP issues

## 2023-09-25 NOTE — NURSING
Care of patient transferred from RICKEY Frazier to RICKEY Aguilera. Report given to RICKEY Aguilera. RICKEY Aguilera notified of remaining potassium bag to be given as well as when bowman was removed and that it is bleeding. RICKEY Aguilera and RICKEY Frazier went to bedside to visualize patient and notify him and his spouse of transfer of care. Patient in bed and has no further concerns at this time.

## 2023-09-25 NOTE — ASSESSMENT & PLAN NOTE
--cards consulted; defer management   - Cardiology rec no indication for PPM placement at this time, recommend avoidance of neg inotropic meds and continued resp support  - tele monitoring

## 2023-09-25 NOTE — ASSESSMENT & PLAN NOTE
Diuretics held duet to low blood pressures  Continue wound care per wound care RN recommendations

## 2023-09-25 NOTE — ASSESSMENT & PLAN NOTE
- Cr baseline approx 1 but has been closer to 2 over last month   - Renally dose meds; avoid nephrotoxic agents  - Monitor UOP   - discontinue bowman

## 2023-09-26 NOTE — PLAN OF CARE
A228/A228 RAZIA Rothman is a 77 y.o.male admitted on 9/20/2023 for Chronic respiratory failure with hypercapnia   Code Status: Full Code MRN: [id number]   Review of patient's allergies indicates:   Allergen Reactions    Sulfamethoxazole-trimethoprim Itching and Shortness Of Breath    Sulfamethoxazole      Past Medical History:   Diagnosis Date    Acute hypoxemic respiratory failure 9/17/2022    Acute pulmonary embolism     Arthritis     Atrial fibrillation 3/1/2023    Cellulitis 12/3/2021    CHF (congestive heart failure)     Chronic kidney disease     Chronic obstructive pulmonary disease with acute exacerbation     Coronary artery disease     Depression     Hyperlipidemia     Hypertension     Hypertensive heart disease with heart failure 9/16/2022    Hypothyroidism     Lymphedema of lower extremity     Nephrolithiasis     Obesity     Peripheral vascular disease     Pneumonia 9/17/2022    Recurrent cellulitis of lower leg     Sleep apnea     can't stand the CPAP , sleeps elevated in hospital bed or chair    Tobacco dependence     resolved      PRN meds    0.9%  NaCl infusion (for blood administration), , Q24H PRN  sodium chloride 0.9%, , PRN  influenza 65up-adj, 0.5 mL, vaccine x 1 dose  ondansetron, 4 mg, Q6H PRN  senna-docusate 8.6-50 mg, 1 tablet, Daily PRN  sodium chloride 0.9%, 10 mL, PRN      Pending insurance approval for triology. Medications given as ordered.Chart check completed. Will continue plan of care.      Orientation: oriented x 4  [place] Coma Scale Score: 15     Lead Monitored: Lead II Rhythm: normal sinus rhythm Frequency/Ectopy: rare, PVCs  Cardiac/Telemetry Box Number: [id number]  VTE Required Core Measure: Pharmacological prophylaxis initiated/maintained Last Bowel Movement: 09/26/23  Diet Cardiac [facility]; Renal; Fluid - 1200mL  Voiding Characteristics: incontinence  Chan Score: 13  Fall Risk Score: 15  Accucheck []   Freq?      Lines/Drains/Airways       Peripherally Inserted Central  Catheter Line  Duration             PICC Double Lumen 09/21/23 1530 right basilic 5 days

## 2023-09-26 NOTE — ASSESSMENT & PLAN NOTE
-- recurrent hospital admissions, likely due to noncompliance with home BIPAP/CPAP   --Pulmonology following: recommends cont NIPPV qhs and prn  - continue home NC O2; maintain sats > 90%   - Procal wnl, stop IV Rocephin as no S/S of resp infection at this time   - Per case management, pt was provided trilogy prior to last hospital discharge but was not approved by insurance. Pt wife asked to provide denial lettere

## 2023-09-26 NOTE — PHYSICIAN QUERY
PT Name: Gene Rothman  MR #: [id number]     DOCUMENTATION CLARIFICATION     CDS: Bayron Cruz RN CCDS               Contact information: [email or web address]      This form is a permanent document in the medical record.     Query Date: 2023    By submitting this query, we are merely seeking further clarification of documentation.  Please utilize your independent clinical judgment when addressing the question(s) below.  Provider, please provide the integumentary diagnosis related to the documentation of Left Calf:   The Medical Record contains the followin/20 H&P: Hx of COPD, OSAS / OHS / Bed bound for 2 years, Acute on chronic Hypercapnic Respiratory failure, Morbid obesity with BMI of 60.0-69.9, adult. He resist change in position and has acquired some wounds.    Nursing assessment: Chan risk score 13   Wound Care Consult:  Left lateral calf DTPI noted that measures 6x3cm, purple discoloration, defined edges, POA, Purple or maroon localized area of discolored intact skin or non-intact skin or a blood-filled blister. Multiple DTPIs noted caused by ACE bandages, cleansed with vashe and patted dry. Open wounds covered with aquacel ag advantage, and large foam dressings applied to cover all. Compression and ACE wraps avoided due to DTPIs. Recommendations made to primary team for wound care, moisture management, pressure injury prevention, speicalty LO bariatric bed (in use)   Nursing assessment: Chan risk score 15-16       The clinical guidelines noted are only a system guideline. It does not replace the providers clinical judgment.    Per the National Pressure Injury Advisory Panel:   A pressure injury is localized damage to the skin and underlying soft tissue usually over a bony prominence or related to a medical or other device. The injury can present as intact skin or an open ulcer and may be painful. The injury occurs as a result of intense and/or prolonged pressure or  pressure in combination with shear. The tolerance of soft tissue for pressure and shear may also be affected by microclimate, nutrition, perfusion, co-morbidities and condition of the soft tissue.       Stage 1 Pressure Injury:  Intact skin with a localized area of non-blanchable erythema, which may appear differently in darkly pigmented skin. Color changes do not include purple or maroon discoloration; these may indicate deep tissue pressure injury.    Stage 2 Pressure Injury:  Partial-thickness loss of skin with exposed dermis. The wound bed is viable, pink or red, moist, and may also present as an intact or ruptured serum-filled blister.    Stage 3 Pressure Injury:  Full-thickness loss of skin, in which adipose (fat) is visible in the ulcer and granulation tissue and epibole (rolled wound edges) are often present. Slough and/or eschar may be visible. Undermining and tunneling may occur.    Stage 4 Pressure Injury:  Full-thickness skin and tissue loss with exposed or directly palpable fascia, muscle, tendon, ligament, cartilage or bone in the ulcer. Slough and/or eschar may be visible. Epibole (rolled edges), undermining and/or tunneling often occur.    Unstageable Pressure Injury:  Full-thickness skin and tissue loss in which the extent of tissue damage within the ulcer cannot be confirmed because it is obscured by slough or eschar. If slough or eschar is removed, a Stage 3 or Stage 4 pressure injury will be revealed.    Deep Tissue Pressure Injury:  Intact or non-intact skin with localized area of persistent non-blanchable deep red, maroon, purple discoloration or epidermal separation revealing a dark wound bed or blood filled blister. This injury results from intense and/or prolonged pressure and shear forces at the bone-muscle interface. The wound may evolve rapidly to reveal the actual extent of tissue injury, or may resolve without tissue loss. If necrotic tissue, subcutaneous tissue, granulation tissue,  fascia, muscle or other underlying structures are visible, this indicates a full thickness pressure injury (Unstageable, Stage 3 or Stage 4). Do not use DTPI to describe vascular, traumatic, neuropathic, or dermatologic conditions.   Medical Device Related Pressure Injury: This describes an etiology. Medical device related pressure injuries result from the use of devices designed and applied for diagnostic or therapeutic purposes. The resultant pressure injury generally conforms to the pattern or shape of the device. The injury should be staged using the staging system.        Provider, please provide the integumentary diagnosis related to the documentation of Left Calf:     [ X  ] Deep Tissue Pressure Injury   [   ] Medical Device Related Deep Tissue Pressure Injury   [   ] Other Integumentary Diagnosis (please specify):______________       Please document in your progress notes daily for the duration of treatment until resolved and include in your discharge summary.    Reference:    LEONA Sanders., SHERRIE Chacon., Goldberg, M., TARAN Gramajo., TARAN Winter., & SALOME Lyles. (2016). Revised National Pressure Ulcer Advisory Panel Pressure Injury Staging System: Revised Pressure Injury Staging System. J Wound Ostomy Continence Nurs, 43(6), 585-597. doi:10.1097/[email or web address]    Form No.68249

## 2023-09-26 NOTE — ASSESSMENT & PLAN NOTE
Body mass index is 56.73 kg/m². Morbid obesity complicates all aspects of disease management from diagnostic modalities to treatment. Weight loss encouraged and health benefits explained to patient.

## 2023-09-26 NOTE — PHYSICIAN QUERY
PT Name: Gene Rothman  MR #: [id number]     DOCUMENTATION CLARIFICATION     CDS: Bayron Cruz RN CCDS               Contact information: [email or web address]      This form is a permanent document in the medical record.     Query Date: 2023    By submitting this query, we are merely seeking further clarification of documentation.  Please utilize your independent clinical judgment when addressing the question(s) below.  Provider, please provide the integumentary diagnosis related to the documentation of Left mid upper back:   The Medical Record contains the followin/20 H&P: Hx of COPD, OSAS / OHS / Bed bound for 2 years, Acute on chronic Hypercapnic Respiratory failure, Morbid obesity with BMI of 60.0-69.9, adult. He resist change in position and has acquired some wounds.   Nursing assessment: Chan risk score 13   Wound Care Consult:  Left mid upper back DTPIs noted with multiple linear purple discolorations, foam dressings applied. POA. Purple or maroon localized area of discolored intact skin or non-intact skin or a blood-filled blister. Recommendations made to primary team for wound care, moisture management, pressure injury prevention, speicalty LO bariatric bed (in use)      Nursing assessment: Chan risk score 15-16       The clinical guidelines noted are only a system guideline. It does not replace the providers clinical judgment.    Per the National Pressure Injury Advisory Panel:   A pressure injury is localized damage to the skin and underlying soft tissue usually over a bony prominence or related to a medical or other device. The injury can present as intact skin or an open ulcer and may be painful. The injury occurs as a result of intense and/or prolonged pressure or pressure in combination with shear. The tolerance of soft tissue for pressure and shear may also be affected by microclimate, nutrition, perfusion, co-morbidities and condition of the soft tissue.        Stage 1 Pressure Injury:  Intact skin with a localized area of non-blanchable erythema, which may appear differently in darkly pigmented skin. Color changes do not include purple or maroon discoloration; these may indicate deep tissue pressure injury.    Stage 2 Pressure Injury:  Partial-thickness loss of skin with exposed dermis. The wound bed is viable, pink or red, moist, and may also present as an intact or ruptured serum-filled blister.    Stage 3 Pressure Injury:  Full-thickness loss of skin, in which adipose (fat) is visible in the ulcer and granulation tissue and epibole (rolled wound edges) are often present. Slough and/or eschar may be visible. Undermining and tunneling may occur.    Stage 4 Pressure Injury:  Full-thickness skin and tissue loss with exposed or directly palpable fascia, muscle, tendon, ligament, cartilage or bone in the ulcer. Slough and/or eschar may be visible. Epibole (rolled edges), undermining and/or tunneling often occur.    Unstageable Pressure Injury:  Full-thickness skin and tissue loss in which the extent of tissue damage within the ulcer cannot be confirmed because it is obscured by slough or eschar. If slough or eschar is removed, a Stage 3 or Stage 4 pressure injury will be revealed.    Deep Tissue Pressure Injury:  Intact or non-intact skin with localized area of persistent non-blanchable deep red, maroon, purple discoloration or epidermal separation revealing a dark wound bed or blood filled blister. This injury results from intense and/or prolonged pressure and shear forces at the bone-muscle interface. The wound may evolve rapidly to reveal the actual extent of tissue injury, or may resolve without tissue loss. If necrotic tissue, subcutaneous tissue, granulation tissue, fascia, muscle or other underlying structures are visible, this indicates a full thickness pressure injury (Unstageable, Stage 3 or Stage 4). Do not use DTPI to describe vascular, traumatic,  neuropathic, or dermatologic conditions.       Provider, please provide the integumentary diagnosis related to the documentation of Left mid upper back:     [  X ] Deep Tissue Pressure Injury   [   ] Other Integumentary Diagnosis (please specify):______________       Please document in your progress notes daily for the duration of treatment until resolved and include in your discharge summary.    Reference:    LEONA Sanders., SHERRIE Chacon., Goldberg, M., LEONA Gramajo, LEONA Winter, & SALOME Lyles. (2016). Revised National Pressure Ulcer Advisory Panel Pressure Injury Staging System: Revised Pressure Injury Staging System. J Wound Ostomy Continence Nurs, 43(6), 585-597. doi:10.1097/won.[id number]    Form No.[id number]

## 2023-09-26 NOTE — PHYSICIAN QUERY
PT Name: Gene Rothman  MR #: [id number]     DOCUMENTATION CLARIFICATION     CDS: Bayron Cruz RN CCDS               Contact information: [email or web address]    This form is a permanent document in the medical record.     Query Date: 2023    By submitting this query, we are merely seeking further clarification of documentation.  Please utilize your independent clinical judgment when addressing the question(s) below.  Provider, please provide the integumentary diagnosis related to the documentation of Right Foot:   The Medical Record contains the followin/20 H&P: Hx of COPD, OSAS / OHS / Bed bound for 2 years, Acute on chronic Hypercapnic Respiratory failure, Morbid obesity with BMI of 60.0-69.9, adult. He resist change in position and has acquired some wounds.   Nursing assessment: Chan risk score 13   Wound Care Consult:  Right lateral foot evolving DTPI noted with partial thickness itssue loss, measures 4x2x0.1cm, POA, Purple or maroon localized area of discolored intact skin or non-intact skin or a blood-filled blister. Multiple DTPIs noted caused by ACE bandages, cleansed with vashe and patted dry. Open wounds covered with aquacel ag advantage, and large foam dressings applied to cover all. Compression and ACE wraps avoided due to DTPIs. Recommendations made to primary team for wound care, moisture management, pressure injury prevention, speicalty LO bariatric bed (in use)    Nursing assessment: Chan risk score 15-16       The clinical guidelines noted are only a system guideline. It does not replace the providers clinical judgment.    Per the National Pressure Injury Advisory Panel:   A pressure injury is localized damage to the skin and underlying soft tissue usually over a bony prominence or related to a medical or other device. The injury can present as intact skin or an open ulcer and may be painful. The injury occurs as a result of intense and/or prolonged  pressure or pressure in combination with shear. The tolerance of soft tissue for pressure and shear may also be affected by microclimate, nutrition, perfusion, co-morbidities and condition of the soft tissue.       Stage 1 Pressure Injury:  Intact skin with a localized area of non-blanchable erythema, which may appear differently in darkly pigmented skin. Color changes do not include purple or maroon discoloration; these may indicate deep tissue pressure injury.    Stage 2 Pressure Injury:  Partial-thickness loss of skin with exposed dermis. The wound bed is viable, pink or red, moist, and may also present as an intact or ruptured serum-filled blister.    Stage 3 Pressure Injury:  Full-thickness loss of skin, in which adipose (fat) is visible in the ulcer and granulation tissue and epibole (rolled wound edges) are often present. Slough and/or eschar may be visible. Undermining and tunneling may occur.    Stage 4 Pressure Injury:  Full-thickness skin and tissue loss with exposed or directly palpable fascia, muscle, tendon, ligament, cartilage or bone in the ulcer. Slough and/or eschar may be visible. Epibole (rolled edges), undermining and/or tunneling often occur.    Unstageable Pressure Injury:  Full-thickness skin and tissue loss in which the extent of tissue damage within the ulcer cannot be confirmed because it is obscured by slough or eschar. If slough or eschar is removed, a Stage 3 or Stage 4 pressure injury will be revealed.    Deep Tissue Pressure Injury:  Intact or non-intact skin with localized area of persistent non-blanchable deep red, maroon, purple discoloration or epidermal separation revealing a dark wound bed or blood filled blister. This injury results from intense and/or prolonged pressure and shear forces at the bone-muscle interface. The wound may evolve rapidly to reveal the actual extent of tissue injury, or may resolve without tissue loss. If necrotic tissue, subcutaneous tissue,  granulation tissue, fascia, muscle or other underlying structures are visible, this indicates a full thickness pressure injury (Unstageable, Stage 3 or Stage 4). Do not use DTPI to describe vascular, traumatic, neuropathic, or dermatologic conditions.   Medical Device Related Pressure Injury: This describes an etiology. Medical device related pressure injuries result from the use of devices designed and applied for diagnostic or therapeutic purposes. The resultant pressure injury generally conforms to the pattern or shape of the device. The injury should be staged using the staging system.        Provider, please provide the integumentary diagnosis related to the documentation of Right Foot:     [   ] Deep Tissue Pressure Injury   [   X] Deep Tissue Pressure Injury that evolved into a Pressure Injury/Decubitus Ulcer Stage 2   [   ] Medical Device Related Deep Tissue Pressure Injury   [   ] Medical Device Related Deep Tissue Pressure Injury that evolved into a Pressure Injury/Decubitus Ulcer Stage 2   [   ] Other Integumentary Diagnosis (please specify):______________       Please document in your progress notes daily for the duration of treatment until resolved and include in your discharge summary.    Reference:    LEONA Sanders., Oswaldo, SHERRIE MCMAHON., Goldberg, M., TARAN Gramajo., TARAN Winter., & SALOME Lyles. (2016). Revised National Pressure Ulcer Advisory Panel Pressure Injury Staging System: Revised Pressure Injury Staging System. J Wound Ostomy Continence Nurs, 43(6), 585-597. doi:10.1097/won.5688166065344419    Form No.[id number]

## 2023-09-26 NOTE — PHYSICIAN QUERY
PT Name: Gene Rothman  MR #: [id number]     DOCUMENTATION CLARIFICATION     CDS: Bayron Cruz RN CCDS               Contact information: [email or web address]    This form is a permanent document in the medical record.     Query Date: 2023    By submitting this query, we are merely seeking further clarification of documentation.  Please utilize your independent clinical judgment when addressing the question(s) below.  Provider, please provide the integumentary diagnosis related to the documentation of Left Hip:   The Medical Record contains the followin/20 H&P: Hx of COPD, OSAS / OHS / Bed bound for 2 years, Acute on chronic Hypercapnic Respiratory failure, Morbid obesity with BMI of 60.0-69.9, adult. He resist change in position and has acquired some wounds.   Nursing assessment: Chan risk score 13   Wound Care Consult:  Left lateral hip stage 2 PI noted. POA. Partial thickness tissue loss. Shallow open ulcer with a red or pink wound bed, without slough. Intact or Open/Ruptured Serum-filled blister. cleansed with vashe and patted dry. cleansed with vashe and foam dressing applied. Recommendations made to primary team for wound care, moisture management, pressure injury prevention, speicalty LO bariatric bed (in use)    Nursing assessment: Chan risk score 15-16       The clinical guidelines noted are only a system guideline. It does not replace the providers clinical judgment.    Per the National Pressure Injury Advisory Panel:   A pressure injury is localized damage to the skin and underlying soft tissue usually over a bony prominence or related to a medical or other device. The injury can present as intact skin or an open ulcer and may be painful. The injury occurs as a result of intense and/or prolonged pressure or pressure in combination with shear. The tolerance of soft tissue for pressure and shear may also be affected by microclimate, nutrition, perfusion,  co-morbidities and condition of the soft tissue.       Stage 1 Pressure Injury:  Intact skin with a localized area of non-blanchable erythema, which may appear differently in darkly pigmented skin. Color changes do not include purple or maroon discoloration; these may indicate deep tissue pressure injury.    Stage 2 Pressure Injury:  Partial-thickness loss of skin with exposed dermis. The wound bed is viable, pink or red, moist, and may also present as an intact or ruptured serum-filled blister.    Stage 3 Pressure Injury:  Full-thickness loss of skin, in which adipose (fat) is visible in the ulcer and granulation tissue and epibole (rolled wound edges) are often present. Slough and/or eschar may be visible. Undermining and tunneling may occur.    Stage 4 Pressure Injury:  Full-thickness skin and tissue loss with exposed or directly palpable fascia, muscle, tendon, ligament, cartilage or bone in the ulcer. Slough and/or eschar may be visible. Epibole (rolled edges), undermining and/or tunneling often occur.    Unstageable Pressure Injury:  Full-thickness skin and tissue loss in which the extent of tissue damage within the ulcer cannot be confirmed because it is obscured by slough or eschar. If slough or eschar is removed, a Stage 3 or Stage 4 pressure injury will be revealed.    Deep Tissue Pressure Injury:  Intact or non-intact skin with localized area of persistent non-blanchable deep red, maroon, purple discoloration or epidermal separation revealing a dark wound bed or blood filled blister. This injury results from intense and/or prolonged pressure and shear forces at the bone-muscle interface. The wound may evolve rapidly to reveal the actual extent of tissue injury, or may resolve without tissue loss. If necrotic tissue, subcutaneous tissue, granulation tissue, fascia, muscle or other underlying structures are visible, this indicates a full thickness pressure injury (Unstageable, Stage 3 or Stage 4). Do not  use DTPI to describe vascular, traumatic, neuropathic, or dermatologic conditions.       Provider, please provide the integumentary diagnosis related to the documentation of Left Hip:     [   X] Pressure Injury/Decubitus Ulcer, Stage 2   [   ] Other Integumentary Diagnosis (please specify):______________       Please document in your progress notes daily for the duration of treatment until resolved and include in your discharge summary.    Reference:    LEONA Sanders., SHERRIE Chacon., Goldberg, M., LEONA Gramajo, LEONA Winter, & SALOME Lyles. (2016). Revised National Pressure Ulcer Advisory Panel Pressure Injury Staging System: Revised Pressure Injury Staging System. J Wound Ostomy Continence Nurs, 43(6), 585-597. doi:10.1097/won.9363292532301877    Form No.[id number]

## 2023-09-26 NOTE — ASSESSMENT & PLAN NOTE
- Cr baseline approx 1 but has been closer to 2 over last month   - Renally dose meds; avoid nephrotoxic agents  - Monitor UOP   - bowman discontinued on 09/25

## 2023-09-26 NOTE — PLAN OF CARE
Contacted [facility] respiratory department.  States [facility]  declined due to not being medically necessary.    Secure chat to Mariangel Marsha requesting assistance with getting the denial letter with appeals process.  Patient's wife is not here.     1:50pm  Spoke with the patient's wife.  She state she never received a denial letter.  She contacted [facility] and gave them 's phone number to call and fax denial letter.  No phone call received from [facility].    2:00pm Followed up with Mariangel.  States they did not receive a letter; only a fax stating not medically necessary and phone number [phone] to follow up.    Contacted above number which is the provider line.  After several transfers, spoke to [facility] representative who states will fax denial letter to [phone].    3:30pm [facility] denial letter received.  Dr. Hammond reviewed.  Spoke to patient's wife.  Advised Ela  will work with her on sending in a letter of appeal.  Advised patient's wife that she will need to assign Dr. Hammond to write the appeal letter.  Ela will help her notify [facility].

## 2023-09-26 NOTE — SUBJECTIVE & OBJECTIVE
Interval History: See Hospital Course     Review of Systems  Objective:     Vital Signs (Most Recent):  Temp: 97.6 °F (36.4 °C) (09/26/23 0713)  Pulse: 61 (09/26/23 0741)  Resp: 19 (09/26/23 0741)  BP: (!) 100/50 (09/26/23 0713)  SpO2: 95 % (09/26/23 0741) Vital Signs (24h Range):  Temp:  [97.6 °F (36.4 °C)-98.4 °F (36.9 °C)] 97.6 °F (36.4 °C)  Pulse:  [55-63] 61  Resp:  [17-20] 19  SpO2:  [91 %-100 %] 95 %  BP: ()/(41-50) 100/50     Weight: (!) 195 kg (430 lb)  Body mass index is 56.73 kg/m².    Intake/Output Summary (Last 24 hours) at 9/26/2023 1017  Last data filed at 9/25/2023 1130  Gross per 24 hour   Intake 153.37 ml   Output --   Net 153.37 ml         Physical Exam  Vitals and nursing note reviewed.   Constitutional:       General: He is not in acute distress.     Appearance: He is obese. Ill appearance: chronic.      Comments: Hard of hearing   Cardiovascular:      Rate and Rhythm: Normal rate and regular rhythm.      Heart sounds: No murmur heard.     No friction rub. No gallop.   Pulmonary:      Effort: Pulmonary effort is normal.      Breath sounds: Normal breath sounds. No wheezing, rhonchi or rales.      Comments: 2L NC O2   Abdominal:      General: Bowel sounds are normal. There is no distension.      Palpations: Abdomen is soft.      Tenderness: There is no abdominal tenderness. There is no guarding or rebound.   Musculoskeletal:      Right lower leg: Edema present.      Left lower leg: Edema present.   Skin:     Comments: Venous stasis wounds to BLE    Neurological:      Mental Status: He is alert and oriented to person, place, and time. Mental status is at baseline.             Significant Labs: All pertinent labs within the past 24 hours have been reviewed.    Significant Imaging: I have reviewed all pertinent imaging results/findings within the past 24 hours.

## 2023-09-26 NOTE — PHYSICIAN QUERY
PT Name: Gene Rothman  MR #: [id number]     DOCUMENTATION CLARIFICATION     CDS: Bayron Cruz RN CCDS               Contact information: [email or web address]      This form is a permanent document in the medical record.     Query Date: 2023    By submitting this query, we are merely seeking further clarification of documentation.  Please utilize your independent clinical judgment when addressing the question(s) below.  Provider, please provide the integumentary diagnosis related to the documentation of Right Thigh:   The Medical Record contains the followin/20 H&P: Hx of COPD, OSAS / OHS / Bed bound for 2 years, Acute on chronic Hypercapnic Respiratory failure, Morbid obesity with BMI of 60.0-69.9, adult. He resist change in position and has acquired some wounds.   Nursing assessment: Chan risk score 13   Wound Care Consult:  Right medial thigh DTPI measures 1x3cm, purple discoloration, moisture barrier paste in use. Purple or maroon localized area of discolored intact skin or non-intact skin or a blood-filled blister. POA. Recommendations made to primary team for wound care, moisture management, pressure injury prevention, speicalty LO bariatric bed (in use)   Nursing assessment: Chan risk score 15-16       The clinical guidelines noted are only a system guideline. It does not replace the providers clinical judgment.    Per the National Pressure Injury Advisory Panel:   A pressure injury is localized damage to the skin and underlying soft tissue usually over a bony prominence or related to a medical or other device. The injury can present as intact skin or an open ulcer and may be painful. The injury occurs as a result of intense and/or prolonged pressure or pressure in combination with shear. The tolerance of soft tissue for pressure and shear may also be affected by microclimate, nutrition, perfusion, co-morbidities and condition of the soft tissue.       Stage 1 Pressure  Injury:  Intact skin with a localized area of non-blanchable erythema, which may appear differently in darkly pigmented skin. Color changes do not include purple or maroon discoloration; these may indicate deep tissue pressure injury.    Stage 2 Pressure Injury:  Partial-thickness loss of skin with exposed dermis. The wound bed is viable, pink or red, moist, and may also present as an intact or ruptured serum-filled blister.    Stage 3 Pressure Injury:  Full-thickness loss of skin, in which adipose (fat) is visible in the ulcer and granulation tissue and epibole (rolled wound edges) are often present. Slough and/or eschar may be visible. Undermining and tunneling may occur.    Stage 4 Pressure Injury:  Full-thickness skin and tissue loss with exposed or directly palpable fascia, muscle, tendon, ligament, cartilage or bone in the ulcer. Slough and/or eschar may be visible. Epibole (rolled edges), undermining and/or tunneling often occur.    Unstageable Pressure Injury:  Full-thickness skin and tissue loss in which the extent of tissue damage within the ulcer cannot be confirmed because it is obscured by slough or eschar. If slough or eschar is removed, a Stage 3 or Stage 4 pressure injury will be revealed.    Deep Tissue Pressure Injury:  Intact or non-intact skin with localized area of persistent non-blanchable deep red, maroon, purple discoloration or epidermal separation revealing a dark wound bed or blood filled blister. This injury results from intense and/or prolonged pressure and shear forces at the bone-muscle interface. The wound may evolve rapidly to reveal the actual extent of tissue injury, or may resolve without tissue loss. If necrotic tissue, subcutaneous tissue, granulation tissue, fascia, muscle or other underlying structures are visible, this indicates a full thickness pressure injury (Unstageable, Stage 3 or Stage 4). Do not use DTPI to describe vascular, traumatic, neuropathic, or dermatologic  conditions.       Provider, please provide the integumentary diagnosis related to the documentation of Right Thigh:     [  X ] Deep Tissue Pressure Injury   [   ] Other Integumentary Diagnosis (please specify):______________       Please document in your progress notes daily for the duration of treatment until resolved and include in your discharge summary.    Reference:    LEONA Sanders., SHERRIE Chacon., Goldberg, M., LEONA Gramajo, LEONA Winter, & SALOME Lyles. (2016). Revised National Pressure Ulcer Advisory Panel Pressure Injury Staging System: Revised Pressure Injury Staging System. J Wound Ostomy Continence Nurs, 43(6), 585-597. doi:10.1097/won.[id number]    Form No.[id number]

## 2023-09-26 NOTE — PHYSICIAN QUERY
PT Name: Gene Rothman  MR #: [id number]     DOCUMENTATION CLARIFICATION     CDS: Bayron Cruz RN CCDS               Contact information: [email or web address]    This form is a permanent document in the medical record.     Query Date: 2023    By submitting this query, we are merely seeking further clarification of documentation.  Please utilize your independent clinical judgment when addressing the question(s) below.  Provider, please provide the integumentary diagnosis related to the documentation of Right Ankle:   The Medical Record contains the followin/20 H&P: Hx of COPD, OSAS / OHS / Bed bound for 2 years, Acute on chronic Hypercapnic Respiratory failure, Morbid obesity with BMI of 60.0-69.9, adult. He resist change in position and has acquired some wounds.   Nursing assessment: Chan risk score 13   Wound Care Consult:  Right posteriorlateral ankle DTPI noted that measures 4x12cm, purple discoloration. POA. Purple or maroon localized area of discolored intact skin or non-intact skin or a blood-filled blister. Multiple DTPIs noted caused by ACE bandages, cleansed with vashe and patted dry. Open wounds covered with aquacel ag advantage, and large foam dressings applied to cover all. Compression and ACE wraps avoided due to DTPIs. Recommendations made to primary team for wound care, moisture management, pressure injury prevention, speicalty LO bariatric bed (in use)    Nursing assessment: Chan risk score 15-16       The clinical guidelines noted are only a system guideline. It does not replace the providers clinical judgment.    Per the National Pressure Injury Advisory Panel:   A pressure injury is localized damage to the skin and underlying soft tissue usually over a bony prominence or related to a medical or other device. The injury can present as intact skin or an open ulcer and may be painful. The injury occurs as a result of intense and/or prolonged pressure or  pressure in combination with shear. The tolerance of soft tissue for pressure and shear may also be affected by microclimate, nutrition, perfusion, co-morbidities and condition of the soft tissue.       Stage 1 Pressure Injury:  Intact skin with a localized area of non-blanchable erythema, which may appear differently in darkly pigmented skin. Color changes do not include purple or maroon discoloration; these may indicate deep tissue pressure injury.    Stage 2 Pressure Injury:  Partial-thickness loss of skin with exposed dermis. The wound bed is viable, pink or red, moist, and may also present as an intact or ruptured serum-filled blister.    Stage 3 Pressure Injury:  Full-thickness loss of skin, in which adipose (fat) is visible in the ulcer and granulation tissue and epibole (rolled wound edges) are often present. Slough and/or eschar may be visible. Undermining and tunneling may occur.    Stage 4 Pressure Injury:  Full-thickness skin and tissue loss with exposed or directly palpable fascia, muscle, tendon, ligament, cartilage or bone in the ulcer. Slough and/or eschar may be visible. Epibole (rolled edges), undermining and/or tunneling often occur.    Unstageable Pressure Injury:  Full-thickness skin and tissue loss in which the extent of tissue damage within the ulcer cannot be confirmed because it is obscured by slough or eschar. If slough or eschar is removed, a Stage 3 or Stage 4 pressure injury will be revealed.    Deep Tissue Pressure Injury:  Intact or non-intact skin with localized area of persistent non-blanchable deep red, maroon, purple discoloration or epidermal separation revealing a dark wound bed or blood filled blister. This injury results from intense and/or prolonged pressure and shear forces at the bone-muscle interface. The wound may evolve rapidly to reveal the actual extent of tissue injury, or may resolve without tissue loss. If necrotic tissue, subcutaneous tissue, granulation tissue,  fascia, muscle or other underlying structures are visible, this indicates a full thickness pressure injury (Unstageable, Stage 3 or Stage 4). Do not use DTPI to describe vascular, traumatic, neuropathic, or dermatologic conditions.   Medical Device Related Pressure Injury: This describes an etiology. Medical device related pressure injuries result from the use of devices designed and applied for diagnostic or therapeutic purposes. The resultant pressure injury generally conforms to the pattern or shape of the device. The injury should be staged using the staging system.        Provider, please provide the integumentary diagnosis related to the documentation of Right Ankle:     [X   ] Deep Tissue Pressure Injury   [   ] Medical Device Related Deep Tissue Pressure Injury   [   ] Other Integumentary Diagnosis (please specify):______________       Please document in your progress notes daily for the duration of treatment until resolved and include in your discharge summary.    Reference:    LEONA Sanders., SHERRIE Chacon., Goldberg, M., TARAN Gramajo., TARAN Winter., & SALOME Lyles. (2016). Revised National Pressure Ulcer Advisory Panel Pressure Injury Staging System: Revised Pressure Injury Staging System. J Wound Ostomy Continence Nurs, 43(6), 585-597. doi:10.1097/won.3887898264027226    Form No.[id number]

## 2023-09-27 PROBLEM — D64.9 ACUTE ON CHRONIC ANEMIA: Status: ACTIVE | Noted: 2023-01-01

## 2023-09-27 NOTE — PLAN OF CARE
A228/A228 RAZIA Rothman is a 77 y.o.male admitted on 9/20/2023 for Chronic respiratory failure with hypercapnia   Code Status: Full Code MRN: [id number]   Review of patient's allergies indicates:   Allergen Reactions    Sulfamethoxazole-trimethoprim Itching and Shortness Of Breath    Sulfamethoxazole      Past Medical History:   Diagnosis Date    Acute hypoxemic respiratory failure 9/17/2022    Acute on chronic anemia 9/27/2023    Acute pulmonary embolism     Arthritis     Atrial fibrillation 3/1/2023    Cellulitis 12/3/2021    CHF (congestive heart failure)     Chronic kidney disease     Chronic obstructive pulmonary disease with acute exacerbation     Coronary artery disease     Depression     Hyperlipidemia     Hypertension     Hypertensive heart disease with heart failure 9/16/2022    Hypothyroidism     Lymphedema of lower extremity     Nephrolithiasis     Obesity     Peripheral vascular disease     Pneumonia 9/17/2022    Recurrent cellulitis of lower leg     Sleep apnea     can't stand the CPAP , sleeps elevated in hospital bed or chair    Tobacco dependence     resolved      PRN meds    0.9%  NaCl infusion (for blood administration), , Q24H PRN  0.9%  NaCl infusion (for blood administration), , Q24H PRN  sodium chloride 0.9%, , PRN  influenza 65up-adj, 0.5 mL, vaccine x 1 dose  ondansetron, 4 mg, Q6H PRN  senna-docusate 8.6-50 mg, 1 tablet, Daily PRN  sodium chloride 0.9%, 10 mL, PRN      Medications given as ordered. Monitoring labs and blood pressure. One unit of blood transfused, second unit infusing. Chart check completed. Will continue plan of care.      Orientation: oriented x 4  Coral Coma Scale Score: 15     Lead Monitored: Lead II Rhythm: normal sinus rhythm Frequency/Ectopy: rare, PVCs  Cardiac/Telemetry Box Number: [id number]  VTE Required Core Measure: Pharmacological prophylaxis initiated/maintained Last Bowel Movement: 09/27/23  Diet [facility]; Renal; Fluid - 1200mL  Voiding  Characteristics: incontinence  Chan Score: 13  Fall Risk Score: 15  Accucheck []   Freq?      Lines/Drains/Airways       Peripherally Inserted Central Catheter Line  Duration             PICC Double Lumen 09/21/23 1530 right basilic 6 days

## 2023-09-27 NOTE — PLAN OF CARE
EMA called [facility] in room with patient/wife and Trilogy denial letter to discuss appeal process. Patient would like to appoint Dr Hammond as his representative for a fast appeal. Paperwork signed and faxed to Cherelle (fax: [phone]) along with supporting clinicals.    MD to call and request fast appeal at [phone], option #3. Appeal  process will take 24-72 business hours per Brandy Mckay.    1132: fax confirmation received.

## 2023-09-27 NOTE — ASSESSMENT & PLAN NOTE
-- recurrent hospital admissions, likely due to noncompliance with home BIPAP/CPAP   --Pulmonology following: recommends cont NIPPV qhs and prn  - continue home NC O2; maintain sats > 90%   - Procal wnl, stop IV Rocephin as no S/S of resp infection at this time   - Per case management, pt was provided trilogy prior to last hospital discharge but was not approved by insurance. Plan to appeal denial

## 2023-09-27 NOTE — PLAN OF CARE
Problem: Adult Inpatient Plan of Care  Goal: Plan of Care Review  Outcome: Ongoing, Progressing  Goal: Optimal Comfort and Wellbeing  Outcome: Ongoing, Progressing     Problem: Bariatric Environmental Safety  Goal: Safety Maintained with Care  Outcome: Ongoing, Progressing     Problem: Fluid and Electrolyte Imbalance (Acute Kidney Injury/Impairment)  Goal: Fluid and Electrolyte Balance  Outcome: Ongoing, Progressing     Problem: Renal Function Impairment (Acute Kidney Injury/Impairment)  Goal: Effective Renal Function  Outcome: Ongoing, Progressing     Problem: Fall Injury Risk  Goal: Absence of Fall and Fall-Related Injury  Outcome: Ongoing, Progressing     Problem: Infection  Goal: Absence of Infection Signs and Symptoms  Outcome: Ongoing, Progressing     Chart check completed.

## 2023-09-27 NOTE — PROGRESS NOTES
"[facility] - Telemetry ([facility])  [facility] Medicine  Progress Note    Patient Name: Gene Rothman  MRN: [id number]  Patient Class: IP- Inpatient   Admission Date: 9/20/2023  Length of Stay: 7 days  Attending Physician: Inna Hammond MD  Primary Care Provider: Ami Zarate DO        Subjective:     Principal Problem:Chronic respiratory failure with hypercapnia        HPI:  A 77-year-old gentleman, familiar to several hospital services, with a background of multiple chronic health issues, including COPD, sleep apnea, A fib, pulmonary hypertension, CHF, lymphedema, bed-bound status for two years, and chronic respiratory failure requiring home oxygen, was brought in by his wife on their 61st wedding anniversary due to an unusual and unresponsive behavior, which she believed was out of character for such a significant day. Upon arrival to the ED, he was placed on NIPPV, with subsequent improvement in his behavior, making him appear "more like himself" to his wife. It was later revealed that patient was non-compliant with his NIPPV, evident from a new machine provided during a previous admission. His blood pressure improved following administration of midodrine, but MAP was noticeably still low. Admitted to ICU for close monitoring.    During this admission, on 9/21, he experienced an episode of bradycardia with heart rates in the 30s but remained hemodynamically stable. By 9/22, he was alert and off NIPPV, tolerating nasal cannula oxygen without the need for pressors. Cardiology evaluated him, noting his heart rate was regular when awake and recommended ventilation support, avoidance of negative inotropic medications, and continuation of eliquis. Despite an elevated BNP suggestive of CHF exacerbation, his heart failure medication was temporarily held due to hypotension. He was deemed stable for transfer to a telemetry unit on 9/22/23. Hospital medicine consulted for assumption of care.      Overview/Hospital " Course:  09/22/2023: no family at bedside at the time of this encounter. Patient resting comfortably, but easily awaken. Still with flat affect and minimal cooperation with exam.   09/23/2023: Family at bedside this morning. Expressed concern regarding patient's CPAP that was given to them at last hospitalization will be taken away soon by insurance(?) Asked if there was a way for them to have the CPAP indefinitely. SW consulted.    09/24/2023: Discussed with SW. Patient's non-compliance with CPAP is likely issue insurance refusing coverage. Discussed case with family this morning. Reports having difficulties with machine at home the first day, but when they called the number they was given, no one answered since it was the weekend. Patient was then readmitted to the hospitals multiple times since that time, and family states that THIS is the reason for patient's non-compliance with CPAP.     09/25: Pt seen and examined with wife at bedside. Wife states that after prior discharge they tried to use CPAP machine but it was not working correctly and when they tried to call number they were given, there was no answer. Unclear if this was user error or machine error. Per nursing, wife took patient off CPAP multiple times overnight and was unable to place him back on it. Pt has no complaints this morning other than nausea. Denies vomiting.     09/26: Per case management, pt was sent home with [facility] on last discharge but was not approved by insurance at the time. Have requested wife to bring in insurance denial letter so that denial can be appealed. NAYOUON. PT did not wear BIPAP due to nausea. Reports continued nausea this AM, no vomiting. + flatus, no BM. No abd pain, CP, SOB.     09/27: Pt seen with wife and RN at bedside. BP remains low, pt asymptomatic. Transfusing 2u PRBC for Hgb 6.4. No overt s/s of bleeding. Did not wear BIPAP last night. Denies vomiting, abd pain, CP, SOB, cough. Was able to have BM yesterday        Interval History: See Hospital Course     Review of Systems  Objective:     Vital Signs (Most Recent):  Temp: 98.8 °F (37.1 °C) (09/27/23 0752)  Pulse: 63 (09/27/23 0800)  Resp: 18 (09/27/23 0800)  BP: (!) 85/54 (09/27/23 0752)  SpO2: 97 % (09/27/23 0800) Vital Signs (24h Range):  Temp:  [97.4 °F (36.3 °C)-98.8 °F (37.1 °C)] 98.8 °F (37.1 °C)  Pulse:  [61-65] 63  Resp:  [16-20] 18  SpO2:  [96 %-100 %] 97 %  BP: (77-92)/(36-54) 85/54     Weight: (!) 195 kg (430 lb)  Body mass index is 56.73 kg/m².    Intake/Output Summary (Last 24 hours) at 9/27/2023 1124  Last data filed at 9/26/2023 1645  Gross per 24 hour   Intake 141.98 ml   Output --   Net 141.98 ml         Physical Exam  Constitutional:       General: He is not in acute distress.     Appearance: He is obese. Ill appearance: chronically ill appearing.   Cardiovascular:      Rate and Rhythm: Normal rate and regular rhythm.      Heart sounds: No murmur heard.     No friction rub. No gallop.   Pulmonary:      Effort: Pulmonary effort is normal.      Breath sounds: Normal breath sounds. No wheezing, rhonchi or rales.      Comments: 2L NC O2   Abdominal:      General: Bowel sounds are normal. There is no distension.      Palpations: Abdomen is soft.      Tenderness: There is no abdominal tenderness.   Musculoskeletal:      Right lower leg: Edema present.      Left lower leg: Edema present.   Skin:     General: Skin is warm and dry.      Comments: BLE wounds    Neurological:      Mental Status: He is alert.      Comments: Hard of hearing, oriented to self, place, situation              Significant Labs: All pertinent labs within the past 24 hours have been reviewed.    Significant Imaging: I have reviewed all pertinent imaging results/findings within the past 24 hours.      Assessment/Plan:      * Chronic respiratory failure with hypercapnia  -- recurrent hospital admissions, likely due to noncompliance with home BIPAP/CPAP   --Pulmonology following: recommends  cont NIPPV qhs and prn  - continue home NC O2; maintain sats > 90%   - Procal wnl, stop IV Rocephin as no S/S of resp infection at this time   - Per case management, pt was provided trilogy prior to last hospital discharge but was not approved by insurance. Plan to appeal denial         Acute on chronic anemia  No overt s/s of bleeding   Transfuse 2u PRBC for Hgb 6.4 in setting of hypotension       Stage III pressure ulcer of sacral region  - POA   - management per wound care recs       Urinary retention  - bowman discontinued 09/25  - monitor UOP       Bradycardia  --cards consulted; defer management   - Cardiology rec no indication for PPM placement at this time, recommend avoidance of neg inotropic meds and continued resp support  - tele monitoring       Hypotension  - chronic, pt asymptomatic. Likely exacerbated by hypoalbuminemia/third spacing, most recent albumin is 1.1  - diuretics held   - continue midodrine, fludrocortisone       CHF (congestive heart failure)  --Last ECHO from 9/3/23 confirm EF 55 %  --acute on chronic BLE edema on exam, but no evidence of vascular congestion on CXR.  - Cardiology consulted and following   --holding diuretics due to low BP- defer resumption to Cards  --monitor electrolytes, UOP, fluid restrictions  --strict I&O and daily weights ordered    Venous thromboembolism  --hx of unprovoked  --Continue home Eliquis       GERD (gastroesophageal reflux disease)  --stable  --continue PPI therapy      Hyperlipidemia  --continue statin        Venous stasis dermatitis of both lower extremities  Diuretics held due to low blood pressures  Continue wound care per wound care RN recommendations     Lymphedema        Sleep apnea        CKD (chronic kidney disease) stage 3, GFR 30-59 ml/min  - Cr baseline approx 1 but has been closer to 2 over last month   - Renally dose meds; avoid nephrotoxic agents  - Monitor UOP   - bowman discontinued on 09/25    Morbid obesity with BMI of 60.0-69.9,  adult  Body mass index is 56.73 kg/m². Morbid obesity complicates all aspects of disease management from diagnostic modalities to treatment. Weight loss encouraged and health benefits explained to patient.         Hypothyroidism (acquired)  - TSH wnl   - continue home synthroid         VTE Risk Mitigation (From admission, onward)         Ordered     apixaban tablet 5 mg  2 times daily         09/20/23 1754     IP VTE HIGH RISK PATIENT  Once         09/20/23 1744     Place sequential compression device  Until discontinued         09/20/23 1744                Discharge Planning   JAMES:      Code Status: Full Code   Is the patient medically ready for discharge?: Yes    Reason for patient still in hospital (select all that apply): Other (specify) insurance approval of home trilogy  Discharge Plan A: Home Health                  Inna Hammond MD  Department of Hospital Medicine   [facility] - Telemetry ([facility])

## 2023-09-27 NOTE — ASSESSMENT & PLAN NOTE
- chronic, pt asymptomatic. Likely exacerbated by hypoalbuminemia/third spacing, most recent albumin is 1.1  - diuretics held   - continue midodrine, fludrocortisone

## 2023-09-27 NOTE — AI DETERIORATION ALERT
Artificial Intelligence Notification  [facility] [place]  [id number] [place] [address] Dr Reanna CALLOWAY [id number]  Phone: [phone]    This documentation was triggered by an Artificial Intelligence Notification:    Admit Date: 2023   LOS: 7  Code Status: Full Code  : 1945  Age: 77 y.o.  Weight:   Wt Readings from Last 1 Encounters:   23 (!) 195 kg (430 lb)        Sex: male  Bed: A228/A228 A  MRN: [id number]  Attending Physician: Inna Hammond MD     Date of Alert: 2023  Time AI Alert Received: 1: 33 PM             Vitals:    23 1249   BP:    Pulse: 67   Resp: 18   Temp:      SpO2: 97 %         AI Deterioration alert received. Pt hypotensive to 80 systolic    . Pt evaluated at bedside. He is awake, alert, oriented x 3, has no complaints. Denies CP, SOB, dizziness.     BP appears to have been taken on forearm. Will ask RN to repeat. Lactate obtained and is within normal limits. Will continue to monitor. No indication for ICU transfer at this time.     Patient Condition: stable

## 2023-09-27 NOTE — SUBJECTIVE & OBJECTIVE
Interval History: See Hospital Course     Review of Systems  Objective:     Vital Signs (Most Recent):  Temp: 98.8 °F (37.1 °C) (09/27/23 0752)  Pulse: 63 (09/27/23 0800)  Resp: 18 (09/27/23 0800)  BP: (!) 85/54 (09/27/23 0752)  SpO2: 97 % (09/27/23 0800) Vital Signs (24h Range):  Temp:  [97.4 °F (36.3 °C)-98.8 °F (37.1 °C)] 98.8 °F (37.1 °C)  Pulse:  [61-65] 63  Resp:  [16-20] 18  SpO2:  [96 %-100 %] 97 %  BP: (77-92)/(36-54) 85/54     Weight: (!) 195 kg (430 lb)  Body mass index is 56.73 kg/m².    Intake/Output Summary (Last 24 hours) at 9/27/2023 1124  Last data filed at 9/26/2023 1645  Gross per 24 hour   Intake 141.98 ml   Output --   Net 141.98 ml         Physical Exam  Constitutional:       General: He is not in acute distress.     Appearance: He is obese. Ill appearance: chronically ill appearing.   Cardiovascular:      Rate and Rhythm: Normal rate and regular rhythm.      Heart sounds: No murmur heard.     No friction rub. No gallop.   Pulmonary:      Effort: Pulmonary effort is normal.      Breath sounds: Normal breath sounds. No wheezing, rhonchi or rales.      Comments: 2L NC O2   Abdominal:      General: Bowel sounds are normal. There is no distension.      Palpations: Abdomen is soft.      Tenderness: There is no abdominal tenderness.   Musculoskeletal:      Right lower leg: Edema present.      Left lower leg: Edema present.   Skin:     General: Skin is warm and dry.      Comments: BLE wounds    Neurological:      Mental Status: He is alert.      Comments: Hard of hearing, oriented to self, place, situation              Significant Labs: All pertinent labs within the past 24 hours have been reviewed.    Significant Imaging: I have reviewed all pertinent imaging results/findings within the past 24 hours.

## 2023-09-28 PROBLEM — K92.1 MELENA: Status: ACTIVE | Noted: 2023-01-01

## 2023-09-28 NOTE — PROGRESS NOTES
[facility] - Intensive Care (Hospital)  Wound Care    Patient Name:  Gene Rothman   MRN:  [id number]  Date: 2023  Diagnosis: Chronic respiratory failure with hypercapnia    History:     Past Medical History:   Diagnosis Date    Acute hypoxemic respiratory failure 2022    Acute on chronic anemia 2023    Acute pulmonary embolism     Arthritis     Atrial fibrillation 3/1/2023    Cellulitis 12/3/2021    CHF (congestive heart failure)     Chronic kidney disease     Chronic obstructive pulmonary disease with acute exacerbation     Coronary artery disease     Depression     Hyperlipidemia     Hypertension     Hypertensive heart disease with heart failure 2022    Hypothyroidism     Lymphedema of lower extremity     Nephrolithiasis     Obesity     Peripheral vascular disease     Pneumonia 2022    Recurrent cellulitis of lower leg     Sleep apnea     can't stand the CPAP , sleeps elevated in hospital bed or chair    Tobacco dependence     resolved       Social History     Socioeconomic History    Marital status:    Tobacco Use    Smoking status: Former     Current packs/day: 0.00     Average packs/day: 1.5 packs/day for 1 year (1.5 ttl pk-yrs)     Types: Cigarettes     Start date:      Quit date:      Years since quittin.7    Smokeless tobacco: Never   Substance and Sexual Activity    Alcohol use: Not Currently    Drug use: Never    Sexual activity: Never   Social History Narrative    ** Merged History Encounter **          Lives with wife and 2 sons and a daughter. No longer drives. Quit in  because couldn't hold foot on pedal. 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 Uses a Rolator at wo    rk and wheelchair at home. No caffeine. Does not have a Living Will or Advanced Directive.       Social Determinants of Health     Financial Resource Strain: High Risk (2023)    Overall Financial Resource  Strain (CARDIA)     Difficulty of Paying Living Expenses: Hard   Food Insecurity: No Food Insecurity (9/21/2023)    Hunger Vital Sign     Worried About Running Out of Food in the Last Year: Never true     Ran Out of Food in the Last Year: Never true   Transportation Needs: Unmet Transportation Needs (9/21/2023)    PRAPARE - Transportation     Lack of Transportation (Medical): Yes     Lack of Transportation (Non-Medical): Yes   Physical Activity: Inactive (9/21/2023)    Exercise Vital Sign     Days of Exercise per Week: 0 days     Minutes of Exercise per Session: 0 min   Stress: Stress Concern Present (2/20/2023)    [employer] [place] [employer] - Occupational Stress Questionnaire     Feeling of Stress : To some extent   Social Connections: Socially Isolated (2/20/2023)    Social Connection and Isolation Panel [NHANES]     Frequency of Communication with Friends and Family: Never     Frequency of Social Gatherings with Friends and Family: Once a week     Attends Judaism Services: Never     Active Member of Clubs or Organizations: No     Attends Club or Organization Meetings: Never     Marital Status:    Housing Stability: Low Risk  (9/21/2023)    Housing Stability Vital Sign     Unable to Pay for Housing in the Last Year: No     Number of Places Lived in the Last Year: 1     Unstable Housing in the Last Year: No       Precautions:     Allergies as of 09/20/2023 - Reviewed 09/20/2023   Allergen Reaction Noted    Sulfamethoxazole-trimethoprim Itching and Shortness Of Breath 08/29/2014    Sulfamethoxazole  07/17/2023       [facility] Assessment Details/Treatment     F/U visit with Mr. Rothman due to ongoing wound care needs. Patient is awake and alert, lying on specialty sizewise bariatric immerse bed.  Sheets pulled back for wound reassessment and patient noted to have large liquid melanotic BM. Incontinence care provided with WOC RNs x2, nursing student, and PCTx2 in room. No rinse foaming cleanser and  bathing wipes used to cleanse skin. Stage 3 Pressure injury again noted to sacral/coccygeal region with full thickness tissue loss, improvement in prior condition. Z guard paste applied to this site and areas of MASD to perineum, medial thighs. Also noted nose oozing during care, and oozing from penile meatus during care. QiVi applied to penis.  Dressings then changed to multiple wounds to BLE: multiple DTPIs and venous leg ulcers. BLE noted to be weeping - moisture wicking pads applied under BLE. Wounds cleansed with vashe, aquacel ag advantage and aquacel extra applied to open wounds and covered with large silicone foam dressings. Heel offloading boots maintained.  Recommend ongoing care per orders.    Secure chat message sent to primary nurse Alaina and Dr. Hammond with findings of large liquid melanotic BM, and oozing from penis and nose.       09/28/23 0845   WOCN Assessment   WOCN Total Time (mins) 90   Visit Date 09/28/23   Visit Time 0845   Consult Type Follow Up   WOCN Speciality Wound   Wound deep tissue injury;pressure;venous;moisture;At risk for pressure Injury;other   Intervention assessed;applied;chart review;team conference;coordination of care        Altered Skin Integrity 09/02/23 0900 upper Sacral spine Full thickness tissue loss. Subcutaneous fat may be visible but bone, tendon or muscle are not exposed   Date First Assessed/Time First Assessed: 09/02/23 0900   Altered Skin Integrity Present on Admission - Did Patient arrive to the hospital with altered skin?: yes  Orientation: upper  Location: Sacral spine  Is this injury device related?: No  Descript...   Wound Image    Description of Altered Skin Integrity Full thickness tissue loss. Subcutaneous fat may be visible but bone, tendon or muscle are not exposed   Dressing Appearance Open to air   Drainage Amount Small   Drainage Characteristics/Odor Serosanguineous   Appearance Red;Yellow;Adipose;Moist   Tissue loss description Full thickness    Red (%), Wound Tissue Color 75 %   Yellow (%), Wound Tissue Color 25 %   Care Cleansed with:;Soap and water;Applied:;Skin Barrier  (z guard)        Altered Skin Integrity 09/05/23 Left lower;lateral Leg Venous Ulcer   Date First Assessed: 09/05/23   Altered Skin Integrity Present on Admission - Did Patient arrive to the hospital with altered skin?: yes  Side: Left  Orientation: lower;lateral  Location: Leg  Primary Wound Type: Venous Ulcer   Wound Image    Description of Altered Skin Integrity   (not pressure related skin injury)   Dressing Appearance Intact;Moist drainage   Drainage Amount Moderate   Drainage Characteristics/Odor Serous   Appearance Tan;Slough   Tissue loss description Full thickness   Care Cleansed with:;Wound cleanser;Applied:;Skin Barrier   Dressing Hydrofiber;Silver;Foam;Changed   Dressing Change Due 10/02/23        Altered Skin Integrity 09/21/23 Left lateral Calf Purple or maroon localized area of discolored intact skin or non-intact skin or a blood-filled blister.   Date First Assessed: 09/21/23   Altered Skin Integrity Present on Admission - Did Patient arrive to the hospital with altered skin?: yes  Side: Left  Orientation: lateral  Location: Calf  Description of Altered Skin Integrity: Purple or maroon localiz...   Wound Image    Description of Altered Skin Integrity Purple or maroon localized area of discolored intact skin or non-intact skin or a blood-filled blister.   Dressing Appearance Intact   Drainage Amount Small   Drainage Characteristics/Odor Serosanguineous   Appearance Red;Maroon   Tissue loss description Partial thickness   Care Cleansed with:;Wound cleanser;Applied:;Skin Barrier   Dressing Foam;Changed   Dressing Change Due 10/02/23        Altered Skin Integrity 09/21/23 Right lateral Foot Purple or maroon localized area of discolored intact skin or non-intact skin or a blood-filled blister.   Date First Assessed: 09/21/23   Altered Skin Integrity Present on Admission -  Did Patient arrive to the hospital with altered skin?: yes  Side: Right  Orientation: lateral  Location: Foot  Description of Altered Skin Integrity: Purple or maroon locali...   Description of Altered Skin Integrity Purple or maroon localized area of discolored intact skin or non-intact skin or a blood-filled blister.   Dressing Appearance Intact;Moist drainage   Drainage Amount Small   Drainage Characteristics/Odor Serosanguineous   Appearance Red;Maroon;Yellow;Slough;Moist   Tissue loss description Full thickness   Care Cleansed with:;Wound cleanser;Applied:;Skin Barrier   Dressing Hydrofiber;Silver;Foam;Changed   Dressing Change Due 10/02/23        Altered Skin Integrity 09/21/23 Right posterior;lateral Ankle Purple or maroon localized area of discolored intact skin or non-intact skin or a blood-filled blister.   Date First Assessed: 09/21/23   Altered Skin Integrity Present on Admission - Did Patient arrive to the hospital with altered skin?: yes  Side: Right  Orientation: posterior;lateral  Location: Ankle  Description of Altered Skin Integrity: Purple or ma...   Wound Image    Description of Altered Skin Integrity Purple or maroon localized area of discolored intact skin or non-intact skin or a blood-filled blister.   Dressing Appearance Intact;Moist drainage   Drainage Amount Scant   Drainage Characteristics/Odor Serous   Appearance Purple;Black;Slough;Moist   Tissue loss description Full thickness   Periwound Area Intact   Wound Edges Defined   Care Cleansed with:;Wound cleanser;Applied:;Skin Barrier   Dressing Hydrofiber;Silver;Foam;Changed   Dressing Change Due 10/02/23        Altered Skin Integrity 09/21/23 Right Calf Venous Ulcer   Date First Assessed: 09/21/23   Altered Skin Integrity Present on Admission - Did Patient arrive to the hospital with altered skin?: yes  Side: Right  Location: Calf  Primary Wound Type: Venous Ulcer   Wound Image    Dressing Appearance Intact   Drainage Amount Moderate    Drainage Characteristics/Odor Serous   Appearance Red;Yellow;Slough;Moist   Tissue loss description Full thickness   Care Cleansed with:;Wound cleanser;Applied:;Skin Barrier   Dressing Hydrofiber;Foam;Changed   Dressing Change Due 10/02/23        Altered Skin Integrity 09/21/23 Right lower;lateral Leg Venous Ulcer   Date First Assessed: 09/21/23   Altered Skin Integrity Present on Admission - Did Patient arrive to the hospital with altered skin?: yes  Side: Right  Orientation: lower;lateral  Location: Leg  Primary Wound Type: Venous Ulcer   Wound Image    Description of Altered Skin Integrity   (not pressure related skin injury)   Dressing Appearance Intact   Drainage Amount Moderate   Drainage Characteristics/Odor Serous   Appearance Red;Yellow;Moist   Tissue loss description Full thickness   Care Cleansed with:;Wound cleanser;Applied:;Skin Barrier   Dressing Hydrofiber;Foam;Changed   Dressing Change Due 10/02/23       Recommendations made to primary team for ongoing wound care per orders, moisture management, pressure injury prevention interventions . Orders placed.     09/28/2023

## 2023-09-28 NOTE — ASSESSMENT & PLAN NOTE
Patient is identified as having Diastolic (HFpEF) heart failure that is Chronic. CHF is currently controlled. Latest ECHO performed and demonstrates- Results for orders placed during the hospital encounter of 09/02/23    Echo    Interpretation Summary    Left Ventricle: The left ventricle is normal in size. Mildly increased wall thickness. There is concentric remodeling. Normal wall motion. Septal motion is normal. There is normal systolic function with a visually estimated ejection fraction of 55 - 70%. There is normal diastolic function.    Left Atrium: Left atrium is mildly dilated.    Right Ventricle: Mild right ventricular enlargement. Wall thickness is normal. Right ventricle wall motion  is normal. Systolic function is normal.    Aortic Valve: The aortic valve is a trileaflet valve. There is moderate aortic valve sclerosis.    Tricuspid Valve: There is moderate regurgitation with a centrally directed jet.    Pulmonary Artery: The estimated pulmonary artery systolic pressure is 57 mmHg.    IVC/SVC: Normal venous pressure at 3 mmHg.    Cr up a bit today  Hold on diuretics for now  Continue to monitor volume status

## 2023-09-28 NOTE — ASSESSMENT & PLAN NOTE
-- recurrent hospital admissions, likely due to noncompliance with home BIPAP/CPAP   --Pulmonology following: recommends cont NIPPV qhs and prn  - continue home NC O2; maintain sats > 90%   - Procal wnl, IV Rocephin discontinued  as no S/S of resp infection at this time   - Per case management, pt was provided trilogy prior to last hospital discharge but was not approved by insurance. Denial appeal completed 09/28, awaiting decision

## 2023-09-28 NOTE — SUBJECTIVE & OBJECTIVE
Interval History:   Moved back to the ICU today due to melanic stool on the floor.  BP running lower than typical for him, though mental status is stable.  1u RBC on order.      Mr Rothman arrives on NC.  He is awake and conversant.  Complaining of being cold.  Denies abdominal pain, N/V, hematemesis.      Objective:     Vital Signs (Most Recent):  Temp: 97.6 °F (36.4 °C) (09/28/23 1300)  Pulse: 71 (09/28/23 1300)  Resp: 16 (09/28/23 1300)  BP: (!) 106/46 (09/28/23 1300)  SpO2: 100 % (09/28/23 1300) Vital Signs (24h Range):  Temp:  [96.7 °F (35.9 °C)-98.6 °F (37 °C)] 97.6 °F (36.4 °C)  Pulse:  [61-76] 71  Resp:  [16-39] 16  SpO2:  [93 %-100 %] 100 %  BP: ()/(20-62) 106/46     Weight: (!) 196.4 kg (433 lb)  Body mass index is 57.13 kg/m².      Intake/Output Summary (Last 24 hours) at 9/28/2023 1314  Last data filed at 9/28/2023 1300  Gross per 24 hour   Intake 935.41 ml   Output --   Net 935.41 ml        Physical Exam  Vitals and nursing note reviewed.   Constitutional:       General: He is not in acute distress.     Appearance: He is obese.   Cardiovascular:      Rate and Rhythm: Normal rate and regular rhythm.      Pulses: Normal pulses.      Heart sounds: No murmur heard.  Pulmonary:      Effort: Pulmonary effort is normal. No respiratory distress.      Comments: Diminished BS bilaterally  Abdominal:      Tenderness: There is no guarding.      Comments: Obese, soft, nontender   Musculoskeletal:      Comments: Chronic venous stasis changes and lymphedema bilaterally   Neurological:      General: No focal deficit present.      Mental Status: He is alert and oriented to person, place, and time. Mental status is at baseline.           Review of Systems    Vents:  Oxygen Concentration (%): 28 (09/28/23 0804)    Lines/Drains/Airways       Peripherally Inserted Central Catheter Line  Duration             PICC Double Lumen 09/21/23 1530 right basilic 6 days              Drain  Duration             Male External  Urinary Catheter 09/28/23 1015 <1 day                    Significant Labs:    CBC/Anemia Profile:  Recent Labs   Lab 09/27/23  0551 09/28/23  0441 09/28/23  1023   WBC 6.14 9.13 9.67   HGB 6.4* 7.6* 7.4*   HCT 20.8* 23.5* 22.4*   * 106* 102*   MCV 96 92 91   RDW 18.8* 17.2* 17.4*        Chemistries:  Recent Labs   Lab 09/27/23  0551 09/28/23  0441    141   K 3.9 3.9    112*   CO2 20* 18*   BUN 68* 78*   CREATININE 2.7* 3.3*   CALCIUM 7.1* 6.8*   ALBUMIN 1.1* 1.1*   MG 1.9 1.9   PHOS 4.8* 5.2*       All pertinent labs within the past 24 hours have been reviewed.    Significant Imaging:  I have reviewed all pertinent imaging results/findings within the past 24 hours.

## 2023-09-28 NOTE — PROGRESS NOTES
"[facility] - Tele[facility] ([facility])  [facility] Medicine  Progress Note    Patient Name: Gene Rothman  MRN: [id number]  Patient Class: IP- Inpatient   Admission Date: 9/20/2023  Length of Stay: 8 days  Attending Physician: Inna Hammond MD  Primary Care Provider: Ami Zarate DO        Subjective:     Principal Problem:Chronic respiratory failure with hypercapnia        HPI:  A 77-year-old gentleman, familiar to several hospital services, with a background of multiple chronic health issues, including COPD, sleep apnea, A fib, pulmonary hypertension, CHF, lymphedema, bed-bound status for two years, and chronic respiratory failure requiring home oxygen, was brought in by his wife on their 61st wedding anniversary due to an unusual and unresponsive behavior, which she believed was out of character for such a significant day. Upon arrival to the ED, he was placed on NIPPV, with subsequent improvement in his behavior, making him appear "more like himself" to his wife. It was later revealed that patient was non-compliant with his NIPPV, evident from a new machine provided during a previous admission. His blood pressure improved following administration of midodrine, but MAP was noticeably still low. Admitted to ICU for close monitoring.    During this admission, on 9/21, he experienced an episode of bradycardia with heart rates in the 30s but remained hemodynamically stable. By 9/22, he was alert and off NIPPV, tolerating nasal cannula oxygen without the need for pressors. Cardiology evaluated him, noting his heart rate was regular when awake and recommended ventilation support, avoidance of negative inotropic medications, and continuation of eliquis. Despite an elevated BNP suggestive of CHF exacerbation, his heart failure medication was temporarily held due to hypotension. He was deemed stable for transfer to a telemetry unit on 9/22/23. Hospital medicine consulted for assumption of care.      Overview/Hospital " Course:  09/22/2023: no family at bedside at the time of this encounter. Patient resting comfortably, but easily awaken. Still with flat affect and minimal cooperation with exam.   09/23/2023: Family at bedside this morning. Expressed concern regarding patient's CPAP that was given to them at last hospitalization will be taken away soon by insurance(?) Asked if there was a way for them to have the CPAP indefinitely. SW consulted.    09/24/2023: Discussed with SW. Patient's non-compliance with CPAP is likely issue insurance refusing coverage. Discussed case with family this morning. Reports having difficulties with machine at home the first day, but when they called the number they was given, no one answered since it was the weekend. Patient was then readmitted to the hospitals multiple times since that time, and family states that THIS is the reason for patient's non-compliance with CPAP.     09/25: Pt seen and examined with wife at bedside. Wife states that after prior discharge they tried to use CPAP machine but it was not working correctly and when they tried to call number they were given, there was no answer. Unclear if this was user error or machine error. Per nursing, wife took patient off CPAP multiple times overnight and was unable to place him back on it. Pt has no complaints this morning other than nausea. Denies vomiting.     09/26: Per case management, pt was sent home with [facility] on last discharge but was not approved by insurance at the time. Have requested wife to bring in insurance denial letter so that denial can be appealed. [place]. PT did not wear BIPAP due to nausea. Reports continued nausea this AM, no vomiting. + flatus, no BM. No abd pain, CP, SOB.     09/27: Pt seen with wife and RN at bedside. BP remains low, pt asymptomatic. Transfusing 2u PRBC for Hgb 6.4. No overt s/s of bleeding. Did not wear BIPAP last night. Denies vomiting, abd pain, CP, SOB, cough. Was able to have BM yesterday      09/28: NAEON. Hgb 7.6 despite 2u PRBC. Per nursing, pt had large melanotic bowel movement this AM. BP in the 80s systolic. PT evaluated, he is awake, alert, oriented x 3. Denies CP, SOB, dizziness or abd pain. Pt made NPO, Eliquis and ASA discontinued. GI consulted and will eval, recommend PPI bolus. Will transfuse additional 1u pRBC,  transfer to ICU for closer monitoring.       Interval History: See Hospital Course     Review of Systems  Objective:     Vital Signs (Most Recent):  Temp: 96.7 °F (35.9 °C) (09/28/23 0732)  Pulse: 61 (09/28/23 0804)  Resp: 18 (09/28/23 0804)  BP: (!) 76/54 (09/28/23 0732)  SpO2: 96 % (09/28/23 0804) Vital Signs (24h Range):  Temp:  [96.7 °F (35.9 °C)-98.7 °F (37.1 °C)] 96.7 °F (35.9 °C)  Pulse:  [61-76] 61  Resp:  [16-23] 18  SpO2:  [96 %-99 %] 96 %  BP: (69-94)/(35-54) 76/54     Weight: (!) 196.4 kg (433 lb)  Body mass index is 57.13 kg/m².    Intake/Output Summary (Last 24 hours) at 9/28/2023 0949  Last data filed at 9/27/2023 1934  Gross per 24 hour   Intake 560.41 ml   Output --   Net 560.41 ml         Physical Exam  Vitals and nursing note reviewed.   Constitutional:       General: He is not in acute distress.     Appearance: He is obese. He is ill-appearing.   HENT:      Nose:      Comments: Dried blood noted to nares   Cardiovascular:      Rate and Rhythm: Normal rate and regular rhythm.      Heart sounds: No murmur heard.     No friction rub. No gallop.   Pulmonary:      Effort: Pulmonary effort is normal.      Breath sounds: Normal breath sounds.      Comments: 2L NC O2   Abdominal:      General: Bowel sounds are normal. There is no distension.      Palpations: Abdomen is soft.      Tenderness: There is no abdominal tenderness. There is no guarding or rebound.   Musculoskeletal:      Right lower leg: Edema present.      Left lower leg: Edema present.   Skin:     General: Skin is warm and dry.      Comments: Chronic BLE wounds with weeping of clear fluid    Neurological:       General: No focal deficit present.      Mental Status: He is alert and oriented to person, place, and time. Mental status is at baseline.      Comments: Hard of hearing              Significant Labs: All pertinent labs within the past 24 hours have been reviewed.    Significant Imaging: I have reviewed all pertinent imaging results/findings within the past 24 hours.      Assessment/Plan:      * Chronic respiratory failure with hypercapnia  -- recurrent hospital admissions, likely due to noncompliance with home BIPAP/CPAP   --Pulmonology following: recommends cont NIPPV qhs and prn  - continue home NC O2; maintain sats > 90%   - Procal wnl, IV Rocephin discontinued  as no S/S of resp infection at this time   - Per case management, pt was provided trilogy prior to last hospital discharge but was not approved by insurance. Denial appeal completed 09/28, awaiting decision         Melena  - prior hx of diverticulitis during prev admission, pt reports hx of hemorrhoids   - developed large melanotic stool on AM of 09/28  - NPO   - hold Eliquis (last dose 09/27 PM), ASA   - IV PPI bolus followed by BID dose  - GI consulted- case discussed with Dr. Morris   - transfuse 1 u PRBC   - monitor Hgb       Acute on chronic anemia  Transfused 2u PRBC 09/27 for Hgb 6.4 in setting of hypotension with inaproppriate rise in Hgbn   See melena for further plan       Stage III pressure ulcer of sacral region  - POA   - management per wound care recs       Urinary retention  - bowman discontinued 09/25  - monitor UOP       Bradycardia  --cards consulted; defer management   - Cardiology rec no indication for PPM placement at this time, recommend avoidance of neg inotropic meds and continued resp support  - tele monitoring       Hypotension  - chronic, pt asymptomatic. Likely exacerbated by anemia and  hypoalbuminemia/third spacing, most recent albumin is 1.1  - diuretics held   - continue midodrine, fludrocortisone   - blood transfusion  as above       CHF (congestive heart failure)  --Last ECHO from 9/3/23 confirm EF 55 %  --acute on chronic BLE edema on exam, but no evidence of vascular congestion on CXR.  - Cardiology consulted and following   --holding diuretics due to low BP- defer resumption to Cards  --monitor electrolytes, UOP, fluid restrictions  --strict I&O and daily weights ordered    Venous thromboembolism  --hx of unprovoked  --Continue home Eliquis       GERD (gastroesophageal reflux disease)  - Cont PPI       Hyperlipidemia  --continue statin        Venous stasis dermatitis of both lower extremities  Diuretics held due to low blood pressures  Continue wound care per wound care RN recommendations     Lymphedema        Sleep apnea        CKD (chronic kidney disease) stage 3, GFR 30-59 ml/min  - Cr baseline approx 1 but has been closer to 2 over last month   - Cr now uptrending, may be in setting of GIB and hypotension   - Renally dose meds; avoid nephrotoxic agents  - Monitor UOP   - bowman discontinued on 09/25    Morbid obesity with BMI of 60.0-69.9, adult  Body mass index is 57.13 kg/m². Morbid obesity complicates all aspects of disease management from diagnostic modalities to treatment. Weight loss encouraged and health benefits explained to patient.         Hypothyroidism (acquired)  - TSH wnl   - continue home synthroid         VTE Risk Mitigation (From admission, onward)         Ordered     IP VTE HIGH RISK PATIENT  Once         09/20/23 1744     Place sequential compression device  Until discontinued         09/20/23 1744                Discharge Planning   JAMES:      Code Status: Full Code   Is the patient medically ready for discharge?: Yes    Reason for patient still in hospital (select all that apply): Patient new problem, Patient trending condition and Pending disposition  Discharge Plan A: Home Health                  Inna Hammond MD  Department of Hospital Medicine   [facility] - Telemetry ([facility])

## 2023-09-28 NOTE — NURSING
Pt transferred from telemetry to ICU #3 on 2L NC. AAOx3 on arrival. Placed on ICU monitor, cardiac rhythm afib w/ HR 70s. Black tarry stool present on arrival, pt cleaned and barrier cream applied. STAT labs ordered and needing to be drawn, 1u PRBCs pending transfusion. Report received from RICKEY Foley.

## 2023-09-28 NOTE — PLAN OF CARE
Problem: Adult Inpatient Plan of Care  Goal: Absence of Hospital-Acquired Illness or Injury  Outcome: Ongoing, Progressing   B/P reads low. Can't get an accurate reading, Dr Josue aware. No change in mental/neuro status.  Problem: Impaired Wound Healing  Goal: Optimal Wound Healing  Outcome: Ongoing, Progressing   Turning q 2hs. Heel boots in use at all times.

## 2023-09-28 NOTE — ASSESSMENT & PLAN NOTE
Patient with Hypercapnic Respiratory failure which is Acute on chronic.  he is on home oxygen at 4 LPM. Supplemental oxygen was provided and noted- Oxygen Concentration (%):  [28] 28    .   Signs/symptoms of respiratory failure include- increased work of breathing. Contributing diagnoses includes - Obesity Hypoventilation and OSAS Labs and images were reviewed. Patient Has recent ABG, which has been reviewed. Will treat underlying causes and adjust management of respiratory failure as follows-     Continue NIPPV qhs and prn   NC when awake and oriented as tolerated.

## 2023-09-28 NOTE — ASSESSMENT & PLAN NOTE
- prior hx of diverticulitis during prev admission, pt reports hx of hemorrhoids   - developed large melanotic stool on AM of 09/28  - NPO   - hold Eliquis (last dose 09/27 PM), ASA   - IV PPI bolus followed by BID dose  - GI consulted- case discussed with Dr. Morris   - transfuse 1 u PRBC   - monitor Hgb

## 2023-09-28 NOTE — PROGRESS NOTES
Last few BP cycles 60s/30s. Blood transfusion in progress at 125ml/hr. ICU NP notified of BP, pt remains awake and conversing with staff frequently calling out for help for repositioning, so no additional orders received, directed to continue infusing PRBCs at current rate.

## 2023-09-28 NOTE — ASSESSMENT & PLAN NOTE
Continue Midodrine   Completed empiric course of Rocephin  Now complicated by melena  Improved with blood currently  Checking lactic acid  Continue to monitor

## 2023-09-28 NOTE — ASSESSMENT & PLAN NOTE
Echo    Interpretation Summary    Left Ventricle: The left ventricle is normal in size. Mildly increased wall thickness. There is concentric remodeling. Normal wall motion. Septal motion is normal. There is normal systolic function with a visually estimated ejection fraction of 55 - 70%. There is normal diastolic function.    Left Atrium: Left atrium is mildly dilated.    Right Ventricle: Mild right ventricular enlargement. Wall thickness is normal. Right ventricle wall motion  is normal. Systolic function is normal.    Aortic Valve: The aortic valve is a trileaflet valve. There is moderate aortic valve sclerosis.    Tricuspid Valve: There is moderate regurgitation with a centrally directed jet.    Pulmonary Artery: The estimated pulmonary artery systolic pressure is 57 mmHg.    IVC/SVC: Normal venous pressure at 3 mmHg.

## 2023-09-28 NOTE — ASSESSMENT & PLAN NOTE
Differential diagnoses includes PUD vs malignancy vs AVMs vs Dieulafoy lesion vs other  Pantoprazole 40mg PO BID  Eliquis last given yesterday (9/27/23)  ASA last given 9/27/23  No NSAID use  2u pRBCs on 9/27/23  1U pRBC on 9/28/23  H&H 8.5/25.8  Hypotensive, however chronic

## 2023-09-28 NOTE — SUBJECTIVE & OBJECTIVE
Interval History: See Hospital Course     Review of Systems  Objective:     Vital Signs (Most Recent):  Temp: 96.7 °F (35.9 °C) (09/28/23 0732)  Pulse: 61 (09/28/23 0804)  Resp: 18 (09/28/23 0804)  BP: (!) 76/54 (09/28/23 0732)  SpO2: 96 % (09/28/23 0804) Vital Signs (24h Range):  Temp:  [96.7 °F (35.9 °C)-98.7 °F (37.1 °C)] 96.7 °F (35.9 °C)  Pulse:  [61-76] 61  Resp:  [16-23] 18  SpO2:  [96 %-99 %] 96 %  BP: (69-94)/(35-54) 76/54     Weight: (!) 196.4 kg (433 lb)  Body mass index is 57.13 kg/m².    Intake/Output Summary (Last 24 hours) at 9/28/2023 0949  Last data filed at 9/27/2023 1934  Gross per 24 hour   Intake 560.41 ml   Output --   Net 560.41 ml         Physical Exam  Vitals and nursing note reviewed.   Constitutional:       General: He is not in acute distress.     Appearance: He is obese. He is ill-appearing.   HENT:      Nose:      Comments: Dried blood noted to nares   Cardiovascular:      Rate and Rhythm: Normal rate and regular rhythm.      Heart sounds: No murmur heard.     No friction rub. No gallop.   Pulmonary:      Effort: Pulmonary effort is normal.      Breath sounds: Normal breath sounds.      Comments: 2L NC O2   Abdominal:      General: Bowel sounds are normal. There is no distension.      Palpations: Abdomen is soft.      Tenderness: There is no abdominal tenderness. There is no guarding or rebound.   Musculoskeletal:      Right lower leg: Edema present.      Left lower leg: Edema present.   Skin:     General: Skin is warm and dry.      Comments: Chronic BLE wounds with weeping of clear fluid    Neurological:      General: No focal deficit present.      Mental Status: He is alert and oriented to person, place, and time. Mental status is at baseline.      Comments: Hard of hearing              Significant Labs: All pertinent labs within the past 24 hours have been reviewed.    Significant Imaging: I have reviewed all pertinent imaging results/findings within the past 24 hours.

## 2023-09-28 NOTE — HPI
77 y.o male with multiple medical problems including morbid obesity with BMI >61 causing functional immobility therefore bed ridden, chronic lymphedema, decubitus ulcers with chronic leg ulcers, chronic respiratory failure requiring home oxygen, non-compliant with PAP therapies, CKD, left LE DVTs diagnosed in 09/2022 and placed on anticoagulation with Eliquis. Other pertinent medical history includes CHF, pulmonary HTN, Afib and anemia of renal disease. Baseline Hgb has been in the low 7-8 range this month. However there has been a progressive decline in counts since starting on Eliquis in September 2022. Blood counts this yesterday dropped to 6.4/20.8. He was transfused 2U pRBCs but had inappropriate response to 7.6/23.5. Today, patient experienced large episode of melena followed by hypotension. He was bolused Protonix 80mg IV bolus and transferred to ICU for monitoring and further management of the suspected GI bleed. In the ICU he was transfused additional unit of blood and repeat is 8.5/25.8. Per nursing staff he had another episode of melena in the ICU. No hematemesis.     Patient is seen in ICU. BP low at 69/35. Per nursing staff and hospitalist, letha's blood pressure is chronically low. Repeat vitals improved. BP after unit of blood improved to 104/46. He is hard of hearing. It is unclear if he is lucid. Son and wife are in waiting area and provide additional information. Son states patient has been experiencing intermittent bloody stools for months. Initially they attributed this to recent antibiotic use for cellulitis. He does have external hemorrhoids. Last episode of melena was last week. Described as motor oil. Wife denies the patient having history of peptic ulcers in the past. No NSAID use. Denies previous EGD. Cannot recall last time he had a colonoscopy.

## 2023-09-28 NOTE — PROGRESS NOTES
"[facility] Care ([facility])  Critical Care Medicine  Progress Note    Patient Name: Gene Rothman  MRN: [id number]  Admission Date: 9/20/2023  Hospital Length of Stay: 8 days  Code Status: Full Code  Attending Provider: Rodrigo Josue MD  Primary Care Provider: Ami Zarate DO   Principal Problem: Chronic respiratory failure with hypercapnia    Subjective:     HPI:  77yr old known to most hospital services presents on his 61st wedding anniversary per his wife who is at the bedside because of his inappropriate response to her questions. She said he was ignoring her and was inappropriate. She thinks there is something physiologically wrong with him or he would not be acting like that on this day.   She knows he has chronic hypotension but was concerned about hypercapnia. In ED he is on NIPPV and is acting like he always does which makes his wife more comfortable since "he is acting like himself" Son is also at the bedside.   He was given midodrine by ED and his BP has improved. Other complaints per family are "lack of appetite and low urine output"    Hx of COPD, OSAS / OHS / Bed bound for 2 years, A fib, On chronic anticoagulation, pul HTN (PAP 57mmHg) CHF, Lymphedema, chronic hypotension, HFpEF, Moderate MR / TR, chronic resp failure on home oxygen and chronic RV dysfunction. He is non compliant with his NIPPV although he was given a new machine during his last admission. He resist change in position and has acquired some wounds.       Hospital/ICU Course:  9/21.  Awake and follows commands on bipap.   Had episode of bradycardia with HR in 30s overnight.   Remained hemodynamically stable.     9/22. Awake and alert. Off NIPPV. Tolerating NC. Not on pressors. K low. No chest pain or abdominal discomfort. No family at the bedside. Flat affect and not interested in a conversation.     9/28 - transferred back to ICU due to melanic stools and hypotension.      Interval History:   Moved back to the ICU today " due to melanic stool on the floor.  BP running lower than typical for him, though mental status is stable.  1u RBC on order.      Mr Rothman arrives on NC.  He is awake and conversant.  Complaining of being cold.  Denies abdominal pain, N/V, hematemesis.      Objective:     Vital Signs (Most Recent):  Temp: 97.6 °F (36.4 °C) (09/28/23 1300)  Pulse: 71 (09/28/23 1300)  Resp: 16 (09/28/23 1300)  BP: (!) 106/46 (09/28/23 1300)  SpO2: 100 % (09/28/23 1300) Vital Signs (24h Range):  Temp:  [96.7 °F (35.9 °C)-98.6 °F (37 °C)] 97.6 °F (36.4 °C)  Pulse:  [61-76] 71  Resp:  [16-39] 16  SpO2:  [93 %-100 %] 100 %  BP: ()/(20-62) 106/46     Weight: (!) 196.4 kg (433 lb)  Body mass index is 57.13 kg/m².      Intake/Output Summary (Last 24 hours) at 9/28/2023 1314  Last data filed at 9/28/2023 1300  Gross per 24 hour   Intake 935.41 ml   Output --   Net 935.41 ml        Physical Exam  Vitals and nursing note reviewed.   Constitutional:       General: He is not in acute distress.     Appearance: He is obese.   Cardiovascular:      Rate and Rhythm: Normal rate and regular rhythm.      Pulses: Normal pulses.      Heart sounds: No murmur heard.  Pulmonary:      Effort: Pulmonary effort is normal. No respiratory distress.      Comments: Diminished BS bilaterally  Abdominal:      Tenderness: There is no guarding.      Comments: Obese, soft, nontender   Musculoskeletal:      Comments: Chronic venous stasis changes and lymphedema bilaterally   Neurological:      General: No focal deficit present.      Mental Status: He is alert and oriented to person, place, and time. Mental status is at baseline.           Review of Systems    Vents:  Oxygen Concentration (%): 28 (09/28/23 0804)    Lines/Drains/Airways       Peripherally Inserted Central Catheter Line  Duration             PICC Double Lumen 09/21/23 1530 right basilic 6 days              Drain  Duration             Male External Urinary Catheter 09/28/23 1015 <1 day               "      Significant Labs:    CBC/Anemia Profile:  Recent Labs   Lab 09/27/23  0551 09/28/23  0441 09/28/23  1023   WBC 6.14 9.13 9.67   HGB 6.4* 7.6* 7.4*   HCT 20.8* 23.5* 22.4*   * 106* 102*   MCV 96 92 91   RDW 18.8* 17.2* 17.4*        Chemistries:  Recent Labs   Lab 09/27/23  0551 09/28/23  0441    141   K 3.9 3.9    112*   CO2 20* 18*   BUN 68* 78*   CREATININE 2.7* 3.3*   CALCIUM 7.1* 6.8*   ALBUMIN 1.1* 1.1*   MG 1.9 1.9   PHOS 4.8* 5.2*       All pertinent labs within the past 24 hours have been reviewed.    Significant Imaging:  I have reviewed all pertinent imaging results/findings within the past 24 hours.      ABG  No results for input(s): "PH", "PO2", "PCO2", "HCO3", "BE" in the last 168 hours.  Assessment/Plan:     Pulmonary  * Chronic respiratory failure with hypercapnia  Patient with Hypercapnic Respiratory failure which is Acute on chronic.  he is on home oxygen at 4 LPM. Supplemental oxygen was provided and noted- Oxygen Concentration (%):  [28] 28    .   Signs/symptoms of respiratory failure include- increased work of breathing. Contributing diagnoses includes - Obesity Hypoventilation and OSAS Labs and images were reviewed. Patient Has recent ABG, which has been reviewed. Will treat underlying causes and adjust management of respiratory failure as follows-     Continue NIPPV qhs and prn   NC when awake and oriented as tolerated.      Cardiac/Vascular  Bradycardia  No repeat episodes. May have been from electrolyte abn.  Cardiology following  Not a candidate for pacemaker.  Does not have chronotropic incompetence. When awake HR in 70-90s    Hypotension  Continue Midodrine   Completed empiric course of Rocephin  Now complicated by melena  Improved with blood currently  Checking lactic acid  Continue to monitor      CHF (congestive heart failure)  Patient is identified as having Diastolic (HFpEF) heart failure that is Chronic. CHF is currently controlled. Latest ECHO performed and " demonstrates- Results for orders placed during the hospital encounter of 09/02/23    Echo    Interpretation Summary    Left Ventricle: The left ventricle is normal in size. Mildly increased wall thickness. There is concentric remodeling. Normal wall motion. Septal motion is normal. There is normal systolic function with a visually estimated ejection fraction of 55 - 70%. There is normal diastolic function.    Left Atrium: Left atrium is mildly dilated.    Right Ventricle: Mild right ventricular enlargement. Wall thickness is normal. Right ventricle wall motion  is normal. Systolic function is normal.    Aortic Valve: The aortic valve is a trileaflet valve. There is moderate aortic valve sclerosis.    Tricuspid Valve: There is moderate regurgitation with a centrally directed jet.    Pulmonary Artery: The estimated pulmonary artery systolic pressure is 57 mmHg.    IVC/SVC: Normal venous pressure at 3 mmHg.    Cr up a bit today  Hold on diuretics for now  Continue to monitor volume status    Venous stasis dermatitis of both lower extremities  Chronic lymphedema  Supportive care  Wound care     Renal/  Urinary retention  Catheter in place.    CKD (chronic kidney disease) stage 3, GFR 30-59 ml/min  Cr up a bit today  Monitor with resuscitation  Renally dose meds   Avoid nephrotoxins    Endocrine  Morbid obesity with BMI of 60.0-69.9, adult  Functional quad.  Not a candidate for surgery   Educated / counseling    Hypothyroidism (acquired)  Continue home medication    GI  Melena  - GI consulted  - holding Eliquis  - monitor H/H   - getting 1unit currently  - received 80mg IV Protonix; continue IV BID    Orthopedic  Stage III pressure ulcer of sacral region  - WOCN    Other  Lymphedema  Wound care   Dressing in place    Sleep apnea  NIPPV qhs and prn  Remains generally noncompliant        Critical Care Time: 43 minutes  Critical secondary to high risk monitoring, GI bleed      Critical care was time spent  personally by me on the following activities: development of treatment plan with patient or surrogate and bedside caregivers, discussions with consultants, evaluation of patient's response to treatment, examination of patient, ordering and performing treatments and interventions, ordering and review of laboratory studies, ordering and review of radiographic studies, pulse oximetry, re-evaluation of patient's condition. This critical care time did not overlap with that of any other provider or involve time for any procedures.     Rodrigo Josue MD  Critical Care Medicine  [facility] - Intensive Care [facility])

## 2023-09-28 NOTE — ASSESSMENT & PLAN NOTE
- chronic, pt asymptomatic. Likely exacerbated by anemia and  hypoalbuminemia/third spacing, most recent albumin is 1.1  - diuretics held   - continue midodrine, fludrocortisone   - blood transfusion as above

## 2023-09-28 NOTE — PLAN OF CARE
LEANNE notified that MD completed fast appeal for Home Tr[employer] device. MD notified fast appeal decision will be provided by Saturday. CM to continue follow status.

## 2023-09-28 NOTE — ASSESSMENT & PLAN NOTE
Transfused 2u PRBC 09/27 for Hgb 6.4 in setting of hypotension with inaproppriate rise in Hgbn   See melena for further plan

## 2023-09-28 NOTE — CONSULTS
[facility] - [facility] Care ([facility])  Gastroenterology  Consult Note    Patient Name: Gene Rothman  MRN: [id number]  Admission Date: 9/20/2023  Hospital Length of Stay: 8 days  Code Status: Full Code   Attending Provider: Rodrigo Josue MD   Consulting Provider: Ami Morris MD  Primary Care Physician: Ami Zarate DO  Principal Problem:Chronic respiratory failure with hypercapnia    Inpatient consult to Gastroenterology  Consult performed by: Ami Morris MD  Consult ordered by: Inna Hammond MD  Reason for consult: melena        Subjective:     HPI: 77 y.o male with multiple medical problems including morbid obesity with BMI >61 causing functional immobility therefore bed ridden, chronic lymphedema, decubitus ulcers with chronic leg ulcers, chronic respiratory failure requiring home oxygen, non-compliant with PAP therapies, CKD, left LE DVTs diagnosed in 09/2022 and placed on anticoagulation with Eliquis. Other pertinent medical history includes CHF, pulmonary HTN, Afib and anemia of renal disease. Baseline Hgb has been in the low 7-8 range this month. However there has been a progressive decline in counts since starting on Eliquis in September 2022. Blood counts this yesterday dropped to 6.4/20.8. He was transfused 2U pRBCs but had inappropriate response to 7.6/23.5. Today, patient experienced large episode of melena followed by hypotension. He was bolused Protonix 80mg IV bolus and transferred to ICU for monitoring and further management of the suspected GI bleed. In the ICU he was transfused additional unit of blood and repeat is 8.5/25.8. Per nursing staff he had another episode of melena in the ICU. No hematemesis.     Patient is seen in ICU. He is hard of hearing. It is unclear if he is lucid. Son and wife are in waiting area and provide additional information. Son states patient has been experiencing intermittent bloody stools for months. Initially they attributed this to recent  antibiotic use for cellulitis Last episode was last week. Described as motor oil. Wife denies the patient having history of peptic ulcers in the past. No NSAID use. Denies previous EGD. Cannot recall last time he had a colonoscopy.     Past Medical History:   Diagnosis Date    Acute hypoxemic respiratory failure 9/17/2022    Acute on chronic anemia 9/27/2023    Acute pulmonary embolism     Arthritis     Atrial fibrillation 3/1/2023    Cellulitis 12/3/2021    CHF (congestive heart failure)     Chronic kidney disease     Chronic obstructive pulmonary disease with acute exacerbation     Coronary artery disease     Depression     Hyperlipidemia     Hypertension     Hypertensive heart disease with heart failure 9/16/2022    Hypothyroidism     Lymphedema of lower extremity     Nephrolithiasis     Obesity     Peripheral vascular disease     Pneumonia 9/17/2022    Recurrent cellulitis of lower leg     Sleep apnea     can't stand the CPAP , sleeps elevated in hospital bed or chair    Tobacco dependence     resolved       Past Surgical History:   Procedure Laterality Date    ADENOIDECTOMY  1961    BACK SURGERY  1975;  1976    x2 for disc; scar tissue removed    debridement of bilateral leg ulcers Bilateral 01/01/2017    Dr Hernandez    INNER EAR SURGERY Bilateral 1961    separate - pinnaplasty , new eardrms constructed    KIDNEY STONE SURGERY Left 1976 approx    open    TONSILLECTOMY  1961       Review of patient's allergies indicates:   Allergen Reactions    Sulfamethoxazole-trimethoprim Itching and Shortness Of Breath    Sulfamethoxazole      Family History       Problem Relation (Age of Onset)    Alcohol abuse Maternal Grandfather    Alzheimer's disease Brother    Asthma Daughter    Cancer Mother, Father, Brother    Diabetes Mother    Heart disease Father, Brother    Hypertension Mother          Tobacco Use    Smoking status: Former     Current packs/day: 0.00     Average packs/day: 1.5 packs/day for 1 year (1.5 ttl pk-yrs)      Types: Cigarettes     Start date:      Quit date:      Years since quittin.7    Smokeless tobacco: Never   Substance and Sexual Activity    Alcohol use: Not Currently    Drug use: Never    Sexual activity: Never     Review of Systems  Objective:     Vital Signs (Most Recent):  Temp: 97.2 °F (36.2 °C) (23 1317)  Pulse: 72 (23 131)  Resp: 12 (23 131)  BP: (!) 104/46 (23 131)  SpO2: 100 % (23 131) Vital Signs (24h Range):  Temp:  [96.7 °F (35.9 °C)-98.6 °F (37 °C)] 97.2 °F (36.2 °C)  Pulse:  [61-76] 72  Resp:  [12-39] 12  SpO2:  [93 %-100 %] 100 %  BP: ()/(20-62) 104/46     Weight: (!) 196.4 kg (433 lb) (23 0330)  Body mass index is 57.13 kg/m².      Intake/Output Summary (Last 24 hours) at 2023 1446  Last data filed at 2023 1300  Gross per 24 hour   Intake 935.41 ml   Output --   Net 935.41 ml       Lines/Drains/Airways       Peripherally Inserted Central Catheter Line  Duration             PICC Double Lumen 23 1530 right basilic 6 days              Drain  Duration             Male External Urinary Catheter 23 1015 <1 day                    Physical Exam  Vitals and nursing note reviewed. Exam conducted with a chaperone present.   Constitutional:       General: He is awake.      Appearance: He is morbidly obese. He is ill-appearing.      Interventions: Nasal cannula in place.   Cardiovascular:      Rate and Rhythm: Normal rate and regular rhythm.      Heart sounds: Heart sounds are distant.   Pulmonary:      Effort: Pulmonary effort is normal.      Breath sounds: Decreased air movement present. Decreased breath sounds present.   Abdominal:      General: Abdomen is protuberant. Bowel sounds are decreased.      Tenderness: There is no abdominal tenderness.   Neurological:      Mental Status: He is alert. He is disoriented.   Psychiatric:         Attention and Perception: He is inattentive.         Speech: Speech is delayed.          Behavior: Behavior is cooperative.         Cognition and Memory: Cognition is impaired.         Judgment: Judgment is inappropriate.         Significant Labs:  CBC:   Recent Labs   Lab 09/27/23  0551 09/28/23  0441 09/28/23  1023 09/28/23  1523   WBC 6.14 9.13 9.67  --    HGB 6.4* 7.6* 7.4* 8.5*   HCT 20.8* 23.5* 22.4* 25.8*   * 106* 102*  --      CMP:   Recent Labs   Lab 09/28/23  0441   GLU 81   CALCIUM 6.8*   ALBUMIN 1.1*      K 3.9   CO2 18*   *   BUN 78*   CREATININE 3.3*     Coagulation:   Recent Labs   Lab 09/28/23  1023   INR 1.8*       Significant Imaging:  Imaging results within the past 24 hours have been reviewed.    Assessment/Plan:     Active Diagnoses:    Diagnosis Date Noted POA    PRINCIPAL PROBLEM:  Chronic respiratory failure with hypercapnia [J96.12] 01/10/2023 Yes    Melena [K92.1] 09/28/2023 Unknown    Acute on chronic anemia [D64.9] 09/27/2023 Yes    Stage III pressure ulcer of sacral region [L89.153] 09/25/2023 Yes    Bradycardia [R00.1] 09/21/2023 Yes    Urinary retention [R33.9] 09/21/2023 Yes    Hypotension [I95.9] 09/15/2023 Yes    CHF (congestive heart failure) [I50.9] 09/15/2023 Yes    Venous thromboembolism [I82.90] 09/20/2022 Yes    GERD (gastroesophageal reflux disease) [K21.9] 01/02/2022 Yes    Hyperlipidemia [E78.5] 01/02/2022 Yes    Venous stasis dermatitis of both lower extremities [I87.2] 09/29/2017 Yes     Chronic    Lymphedema [I89.0] 12/30/2016 Yes    CKD (chronic kidney disease) stage 3, GFR 30-59 ml/min [N18.30] 04/08/2016 Yes     Chronic    Sleep apnea [G47.30] 04/08/2016 Yes     Chronic    Hypothyroidism (acquired) [E03.9] 12/19/2014 Yes    Morbid obesity with BMI of 60.0-69.9, adult [E66.01, Z68.44] 12/19/2014 Not Applicable     Chronic      Problems Resolved During this Admission:    Diagnosis Date Noted Date Resolved POA    Cellulitis [L03.90] 12/03/2021 09/25/2023 Unknown       Melena.   - Concerning for UGIB.   - Initiated on Protonix bolus  followed by Protonix gtt  - NPO since onset of melena  - hypotensive  - transferred to ICU  - last Eliquis/ASA on 9/27/23  - serial H&H q 4 hours x 24 hours    Met with patient's wife and son. Wife requested conservative measures with medical therapy and defer endoscopic evaluation after discussing risks of endoscopy. He is presently, hypotensive. He needs further resuscitation and restarting midodrine given there is a chronic component to it as well. She understands Eliquis and ASA cannot be continued with suspected UGIB, therefore risk of recurrent DVT and or CVA is present. She also expressed understanding that by delaying endoscopic evaluation there is potential to delay diagnosis and treatment of treatable condition such as a bleeding peptic ulcer. By delaying endoscopic evaluation, she is contributing to the patient's demise. We discussed potential differential diagnoses including peptic ulcer vs malignancy vs Dieulafoy lesion vs GAVE vs other. Recommend serial H&H's and transfuse if patient drops below 7.     Thank you for your consult. I will follow-up with patient. Please contact us if you have any additional questions.    Ami Morris MD  Gastroenterology  [facility] - Intensive Care ([facility])

## 2023-09-28 NOTE — ASSESSMENT & PLAN NOTE
- GI consulted  - holding Eliquis  - monitor H/H   - getting 1unit currently  - received 80mg IV Protonix; continue IV BID

## 2023-09-28 NOTE — ASSESSMENT & PLAN NOTE
- Cr baseline approx 1 but has been closer to 2 over last month   - Cr now uptrending, may be in setting of GIB and hypotension   - Renally dose meds; avoid nephrotoxic agents  - Monitor UOP   - bowman discontinued on 09/25

## 2023-09-29 NOTE — PROGRESS NOTES
09/29/23 0101   Vital Signs   Pulse 68   Resp (!) 28   SpO2 98 %   BP (!) (S)  49/16  (notified NP)   MAP (mmHg) (S)  24

## 2023-09-29 NOTE — SUBJECTIVE & OBJECTIVE
Subjective:     Interval History: unstable overnight. BP as low as 40/20. Resuscitated with albumin, unit of pRBCs and placed on Levophed and Vasopresson. Art line placed as well.     Seen this morning. Nurses cleaning him, passed more melena.    Wife declined endoscopic intervention last night    Review of Systems  Objective:     Vital Signs (Most Recent):  Temp: 97.5 °F (36.4 °C) (09/29/23 0145)  Pulse: (!) 58 (09/29/23 0145)  Resp: (!) 21 (09/29/23 0145)  BP: (!) 74/36 (09/29/23 0145)  SpO2: 100 % (09/29/23 0145) Vital Signs (24h Range):  Temp:  [96.7 °F (35.9 °C)-97.9 °F (36.6 °C)] 97.5 °F (36.4 °C)  Pulse:  [58-76] 58  Resp:  [12-39] 21  SpO2:  [83 %-100 %] 100 %  BP: ()/(19-62) 74/36     Weight: (!) 196.4 kg (433 lb) (09/28/23 0330)  Body mass index is 57.13 kg/m².      Intake/Output Summary (Last 24 hours) at 9/29/2023 0653  Last data filed at 9/29/2023 0145  Gross per 24 hour   Intake 544.48 ml   Output 0 ml   Net 544.48 ml       Lines/Drains/Airways       Peripherally Inserted Central Catheter Line  Duration             PICC Double Lumen 09/21/23 1530 right basilic 7 days              Drain  Duration             Male External Urinary Catheter 09/28/23 1015 <1 day              Arterial Line  Duration             Arterial Line 09/29/23 0145 Right Radial <1 day                     Physical Exam  Constitutional:       Appearance: He is morbidly obese. He is ill-appearing.   Abdominal:      General: Abdomen is protuberant. Bowel sounds are increased.      Palpations: Abdomen is soft.   Neurological:      Mental Status: He is disoriented and confused.          Significant Labs:  CBC:   Recent Labs   Lab 09/28/23  0441 09/28/23  1023 09/28/23  1523 09/28/23  2000 09/29/23  0005 09/29/23  0415   WBC 9.13 9.67  --   --   --   --    HGB 7.6* 7.4*   < > 7.9* 7.7* 8.4*   HCT 23.5* 22.4*   < > 23.9* 23.4* 24.9*   * 102*  --   --   --   --     < > = values in this interval not displayed.     CMP:    Recent Labs   Lab 09/29/23  0415      CALCIUM 6.9*   ALBUMIN 1.5*      K 4.3   CO2 14*   *   BUN 88*   CREATININE 3.7*     Coagulation:   Recent Labs   Lab 09/28/23  1023   INR 1.8*         Significant Imaging:  Imaging results within the past 24 hours have been reviewed.

## 2023-09-29 NOTE — PLAN OF CARE
Pt remains intermittently confused. On RA. Afib/flutter/ SR on monitor. Vaso off. Levo remains on, requiring frequent titrations to maintain SBP >100. Afebrile. H/H stable, monitoring Q4. Voiding per bowman with minimal UOP. No bm this shift. No visible signs of bleeding noted. Turned Q2 to prevent further skin break down and injury. Pt and family updated on POC.       Problem: Adult Inpatient Plan of Care  Goal: Plan of Care Review  Outcome: Ongoing, Progressing  Goal: Patient-Specific Goal (Individualized)  Outcome: Ongoing, Progressing  Goal: Absence of Hospital-Acquired Illness or Injury  Outcome: Ongoing, Progressing  Goal: Optimal Comfort and Wellbeing  Outcome: Ongoing, Progressing  Goal: Readiness for Transition of Care  Outcome: Ongoing, Progressing     Problem: Bariatric Environmental Safety  Goal: Safety Maintained with Care  Outcome: Ongoing, Progressing     Problem: Fluid and Electrolyte Imbalance (Acute Kidney Injury/Impairment)  Goal: Fluid and Electrolyte Balance  Outcome: Ongoing, Progressing     Problem: Oral Intake Inadequate (Acute Kidney Injury/Impairment)  Goal: Optimal Nutrition Intake  Outcome: Ongoing, Progressing     Problem: Renal Function Impairment (Acute Kidney Injury/Impairment)  Goal: Effective Renal Function  Outcome: Ongoing, Progressing     Problem: Impaired Wound Healing  Goal: Optimal Wound Healing  Outcome: Ongoing, Progressing     Problem: Fall Injury Risk  Goal: Absence of Fall and Fall-Related Injury  Outcome: Ongoing, Progressing     Problem: Skin Injury Risk Increased  Goal: Skin Health and Integrity  Outcome: Ongoing, Progressing     Problem: Infection  Goal: Absence of Infection Signs and Symptoms  Outcome: Ongoing, Progressing

## 2023-09-29 NOTE — PROGRESS NOTES
Formerly [facility], [facility] - Intensive Care ([facility])  Gastroenterology  Progress Note    Patient Name: Gene Rothman  MRN: [id number]  Admission Date: 9/20/2023  Hospital Length of Stay: 9 days  Code Status: Full Code   Attending Provider: Rodrigo Josue MD  Consulting Provider: Ami Morris MD  Primary Care Physician: Ami Zarate DO  Principal Problem: Chronic respiratory failure with hypercapnia        Subjective:     Interval History: unstable overnight. BP as low as 40/20. Resuscitated with albumin, unit of pRBCs and placed on Levophed and Vasopresson. Art line placed as well.     Seen this morning. Nurses cleaning him, passed more melena.    Wife declined endoscopic intervention last night    Review of Systems  Objective:     Vital Signs (Most Recent):  Temp: 97.5 °F (36.4 °C) (09/29/23 0145)  Pulse: (!) 58 (09/29/23 0145)  Resp: (!) 21 (09/29/23 0145)  BP: (!) 74/36 (09/29/23 0145)  SpO2: 100 % (09/29/23 0145) Vital Signs (24h Range):  Temp:  [96.7 °F (35.9 °C)-97.9 °F (36.6 °C)] 97.5 °F (36.4 °C)  Pulse:  [58-76] 58  Resp:  [12-39] 21  SpO2:  [83 %-100 %] 100 %  BP: ()/(19-62) 74/36     Weight: (!) 196.4 kg (433 lb) (09/28/23 0330)  Body mass index is 57.13 kg/m².      Intake/Output Summary (Last 24 hours) at 9/29/2023 0653  Last data filed at 9/29/2023 0145  Gross per 24 hour   Intake 544.48 ml   Output 0 ml   Net 544.48 ml       Lines/Drains/Airways       Peripherally Inserted Central Catheter Line  Duration             PICC Double Lumen 09/21/23 1530 right basilic 7 days              Drain  Duration             Male External Urinary Catheter 09/28/23 1015 <1 day              Arterial Line  Duration             Arterial Line 09/29/23 0145 Right Radial <1 day                     Physical Exam  Constitutional:       Appearance: He is morbidly obese. He is ill-appearing.   Abdominal:      General: Abdomen is protuberant. Bowel sounds are increased.      Palpations: Abdomen is soft.   Neurological:      Mental  Status: He is disoriented and confused.          Significant Labs:  CBC:   Recent Labs   Lab 09/28/23  0441 09/28/23  1023 09/28/23  1523 09/28/23  2000 09/29/23  0005 09/29/23  0415   WBC 9.13 9.67  --   --   --   --    HGB 7.6* 7.4*   < > 7.9* 7.7* 8.4*   HCT 23.5* 22.4*   < > 23.9* 23.4* 24.9*   * 102*  --   --   --   --     < > = values in this interval not displayed.     CMP:   Recent Labs   Lab 09/29/23  0415      CALCIUM 6.9*   ALBUMIN 1.5*      K 4.3   CO2 14*   *   BUN 88*   CREATININE 3.7*     Coagulation:   Recent Labs   Lab 09/28/23  1023   INR 1.8*         Significant Imaging:  Imaging results within the past 24 hours have been reviewed.    Assessment/Plan:     Cardiac/Vascular  CHF (congestive heart failure)  Echo    Interpretation Summary    Left Ventricle: The left ventricle is normal in size. Mildly increased wall thickness. There is concentric remodeling. Normal wall motion. Septal motion is normal. There is normal systolic function with a visually estimated ejection fraction of 55 - 70%. There is normal diastolic function.    Left Atrium: Left atrium is mildly dilated.    Right Ventricle: Mild right ventricular enlargement. Wall thickness is normal. Right ventricle wall motion  is normal. Systolic function is normal.    Aortic Valve: The aortic valve is a trileaflet valve. There is moderate aortic valve sclerosis.    Tricuspid Valve: There is moderate regurgitation with a centrally directed jet.    Pulmonary Artery: The estimated pulmonary artery systolic pressure is 57 mmHg.    IVC/SVC: Normal venous pressure at 3 mmHg.      Hypotension  BP low  60/40's. Not new  Concerning for hypovolemia  Hx CHF with preserved EF    Renal/  CKD (chronic kidney disease) stage 3, GFR 30-59 ml/min  Progressive decline H&H since onset of Eliquis    Oncology  Acute on chronic anemia  Multifactorial - renal disease, chronic open wounds (leg and decubitus)    Endocrine  Morbid  obesity with BMI of 60.0-69.9, adult  Bed bound  Immobile    GI  Melena  Differential diagnoses includes PUD vs malignancy vs AVMs vs Dieulafoy lesion vs other  Pantoprazole 40mg PO BID  Eliquis last given yesterday (9/27/23)  ASA last given 9/27/23  No NSAID use  2u pRBCs on 9/27/23  1U pRBC on 9/28/23  H&H 8.5/25.8  Hypotensive, however chronic      Recommendations:  1. Agree with resuscitation efforts  2. Re-bolus Protonix 80mg IV x1 now      Patient hemodynamically unstable to proceed with endoscopic evaluation at this time. Wide declined EGD yesterday. Await for the patient to become stable and determine if she is now amenable. I will follow-up with patient. Please contact us if you have any additional questions.    Ami Morris MD  Gastroenterology  [facility] - Intensive Care ([facility])

## 2023-09-29 NOTE — PLAN OF CARE
Nutrition Recommendations 9/29:  1. Recommend initiate pt on Clear liquid diet when medically appropriate, advance per MD/NP, pt tolerance   2. Recommend Boost breeze TID if advanced to Clear liquid diet   3. Recommend pt recieves Jm BID x 2 weeks to assist with wound healing when medically appropriate  5. If unable to advance from NPO by day 5, recommend re consult to initiate alternative means of nutrition   6. Daily weights   7. Collaboration with other providers     Goals:   1. RD will be re consulted if pt diet not advanced by day 5 NPO   2. Pt will consume >50% of EEN and EPN prior to RD follow up  3. Pt's diarrhea/melena will improve by RD follow up   4. Pt will consume Jm BID by RD follow up     Rena Kwong, Registration Eligible, Provisional LDN

## 2023-09-29 NOTE — SUBJECTIVE & OBJECTIVE
Interval History:   3 maroon stools since arrival to the ICU. Some worsening hypotension overnight prompting initiation of Levo (PICC in place).  Alert and conversant this morning, though confused as to the circumstances of his current condition.  Stable on room air to nasal cannula.  Cr uptrending.      Objective:     Vital Signs (Most Recent):  Temp: 96.6 °F (35.9 °C) (09/29/23 0701)  Pulse: 67 (09/29/23 0701)  Resp: (!) 22 (09/29/23 0701)  BP: 111/81 (09/29/23 0701)  SpO2: 100 % (09/29/23 0701) Vital Signs (24h Range):  Temp:  [96.6 °F (35.9 °C)-97.9 °F (36.6 °C)] 96.6 °F (35.9 °C)  Pulse:  [55-91] 67  Resp:  [12-39] 22  SpO2:  [83 %-100 %] 100 %  BP: ()/(16-81) 111/81  Arterial Line BP: ()/(26-44) 92/34     Weight: (!) 194.1 kg (428 lb)  Body mass index is 56.47 kg/m².      Intake/Output Summary (Last 24 hours) at 9/29/2023 0826  Last data filed at 9/29/2023 0701  Gross per 24 hour   Intake 837.52 ml   Output 365 ml   Net 472.52 ml        Physical Exam  Vitals and nursing note reviewed.   Constitutional:       General: He is not in acute distress.     Appearance: He is obese.   Cardiovascular:      Rate and Rhythm: Normal rate and regular rhythm.      Pulses: Normal pulses.      Heart sounds: No murmur heard.  Pulmonary:      Effort: Pulmonary effort is normal. No respiratory distress.      Breath sounds: No wheezing.      Comments: Diminished bilaterally  Abdominal:      General: Abdomen is flat. There is no distension.      Palpations: Abdomen is soft.      Tenderness: There is no abdominal tenderness.   Musculoskeletal:      Right lower leg: Edema present.      Left lower leg: Edema present.      Comments: Lymphedema and chronic venous stasis changes bilaterally   Neurological:      Mental Status: He is alert.      Comments: Alert and conversant. Confused as to circumstances of his admission.           Review of Systems    Vents:  Oxygen Concentration (%): 40 (09/29/23  0054)    Lines/Drains/Airways       Peripherally Inserted Central Catheter Line  Duration             PICC Double Lumen 09/21/23 1530 right basilic 7 days              Drain  Duration                  Urethral Catheter 09/29/23 0301 Non-latex;Straight-tip 16 Fr. <1 day    Male External Urinary Catheter 09/28/23 1015 <1 day              Arterial Line  Duration             Arterial Line 09/29/23 0145 Right Radial <1 day                    Significant Labs:    CBC/Anemia Profile:  Recent Labs   Lab 09/28/23  0441 09/28/23  1023 09/28/23  1523 09/28/23  2000 09/29/23  0005 09/29/23  0415   WBC 9.13 9.67  --   --   --   --    HGB 7.6* 7.4*   < > 7.9* 7.7* 8.4*   HCT 23.5* 22.4*   < > 23.9* 23.4* 24.9*   * 102*  --   --   --   --    MCV 92 91  --   --   --   --    RDW 17.2* 17.4*  --   --   --   --     < > = values in this interval not displayed.        Chemistries:  Recent Labs   Lab 09/28/23 0441 09/29/23  0415    141   K 3.9 4.3   * 112*   CO2 18* 14*   BUN 78* 88*   CREATININE 3.3* 3.7*   CALCIUM 6.8* 6.9*   ALBUMIN 1.1* 1.5*   MG 1.9 1.9   PHOS 5.2* 6.0*       All pertinent labs within the past 24 hours have been reviewed.    Significant Imaging:  I have reviewed all pertinent imaging results/findings within the past 24 hours.

## 2023-09-29 NOTE — PROGRESS NOTES
[facility] - Telemetry ([facility])  Adult Nutrition  Progress Note    SUMMARY       Recommendations    Recommendation/Intervention:   1. Recommend initiate pt on Clear liquid diet when medically appropriate, advance per MD/NP, pt tolerance   2. Recommend Boost breeze TID if advanced to Clear liquid diet   3. Recommend pt recieves Jm BID x 2 weeks to assist with wound healing when medically appropriate  5. If unable to advance from NPO by day 5, recommend re consult to initiate alternative means of nutrition   6. Daily weights     Goals:   1. RD will be re consulted if pt diet not advanced by day 5 NPO   2. Pt will consume >50% of EEN and EPN prior to RD follow up  3. Pt's diarrhea/melena will improve by RD follow up   4. Pt will consume Jm BID by RD follow up   Nutrition Goal Status: continues/ new  Communication of RD Recs: other (comment) (POC: Sticky Note)    Assessment and Plan    Nutrition Problem  Inadequate PO intake  Altered GI function   Increased protein needs      Related to (etiology):   Decreased ability to consume sufficient nutrition  Alteration in GI tract structure and/or function   Increased demand for nutrition      Signs and Symptoms (as evidenced by):   NPO x 1 day   Diarrhea/Melena   Decubitus ulcers, multiple wound healing needs      Interventions(treatment strategy):  1. Clear liquid diet   2. Commercial Beverage  3. Medical food and mineral supplement therapy for wound healing  4. Collaboration with other providers     Nutrition Diagnosis Status:   Continues    Malnutrition Assessment     Skin (Micronutrient): bruised, edema, red, pallor, wounds unhealed (Flaky, Chan score = 12 (High risk)       Fluid Accumulation (Malnutrition): moderate to severe                         Reason for Assessment    Reason For Assessment: consult  Diagnosis: pulmonary disease (Chronic respiratory failure with hypercapnia)  Relevant Medical History: CHF, CKD, CAD, HTN, Lymphedema of BLE, Sleep apnea  (Chronic), Urinary retention  Interdisciplinary Rounds: did not attend  General Information Comments:   76 y/o male admitted w current principal problem of Chronic respiratory failure with hypercapnia. Pt currently has NPO diet order 2/2 being on continueous BiPAP. The pt RN supects pt will be taken of BiPAP soon. NFPE not performed. RD will assess need for NFPE during follow up. The is currently charted to weigh 433 lb, BMI 57.13 (Morbidly obese). Per EMR pt has skin tears to sacrum/ gluteal and BLE ulcers. Awaiting wound care assessment. The pt LBM was 9/19. Per family members the pt has had a poor appetite and poor urien output. RD will continue to follow    Nutrition Discharge Planning: Cardiac diet + Jm BID x 2 weeks + Suplena as warranted     Follow up:   9/25/23: RD follow up. Pt currently on Cardiac, Renal, 1200 mL Fluid Restricted diet. Visited pt at bedside, pt working with RN. Spoke to RN, stated pt has been experiencing diarrhea and nausea w/out vomiting, confirmed only consumed apple juice today with pt d/t nausea. Wound care noted on 9/21 multiple DTPIs noted caused by ACE bandages, stage 2 PI to L lateral hip, R medial thigh DTPI, MASD to bilateral buttocks, L mid upper back DTPI's and stage 3 PI to coccyx/sacral region. Reviewed chart: LBM 9/25 (loose); Skin: bruised, wounds; Chan score: 13 (moderate risk); Edema: 3+ moderate L foot/ bilateral ankle/leg, generalized. Labs, meds, weight reviewed. Note PRN ondansetron. Weight charted 9/21 432 lbs, 9/23 430 lbs (BMI 56.73, Morbid obesity), -2 lb wt loss x 2 days. No NFPE warranted at this time, BMI > 40. RD will continue to monitor.     9/29: RD follow up. Pt currently NPO x 1 day, transferred to the ICU on 9/28 d/t melanic stools and hypotension. EMR noted that pt wife declined endoscopic intervention, pt hemodynamically unstable to proceed w/ endoscopic evaluation at this time, plan for pt to become stable to determine if he is now amenable, pt  "has confusion towards the circumstances of his current condition, possible AMS, Cr continues to uptrend, not a dialysis candidate. Wound care noted 9/28 improvement in prior condition of stage 3 pressure injury to sacral/coccygeal region with full thickness tissue loss, oozing from penile meatus , weeping from multiple BLE DTPIs and venous leg ulcers. Reviewed chart: LBM 9/29 (fecal incontinence), Skin: pale, red, bruised, flaky, wounds; Chan score: 12 (High risk); Edema: 4+ severe bilateral foot, 3+ moderate bilateral ankle/knee/leg, generalized, dependent. Labs, meds, weight reviewed. Weight charted 9/23 430 lbs, 9/29 428 lbs (BMI 56.47, Morbid obesity), -2 lb wt loss x 6 days. No NFPE warranted at this time, BMI > 40, insignificant wt loss. RD will continue to monitor.     Nutrition Risk Screen    Nutrition Risk Screen: large or nonhealing wound, burn or pressure injury    Nutrition/Diet History    Spiritual, Cultural Beliefs, Gnosticism Practices, Values that Affect Care:  (GUMARO)  Food Allergies: NKFA  Factors Affecting Nutritional Intake: abdominal distension, NPO (BiPAP)    Anthropometrics    Temp: 97.5 °F (36.4 °C)  Height Method: Estimated  Height: 6' 1" (185.4 cm)  Height (inches): 73 in  Weight Method: Bed Scale  Weight: (!) 194.1 kg (428 lb)  Weight (lb): (!) 428.01 lb  Ideal Body Weight (IBW), Male: 184 lb  % Ideal Body Weight, Male (lb): 235.32 %  BMI (Calculated): 56.5  BMI Grade: greater than 40 - morbid obesity     Wt Readings from Last 15 Encounters:   09/29/23 (!) 194.1 kg (428 lb)   09/15/23 (!) 192.2 kg (423 lb 11.6 oz)   09/09/23 (!) 200 kg (440 lb 14.7 oz)   08/01/23 (!) 178.6 kg (393 lb 11.9 oz)   07/17/23 (!) 176 kg (388 lb)   03/07/23 (!) 180.7 kg (398 lb 5.9 oz)   02/20/23 (!) 154.7 kg (341 lb)   02/04/23 (!) 186 kg (410 lb)   12/11/22 (!) 192.5 kg (424 lb 6.4 oz)   09/19/22 (!) 188.2 kg (415 lb)   08/21/22 (!) 195 kg (429 lb 14.4 oz)   06/13/22 (!) 218.7 kg (482 lb 2.3 oz)   01/05/22 (!) " 181.9 kg (401 lb 0.3 oz)   12/19/21 (!) 194.4 kg (428 lb 9.2 oz)   12/09/21 (!) 202.3 kg (446 lb)     Lab/Procedures/Meds    Pertinent Labs Reviewed: reviewed  Pertinent Medications Reviewed: reviewed  BMP  Lab Results   Component Value Date     09/29/2023    K 4.3 09/29/2023     (H) 09/29/2023    CO2 14 (L) 09/29/2023    BUN 88 (H) 09/29/2023    CREATININE 3.7 (H) 09/29/2023    CALCIUM 6.9 (LL) 09/29/2023    ANIONGAP 15 09/29/2023    EGFRNORACEVR 16 (A) 09/29/2023     Lab Results   Component Value Date    CALCIUM 6.9 (LL) 09/29/2023    PHOS 6.0 (H) 09/29/2023     Lab Results   Component Value Date    ALBUMIN 1.5 (L) 09/29/2023     Scheduled Meds:   acetaminophen  650 mg Oral TID    atorvastatin  40 mg Oral Daily    cefTRIAXone (ROCEPHIN) IVPB  1 g Intravenous Q24H    fludrocortisone  100 mcg Oral BID    hydrocortisone sodium succinate  100 mg Intravenous Once    ipratropium  0.5 mg Nebulization Q6H    levothyroxine  50 mcg Oral Before breakfast    midodrine  15 mg Oral Q8H    polyethylene glycol  17 g Oral Daily    senna-docusate 8.6-50 mg  1 tablet Oral BID    sucralfate  1 g Oral Q6H     Continuous Infusions:   NORepinephrine bitartrate-D5W 0.56 mcg/kg/min (09/29/23 1500)    pantoprazole (PROTONIX) IV infusion 8 mg/hr (09/29/23 1500)     PRN Meds:.0.9%  NaCl infusion (for blood administration), sodium chloride 0.9%, influenza 65up-adj, ondansetron, senna-docusate 8.6-50 mg, sodium chloride 0.9%    Physical Findings/Assessment         Estimated/Assessed Needs    Weight Used For Calorie Calculations: 111.3 kg (245 lb 6 oz) (Adj BW)  Energy Calorie Requirements (kcal): 1882 kcals (MSJ Adj BW x no AF (GI Disorder vs CHF, Morbid obese BMI 56.47)  Energy Need Method: Celena Stokes  Protein Requirements: 61-67 g (0.55-0.6 g/kg Adj BW (CKD 3, Mrobid obese 235.32% IBW)  Weight Used For Protein Calculations: 111.3 kg (245 lb 6 oz) (Adj BW)  Fluid Requirements (mL): 1-2 L/day per MD/NP (CHF, edema)  Estimated  Fluid Requirement Method: other (see comments)  RDA Method (mL): 1882  CHO Requirement: 235 g (1882 kcals/8)      Nutrition Prescription Ordered    Current Diet Order: NPO     Evaluation of Received Nutrient/Fluid Intake  I/O: (Net since admit)   9/21: -11.1 mL   9/25: -1257.3 mL  9/29: +1497.8 mL    Energy Calories Required: not meeting needs  Protein Required: not meeting needs  Fluid Required: meeting needs  Total Fluid Intake (mL): 1537.4  Tolerance: Currently no intake   % Intake of Estimated Energy Needs: 0 - 25 %  % Meal Intake: NPO    Nutrition Risk    Level of Risk/Frequency of Follow-up: high (x2 weekly)     Monitor and Evaluation    Food and Nutrient Intake: energy intake, food and beverage intake  Food and Nutrient Adminstration: diet order  Physical Activity and Function: nutrition-related ADLs and IADLs, factors affecting access to physical activity  Anthropometric Measurements: weight, body mass index  Biochemical Data, Medical Tests and Procedures: electrolyte and renal panel, glucose/endocrine profile, inflammatory profile, lipid profile  Nutrition-Focused Physical Findings: overall appearance, skin     Nutrition Follow-Up    RD Follow-up?: Yes  Rena Kwong, Registration Eligible, Provisional LDN

## 2023-09-29 NOTE — PHYSICIAN QUERY
PT Name: Gene Rothman  MR #: [id number]     DOCUMENTATION CLARIFICATION     CDS: Bayron Cruz RN CCDS               Contact information: [email or web address]      This form is a permanent document in the medical record.    Query Date: September 29, 2023    By submitting this query, we are merely seeking further clarification of documentation.  Please utilize your independent clinical judgment when addressing the question(s) below.    The Medical Record contains the following:   Indicator Supporting Clinical Findings Location in Medical Record    Kidney (Renal) Insufficiency       x Kidney (Renal) Failure/Injury Critical secondary to Patient has a condition that poses threat to life and bodily function: Acute Renal Failure 9/20/2023 H&P     Nephrotoxic Agents       x BUN/Creatinine           GFR  09/20/23 15:41 09/22/23 04:23 09/24/23 05:57 09/26/23 06:12 09/28/23 04:41 09/29/23 04:15   BUN 65 (H) 60 (H) 56 (H) 63 (H) 78 (H) 88 (H)   Creatinine 2.9 (H) 2.2 (H) 1.9 (H) 2.3 (H) 3.3 (H) 3.7 (H)   eGFR 21  30  35  28  18  16     Lab values     x Urine: Casts         Eosinophils  09/21/23 11:30 09/29/23 04:00   Hyaline Casts, UA 0 16     Lab values     x Urine Output Intake/Output Summary (Last 24 hours) at 9/21/2023 1126  Last data filed at 9/21/2023 0000      Gross per 24 hour   Intake 1038.88 ml   Output 0 ml   Net 1038.88 ml     Intake/Output Summary (Last 24 hours) at 9/29/2023 0826  Last data filed at 9/29/2023 0701      Gross per 24 hour   Intake 837.52 ml   Output 365 ml   Net 472.52 ml    9/21/2023 Pulmonology progress notes                  9/29/2023 Pulmonology progress notes    Dehydration       x Nausea/Vomiting Pt has no complaints this morning other than nausea. Denies vomiting.  9/25/2023 Hospital Medicine progress notes    Dialysis/CRRT       x Treatment lactated ringers bolus 500 mL  sodium bicarbonate 100 mEq in dextrose 5 % (D5W) 1,100 mL infusion @ 75 mL/hr  sodium bicarbonate solution 100 mEq IV x  1  sodium chloride 0.9% bolus 250 mL 9/20/2023 Medication  9/21-9/22/2023 Medication    9/29/2023 Medications     x Other Significant drop in BP, currently 50's-60's systolic    9/28 - transferred back to ICU due to melanic stools and hypotension.  9/29 - 3 BM since arrival to the ICU.  Wife refused endoscopy yesterday.  Low dose Levo this morning. Cr uptrending. Hoping to turn it around now that we are supporting his BP more  Not a dialysis candidate 9/29/2023 Pulmonology significant event note  9/29/2023 Pulmonology progress notes       [facility] approved diagnostic criteria for acute kidney injury is based on KDIGO criteria:    An increase in serum creatinine > 0.3mg/dl within 48 hours  OR  Increase in serum creatinine to > 1.5x baseline, which is known or presumed to have occurred within the prior 7 days  OR  Urine volume <0.5 ml/kg/hr for 6 hours       The clinical guidelines noted above are only a system guideline. It does not replace the providers clinical judgment.     Provider, please specify the diagnosis or diagnoses associated with above clinical findings.     [  X  ] Acute Kidney Failure/Injury with Tubular Necrosis  - Damage to the tubule cells of the kidney. Common triggers: shock, hypotension, IV contrast, rhabdomyolysis, medications; Expected to take more than 72 hours for renal function to return to baseline   [    ] Unspecified Acute Kidney Failure/Injury     [    ] Other Acute Kidney Failure/Injury (please specify): ____________     [    ] Other (please specify): _______________________________   [  ] Clinically Undetermined         Please document in your progress notes daily for the duration of treatment until resolved and include in your discharge summary.    References:   [facility] Clinical Practice Guideline for Acute Kidney Injury. (2012, March). Retrieved October 21, 2020, from https://kdigo.org/wp-content/uploads/2016/10/LYEBQ-1956-PHS-Guideline-English.pdf    BRITT Hilliard., MD, FACP.  (2015, Sandra 15). Acute kidney injury revisited. Retrieved October 21, 2020, from https://acphospitalist.org/archives/2015/06/coding-acute-kidney-injury.htm      Form No. 65827

## 2023-09-29 NOTE — PROGRESS NOTES
"[facility] - Intensive Care ([facility])  Critical Care Medicine  Progress Note    Patient Name: Gene Rothman  MRN: [id number]  Admission Date: 9/20/2023  Hospital Length of Stay: 9 days  Code Status: Full Code  Attending Provider: Rodrigo Josue MD  Primary Care Provider: Ami Zarate DO   Principal Problem: Chronic respiratory failure with hypercapnia    Subjective:     HPI:  77yr old known to most hospital services presents on his 61st wedding anniversary per his wife who is at the bedside because of his inappropriate response to her questions. She said he was ignoring her and was inappropriate. She thinks there is something physiologically wrong with him or he would not be acting like that on this day.   She knows he has chronic hypotension but was concerned about hypercapnia. In ED he is on NIPPV and is acting like he always does which makes his wife more comfortable since "he is acting like himself" Son is also at the bedside.   He was given midodrine by ED and his BP has improved. Other complaints per family are "lack of appetite and low urine output"    Hx of COPD, OSAS / OHS / Bed bound for 2 years, A fib, On chronic anticoagulation, pul HTN (PAP 57mmHg) CHF, Lymphedema, chronic hypotension, HFpEF, Moderate MR / TR, chronic resp failure on home oxygen and chronic RV dysfunction. He is non compliant with his NIPPV although he was given a new machine during his last admission. He resist change in position and has acquired some wounds.       Hospital/ICU Course:  9/21.  Awake and follows commands on bipap.   Had episode of bradycardia with HR in 30s overnight.   Remained hemodynamically stable.     9/22. Awake and alert. Off NIPPV. Tolerating NC. Not on pressors. K low. No chest pain or abdominal discomfort. No family at the bedside. Flat affect and not interested in a conversation.     9/28 - transferred back to ICU due to melanic stools and hypotension.  9/29 - 3 BM since arrival to the ICU.  Wife refused " endoscopy yesterday.  Low dose Levo this morning. Cr uptrending.        Interval History:   3 maroon stools since arrival to the ICU. Some worsening hypotension overnight prompting initiation of Levo (PICC in place).  Alert and conversant this morning, though confused as to the circumstances of his current condition.  Stable on room air to nasal cannula.  Cr uptrending.      Objective:     Vital Signs (Most Recent):  Temp: 96.6 °F (35.9 °C) (09/29/23 0701)  Pulse: 67 (09/29/23 0701)  Resp: (!) 22 (09/29/23 0701)  BP: 111/81 (09/29/23 0701)  SpO2: 100 % (09/29/23 0701) Vital Signs (24h Range):  Temp:  [96.6 °F (35.9 °C)-97.9 °F (36.6 °C)] 96.6 °F (35.9 °C)  Pulse:  [55-91] 67  Resp:  [12-39] 22  SpO2:  [83 %-100 %] 100 %  BP: ()/(16-81) 111/81  Arterial Line BP: ()/(26-44) 92/34     Weight: (!) 194.1 kg (428 lb)  Body mass index is 56.47 kg/m².      Intake/Output Summary (Last 24 hours) at 9/29/2023 0826  Last data filed at 9/29/2023 0701  Gross per 24 hour   Intake 837.52 ml   Output 365 ml   Net 472.52 ml        Physical Exam  Vitals and nursing note reviewed.   Constitutional:       General: He is not in acute distress.     Appearance: He is obese.   Cardiovascular:      Rate and Rhythm: Normal rate and regular rhythm.      Pulses: Normal pulses.      Heart sounds: No murmur heard.  Pulmonary:      Effort: Pulmonary effort is normal. No respiratory distress.      Breath sounds: No wheezing.      Comments: Diminished bilaterally  Abdominal:      General: Abdomen is flat. There is no distension.      Palpations: Abdomen is soft.      Tenderness: There is no abdominal tenderness.   Musculoskeletal:      Right lower leg: Edema present.      Left lower leg: Edema present.      Comments: Lymphedema and chronic venous stasis changes bilaterally   Neurological:      Mental Status: He is alert.      Comments: Alert and conversant. Confused as to circumstances of his admission.           Review of  "Systems    Vents:  Oxygen Concentration (%): 40 (09/29/23 0054)    Lines/Drains/Airways       Peripherally Inserted Central Catheter Line  Duration             PICC Double Lumen 09/21/23 1530 right basilic 7 days              Drain  Duration                  Urethral Catheter 09/29/23 0301 Non-latex;Straight-tip 16 Fr. <1 day    Male External Urinary Catheter 09/28/23 1015 <1 day              Arterial Line  Duration             Arterial Line 09/29/23 0145 Right Radial <1 day                    Significant Labs:    CBC/Anemia Profile:  Recent Labs   Lab 09/28/23  0441 09/28/23  1023 09/28/23  1523 09/28/23  2000 09/29/23  0005 09/29/23  0415   WBC 9.13 9.67  --   --   --   --    HGB 7.6* 7.4*   < > 7.9* 7.7* 8.4*   HCT 23.5* 22.4*   < > 23.9* 23.4* 24.9*   * 102*  --   --   --   --    MCV 92 91  --   --   --   --    RDW 17.2* 17.4*  --   --   --   --     < > = values in this interval not displayed.        Chemistries:  Recent Labs   Lab 09/28/23  0441 09/29/23  0415    141   K 3.9 4.3   * 112*   CO2 18* 14*   BUN 78* 88*   CREATININE 3.3* 3.7*   CALCIUM 6.8* 6.9*   ALBUMIN 1.1* 1.5*   MG 1.9 1.9   PHOS 5.2* 6.0*       All pertinent labs within the past 24 hours have been reviewed.    Significant Imaging:  I have reviewed all pertinent imaging results/findings within the past 24 hours.      ABG  No results for input(s): "PH", "PO2", "PCO2", "HCO3", "BE" in the last 168 hours.  Assessment/Plan:     Pulmonary  * Chronic respiratory failure with hypercapnia  Patient with Hypercapnic Respiratory failure which is Acute on chronic.  he is on home oxygen at 4 LPM. Supplemental oxygen was provided and noted- Oxygen Concentration (%):  [40] 40    .   Signs/symptoms of respiratory failure include- increased work of breathing. Contributing diagnoses includes - Obesity Hypoventilation and OSAS Labs and images were reviewed. Patient Has recent ABG, which has been reviewed. Will treat underlying causes and adjust " management of respiratory failure as follows-     Continue NIPPV qhs and prn   NC when awake and oriented as tolerated.      Cardiac/Vascular  Bradycardia  No repeat episodes. May have been from electrolyte abn.  Cardiology following  Not a candidate for pacemaker.  Does not have chronotropic incompetence. When awake HR in 70-90s    Hypotension  Continue Midodrine   Completed empiric course of Rocephin  Now complicated by melena  Improved with blood initially, but required initiation of pressors overnight 9/28  Lactic acid normal  Continue to monitor      CHF (congestive heart failure)  Patient is identified as having Diastolic (HFpEF) heart failure that is Chronic. CHF is currently controlled. Latest ECHO performed and demonstrates- Results for orders placed during the hospital encounter of 09/02/23    Echo    Interpretation Summary    Left Ventricle: The left ventricle is normal in size. Mildly increased wall thickness. There is concentric remodeling. Normal wall motion. Septal motion is normal. There is normal systolic function with a visually estimated ejection fraction of 55 - 70%. There is normal diastolic function.    Left Atrium: Left atrium is mildly dilated.    Right Ventricle: Mild right ventricular enlargement. Wall thickness is normal. Right ventricle wall motion  is normal. Systolic function is normal.    Aortic Valve: The aortic valve is a trileaflet valve. There is moderate aortic valve sclerosis.    Tricuspid Valve: There is moderate regurgitation with a centrally directed jet.    Pulmonary Artery: The estimated pulmonary artery systolic pressure is 57 mmHg.    IVC/SVC: Normal venous pressure at 3 mmHg.    Cr continues to uptrend  Hold on diuretics for now  Continue to monitor volume status    Venous stasis dermatitis of both lower extremities  Chronic lymphedema  Supportive care  Wound care     Renal/  Urinary retention  Catheter in place.    CKD (chronic kidney disease) stage 3, GFR 30-59  ml/min  Cr continues to uptrend  Hoping to turn it around now that we are supporting his BP more  Not a dialysis candidate  Monitor UOP and renal fxn  Renally dose meds   Avoid nephrotoxins    Hematology  Venous thromboembolism  - holding Eliquis now in the face of GIB    Endocrine  Morbid obesity with BMI of 60.0-69.9, adult  Functional quad.  Not a candidate for surgery   Educated / counseling    Hypothyroidism (acquired)  Continue home medication    GI  Melena  - GI consulted  - holding Eliquis  - monitor H/H   - recieved 1unit currently  - received 80mg IV Protonix; continue IV BID  - wife refused endoscopy    GERD (gastroesophageal reflux disease)  - PPI    Orthopedic  Stage III pressure ulcer of sacral region  - WOCN    Other  Lymphedema  Wound care   Dressing in place    Sleep apnea  NIPPV qhs and prn  Remains generally noncompliant        Critical Care Time: 38 minutes  Critical secondary to infusion of high risk medications.      Critical care was time spent personally by me on the following activities: development of treatment plan with patient or surrogate and bedside caregivers, discussions with consultants, evaluation of patient's response to treatment, examination of patient, ordering and performing treatments and interventions, ordering and review of laboratory studies, ordering and review of radiographic studies, pulse oximetry, re-evaluation of patient's condition. This critical care time did not overlap with that of any other provider or involve time for any procedures.     Rodrigo Josue MD  Critical Care Medicine  '[facility] - Intensive Care ([facility])

## 2023-09-29 NOTE — ASSESSMENT & PLAN NOTE
Cr continues to uptrend  Hoping to turn it around now that we are supporting his BP more  Not a dialysis candidate  Monitor UOP and renal fxn  Renally dose meds   Avoid nephrotoxins

## 2023-09-29 NOTE — PHYSICIAN QUERY
PT Name: Gene Rothman  MR #: [id number]     DOCUMENTATION CLARIFICATION     CDS: Bayron Cruz RN CCDS               Contact information: [email or web address]      This form is a permanent document in the medical record.     Query Date: September 29, 2023    By submitting this query, we are merely seeking further clarification of documentation.  Please utilize your independent clinical judgment when addressing the question(s) below.  The Medical Record contains the following:  Indicators Supporting Clinical Findings Location in Medical Record   x Acute Illness (e.g. AMI, Sepsis, etc.) Acute Renal Failure  Acute on chronic anemia    Melena  developed large melanotic stool on AM of 09/28 9/20/2023 H&P  9/27/2023 Hospital Medicine progress notes  9/28/2023 Hospital Medicine progress notes   x Vital Signs  BPs: 89/52 (MAP 68), 62/37 (MAP 45), 52/27 (MAP 34), 62/36 (MAP 44)  HR: 61-76    BPs: 49/16 (MAP 24), BP 63/28 (MAP 40), 73/49 (MAP 57), 70/40 (MAP 50)  HR: 67-69 9/28/2023 Vitals      9/29/2023 Vitals    Acidosis documented     x ABGs/Labs  09/25/23 03:33 09/27/23 05:51 09/28/23 15:23 09/29/23 00:05   Hemoglobin 7.7 (L) 6.4 (L) 8.5 (L) 7.7 (L)   Hematocrit 25.1 (L) 20.8 (L) 25.8 (L) 23.4 (L)    Lab values   x Hypotension or Low Blood Pressure documented Hypotension  chronic, pt asymptomatic. Likely exacerbated by hypoalbuminemia/third spacing, most recent albumin is 1.1    Hypotension  Now complicated by melena  Improved with blood currently    Some worsening hypotension overnight prompting initiation of Levo (PICC in place).      Hypotension  BP low  60/40's. Not new  Concerning for hypovolemia 9/27/2023 Hospital Medicine progress notes      9/28/2023 Pulmonology progress notes        9/29/2023 Pulmonology progress notes      9/29/2023 GI progress notes   x Altered Mental Status or Confusion patient is confused, will not answer any questions, there is hearing impairment as well hindering assessment, keeps saying  ""help me" over and over again.    Alert and conversant this morning, though confused as to the circumstances of his current condition. 9/29/2023 Pulmonology significant event note      9/29/2023 Pulmonology progress notes   x Diaphoresis, Cold Extremities or Cyanosis cool, dusky, pale skin 9/28/2023 Nursing assessment    Oliguria     x Medication/Treatment  -Vasopressors  -Inotropic Drugs  -IV Fluids   -IV Antibiotics  -Cardiac Assist Devices  -Hemodynamic Monitoring  -Blood/Blood Products albumin human 5% bottle 12.5 g IV x 1  NORepinephrine 4 mg infusion (titrating)  sodium chloride 0.9% bolus 250 mL  vasopressin (PITRESSIN) 0.2 Units/mL infusion @ 12 mL/hr  pantoprazole (PROTONIX) IV @ 8 mg/hr    Transfusing 2u PRBC for Hgb 6.4.     GI consulted and will eval, recommend PPI bolus. Will transfuse additional 1u pRBC,  transfer to ICU for closer monitoring.     Hydrocortisone ordered x one dose  Will order arterial line as well  May require initiation of pressor but will transfuse one unit PRBC prior to starting pressor    Re-bolus Protonix 80mg IV x1 now 9/29/2023 Medications        9/28/2023- Current Medication    9/27/2023 Hospital Medicine progress notes  9/28/2023 Hospital Medicine progress notes    9/29/2023 Pulmonology significant event note        9/29/2023 GI progress notes   x Other BP remains low, pt asymptomatic.    Hgb 7.6 despite 2u PRBC. Per nursing, pt had large melanotic bowel movement this AM. he is awake, alert, oriented x 3.     BP running lower than typical for him, though mental status is stable    Wife declined endoscopic intervention last night    3 maroon stools since arrival to the ICU.  Cr uptrending. Hoping to turn it around now that we are supporting his BP more 9/27/2023 Hospital Medicine progress notes  9/28/2023 Hospital Medicine progress notes    9/28/2023 Pulmonology progress notes    9/29/2023 GI progress notes    9/29/2023 Pulmonology progress notes     Provider, please specify " diagnosis or diagnoses associated with above clinical findings.    [    ] Hemorrhagic Shock   [  X  ] Hypovolemic Shock   [    ] Other Condition (please specify): _________   [  ] Clinically Undetermined           Please document in your progress notes daily for the duration of treatment until resolved and include in your discharge summary.     Form No. [id number]

## 2023-09-29 NOTE — ASSESSMENT & PLAN NOTE
Patient with Hypercapnic Respiratory failure which is Acute on chronic.  he is on home oxygen at 4 LPM. Supplemental oxygen was provided and noted- Oxygen Concentration (%):  [40] 40    .   Signs/symptoms of respiratory failure include- increased work of breathing. Contributing diagnoses includes - Obesity Hypoventilation and OSAS Labs and images were reviewed. Patient Has recent ABG, which has been reviewed. Will treat underlying causes and adjust management of respiratory failure as follows-     Continue NIPPV qhs and prn   NC when awake and oriented as tolerated.

## 2023-09-29 NOTE — NURSING
Notified NP of low SBP in the 70-80's & DBP from 30-40's. MAP <65. Pt is asymptomatic despite hypotensive pressures.   NP made aware of BP's & no new orders given.  Will continue to monitor.

## 2023-09-29 NOTE — ASSESSMENT & PLAN NOTE
Patient is identified as having Diastolic (HFpEF) heart failure that is Chronic. CHF is currently controlled. Latest ECHO performed and demonstrates- Results for orders placed during the hospital encounter of 09/02/23    Echo    Interpretation Summary    Left Ventricle: The left ventricle is normal in size. Mildly increased wall thickness. There is concentric remodeling. Normal wall motion. Septal motion is normal. There is normal systolic function with a visually estimated ejection fraction of 55 - 70%. There is normal diastolic function.    Left Atrium: Left atrium is mildly dilated.    Right Ventricle: Mild right ventricular enlargement. Wall thickness is normal. Right ventricle wall motion  is normal. Systolic function is normal.    Aortic Valve: The aortic valve is a trileaflet valve. There is moderate aortic valve sclerosis.    Tricuspid Valve: There is moderate regurgitation with a centrally directed jet.    Pulmonary Artery: The estimated pulmonary artery systolic pressure is 57 mmHg.    IVC/SVC: Normal venous pressure at 3 mmHg.    Cr continues to uptrend  Hold on diuretics for now  Continue to monitor volume status

## 2023-09-29 NOTE — SIGNIFICANT EVENT
"Significant drop in BP, currently 50's-60's systolic, patient is confused, will not answer any questions, there is hearing impairment as well hindering assessment, keeps saying "help me" over and over again.    Hgb 7.7  NS bolus 250 ml ordered  Albumin ordered  Hydrocortisone ordered x one dose  He is already on oral midodrine and fludrocortisone  Will order arterial line as well  May require initiation of pressor but will transfuse one unit PRBC prior to starting pressor  "

## 2023-09-29 NOTE — ASSESSMENT & PLAN NOTE
- GI consulted  - holding Eliquis  - monitor H/H   - recieved 1unit currently  - received 80mg IV Protonix; continue IV BID  - wife refused endoscopy

## 2023-09-29 NOTE — ASSESSMENT & PLAN NOTE
Continue Midodrine   Completed empiric course of Rocephin  Now complicated by melena  Improved with blood initially, but required initiation of pressors overnight 9/28  Lactic acid normal  Continue to monitor

## 2023-09-30 PROBLEM — R57.9 SHOCK: Status: ACTIVE | Noted: 2021-12-03

## 2023-09-30 NOTE — ASSESSMENT & PLAN NOTE
- initially presumed to be 2/2 to GIB when he transferred back to the ICU  - initially responded to resuscitation, but then started on pressors that have been steadily increasing  - lactic has been normal, but elevated this morning  - completed a course of empiric Rocephin earlier his hospital course and restarted when he came back to the ICU.  Will broad Abx today given worsening shock.  - Urine Cx from 9/29 is pending  - repeat blood cultures  - need to have further discussions with family today, though extensive discussions have been had in the past with the various care teams, including Palliative Care, and they have always been resistant to anything other than Full Code

## 2023-09-30 NOTE — ASSESSMENT & PLAN NOTE
- GI consulted  - holding Eliquis  - stopping serial H/H today given stability  - recieved 1unit currently  - stopping protonix drip to aid with compatibility and IV access; continue BID  - wife refused endoscopy

## 2023-09-30 NOTE — PLAN OF CARE
"Patient remains on Levo and Protonix gtts. Patient remains confused. Patient refused BiPAP at night. Placed on 4L NC. In and out of Afib throughout the night. Afebrile. May remains. Trending H&H q4h. No signs of bleeding noted. Turned Q2 for further skin breakdown prevention.    Patient did not sleep during the night. Pt given melatonin with no results. Patient was screaming entirety of night. Pt redirected and re-educated multiple times throughout the night. Patient repeatedly asked "Why can't you let me go? I want to die. Please let me go." Patient re-educated on importance of expressing his wishes with his family.    Problem: Adult Inpatient Plan of Care  Goal: Plan of Care Review  Outcome: Ongoing, Progressing  Goal: Patient-Specific Goal (Individualized)  Outcome: Ongoing, Progressing  Goal: Absence of Hospital-Acquired Illness or Injury  Outcome: Ongoing, Progressing  Goal: Optimal Comfort and Wellbeing  Outcome: Ongoing, Progressing  Goal: Readiness for Transition of Care  Outcome: Ongoing, Progressing     Problem: Bariatric Environmental Safety  Goal: Safety Maintained with Care  Outcome: Ongoing, Progressing     Problem: Fluid and Electrolyte Imbalance (Acute Kidney Injury/Impairment)  Goal: Fluid and Electrolyte Balance  Outcome: Ongoing, Progressing     Problem: Oral Intake Inadequate (Acute Kidney Injury/Impairment)  Goal: Optimal Nutrition Intake  Outcome: Ongoing, Progressing     Problem: Renal Function Impairment (Acute Kidney Injury/Impairment)  Goal: Effective Renal Function  Outcome: Ongoing, Progressing     Problem: Impaired Wound Healing  Goal: Optimal Wound Healing  Outcome: Ongoing, Progressing     Problem: Fall Injury Risk  Goal: Absence of Fall and Fall-Related Injury  Outcome: Ongoing, Progressing     Problem: Skin Injury Risk Increased  Goal: Skin Health and Integrity  Outcome: Ongoing, Progressing     Problem: Infection  Goal: Absence of Infection Signs and Symptoms  Outcome: Ongoing, " Progressing

## 2023-09-30 NOTE — ASSESSMENT & PLAN NOTE
Patient with Hypercapnic Respiratory failure which is Acute on chronic.  he is on home oxygen at 4 LPM. Supplemental oxygen was provided and noted-      .   Signs/symptoms of respiratory failure include- increased work of breathing. Contributing diagnoses includes - Obesity Hypoventilation and OSAS Labs and images were reviewed. Patient Has recent ABG, which has been reviewed. Will treat underlying causes and adjust management of respiratory failure as follows-     Continue NIPPV qhs and prn; pt routinely refuses  NC when awake and oriented as tolerated.

## 2023-09-30 NOTE — PLAN OF CARE
Patient refusing to turn, despite education. Patient wife at  and given update by RN and Dr. Josue. Bicarb drip started. Attempting to wean Levo with little success.

## 2023-09-30 NOTE — SUBJECTIVE & OBJECTIVE
Interval History:   No acute events overnight.  Alert and conversant, though confused.  Refused Bipap overnight.  Oxygenation stable on 4L.  Levo requirement up quite a bit.  Cr continues uptrend and UOP is minimal.  H/H stable and decreased frequency of BM.      Objective:     Vital Signs (Most Recent):  Temp: 96.4 °F (35.8 °C) (09/30/23 0645)  Pulse: 95 (09/30/23 0645)  Resp: (!) 21 (09/30/23 0500)  BP: (!) 98/58 (09/30/23 0600)  SpO2: 98 % (09/30/23 0645) Vital Signs (24h Range):  Temp:  [96.3 °F (35.7 °C)-98.1 °F (36.7 °C)] 96.4 °F (35.8 °C)  Pulse:  [] 95  Resp:  [8-35] 21  SpO2:  [84 %-100 %] 98 %  BP: ()/(30-97) 98/58  Arterial Line BP: ()/(32-65) 127/61     Weight: (!) 194.1 kg (428 lb)  Body mass index is 56.47 kg/m².      Intake/Output Summary (Last 24 hours) at 9/30/2023 0744  Last data filed at 9/30/2023 0600  Gross per 24 hour   Intake 3083.75 ml   Output 102 ml   Net 2981.75 ml        Physical Exam  Vitals and nursing note reviewed.   Constitutional:       General: He is not in acute distress.     Appearance: He is obese.   Cardiovascular:      Rate and Rhythm: Normal rate and regular rhythm.      Pulses: Normal pulses.      Heart sounds: No murmur heard.  Pulmonary:      Effort: Pulmonary effort is normal. No respiratory distress.      Breath sounds: No wheezing or rhonchi.      Comments: Diminished bilaterally  Abdominal:      General: Abdomen is flat. There is no distension.      Palpations: Abdomen is soft.      Tenderness: There is no abdominal tenderness.   Musculoskeletal:      Right lower leg: Edema present.      Left lower leg: Edema present.      Comments: Chronic venous stasis changes and lymphedema   Neurological:      General: No focal deficit present.      Mental Status: He is alert.      Comments: Alert and conversant, though confused           Review of Systems    Vents:  Oxygen Concentration (%): 40 (09/29/23 0054)    Lines/Drains/Airways       Peripherally Inserted  Central Catheter Line  Duration             PICC Double Lumen 09/21/23 1530 right basilic 8 days              Drain  Duration                  Urethral Catheter 09/29/23 0301 Non-latex;Straight-tip 16 Fr. 1 day              Arterial Line  Duration             Arterial Line 09/29/23 0145 Right Radial 1 day                    Significant Labs:    CBC/Anemia Profile:  Recent Labs   Lab 09/28/23  1023 09/28/23  1523 09/29/23  2032 09/29/23  2356 09/30/23  0401   WBC 9.67  --   --   --   --    HGB 7.4*   < > 8.6* 8.9* 8.7*   HCT 22.4*   < > 25.6* 26.4* 25.7*   *  --   --   --   --    MCV 91  --   --   --   --    RDW 17.4*  --   --   --   --     < > = values in this interval not displayed.        Chemistries:  Recent Labs   Lab 09/29/23  0415 09/30/23  0401    137   K 4.3 4.2   * 108   CO2 14* 8*   BUN 88* 91*   CREATININE 3.7* 4.3*   CALCIUM 6.9* 7.1*   ALBUMIN 1.5* 1.5*   MG 1.9 1.9   PHOS 6.0* 6.0*       All pertinent labs within the past 24 hours have been reviewed.    Significant Imaging:  I have reviewed all pertinent imaging results/findings within the past 24 hours.

## 2023-09-30 NOTE — CONSULTS
Pharmacokinetic Initial Assessment: IV Vancomycin    Assessment/Plan:  Initiate intravenous vancomycin with a loading dose of 2000 mg once with subsequent doses when random concentrations are less than 20 mcg/mL  Desired empiric serum trough concentration is 10 to 20 mcg/mL  Draw vancomycin random level on 10/1 at 0430 with AM labs.  Pharmacy will continue to follow and monitor vancomycin.      Please contact pharmacy at extension [phone] with any questions regarding this assessment.     Thank you for the consult,   Katherine McArdle, Pharm.D. 9/30/2023 3:36 PM         Patient brief summary:  Gene Rothman is a 77 y.o. male initiated on antimicrobial therapy with IV Vancomycin for treatment of suspected sepsis of unknown source     Drug Allergies:   Review of patient's allergies indicates:   Allergen Reactions    Sulfamethoxazole-trimethoprim Itching and Shortness Of Breath    Sulfamethoxazole        Actual Body Weight:   194 kg    Renal Function:   Estimated Creatinine Clearance: 24.7 mL/min (A) (based on SCr of 4.3 mg/dL (H)). -- trending up    Dialysis Method (if applicable):  N/A     CBC (last 72 hours):  Recent Labs   Lab Result Units 09/28/23  0441 09/28/23  1023 09/28/23  1523 09/28/23  2000 09/29/23  0005 09/29/23  0415 09/29/23  0802 09/29/23  1150 09/29/23  1553 09/29/23  2032 09/29/23  2356 09/30/23  0401   WBC K/uL 9.13 9.67  --   --   --   --   --   --   --   --   --   --    Hemoglobin g/dL 7.6* 7.4* 8.5* 7.9* 7.7* 8.4* 7.8* 8.8* 8.9* 8.6* 8.9* 8.7*   Hematocrit % 23.5* 22.4* 25.8* 23.9* 23.4* 24.9* 23.8* 26.1* 26.5* 25.6* 26.4* 25.7*   Platelets K/uL 106* 102*  --   --   --   --   --   --   --   --   --   --    Gran % % 62.6 66.8  --   --   --   --   --   --   --   --   --   --    Lymph % % 28.4 25.7  --   --   --   --   --   --   --   --   --   --    Mono % % 8.0 6.6  --   --   --   --   --   --   --   --   --   --    Eosinophil % % 0.3 0.2  --   --   --   --   --   --   --   --   --   --     Basophil % % 0.2 0.2  --   --   --   --   --   --   --   --   --   --    Differential Method  Automated Automated  --   --   --   --   --   --   --   --   --   --        Metabolic Panel (last 72 hours):  Recent Labs   Lab Result Units 09/28/23  0441 09/29/23  0400 09/29/23  0415 09/30/23  0401 09/30/23  1346   Sodium mmol/L 141  --  141 137  --    Sodium, Urine mmol/L  --  <20*  --   --   --    Potassium mmol/L 3.9  --  4.3 4.2  --    Chloride mmol/L 112*  --  112* 108  --    CO2 mmol/L 18*  --  14* 8*  --    Glucose mg/dL 81  --  109 235*  --    Glucose, UA   --  Negative  --   --  Negative   BUN mg/dL 78*  --  88* 91*  --    Creatinine mg/dL 3.3*  --  3.7* 4.3*  --    Creatinine, Urine mg/dL  --  93.1  --   --  44.6   Albumin g/dL 1.1*  --  1.5* 1.5*  --    Magnesium mg/dL 1.9  --  1.9 1.9  --    Phosphorus mg/dL 5.2*  --  6.0* 6.0*  --        Microbiologic Results:  Microbiology Results (last 7 days)       Procedure Component Value Units Date/Time    Blood culture [[id number]] Collected: 09/30/23 0920    Order Status: Sent Specimen: Blood Updated: 09/30/23 1518    Blood culture [[id number]] Collected: 09/30/23 0920    Order Status: Sent Specimen: Blood Updated: 09/30/23 1518    Urine culture [[id number]] Collected: 09/29/23 0400    Order Status: Completed Specimen: Urine Updated: 09/30/23 1408     Urine Culture, Routine Multiple organisms isolated. None in predominance.  Repeat if      clinically necessary.    Narrative:      Specimen Source->Urine    Blood culture x two cultures. Draw prior to antibiotics. [[id number]] Collected: 09/20/23 1756    Order Status: Completed Specimen: Blood from Peripheral, Upper Arm, Right Updated: 09/26/23 0612     Blood Culture, Routine No growth after 5 days.    Narrative:      Aerobic and anaerobic    Blood culture x two cultures. Draw prior to antibiotics. [[id number]] Collected: 09/20/23 1800    Order Status: Completed Specimen: Blood from Peripheral, Upper Arm, Right  Updated: 09/25/23 6141     Blood Culture, Routine No growth after 5 days.    Narrative:      Aerobic and anaerobic

## 2023-09-30 NOTE — PROGRESS NOTES
[facility] - Intensive Care ([facility])  Gastroenterology  Progress Note    Patient Name: Gene Rothman  MRN: [id number]  Admission Date: 9/20/2023  Hospital Length of Stay: 10 days  Code Status: Full Code   Attending Provider: Rodrigo Josue MD  Consulting Provider: Ami Morris MD  Primary Care Physician: Ami Zarate DO  Principal Problem: Chronic respiratory failure with hypercapnia        Subjective:     Interval History: pressors increased to maintain BP. No further melena per nursing. In acute renal failure. Started on bicarb gtt, CO2 8, lactate 6.5.     Protonix gtt discontinued and switched to PPI IV BID. Remains confused.     Review of Systems  Objective:     Vital Signs (Most Recent):  Temp: 96.1 °F (35.6 °C) (09/30/23 1215)  Pulse: (!) 115 (09/30/23 1215)  Resp: (!) 27 (09/30/23 1215)  BP: (!) 84/56 (09/30/23 0800)  SpO2: (!) 92 % (09/30/23 1215) Vital Signs (24h Range):  Temp:  [96.1 °F (35.6 °C)-98.1 °F (36.7 °C)] 96.1 °F (35.6 °C)  Pulse:  [] 115  Resp:  [8-35] 27  SpO2:  [85 %-100 %] 92 %  BP: ()/(33-70) 84/56  Arterial Line BP: ()/(32-65) 108/51     Weight: (!) 194.1 kg (428 lb) (09/29/23 0601)  Body mass index is 56.47 kg/m².      Intake/Output Summary (Last 24 hours) at 9/30/2023 1246  Last data filed at 9/30/2023 1223  Gross per 24 hour   Intake 4164.59 ml   Output 60 ml   Net 4104.59 ml       Lines/Drains/Airways       Peripherally Inserted Central Catheter Line  Duration             PICC Double Lumen 09/21/23 1530 right basilic 8 days              Drain  Duration                  Urethral Catheter 09/29/23 0301 Non-latex;Straight-tip 16 Fr. 1 day              Arterial Line  Duration             Arterial Line 09/29/23 0145 Right Radial 1 day                     Physical Exam  Constitutional:       Appearance: He is morbidly obese. He is ill-appearing.   Abdominal:      General: Abdomen is protuberant. Bowel sounds are decreased.      Palpations: Abdomen is soft.       "Tenderness: There is no abdominal tenderness.   Neurological:      Mental Status: He is disoriented and confused.          Significant Labs:  CBC:   Recent Labs   Lab 09/29/23  2032 09/29/23  2356 09/30/23  0401   HGB 8.6* 8.9* 8.7*   HCT 25.6* 26.4* 25.7*     CMP:   Recent Labs   Lab 09/30/23  0401   *   CALCIUM 7.1*   ALBUMIN 1.5*      K 4.2   CO2 8*      BUN 91*   CREATININE 4.3*     Coagulation: No results for input(s): "PT", "INR", "APTT" in the last 48 hours.      Significant Imaging:  Imaging results within the past 24 hours have been reviewed.    Assessment/Plan:     Cardiac/Vascular  Venous stasis dermatitis of both lower extremities  Severe  Chronic oozing  Wound care following    Hypotension  Multifactorial  On pressors  Hx CHF with preserved EF    Renal/  CKD (chronic kidney disease) stage 3, GFR 30-59 ml/min  Acute on chronic renal failure  Crea 4.3  CO2 8, placed on bicarb gtt    Oncology  Acute on chronic anemia  Multifactorial - renal disease, chronic open wounds (leg and decubitus), suspected peptic ulcer.     GI  Melena  Differential diagnoses includes PUD vs esophagitis vs malignancy vs AVMs vs Dieulafoy lesion vs other  Pantoprazole 40mg PO BID  Eliquis last given yesterday (9/27/23)  ASA last given 9/27/23  No NSAID use  2u pRBCs on 9/27/23  1U pRBC on 9/28/23  Wife declined EGD 9/28 therefore placed on protonix gtt  Placed on pressors early morning 9/29 for severe hypotension  H&H stabilized  No melena in last 24 hours (9/29/23 to 9/30/23)      Recommendations:  1. Continue to hold anticoagulation  2. Protonix 40mg IV BID  3. No plans for endoscopic evaluation as patient's renal and pulmonary status remain tenuous.       Discussed with Dr. Josue, ICU service. I will follow-up with patient. Please contact us if you have any additional questions.    Ami Morris MD  Gastroenterology  [facility] - Intensive Care ([facility])  "

## 2023-09-30 NOTE — CONSULTS
..Gene Rothman is a 77 y.o. male for whom nephrology consult has been requested to evaluate and give opinion.     HPI: 77 year old white male with morbid obesity and CKD stage 3 who was admitted to ICU on 9/20/23 with Scr 2.9 this improved a bit to a radha of 1.9 but then is[place] to 3.7 and then 4.3 by this am; Nephrology consult was requested for further evaluation; he is felt to be septic likely from some soft tissues/cellulitis associated with his morbid obesity; last pH was in 7.2 range but none available this am; lactate level around 6; since admission he is + 5 liters TBV; he has multiple co-morbidities including DENISHA; OHS; COPD; HTN; CHF; chronic lymphdema; moderate MR/TR; melenotic stools noted noted 9/28/23; never had endoscopy; and no plans for such until renal and pulm status improves per GI notes; we are asked to assist further in his care; no recent contrasted studies are noted to have been ordered or performed.     PAST MEDICAL HISTORY:  He  has a past medical history of Acute hypoxemic respiratory failure (9/17/2022), Acute on chronic anemia (9/27/2023), Acute pulmonary embolism, Arthritis, Atrial fibrillation (3/1/2023), Cellulitis (12/3/2021), CHF (congestive heart failure), Chronic kidney disease, Chronic obstructive pulmonary disease with acute exacerbation, Coronary artery disease, Depression, Hyperlipidemia, Hypertension, Hypertensive heart disease with heart failure (9/16/2022), Hypothyroidism, Lymphedema of lower extremity, Nephrolithiasis, Obesity, Peripheral vascular disease, Pneumonia (9/17/2022), Recurrent cellulitis of lower leg, Sleep apnea, and Tobacco dependence.    PAST SURGICAL HISTORY:  He  has a past surgical history that includes Back surgery (1975;  1976); Inner ear surgery (Bilateral, 1961); Tonsillectomy (1961); Kidney stone surgery (Left, 1976 approx); Adenoidectomy (1961); and debridement of bilateral leg ulcers (Bilateral, 01/01/2017).    SOCIAL HISTORY:  He  reports that he  quit smoking about 54 years ago. His smoking use included cigarettes. He started smoking about 55 years ago. He has a 1.5 pack-year smoking history. He has never used smokeless tobacco. He reports that he does not currently use alcohol. He reports that he does not use drugs.      FAMILY MEDICAL HISTORY:  His family history includes Alcohol abuse in his maternal grandfather; Alzheimer's disease in his brother; Asthma in his daughter; Cancer in his brother, father, and mother; Diabetes in his mother; Heart disease in his brother and father; Hypertension in his mother.    Review of patient's allergies indicates:   Allergen Reactions    Sulfamethoxazole-trimethoprim Itching and Shortness Of Breath    Sulfamethoxazole         acetaminophen  650 mg Oral TID    atorvastatin  40 mg Oral Daily    fludrocortisone  100 mcg Oral BID    hydrocortisone sodium succinate  100 mg Intravenous Once    ipratropium  0.5 mg Nebulization Q12H    levothyroxine  50 mcg Oral Before breakfast    midodrine  15 mg Oral Q8H    pantoprazole  40 mg Intravenous BID    piperacillin-tazobactam (Zosyn) IV (PEDS and ADULTS) (extended infusion is not appropriate)  4.5 g Intravenous Q12H    polyethylene glycol  17 g Oral Daily    senna-docusate 8.6-50 mg  1 tablet Oral BID    sucralfate  1 g Oral Q6H       Prior to Admission medications    Medication Sig Start Date End Date Taking? Authorizing Provider   apixaban (ELIQUIS) 5 mg Tab Take 1 tablet (5 mg total) by mouth 2 (two) times daily. 7/11/23 7/10/24 Yes Kyara Cassidy, NP   aspirin 81 MG Chew Take 1 tablet (81 mg total) by mouth once daily. 2/20/23  Yes Zhane Vázquez FNP   acetaminophen (TYLENOL) 500 MG tablet Take 500 mg by mouth every 6 (six) hours as needed for Pain.    Provider, Historical   atorvastatin (LIPITOR) 40 MG tablet Take 1 tablet (40 mg total) by mouth once daily. 2/20/23 9/15/23  Zhane Vázquez FNP   fludrocortisone (FLORINEF) 0.1 mg Tab Take 1 tablet (100 mcg total) by  "mouth 2 (two) times daily.  Patient not taking: Reported on 9/15/2023 9/14/23 10/14/23  Linda Cano NP   furosemide (LASIX) 40 MG tablet Take 1 tablet (40 mg total) by mouth once daily. 3/20/23 3/19/24  Kyara Cassidy, NP   levalbuterol (XOPENEX) 1.25 mg/3 mL nebulizer solution Take 3 mLs (1.25 mg total) by nebulization every 4 (four) hours as needed for Wheezing. Rescue 3/20/23 3/19/24  Kyara Cassidy, NP   levothyroxine (SYNTHROID) 50 MCG tablet Take 1 tablet (50 mcg total) by mouth before breakfast. 2/3/23   Ami Zarate,    metronidazole 1% (METROGEL) 1 % Gel Apply topically once daily. 9/14/23 10/14/23  Linda Cano NP   miconazole nitrate 2% (MICOTIN) 2 % Oint Apply topically 2 (two) times daily.  Patient not taking: Reported on 9/15/2023 9/14/23 10/14/23  Linda Cano NP   midodrine (PROAMATINE) 5 MG Tab Take 3 tablets (15 mg total) by mouth 3 (three) times daily. 9/13/23 10/14/23  Linda Cano NP   nebulizer and compressor (HOME NEBULIZER PLUS SIDESTREAM) Diana use as directed 3/8/23   Russ Comer MD   senna-docusate 8.6-50 mg (PERICOLACE) 8.6-50 mg per tablet Take 1 tablet by mouth daily as needed for Constipation. 9/13/23 10/14/23  Linda Cano NP   tiotropium (SPIRIVA) 18 mcg inhalation capsule Inhale 1 capsule (18 mcg total) into the lungs once daily. Controller 6/30/23 6/29/24  Kyara Cassidy NP        REVIEW OF SYSTEMS:    Unobtainable at present.    Patient has no fever, fatigue, visual changes, chest pain, edema, cough, dyspnea, nausea, vomiting, constipation, diarrhea, arthralgias, pruritis, dizziness, weakness, depression, confusion.        PHYSICAL EXAM:   height is 6' 1" (1.854 m) and weight is 194.1 kg (428 lb) (abnormal). His temperature is 96.1 °F (35.6 °C). His blood pressure is 84/56 (abnormal) and his pulse is 115 (abnormal). His respiration is 27 (abnormal) and oxygen saturation is 92% (abnormal).   Gen: WDWN male in no apparent distress  Psych: Normal mood " and affect  Skin: No rashes or ulcers  Eyes: Normal conjunctiva and lids, PERRLA  ENT: Normal hearing with no oropharyngeal lesions  Neck: No JVD  Chest: Clear with no rales, rhonchi, wheezing with normal effort  CV: Regular with no murmurs, gallops or rubs  Abd: Soft, nontender, no distension, positive bowel sounds; morbid obesity  Ext: No cyanosis, clubbing; 2+ edema          IMPRESSION AND RECOMMENDATIONS:    BETY on CKD stage 3 likely due to sepsis and GIB with melena  Chronic resp failure with hypercapnea  CHF; EF 55 %  BL venous statis dermatitis  Morbid obesity  Sleep apnea    Plan: Patient is seen and examined; chart reviewed; he makes a terrible HD candidate due to non-compliance and poor prognosis with multiple admissions and co-morbidities; severe obesity and highr risk for sudden cardiac death; certainly will abide by wishes of wife and patient but he is not an ideal candidate for multiple reasons above; supportive care per pulmonary crit care team; K is acceptable; continue bicarb gtt for acidosis and trend lactate levels as needed; his CO2 level is suggestive of metabolic acidosis but can consider formal ABG testing to clarify if any coexisitng resp acidosis. Will check iPTH and Vit D and phos and dose RAY Rx for completion when criteria satisfied; currently remains on bicarb gtt and Levophed and midodrine; Call with interim concerns; [phone].        Memo Garcia MD

## 2023-09-30 NOTE — SUBJECTIVE & OBJECTIVE
Subjective:     Interval History: pressors increased to maintain BP. No further melena per nursing. In acute renal failure. Started on bicarb gtt, CO2 8, lactate 6.5.     Protonix gtt discontinued and switched to PPI IV BID. Remains confused.     Review of Systems  Objective:     Vital Signs (Most Recent):  Temp: 96.1 °F (35.6 °C) (09/30/23 1215)  Pulse: (!) 115 (09/30/23 1215)  Resp: (!) 27 (09/30/23 1215)  BP: (!) 84/56 (09/30/23 0800)  SpO2: (!) 92 % (09/30/23 1215) Vital Signs (24h Range):  Temp:  [96.1 °F (35.6 °C)-98.1 °F (36.7 °C)] 96.1 °F (35.6 °C)  Pulse:  [] 115  Resp:  [8-35] 27  SpO2:  [85 %-100 %] 92 %  BP: ()/(33-70) 84/56  Arterial Line BP: ()/(32-65) 108/51     Weight: (!) 194.1 kg (428 lb) (09/29/23 0601)  Body mass index is 56.47 kg/m².      Intake/Output Summary (Last 24 hours) at 9/30/2023 1246  Last data filed at 9/30/2023 1223  Gross per 24 hour   Intake 4164.59 ml   Output 60 ml   Net 4104.59 ml       Lines/Drains/Airways       Peripherally Inserted Central Catheter Line  Duration             PICC Double Lumen 09/21/23 1530 right basilic 8 days              Drain  Duration                  Urethral Catheter 09/29/23 0301 Non-latex;Straight-tip 16 Fr. 1 day              Arterial Line  Duration             Arterial Line 09/29/23 0145 Right Radial 1 day                     Physical Exam  Constitutional:       Appearance: He is morbidly obese. He is ill-appearing.   Abdominal:      General: Abdomen is protuberant. Bowel sounds are decreased.      Palpations: Abdomen is soft.      Tenderness: There is no abdominal tenderness.   Neurological:      Mental Status: He is disoriented and confused.          Significant Labs:  CBC:   Recent Labs   Lab 09/29/23  2032 09/29/23  2356 09/30/23  0401   HGB 8.6* 8.9* 8.7*   HCT 25.6* 26.4* 25.7*     CMP:   Recent Labs   Lab 09/30/23  0401   *   CALCIUM 7.1*   ALBUMIN 1.5*      K 4.2   CO2 8*      BUN 91*   CREATININE  "4.3*     Coagulation: No results for input(s): "PT", "INR", "APTT" in the last 48 hours.      Significant Imaging:  Imaging results within the past 24 hours have been reviewed.  "

## 2023-09-30 NOTE — PROGRESS NOTES
"[facility] - [facility]ensive Care ([facility])  Critical Care Medicine  Progress Note    Patient Name: Gene Rohtman  MRN: [id number]  Admission Date: 9/20/2023  Hospital Length of Stay: 10 days  Code Status: Full Code  Attending Provider: Rodrigo Josue MD  Primary Care Provider: Ami Zarate DO   Principal Problem: Chronic respiratory failure with hypercapnia    Subjective:     HPI:  77yr old known to most hospital services presents on his 61st wedding anniversary per his wife who is at the bedside because of his inappropriate response to her questions. She said he was ignoring her and was inappropriate. She thinks there is something physiologically wrong with him or he would not be acting like that on this day.   She knows he has chronic hypotension but was concerned about hypercapnia. In ED he is on NIPPV and is acting like he always does which makes his wife more comfortable since "he is acting like himself" Son is also at the bedside.   He was given midodrine by ED and his BP has improved. Other complaints per family are "lack of appetite and low urine output"    Hx of COPD, OSAS / OHS / Bed bound for 2 years, A fib, On chronic anticoagulation, pul HTN (PAP 57mmHg) CHF, Lymphedema, chronic hypotension, HFpEF, Moderate MR / TR, chronic resp failure on home oxygen and chronic RV dysfunction. He is non compliant with his NIPPV although he was given a new machine during his last admission. He resist change in position and has acquired some wounds.       Hospital/ICU Course:  9/21.  Awake and follows commands on bipap.   Had episode of bradycardia with HR in 30s overnight.   Remained hemodynamically stable.     9/22. Awake and alert. Off NIPPV. Tolerating NC. Not on pressors. K low. No chest pain or abdominal discomfort. No family at the bedside. Flat affect and not interested in a conversation.     9/28 - transferred back to ICU due to melanic stools and hypotension.  9/29 - 3 BM since arrival to the ICU.  Wife refused " endoscopy yesterday.  Low dose Levo this morning. Cr uptrending.  9/30 - No BM overnight.  H/H stable, but worsening pressor requirement and renal failure.  Minimal UOP.  Remains alert and conversant, though confused.      Interval History:   No acute events overnight.  Alert and conversant, though confused.  Refused Bipap overnight.  Oxygenation stable on 4L.  Levo requirement up quite a bit.  Cr continues uptrend and UOP is minimal.  H/H stable and decreased frequency of BM.      Objective:     Vital Signs (Most Recent):  Temp: 96.4 °F (35.8 °C) (09/30/23 0645)  Pulse: 95 (09/30/23 0645)  Resp: (!) 21 (09/30/23 0500)  BP: (!) 98/58 (09/30/23 0600)  SpO2: 98 % (09/30/23 0645) Vital Signs (24h Range):  Temp:  [96.3 °F (35.7 °C)-98.1 °F (36.7 °C)] 96.4 °F (35.8 °C)  Pulse:  [] 95  Resp:  [8-35] 21  SpO2:  [84 %-100 %] 98 %  BP: ()/(30-97) 98/58  Arterial Line BP: ()/(32-65) 127/61     Weight: (!) 194.1 kg (428 lb)  Body mass index is 56.47 kg/m².      Intake/Output Summary (Last 24 hours) at 9/30/2023 0744  Last data filed at 9/30/2023 0600  Gross per 24 hour   Intake 3083.75 ml   Output 102 ml   Net 2981.75 ml        Physical Exam  Vitals and nursing note reviewed.   Constitutional:       General: He is not in acute distress.     Appearance: He is obese.   Cardiovascular:      Rate and Rhythm: Normal rate and regular rhythm.      Pulses: Normal pulses.      Heart sounds: No murmur heard.  Pulmonary:      Effort: Pulmonary effort is normal. No respiratory distress.      Breath sounds: No wheezing or rhonchi.      Comments: Diminished bilaterally  Abdominal:      General: Abdomen is flat. There is no distension.      Palpations: Abdomen is soft.      Tenderness: There is no abdominal tenderness.   Musculoskeletal:      Right lower leg: Edema present.      Left lower leg: Edema present.      Comments: Chronic venous stasis changes and lymphedema   Neurological:      General: No focal deficit present.  "     Mental Status: He is alert.      Comments: Alert and conversant, though confused           Review of Systems    Vents:  Oxygen Concentration (%): 40 (09/29/23 0054)    Lines/Drains/Airways       Peripherally Inserted Central Catheter Line  Duration             PICC Double Lumen 09/21/23 1530 right basilic 8 days              Drain  Duration                  Urethral Catheter 09/29/23 0301 Non-latex;Straight-tip 16 Fr. 1 day              Arterial Line  Duration             Arterial Line 09/29/23 0145 Right Radial 1 day                    Significant Labs:    CBC/Anemia Profile:  Recent Labs   Lab 09/28/23  1023 09/28/23  1523 09/29/23  2032 09/29/23  2356 09/30/23  0401   WBC 9.67  --   --   --   --    HGB 7.4*   < > 8.6* 8.9* 8.7*   HCT 22.4*   < > 25.6* 26.4* 25.7*   *  --   --   --   --    MCV 91  --   --   --   --    RDW 17.4*  --   --   --   --     < > = values in this interval not displayed.        Chemistries:  Recent Labs   Lab 09/29/23  0415 09/30/23  0401    137   K 4.3 4.2   * 108   CO2 14* 8*   BUN 88* 91*   CREATININE 3.7* 4.3*   CALCIUM 6.9* 7.1*   ALBUMIN 1.5* 1.5*   MG 1.9 1.9   PHOS 6.0* 6.0*       All pertinent labs within the past 24 hours have been reviewed.    Significant Imaging:  I have reviewed all pertinent imaging results/findings within the past 24 hours.      ABG  No results for input(s): "PH", "PO2", "PCO2", "HCO3", "BE" in the last 168 hours.  Assessment/Plan:     Pulmonary  * Chronic respiratory failure with hypercapnia  Patient with Hypercapnic Respiratory failure which is Acute on chronic.  he is on home oxygen at 4 LPM. Supplemental oxygen was provided and noted-      .   Signs/symptoms of respiratory failure include- increased work of breathing. Contributing diagnoses includes - Obesity Hypoventilation and OSAS Labs and images were reviewed. Patient Has recent ABG, which has been reviewed. Will treat underlying causes and adjust management of respiratory " failure as follows-     Continue NIPPV qhs and prn; pt routinely refuses  NC when awake and oriented as tolerated.      Cardiac/Vascular  Bradycardia  No repeat episodes. May have been from electrolyte abn.  Cardiology following  Not a candidate for pacemaker.  Does not have chronotropic incompetence. When awake HR in 70-90s    Hypotension  Continue Midodrine   Completed empiric course of Rocephin  Now complicated by melena  Improved with blood initially, but required initiation of pressors overnight 9/28  Lactic acid normal  Continue to monitor      CHF (congestive heart failure)  Patient is identified as having Diastolic (HFpEF) heart failure that is Chronic. CHF is currently controlled. Latest ECHO performed and demonstrates- Results for orders placed during the hospital encounter of 09/02/23    Echo    Interpretation Summary    Left Ventricle: The left ventricle is normal in size. Mildly increased wall thickness. There is concentric remodeling. Normal wall motion. Septal motion is normal. There is normal systolic function with a visually estimated ejection fraction of 55 - 70%. There is normal diastolic function.    Left Atrium: Left atrium is mildly dilated.    Right Ventricle: Mild right ventricular enlargement. Wall thickness is normal. Right ventricle wall motion  is normal. Systolic function is normal.    Aortic Valve: The aortic valve is a trileaflet valve. There is moderate aortic valve sclerosis.    Tricuspid Valve: There is moderate regurgitation with a centrally directed jet.    Pulmonary Artery: The estimated pulmonary artery systolic pressure is 57 mmHg.    IVC/SVC: Normal venous pressure at 3 mmHg.    Cr continues to uptrend  Hold on diuretics for now  Continue to monitor volume status    Venous stasis dermatitis of both lower extremities  Chronic lymphedema  Supportive care  Wound care     Renal/  Urinary retention  Catheter in place.    CKD (chronic kidney disease) stage 3, GFR 30-59  ml/min  BETY on CKD  Cr continues to uptrend  I do not feel that he is a dialysis candidate  Will consult Nephrology today for assistance  Monitor UOP and renal fxn  Renally dose meds   Avoid nephrotoxins    Hematology  Venous thromboembolism  - holding Eliquis now in the face of GIB    Endocrine  Morbid obesity with BMI of 60.0-69.9, adult  Functional quad.  Not a candidate for surgery   Educated / counseling    Hypothyroidism (acquired)  Continue home medication    GI  Melena  - GI consulted  - holding Eliquis  - stopping serial H/H today given stability  - recieved 1unit currently  - stopping protonix drip to aid with compatibility and IV access; continue BID  - wife refused endoscopy    GERD (gastroesophageal reflux disease)  - PPI    Orthopedic  Stage III pressure ulcer of sacral region  - WOCN    Other  Lymphedema  Wound care   Dressing in place    Sleep apnea  NIPPV qhs and prn  Remains generally noncompliant        Critical Care Time: 40 minutes  Critical secondary to infusion of high risk medications.      Critical care was time spent personally by me on the following activities: development of treatment plan with patient or surrogate and bedside caregivers, discussions with consultants, evaluation of patient's response to treatment, examination of patient, ordering and performing treatments and interventions, ordering and review of laboratory studies, ordering and review of radiographic studies, pulse oximetry, re-evaluation of patient's condition. This critical care time did not overlap with that of any other provider or involve time for any procedures.     Rodrigo Josue MD  Critical Care Medicine  '[place] - Intensive Care ([facility])

## 2023-09-30 NOTE — ASSESSMENT & PLAN NOTE
BETY on CKD  Cr continues to uptrend  I do not feel that he is a dialysis candidate  Will consult Nephrology today for assistance  Monitor UOP and renal fxn  Renally dose meds   Avoid nephrotoxins

## 2023-09-30 NOTE — ASSESSMENT & PLAN NOTE
Differential diagnoses includes PUD vs esophagitis vs malignancy vs AVMs vs Dieulafoy lesion vs other  Pantoprazole 40mg PO BID  Eliquis last given yesterday (9/27/23)  ASA last given 9/27/23  No NSAID use  2u pRBCs on 9/27/23  1U pRBC on 9/28/23  Wife declined EGD 9/28 therefore placed on protonix gtt  Placed on pressors early morning 9/29 for severe hypotension  H&H stabilized  No melena in last 24 hours (9/29/23 to 9/30/23)

## 2023-10-01 PROBLEM — Z71.89 GOALS OF CARE, COUNSELING/DISCUSSION: Status: ACTIVE | Noted: 2022-05-26

## 2023-10-01 NOTE — ASSESSMENT & PLAN NOTE
BETY on CKD  Cr slightly improved today, though UOP remains low  I do not feel that he is a dialysis candidate  Nephrology following  Monitor UOP and renal fxn  Renally dose meds   Avoid nephrotoxins

## 2023-10-01 NOTE — PROGRESS NOTES
"[facility] - Intensive Care ([facility])  Critical Care Medicine  Progress Note    Patient Name: Gene Rothman  MRN: [id number]  Admission Date: 9/20/2023  Hospital Length of Stay: 11 days  Code Status: Full Code  Attending Provider: Rodrigo Josue MD  Primary Care Provider: Ami Zarate DO   Principal Problem: Chronic respiratory failure with hypercapnia    Subjective:     HPI:  77yr old known to most hospital services presents on his 61st wedding anniversary per his wife who is at the bedside because of his inappropriate response to her questions. She said he was ignoring her and was inappropriate. She thinks there is something physiologically wrong with him or he would not be acting like that on this day.   She knows he has chronic hypotension but was concerned about hypercapnia. In ED he is on NIPPV and is acting like he always does which makes his wife more comfortable since "he is acting like himself" Son is also at the bedside.   He was given midodrine by ED and his BP has improved. Other complaints per family are "lack of appetite and low urine output"    Hx of COPD, OSAS / OHS / Bed bound for 2 years, A fib, On chronic anticoagulation, pul HTN (PAP 57mmHg) CHF, Lymphedema, chronic hypotension, HFpEF, Moderate MR / TR, chronic resp failure on home oxygen and chronic RV dysfunction. He is non compliant with his NIPPV although he was given a new machine during his last admission. He resist change in position and has acquired some wounds.       Hospital/ICU Course:  9/21.  Awake and follows commands on bipap.   Had episode of bradycardia with HR in 30s overnight.   Remained hemodynamically stable.     9/22. Awake and alert. Off NIPPV. Tolerating NC. Not on pressors. K low. No chest pain or abdominal discomfort. No family at the bedside. Flat affect and not interested in a conversation.     9/28 - transferred back to ICU due to melanic stools and hypotension.  9/29 - 3 BM since arrival to the ICU.  Wife refused " endoscopy yesterday.  Low dose Levo this morning. Cr uptrending.  9/30 - No BM overnight.  H/H stable, but worsening pressor requirement and renal failure.  Minimal UOP.  Remains alert and conversant, though confused.  9/31 - Cr slightly improved, remains oliguric.  Lactic remains elevated.  Oxygenation stable and easily arousable.        Interval History: No acute events overnight.  Levo @ 1.5 mcg/kg/min.  Oxygenation stable.  Remains oliguric, though Cr came down slightly.  Lactic remains elevated.  Easily arousable to voice.      Objective:     Vital Signs (Most Recent):  Temp: 96.3 °F (35.7 °C) (10/01/23 0745)  Pulse: 91 (10/01/23 0745)  Resp: (!) 27 (10/01/23 0745)  BP: (!) 75/29 (10/01/23 0705)  SpO2: (!) 94 % (10/01/23 0745) Vital Signs (24h Range):  Temp:  [95.9 °F (35.5 °C)-96.9 °F (36.1 °C)] 96.3 °F (35.7 °C)  Pulse:  [] 91  Resp:  [10-35] 27  SpO2:  [61 %-100 %] 94 %  BP: (75-84)/(29-56) 75/29  Arterial Line BP: ()/(43-69) 104/52     Weight: (!) 196 kg (432 lb 1.6 oz)  Body mass index is 57.01 kg/m².      Intake/Output Summary (Last 24 hours) at 10/1/2023 0754  Last data filed at 10/1/2023 0712  Gross per 24 hour   Intake 5849.37 ml   Output 195 ml   Net 5654.37 ml        Physical Exam  Vitals and nursing note reviewed.   Constitutional:       General: He is not in acute distress.     Appearance: He is ill-appearing.      Comments: Obese, easily arousable to voice   Cardiovascular:      Rate and Rhythm: Normal rate and regular rhythm.      Pulses: Normal pulses.      Heart sounds: No murmur heard.  Pulmonary:      Effort: Pulmonary effort is normal. No respiratory distress.      Breath sounds: Rhonchi present. No wheezing or rales.   Abdominal:      General: There is no distension.      Palpations: Abdomen is soft.      Tenderness: There is no abdominal tenderness.      Comments: obese   Musculoskeletal:      Right lower leg: Edema present.      Left lower leg: Edema present.      Comments:  "Chronic venous stasis changes and lymphedema   Neurological:      Comments: Easily arousable to voice. Answers basic questions.  Oriented to person.           Review of Systems    Vents:  Oxygen Concentration (%): 36 (09/30/23 1903)    Lines/Drains/Airways       Peripherally Inserted Central Catheter Line  Duration             PICC Double Lumen 09/21/23 1530 right basilic 9 days              Drain  Duration                  Urethral Catheter 09/29/23 0301 Non-latex;Straight-tip 16 Fr. 2 days              Arterial Line  Duration             Arterial Line 09/29/23 0145 Right Radial 2 days                    Significant Labs:    CBC/Anemia Profile:  Recent Labs   Lab 09/29/23  2356 09/30/23  0401 10/01/23  0350   WBC  --   --  25.39*   HGB 8.9* 8.7* 7.7*   HCT 26.4* 25.7* 22.8*   PLT  --   --  82*   MCV  --   --  89   RDW  --   --  16.8*        Chemistries:  Recent Labs   Lab 09/30/23  0401 10/01/23  0350    132*   K 4.2 3.7    102   CO2 8* 12*   BUN 91* 89*   CREATININE 4.3* 4.1*   CALCIUM 7.1* 6.7*   ALBUMIN 1.5* 1.2*   MG 1.9 1.6   PHOS 6.0* 5.1*       All pertinent labs within the past 24 hours have been reviewed.    Significant Imaging:  I have reviewed all pertinent imaging results/findings within the past 24 hours.      ABG  No results for input(s): "PH", "PO2", "PCO2", "HCO3", "BE" in the last 168 hours.  Assessment/Plan:     Pulmonary  * Chronic respiratory failure with hypercapnia  Patient with Hypercapnic Respiratory failure which is Acute on chronic.  he is on home oxygen at 4 LPM. Supplemental oxygen was provided and noted- Oxygen Concentration (%):  [36] 36    .   Signs/symptoms of respiratory failure include- increased work of breathing. Contributing diagnoses includes - Obesity Hypoventilation and OSAS Labs and images were reviewed. Patient Has recent ABG, which has been reviewed. Will treat underlying causes and adjust management of respiratory failure as follows-     Continue NIPPV qhs and " prn; pt routinely refuses  NC when awake and oriented as tolerated.      Cardiac/Vascular  Bradycardia  No repeat episodes. May have been from electrolyte abn.  Cardiology following  Not a candidate for pacemaker.  Does not have chronotropic incompetence. When awake HR in 70-90s    Hypotension  Continue Midodrine   Completed empiric course of Rocephin  Now complicated by melena  Improved with blood initially, but required initiation of pressors overnight 9/28  Lactic acid normal  Continue to monitor      CHF (congestive heart failure)  Patient is identified as having Diastolic (HFpEF) heart failure that is Chronic. CHF is currently controlled. Latest ECHO performed and demonstrates- Results for orders placed during the hospital encounter of 09/02/23    Echo    Interpretation Summary    Left Ventricle: The left ventricle is normal in size. Mildly increased wall thickness. There is concentric remodeling. Normal wall motion. Septal motion is normal. There is normal systolic function with a visually estimated ejection fraction of 55 - 70%. There is normal diastolic function.    Left Atrium: Left atrium is mildly dilated.    Right Ventricle: Mild right ventricular enlargement. Wall thickness is normal. Right ventricle wall motion  is normal. Systolic function is normal.    Aortic Valve: The aortic valve is a trileaflet valve. There is moderate aortic valve sclerosis.    Tricuspid Valve: There is moderate regurgitation with a centrally directed jet.    Pulmonary Artery: The estimated pulmonary artery systolic pressure is 57 mmHg.    IVC/SVC: Normal venous pressure at 3 mmHg.    Cr slightly improved  Hold on diuretics for now, but will likely have to add back soon  Continue to monitor volume status    Shock  - initially presumed to be 2/2 to GIB when he transferred back to the ICU  - initially responded to resuscitation, but then started on pressors that have been steadily increasing  - lactic has been normal, but  elevated 9/30 and remains elevated (thought stable)  - completed a course of empiric Rocephin earlier his hospital course and restarted when he came back to the ICU. Broadened Abx 9/30 given worsening shock.  - Urine Cx from 9/29 is again polymicrobial  - repeat blood cultures 9/30 are NGTD      Venous stasis dermatitis of both lower extremities  Chronic lymphedema  Supportive care  Wound care     Renal/  Urinary retention  Catheter in place.    CKD (chronic kidney disease) stage 3, GFR 30-59 ml/min  BETY on CKD  Cr slightly improved today, though UOP remains low  I do not feel that he is a dialysis candidate  Nephrology following  Monitor UOP and renal fxn  Renally dose meds   Avoid nephrotoxins    Hematology  Venous thromboembolism  - holding Eliquis now in the face of GIB  - unable to place SCDs due to lymphedema and wounds    Endocrine  Morbid obesity with BMI of 60.0-69.9, adult  Functional quad.  Not a candidate for surgery   Educated / counseling    Hypothyroidism (acquired)  Continue home medication    GI  Melena  - GI consulted  - holding Eliquis  - stopping serial H/H today given stability  - recieved 1unit currently  - stopping protonix drip 9/30  to aid with compatibility and IV access; continue BID  - wife refused endoscopy    GERD (gastroesophageal reflux disease)  - PPI    Orthopedic  Stage III pressure ulcer of sacral region  - WOCN    Palliative Care  Goals of care, counseling/discussion  I sat down with Ms Rothman for almost an hour yesterday to discuss Gene's current situation.  I discussed that he was dealing with multiple acute issues right now, primarily the shock (likely from a new infection), GI bleeding (which seems to be a bit better), and his renal failure (a bit better today, but still worsening yesterday).  We discussed dialysis and my concerns that he is a very poor candidate and that there is a chance dialysis could precipitate a rapid decline and ultimately his death given how overall  "frail he is.  We also discussed my feelings that if he were placed on life support that he would likely never come off the machines, and if his heart stopped that CPR/resuscitaiton would not ultimately help him or fix any of his problems.  It would all simply prolong his dying process, add additional pain and suffering, and ultimately not improve his outcomes.      She expressed that he has historically been clear that he wanted everything done.  I was able to help her understand that we are in a very different place now (both acutely and big picture) than we were when he expressed those wishes.  He has now been telling our nurses that he "was ready to go" and is "tired."      I spent a long of time listening to Ms Rothman's stories and empathizing.  Ultimately, she plans to discuss with her children (a son who is here, but did not wish to be a part of our discussion, and two daughters).  No decisions re: code status were made, but hopefully we've laid more realistic ground work for further discussion.    Other  Lymphedema  Wound care   Dressing in place    Sleep apnea  NIPPV qhs and prn  Remains generally noncompliant      Critical Care Time: 40 minutes  Critical secondary to infusion of high risk medications.      Critical care was time spent personally by me on the following activities: development of treatment plan with patient or surrogate and bedside caregivers, discussions with consultants, evaluation of patient's response to treatment, examination of patient, ordering and performing treatments and interventions, ordering and review of laboratory studies, ordering and review of radiographic studies, pulse oximetry, re-evaluation of patient's condition. This critical care time did not overlap with that of any other provider or involve time for any procedures.     Rdorigo Josue MD  Critical Care Medicine  '[place] - Intensive Care ([facility])  "

## 2023-10-01 NOTE — ASSESSMENT & PLAN NOTE
Patient is identified as having Diastolic (HFpEF) heart failure that is Chronic. CHF is currently controlled. Latest ECHO performed and demonstrates- Results for orders placed during the hospital encounter of 09/02/23    Echo    Interpretation Summary    Left Ventricle: The left ventricle is normal in size. Mildly increased wall thickness. There is concentric remodeling. Normal wall motion. Septal motion is normal. There is normal systolic function with a visually estimated ejection fraction of 55 - 70%. There is normal diastolic function.    Left Atrium: Left atrium is mildly dilated.    Right Ventricle: Mild right ventricular enlargement. Wall thickness is normal. Right ventricle wall motion  is normal. Systolic function is normal.    Aortic Valve: The aortic valve is a trileaflet valve. There is moderate aortic valve sclerosis.    Tricuspid Valve: There is moderate regurgitation with a centrally directed jet.    Pulmonary Artery: The estimated pulmonary artery systolic pressure is 57 mmHg.    IVC/SVC: Normal venous pressure at 3 mmHg.    Cr slightly improved  Hold on diuretics for now, but will likely have to add back soon  Continue to monitor volume status

## 2023-10-01 NOTE — ASSESSMENT & PLAN NOTE
- initially presumed to be 2/2 to GIB when he transferred back to the ICU  - initially responded to resuscitation, but then started on pressors that have been steadily increasing  - lactic has been normal, but elevated 9/30 and remains elevated (thought stable)  - completed a course of empiric Rocephin earlier his hospital course and restarted when he came back to the ICU. Broadened Abx 9/30 given worsening shock.  - Urine Cx from 9/29 is again polymicrobial  - repeat blood cultures 9/30 are NGTD

## 2023-10-01 NOTE — PROGRESS NOTES
[facility] - Intensive Care ([facility])  Gastroenterology  Progress Note    Patient Name: Gene Rothman  MRN: [id number]  Admission Date: 9/20/2023  Hospital Length of Stay: 11 days  Code Status: Full Code   Attending Provider: Rodrigo Josue MD  Consulting Provider: Ami Morris MD  Primary Care Physician: Ami Zarate DO  Principal Problem: Chronic respiratory failure with hypercapnia        Subjective:     Interval History: no melena per nursing staff. Patient had a smear of stool but reported to be brown. Remains oliguric with low CO2 and on bicarb gtt. Remains on Levophed for blood pressure stability. No family at bedside.     Review of Systems   Unable to perform ROS: Mental status change     Objective:     Vital Signs (Most Recent):  Temp: (!) 95.4 °F (35.2 °C) (10/01/23 1415)  Pulse: 91 (10/01/23 1415)  Resp: (!) 32 (10/01/23 1415)  BP: 118/70 (10/01/23 1236)  SpO2: 100 % (10/01/23 1415) Vital Signs (24h Range):  Temp:  [95.2 °F (35.1 °C)-96.9 °F (36.1 °C)] 95.4 °F (35.2 °C)  Pulse:  [] 91  Resp:  [10-37] 32  SpO2:  [61 %-100 %] 100 %  BP: ()/(29-70) 118/70  Arterial Line BP: ()/(38-69) 85/46     Weight: (!) 196 kg (432 lb 1.6 oz) (10/01/23 0501)  Body mass index is 57.01 kg/m².      Intake/Output Summary (Last 24 hours) at 10/1/2023 1426  Last data filed at 10/1/2023 1400  Gross per 24 hour   Intake 5339.9 ml   Output 140 ml   Net 5199.9 ml       Lines/Drains/Airways       Peripherally Inserted Central Catheter Line  Duration             PICC Double Lumen 09/21/23 1530 right basilic 9 days              Drain  Duration                  Urethral Catheter 09/29/23 0301 Non-latex;Straight-tip 16 Fr. 2 days              Arterial Line  Duration             Arterial Line 09/29/23 0145 Right Radial 2 days                     Physical Exam  Vitals and nursing note reviewed.   Constitutional:       Appearance: He is morbidly obese. He is ill-appearing.   Abdominal:      General: Abdomen is  "protuberant. Bowel sounds are decreased.      Tenderness: There is no abdominal tenderness.   Neurological:      Mental Status: He is disoriented and confused.      Comments: Opens eyes when name called.           Significant Labs:  CBC:   Recent Labs   Lab 09/29/23  2356 09/30/23  0401 10/01/23  0350   WBC  --   --  25.39*   HGB 8.9* 8.7* 7.7*   HCT 26.4* 25.7* 22.8*   PLT  --   --  82*     CMP:   Recent Labs   Lab 10/01/23  0350   *   CALCIUM 6.7*   ALBUMIN 1.2*   *   K 3.7   CO2 12*      BUN 89*   CREATININE 4.1*     Coagulation: No results for input(s): "PT", "INR", "APTT" in the last 48 hours.      Significant Imaging:  Imaging results within the past 24 hours have been reviewed.    Assessment/Plan:     GI  Melena  Differential diagnoses includes PUD vs esophagitis vs malignancy vs AVMs vs Dieulafoy lesion vs other  Pantoprazole 40mg PO BID  Eliquis last given yesterday (9/27/23)  ASA last given 9/27/23  No NSAID use  2u pRBCs on 9/27/23  1U pRBC on 9/28/23  Wife declined EGD 9/28 therefore placed on protonix gtt  Placed on pressors early morning 9/29 for severe hypotension  H&H stabilized  No melena in last 48 hours (9/29/23 to 10/1/23)  Switched to IV PPI BID 9/30/23      Recommendations:  1. Continue IV PPI BID indefinitely  2. Defer anticoagulation for additional 7 days since no EGD performed we are unable to determine etiology of melena.   3. No plans for endoscopy       Communicated with ICU nursing staff. Appears bleeding has stopped. I will sign off. Please contact us if you have any additional questions.      Ami Morris MD  Gastroenterology  [facility] - Intensive Care ([facility])  "

## 2023-10-01 NOTE — PLAN OF CARE
Pt HR via monitor between SR with PVC's and rate controlled Afibb(Hx of Afibb). 3-4L nasal cannula; SpO2>92%. Pt on levo drip for BP support; vaso drip added, see MAR. Bicarb gtt infusing. May in place; very minimal UOP; MD's aware. Pt on specialty bed; refusing to be turned or repositioned. Pt with very flat affect; not cooperating to take PO meds right now. Pt and pt's spouse updated on status and plan of care.

## 2023-10-01 NOTE — ASSESSMENT & PLAN NOTE
Differential diagnoses includes PUD vs esophagitis vs malignancy vs AVMs vs Dieulafoy lesion vs other  Pantoprazole 40mg PO BID  Eliquis last given yesterday (9/27/23)  ASA last given 9/27/23  No NSAID use  2u pRBCs on 9/27/23  1U pRBC on 9/28/23  Wife declined EGD 9/28 therefore placed on protonix gtt  Placed on pressors early morning 9/29 for severe hypotension  H&H stabilized  No melena in last 48 hours (9/29/23 to 10/1/23)  Switched to IV PPI BID 9/30/23

## 2023-10-01 NOTE — SUBJECTIVE & OBJECTIVE
Subjective:     Interval History: no melena per nursing staff. Patient had a smear of stool but reported to be brown. Remains oliguric with low CO2 and on bicarb gtt. Remains on Levophed for blood pressure stability. No family at bedside.     Review of Systems   Unable to perform ROS: Mental status change     Objective:     Vital Signs (Most Recent):  Temp: (!) 95.4 °F (35.2 °C) (10/01/23 1415)  Pulse: 91 (10/01/23 1415)  Resp: (!) 32 (10/01/23 1415)  BP: 118/70 (10/01/23 1236)  SpO2: 100 % (10/01/23 1415) Vital Signs (24h Range):  Temp:  [95.2 °F (35.1 °C)-96.9 °F (36.1 °C)] 95.4 °F (35.2 °C)  Pulse:  [] 91  Resp:  [10-37] 32  SpO2:  [61 %-100 %] 100 %  BP: ()/(29-70) 118/70  Arterial Line BP: ()/(38-69) 85/46     Weight: (!) 196 kg (432 lb 1.6 oz) (10/01/23 0501)  Body mass index is 57.01 kg/m².      Intake/Output Summary (Last 24 hours) at 10/1/2023 1426  Last data filed at 10/1/2023 1400  Gross per 24 hour   Intake 5339.9 ml   Output 140 ml   Net 5199.9 ml       Lines/Drains/Airways       Peripherally Inserted Central Catheter Line  Duration             PICC Double Lumen 09/21/23 1530 right basilic 9 days              Drain  Duration                  Urethral Catheter 09/29/23 0301 Non-latex;Straight-tip 16 Fr. 2 days              Arterial Line  Duration             Arterial Line 09/29/23 0145 Right Radial 2 days                     Physical Exam  Vitals and nursing note reviewed.   Constitutional:       Appearance: He is morbidly obese. He is ill-appearing.   Abdominal:      General: Abdomen is protuberant. Bowel sounds are decreased.      Tenderness: There is no abdominal tenderness.   Neurological:      Mental Status: He is disoriented and confused.      Comments: Opens eyes when name called.           Significant Labs:  CBC:   Recent Labs   Lab 09/29/23  2356 09/30/23  0401 10/01/23  0350   WBC  --   --  25.39*   HGB 8.9* 8.7* 7.7*   HCT 26.4* 25.7* 22.8*   PLT  --   --  82*     CMP:  "  Recent Labs   Lab 10/01/23  0350   *   CALCIUM 6.7*   ALBUMIN 1.2*   *   K 3.7   CO2 12*      BUN 89*   CREATININE 4.1*     Coagulation: No results for input(s): "PT", "INR", "APTT" in the last 48 hours.      Significant Imaging:  Imaging results within the past 24 hours have been reviewed.  "

## 2023-10-01 NOTE — SUBJECTIVE & OBJECTIVE
Interval History: No acute events overnight.  Levo @ 1.5 mcg/kg/min.  Oxygenation stable.  Remains oliguric, though Cr came down slightly.  Lactic remains elevated.  Easily arousable to voice.      Objective:     Vital Signs (Most Recent):  Temp: 96.3 °F (35.7 °C) (10/01/23 0745)  Pulse: 91 (10/01/23 0745)  Resp: (!) 27 (10/01/23 0745)  BP: (!) 75/29 (10/01/23 0705)  SpO2: (!) 94 % (10/01/23 0745) Vital Signs (24h Range):  Temp:  [95.9 °F (35.5 °C)-96.9 °F (36.1 °C)] 96.3 °F (35.7 °C)  Pulse:  [] 91  Resp:  [10-35] 27  SpO2:  [61 %-100 %] 94 %  BP: (75-84)/(29-56) 75/29  Arterial Line BP: ()/(43-69) 104/52     Weight: (!) 196 kg (432 lb 1.6 oz)  Body mass index is 57.01 kg/m².      Intake/Output Summary (Last 24 hours) at 10/1/2023 0754  Last data filed at 10/1/2023 0712  Gross per 24 hour   Intake 5849.37 ml   Output 195 ml   Net 5654.37 ml        Physical Exam  Vitals and nursing note reviewed.   Constitutional:       General: He is not in acute distress.     Appearance: He is ill-appearing.      Comments: Obese, easily arousable to voice   Cardiovascular:      Rate and Rhythm: Normal rate and regular rhythm.      Pulses: Normal pulses.      Heart sounds: No murmur heard.  Pulmonary:      Effort: Pulmonary effort is normal. No respiratory distress.      Breath sounds: Rhonchi present. No wheezing or rales.   Abdominal:      General: There is no distension.      Palpations: Abdomen is soft.      Tenderness: There is no abdominal tenderness.      Comments: obese   Musculoskeletal:      Right lower leg: Edema present.      Left lower leg: Edema present.      Comments: Chronic venous stasis changes and lymphedema   Neurological:      Comments: Easily arousable to voice. Answers basic questions.  Oriented to person.           Review of Systems    Vents:  Oxygen Concentration (%): 36 (09/30/23 1903)    Lines/Drains/Airways       Peripherally Inserted Central Catheter Line  Duration             PICC Double  Lumen 09/21/23 1530 right basilic 9 days              Drain  Duration                  Urethral Catheter 09/29/23 0301 Non-latex;Straight-tip 16 Fr. 2 days              Arterial Line  Duration             Arterial Line 09/29/23 0145 Right Radial 2 days                    Significant Labs:    CBC/Anemia Profile:  Recent Labs   Lab 09/29/23  2356 09/30/23  0401 10/01/23  0350   WBC  --   --  25.39*   HGB 8.9* 8.7* 7.7*   HCT 26.4* 25.7* 22.8*   PLT  --   --  82*   MCV  --   --  89   RDW  --   --  16.8*        Chemistries:  Recent Labs   Lab 09/30/23  0401 10/01/23  0350    132*   K 4.2 3.7    102   CO2 8* 12*   BUN 91* 89*   CREATININE 4.3* 4.1*   CALCIUM 7.1* 6.7*   ALBUMIN 1.5* 1.2*   MG 1.9 1.6   PHOS 6.0* 5.1*       All pertinent labs within the past 24 hours have been reviewed.    Significant Imaging:  I have reviewed all pertinent imaging results/findings within the past 24 hours.

## 2023-10-01 NOTE — PROGRESS NOTES
Pharmacokinetic Assessment Follow Up: IV Vancomycin    Vancomycin serum concentration assessment(s):    The random level was drawn correctly and can be used to guide therapy at this time. The measurement is within the desired definitive target range of 10 to 20 mcg/mL.    Vancomycin Regimen Plan:    Continue pulse dosing regimen with a one time dose of vancomycin 1250 mg.    Re-dose when the random level is less than 20 mcg/mL, next level to be drawn at 0430 on 10/02    Drug levels (last 3 results):  Recent Labs   Lab Result Units 10/01/23  0350   Vancomycin, Random ug/mL 15.6       Pharmacy will continue to follow and monitor vancomycin.    Please contact pharmacy at extension [phone] for questions regarding this assessment.    Thank you for the consult,   Delphine Shah       Patient brief summary:  Gene Rothman is a 77 y.o. male initiated on antimicrobial therapy with IV Vancomycin for treatment of sepsis    The patient's current regimen is pulse dosing.     Drug Allergies:   Review of patient's allergies indicates:   Allergen Reactions    Sulfamethoxazole-trimethoprim Itching and Shortness Of Breath    Sulfamethoxazole        Actual Body Weight:   194.1 kg    Renal Function:   Estimated Creatinine Clearance: 26 mL/min (A) (based on SCr of 4.1 mg/dL (H)).,     Dialysis Method (if applicable):  N/A    CBC (last 72 hours):  Recent Labs   Lab Result Units 09/28/23  1023 09/28/23  1523 09/28/23  2000 09/29/23  0005 09/29/23  0415 09/29/23  0802 09/29/23  1150 09/29/23  1553 09/29/23  2032 09/29/23  2356 09/30/23  0401 10/01/23  0350   WBC K/uL 9.67  --   --   --   --   --   --   --   --   --   --  25.39*   Hemoglobin g/dL 7.4* 8.5* 7.9* 7.7* 8.4* 7.8* 8.8* 8.9* 8.6* 8.9* 8.7* 7.7*   Hematocrit % 22.4* 25.8* 23.9* 23.4* 24.9* 23.8* 26.1* 26.5* 25.6* 26.4* 25.7* 22.8*   Platelets K/uL 102*  --   --   --   --   --   --   --   --   --   --  82*   Gran % % 66.8  --   --   --   --   --   --   --   --   --   --  89.5*   Lymph  % % 25.7  --   --   --   --   --   --   --   --   --   --  6.2*   Mono % % 6.6  --   --   --   --   --   --   --   --   --   --  2.5*   Eosinophil % % 0.2  --   --   --   --   --   --   --   --   --   --  0.0   Basophil % % 0.2  --   --   --   --   --   --   --   --   --   --  0.3   Differential Method  Automated  --   --   --   --   --   --   --   --   --   --  Automated       Metabolic Panel (last 72 hours):  Recent Labs   Lab Result Units 09/29/23  0400 09/29/23  0415 09/30/23  0401 09/30/23  1346 10/01/23  0350   Sodium mmol/L  --  141 137  --  132*   Sodium, Urine mmol/L <20*  --   --   --   --    Potassium mmol/L  --  4.3 4.2  --  3.7   Chloride mmol/L  --  112* 108  --  102   CO2 mmol/L  --  14* 8*  --  12*   Glucose mg/dL  --  109 235*  --  228*   Glucose, UA  Negative  --   --  Negative  --    BUN mg/dL  --  88* 91*  --  89*   Creatinine mg/dL  --  3.7* 4.3*  --  4.1*   Creatinine, Urine mg/dL 93.1  --   --  44.6  --    Albumin g/dL  --  1.5* 1.5*  --  1.2*   Magnesium mg/dL  --  1.9 1.9  --  1.6   Phosphorus mg/dL  --  6.0* 6.0*  --  5.1*       Vancomycin Administrations:  vancomycin given in the last 96 hours                     vancomycin 2 g in dextrose 5 % 500 mL IVPB (mg) 2,000 mg New Bag 09/30/23 0946                    Microbiologic Results:  Microbiology Results (last 7 days)       Procedure Component Value Units Date/Time    Blood culture [[id number]] Collected: 09/30/23 0920    Order Status: Completed Specimen: Blood Updated: 09/30/23 2145     Blood Culture, Routine No Growth to date    Blood culture [[id number]] Collected: 09/30/23 0920    Order Status: Completed Specimen: Blood Updated: 09/30/23 2145     Blood Culture, Routine No Growth to date    Urine culture [[id number]] Collected: 09/29/23 0400    Order Status: Completed Specimen: Urine Updated: 09/30/23 1408     Urine Culture, Routine Multiple organisms isolated. None in predominance.  Repeat if      clinically necessary.    Narrative:       Specimen Source->Urine    Blood culture x two cultures. Draw prior to antibiotics. [[id number]] Collected: 09/20/23 1756    Order Status: Completed Specimen: Blood from Peripheral, Upper Arm, Right Updated: 09/26/23 0612     Blood Culture, Routine No growth after 5 days.    Narrative:      Aerobic and anaerobic    Blood culture x two cultures. Draw prior to antibiotics. [[id number]] Collected: 09/20/23 1800    Order Status: Completed Specimen: Blood from Peripheral, Upper Arm, Right Updated: 09/25/23 2212     Blood Culture, Routine No growth after 5 days.    Narrative:      Aerobic and anaerobic

## 2023-10-01 NOTE — ASSESSMENT & PLAN NOTE
"I sat down with Ms Rothman for almost an hour yesterday to discuss Gene's current situation.  I discussed that he was dealing with multiple acute issues right now, primarily the shock (likely from a new infection), GI bleeding (which seems to be a bit better), and his renal failure (a bit better today, but still worsening yesterday).  We discussed dialysis and my concerns that he is a very poor candidate and that there is a chance dialysis could precipitate a rapid decline and ultimately his death given how overall frail he is.  We also discussed my feelings that if he were placed on life support that he would likely never come off the machines, and if his heart stopped that CPR/resuscitaiton would not ultimately help him or fix any of his problems.  It would all simply prolong his dying process, add additional pain and suffering, and ultimately not improve his outcomes.      She expressed that he has historically been clear that he wanted everything done.  I was able to help her understand that we are in a very different place now (both acutely and big picture) than we were when he expressed those wishes.  He has now been telling our nurses that he "was ready to go" and is "tired."      I spent a long of time listening to Ms Rothman's stories and empathizing.  Ultimately, she plans to discuss with her children (a son who is here, but did not wish to be a part of our discussion, and two daughters).  No decisions re: code status were made, but hopefully we've laid more realistic ground work for further discussion.  "

## 2023-10-01 NOTE — PROGRESS NOTES
..Gene Rothman is a 77 y.o. male for whom nephrology consult has been requested to evaluate and give opinion.     HPI: 77 year old white male with morbid obesity and CKD stage 3 who was admitted to ICU on 9/20/23 with Scr 2.9 this improved a bit to a radha of 1.9 but then israel to 3.7 and then 4.3 by this am; Nephrology consult was requested for further evaluation; he is felt to be septic likely from some soft tissues/cellulitis associated with his morbid obesity; last pH was in 7.2 range but none available this am; lactate level around 6; since admission he is + 5 liters TBV; he has multiple co-morbidities including DENISHA; OHS; COPD; HTN; CHF; chronic lymphdema; moderate MR/TR; melenotic stools noted noted 9/28/23; never had endoscopy; and no plans for such until renal and pulm status improves per GI notes; we are asked to assist further in his care; no recent contrasted studies are noted to have been ordered or performed.     *For 10/1/23: Seen and examined; sleepy; fair UOP; but still with large gains; I/O tally since admission shows: +10 liters.     PAST MEDICAL HISTORY:  He  has a past medical history of Acute hypoxemic respiratory failure (9/17/2022), Acute on chronic anemia (9/27/2023), Acute pulmonary embolism, Arthritis, Atrial fibrillation (3/1/2023), Cellulitis (12/3/2021), CHF (congestive heart failure), Chronic kidney disease, Chronic obstructive pulmonary disease with acute exacerbation, Coronary artery disease, Depression, Hyperlipidemia, Hypertension, Hypertensive heart disease with heart failure (9/16/2022), Hypothyroidism, Lymphedema of lower extremity, Nephrolithiasis, Obesity, Peripheral vascular disease, Pneumonia (9/17/2022), Recurrent cellulitis of lower leg, Sleep apnea, and Tobacco dependence.    PAST SURGICAL HISTORY:  He  has a past surgical history that includes Back surgery (1975;  1976); Inner ear surgery (Bilateral, 1961); Tonsillectomy (1961); Kidney stone surgery (Left, 1976 approx);  Adenoidectomy (1961); and debridement of bilateral leg ulcers (Bilateral, 01/01/2017).    SOCIAL HISTORY:  He  reports that he quit smoking about 54 years ago. His smoking use included cigarettes. He started smoking about 55 years ago. He has a 1.5 pack-year smoking history. He has never used smokeless tobacco. He reports that he does not currently use alcohol. He reports that he does not use drugs.      FAMILY MEDICAL HISTORY:  His family history includes Alcohol abuse in his maternal grandfather; Alzheimer's disease in his brother; Asthma in his daughter; Cancer in his brother, father, and mother; Diabetes in his mother; Heart disease in his brother and father; Hypertension in his mother.    Review of patient's allergies indicates:   Allergen Reactions    Sulfamethoxazole-trimethoprim Itching and Shortness Of Breath    Sulfamethoxazole         acetaminophen  650 mg Oral TID    atorvastatin  40 mg Oral Daily    fludrocortisone  100 mcg Oral BID    hydrocortisone sodium succinate  100 mg Intravenous Once    ipratropium  0.5 mg Nebulization Q12H    levothyroxine  50 mcg Oral Before breakfast    midodrine  15 mg Oral Q8H    pantoprazole  40 mg Intravenous BID    piperacillin-tazobactam (Zosyn) IV (PEDS and ADULTS) (extended infusion is not appropriate)  4.5 g Intravenous Q12H    polyethylene glycol  17 g Oral Daily    senna-docusate 8.6-50 mg  1 tablet Oral BID    sucralfate  1 g Oral Q6H       Prior to Admission medications    Medication Sig Start Date End Date Taking? Authorizing Provider   apixaban (ELIQUIS) 5 mg Tab Take 1 tablet (5 mg total) by mouth 2 (two) times daily. 7/11/23 7/10/24 Yes Kyara Cassidy, NP   aspirin 81 MG Chew Take 1 tablet (81 mg total) by mouth once daily. 2/20/23  Yes Zhane Vázquez FNP   acetaminophen (TYLENOL) 500 MG tablet Take 500 mg by mouth every 6 (six) hours as needed for Pain.    Provider, Historical   atorvastatin (LIPITOR) 40 MG tablet Take 1 tablet (40 mg total) by mouth  "once daily. 2/20/23 9/15/23  Zhane Vázquez, FNP   fludrocortisone (FLORINEF) 0.1 mg Tab Take 1 tablet (100 mcg total) by mouth 2 (two) times daily.  Patient not taking: Reported on 9/15/2023 9/14/23 10/14/23  Linda Cano, HERON   furosemide (LASIX) 40 MG tablet Take 1 tablet (40 mg total) by mouth once daily. 3/20/23 3/19/24  Kyara Cassidy, NP   levalbuterol (XOPENEX) 1.25 mg/3 mL nebulizer solution Take 3 mLs (1.25 mg total) by nebulization every 4 (four) hours as needed for Wheezing. Rescue 3/20/23 3/19/24  Kyara Cassidy, NP   levothyroxine (SYNTHROID) 50 MCG tablet Take 1 tablet (50 mcg total) by mouth before breakfast. 2/3/23   Ami Zarate,    metronidazole 1% (METROGEL) 1 % Gel Apply topically once daily. 9/14/23 10/14/23  Linda Cano NP   miconazole nitrate 2% (MICOTIN) 2 % Oint Apply topically 2 (two) times daily.  Patient not taking: Reported on 9/15/2023 9/14/23 10/14/23  Linda Cano NP   midodrine (PROAMATINE) 5 MG Tab Take 3 tablets (15 mg total) by mouth 3 (three) times daily. 9/13/23 10/14/23  Linda Cano NP   nebulizer and compressor (HOME NEBULIZER PLUS SIDESTREAM) Diana use as directed 3/8/23   Russ Comer MD   senna-docusate 8.6-50 mg (PERICOLACE) 8.6-50 mg per tablet Take 1 tablet by mouth daily as needed for Constipation. 9/13/23 10/14/23  Linda Cano NP   tiotropium (SPIRIVA) 18 mcg inhalation capsule Inhale 1 capsule (18 mcg total) into the lungs once daily. Controller 6/30/23 6/29/24  Kyara Cassidy NP        REVIEW OF SYSTEMS:    Unobtainable at present.    Patient has no fever, fatigue, visual changes, chest pain, edema, cough, dyspnea, nausea, vomiting, constipation, diarrhea, arthralgias, pruritis, dizziness, weakness, depression, confusion.        PHYSICAL EXAM:   height is 6' 1" (1.854 m) and weight is 196 kg (432 lb 1.6 oz) (abnormal). His temperature is 95.7 °F (35.4 °C) (abnormal). His blood pressure is 104/52 (abnormal) and his pulse is 94. His " respiration is 33 (abnormal) and oxygen saturation is 100%.   Gen: WDWN male in no apparent distress  Psych: Normal mood and affect  Skin: No rashes or ulcers  Eyes: Normal conjunctiva and lids, PERRLA  ENT: Normal hearing with no oropharyngeal lesions  Neck: No JVD  Chest: Clear with no rales, rhonchi, wheezing with normal effort  CV: Regular with no murmurs, gallops or rubs  Abd: Soft, nontender, no distension, positive bowel sounds; morbid obesity  Ext: No cyanosis, clubbing; 2+ edema          IMPRESSION AND RECOMMENDATIONS:    BETY on CKD stage 3 likely due to sepsis and GIB with melena  Chronic resp failure with hypercapnea  CHF; EF 55 %  BL venous statis dermatitis  Morbid obesity  Sleep apnea    Plan: Patient is seen and examined; chart reviewed; he makes a terrible HD candidate due to non-compliance and poor prognosis with multiple admissions and co-morbidities; severe obesity and highr risk for sudden cardiac death; certainly will abide by wishes of wife and patient but he is not an ideal candidate for multiple reasons above; supportive care per pulmonary crit care team; K is acceptable; continue bicarb gtt for acidosis and trend lactate levels as needed; his CO2 level is suggestive of metabolic acidosis but can consider formal ABG testing to clarify if any coexisitng resp acidosis. Will check iPTH and Vit D and phos and dose RAY Rx for completion when criteria satisfied; currently remains on bicarb gtt and Levophed and midodrine; Call with interim concerns; [phone].    *For 10/1/23: Seen and examined; would consider slowing down IVFs due to large gains; poor UOP; may need diuretics soon; meds and labs reviewed; not a HD candidate at present time. Corrected calcium near normal; metabolic acidosis still evident; will dose bicarb IV x 1 and lower rate of gtt; RAY Rx for anemia.  Following.         Memo Garcia MD

## 2023-10-01 NOTE — ASSESSMENT & PLAN NOTE
- GI consulted  - holding Eliquis  - stopping serial H/H today given stability  - recieved 1unit currently  - stopping protonix drip 9/30  to aid with compatibility and IV access; continue BID  - wife refused endoscopy

## 2023-10-02 NOTE — PLAN OF CARE
Unable to assess orientation. Afib/ Aflutter on monitor hr in 70s. Remains on continuous BIPAP. Given 1U PRBC and 1U FFP; bp improved after blood products administered. Pt also became more responsive after receiving the blood products, now opening eyes to voice and withdrawing from pain. H/H improved. Hemodynamics supported with vaso and levo. Bicarb gtt also remains infusing. Voley in place with 20ml output total this shift. No BM. Family updated on status and POC.      Problem: Adult Inpatient Plan of Care  Goal: Plan of Care Review  Outcome: Ongoing, Progressing  Goal: Absence of Hospital-Acquired Illness or Injury  Outcome: Ongoing, Progressing  Goal: Optimal Comfort and Wellbeing  Outcome: Ongoing, Progressing     Problem: Bariatric Environmental Safety  Goal: Safety Maintained with Care  Outcome: Ongoing, Progressing     Problem: Fluid and Electrolyte Imbalance (Acute Kidney Injury/Impairment)  Goal: Fluid and Electrolyte Balance  Outcome: Ongoing, Progressing     Problem: Oral Intake Inadequate (Acute Kidney Injury/Impairment)  Goal: Optimal Nutrition Intake  Outcome: Ongoing, Progressing     Problem: Impaired Wound Healing  Goal: Optimal Wound Healing  Outcome: Ongoing, Progressing     Problem: Fall Injury Risk  Goal: Absence of Fall and Fall-Related Injury  Outcome: Ongoing, Progressing     Problem: Skin Injury Risk Increased  Goal: Skin Health and Integrity  Outcome: Ongoing, Progressing     Problem: Infection  Goal: Absence of Infection Signs and Symptoms  Outcome: Ongoing, Progressing

## 2023-10-02 NOTE — ASSESSMENT & PLAN NOTE
Continue Midodrine   Completed empiric course of Rocephin  Now complicated by melena  Improved with blood initially, but required initiation of pressors overnight 9/28  Lactic acid normal  Continue to monitor    10/2  Remains on pressors overnight.   Anemia and coagulopathy  Getting PRBC.  In MSOF   GI bleed and progressive decline.   Severe metabolic acidosis complicating his present hemodynamic instability  Wife is at the bedside.   Serial labs

## 2023-10-02 NOTE — PROGRESS NOTES
..Gene Rothman is a 77 y.o. male for whom nephrology consult has been requested to evaluate and give opinion.     HPI: 77 year old white male with morbid obesity and CKD stage 3 who was admitted to ICU on 9/20/23 with Scr 2.9 this improved a bit to a radha of 1.9 but then israel to 3.7 and then 4.3 by this am; Nephrology consult was requested for further evaluation; he is felt to be septic likely from some soft tissues/cellulitis associated with his morbid obesity; last pH was in 7.2 range but none available this am; lactate level around 6; since admission he is + 5 liters TBV; he has multiple co-morbidities including DENISHA; OHS; COPD; HTN; CHF; chronic lymphdema; moderate MR/TR; melenotic stools noted noted 9/28/23; never had endoscopy; and no plans for such until renal and pulm status improves per GI notes; we are asked to assist further in his care; no recent contrasted studies are noted to have been ordered or performed.     *For 10/1/23: Seen and examined; sleepy; fair UOP; but still with large gains; I/O tally since admission shows: +10 liters.     *For 10/2/23: Seen and examined; massive GIB suspected; now maxed on triple pressors and continues to be acidotic.     PAST MEDICAL HISTORY:  He  has a past medical history of Acute hypoxemic respiratory failure (9/17/2022), Acute on chronic anemia (9/27/2023), Acute pulmonary embolism, Arthritis, Atrial fibrillation (3/1/2023), Cellulitis (12/3/2021), CHF (congestive heart failure), Chronic kidney disease, Chronic obstructive pulmonary disease with acute exacerbation, Coronary artery disease, Depression, Hyperlipidemia, Hypertension, Hypertensive heart disease with heart failure (9/16/2022), Hypothyroidism, Lymphedema of lower extremity, Nephrolithiasis, Obesity, Peripheral vascular disease, Pneumonia (9/17/2022), Recurrent cellulitis of lower leg, Sleep apnea, and Tobacco dependence.    PAST SURGICAL HISTORY:  He  has a past surgical history that includes Back  surgery (1975;  1976); Inner ear surgery (Bilateral, 1961); Tonsillectomy (1961); Kidney stone surgery (Left, 1976 approx); Adenoidectomy (1961); and debridement of bilateral leg ulcers (Bilateral, 01/01/2017).    SOCIAL HISTORY:  He  reports that he quit smoking about 54 years ago. His smoking use included cigarettes. He started smoking about 55 years ago. He has a 1.5 pack-year smoking history. He has never used smokeless tobacco. He reports that he does not currently use alcohol. He reports that he does not use drugs.      FAMILY MEDICAL HISTORY:  His family history includes Alcohol abuse in his maternal grandfather; Alzheimer's disease in his brother; Asthma in his daughter; Cancer in his brother, father, and mother; Diabetes in his mother; Heart disease in his brother and father; Hypertension in his mother.    Review of patient's allergies indicates:   Allergen Reactions    Sulfamethoxazole-trimethoprim Itching and Shortness Of Breath    Sulfamethoxazole         acetaminophen  650 mg Oral TID    atorvastatin  40 mg Oral Daily    fludrocortisone  100 mcg Oral BID    hydrocortisone sodium succinate  100 mg Intravenous Once    ipratropium  0.5 mg Nebulization Q12H    levothyroxine  50 mcg Oral Before breakfast    midodrine  15 mg Oral Q8H    pantoprazole  40 mg Intravenous BID    piperacillin-tazobactam (Zosyn) IV (PEDS and ADULTS) (extended infusion is not appropriate)  4.5 g Intravenous Q12H    polyethylene glycol  17 g Oral Daily    senna-docusate 8.6-50 mg  1 tablet Oral BID    sucralfate  1 g Oral Q6H       Prior to Admission medications    Medication Sig Start Date End Date Taking? Authorizing Provider   apixaban (ELIQUIS) 5 mg Tab Take 1 tablet (5 mg total) by mouth 2 (two) times daily. 7/11/23 7/10/24 Yes Kyara Cassidy NP   aspirin 81 MG Chew Take 1 tablet (81 mg total) by mouth once daily. 2/20/23  Yes Zhane Vázquez FNP   acetaminophen (TYLENOL) 500 MG tablet Take 500 mg by mouth every 6 (six)  "hours as needed for Pain.    Provider, Historical   atorvastatin (LIPITOR) 40 MG tablet Take 1 tablet (40 mg total) by mouth once daily. 2/20/23 9/15/23  Zhane Vázquez, KIKO   fludrocortisone (FLORINEF) 0.1 mg Tab Take 1 tablet (100 mcg total) by mouth 2 (two) times daily.  Patient not taking: Reported on 9/15/2023 9/14/23 10/14/23  Linda Cano, HERON   furosemide (LASIX) 40 MG tablet Take 1 tablet (40 mg total) by mouth once daily. 3/20/23 3/19/24  Kyara Cassidy, NP   levalbuterol (XOPENEX) 1.25 mg/3 mL nebulizer solution Take 3 mLs (1.25 mg total) by nebulization every 4 (four) hours as needed for Wheezing. Rescue 3/20/23 3/19/24  Kyara Cassidy, NP   levothyroxine (SYNTHROID) 50 MCG tablet Take 1 tablet (50 mcg total) by mouth before breakfast. 2/3/23   Ami Zarate,    metronidazole 1% (METROGEL) 1 % Gel Apply topically once daily. 9/14/23 10/14/23  Linda Cano NP   miconazole nitrate 2% (MICOTIN) 2 % Oint Apply topically 2 (two) times daily.  Patient not taking: Reported on 9/15/2023 9/14/23 10/14/23  Linda Cano, HERON   midodrine (PROAMATINE) 5 MG Tab Take 3 tablets (15 mg total) by mouth 3 (three) times daily. 9/13/23 10/14/23  Linda Cano, HERON   nebulizer and compressor (HOME NEBULIZER PLUS SIDESTREAM) Diana use as directed 3/8/23   Russ Comer MD   senna-docusate 8.6-50 mg (PERICOLACE) 8.6-50 mg per tablet Take 1 tablet by mouth daily as needed for Constipation. 9/13/23 10/14/23  Linda Cano, HERON   tiotropium (SPIRIVA) 18 mcg inhalation capsule Inhale 1 capsule (18 mcg total) into the lungs once daily. Controller 6/30/23 6/29/24  Kyara Cassidy NP        REVIEW OF SYSTEMS:    Unobtainable at present.    Patient has no fever, fatigue, visual changes, chest pain, edema, cough, dyspnea, nausea, vomiting, constipation, diarrhea, arthralgias, pruritis, dizziness, weakness, depression, confusion.        PHYSICAL EXAM:   height is 6' 1" (1.854 m) and weight is 198 kg (436 lb 8.2 oz) " (abnormal). His core bladder temperature is 95 °F (35 °C) (abnormal). His blood pressure is 80/40 (abnormal) and his pulse is 80. His respiration is 31 (abnormal) and oxygen saturation is 100%.   Gen: WDWN male in no apparent distress  Psych: Normal mood and affect  Skin: No rashes or ulcers  Eyes: Normal conjunctiva and lids, PERRLA  ENT: Normal hearing with no oropharyngeal lesions  Neck: No JVD  Chest: Clear with no rales, rhonchi, wheezing with normal effort  CV: Regular with no murmurs, gallops or rubs  Abd: Soft, nontender, no distension, positive bowel sounds; morbid obesity  Ext: No cyanosis, clubbing; 2+ edema          IMPRESSION AND RECOMMENDATIONS:    BETY on CKD stage 3 likely due to sepsis and GIB with melena  Chronic resp failure with hypercapnea  CHF; EF 55 %  BL venous statis dermatitis  Morbid obesity  Sleep apnea    Plan: Patient is seen and examined; chart reviewed; he makes a terrible HD candidate due to non-compliance and poor prognosis with multiple admissions and co-morbidities; severe obesity and highr risk for sudden cardiac death; certainly will abide by wishes of wife and patient but he is not an ideal candidate for multiple reasons above; supportive care per pulmonary crit care team; K is acceptable; continue bicarb gtt for acidosis and trend lactate levels as needed; his CO2 level is suggestive of metabolic acidosis but can consider formal ABG testing to clarify if any coexisitng resp acidosis. Will check iPTH and Vit D and phos and dose RAY Rx for completion when criteria satisfied; currently remains on bicarb gtt and Levophed and midodrine; Call with interim concerns; [phone].    *For 10/1/23: Seen and examined; would consider slowing down IVFs due to large gains; poor UOP; may need diuretics soon; meds and labs reviewed; not a HD candidate at present time. Corrected calcium near normal; metabolic acidosis still evident; will dose bicarb IV x 1 and lower rate of gtt; RAY Rx for  anemia.  Following.     *For 10/2/23: Patient appears terminal; prog dismal; not a HD candidate at all; too unstable; severe anemia; elevated lactic acidosis; defer to crit care team regarding ultimate goals of care; will be on standby; if any modality is utilized it would be CRRT but this is not advocated given his overall condition and clinical context.         Memo Garcia MD

## 2023-10-02 NOTE — ASSESSMENT & PLAN NOTE
Patient is identified as having Diastolic (HFpEF) heart failure that is Chronic. CHF is currently controlled. Latest ECHO performed and demonstrates- Results for orders placed during the hospital encounter of 09/02/23    Echo    Interpretation Summary    Left Ventricle: The left ventricle is normal in size. Mildly increased wall thickness. There is concentric remodeling. Normal wall motion. Septal motion is normal. There is normal systolic function with a visually estimated ejection fraction of 55 - 70%. There is normal diastolic function.    Left Atrium: Left atrium is mildly dilated.    Right Ventricle: Mild right ventricular enlargement. Wall thickness is normal. Right ventricle wall motion  is normal. Systolic function is normal.    Aortic Valve: The aortic valve is a trileaflet valve. There is moderate aortic valve sclerosis.    Tricuspid Valve: There is moderate regurgitation with a centrally directed jet.    Pulmonary Artery: The estimated pulmonary artery systolic pressure is 57 mmHg.    IVC/SVC: Normal venous pressure at 3 mmHg.    Cr slightly improved  Hold on diuretics for now, but will likely have to add back soon  Continue to monitor volume status    10/2  Shock and on multiple pressors.   Acute on chronic renal failure   Will not tolerate his usual medications.

## 2023-10-02 NOTE — ASSESSMENT & PLAN NOTE
"I sat down with Ms Rothman for almost an hour yesterday to discuss Gene's current situation.  I discussed that he was dealing with multiple acute issues right now, primarily the shock (likely from a new infection), GI bleeding (which seems to be a bit better), and his renal failure (a bit better today, but still worsening yesterday).  We discussed dialysis and my concerns that he is a very poor candidate and that there is a chance dialysis could precipitate a rapid decline and ultimately his death given how overall frail he is.  We also discussed my feelings that if he were placed on life support that he would likely never come off the machines, and if his heart stopped that CPR/resuscitaiton would not ultimately help him or fix any of his problems.  It would all simply prolong his dying process, add additional pain and suffering, and ultimately not improve his outcomes.      She expressed that he has historically been clear that he wanted everything done.  I was able to help her understand that we are in a very different place now (both acutely and big picture) than we were when he expressed those wishes.  He has now been telling our nurses that he "was ready to go" and is "tired."      I spent a long of time listening to Ms Rothman's stories and empathizing.  Ultimately, she plans to discuss with her children (a son who is here, but did not wish to be a part of our discussion, and two daughters).  No decisions re: code status were made, but hopefully we've laid more realistic ground work for further discussion.      10/2  Met with wife and son  Shared with her lab results  MSOF, Encephalopathy, uremia, shock liver, coagulopathy, GI bleed, respiratory failure, refractory shock, sepsis syndrome and severe metabolic acidosis sec to renal failure, shock and lactate.  His mortality at this time approaches 100%    He is being actively resuscitated with multiple pressors and PRBC.  Despite appropriate hemodynamic support " he is progressing in to end stage MSOF  He is likely a poor candidate for HD and most likely will not tolerate it if attempted as suggested by renal this weekend.     Our options are limited and further excalation of care is futile.   Wife understands / Son and patients RN was at the bedside.   All questions answered to their satisfaction.   Pastoral care and palliative care offered. They declined at this time.     His brother and other family members were called by his wife and son.   They seem to understand that he will likely not survive this hospitalization.

## 2023-10-02 NOTE — PLAN OF CARE
Patient had drastic decline in BP ~0000. NP notified. NP notified family. Family at bedside ~0100. Patient's BP has progressively continued to decrease regardless of treatments given. HR is between A-fib and A-flutter. Patient is hypothermic regardless of warming therapy. Patient on vaso @ 0.04, levo @ 3, sodium bicarb @ 50. Patient also given 100mL push of sodium bicarb. Patient receiving 1 unit PRBC. Patient to receive 1 unit FFP and 1 unit PRBC. Patient had decrease in O2 sat throughout the night with BiPAP.    NP, charge nurse, and myself talked with family about code status. Family refused to make patient DNR/DNI and wants to proceed with full code status. Wife and son continues to stay at bedside.    Continuing to monitor very closely. Vitals unstable and further decreasing.    Problem: Adult Inpatient Plan of Care  Goal: Plan of Care Review  Outcome: Ongoing, Not Progressing  Goal: Patient-Specific Goal (Individualized)  Outcome: Ongoing, Not Progressing  Goal: Absence of Hospital-Acquired Illness or Injury  Outcome: Ongoing, Not Progressing  Goal: Optimal Comfort and Wellbeing  Outcome: Ongoing, Not Progressing  Goal: Readiness for Transition of Care  Outcome: Ongoing, Not Progressing     Problem: Bariatric Environmental Safety  Goal: Safety Maintained with Care  Outcome: Ongoing, Not Progressing     Problem: Fluid and Electrolyte Imbalance (Acute Kidney Injury/Impairment)  Goal: Fluid and Electrolyte Balance  Outcome: Ongoing, Not Progressing     Problem: Oral Intake Inadequate (Acute Kidney Injury/Impairment)  Goal: Optimal Nutrition Intake  Outcome: Ongoing, Not Progressing     Problem: Renal Function Impairment (Acute Kidney Injury/Impairment)  Goal: Effective Renal Function  Outcome: Ongoing, Not Progressing     Problem: Impaired Wound Healing  Goal: Optimal Wound Healing  Outcome: Ongoing, Not Progressing     Problem: Fall Injury Risk  Goal: Absence of Fall and Fall-Related Injury  Outcome: Ongoing,  Not Progressing     Problem: Skin Injury Risk Increased  Goal: Skin Health and Integrity  Outcome: Ongoing, Not Progressing     Problem: Infection  Goal: Absence of Infection Signs and Symptoms  Outcome: Ongoing, Not Progressing

## 2023-10-02 NOTE — ASSESSMENT & PLAN NOTE
Patient with Hypercapnic Respiratory failure which is Acute on chronic.  he is on home oxygen at 4 LPM. Supplemental oxygen was provided and noted- Oxygen Concentration (%):  [32-40] 40    .   Signs/symptoms of respiratory failure include- increased work of breathing. Contributing diagnoses includes - Obesity Hypoventilation and OSAS Labs and images were reviewed. Patient Has recent ABG, which has been reviewed. Will treat underlying causes and adjust management of respiratory failure as follows-     Continue NIPPV qhs and prn; pt routinely refuses  NC when awake and oriented as tolerated.    10/2  Resp status supported by NIPPV.  Somnolent from metabolic encephalopathy  Terminal and at high likelihood of having an adverse outcome.   Respirations are not labored. He is tolerating the NIPPV

## 2023-10-02 NOTE — PLAN OF CARE
10/02/23 1521   Discharge Reassessment   Assessment Type Discharge Planning Reassessment   Did the patient's condition or plan change since previous assessment? Yes   Discharge Plan discussed with: Spouse/sig other   Communicated JAMES with patient/caregiver Date not available/Unable to determine   Discharge Plan A Other  (TBD)   Why the patient remains in the hospital Requires continued medical care     Discharge planning reassessment complete. Patient remains hospitalized in critical condition. Prognosis undetermined due to clinical status.

## 2023-10-02 NOTE — SIGNIFICANT EVENT
0215 after multiple escalations of pressor, levophed now at max dose. Pt responds with moans only. He is making no urine.   Spouse called to update on decline and concern for impending death despite multiple pressor, bicarb infusion, and NIPPV support. She and her son are coming to hospital.      On their arrival I updated Ms Rothman and their son Solo again of max dose pressor with very poor response.  Now in heart, lung, and kidney failure.  I attempted to address code status in light of multi organ failure, Ms Rothman only states that she needs to be with him.  Son expresses hope that by some miracle Mr Rothman will survive the night and begin to get better.    Penny Dacosta, [facility] Center[facility]-BC  Critical Care Medicine  [facility]

## 2023-10-02 NOTE — ASSESSMENT & PLAN NOTE
- initially presumed to be 2/2 to GIB when he transferred back to the ICU  - initially responded to resuscitation, but then started on pressors that have been steadily increasing  - lactic has been normal, but elevated 9/30 and remains elevated (thought stable)  - completed a course of empiric Rocephin earlier his hospital course and restarted when he came back to the ICU. Broadened Abx 9/30 given worsening shock.  - Urine Cx from 9/29 is again polymicrobial  - repeat blood cultures 9/30 are NGTD    10/2  Multifactorial - see hypotension.

## 2023-10-02 NOTE — SUBJECTIVE & OBJECTIVE
Objective:     Vital Signs (Most Recent):  Temp: (!) 95.4 °F (35.2 °C) (10/02/23 0700)  Pulse: 74 (10/02/23 0724)  Resp: 18 (10/02/23 0724)  BP: (!) 50/30 (10/02/23 0623)  SpO2: 100 % (10/02/23 0724) Vital Signs (24h Range):  Temp:  [95 °F (35 °C)-96.3 °F (35.7 °C)] 95.4 °F (35.2 °C)  Pulse:  [] 74  Resp:  [17-40] 18  SpO2:  [53 %-100 %] 100 %  BP: ()/(23-70) 50/30  Arterial Line BP: ()/(23-70) 58/32     Weight: (!) 198 kg (436 lb 8.2 oz)  Body mass index is 57.59 kg/m².      Intake/Output Summary (Last 24 hours) at 10/2/2023 0815  Last data filed at 10/2/2023 0700  Gross per 24 hour   Intake 5406.98 ml   Output 80 ml   Net 5326.98 ml        Physical Exam  Vitals and nursing note reviewed.   HENT:      Head: Normocephalic.   Eyes:      Pupils: Pupils are equal, round, and reactive to light.   Cardiovascular:      Rate and Rhythm: Tachycardia present.      Heart sounds: Murmur heard.   Pulmonary:      Effort: Respiratory distress present.      Breath sounds: Rhonchi present.   Skin:     Capillary Refill: Capillary refill takes 2 to 3 seconds.           Review of Systems    Vents:  Oxygen Concentration (%): 40 (10/02/23 0724)    Lines/Drains/Airways       Peripherally Inserted Central Catheter Line  Duration             PICC Double Lumen 09/21/23 1530 right basilic 10 days              Drain  Duration                  Urethral Catheter 09/29/23 0301 Non-latex;Straight-tip 16 Fr. 3 days              Arterial Line  Duration             Arterial Line 09/29/23 0145 Right Radial 3 days                    Significant Labs:    CBC/Anemia Profile:  Recent Labs   Lab 10/01/23  0350 10/02/23  0406   WBC 25.39* 17.86*   HGB 7.7* 5.3*   HCT 22.8* 16.3*   PLT 82* 59*   MCV 89 93   RDW 16.8* 17.0*        Chemistries:  Recent Labs   Lab 10/01/23  0350 10/02/23  0406   * 128*   K 3.7 4.5    95   CO2 12* 6*   BUN 89* 86*   CREATININE 4.1* 4.1*   CALCIUM 6.7* 6.1*   ALBUMIN 1.2* 0.8*   MG 1.6 1.7    PHOS 5.1* 6.3*       All pertinent labs within the past 24 hours have been reviewed.    Significant Imaging:  I have reviewed all pertinent imaging results/findings within the past 24 hours.

## 2023-10-02 NOTE — PLAN OF CARE
Nutrition Recommendations 10/2:  1. Recommend re-consult within 24 hrs if PO diet or alternative means of nutrition warranted   2. If patient transitioned to comfort care and nutrition warranted, recommend providing pt with diet as desired and tolerated   3. Daily weights   4. Collaboration with other providers     Goals:   1. RD will be re consulted within 24 hrs if warranted   2. If pt on comfort care and nutrition warranted, pt will consume desired diet with safest texture during admit   3. Pt's diarrhea/melena will improve by RD follow up (Resolved)     Rena Kwong, Registration Eligible, Provisional LDN

## 2023-10-02 NOTE — PROGRESS NOTES
"[facility] - Intensive Care ([facility])  Critical Care Medicine  Progress Note    Patient Name: Gene Rothman  MRN: [id number]  Admission Date: 9/20/2023  Hospital Length of Stay: 12 days  Code Status: Full Code  Attending Provider: Rodrigo Josue MD  Primary Care Provider: Ami Zarate DO   Principal Problem: Chronic respiratory failure with hypercapnia    Subjective:     HPI:  77yr old known to most hospital services presents on his 61st wedding anniversary per his wife who is at the bedside because of his inappropriate response to her questions. She said he was ignoring her and was inappropriate. She thinks there is something physiologically wrong with him or he would not be acting like that on this day.   She knows he has chronic hypotension but was concerned about hypercapnia. In ED he is on NIPPV and is acting like he always does which makes his wife more comfortable since "he is acting like himself" Son is also at the bedside.   He was given midodrine by ED and his BP has improved. Other complaints per family are "lack of appetite and low urine output"    Hx of COPD, OSAS / OHS / Bed bound for 2 years, A fib, On chronic anticoagulation, pul HTN (PAP 57mmHg) CHF, Lymphedema, chronic hypotension, HFpEF, Moderate MR / TR, chronic resp failure on home oxygen and chronic RV dysfunction. He is non compliant with his NIPPV although he was given a new machine during his last admission. He resist change in position and has acquired some wounds.       Hospital/ICU Course:  9/21.  Awake and follows commands on bipap.   Had episode of bradycardia with HR in 30s overnight.   Remained hemodynamically stable.     9/22. Awake and alert. Off NIPPV. Tolerating NC. Not on pressors. K low. No chest pain or abdominal discomfort. No family at the bedside. Flat affect and not interested in a conversation.     9/28 - transferred back to ICU due to melanic stools and hypotension.  9/29 - 3 BM since arrival to the ICU.  Wife refused " endoscopy yesterday.  Low dose Levo this morning. Cr uptrending.  9/30 - No BM overnight.  H/H stable, but worsening pressor requirement and renal failure.  Minimal UOP.  Remains alert and conversant, though confused.  9/31 - Cr slightly improved, remains oliguric.  Lactic remains elevated.  Oxygenation stable and easily arousable.    10/2. Last nights events noted. Unresponsive, on NIPPV, On multiple pressors. PRBC ongoing. Wife at the bedside.           Objective:     Vital Signs (Most Recent):  Temp: (!) 95.4 °F (35.2 °C) (10/02/23 0700)  Pulse: 74 (10/02/23 0724)  Resp: 18 (10/02/23 0724)  BP: (!) 50/30 (10/02/23 0623)  SpO2: 100 % (10/02/23 0724) Vital Signs (24h Range):  Temp:  [95 °F (35 °C)-96.3 °F (35.7 °C)] 95.4 °F (35.2 °C)  Pulse:  [] 74  Resp:  [17-40] 18  SpO2:  [53 %-100 %] 100 %  BP: ()/(23-70) 50/30  Arterial Line BP: ()/(23-70) 58/32     Weight: (!) 198 kg (436 lb 8.2 oz)  Body mass index is 57.59 kg/m².      Intake/Output Summary (Last 24 hours) at 10/2/2023 0815  Last data filed at 10/2/2023 0700  Gross per 24 hour   Intake 5406.98 ml   Output 80 ml   Net 5326.98 ml        Physical Exam  Vitals and nursing note reviewed.   HENT:      Head: Normocephalic.   Eyes:      Pupils: Pupils are equal, round, and reactive to light.   Cardiovascular:      Rate and Rhythm: Tachycardia present.      Heart sounds: Murmur heard.   Pulmonary:      Effort: Respiratory distress present.      Breath sounds: Rhonchi present.   Skin:     Capillary Refill: Capillary refill takes 2 to 3 seconds.           Review of Systems    Vents:  Oxygen Concentration (%): 40 (10/02/23 0724)    Lines/Drains/Airways       Peripherally Inserted Central Catheter Line  Duration             PICC Double Lumen 09/21/23 1530 right basilic 10 days              Drain  Duration                  Urethral Catheter 09/29/23 0301 Non-latex;Straight-tip 16 Fr. 3 days              Arterial Line  Duration             Arterial Line  "09/29/23 0145 Right Radial 3 days                    Significant Labs:    CBC/Anemia Profile:  Recent Labs   Lab 10/01/23  0350 10/02/23  0406   WBC 25.39* 17.86*   HGB 7.7* 5.3*   HCT 22.8* 16.3*   PLT 82* 59*   MCV 89 93   RDW 16.8* 17.0*        Chemistries:  Recent Labs   Lab 10/01/23  0350 10/02/23  0406   * 128*   K 3.7 4.5    95   CO2 12* 6*   BUN 89* 86*   CREATININE 4.1* 4.1*   CALCIUM 6.7* 6.1*   ALBUMIN 1.2* 0.8*   MG 1.6 1.7   PHOS 5.1* 6.3*       All pertinent labs within the past 24 hours have been reviewed.    Significant Imaging:  I have reviewed all pertinent imaging results/findings within the past 24 hours.      ABG  No results for input(s): "PH", "PO2", "PCO2", "HCO3", "BE" in the last 168 hours.  Assessment/Plan:     Pulmonary  * Chronic respiratory failure with hypercapnia  Patient with Hypercapnic Respiratory failure which is Acute on chronic.  he is on home oxygen at 4 LPM. Supplemental oxygen was provided and noted- Oxygen Concentration (%):  [32-40] 40    .   Signs/symptoms of respiratory failure include- increased work of breathing. Contributing diagnoses includes - Obesity Hypoventilation and OSAS Labs and images were reviewed. Patient Has recent ABG, which has been reviewed. Will treat underlying causes and adjust management of respiratory failure as follows-     Continue NIPPV qhs and prn; pt routinely refuses  NC when awake and oriented as tolerated.    10/2  Resp status supported by NIPPV.  Somnolent from metabolic encephalopathy  Terminal and at high likelihood of having an adverse outcome.   Respirations are not labored. He is tolerating the NIPPV         Cardiac/Vascular  Bradycardia  No repeat episodes. May have been from electrolyte abn.  Cardiology following  Not a candidate for pacemaker.  Does not have chronotropic incompetence. When awake HR in 70-90s    10/2  Currently stable on pressors    CHF (congestive heart failure)  Patient is identified as having Diastolic " (HFpEF) heart failure that is Chronic. CHF is currently controlled. Latest ECHO performed and demonstrates- Results for orders placed during the hospital encounter of 09/02/23    Echo    Interpretation Summary    Left Ventricle: The left ventricle is normal in size. Mildly increased wall thickness. There is concentric remodeling. Normal wall motion. Septal motion is normal. There is normal systolic function with a visually estimated ejection fraction of 55 - 70%. There is normal diastolic function.    Left Atrium: Left atrium is mildly dilated.    Right Ventricle: Mild right ventricular enlargement. Wall thickness is normal. Right ventricle wall motion  is normal. Systolic function is normal.    Aortic Valve: The aortic valve is a trileaflet valve. There is moderate aortic valve sclerosis.    Tricuspid Valve: There is moderate regurgitation with a centrally directed jet.    Pulmonary Artery: The estimated pulmonary artery systolic pressure is 57 mmHg.    IVC/SVC: Normal venous pressure at 3 mmHg.    Cr slightly improved  Hold on diuretics for now, but will likely have to add back soon  Continue to monitor volume status    10/2  Shock and on multiple pressors.   Acute on chronic renal failure   Will not tolerate his usual medications.       Shock  - initially presumed to be 2/2 to GIB when he transferred back to the ICU  - initially responded to resuscitation, but then started on pressors that have been steadily increasing  - lactic has been normal, but elevated 9/30 and remains elevated (thought stable)  - completed a course of empiric Rocephin earlier his hospital course and restarted when he came back to the ICU. Broadened Abx 9/30 given worsening shock.  - Urine Cx from 9/29 is again polymicrobial  - repeat blood cultures 9/30 are NGTD    10/2  Multifactorial - see hypotension.      Venous stasis dermatitis of both lower extremities  Chronic lymphedema  Supportive care  Wound care     Hypotension  Continue  Midodrine   Completed empiric course of Rocephin  Now complicated by melena  Improved with blood initially, but required initiation of pressors overnight 9/28  Lactic acid normal  Continue to monitor    10/2  Remains on pressors overnight.   Anemia and coagulopathy  Getting PRBC.  In MSOF   GI bleed and progressive decline.   Severe metabolic acidosis complicating his present hemodynamic instability  Wife is at the bedside.   Serial labs      Renal/  Urinary retention  Catheter in place.    CKD (chronic kidney disease) stage 3, GFR 30-59 ml/min  BETY on CKD  Cr slightly improved today, though UOP remains low  I do not feel that he is a dialysis candidate  Nephrology following  Monitor UOP and renal fxn  Renally dose meds   Avoid nephrotoxins    Hematology  Venous thromboembolism  - holding Eliquis now in the face of GIB  - unable to place SCDs due to lymphedema and wounds    Endocrine  Morbid obesity with BMI of 60.0-69.9, adult  Functional quad.  Not a candidate for surgery   Educated / counseling    Hypothyroidism (acquired)  Continue home medication    GI  Melena  - GI consulted  - holding Eliquis  - stopping serial H/H today given stability  - recieved 1unit currently  - stopping protonix drip 9/30  to aid with compatibility and IV access; continue BID  - wife refused endoscopy    10/2  On going need for PRBC  GI signed off   Wife refused endoscopy. Understand that he continues to need PRBC and we do not know the cause of the bleed.    GERD (gastroesophageal reflux disease)  - PPI    Orthopedic  Stage III pressure ulcer of sacral region  - WOCN    Palliative Care  Goals of care, counseling/discussion  I sat down with Ms Rothman for almost an hour yesterday to discuss Gene's current situation.  I discussed that he was dealing with multiple acute issues right now, primarily the shock (likely from a new infection), GI bleeding (which seems to be a bit better), and his renal failure (a bit better today, but still  "worsening yesterday).  We discussed dialysis and my concerns that he is a very poor candidate and that there is a chance dialysis could precipitate a rapid decline and ultimately his death given how overall frail he is.  We also discussed my feelings that if he were placed on life support that he would likely never come off the machines, and if his heart stopped that CPR/resuscitaiton would not ultimately help him or fix any of his problems.  It would all simply prolong his dying process, add additional pain and suffering, and ultimately not improve his outcomes.      She expressed that he has historically been clear that he wanted everything done.  I was able to help her understand that we are in a very different place now (both acutely and big picture) than we were when he expressed those wishes.  He has now been telling our nurses that he "was ready to go" and is "tired."      I spent a long of time listening to Ms Rothamn's stories and empathizing.  Ultimately, she plans to discuss with her children (a son who is here, but did not wish to be a part of our discussion, and two daughters).  No decisions re: code status were made, but hopefully we've laid more realistic ground work for further discussion.      10/2  Met with wife and son  Shared with her lab results  MSOF, Encephalopathy, uremia, shock liver, coagulopathy, GI bleed, respiratory failure, refractory shock, sepsis syndrome and severe metabolic acidosis sec to renal failure, shock and lactate.  His mortality at this time approaches 100%    He is being actively resuscitated with multiple pressors and PRBC.  Despite appropriate hemodynamic support he is progressing in to end stage MSOF  He is likely a poor candidate for HD and most likely will not tolerate it if attempted as suggested by renal this weekend.     Our options are limited and further excalation of care is futile.   Wife understands / Son and patients RN was at the bedside.   All questions " answered to their satisfaction.   Pastoral care and palliative care offered. They declined at this time.     His brother and other family members were called by his wife and son.   They seem to understand that he will likely not survive this hospitalization.     Other  Lymphedema  Wound care   Dressing in place    Sleep apnea  NIPPV qhs and prn  Remains generally noncompliant         Critical Care Daily Checklist:    A: Awake: RASS Goal/Actual Goal: RASS Goal: 0-->alert and calm  Actual:     B: Spontaneous Breathing Trial Performed?     C: SAT & SBT Coordinated?  n/a                      D: Delirium: CAM-ICU Overall CAM-ICU: Positive   E: Early Mobility Performed? No   F: Feeding Goal: Goals: 1. Pt diet order will be modified within x 4 days. 2. Pt will consume >50% of Jm prior to RD follow up. 3. Pt will consume >50% of EEN and EPN prior to RD follow up.  Status: Nutrition Goal Status: new   Current Diet Order   Procedures    Diet NPO Except for: Medication     Order Specific Question:   Except for     Answer:   Medication      AS: Analgesia/Sedation n/a   T: Thromboembolic Prophylaxis yes   H: HOB > 300 Yes   U: Stress Ulcer Prophylaxis (if needed) yes   G: Glucose Control yes   B: Bowel Function Stool Occurrence: 1   I: Indwelling Catheter (Lines & May) Necessity yes   D: De-escalation of Antimicrobials/Pharmacotherapies yes    Plan for the day/ETD yes    Code Status:  Family/Goals of Care: Full Code  yes     Critical Care Time: 55 minutes  Critical secondary to Patient has a condition that poses threat to life and bodily function: Acute Renal Failure      Critical care was time spent personally by me on the following activities: development of treatment plan with patient or surrogate and bedside caregivers, discussions with consultants, evaluation of patient's response to treatment, examination of patient, ordering and performing treatments and interventions, ordering and review of laboratory studies,  ordering and review of radiographic studies, pulse oximetry, re-evaluation of patient's condition. This critical care time did not overlap with that of any other provider or involve time for any procedures.     Cristiano Altamirano MD  Critical Care Medicine  [facility] - Intensive Care [facility])

## 2023-10-02 NOTE — ASSESSMENT & PLAN NOTE
No repeat episodes. May have been from electrolyte abn.  Cardiology following  Not a candidate for pacemaker.  Does not have chronotropic incompetence. When awake HR in 70-90s    10/2  Currently stable on pressors

## 2023-10-02 NOTE — PROGRESS NOTES
Pharmacokinetic Assessment Follow Up: IV Vancomycin    Vancomycin serum concentration assessment(s):    The random level was drawn correctly and can be used to guide therapy at this time. The measurement is within the desired definitive target range of 15 to 20 mcg/mL.    Vancomycin Regimen Plan:    Continue pulse dosing regimen with a one time dose of vancomycin 1000 mg.    Re-dose when the random level is less than 20 mcg/mL, next level to be drawn at 0430 on 10/03    Drug levels (last 3 results):  Recent Labs   Lab Result Units 10/01/23  0350 10/02/23  0406   Vancomycin, Random ug/mL 15.6 18.5       Pharmacy will continue to follow and monitor vancomycin.    Please contact pharmacy at extension [phone] for questions regarding this assessment.    Thank you for the consult,   Delphine Shah       Patient brief summary:  Gene Rothman is a 77 y.o. male initiated on antimicrobial therapy with IV Vancomycin for treatment of sepsis    The patient's current regimen is pulse dosing.     Drug Allergies:   Review of patient's allergies indicates:   Allergen Reactions    Sulfamethoxazole-trimethoprim Itching and Shortness Of Breath    Sulfamethoxazole        Actual Body Weight:   196 kg    Renal Function:   Estimated Creatinine Clearance: 26.1 mL/min (A) (based on SCr of 4.1 mg/dL (H)).,     Dialysis Method (if applicable):  N/A    CBC (last 72 hours):  Recent Labs   Lab Result Units 09/29/23  0802 09/29/23  1150 09/29/23  1553 09/29/23  2032 09/29/23  2356 09/30/23  0401 10/01/23  0350 10/02/23  0406   WBC K/uL  --   --   --   --   --   --  25.39* 17.86*   Hemoglobin g/dL 7.8* 8.8* 8.9* 8.6* 8.9* 8.7* 7.7* 5.3*   Hematocrit % 23.8* 26.1* 26.5* 25.6* 26.4* 25.7* 22.8* 16.3*   Platelets K/uL  --   --   --   --   --   --  82* 59*   Gran % %  --   --   --   --   --   --  89.5* 82.8*   Lymph % %  --   --   --   --   --   --  6.2* 4.6*   Mono % %  --   --   --   --   --   --  2.5* 2.7*   Eosinophil % %  --   --   --   --   --   --   0.0 0.1   Basophil % %  --   --   --   --   --   --  0.3 0.2   Differential Method   --   --   --   --   --   --  Automated Automated       Metabolic Panel (last 72 hours):  Recent Labs   Lab Result Units 09/30/23  0401 09/30/23  1346 10/01/23  0350   Sodium mmol/L 137  --  132*   Potassium mmol/L 4.2  --  3.7   Chloride mmol/L 108  --  102   CO2 mmol/L 8*  --  12*   Glucose mg/dL 235*  --  228*   Glucose, UA   --  Negative  --    BUN mg/dL 91*  --  89*   Creatinine mg/dL 4.3*  --  4.1*   Creatinine, Urine mg/dL  --  44.6  --    Albumin g/dL 1.5*  --  1.2*   Magnesium mg/dL 1.9  --  1.6   Phosphorus mg/dL 6.0*  --  5.1*       Vancomycin Administrations:  vancomycin given in the last 96 hours                     vancomycin 1,250 mg in dextrose 5 % (D5W) 250 mL IVPB (Vial-Mate) ()  Restarted 10/01/23 0659     1,250 mg New Bag  0612    vancomycin 2 g in dextrose 5 % 500 mL IVPB (mg) 2,000 mg New Bag 09/30/23 0946                    Microbiologic Results:  Microbiology Results (last 7 days)       Procedure Component Value Units Date/Time    Blood culture [[id number]] Collected: 09/30/23 0920    Order Status: Completed Specimen: Blood Updated: 10/01/23 1612     Blood Culture, Routine No Growth to date      No Growth to date    Blood culture [[id number]] Collected: 09/30/23 0920    Order Status: Completed Specimen: Blood Updated: 10/01/23 1612     Blood Culture, Routine No Growth to date      No Growth to date    Urine culture [[id number]] Collected: 09/29/23 0400    Order Status: Completed Specimen: Urine Updated: 09/30/23 1408     Urine Culture, Routine Multiple organisms isolated. None in predominance.  Repeat if      clinically necessary.    Narrative:      Specimen Source->Urine    Blood culture x two cultures. Draw prior to antibiotics. [[id number]] Collected: 09/20/23 1756    Order Status: Completed Specimen: Blood from Peripheral, Upper Arm, Right Updated: 09/26/23 0612     Blood Culture, Routine No growth after 5  days.    Narrative:      Aerobic and anaerobic    Blood culture x two cultures. Draw prior to antibiotics. [[id number]] Collected: 09/20/23 1800    Order Status: Completed Specimen: Blood from Peripheral, Upper Arm, Right Updated: 09/25/23 2212     Blood Culture, Routine No growth after 5 days.    Narrative:      Aerobic and anaerobic

## 2023-10-02 NOTE — PROGRESS NOTES
Pharmacist Renal Dose Adjustment Note    Gene Rothman is a 77 y.o. male being treated with the medication Piperacillin-tazobactam.    Patient Data:    Vital Signs (Most Recent):  Temp: (!) 95 °F (35 °C) (10/02/23 0930)  Pulse: 80 (10/02/23 0930)  Resp: (!) 31 (10/02/23 0930)  BP: (!) 80/40 (10/02/23 0930)  SpO2: 100 % (10/02/23 0930) Vital Signs (72h Range):  Temp:  [95 °F (35 °C)-98.1 °F (36.7 °C)]   Pulse:  []   Resp:  [8-40]   BP: ()/(23-70)   SpO2:  [53 %-100 %]   Arterial Line BP: ()/(23-70)      Recent Labs   Lab 09/30/23  0401 10/01/23  0350 10/02/23  0406   CREATININE 4.3* 4.1* 4.1*     Serum creatinine: 4.1 mg/dL (H) 10/02/23 0406  Estimated creatinine clearance: 26.3 mL/min (A)    Piperacillin-tazobactam 4.5 g IV Q12H will be changed to piperacillin-tazobactam 4.5 g IV Q8H for CrCl > 20 mL/min per pharmacy protocol.    Pharmacist's Name: Catherine Solomon  Pharmacist's Extension: [phone]

## 2023-10-02 NOTE — PROGRESS NOTES
[facility] - Telemetry ([facility])  Adult Nutrition  Progress Note    SUMMARY       Recommendations    Recommendation/Intervention:   1. Recommend re-consult within 24 hrs if PO diet or alternative means of nutrition warranted   2. If patient transitioned to comfort care and nutrition warranted, recommend providing pt with diet as desired and tolerated   3. Daily weights     Goals:   1. RD will be re consulted within 24 hrs if warranted   2. If pt on comfort care and nutrition warranted, pt will consume desired diet with safest texture during admit   3. Pt's diarrhea/melena will improve by RD follow up (Resolved)   Nutrition Goal Status: new/ resolved   Communication of RD Recs: other (comment) (POC: Sticky Note)    Assessment and Plan    Nutrition Problem  Inadequate PO intake  Altered GI function (Resolved)   Increased protein needs      Related to (etiology):   Decreased ability to consume sufficient protein-energy   Alteration in GI tract structure and/or function   Increased demand for nutrition      Signs and Symptoms (as evidenced by):   NPO x 5 days  Diarrhea/Melena   Decubitus ulcers, multiple wound healing needs      Interventions(treatment strategy):  1. Sodium and Fat, Mineral modified diet   2. Enteral or Parental nutrition   3. Comfort care/ Pleasure feeds   4. Collaboration with other providers     Nutrition Diagnosis Status:   Continues/ Resolved     Malnutrition Assessment     Skin (Micronutrient): bruised, red, edema, wounds unhealed (diaphoretic, flaky, Chan score = 11 (high risk)       Energy Intake (Malnutrition): less than or equal to 50% for greater than or equal to 5 days  Fluid Accumulation (Malnutrition): moderate to severe                         Reason for Assessment    Reason For Assessment: consult  Diagnosis: pulmonary disease (Chronic respiratory failure with hypercapnia)  Relevant Medical History: CHF, CKD, CAD, HTN, Lymphedema of BLE, Sleep apnea (Chronic), Urinary  retention  Interdisciplinary Rounds: did not attend  General Information Comments:   78 y/o male admitted w current principal problem of Chronic respiratory failure with hypercapnia. Pt currently has NPO diet order 2/2 being on continueous BiPAP. The pt RN supects pt will be taken of BiPAP soon. NFPE not performed. RD will assess need for NFPE during follow up. The is currently charted to weigh 433 lb, BMI 57.13 (Morbidly obese). Per EMR pt has skin tears to sacrum/ gluteal and BLE ulcers. Awaiting wound care assessment. The pt LBM was 9/19. Per family members the pt has had a poor appetite and poor urien output. RD will continue to follow    Nutrition Discharge Planning: Cardiac diet + Jm BID x 2 weeks + Suplena as warranted; Dependent on medical treatment     Follow up:   9/25/23: RD follow up. Pt currently on Cardiac, Renal, 1200 mL Fluid Restricted diet. Visited pt at bedside, pt working with RN. Spoke to RN, stated pt has been experiencing diarrhea and nausea w/out vomiting, confirmed only consumed apple juice today with pt d/t nausea. Wound care noted on 9/21 multiple DTPIs noted caused by ACE bandages, stage 2 PI to L lateral hip, R medial thigh DTPI, MASD to bilateral buttocks, L mid upper back DTPI's and stage 3 PI to coccyx/sacral region. Reviewed chart: LBM 9/25 (loose); Skin: bruised, wounds; Chan score: 13 (moderate risk); Edema: 3+ moderate L foot/ bilateral ankle/leg, generalized. Labs, meds, weight reviewed. Note PRN ondansetron. Weight charted 9/21 432 lbs, 9/23 430 lbs (BMI 56.73, Morbid obesity), -2 lb wt loss x 2 days. No NFPE warranted at this time, BMI > 40. RD will continue to monitor.     9/29: RD follow up. Pt currently NPO x 1 day, transferred to the ICU on 9/28 d/t melanic stools and hypotension. EMR noted that pt wife declined endoscopic intervention, pt hemodynamically unstable to proceed w/ endoscopic evaluation at this time, plan for pt to become stable to determine if he is now  "amenable, pt has confusion towards the circumstances of his current condition, possible AMS, Cr continues to uptrend, not a dialysis candidate. Wound care noted 9/28 improvement in prior condition of stage 3 pressure injury to sacral/coccygeal region with full thickness tissue loss, oozing from penile meatus , weeping from multiple BLE DTPIs and venous leg ulcers. Reviewed chart: LBM 9/29 (fecal incontinence), Skin: pale, red, bruised, flaky, wounds; Chan score: 12 (High risk); Edema: 4+ severe bilateral foot, 3+ moderate bilateral ankle/knee/leg, generalized, dependent. Labs, meds, weight reviewed. Weight charted 9/23 430 lbs, 9/29 428 lbs (BMI 56.47, Morbid obesity), -2 lb wt loss x 6 days. No NFPE warranted at this time, BMI > 40, insignificant wt loss. RD will continue to monitor.     10/2: RD follow up. Pt currently unresponsive in the ICU, NPO x 5 days. Gastroenterologist Physician noted 10/1 that pt had no melena x 48 hrs, appears bleeding has stopped. Per Pulmonology NP note 10/2 "... Pt responds with moans only. He is making no urine. Spouse called to update on decline and concern for impending death despite multiple pressor, bicarb infusion, and NIPPV support", "Now in heart, lung, and kidney failure". Per Pulmonology Physician note 10/2 "His mortality at this time approaches 100%; He is likely a poor candidate for HD and most likely will not tolerate it if attempted...; Our options are limited and further excalation of care is futile; Pastoral care and palliative care offered. They declined at this time; ...he will likely not survive this hospitalization". Reviewed chart: LBM 10/1 (bowel incontinence, smear); Skin: red, diaphoretic, flaky, bruised; Alterd skin continues, all serosanguineous, clean/dry/intact; Chan score decreased to 11 (high risk); Edema continues  4+ severe bilateral foot, 3+ moderate bilateral ankle/knee/leg, generalized, dependent. Labs, meds, weight reviewed. Note Norepinephrine " "bitartrate D5W @ 2.3 mcg/kg/min. Weight charted 9/29 428 lbs, 10/2 436 lbs (BMI 57.59, Morbid obesity), +8 lb wt gain x 4 days. No NFPE warranted at this time, BMI > 40, critical care. RD will continue to monitor.     Nutrition Risk Screen    Nutrition Risk Screen: large or nonhealing wound, burn or pressure injury    Nutrition/Diet History    Spiritual, Cultural Beliefs, Jew Practices, Values that Affect Care:  (GUMARO)  Food Allergies: NKFA  Factors Affecting Nutritional Intake: abdominal distension, NPO (BiPAP)    Anthropometrics    Temp: (!) 95.2 °F (35.1 °C)  Height Method: Estimated  Height: 6' 1" (185.4 cm)  Height (inches): 73 in  Weight Method: Bed Scale  Weight: (!) 198 kg (436 lb 8.2 oz)  Weight (lb): (!) 436.52 lb  Ideal Body Weight (IBW), Male: 184 lb  % Ideal Body Weight, Male (lb): 235.32 %  BMI (Calculated): 57.6  BMI Grade: greater than 40 - morbid obesity     Wt Readings from Last 15 Encounters:   10/02/23 (!) 198 kg (436 lb 8.2 oz)   09/15/23 (!) 192.2 kg (423 lb 11.6 oz)   09/09/23 (!) 200 kg (440 lb 14.7 oz)   08/01/23 (!) 178.6 kg (393 lb 11.9 oz)   07/17/23 (!) 176 kg (388 lb)   03/07/23 (!) 180.7 kg (398 lb 5.9 oz)   02/20/23 (!) 154.7 kg (341 lb)   02/04/23 (!) 186 kg (410 lb)   12/11/22 (!) 192.5 kg (424 lb 6.4 oz)   09/19/22 (!) 188.2 kg (415 lb)   08/21/22 (!) 195 kg (429 lb 14.4 oz)   06/13/22 (!) 218.7 kg (482 lb 2.3 oz)   01/05/22 (!) 181.9 kg (401 lb 0.3 oz)   12/19/21 (!) 194.4 kg (428 lb 9.2 oz)   12/09/21 (!) 202.3 kg (446 lb)     Lab/Procedures/Meds    Pertinent Labs Reviewed: reviewed  Pertinent Medications Reviewed: reviewed  BMP  Lab Results   Component Value Date     (L) 10/02/2023    K 4.5 10/02/2023    CL 95 10/02/2023    CO2 6 (LL) 10/02/2023    BUN 86 (H) 10/02/2023    CREATININE 4.1 (H) 10/02/2023    CALCIUM 6.1 (LL) 10/02/2023    ANIONGAP 27 (H) 10/02/2023    EGFRNORACEVR 14 (A) 10/02/2023     Lab Results   Component Value Date    CALCIUM 6.1 (LL) 10/02/2023    " PHOS 6.3 (H) 10/02/2023     Lab Results   Component Value Date    ALBUMIN 0.8 (L) 10/02/2023     Scheduled Meds:   acetaminophen  650 mg Oral TID    atorvastatin  40 mg Oral Daily    fludrocortisone  100 mcg Oral BID    hydrocortisone sodium succinate  100 mg Intravenous Once    ipratropium  0.5 mg Nebulization Q12H    levothyroxine  50 mcg Oral Before breakfast    midodrine  15 mg Oral Q8H    pantoprazole  40 mg Intravenous BID    piperacillin-tazobactam (Zosyn) IV (PEDS and ADULTS) (extended infusion is not appropriate)  4.5 g Intravenous Q8H    polyethylene glycol  17 g Oral Daily    senna-docusate 8.6-50 mg  1 tablet Oral BID    sucralfate  1 g Oral Q6H     Continuous Infusions:   NORepinephrine bitartrate-D5W 2.3 mcg/kg/min (10/02/23 1609)    sodium bicarbonate 150 mEq in sterile water 1,150 mL infusion 50 mL/hr at 10/02/23 1600    vasopressin 0.04 Units/min (10/02/23 1600)     PRN Meds:.0.9%  NaCl infusion (for blood administration), 0.9%  NaCl infusion (for blood administration), 0.9%  NaCl infusion (for blood administration), sodium chloride 0.9%, influenza 65up-adj, melatonin, ondansetron, senna-docusate 8.6-50 mg, sodium chloride 0.9%, Pharmacy to dose Vancomycin consult **AND** vancomycin - pharmacy to dose    Physical Findings/Assessment         Estimated/Assessed Needs    Weight Used For Calorie Calculations: 112.2 kg (247 lb 5.7 oz) (Adj BW)  Energy Calorie Requirements (kcal): 2468 kcals (22 kcals/kg Adj BW (CHF, Morbid obesity BMI 57.59)  Energy Need Method: Kcal/kg  Protein Requirements: 168-191 g (1.5-1.7 g/kg Adj BW (BETY on CKD, critical care/ hypermetabolic, Morbid obesity BMI 57.59)  Weight Used For Protein Calculations: 112.2 kg (247 lb 5.7 oz) (Adj BW)  Fluid Requirements (mL): 1-2 L/day per MD/NP (CHF, edema)  Estimated Fluid Requirement Method: other (see comments)  RDA Method (mL): 2468  CHO Requirement: 309 g (2468 kcals/8)      Nutrition Prescription Ordered    Current Diet Order: NPO      Evaluation of Received Nutrient/Fluid Intake  I/O: (Net since admit)   9/21: -11.1 mL   9/25: -1257.3 mL  9/29: +1497.8 mL  10/2: +72704.9 mL    Energy Calories Required: not meeting needs  Protein Required: not meeting needs  Fluid Required: exceeding needs  Total Fluid Intake (mL): 5619.4 mL  Tolerance: Currently no intake   % Intake of Estimated Energy Needs: 0 - 25 %  % Meal Intake: NPO    Nutrition Risk    Level of Risk/Frequency of Follow-up: high (x2 weekly)     Monitor and Evaluation    Food and Nutrient Intake: energy intake, food and beverage intake  Food and Nutrient Adminstration: diet order  Physical Activity and Function: nutrition-related ADLs and IADLs, factors affecting access to physical activity  Anthropometric Measurements: weight, body mass index  Biochemical Data, Medical Tests and Procedures: electrolyte and renal panel, glucose/endocrine profile, inflammatory profile, lipid profile  Nutrition-Focused Physical Findings: overall appearance, skin     Nutrition Follow-Up    RD Follow-up?: Yes  Rena Kwong, Registration Eligible, Provisional LDN

## 2023-10-02 NOTE — ASSESSMENT & PLAN NOTE
- GI consulted  - holding Eliquis  - stopping serial H/H today given stability  - recieved 1unit currently  - stopping protonix drip 9/30  to aid with compatibility and IV access; continue BID  - wife refused endoscopy    10/2  On going need for PRBC  GI signed off   Wife refused endoscopy. Understand that he continues to need PRBC and we do not know the cause of the bleed.

## 2023-10-03 PROBLEM — K57.92 DIVERTICULITIS: Status: RESOLVED | Noted: 2023-01-01 | Resolved: 2023-01-01

## 2023-10-03 PROBLEM — J96.21 ACUTE ON CHRONIC RESPIRATORY FAILURE WITH HYPOXIA AND HYPERCAPNIA: Status: RESOLVED | Noted: 2023-01-01 | Resolved: 2023-01-01

## 2023-10-03 PROBLEM — R33.9 URINARY RETENTION: Status: RESOLVED | Noted: 2023-01-01 | Resolved: 2023-01-01

## 2023-10-03 PROBLEM — Z86.19 HX OF SEPSIS: Status: RESOLVED | Noted: 2023-01-01 | Resolved: 2023-01-01

## 2023-10-03 PROBLEM — N39.0 UTI (URINARY TRACT INFECTION): Status: RESOLVED | Noted: 2023-01-01 | Resolved: 2023-01-01

## 2023-10-03 PROBLEM — J81.1 PULMONARY EDEMA: Status: RESOLVED | Noted: 2022-01-02 | Resolved: 2023-01-01

## 2023-10-03 PROBLEM — L03.116 BILATERAL LOWER LEG CELLULITIS: Status: RESOLVED | Noted: 2022-01-02 | Resolved: 2023-01-01

## 2023-10-03 PROBLEM — J44.1 CHRONIC OBSTRUCTIVE PULMONARY DISEASE WITH ACUTE EXACERBATION: Status: RESOLVED | Noted: 2023-01-01 | Resolved: 2023-01-01

## 2023-10-03 PROBLEM — I95.9 HYPOTENSION: Status: RESOLVED | Noted: 2023-01-01 | Resolved: 2023-01-01

## 2023-10-03 PROBLEM — R53.81 PHYSICAL DEBILITY: Status: RESOLVED | Noted: 2022-05-26 | Resolved: 2023-01-01

## 2023-10-03 PROBLEM — D69.6 THROMBOCYTOPENIA: Status: RESOLVED | Noted: 2023-01-01 | Resolved: 2023-01-01

## 2023-10-03 PROBLEM — I50.9 CHF (CONGESTIVE HEART FAILURE): Status: RESOLVED | Noted: 2023-01-01 | Resolved: 2023-01-01

## 2023-10-03 PROBLEM — K92.1 MELENA: Status: RESOLVED | Noted: 2023-01-01 | Resolved: 2023-01-01

## 2023-10-03 PROBLEM — R79.89 ELEVATED BRAIN NATRIURETIC PEPTIDE (BNP) LEVEL: Status: RESOLVED | Noted: 2022-09-19 | Resolved: 2023-01-01

## 2023-10-03 PROBLEM — I48.91 ATRIAL FIBRILLATION: Status: RESOLVED | Noted: 2023-01-01 | Resolved: 2023-01-01

## 2023-10-03 PROBLEM — J96.12 CHRONIC RESPIRATORY FAILURE WITH HYPERCAPNIA: Status: RESOLVED | Noted: 2023-01-01 | Resolved: 2023-01-01

## 2023-10-03 PROBLEM — M79.641 HAND PAIN, RIGHT: Status: RESOLVED | Noted: 2021-12-07 | Resolved: 2023-01-01

## 2023-10-03 PROBLEM — L89.311 DECUBITUS ULCER OF RIGHT BUTTOCK, STAGE 1: Status: RESOLVED | Noted: 2023-01-01 | Resolved: 2023-01-01

## 2023-10-03 PROBLEM — N17.9 AKI (ACUTE KIDNEY INJURY): Status: RESOLVED | Noted: 2023-01-01 | Resolved: 2023-01-01

## 2023-10-03 PROBLEM — I73.9 PERIPHERAL VASCULAR DISEASE, UNSPECIFIED: Status: RESOLVED | Noted: 2021-08-10 | Resolved: 2023-01-01

## 2023-10-03 PROBLEM — R06.03 RESPIRATORY DISTRESS: Status: RESOLVED | Noted: 2023-01-01 | Resolved: 2023-01-01

## 2023-10-03 PROBLEM — L89.153 STAGE III PRESSURE ULCER OF SACRAL REGION: Status: RESOLVED | Noted: 2023-01-01 | Resolved: 2023-01-01

## 2023-10-03 PROBLEM — R57.9 SHOCK: Status: RESOLVED | Noted: 2021-12-03 | Resolved: 2023-01-01

## 2023-10-03 PROBLEM — L97.229 VENOUS STASIS ULCER OF LEFT CALF: Status: RESOLVED | Noted: 2021-12-05 | Resolved: 2023-01-01

## 2023-10-03 PROBLEM — L03.115 BILATERAL LOWER LEG CELLULITIS: Status: RESOLVED | Noted: 2022-01-02 | Resolved: 2023-01-01

## 2023-10-03 PROBLEM — D64.9 ACUTE ON CHRONIC ANEMIA: Status: RESOLVED | Noted: 2023-01-01 | Resolved: 2023-01-01

## 2023-10-03 PROBLEM — E03.9 HYPOTHYROIDISM: Status: RESOLVED | Noted: 2022-01-02 | Resolved: 2023-01-01

## 2023-10-03 PROBLEM — E78.5 HYPERLIPIDEMIA: Status: RESOLVED | Noted: 2022-01-02 | Resolved: 2023-01-01

## 2023-10-03 PROBLEM — R78.81 BACTEREMIA: Status: RESOLVED | Noted: 2023-01-01 | Resolved: 2023-01-01

## 2023-10-03 PROBLEM — R00.1 BRADYCARDIA: Status: RESOLVED | Noted: 2023-01-01 | Resolved: 2023-01-01

## 2023-10-03 PROBLEM — I83.022 VENOUS STASIS ULCER OF LEFT CALF: Status: RESOLVED | Noted: 2021-12-05 | Resolved: 2023-01-01

## 2023-10-03 PROBLEM — N18.30 CKD (CHRONIC KIDNEY DISEASE) STAGE 3, GFR 30-59 ML/MIN: Status: RESOLVED | Noted: 2022-01-02 | Resolved: 2023-01-01

## 2023-10-03 PROBLEM — L60.3 DYSTROPHIC NAIL: Status: RESOLVED | Noted: 2023-01-01 | Resolved: 2023-01-01

## 2023-10-03 PROBLEM — Z71.89 GOALS OF CARE, COUNSELING/DISCUSSION: Status: RESOLVED | Noted: 2022-05-26 | Resolved: 2023-01-01

## 2023-10-03 PROBLEM — I82.90 VENOUS THROMBOEMBOLISM: Status: RESOLVED | Noted: 2022-09-20 | Resolved: 2023-01-01

## 2023-10-03 PROBLEM — J96.22 ACUTE ON CHRONIC RESPIRATORY FAILURE WITH HYPOXIA AND HYPERCAPNIA: Status: RESOLVED | Noted: 2023-01-01 | Resolved: 2023-01-01

## 2023-10-03 PROBLEM — F32.A DEPRESSION: Status: RESOLVED | Noted: 2021-12-13 | Resolved: 2023-01-01

## 2023-10-03 PROBLEM — E44.0 MODERATE MALNUTRITION: Status: RESOLVED | Noted: 2023-01-01 | Resolved: 2023-01-01

## 2023-10-03 PROBLEM — R53.2 FUNCTIONAL QUADRIPLEGIA: Status: RESOLVED | Noted: 2022-01-02 | Resolved: 2023-01-01

## 2023-10-03 PROBLEM — E66.01 SEVERE OBESITY (BMI >= 40): Status: RESOLVED | Noted: 2022-01-02 | Resolved: 2023-01-01

## 2023-10-03 PROBLEM — K21.9 GERD (GASTROESOPHAGEAL REFLUX DISEASE): Status: RESOLVED | Noted: 2022-01-02 | Resolved: 2023-01-01

## 2023-10-03 PROBLEM — I26.99 ACUTE PULMONARY EMBOLISM: Status: RESOLVED | Noted: 2022-01-04 | Resolved: 2023-01-01

## 2023-10-03 PROBLEM — I87.2 VENOUS STASIS DERMATITIS OF BOTH LOWER EXTREMITIES: Chronic | Status: RESOLVED | Noted: 2017-09-29 | Resolved: 2023-01-01

## 2023-10-03 PROBLEM — R79.89 ELEVATED D-DIMER: Status: RESOLVED | Noted: 2022-01-02 | Resolved: 2023-01-01

## 2023-10-03 NOTE — PLAN OF CARE
-Pt remains obtunded and slowly continues to decline  -Family updated at bedside  -Unresponsive to pain and voice  -Unable to turn because little shifts cause BP to drop dramatically  -Remains maxed on Levophed and Vaso ggt. Bicarb ggt infusing  -scant UOP from [facility]  -Critical labs this morning and provider was notified. 2 PRBC, 1 Plt, 1g CaGluc, D10 bolus and insulin ordered.  -Will follow orders and continue to monitor

## 2023-10-03 NOTE — PROGRESS NOTES
At 11:03 Pt janice to aystole.  No pulse noted. CPR inititated. Family at bedside and stopped the code. TOD called at 11:04. All belongings given to family.( Wedding ring, hearing aid and .)  Dede and  released body.   home notified.

## 2023-10-03 NOTE — DISCHARGE SUMMARY
"[facility] - Intensive Care ([facility])  Critical Care Medicine  Discharge Summary      Patient Name: Gene Rothman  MRN: [id number]  Admission Date: 9/20/2023  Hospital Length of Stay: 13 days  Discharge Date and Time:  10/03/2023 11:51 AM  Attending Physician: Rodrigo Josue MD   Discharging Provider: Cristiano Altamirano MD  Primary Care Provider: Ami Zarate DO  Reason for Admission: gastrointestinal hemorrhage    HPI:   77yr old known to most hospital services presents on his 61st wedding anniversary per his wife who is at the bedside because of his inappropriate response to her questions. She said he was ignoring her and was inappropriate. She thinks there is something physiologically wrong with him or he would not be acting like that on this day.   She knows he has chronic hypotension but was concerned about hypercapnia. In ED he is on NIPPV and is acting like he always does which makes his wife more comfortable since "he is acting like himself" Son is also at the bedside.   He was given midodrine by ED and his BP has improved. Other complaints per family are "lack of appetite and low urine output"    Hx of COPD, OSAS / OHS / Bed bound for 2 years, A fib, On chronic anticoagulation, pul HTN (PAP 57mmHg) CHF, Lymphedema, chronic hypotension, HFpEF, Moderate MR / TR, chronic resp failure on home oxygen and chronic RV dysfunction. He is non compliant with his NIPPV although he was given a new machine during his last admission. He resist change in position and has acquired some wounds.       Procedure(s) (LRB):  EGD (ESOPHAGOGASTRODUODENOSCOPY) (N/A)    Indwelling Lines/Drains at Time of Discharge:   Lines/Drains/Airways     Peripherally Inserted Central Catheter Line  Duration           PICC Double Lumen 09/21/23 1530 right basilic 11 days          Drain  Duration                Urethral Catheter 09/29/23 0301 Non-latex;Straight-tip 16 Fr. 4 days          Arterial Line  Duration           Arterial Line 09/29/23 " 0145 Right Radial 4 days              Hospital Course:   9/21.  Awake and follows commands on bipap.   Had episode of bradycardia with HR in 30s overnight.   Remained hemodynamically stable.     9/22. Awake and alert. Off NIPPV. Tolerating NC. Not on pressors. K low. No chest pain or abdominal discomfort. No family at the bedside. Flat affect and not interested in a conversation.     9/28 - transferred back to ICU due to melanic stools and hypotension.  9/29 - 3 BM since arrival to the ICU.  Wife refused endoscopy yesterday.  Low dose Levo this morning. Cr uptrending.  9/30 - No BM overnight.  H/H stable, but worsening pressor requirement and renal failure.  Minimal UOP.  Remains alert and conversant, though confused.  9/31 - Cr slightly improved, remains oliguric.  Lactic remains elevated.  Oxygenation stable and easily arousable.    10/2. Last nights events noted. Unresponsive, on NIPPV, On multiple pressors. PRBC ongoing. Wife at the bedside.     10/3. Went from sinus rhythm to asystole  CPR was done for 1 min and wife at the bedside asked to discontinue   He was on two max pressors  Fixed dilated pupils  No corneals  No spont respiration  Asystole  Patient was pronounced at 11:04 am        Consults (From admission, onward)        Status Ordering Provider     Inpatient consult to Nephrology  Once        Provider:  (Not yet assigned)    Completed RICH ANN     Inpatient consult to Critical Care Medicine  Once        Provider:  Rich Ann MD    Acknowledged TJ BRAGA     Inpatient consult to Gastroenterology  Once        Provider:  Ami Morris MD    Completed TJ BRAGA     Inpatient consult to Social Work  Once        Provider:  (Not yet assigned)    Completed LINUS ANDREW     Inpatient consult to Hospitalist  Once        Provider:  (Not yet assigned)    Acknowledged LASHELL CURIEL     Inpatient consult to PICC team ([facility])  Once        Provider:  (Not yet  assigned)    Acknowledged CRISTIANO ALTAMIRANO     Inpatient consult to Cardiology  Once        Provider:  Cristiano Altamirano MD    Completed CRISTIANO ALTAMIRANO     IP consult to case management  Once        Provider:  (Not yet assigned)    Completed CRISTIANO ALTAMIRANO     Inpatient consult to Registered Dietitian/Nutritionist  Once        Provider:  (Not yet assigned)    Completed CRISTIANO ALTAMIRANO        Significant Labs:  None    Significant Imaging:  I have reviewed all pertinent imaging results/findings within the past 24 hours.    Pending Diagnostic Studies:     None        Final Active Diagnoses:      Problems Resolved During this Admission:    Diagnosis Date Noted Date Resolved POA    PRINCIPAL PROBLEM:  Chronic respiratory failure with hypercapnia [J96.12] 01/10/2023 10/03/2023 Yes    Hypotension [I95.9] 09/15/2023 10/03/2023 Yes    Sleep apnea [G47.30] 04/08/2016 10/03/2023 Yes     Chronic    CKD (chronic kidney disease) stage 3, GFR 30-59 ml/min [N18.30] 04/08/2016 10/03/2023 Yes     Chronic    Cellulitis [L03.90] 12/03/2021 09/25/2023 Yes    Melena [K92.1] 09/28/2023 10/03/2023 No    Acute on chronic anemia [D64.9] 09/27/2023 10/03/2023 Yes    Stage III pressure ulcer of sacral region [L89.153] 09/25/2023 10/03/2023 Yes    Bradycardia [R00.1] 09/21/2023 10/03/2023 Yes    Urinary retention [R33.9] 09/21/2023 10/03/2023 Yes    CHF (congestive heart failure) [I50.9] 09/15/2023 10/03/2023 Yes    Venous thromboembolism [I82.90] 09/20/2022 10/03/2023 Yes    Goals of care, counseling/discussion [Z71.89] 05/26/2022 10/03/2023 Not Applicable    GERD (gastroesophageal reflux disease) [K21.9] 01/02/2022 10/03/2023 Yes    Hyperlipidemia [E78.5] 01/02/2022 10/03/2023 Yes    Shock [R57.9] 12/03/2021 10/03/2023 No    Venous stasis dermatitis of both lower extremities [I87.2] 09/29/2017 10/03/2023 Yes     Chronic    Lymphedema [I89.0] 12/30/2016 10/03/2023 Yes    Hypothyroidism  (acquired) [E03.9] 2014 10/03/2023 Yes    Morbid obesity with BMI of 60.0-69.9, adult [E66.01, Z68.44] 2014 10/03/2023 Not Applicable     Chronic     No new Assessment & Plan notes have been filed under this hospital service since the last note was generated.  Service: Pulmonology    Discharged Condition:     Patient Instructions:   No discharge procedures on file.  Medications:  None     Time: 32 min     Cristiano Altamirano MD  Critical Care Medicine  [facility] - Intensive Care ([facility])

## 2023-10-03 NOTE — PROGRESS NOTES
Pharmacokinetic Assessment Follow Up: IV Vancomycin    Vancomycin serum concentration assessment(s):    The random level was drawn correctly and can be used to guide therapy at this time. The measurement is slightly above the desired definitive target range of 15 to 20 mcg/mL.    Vancomycin Regimen Plan:    Continue pulse dosing regimen with a one time dose of vancomycin 500 mg.    Re-dose when the random level is less than 20 mcg/mL, next level to be drawn at 0430 on 10/04    Drug levels (last 3 results):  Recent Labs   Lab Result Units 10/01/23  0350 10/02/23  0406 10/03/23  0422   Vancomycin, Random ug/mL 15.6 18.5 20.7       Pharmacy will continue to follow and monitor vancomycin.    Please contact pharmacy at extension [phone] for questions regarding this assessment.    Thank you for the consult,   Delphine Shah       Patient brief summary:  Gene Rothman is a 77 y.o. male initiated on antimicrobial therapy with IV Vancomycin for treatment of sepsis    The patient's current regimen is pulse dosing.     Drug Allergies:   Review of patient's allergies indicates:   Allergen Reactions    Sulfamethoxazole-trimethoprim Itching and Shortness Of Breath    Sulfamethoxazole        Actual Body Weight:   219.5 kg    Renal Function:   Estimated Creatinine Clearance: 28.7 mL/min (A) (based on SCr of 4 mg/dL (H)).,     Dialysis Method (if applicable):  N/A    CBC (last 72 hours):  Recent Labs   Lab Result Units 10/01/23  0350 10/02/23  0406 10/02/23  1349 10/03/23  0422   WBC K/uL 25.39* 17.86*  --  12.81*   Hemoglobin g/dL 7.7* 5.3* 8.6* 5.6*   Hematocrit % 22.8* 16.3* 26.3* 18.0*   Platelets K/uL 82* 59*  --  14*   Gran % % 89.5* 84.0*  --   --    Lymph % % 6.2* 8.0*  --   --    Mono % % 2.5* 3.0*  --   --    Eosinophil % % 0.0 0.0  --   --    Basophil % % 0.3 0.0  --   --    Differential Method  Automated Manual  --   --        Metabolic Panel (last 72 hours):  Recent Labs   Lab Result Units 09/30/23  1346 10/01/23  0350  10/02/23  0406 10/02/23  1740 10/03/23  0422   Sodium mmol/L  --  132* 128* 122* 115*   Potassium mmol/L  --  3.7 4.5 4.7 6.1*   Chloride mmol/L  --  102 95 93* 88*   CO2 mmol/L  --  12* 6* 6* 5*   Glucose mg/dL  --  228* 195* 134* 153*   Glucose, UA  Negative  --   --   --   --    BUN mg/dL  --  89* 86* 79* 73*   Creatinine mg/dL  --  4.1* 4.1* 3.9* 4.0*   Creatinine, Urine mg/dL 44.6  --   --   --   --    Albumin g/dL  --  1.2* 0.8*  --  0.7*   Magnesium mg/dL  --  1.6 1.7  --  1.7   Phosphorus mg/dL  --  5.1* 6.3*  --  8.3*       Vancomycin Administrations:  vancomycin given in the last 96 hours                     vancomycin (VANCOCIN) 1,000 mg in dextrose 5 % (D5W) 250 mL IVPB (Vial-Mate) (mg) 1,000 mg New Bag 10/02/23 0607    vancomycin 1,250 mg in dextrose 5 % (D5W) 250 mL IVPB (Vial-Mate) ()  Restarted 10/01/23 0659     1,250 mg New Bag  0612    vancomycin 2 g in dextrose 5 % 500 mL IVPB (mg) 2,000 mg New Bag 09/30/23 0946                    Microbiologic Results:  Microbiology Results (last 7 days)       Procedure Component Value Units Date/Time    Blood culture [[id number]] Collected: 09/30/23 0920    Order Status: Completed Specimen: Blood Updated: 10/02/23 1612     Blood Culture, Routine No Growth to date      No Growth to date      No Growth to date    Blood culture [[id number]] Collected: 09/30/23 0920    Order Status: Completed Specimen: Blood Updated: 10/02/23 1612     Blood Culture, Routine No Growth to date      No Growth to date      No Growth to date    Urine culture [[id number]] Collected: 09/29/23 0400    Order Status: Completed Specimen: Urine Updated: 09/30/23 1408     Urine Culture, Routine Multiple organisms isolated. None in predominance.  Repeat if      clinically necessary.    Narrative:      Specimen Source->Urine    Blood culture x two cultures. Draw prior to antibiotics. [[id number]] Collected: 09/20/23 9878    Order Status: Completed Specimen: Blood from Peripheral, Upper Arm, Right  Updated: 09/26/23 0612     Blood Culture, Routine No growth after 5 days.    Narrative:      Aerobic and anaerobic

## 2023-10-03 NOTE — PROGRESS NOTES
Therapy with vancomycin complete and/or consult discontinued by provider.  Pharmacy will sign off, please re-consult as needed.    Thank you for allowing us to participate in this patient's care.   Katherine McArdle, Pharm.D. 10/3/2023 9:30 AM

## 2023-10-04 ENCOUNTER — DOCUMENTATION ONLY (OUTPATIENT)
Dept: HOME HEALTH SERVICES | Facility: CLINIC | Age: 78
End: 2023-10-04
Payer: MEDICARE

## 2023-10-04 NOTE — PLAN OF CARE
10/04/23 0808   Final Note   Assessment Type Final Discharge Note   Anticipated Discharge Disposition

## 2023-10-05 LAB
BACTERIA BLD CULT: NORMAL
BACTERIA BLD CULT: NORMAL
